# Patient Record
Sex: FEMALE | Race: BLACK OR AFRICAN AMERICAN | NOT HISPANIC OR LATINO | Employment: OTHER | URBAN - METROPOLITAN AREA
[De-identification: names, ages, dates, MRNs, and addresses within clinical notes are randomized per-mention and may not be internally consistent; named-entity substitution may affect disease eponyms.]

---

## 2017-01-03 ENCOUNTER — OFFICE VISIT (OUTPATIENT)
Dept: OPTOMETRY | Facility: CLINIC | Age: 72
End: 2017-01-03
Payer: MEDICARE

## 2017-01-03 DIAGNOSIS — H43.813 PVD (POSTERIOR VITREOUS DETACHMENT), BILATERAL: ICD-10-CM

## 2017-01-03 DIAGNOSIS — H52.7 REFRACTIVE ERROR: ICD-10-CM

## 2017-01-03 DIAGNOSIS — E11.49 TYPE II DIABETES MELLITUS WITH NEUROLOGICAL MANIFESTATIONS: ICD-10-CM

## 2017-01-03 DIAGNOSIS — E11.9 TYPE 2 DIABETES MELLITUS WITHOUT OPHTHALMIC MANIFESTATIONS: ICD-10-CM

## 2017-01-03 DIAGNOSIS — H26.9 CORTICAL CATARACT OF BOTH EYES: ICD-10-CM

## 2017-01-03 DIAGNOSIS — H25.13 NS (NUCLEAR SCLEROSIS), BILATERAL: ICD-10-CM

## 2017-01-03 DIAGNOSIS — I10 HTN (HYPERTENSION), BENIGN: Primary | ICD-10-CM

## 2017-01-03 PROCEDURE — 92014 COMPRE OPH EXAM EST PT 1/>: CPT | Mod: S$GLB,,, | Performed by: OPTOMETRIST

## 2017-01-03 PROCEDURE — 99999 PR PBB SHADOW E&M-EST. PATIENT-LVL III: CPT | Mod: PBBFAC,,, | Performed by: OPTOMETRIST

## 2017-01-03 PROCEDURE — 92015 DETERMINE REFRACTIVE STATE: CPT | Mod: S$GLB,,, | Performed by: OPTOMETRIST

## 2017-01-03 NOTE — PROGRESS NOTES
HPI     Patient's age: 71 y.o.    Approximate date of last eye examination:  11/24/15  Name of last eye doctor seen: Dr. Real    Pt states that she is here for annual exam, not having any trouble with   eyes.    Wears glasses? readers       Wears CLs?:  no            Headaches?  no  Eye pain/discomfort?  no                                                                                     Flashes?  no  Floaters?  no  Diplopia/Double vision?  no    Patient's Ocular History:         Any eye surgeries? no        Family history of eye disease?  no    Significant patient medical history:         1. Diabetes?  yes       If yes, IDDM or NIDDM?  NIDDM   2. HBP?  Yes, medication and diet control                 ! OTC eyedrops currently using:  none   ! Prescription eye meds currently using:  none           Last edited by Kathe Mar MA on 1/3/2017 10:13 AM.         Assessment /Plan     For exam results, see Encounter Report.    Type II diabetes mellitus with neurological manifestations  HTN (hypertension), benign  No retinopathy, monitor yearly     Nuclear sclerosis, bilateral  Cortical cataract of both eyes  Mild, not visually significant     PVD (posterior vitreous detachment), bilateral  Stable  Refractive error  Ok to continue with readers      RTC 1 year, sooner PRN

## 2017-01-13 ENCOUNTER — OFFICE VISIT (OUTPATIENT)
Dept: INTERNAL MEDICINE | Facility: CLINIC | Age: 72
End: 2017-01-13
Payer: MEDICARE

## 2017-01-13 VITALS
DIASTOLIC BLOOD PRESSURE: 75 MMHG | SYSTOLIC BLOOD PRESSURE: 146 MMHG | HEIGHT: 66 IN | WEIGHT: 190 LBS | BODY MASS INDEX: 30.53 KG/M2 | RESPIRATION RATE: 17 BRPM | HEART RATE: 79 BPM

## 2017-01-13 DIAGNOSIS — Z12.31 VISIT FOR SCREENING MAMMOGRAM: ICD-10-CM

## 2017-01-13 DIAGNOSIS — E78.2 HYPERLIPIDEMIA, MIXED: ICD-10-CM

## 2017-01-13 DIAGNOSIS — E04.2 MULTIPLE THYROID NODULES: ICD-10-CM

## 2017-01-13 DIAGNOSIS — I10 HTN (HYPERTENSION), BENIGN: ICD-10-CM

## 2017-01-13 DIAGNOSIS — E11.49 TYPE II DIABETES MELLITUS WITH NEUROLOGICAL MANIFESTATIONS: Primary | ICD-10-CM

## 2017-01-13 DIAGNOSIS — K21.9 GASTROESOPHAGEAL REFLUX DISEASE, ESOPHAGITIS PRESENCE NOT SPECIFIED: ICD-10-CM

## 2017-01-13 DIAGNOSIS — R20.2 PARESTHESIA: ICD-10-CM

## 2017-01-13 LAB
BACTERIA #/AREA URNS AUTO: ABNORMAL /HPF
BILIRUB UR QL STRIP: NEGATIVE
CLARITY UR REFRACT.AUTO: CLEAR
COLOR UR AUTO: YELLOW
CREAT UR-MCNC: 67 MG/DL
GLUCOSE UR QL STRIP: NEGATIVE
HGB UR QL STRIP: NEGATIVE
HYALINE CASTS UR QL AUTO: 1 /LPF
KETONES UR QL STRIP: NEGATIVE
LEUKOCYTE ESTERASE UR QL STRIP: ABNORMAL
MICROALBUMIN UR DL<=1MG/L-MCNC: 107 UG/ML
MICROALBUMIN/CREATININE RATIO: 159.7 UG/MG
MICROSCOPIC COMMENT: ABNORMAL
NITRITE UR QL STRIP: POSITIVE
PH UR STRIP: 6 [PH] (ref 5–8)
PROT UR QL STRIP: NEGATIVE
RBC #/AREA URNS AUTO: 1 /HPF (ref 0–4)
SP GR UR STRIP: 1.01 (ref 1–1.03)
SQUAMOUS #/AREA URNS AUTO: 1 /HPF
URN SPEC COLLECT METH UR: ABNORMAL
UROBILINOGEN UR STRIP-ACNC: NEGATIVE EU/DL
WBC #/AREA URNS AUTO: 26 /HPF (ref 0–5)

## 2017-01-13 PROCEDURE — 99499 UNLISTED E&M SERVICE: CPT | Mod: S$GLB,,, | Performed by: INTERNAL MEDICINE

## 2017-01-13 PROCEDURE — 4010F ACE/ARB THERAPY RXD/TAKEN: CPT | Mod: S$GLB,,, | Performed by: INTERNAL MEDICINE

## 2017-01-13 PROCEDURE — 99214 OFFICE O/P EST MOD 30 MIN: CPT | Mod: S$GLB,,, | Performed by: INTERNAL MEDICINE

## 2017-01-13 PROCEDURE — 1125F AMNT PAIN NOTED PAIN PRSNT: CPT | Mod: S$GLB,,, | Performed by: INTERNAL MEDICINE

## 2017-01-13 PROCEDURE — 3044F HG A1C LEVEL LT 7.0%: CPT | Mod: S$GLB,,, | Performed by: INTERNAL MEDICINE

## 2017-01-13 PROCEDURE — 81001 URINALYSIS AUTO W/SCOPE: CPT

## 2017-01-13 PROCEDURE — 3077F SYST BP >= 140 MM HG: CPT | Mod: S$GLB,,, | Performed by: INTERNAL MEDICINE

## 2017-01-13 PROCEDURE — 99999 PR PBB SHADOW E&M-EST. PATIENT-LVL IV: CPT | Mod: PBBFAC,,, | Performed by: INTERNAL MEDICINE

## 2017-01-13 PROCEDURE — 82570 ASSAY OF URINE CREATININE: CPT

## 2017-01-13 PROCEDURE — 3078F DIAST BP <80 MM HG: CPT | Mod: S$GLB,,, | Performed by: INTERNAL MEDICINE

## 2017-01-13 PROCEDURE — 1160F RVW MEDS BY RX/DR IN RCRD: CPT | Mod: S$GLB,,, | Performed by: INTERNAL MEDICINE

## 2017-01-13 PROCEDURE — 1159F MED LIST DOCD IN RCRD: CPT | Mod: S$GLB,,, | Performed by: INTERNAL MEDICINE

## 2017-01-13 PROCEDURE — 1157F ADVNC CARE PLAN IN RCRD: CPT | Mod: S$GLB,,, | Performed by: INTERNAL MEDICINE

## 2017-01-13 RX ORDER — HYDROCODONE BITARTRATE AND ACETAMINOPHEN 5; 325 MG/1; MG/1
TABLET ORAL
Qty: 30 TABLET | Refills: 0 | Status: SHIPPED | OUTPATIENT
Start: 2017-01-13 | End: 2017-04-24 | Stop reason: SDUPTHER

## 2017-01-13 RX ORDER — LOSARTAN POTASSIUM AND HYDROCHLOROTHIAZIDE 25; 100 MG/1; MG/1
1 TABLET ORAL DAILY
Qty: 90 TABLET | Refills: 3 | Status: SHIPPED | OUTPATIENT
Start: 2017-01-13 | End: 2018-02-06 | Stop reason: SDUPTHER

## 2017-01-13 NOTE — MR AVS SNAPSHOT
[facility] [address]  [address]  [address]  [facility] [address]  Phone: [phone]                  Lina Davis   2017 8:00 AM   Office Visit    Description:  Female : 1945   Provider:  Dora Freedman MD   Department:  [facility] [address]           Reason for Visit     Follow-up           Diagnoses this Visit        Comments    Type II diabetes mellitus with neurological manifestations    -  Primary     HTN (hypertension), benign         Hyperlipidemia, mixed         Gastroesophageal reflux disease, esophagitis presence not specified         Multiple thyroid nodules         Visit for screening mammogram         Paresthesia                To Do List           Future Appointments        Provider Department Dept Phone    2017 10:00 AM [facility] [id number] SCREEN INT MED [facility] [phone]    2017 10:00 AM Kerwin Desir III, MD [facility] - Neurology [phone]    2017 10:00 AM LAB, APPOINTMENT [place] [facility] [phone]    2017 10:00 AM Dora Freedman MD [facility] [address] [phone]      Goals (5 Years of Data)     None       These Medications        Disp Refills Start End    blood sugar diagnostic (TRUE METRIX GLUCOSE TEST STRIP) Strp 200 strip 2 2017     Please give True metrix test strips to use as directed to monitor blood sugars BID    [facility] [address] [facility] Ph #: [phone]       losartan-hydrochlorothiazide 100-25 mg (HYZAAR) 100-25 mg per tablet 90 tablet 3 2017    Take 1 tablet by mouth once daily. - Oral    Ph[facility]acy: [facility] [address] [facility] Ph #: [phone]       hydrocodone-acetaminophen 5-325mg (NORCO) 5-325 mg per tablet 30 tablet 0 2017     One daily as needed for pain    Pharmacy: [facility] -  [place], [place] - [address] [facility] #: [phone]         [facility]     [facility] - 24/7 Assistance  Registered nurses in the [facility] provide clinical advisement, health education, appointment booking, and other advisory services.  Call for this free service at [phone].             Medications           Message regarding Medications     Verify the changes and/or additions to your medication regime listed below are the same as discussed with your clinician today.  If any of these changes or additions are incorrect, please notify your healthcare provider.        START taking these NEW medications        Refills    losartan-hydrochlorothiazide 100-25 mg (HYZAAR) 100-25 mg per tablet 3    Sig: Take 1 tablet by mouth once daily.    Class: Normal    Route: Oral      STOP taking these medications     hydrochlorothiazide (MICROZIDE) 12.5 mg capsule Take 1 capsule (12.5 mg total) by mouth once daily.    losartan-hydrochlorothiazide 100-12.5 mg (HYZAAR) 100-12.5 mg Tab Take 1 tablet by mouth once daily.           Verify that the below list of medications is an accurate representation of the medications you are currently taking.  If none reported, the list may be blank. If incorrect, please contact your healthcare provider. Carry this list with you in case of emergency.           Current Medications     allopurinol (ZYLOPRIM) 300 MG tablet 1 Tablet Oral Every day    aspirin (ASPIRIN LOW DOSE) 81 MG EC tablet Take by mouth. 1 Tablet, Delayed Release (E.C.) Oral Every day    BD ALCOHOL SWABS PadM USE AS DIRECTED TO MONITOR BLOOD SUGARS TWICE DAILY    blood sugar diagnostic (TRUE METRIX GLUCOSE TEST STRIP) Strp Please give True metrix test strips to use as directed to monitor blood sugars BID    blood-glucose meter (TRUE METRIX AIR GLUCOSE METER) Misc Please give a True metrix air glucose meter to use as directed to monitor blood sugars BID    hydrocodone-acetaminophen  "5-325mg (NORCO) 5-325 mg per tablet One daily as needed for pain    lancets (TRUEPLUS LANCETS) 28 gauge Misc 1 lancet by Misc.(Non-Drug; Combo Route) route 2 (two) times daily.    lovastatin (MEVACOR) 40 MG tablet Take 1 tablet (40 mg total) by mouth every evening.    metformin (GLUCOPHAGE) 500 MG tablet Take 2 tablets (1,000 mg total) by mouth 2 (two) times daily with meals.    omeprazole (PRILOSEC) 20 MG capsule One capsule twice daily    PRODIGY CONTROL SOLUTION, LOW Soln     sitagliptin (JANUVIA) 100 MG Tab TAKE 1 TABLET ONE TIME DAILY    losartan-hydrochlorothiazide 100-25 mg (HYZAAR) 100-25 mg per tablet Take 1 tablet by mouth once daily.           Clinical Reference Information           Vital Signs - Last Recorded  Most recent update: 1/13/2017  8:22 AM by Amanda Carias MA    BP Pulse Resp Ht Wt BMI    (!) 146/75 79 17 5' 6" (1.676 m) 86.2 kg (190 lb) 30.67 kg/m2      Blood Pressure          Most Recent Value    BP  (!)  146/75      Allergies as of 1/13/2017     No Known Drug Allergies      Immunizations Administered on Date of Encounter - 1/13/2017     None      Orders Placed During Today's Visit      Normal Orders This Visit    Microalbumin/creatinine urine ratio     Urinalysis     Future Labs/Procedures Expected by Expires    Hemoglobin A1c  1/13/2017 1/13/2018    Mammo Digital Screening Bilat with CAD  1/13/2017 3/16/2018    US Fine Needle Aspiration Thyroid Multi site (xpd)  1/13/2017 1/13/2018    EMG w/ Ultrasound  As directed 1/13/2018      "

## 2017-01-16 ENCOUNTER — HOSPITAL ENCOUNTER (OUTPATIENT)
Dept: RADIOLOGY | Facility: HOSPITAL | Age: 72
Discharge: HOME | End: 2017-01-16
Attending: INTERNAL MEDICINE
Payer: MEDICARE

## 2017-01-16 DIAGNOSIS — Z12.31 VISIT FOR SCREENING MAMMOGRAM: ICD-10-CM

## 2017-01-16 PROCEDURE — 77063 BREAST TOMOSYNTHESIS BI: CPT | Mod: 26,,, | Performed by: RADIOLOGY

## 2017-01-16 PROCEDURE — 77067 SCR MAMMO BI INCL CAD: CPT | Mod: TC

## 2017-01-16 PROCEDURE — 77067 SCR MAMMO BI INCL CAD: CPT | Mod: 26,,, | Performed by: RADIOLOGY

## 2017-01-29 NOTE — PROGRESS NOTES
Subjective:       Patient ID: Lina Davis is a 71 y.o. female.    Chief Complaint: Follow-up    HPIPt stomach is feeling better - no CP or SOB.  Some chronic shoulder pain but some new numbness/tingling in fingers.  No weakness.  Review of Systems   Respiratory: Negative for shortness of breath (PND or orthopnea).    Cardiovascular: Negative for chest pain (arm pain or jaw pain).   Gastrointestinal: Negative for abdominal pain, diarrhea, nausea and vomiting.   Genitourinary: Negative for dysuria.   Neurological: Negative for seizures, syncope and headaches.       Objective:      Physical Exam   Constitutional: She is oriented to person, place, and time. She appears well-developed and well-nourished. No distress.   HENT:   Head: Normocephalic.   Mouth/Throat: Oropharynx is clear and moist.   Neck: Neck supple. No JVD present. No thyromegaly present.   Cardiovascular: Normal rate, regular rhythm, normal heart sounds and intact distal pulses.  Exam reveals no gallop and no friction rub.    No murmur heard.  Pulmonary/Chest: Effort normal and breath sounds normal. She has no wheezes. She has no rales.   Abdominal: Soft. Bowel sounds are normal. She exhibits no distension and no mass. There is no tenderness. There is no rebound and no guarding.   Musculoskeletal: She exhibits no edema.   Lymphadenopathy:     She has no cervical adenopathy.   Neurological: She is alert and oriented to person, place, and time.   Skin: Skin is warm and dry.   Psychiatric: She has a normal mood and affect. Her behavior is normal. Judgment and thought content normal.       Assessment:       1. Type II diabetes mellitus with neurological manifestations    2. HTN (hypertension), benign    3. Hyperlipidemia, mixed    4. Gastroesophageal reflux disease, esophagitis presence not specified    5. Multiple thyroid nodules    6. Visit for screening mammogram    7. Paresthesia        Plan:   Type II diabetes mellitus with neurological  manifestations  -     Hemoglobin A1c; Future; Expected date: 1/13/17  -     Urinalysis  -     Microalbumin/creatinine urine ratio    HTN (hypertension), benign  Uncontrolled increase to [id number]/25  Hyperlipidemia, mixed  Controlled - continue current meds    Gastroesophageal reflux disease, esophagitis presence not specified  Controlled - continue current meds    Multiple thyroid nodules  -     US Fine Needle Aspiration Thyroid Multi site (xpd); Future; Expected date: 1/13/17    Visit for screening mammogram  -     Cancel: Mammo Digital Screening Bilat with CAD; Future; Expected date: 1/13/17    Paresthesia  -     EMG w/ Ultrasound; Future    Other orders  -     blood sugar diagnostic (TRUE METRIX GLUCOSE TEST STRIP) Strp; Please give True metrix test strips to use as directed to monitor blood sugars BID  Dispense: 200 strip; Refill: 2  -     losartan-hydrochlorothiazide 100-25 mg (HYZAAR) 100-25 mg per tablet; Take 1 tablet by mouth once daily.  Dispense: 90 tablet; Refill: 3  -     hydrocodone-acetaminophen 5-325mg (NORCO) 5-325 mg per tablet; One daily as needed for pain  Dispense: 30 tablet; Refill: 0  -     Urinalysis Microscopic

## 2017-02-20 ENCOUNTER — PROCEDURE VISIT (OUTPATIENT)
Dept: NEUROLOGY | Facility: CLINIC | Age: 72
End: 2017-02-20
Payer: MEDICARE

## 2017-02-20 DIAGNOSIS — R20.2 PARESTHESIA: ICD-10-CM

## 2017-02-20 PROCEDURE — 95886 MUSC TEST DONE W/N TEST COMP: CPT | Mod: S$GLB,,,

## 2017-02-20 PROCEDURE — 95909 NRV CNDJ TST 5-6 STUDIES: CPT | Mod: S$GLB,,,

## 2017-02-20 NOTE — PROCEDURES
Procedures     See the copy of this report that has been scanned into patient's Epic Chart.     EMILY REYNOLDS III, MD

## 2017-02-27 RX ORDER — LANCETS 28 GAUGE
1 EACH MISCELLANEOUS 2 TIMES DAILY
Qty: 200 EACH | Refills: 2 | Status: SHIPPED | OUTPATIENT
Start: 2017-02-27

## 2017-03-14 ENCOUNTER — PATIENT MESSAGE (OUTPATIENT)
Dept: ENDOCRINOLOGY | Facility: CLINIC | Age: 72
End: 2017-03-14

## 2017-03-18 DIAGNOSIS — E78.5 HYPERLIPIDEMIA: ICD-10-CM

## 2017-03-18 RX ORDER — BLOOD-GLUCOSE METER
EACH MISCELLANEOUS
Qty: 1 EACH | Refills: 0 | Status: SHIPPED | OUTPATIENT
Start: 2017-03-18 | End: 2017-07-31 | Stop reason: SDUPTHER

## 2017-03-19 RX ORDER — LOVASTATIN 40 MG/1
TABLET ORAL
Qty: 90 TABLET | Refills: 3 | Status: SHIPPED | OUTPATIENT
Start: 2017-03-19 | End: 2018-02-06 | Stop reason: SDUPTHER

## 2017-04-17 ENCOUNTER — LAB VISIT (OUTPATIENT)
Dept: LAB | Facility: HOSPITAL | Age: 72
End: 2017-04-17
Attending: INTERNAL MEDICINE
Payer: MEDICARE

## 2017-04-17 DIAGNOSIS — E11.49 TYPE II DIABETES MELLITUS WITH NEUROLOGICAL MANIFESTATIONS: ICD-10-CM

## 2017-04-17 LAB
ESTIMATED AVG GLUCOSE: 117 MG/DL
HBA1C MFR BLD HPLC: 5.7 %

## 2017-04-17 PROCEDURE — 83036 HEMOGLOBIN GLYCOSYLATED A1C: CPT

## 2017-04-17 PROCEDURE — 36415 COLL VENOUS BLD VENIPUNCTURE: CPT

## 2017-04-24 ENCOUNTER — OFFICE VISIT (OUTPATIENT)
Dept: INTERNAL MEDICINE | Facility: CLINIC | Age: 72
End: 2017-04-24
Payer: MEDICARE

## 2017-04-24 VITALS
DIASTOLIC BLOOD PRESSURE: 68 MMHG | SYSTOLIC BLOOD PRESSURE: 136 MMHG | WEIGHT: 189.19 LBS | HEART RATE: 87 BPM | HEIGHT: 66 IN | BODY MASS INDEX: 30.41 KG/M2

## 2017-04-24 DIAGNOSIS — M54.12 CERVICAL RADICULOPATHY AT C7: Primary | ICD-10-CM

## 2017-04-24 DIAGNOSIS — I10 HTN (HYPERTENSION), BENIGN: ICD-10-CM

## 2017-04-24 DIAGNOSIS — E11.21 TYPE 2 DIABETES MELLITUS WITH DIABETIC NEPHROPATHY, WITHOUT LONG-TERM CURRENT USE OF INSULIN: ICD-10-CM

## 2017-04-24 DIAGNOSIS — E78.2 HYPERLIPIDEMIA, MIXED: ICD-10-CM

## 2017-04-24 PROCEDURE — 1160F RVW MEDS BY RX/DR IN RCRD: CPT | Mod: S$GLB,,, | Performed by: INTERNAL MEDICINE

## 2017-04-24 PROCEDURE — 4010F ACE/ARB THERAPY RXD/TAKEN: CPT | Mod: S$GLB,,, | Performed by: INTERNAL MEDICINE

## 2017-04-24 PROCEDURE — 99999 PR PBB SHADOW E&M-EST. PATIENT-LVL IV: CPT | Mod: PBBFAC,,, | Performed by: INTERNAL MEDICINE

## 2017-04-24 PROCEDURE — 1125F AMNT PAIN NOTED PAIN PRSNT: CPT | Mod: S$GLB,,, | Performed by: INTERNAL MEDICINE

## 2017-04-24 PROCEDURE — 99499 UNLISTED E&M SERVICE: CPT | Mod: S$GLB,,, | Performed by: INTERNAL MEDICINE

## 2017-04-24 PROCEDURE — 3078F DIAST BP <80 MM HG: CPT | Mod: S$GLB,,, | Performed by: INTERNAL MEDICINE

## 2017-04-24 PROCEDURE — 3075F SYST BP GE 130 - 139MM HG: CPT | Mod: S$GLB,,, | Performed by: INTERNAL MEDICINE

## 2017-04-24 PROCEDURE — 3044F HG A1C LEVEL LT 7.0%: CPT | Mod: S$GLB,,, | Performed by: INTERNAL MEDICINE

## 2017-04-24 PROCEDURE — 99214 OFFICE O/P EST MOD 30 MIN: CPT | Mod: S$GLB,,, | Performed by: INTERNAL MEDICINE

## 2017-04-24 PROCEDURE — 1159F MED LIST DOCD IN RCRD: CPT | Mod: S$GLB,,, | Performed by: INTERNAL MEDICINE

## 2017-04-24 RX ORDER — GABAPENTIN 100 MG/1
CAPSULE ORAL
Qty: 90 CAPSULE | Refills: 3 | Status: SHIPPED | OUTPATIENT
Start: 2017-04-24 | End: 2017-08-08

## 2017-04-24 RX ORDER — HYDROCODONE BITARTRATE AND ACETAMINOPHEN 5; 325 MG/1; MG/1
TABLET ORAL
Qty: 30 TABLET | Refills: 0 | Status: SHIPPED | OUTPATIENT
Start: 2017-04-24 | End: 2017-06-08 | Stop reason: SDUPTHER

## 2017-04-24 NOTE — MR AVS SNAPSHOT
[facility] [address]  [address]  [address]  [facility] [address]  Phone: [phone]                  Lina Davis   2017 10:00 AM   Office Visit    Description:  Female : 1945   Provider:  Dora Freedman MD   Department:  [facility] [address]           Reason for Visit     Follow-up           Diagnoses this Visit        Comments    Cervical radiculopathy at C7    -  Primary            To Do List           Goals (5 Years of Data)     None      Follow-Up and Disposition     Return in about 3 months (around 2017).       These Medications        Disp Refills Start End    gabapentin (NEURONTIN) 100 MG capsule 90 capsule 3 2017     One at bedtime for 4 days then two at bedtime for four days then three indefintiely    [facility] [address] Ph #: [phone]       hydrocodone-acetaminophen 5-325mg (NORCO) 5-325 mg per tablet 30 tablet 0 2017     One daily as needed for pain    [facility] [address] Ph #: [phone]         [facility] On [facility]     [facility] Care [facility] -  Assistance  Unless otherwise directed by your provider, please contact [facility], our nurse care line that is available for  assistance.     Registered nurses in the [facility] provide: appointment scheduling, clinical advisement, health education, and other advisory services.  Call: [phone] (toll free)               Medications           Message regarding Medications     Verify the changes and/or additions to your medication regime listed below are the same as discussed with your clinician today.  If any of these changes or additions are incorrect, please notify your healthcare provider.        START taking these NEW medications        Refills    gabapentin (NEURONTIN) 100 MG capsule 3    Sig: One at bedtime  "for 4 days then two at bedtime for four days then three indefintiely    Class: Normal           Verify that the below list of medications is an accurate representation of the medications you are currently taking.  If none reported, the list may be blank. If incorrect, please contact your healthcare provider. Carry this list with you in case of emergency.           Current Medications     allopurinol (ZYLOPRIM) 300 MG tablet 1 Tablet Oral Every day    aspirin (ASPIRIN LOW DOSE) 81 MG EC tablet Take by mouth. 1 Tablet, Delayed Release (E.C.) Oral Every day    BD ALCOHOL SWABS PadM USE AS DIRECTED TO MONITOR BLOOD SUGARS TWICE DAILY    blood sugar diagnostic (TRUE METRIX GLUCOSE TEST STRIP) Strp Please give True metrix test strips to use as directed to monitor blood sugars BID    blood-glucose meter (TRUE METRIX AIR GLUCOSE METER) Misc Please give a True metrix air glucose meter to use as directed to monitor blood sugars BID    hydrocodone-acetaminophen 5-325mg (NORCO) 5-325 mg per tablet One daily as needed for pain    lancets (TRUEPLUS LANCETS) 28 gauge Misc 1 lancet by Misc.(Non-Drug; Combo Route) route 2 (two) times daily.    losartan-hydrochlorothiazide 100-25 mg (HYZAAR) 100-25 mg per tablet Take 1 tablet by mouth once daily.    lovastatin (MEVACOR) 40 MG tablet TAKE 1 TABLET EVERY EVENING    metformin (GLUCOPHAGE) 500 MG tablet Take 2 tablets (1,000 mg total) by mouth 2 (two) times daily with meals.    omeprazole (PRILOSEC) 20 MG capsule One capsule twice daily    PRODIGY CONTROL SOLUTION, LOW Soln     sitagliptin (JANUVIA) 100 MG Tab TAKE 1 TABLET ONE TIME DAILY    TRUE METRIX AIR GLUCOSE METER kit TEST BLOOD SUGAR TWICE DAILY    gabapentin (NEURONTIN) 100 MG capsule One at bedtime for 4 days then two at bedtime for four days then three indefintiely           Clinical Reference Information           Your Vitals Were     BP Pulse Height Weight BMI    136/68 87 5' 6" (1.676 m) 85.8 kg (189 lb 3.2 oz) 30.54 kg/m2 "      Blood Pressure          Most Recent Value    BP  136/68      Allergies as of 4/24/2017     No Known Drug Allergies      Immunizations Administered on Date of Encounter - 4/24/2017     None      Orders Placed During Today's Visit     Future Labs/Procedures Expected by Expires    Creatinine, serum  4/24/2017 6/23/2018    MRI Cervical Spine W WO Cont  4/24/2017 4/24/2018      Language Assistance Services     ATTENTION: Language assistance services are available, free of charge. Please call [phone].      ATENCIÓN: Si habla michael, tiene a castillo disposición servicios gratuitos de asistencia lingüística. Llame al [phone].     CHÚ Ý: N?u b?n nói Ti?ng Vi?t, có các d?ch v? h? tr? ngôn ng? mi?n phí dành cho b?n. G?i s? [phone].         [facility] [address] complies with applicable Federal civil rights laws and does not discriminate on the basis of race, color, national origin, age, disability, or sex.

## 2017-05-02 NOTE — PROGRESS NOTES
Subjective:       Patient ID: Lina Davis is a 72 y.o. female.    Chief Complaint: Follow-up (HTN)    HPIPt doing well from standpoint of BP and sugar however has burning pain and numbness in first and second fingers of the left hand.  She finds she is dropping some things but only knows she has dropped them when she hears them hit the floor.  Difficulty buttoning clothes etc.  Review of Systems   Respiratory: Negative for shortness of breath (PND or orthopnea).    Cardiovascular: Negative for chest pain (arm pain or jaw pain).   Gastrointestinal: Negative for abdominal pain, diarrhea, nausea and vomiting.   Genitourinary: Negative for dysuria.   Neurological: Negative for seizures, syncope and headaches.       Objective:      Physical Exam   Constitutional: She is oriented to person, place, and time. She appears well-developed and well-nourished. No distress.   HENT:   Head: Normocephalic.   Mouth/Throat: Oropharynx is clear and moist.   Neck: Neck supple. No JVD present. No thyromegaly present.   Cardiovascular: Normal rate, regular rhythm, normal heart sounds and intact distal pulses.  Exam reveals no gallop and no friction rub.    No murmur heard.  Pulmonary/Chest: Effort normal and breath sounds normal. She has no wheezes. She has no rales.   Abdominal: Soft. Bowel sounds are normal. She exhibits no distension and no mass. There is no tenderness. There is no rebound and no guarding.   Musculoskeletal: She exhibits no edema.   5/5 strength in upper extremities of testing some diminished reflexes at the biceps and brachioradialis on the left   Lymphadenopathy:     She has no cervical adenopathy.   Neurological: She is alert and oriented to person, place, and time.   Skin: Skin is warm and dry.   Psychiatric: She has a normal mood and affect. Her behavior is normal. Judgment and thought content normal.       Assessment:       1. Cervical radiculopathy at C7    2. HTN (hypertension), benign    3. Type 2 diabetes  mellitus with diabetic nephropathy, without long-term current use of insulin    4. Hyperlipidemia, mixed        Plan:   Cervical radiculopathy at C7  -     MRI Cervical Spine W WO Cont; Future; Expected date: 4/24/17  -     Creatinine, serum; Future; Expected date: 4/24/17    HTN (hypertension), benign  Controlled - continue current meds    Type 2 diabetes mellitus with diabetic nephropathy, without long-term current use of insulin  Controlled - continue current meds    Hyperlipidemia, mixed  Controlled - continue current meds    Other orders  -     gabapentin (NEURONTIN) 100 MG capsule; One at bedtime for 4 days then two at bedtime for four days then three indefintiely  Dispense: 90 capsule; Refill: 3  -     hydrocodone-acetaminophen 5-325mg (NORCO) 5-325 mg per tablet; One daily as needed for pain  Dispense: 30 tablet; Refill: 0

## 2017-05-24 ENCOUNTER — HOSPITAL ENCOUNTER (OUTPATIENT)
Dept: RADIOLOGY | Facility: HOSPITAL | Age: 72
Discharge: HOME | End: 2017-05-24
Attending: INTERNAL MEDICINE
Payer: MEDICARE

## 2017-05-24 DIAGNOSIS — M54.12 CERVICAL RADICULOPATHY AT C7: ICD-10-CM

## 2017-05-24 PROCEDURE — 72141 MRI NECK SPINE W/O DYE: CPT | Mod: TC

## 2017-05-24 PROCEDURE — 63600175 PHARM REV CODE 636 W HCPCS: Performed by: STUDENT IN AN ORGANIZED HEALTH CARE EDUCATION/TRAINING PROGRAM

## 2017-05-24 PROCEDURE — 72141 MRI NECK SPINE W/O DYE: CPT | Mod: 26,,, | Performed by: RADIOLOGY

## 2017-05-24 RX ORDER — ALLOPURINOL 300 MG/1
TABLET ORAL
Qty: 90 TABLET | Refills: 3 | Status: SHIPPED | OUTPATIENT
Start: 2017-05-24 | End: 2018-09-12 | Stop reason: SDUPTHER

## 2017-05-24 RX ORDER — MIDAZOLAM HYDROCHLORIDE 1 MG/ML
2 INJECTION INTRAMUSCULAR; INTRAVENOUS ONCE
Status: COMPLETED | OUTPATIENT
Start: 2017-05-24 | End: 2017-05-24

## 2017-05-24 RX ADMIN — MIDAZOLAM HYDROCHLORIDE 2 MG: 1 INJECTION, SOLUTION INTRAMUSCULAR; INTRAVENOUS at 10:05

## 2017-05-24 NOTE — NURSING
MD ordered versed 2mg IV to be given to pt in MRI as anxiolytic, NKDA, pts  present in waiting area & will drive pt home, pt instructed no driving until godfrey am, pt verbalized she takes pain medicine @ night only, pt denies taking antianxiety medicine, pt verbalized understanding to all above

## 2017-05-25 RX ORDER — DEXTROSE 4 G
TABLET,CHEWABLE ORAL
Qty: 1 EACH | Refills: 0 | Status: SHIPPED | OUTPATIENT
Start: 2017-05-25 | End: 2017-11-17 | Stop reason: SDUPTHER

## 2017-05-25 NOTE — TELEPHONE ENCOUNTER
----- Message from Luisa Page MA sent at 5/25/2017 12:00 PM CDT -----  Contact: [phone]  Type: Rx    Name of medication(s): blood-glucose meter (TRUE METRIX AIR GLUCOSE METER) Misc    Is this a refill? New rx? Refill    Who prescribed medication?    Pharmacy Name, Phone, & Location: [facility] - [place], [place] - [address]    Comments: Please advise. Thanks!

## 2017-06-08 ENCOUNTER — OFFICE VISIT (OUTPATIENT)
Dept: ORTHOPEDICS | Facility: CLINIC | Age: 72
End: 2017-06-08
Payer: MEDICARE

## 2017-06-08 ENCOUNTER — HOSPITAL ENCOUNTER (OUTPATIENT)
Dept: RADIOLOGY | Facility: HOSPITAL | Age: 72
Discharge: HOME | End: 2017-06-08
Attending: NURSE PRACTITIONER
Payer: MEDICARE

## 2017-06-08 VITALS — WEIGHT: 185.63 LBS | HEIGHT: 66 IN | BODY MASS INDEX: 29.83 KG/M2

## 2017-06-08 DIAGNOSIS — M25.561 RIGHT KNEE PAIN, UNSPECIFIED CHRONICITY: Primary | ICD-10-CM

## 2017-06-08 DIAGNOSIS — M17.11 PRIMARY OSTEOARTHRITIS OF RIGHT KNEE: ICD-10-CM

## 2017-06-08 DIAGNOSIS — M25.561 RIGHT KNEE PAIN, UNSPECIFIED CHRONICITY: ICD-10-CM

## 2017-06-08 PROCEDURE — 1159F MED LIST DOCD IN RCRD: CPT | Mod: S$GLB,,, | Performed by: NURSE PRACTITIONER

## 2017-06-08 PROCEDURE — 99999 PR PBB SHADOW E&M-EST. PATIENT-LVL III: CPT | Mod: PBBFAC,,, | Performed by: NURSE PRACTITIONER

## 2017-06-08 PROCEDURE — 73560 X-RAY EXAM OF KNEE 1 OR 2: CPT | Mod: TC,LT

## 2017-06-08 PROCEDURE — 1125F AMNT PAIN NOTED PAIN PRSNT: CPT | Mod: S$GLB,,, | Performed by: NURSE PRACTITIONER

## 2017-06-08 PROCEDURE — 20610 DRAIN/INJ JOINT/BURSA W/O US: CPT | Mod: RT,S$GLB,, | Performed by: NURSE PRACTITIONER

## 2017-06-08 PROCEDURE — 73562 X-RAY EXAM OF KNEE 3: CPT | Mod: 26,RT,, | Performed by: RADIOLOGY

## 2017-06-08 PROCEDURE — 73560 X-RAY EXAM OF KNEE 1 OR 2: CPT | Mod: 26,59,LT, | Performed by: RADIOLOGY

## 2017-06-08 PROCEDURE — 99499 UNLISTED E&M SERVICE: CPT | Mod: S$GLB,,, | Performed by: NURSE PRACTITIONER

## 2017-06-08 PROCEDURE — 99214 OFFICE O/P EST MOD 30 MIN: CPT | Mod: 25,S$GLB,, | Performed by: NURSE PRACTITIONER

## 2017-06-08 RX ORDER — HYDROCODONE BITARTRATE AND ACETAMINOPHEN 5; 325 MG/1; MG/1
TABLET ORAL
Qty: 30 TABLET | Refills: 0 | Status: SHIPPED | OUTPATIENT
Start: 2017-06-08 | End: 2017-08-08 | Stop reason: SDUPTHER

## 2017-06-08 RX ORDER — ACETAMINOPHEN 325 MG/1
325 TABLET ORAL EVERY 6 HOURS PRN
Status: ON HOLD | COMMUNITY
End: 2020-03-20 | Stop reason: HOSPADM

## 2017-06-09 NOTE — PROGRESS NOTES
CC: Pain of the Right Knee      HPI: Pt with right knee pain for the past month and 1/2. The pain is global with swelling to the posterior knee. The pain is aching and worse with activity. She has taken tylenol without improvement. She doesn't take NSAIDS due to her diabetes. She is ambulating without assistive device. There is not a limp.    ROS  General: denies fever and chills  Resp: no c/o sob  CVS: no c/o cp  MSK: c/o right knee pain and swelling    PE  General: AAOx3, pleasant and cooperative  Resp: respirations even and unlabored  MSK: right knee exam  0 degrees extension  120 degrees flexion  No warmth or erythema   - effusion    Xray:  Ordered and reviewed by me: There is mild DJD and a mild varus deformity.  There are large spurs on the patella.  No fracture dislocation bone destruction seen.    Assessment:  Right knee djd    Plan:  Cortisone injection right knee today  RICE  Tylenol prn  F/u as needed    Knee Injection Procedure Note    Pre-operative Diagnosis: right knee degenerative arthritis    Post-operative Diagnosis: same    Indications: right knee pain    Anesthesia: none    Procedure Details     Verbal consent was obtained for the procedure. The injection site was identified and the skin was prepared with alcohol. The right knee was injected from an anterolateral approach with 1 ml of Kenalog and 5 ml Lidocaine under sterile technique using a 22 gauge needle. The needle was removed and the area cleansed and dressed.    Complications:  None; patient tolerated the procedure well.    she was advised to rest the knee today, using ice and elevation as needed for comfort and swelling. she did receive immediate relief of the knee pain. she was told this would be short lived and is secondary to the lidocaine. she may have an increase in discomfort tonight followed by steady improvement over the next several days. It may take 1-3 weeks following the injection to get the full benefit of the  medication.

## 2017-06-12 RX ORDER — TRIAMCINOLONE ACETONIDE 40 MG/ML
40 INJECTION, SUSPENSION INTRA-ARTICULAR; INTRAMUSCULAR
Status: DISCONTINUED | OUTPATIENT
Start: 2017-06-08 | End: 2017-10-13 | Stop reason: HOSPADM

## 2017-06-22 ENCOUNTER — OFFICE VISIT (OUTPATIENT)
Dept: INTERNAL MEDICINE | Facility: CLINIC | Age: 72
End: 2017-06-22
Payer: MEDICARE

## 2017-06-22 ENCOUNTER — HOSPITAL ENCOUNTER (OUTPATIENT)
Dept: RADIOLOGY | Facility: HOSPITAL | Age: 72
Discharge: HOME | End: 2017-06-22
Attending: INTERNAL MEDICINE
Payer: MEDICARE

## 2017-06-22 VITALS
DIASTOLIC BLOOD PRESSURE: 59 MMHG | TEMPERATURE: 99 F | HEART RATE: 90 BPM | WEIGHT: 183.19 LBS | HEIGHT: 67 IN | BODY MASS INDEX: 28.75 KG/M2 | OXYGEN SATURATION: 98 % | SYSTOLIC BLOOD PRESSURE: 134 MMHG

## 2017-06-22 DIAGNOSIS — R05.9 COUGH: ICD-10-CM

## 2017-06-22 DIAGNOSIS — J32.9 SINUSITIS, UNSPECIFIED CHRONICITY, UNSPECIFIED LOCATION: Primary | ICD-10-CM

## 2017-06-22 DIAGNOSIS — J20.9 ACUTE BRONCHITIS, UNSPECIFIED ORGANISM: ICD-10-CM

## 2017-06-22 PROCEDURE — 99499 UNLISTED E&M SERVICE: CPT | Mod: S$GLB,,, | Performed by: PHYSICIAN ASSISTANT

## 2017-06-22 PROCEDURE — 1159F MED LIST DOCD IN RCRD: CPT | Mod: S$GLB,,, | Performed by: PHYSICIAN ASSISTANT

## 2017-06-22 PROCEDURE — 1126F AMNT PAIN NOTED NONE PRSNT: CPT | Mod: S$GLB,,, | Performed by: PHYSICIAN ASSISTANT

## 2017-06-22 PROCEDURE — 99999 PR PBB SHADOW E&M-EST. PATIENT-LVL V: CPT | Mod: PBBFAC,,, | Performed by: PHYSICIAN ASSISTANT

## 2017-06-22 PROCEDURE — 71020 XR CHEST PA AND LATERAL: CPT | Mod: 26,,, | Performed by: RADIOLOGY

## 2017-06-22 PROCEDURE — 99213 OFFICE O/P EST LOW 20 MIN: CPT | Mod: S$GLB,,, | Performed by: PHYSICIAN ASSISTANT

## 2017-06-22 PROCEDURE — 71020 XR CHEST PA AND LATERAL: CPT | Mod: TC

## 2017-06-22 RX ORDER — BENZONATATE 100 MG/1
100 CAPSULE ORAL 3 TIMES DAILY PRN
Qty: 30 CAPSULE | Refills: 0 | Status: SHIPPED | OUTPATIENT
Start: 2017-06-22 | End: 2017-06-22 | Stop reason: SDUPTHER

## 2017-06-22 RX ORDER — BENZONATATE 100 MG/1
100 CAPSULE ORAL 3 TIMES DAILY PRN
Qty: 30 CAPSULE | Refills: 0 | Status: SHIPPED | OUTPATIENT
Start: 2017-06-22 | End: 2017-06-30 | Stop reason: SDUPTHER

## 2017-06-22 RX ORDER — DOXYCYCLINE 100 MG/1
100 CAPSULE ORAL EVERY 12 HOURS
Qty: 20 CAPSULE | Refills: 0 | Status: SHIPPED | OUTPATIENT
Start: 2017-06-22 | End: 2017-08-08

## 2017-06-22 NOTE — PATIENT INSTRUCTIONS
What Is Acute Bronchitis?  Acute or short-term bronchitis last for days or weeks. It occurs when the bronchial tubes (airways in the lungs) are irritated by a virus, bacteria, or allergen. This causes a cough that produces yellow or greenish mucus.  Inside healthy lungs    Air travels in and out of the lungs through the airways. The linings of these airways produce sticky mucus. This mucus traps particles that enter the lungs. Tiny structures called cilia then sweep the particles out of the airways.     Healthy airway: Airways are normally open. Air moves in and out easily.      Healthy cilia: Tiny, hairlike cilia sweep mucus and particles up and out of the airways.   Lungs with bronchitis  Bronchitis often occurs with a cold or the flu virus. The airways become inflamed (red and swollen). There is a deep hacking cough from the extra mucus. Other symptoms may include:  · Wheezing  · Chest discomfort  · Shortness of breath  · Mild fever  A second infection, this time due to bacteria, may then occur. And airways irritated by allergens or smoke are more likely to get infected.        Inflamed airway: Inflammation and extra mucus narrow the airway, causing shortness of breath.      Impaired cilia: Extra mucus impairs cilia, causing congestion and wheezing. Smoking makes the problem worse.   Making a diagnosis  A physical exam, health history, and certain tests help your healthcare provider make the diagnosis.  Health history  Your healthcare provider will ask you about your symptoms.  The exam  Your provider listens to your chest for signs of congestion. He or she may also check your ears, nose, and throat.  Possible tests  · A sputum test for bacteria. This requires a sample of mucus from the lungs.  · A nasal or throat swab for bacterial infection.  · A chest X-ray if your healthcare provider thinks you have pneumonia.  · Tests to check for an underlying condition, such as allergies, asthma, or COPD. You may need  to see a specialist for more lung function testing.  Treating a cough  The main treatment for bronchitis is easing symptoms. Avoiding smoke, allergens, and other things that trigger coughing can often help. If the infection is bacterial, you may be given antibiotics. During the illness, it's important to get plenty of sleep. To ease symptoms:  · Dont smoke, and avoid secondhand smoke.  · Use a humidifier, or breathe in steam from a hot shower. This may help loosen mucus.  · Drink a lot of water and juice. They can soothe the throat and may help thin mucus.  · Sit up or use extra pillows when in bed to help lessen coughing and congestion.  · Ask your provider about using cough medicine, pain and fever medicine, or a decongestant.  Antibiotics  Most cases of bronchitis are caused by cold or flu viruses. Antibiotics dont treat viral illness. Taking antibiotics when they are not needed increases your risk of getting an infection later that is antibiotic-resistant. Your provider will prescribe antibiotics if the infection is caused by bacteria. If they are prescribed:  · Take the medicine until it is used up, even if symptoms have improved. If you dont, the bronchitis may come back.  · Take them as directed. For instance, some medicines should be taken with food.  · Ask your provider or pharmacist what side effects are common, and what to do about them.  Follow-up care  You should see your provider again in 2 to 3 weeks. By this time, symptoms should have improved. An infection that lasts longer may mean you have a more serious problem.  Prevention  · Avoid tobacco smoke. If you smoke, quit. Stay away from smoky places. Ask friends and family not to smoke around you, or in your home or car.  · Get checked for allergies.  · Ask your provider about getting a yearly flu shot, and pneumococcal or pneumonia shots.  · Wash your hands often. This helps reduce the chance of picking up viruses that cause colds and flu.  Call  your healthcare provider if:  · Symptoms worsen, or new symptoms develop.  · Breathing problems worsen or  become severe.  · Symptoms dont get better within a week, or within 3 days of taking antibiotics.   Date Last Reviewed: 6/18/2014  © 6835-3426 [employer]. [address] [place] [address], [place] [address]. All rights reserved. This information is not intended as a substitute for professional medical care. Always follow your healthcare professional's instructions.

## 2017-06-22 NOTE — PROGRESS NOTES
Subjective:       Patient ID: Lina Davis is a 72 y.o. female.        Chief Complaint: Cough; Fever; and Nasal Congestion    Lina Davis is an established patient of Dora Freedman MD here today for urgent care visit.    Sx started 5 days ago.  Nasal congestion, purulent nasal discharge, cough productive of thick white mucus.  No shortness of breath.  No chest pain.  Feels congestion in her chest.  Also with sore throat.  She has had some sweating.  No chills.      No N/V/D/C.  No abdominal pain.  No urinary sx.  No leg swelling.      Past medical history significant for HTN, DM, gout, hyperlipidemia.      Often can use Mucinex to improve sx in a couple days but sx this time have been persistent.      DM-blood sugar 143 this morning.             Review of Systems   Constitutional: Positive for diaphoresis. Negative for chills, fatigue and fever.   HENT: Positive for congestion and sore throat.    Eyes: Negative for visual disturbance.   Respiratory: Positive for cough. Negative for chest tightness and shortness of breath.    Cardiovascular: Negative for chest pain, palpitations and leg swelling.   Gastrointestinal: Negative for abdominal pain, blood in stool, constipation, diarrhea, nausea and vomiting.   Genitourinary: Negative for dysuria, frequency, hematuria and urgency.   Musculoskeletal: Negative for arthralgias and back pain.   Skin: Negative for rash.   Neurological: Negative for dizziness, syncope, weakness and headaches.   Psychiatric/Behavioral: Negative for dysphoric mood and sleep disturbance. The patient is not nervous/anxious.        Objective:      Physical Exam   Constitutional: She appears well-developed and well-nourished. No distress.   HENT:   Head: Normocephalic and atraumatic.   Right Ear: Tympanic membrane and external ear normal.   Left Ear: Tympanic membrane and external ear normal.   Nose: Mucosal edema and rhinorrhea present. Right sinus exhibits maxillary sinus tenderness. Right  sinus exhibits no frontal sinus tenderness. Left sinus exhibits maxillary sinus tenderness. Left sinus exhibits no frontal sinus tenderness.   Mouth/Throat: Oropharynx is clear and moist.   Eyes: Conjunctivae are normal. Pupils are equal, round, and reactive to light.   Cardiovascular: Normal rate, regular rhythm and normal heart sounds.  Exam reveals no gallop.    No murmur heard.  Pulmonary/Chest: Effort normal and breath sounds normal. No respiratory distress.   Abdominal: Soft. Normal appearance. There is no tenderness. There is no rebound, no guarding and no CVA tenderness.   Musculoskeletal: She exhibits no edema.   Neurological: She is alert.   Skin: Skin is warm and dry. She is not diaphoretic.   Psychiatric: She has a normal mood and affect.   Nursing note and vitals reviewed.      Assessment:       1. Sinusitis, unspecified chronicity, unspecified location    2. Acute bronchitis, unspecified organism        Plan:       Lina was seen today for cough, fever and nasal congestion.    Diagnoses and all orders for this visit:    Sinusitis, unspecified chronicity, unspecified location  -     doxycycline (VIBRAMYCIN) 100 MG Cap; Take 1 capsule (100 mg total) by mouth every 12 (twelve) hours.  -     benzonatate (TESSALON) 100 MG capsule; Take 1 capsule (100 mg total) by mouth 3 (three) times daily as needed for Cough.    Acute bronchitis, unspecified organism        -     X-Ray Chest PA And Lateral; Future  -     doxycycline (VIBRAMYCIN) 100 MG Cap; Take 1 capsule (100 mg total) by mouth every 12 (twelve) hours.  -     benzonatate (TESSALON) 100 MG capsule; Take 1 capsule (100 mg total) by mouth 3 (three) times daily as needed for Cough.    Drink plenty of fluids, get lots of rest, and follow-up poor results.  CXR negative for pneumonia.  Sx x 5 days and worsening with immunocompromised state with diabetes, start empiric antibiotic therapy.      Pt has been given instructions populated from [employer] database and has  "verbalized understanding of the after visit summary and information contained wherein.    Follow up with a primary care provider. May go to ER for acute shortness of breath, lightheadedness, fever, or any other emergent complaints or changes in condition.    "This note will be shared with the patient"    Future Appointments  Date Time Provider Department Center   7/31/2017 9:00 AM Dora Freedman MD [facility] Patel               "

## 2017-06-30 DIAGNOSIS — J32.9 SINUSITIS, UNSPECIFIED CHRONICITY, UNSPECIFIED LOCATION: ICD-10-CM

## 2017-06-30 DIAGNOSIS — J20.9 ACUTE BRONCHITIS, UNSPECIFIED ORGANISM: ICD-10-CM

## 2017-07-03 RX ORDER — BENZONATATE 100 MG/1
CAPSULE ORAL
Qty: 30 CAPSULE | Refills: 0 | Status: SHIPPED | OUTPATIENT
Start: 2017-07-03 | End: 2018-03-12

## 2017-07-13 RX ORDER — BLOOD-GLUCOSE METER
EACH MISCELLANEOUS
Refills: 0 | OUTPATIENT
Start: 2017-07-13

## 2017-07-31 RX ORDER — CALCIUM CITRATE/VITAMIN D3 200MG-6.25
TABLET ORAL
Qty: 200 STRIP | Refills: 2 | Status: CANCELLED | OUTPATIENT
Start: 2017-07-31

## 2017-08-03 NOTE — TELEPHONE ENCOUNTER
----- Message from Jackelin Khanna sent at 8/3/2017 12:19 PM CDT -----  Contact: Ran [phone]  Type: Rx    Name of medication(s):   blood sugar diagnostic (TRUE METRIX GLUCOSE TEST STRIP) Strp ,blood-glucose meter (TRUE METRIX AIR GLUCOSE METER) Misc     Is this a refill? New rx? Refill      Who prescribed medication?    Pharmacy Name, Phone, & Location: [facility]     Comments: please advise , Thanks !

## 2017-08-08 ENCOUNTER — HOSPITAL ENCOUNTER (OUTPATIENT)
Dept: RADIOLOGY | Facility: HOSPITAL | Age: 72
Discharge: HOME | End: 2017-08-08
Attending: INTERNAL MEDICINE
Payer: MEDICARE

## 2017-08-08 ENCOUNTER — TELEPHONE (OUTPATIENT)
Dept: INTERNAL MEDICINE | Facility: CLINIC | Age: 72
End: 2017-08-08

## 2017-08-08 DIAGNOSIS — I10 HTN (HYPERTENSION), BENIGN: ICD-10-CM

## 2017-08-08 DIAGNOSIS — E55.9 MILD VITAMIN D DEFICIENCY: ICD-10-CM

## 2017-08-08 DIAGNOSIS — E11.49 TYPE II DIABETES MELLITUS WITH NEUROLOGICAL MANIFESTATIONS: ICD-10-CM

## 2017-08-08 DIAGNOSIS — M25.511 ACUTE PAIN OF RIGHT SHOULDER: ICD-10-CM

## 2017-08-08 DIAGNOSIS — G95.89 MYELOMALACIA OF CERVICAL CORD: ICD-10-CM

## 2017-08-08 DIAGNOSIS — R11.10 VOMITING, INTRACTABILITY OF VOMITING NOT SPECIFIED, PRESENCE OF NAUSEA NOT SPECIFIED, UNSPECIFIED VOMITING TYPE: ICD-10-CM

## 2017-08-08 DIAGNOSIS — E78.00 PURE HYPERCHOLESTEROLEMIA: Primary | ICD-10-CM

## 2017-08-08 PROCEDURE — 73060 X-RAY EXAM OF HUMERUS: CPT | Mod: TC,RT

## 2017-08-08 PROCEDURE — 73030 X-RAY EXAM OF SHOULDER: CPT | Mod: 26,RT,, | Performed by: RADIOLOGY

## 2017-08-08 PROCEDURE — 73030 X-RAY EXAM OF SHOULDER: CPT | Mod: TC,RT

## 2017-08-08 PROCEDURE — 73060 X-RAY EXAM OF HUMERUS: CPT | Mod: 26,RT,, | Performed by: RADIOLOGY

## 2017-08-08 RX ORDER — HYDROCODONE BITARTRATE AND ACETAMINOPHEN 5; 325 MG/1; MG/1
TABLET ORAL
Qty: 30 TABLET | Refills: 0 | Status: SHIPPED | OUTPATIENT
Start: 2017-08-08 | End: 2017-09-11 | Stop reason: SDUPTHER

## 2017-08-08 RX ORDER — INSULIN PUMP SYRINGE, 3 ML
EACH MISCELLANEOUS
Qty: 1 EACH | Refills: 0 | Status: ON HOLD | OUTPATIENT
Start: 2017-08-08 | End: 2018-01-30 | Stop reason: HOSPADM

## 2017-08-09 ENCOUNTER — PATIENT MESSAGE (OUTPATIENT)
Dept: INTERNAL MEDICINE | Facility: CLINIC | Age: 72
End: 2017-08-09

## 2017-09-06 ENCOUNTER — TELEPHONE (OUTPATIENT)
Dept: INTERNAL MEDICINE | Facility: CLINIC | Age: 72
End: 2017-09-06

## 2017-09-06 NOTE — TELEPHONE ENCOUNTER
----- Message from Cookie Guerra sent at 9/5/2017  1:20 PM CDT -----  Contact: Self/ [phone]   Pt want to know if the doctor want to do more testing on her shoulder. She is still having issues with it. Please call and advise     Thank you

## 2017-09-06 NOTE — TELEPHONE ENCOUNTER
Pt is still having pain in her shoulder she states what other test she can have done to see what is wrong

## 2017-09-11 ENCOUNTER — TELEPHONE (OUTPATIENT)
Dept: INTERNAL MEDICINE | Facility: CLINIC | Age: 72
End: 2017-09-11

## 2017-09-11 DIAGNOSIS — M25.519 SHOULDER PAIN, UNSPECIFIED CHRONICITY, UNSPECIFIED LATERALITY: Primary | ICD-10-CM

## 2017-09-11 RX ORDER — HYDROCODONE BITARTRATE AND ACETAMINOPHEN 5; 325 MG/1; MG/1
TABLET ORAL
Qty: 30 TABLET | Refills: 0 | Status: SHIPPED | OUTPATIENT
Start: 2017-09-11 | End: 2017-10-10 | Stop reason: SDUPTHER

## 2017-09-12 DIAGNOSIS — E11.21 TYPE 2 DIABETES MELLITUS WITH DIABETIC NEPHROPATHY: ICD-10-CM

## 2017-09-12 RX ORDER — METFORMIN HYDROCHLORIDE 500 MG/1
TABLET ORAL
Qty: 360 TABLET | Refills: 3 | Status: SHIPPED | OUTPATIENT
Start: 2017-09-12 | End: 2018-02-26

## 2017-09-14 ENCOUNTER — TELEPHONE (OUTPATIENT)
Dept: INTERNAL MEDICINE | Facility: CLINIC | Age: 72
End: 2017-09-14

## 2017-09-14 ENCOUNTER — OFFICE VISIT (OUTPATIENT)
Dept: SPORTS MEDICINE | Facility: CLINIC | Age: 72
End: 2017-09-14
Payer: MEDICARE

## 2017-09-14 VITALS
HEART RATE: 91 BPM | SYSTOLIC BLOOD PRESSURE: 129 MMHG | WEIGHT: 183 LBS | DIASTOLIC BLOOD PRESSURE: 74 MMHG | HEIGHT: 67 IN | BODY MASS INDEX: 28.72 KG/M2

## 2017-09-14 DIAGNOSIS — M19.019 ACROMIOCLAVICULAR JOINT ARTHRITIS: ICD-10-CM

## 2017-09-14 DIAGNOSIS — M75.41 IMPINGEMENT SYNDROME, SHOULDER, RIGHT: ICD-10-CM

## 2017-09-14 DIAGNOSIS — M25.511 ACUTE PAIN OF RIGHT SHOULDER: ICD-10-CM

## 2017-09-14 DIAGNOSIS — M79.603 PAIN OF UPPER EXTREMITY, UNSPECIFIED LATERALITY: Primary | ICD-10-CM

## 2017-09-14 PROCEDURE — 1159F MED LIST DOCD IN RCRD: CPT | Mod: S$GLB,,, | Performed by: ORTHOPAEDIC SURGERY

## 2017-09-14 PROCEDURE — 3078F DIAST BP <80 MM HG: CPT | Mod: S$GLB,,, | Performed by: ORTHOPAEDIC SURGERY

## 2017-09-14 PROCEDURE — 3008F BODY MASS INDEX DOCD: CPT | Mod: S$GLB,,, | Performed by: ORTHOPAEDIC SURGERY

## 2017-09-14 PROCEDURE — 99999 PR PBB SHADOW E&M-EST. PATIENT-LVL III: CPT | Mod: PBBFAC,,, | Performed by: ORTHOPAEDIC SURGERY

## 2017-09-14 PROCEDURE — 3074F SYST BP LT 130 MM HG: CPT | Mod: S$GLB,,, | Performed by: ORTHOPAEDIC SURGERY

## 2017-09-14 PROCEDURE — 20610 DRAIN/INJ JOINT/BURSA W/O US: CPT | Mod: RT,S$GLB,, | Performed by: ORTHOPAEDIC SURGERY

## 2017-09-14 PROCEDURE — 1125F AMNT PAIN NOTED PAIN PRSNT: CPT | Mod: S$GLB,,, | Performed by: ORTHOPAEDIC SURGERY

## 2017-09-14 PROCEDURE — 99204 OFFICE O/P NEW MOD 45 MIN: CPT | Mod: 25,S$GLB,, | Performed by: ORTHOPAEDIC SURGERY

## 2017-09-14 RX ORDER — TRIAMCINOLONE ACETONIDE 40 MG/ML
40 INJECTION, SUSPENSION INTRA-ARTICULAR; INTRAMUSCULAR
Status: DISCONTINUED | OUTPATIENT
Start: 2017-09-14 | End: 2017-09-15 | Stop reason: HOSPADM

## 2017-09-14 RX ADMIN — TRIAMCINOLONE ACETONIDE 40 MG: 40 INJECTION, SUSPENSION INTRA-ARTICULAR; INTRAMUSCULAR at 04:09

## 2017-09-14 NOTE — PROGRESS NOTES
CC: RIGHT shoulder pain     72 y.o. 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 Her right shoulder has been bothering her for 3 weeks after a fall down the stairs with an extended and slightly abducted R arm. The pain has gradually gotten worse since the fall. She cannot sleep with the pain. The pain in achy in nature.   She states that the pain is severe and not responding to any conservative care.      She reports that the pain and weakness is worse with overhead activity. It also bothers her at night.    Is affecting ADLs.     No numbness or tingling down arm in association with complaint.    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    Vitamin D deficiency disease        Past Surgical History:   Procedure Laterality Date    CARPAL TUNNEL RELEASE      bilateral    COLONOSCOPY W/ BIOPSIES AND POLYPECTOMY      HEMORRHOID SURGERY      HYSTERECTOMY      AUB    ROTATOR CUFF REPAIR      left shoulder       Family History   Problem Relation Age of Onset    Heart disease Mother     Diabetes Mother     Heart disease Father     Cancer Father      liver    Diabetes Sister     Cataracts Sister     Kidney disease Brother     Heart disease Brother     Diabetes Sister     COPD Brother     COPD Brother     Gout Brother     Benign prostatic hyperplasia Brother     Heart disease Brother     Kidney disease Brother     Heart attack Brother      heart attack    Kidney failure Brother     Lupus Sister     Heart attack Brother     Drug abuse Brother     No Known Problems Daughter     No Known Problems Son     Amblyopia Neg Hx     Blindness Neg Hx     Breast cancer Neg Hx     Colon  cancer Neg Hx     Ovarian cancer Neg Hx          Current Outpatient Prescriptions:     acetaminophen (TYLENOL) 325 MG tablet, Take 325 mg by mouth every 6 (six) hours as needed for Pain., Disp: , Rfl:     allopurinol (ZYLOPRIM) 300 MG tablet, 1 Tablet Oral Every day, Disp: 90 tablet, Rfl: 3    aspirin (ASPIRIN LOW DOSE) 81 MG EC tablet, Take by mouth. 1 Tablet, Delayed Release (E.C.) Oral Every day, Disp: , Rfl:     BD ALCOHOL SWABS Pad, USE AS DIRECTED TO MONITOR BLOOD SUGARS TWICE DAILY, Disp: 200 each, Rfl: 3    benzonatate (TESSALON) 100 MG capsule, TAKE 1 CAPSULE THREE TIMES DAILY AS NEEDED FOR COUGH, Disp: 30 capsule, Rfl: 0    blood sugar diagnostic (TRUE METRIX GLUCOSE TEST STRIP) Strp, Please give True metrix test strips and lancets to monitor blood sugars twice daily E11.21, Disp: 200 each, Rfl: 2    blood-glucose meter (TRUE METRIX AIR GLUCOSE METER) kit, Test twice daily, E11.21, Disp: 1 each, Rfl: 0    blood-glucose meter (TRUE METRIX AIR GLUCOSE METER) Misc, Please give a True metrix air glucose meter to use as directed to monitor blood sugars BID, Disp: 1 each, Rfl: 0    hydrocodone-acetaminophen 5-325mg (NORCO) 5-325 mg per tablet, One daily as needed for pain, Disp: 30 tablet, Rfl: 0    lancets (TRUEPLUS LANCETS) 28 gauge Misc, 1 lancet by Misc.(Non-Drug; Combo Route) route 2 (two) times daily., Disp: 200 each, Rfl: 2    losartan-hydrochlorothiazide 100-25 mg (HYZAAR) 100-25 mg per tablet, Take 1 tablet by mouth once daily., Disp: 90 tablet, Rfl: 3    lovastatin (MEVACOR) 40 MG tablet, TAKE 1 TABLET EVERY EVENING, Disp: 90 tablet, Rfl: 3    metformin (GLUCOPHAGE) 500 MG tablet, TAKE 2 TABLETS TWICE DAILY WITH MEALS, Disp: 360 tablet, Rfl: 3    omeprazole (PRILOSEC) 20 MG capsule, One capsule twice daily, Disp: 180 capsule, Rfl: 3    PRODIGY CONTROL SOLUTION, LOW Soln, , Disp: , Rfl:     sitagliptin (JANUVIA) 100 MG Tab, TAKE 1 TABLET ONE TIME DAILY, Disp: 90 tablet, Rfl:  "3    Current Facility-Administered Medications:     triamcinolone acetonide injection 40 mg, 40 mg, Intra-articular, 1 time in Clinic/HOD, Ruth Rojas NP    Review of patient's allergies indicates:   Allergen Reactions    No known drug allergies           REVIEW OF SYSTEMS:  Constitution: Negative. Negative for chills, fever and night sweats.    Hematologic/Lymphatic: Negative for bleeding problem. Does not bruise/bleed easily.   Skin: Negative for dry skin, itching and rash.   Musculoskeletal: Negative for falls. Positive for right shoulder pain and  muscle weakness.     PHYSICAL EXAMINATION:  Vitals:  /74   Pulse 91   Ht 5' 7" (1.702 m)   Wt 83 kg (183 lb)   BMI 28.66 kg/m²    General: Well-developed well-nourished 72 y.o. femalein no acute distress   Cardiovascular: Regular rhythm by palpation of distal pulse, normal color and temperature, no concerning varicosities on symptomatic side   Lungs: No labored breathing or wheezing appreciated   Neuro: Alert and oriented ×3   Psychiatric: well oriented to person, place and time, demonstrates normal mood and affect   Skin: No rashes, lesions or ulcers, normal temperature, turgor, and texture on involved extremity            Right Shoulder Exam     Crepitus   The patient has crepitus of the greater tuberosity, supraspinatus, biceps tendon and AC joint.    Range of Motion   Forward Elevation: 80  External Rotation 0 degrees: 30     Tests & Signs   Drop Arm: positive  Hawkin's test: positive  Impingement: positive  Anterosuperior Escape: negative  Belly Press: negative    Left Shoulder Exam     Inspection/Observation   Scars: present      Muscle Strength   Right Upper Extremity   Shoulder Abduction: 3/5   Shoulder Internal Rotation: 4/5   Shoulder External Rotation: 4/5   Supraspinatus: 4/5/5   Subscapularis: 4/5/5   Left Upper Extremity  Shoulder Abduction: 5/5   Shoulder Internal Rotation: 5/5   Shoulder External Rotation: 5/5   Supraspinatus: 5/5/5 "   Subscapularis: 5/5/5         IMAGING:  Xrays including AP, Outlet and Axillary Lateral of RIGHT shoulder are ordered / images reviewed by me:   No fracture dislocation   Acromion type III with large spur   Proximal migration of humeral head: None   GH arthritis: Mild degenerative change    ASSESSMENT:      ICD-10-CM ICD-9-CM   1. Impingement syndrome, shoulder, right M75.41 726.2   2. Acromioclavicular joint arthritis M19.019 716.91   3. Acute pain of right shoulder M25.511 719.41       Likely acute on chronic cuff tearing with new onset pseudoparalysis    PLAN:      Will obtain MRI of the shoulder to assess the cuff. Similar complaint in left shoulder which ultimately required surgery. Patient requests steroid injection to help her sleep. Given her current pain level, I'm happy to provide this. RTC after study.       Large Joint Aspiration/Injection  Date/Time: 9/14/2017 4:34 PM  Performed by: MUNIRA LACKEY  Authorized by: MUNIRA LACKEY     Consent Done?:  Yes (Verbal)  Indications:  Pain  Procedure site marked: Yes    Timeout: Prior to procedure the correct patient, procedure, and site was verified      Location:  Shoulder  Site:  R subacromial bursa  Prep: Patient was prepped and draped in usual sterile fashion    Ultrasonic Guidance for needle placement: No  Needle size:  22 G  Approach:  Posterior  Medications:  40 mg triamcinolone acetonide 40 mg/mL  Patient tolerance:  Patient tolerated the procedure well with no immediate complications

## 2017-09-14 NOTE — LETTER
September 14, 2017      Dora Freedman MD  [address]  [facility] [address]           [facility]  [address]  [facility] [address]  Phone: [phone]          Patient: Lina Davis   MR Number: [id number]   YOB: 1945   Date of Visit: 9/14/2017       Dear Dr. Dora Freedman:    Thank you for referring Lina Davis to me for evaluation. Attached you will find relevant portions of my assessment and plan of care.    If you have questions, please do not hesitate to call me. I look forward to following Lina Davis along with you.    Sincerely,    MUNIRA Grier MD    Enclosure  CC:  No Recipients    If you would like to receive this communication electronically, please contact [email or web address] or [phone] to request more information on [employer] access.    For providers and/or their staff who would like to refer a patient to [facility], please contact us through our one-stop-shop provider referral line, [facility], at [phone].    If you feel you have received this communication in error or would no longer like to receive these types of communications, please e-mail [email or web address]

## 2017-09-19 RX ORDER — ISOPROPYL ALCOHOL 0.75 G/1
SWAB TOPICAL
Refills: 3 | Status: CANCELLED | OUTPATIENT
Start: 2017-09-19

## 2017-09-21 RX ORDER — ALPRAZOLAM 0.5 MG/1
TABLET ORAL
Qty: 2 TABLET | Refills: 0 | Status: SHIPPED | OUTPATIENT
Start: 2017-09-21 | End: 2017-10-12 | Stop reason: CLARIF

## 2017-09-22 ENCOUNTER — HOSPITAL ENCOUNTER (OUTPATIENT)
Dept: RADIOLOGY | Facility: HOSPITAL | Age: 72
Discharge: HOME | End: 2017-09-22
Attending: ORTHOPAEDIC SURGERY
Payer: MEDICARE

## 2017-09-22 DIAGNOSIS — M75.41 IMPINGEMENT SYNDROME, SHOULDER, RIGHT: ICD-10-CM

## 2017-09-22 PROCEDURE — 73221 MRI JOINT UPR EXTREM W/O DYE: CPT | Mod: 26,RT,, | Performed by: RADIOLOGY

## 2017-09-22 PROCEDURE — 73221 MRI JOINT UPR EXTREM W/O DYE: CPT | Mod: TC,RT

## 2017-09-26 ENCOUNTER — OFFICE VISIT (OUTPATIENT)
Dept: SPORTS MEDICINE | Facility: CLINIC | Age: 72
End: 2017-09-26
Payer: MEDICARE

## 2017-09-26 VITALS
SYSTOLIC BLOOD PRESSURE: 157 MMHG | HEART RATE: 93 BPM | BODY MASS INDEX: 28.72 KG/M2 | DIASTOLIC BLOOD PRESSURE: 84 MMHG | WEIGHT: 183 LBS | HEIGHT: 67 IN

## 2017-09-26 DIAGNOSIS — M75.121 COMPLETE TEAR OF RIGHT ROTATOR CUFF: Primary | ICD-10-CM

## 2017-09-26 DIAGNOSIS — M75.41 IMPINGEMENT SYNDROME, SHOULDER, RIGHT: ICD-10-CM

## 2017-09-26 DIAGNOSIS — M75.21 BICIPITAL TENDINITIS, RIGHT: ICD-10-CM

## 2017-09-26 DIAGNOSIS — M19.019 ACROMIOCLAVICULAR JOINT ARTHRITIS: ICD-10-CM

## 2017-09-26 PROCEDURE — 3079F DIAST BP 80-89 MM HG: CPT | Mod: S$GLB,,, | Performed by: ORTHOPAEDIC SURGERY

## 2017-09-26 PROCEDURE — 1125F AMNT PAIN NOTED PAIN PRSNT: CPT | Mod: S$GLB,,, | Performed by: ORTHOPAEDIC SURGERY

## 2017-09-26 PROCEDURE — 99999 PR PBB SHADOW E&M-EST. PATIENT-LVL III: CPT | Mod: PBBFAC,,, | Performed by: ORTHOPAEDIC SURGERY

## 2017-09-26 PROCEDURE — 3077F SYST BP >= 140 MM HG: CPT | Mod: S$GLB,,, | Performed by: ORTHOPAEDIC SURGERY

## 2017-09-26 PROCEDURE — 1159F MED LIST DOCD IN RCRD: CPT | Mod: S$GLB,,, | Performed by: ORTHOPAEDIC SURGERY

## 2017-09-26 PROCEDURE — 99214 OFFICE O/P EST MOD 30 MIN: CPT | Mod: S$GLB,,, | Performed by: ORTHOPAEDIC SURGERY

## 2017-09-26 PROCEDURE — 3008F BODY MASS INDEX DOCD: CPT | Mod: S$GLB,,, | Performed by: ORTHOPAEDIC SURGERY

## 2017-09-26 NOTE — PROGRESS NOTES
"CC: RIGHT shoulder pain     72 y.o. 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 Her right shoulder has been bothering her since a fall down the stairs with an extended and slightly abducted R arm. She obtained a cortisone injection last visit, which she reports gave her 40% relief but pain has returned back. Last night was the first night her pain had completely returned, and it kept her up all night. She is here today to review the MRI and discuss possible treatment options with Dr. Grier.     She continues to report that the pain and weakness is worse with overhead activity. It continues to bother her at night.    Is affecting ADLs.     No numbness or tingling down arm in association with complaint.    History of previous left shoulder RCR from which she's well.     She denies any changes to their past medical history, past surgical history, family history, social history, medications and allergies.     REVIEW OF SYSTEMS:  Constitution: Negative. Negative for chills, fever and night sweats.    Hematologic/Lymphatic: Negative for bleeding problem. Does not bruise/bleed easily.   Skin: Negative for dry skin, itching and rash.   Musculoskeletal: Negative for falls. Positive for right shoulder pain and  muscle weakness.     PHYSICAL EXAMINATION:  Vitals:  BP (!) 157/84   Pulse 93   Ht 5' 7" (1.702 m)   Wt 83 kg (183 lb)   BMI 28.66 kg/m²    General: Well-developed well-nourished 72 y.o. femalein no acute distress   Cardiovascular: Regular rhythm by palpation of distal pulse, normal color and temperature, no concerning varicosities on symptomatic side   Lungs: No labored breathing or wheezing appreciated   Neuro: Alert and oriented ×3   Psychiatric: well oriented to person, place and time, demonstrates normal mood and affect   Skin: No rashes, lesions or ulcers, normal temperature, turgor, and texture on involved extremity          Right Shoulder Exam "     Inspection/Observation   Swelling: absent    Tenderness   The patient is tender to palpation of the greater tuberosity, supraspinatus, acromioclavicular joint and biceps tendon.    Crepitus   The patient has crepitus of the greater tuberosity.    Range of Motion   Forward Elevation: 140  External Rotation 0 degrees: 60   Internal Rotation 0 degrees: T12     Tests & Signs   Hawkin's test: positive  Impingement: positive  Rotator Cuff Painful Arc/Range: moderate  Anterosuperior Escape: negative  Belly Press: negative    Comments:  Improved AROM on today's exam - overhead limited by pain; full PROM in all planes and no signs of adhesive capsulitis; pain and weakness with cuff testing; no rotational lag signs; negative Spurlings    Left Shoulder Exam     Inspection/Observation   Scars: present      Muscle Strength   Right Upper Extremity   Shoulder Internal Rotation: 5/5   Shoulder External Rotation: 4/5   Supraspinatus: 4/5/5   Left Upper Extremity  Shoulder Abduction: 5/5   Shoulder Internal Rotation: 5/5   Shoulder External Rotation: 5/5   Supraspinatus: 5/5/5   Subscapularis: 5/5/5     Vascular Exam     Right Pulses      Radial:                    2+        Hemoglobin A1C 4.0 - 5.6 % 5.1          IMAGING:  R Shoulder MRI from 9/22/17 was reviewed with the patient. Images show:    1.Massive rotator cuff tear involving the supraspinatus and infraspinatus tendons with differential retraction to the level of the glenoid.  No associated volume loss or fatty infiltration.  See description.  2.Biceps tendinosis without partial or full-thickness tear.  3. Degenerative changes of the glenoid labrum with a diminutive caliber of the superior segment.  4. Suspected high-grade partial-thickness cartilage loss overlying the superior aspect of the humeral head.  5. Superior subluxation of the humeral head with resulting narrowing of the acromiohumeral interval but no associated pseudoarticulation.  6. Moderate AC joint  hypertrophy.  7. Joint effusion and subacromial/subdeltoid bursitis.    Negative Pickens. Goutallier grade 2 fatty infiltration of infraspinatus. Supine imaging in scanner without sig proximal humeral head migration on upright prior films. Large subacromial spurs.       ASSESSMENT:      ICD-10-CM ICD-9-CM   1. Complete tear of right rotator cuff M75.121 727.61   2. Bicipital tendinitis, right M75.21 726.12   3. Impingement syndrome, shoulder, right M75.41 726.2   4. Acromioclavicular joint arthritis M19.019 716.91     PLAN:      Findings were reviewed with the patient. Treatment options were discussed to include arthroscopic rotator cuff repair versus debridement, biceps tenotomy, subacromial decompression, labral debridement, distal clavicle excision.  Risks of surgery include tissue re-tear versus inability to completely repair the cuff at time of surgery.  This is a large to massive tear and certainly the risk for re-tear or irreparability is significant although muscle quality well maintained.  Functional and clinical outcomes often improve irrespective of retear with biceps treatment. In the setting of persistent or recurrent symptomatic tear, she understands the  potential need down the road for RTSA, which she'd like to avoid if possible. In the absence of treatment, she understands the inherent risk for CTA. She will discuss with her  and let us know if she would like to proceed.     Procedures

## 2017-10-03 DIAGNOSIS — M75.41 IMPINGEMENT SYNDROME OF RIGHT SHOULDER: ICD-10-CM

## 2017-10-03 DIAGNOSIS — M75.121 COMPLETE TEAR OF RIGHT ROTATOR CUFF: Primary | ICD-10-CM

## 2017-10-03 DIAGNOSIS — M75.21 BICEPS TENDINITIS OF RIGHT UPPER EXTREMITY: ICD-10-CM

## 2017-10-03 DIAGNOSIS — M19.019 AC (ACROMIOCLAVICULAR) ARTHRITIS: ICD-10-CM

## 2017-10-05 NOTE — PLAN OF CARE
10/05/17 1224   ED Admissions Case Approval   ED Admissions Case Approval CM Approved  (OP )

## 2017-10-09 ENCOUNTER — OFFICE VISIT (OUTPATIENT)
Dept: SPORTS MEDICINE | Facility: CLINIC | Age: 72
End: 2017-10-09
Payer: MEDICARE

## 2017-10-09 VITALS
HEIGHT: 67 IN | DIASTOLIC BLOOD PRESSURE: 84 MMHG | WEIGHT: 183 LBS | HEART RATE: 96 BPM | BODY MASS INDEX: 28.72 KG/M2 | SYSTOLIC BLOOD PRESSURE: 148 MMHG

## 2017-10-09 DIAGNOSIS — M75.21 BICEPS TENDONITIS ON RIGHT: ICD-10-CM

## 2017-10-09 DIAGNOSIS — M75.121 COMPLETE TEAR OF RIGHT ROTATOR CUFF: Primary | ICD-10-CM

## 2017-10-09 PROCEDURE — 99999 PR PBB SHADOW E&M-EST. PATIENT-LVL IV: CPT | Mod: PBBFAC,,, | Performed by: PHYSICIAN ASSISTANT

## 2017-10-09 PROCEDURE — 99499 UNLISTED E&M SERVICE: CPT | Mod: S$GLB,,, | Performed by: PHYSICIAN ASSISTANT

## 2017-10-09 RX ORDER — OXYCODONE HYDROCHLORIDE 5 MG/1
5 TABLET ORAL EVERY 4 HOURS PRN
Qty: 60 TABLET | Refills: 0 | Status: SHIPPED | OUTPATIENT
Start: 2017-10-09 | End: 2017-10-25

## 2017-10-09 RX ORDER — ASPIRIN 325 MG
325 TABLET, DELAYED RELEASE (ENTERIC COATED) ORAL 2 TIMES DAILY
Qty: 28 TABLET | Refills: 0 | Status: SHIPPED | OUTPATIENT
Start: 2017-10-09 | End: 2017-11-21

## 2017-10-09 RX ORDER — ONDANSETRON 4 MG/1
4 TABLET, FILM COATED ORAL EVERY 8 HOURS PRN
Qty: 30 TABLET | Refills: 0 | Status: SHIPPED | OUTPATIENT
Start: 2017-10-09 | End: 2017-10-16

## 2017-10-09 NOTE — LETTER
Patient: Lina Davis   YOB: 1945   Clinic Number: [id number]   Today's Date: October 9, 2017        To Whom It May Concern     Lina Davis is scheduled for right shoulder surgery on 10/13/2017.     Please excuse Madiha Davis from work on 10/13/2017 while she takes care of her mother for surgery on Friday.      If you have any questions or concerns, please feel free to contact the office at [phone].    Thank you.    Glenny Steward PA-C        Signature: __________________________________________________

## 2017-10-09 NOTE — H&P
Lina Davis  is here for a completion of her perioperative paperwork. she  Is scheduled to undergo right shoulder arthroscopic rotator cuff repair versus debridement, biceps tenotomy, subacromial decompression, labral debridement, distal clavicle excision on 10/13/2017.  She is a healthy individual and does need clearance for this procedure. Pending clearance by Dr. Freedman.    Risks, indications and benefits of the surgical procedure were discussed with the patient. All questions with regard to surgery, rehab, expected return to functional activities, activities of daily living and recreational endeavors were answered to her satisfaction.    Patient was informed and understands the risks of surgery are greater for patients with a current condition or hx of heart disease, obesity, clotting disorders, recurrent infections, steroid use, current or past smoking, and factors such as sedentary lifestyle and noncompliance with medications, therapy or f/u. The degree of the increased risk is hard to estimate w/ any degree of precision.    Once no other questions were asked, a brief history and physical exam was then performed.      PHYSICAL EXAM:  GEN: A&Ox3, WD WN NAD  HEENT: WNL  CHEST: CTAB, no W/R/R  HEART: RRR, no M/R/G   ABD: Soft, NT ND, BS x4 QUADS  MS: Refer to previous note for detailed MS exam  NEURO: CN II-XII intact       The surgical consent was then reviewed with the patient, who agreed with all the contents of the consent form and it was signed. she was then given the [facility] surgery packet to bring with her to [facility] for the anesthesia portion of her perioperative paperwork.     PHYSICAL THERAPY:  She was also instructed regarding physical therapy and will begin on  POD #3-5 at [facility].     POST OP CARE:instructions were reviewed including care of the wound and dressing after surgery and when she can shower.     PAIN MANAGEMENT: Lina Davis was instructed regarding the Polar ice unit that  will be in place after surgery and her postoperative pain medications.     MEDICATION:  Roxicodone 5 mg 1-2 q 4 hours PRN for pain  Zofran 4 mg q 8 hours PRN for nausea and vomiting.  ASA 325mg BID x 2 weeks    Patient was instructed to purchase and take Colace to counter possible GI side effects of taking opiates.     DVT prophylaxis was discussed with the patient today including risk factors for developing DVTs and history of DVTs. The patient was asked if any specific recommendations were given from the doctor/s that did pre-operative surgical clearance.      The below listed DVT prophylaxis regimen along with bilateral BETH compression stockings will be used post-op.      Patient was instructed to buy and take:  Patient was asked if they were taking or using OCP pills or devices, if had a hx of a DVT/PE, current smoker, or hypercoagulable disorder. If they answered yes to any of the following, then Lovenox 40 mg, q daily x 2 weeks was prescribed to help prevent DVT development.     If patient's BMI>40, Lovenox 40 mg, q daily x 2 weeks was prescribed to help prevent DVT development.     If patient has no increased risk factors, Aspirin 325 mg BID x 2 weeks was prescribed to help prevent DVT development    If the patient was previously taking 81mg baby aspirin, they were told to not take it will using the above stated aspirin and to restart the 81mg aspirin after completion of the aspirin dose.      Aspirin 325mg BID x 2 weeks for DVT prophylaxis starting on the evening after surgery.  Patient will also use bilateral TEDs on lower extremities, SCDs during surgery, and early ambulation post-op.   Patient was also told to buy over the counter Prilosec medication and take it once daily for GI protection as long as they are taking NSAIDs or Aspirin.     The patient was told that narcotic pain medications may make them drowsy and instructions were given to not sign legal documents, drive or operate heavy machinery,  cars, or equipment while under the influence of narcotic medications.     Dr. Grier was present in clinic and directly involved in the additional informed consent process with signing of paperwork and pre-op evaluation. The patient had the opportunity to ask Dr. Grier any additional questions and all questions were answered.    As there were no other questions to be asked, she was given my business card along with Dr. Grier's business card if she has any questions or concerns prior to surgery or in the postop period.

## 2017-10-10 ENCOUNTER — OFFICE VISIT (OUTPATIENT)
Dept: INTERNAL MEDICINE | Facility: CLINIC | Age: 72
End: 2017-10-10
Payer: MEDICARE

## 2017-10-10 ENCOUNTER — IMMUNIZATION (OUTPATIENT)
Dept: INTERNAL MEDICINE | Facility: CLINIC | Age: 72
End: 2017-10-10
Payer: MEDICARE

## 2017-10-10 ENCOUNTER — LAB VISIT (OUTPATIENT)
Dept: LAB | Facility: HOSPITAL | Age: 72
End: 2017-10-10
Attending: INTERNAL MEDICINE
Payer: MEDICARE

## 2017-10-10 VITALS
SYSTOLIC BLOOD PRESSURE: 115 MMHG | WEIGHT: 179.88 LBS | HEART RATE: 71 BPM | BODY MASS INDEX: 28.23 KG/M2 | DIASTOLIC BLOOD PRESSURE: 69 MMHG | HEIGHT: 67 IN

## 2017-10-10 DIAGNOSIS — I10 HTN (HYPERTENSION), BENIGN: ICD-10-CM

## 2017-10-10 DIAGNOSIS — E11.49 TYPE II DIABETES MELLITUS WITH NEUROLOGICAL MANIFESTATIONS: ICD-10-CM

## 2017-10-10 DIAGNOSIS — R80.9 DIABETES MELLITUS DUE TO UNDERLYING CONDITION WITH MICROALBUMINURIA, WITHOUT LONG-TERM CURRENT USE OF INSULIN: ICD-10-CM

## 2017-10-10 DIAGNOSIS — E11.21 DIABETIC NEPHROPATHY ASSOCIATED WITH TYPE 2 DIABETES MELLITUS: ICD-10-CM

## 2017-10-10 DIAGNOSIS — E78.00 PURE HYPERCHOLESTEROLEMIA: Primary | ICD-10-CM

## 2017-10-10 DIAGNOSIS — K21.9 GASTROESOPHAGEAL REFLUX DISEASE, ESOPHAGITIS PRESENCE NOT SPECIFIED: ICD-10-CM

## 2017-10-10 DIAGNOSIS — E08.29 DIABETES MELLITUS DUE TO UNDERLYING CONDITION WITH MICROALBUMINURIA, WITHOUT LONG-TERM CURRENT USE OF INSULIN: ICD-10-CM

## 2017-10-10 DIAGNOSIS — E55.9 VITAMIN D DEFICIENCY: ICD-10-CM

## 2017-10-10 LAB
25(OH)D3+25(OH)D2 SERPL-MCNC: 28 NG/ML
ALBUMIN SERPL BCP-MCNC: 3.6 G/DL
ALP SERPL-CCNC: 72 U/L
ALT SERPL W/O P-5'-P-CCNC: 13 U/L
ANION GAP SERPL CALC-SCNC: 11 MMOL/L
AST SERPL-CCNC: 13 U/L
BASOPHILS # BLD AUTO: 0.02 K/UL
BASOPHILS NFR BLD: 0.2 %
BILIRUB SERPL-MCNC: 0.4 MG/DL
BUN SERPL-MCNC: 18 MG/DL
CALCIUM SERPL-MCNC: 9.9 MG/DL
CHLORIDE SERPL-SCNC: 110 MMOL/L
CO2 SERPL-SCNC: 22 MMOL/L
CREAT SERPL-MCNC: 0.8 MG/DL
DIFFERENTIAL METHOD: ABNORMAL
EOSINOPHIL # BLD AUTO: 0.1 K/UL
EOSINOPHIL NFR BLD: 0.8 %
ERYTHROCYTE [DISTWIDTH] IN BLOOD BY AUTOMATED COUNT: 14.5 %
EST. GFR  (AFRICAN AMERICAN): >60 ML/MIN/1.73 M^2
EST. GFR  (NON AFRICAN AMERICAN): >60 ML/MIN/1.73 M^2
GLUCOSE SERPL-MCNC: 111 MG/DL
HCT VFR BLD AUTO: 32 %
HGB BLD-MCNC: 10.4 G/DL
LYMPHOCYTES # BLD AUTO: 3 K/UL
LYMPHOCYTES NFR BLD: 30.1 %
MCH RBC QN AUTO: 28.7 PG
MCHC RBC AUTO-ENTMCNC: 32.5 G/DL
MCV RBC AUTO: 88 FL
MONOCYTES # BLD AUTO: 0.7 K/UL
MONOCYTES NFR BLD: 7.1 %
NEUTROPHILS # BLD AUTO: 6.2 K/UL
NEUTROPHILS NFR BLD: 61.8 %
PLATELET # BLD AUTO: 317 K/UL
PMV BLD AUTO: 9.8 FL
POTASSIUM SERPL-SCNC: 4.6 MMOL/L
PROT SERPL-MCNC: 7.7 G/DL
RBC # BLD AUTO: 3.62 M/UL
SODIUM SERPL-SCNC: 143 MMOL/L
TSH SERPL DL<=0.005 MIU/L-ACNC: 1.07 UIU/ML
WBC # BLD AUTO: 10.09 K/UL

## 2017-10-10 PROCEDURE — 36415 COLL VENOUS BLD VENIPUNCTURE: CPT | Mod: PO

## 2017-10-10 PROCEDURE — 99999 PR PBB SHADOW E&M-EST. PATIENT-LVL IV: CPT | Mod: PBBFAC,,, | Performed by: INTERNAL MEDICINE

## 2017-10-10 PROCEDURE — 93005 ELECTROCARDIOGRAM TRACING: CPT | Mod: S$GLB,,, | Performed by: INTERNAL MEDICINE

## 2017-10-10 PROCEDURE — G0008 ADMIN INFLUENZA VIRUS VAC: HCPCS | Mod: S$GLB,,, | Performed by: INTERNAL MEDICINE

## 2017-10-10 PROCEDURE — 84443 ASSAY THYROID STIM HORMONE: CPT

## 2017-10-10 PROCEDURE — 90662 IIV NO PRSV INCREASED AG IM: CPT | Mod: S$GLB,,, | Performed by: INTERNAL MEDICINE

## 2017-10-10 PROCEDURE — 99214 OFFICE O/P EST MOD 30 MIN: CPT | Mod: S$GLB,,, | Performed by: INTERNAL MEDICINE

## 2017-10-10 PROCEDURE — 93010 ELECTROCARDIOGRAM REPORT: CPT | Mod: S$GLB,,, | Performed by: INTERNAL MEDICINE

## 2017-10-10 PROCEDURE — 80053 COMPREHEN METABOLIC PANEL: CPT

## 2017-10-10 PROCEDURE — 85025 COMPLETE CBC W/AUTO DIFF WBC: CPT | Mod: PO

## 2017-10-10 PROCEDURE — 99499 UNLISTED E&M SERVICE: CPT | Mod: S$GLB,,, | Performed by: INTERNAL MEDICINE

## 2017-10-10 PROCEDURE — 82306 VITAMIN D 25 HYDROXY: CPT

## 2017-10-10 RX ORDER — HYDROCODONE BITARTRATE AND ACETAMINOPHEN 5; 325 MG/1; MG/1
TABLET ORAL
Qty: 10 TABLET | Refills: 0 | Status: SHIPPED | OUTPATIENT
Start: 2017-10-10 | End: 2017-10-12 | Stop reason: CLARIF

## 2017-10-10 RX ORDER — ISOPROPYL ALCOHOL 70 ML/100ML
SWAB TOPICAL
Qty: 200 EACH | Refills: 3 | Status: SHIPPED | OUTPATIENT
Start: 2017-10-10 | End: 2019-03-04 | Stop reason: SDUPTHER

## 2017-10-12 ENCOUNTER — ANESTHESIA EVENT (OUTPATIENT)
Dept: SURGERY | Facility: OTHER | Age: 72
End: 2017-10-12
Payer: MEDICARE

## 2017-10-12 ENCOUNTER — TELEPHONE (OUTPATIENT)
Dept: INTERNAL MEDICINE | Facility: CLINIC | Age: 72
End: 2017-10-12

## 2017-10-12 ENCOUNTER — HOSPITAL ENCOUNTER (OUTPATIENT)
Dept: PREADMISSION TESTING | Facility: OTHER | Age: 72
Discharge: HOME | End: 2017-10-12
Attending: ORTHOPAEDIC SURGERY
Payer: MEDICARE

## 2017-10-12 VITALS
OXYGEN SATURATION: 99 % | DIASTOLIC BLOOD PRESSURE: 81 MMHG | WEIGHT: 173 LBS | SYSTOLIC BLOOD PRESSURE: 162 MMHG | TEMPERATURE: 98 F | BODY MASS INDEX: 27.15 KG/M2 | HEIGHT: 67 IN | HEART RATE: 94 BPM

## 2017-10-12 RX ORDER — FENTANYL CITRATE 50 UG/ML
25 INJECTION, SOLUTION INTRAMUSCULAR; INTRAVENOUS EVERY 5 MIN PRN
Status: CANCELLED | OUTPATIENT
Start: 2017-10-12

## 2017-10-12 RX ORDER — OXYCODONE HYDROCHLORIDE 5 MG/1
5 TABLET ORAL
Status: CANCELLED | OUTPATIENT
Start: 2017-10-12

## 2017-10-12 RX ORDER — SODIUM CHLORIDE 0.9 % (FLUSH) 0.9 %
3 SYRINGE (ML) INJECTION
Status: DISCONTINUED | OUTPATIENT
Start: 2017-10-12 | End: 2017-10-13 | Stop reason: HOSPADM

## 2017-10-12 RX ORDER — MEPERIDINE HYDROCHLORIDE 50 MG/ML
12.5 INJECTION INTRAMUSCULAR; INTRAVENOUS; SUBCUTANEOUS ONCE AS NEEDED
Status: CANCELLED | OUTPATIENT
Start: 2017-10-12 | End: 2017-10-12

## 2017-10-12 RX ORDER — HYDROMORPHONE HYDROCHLORIDE 2 MG/ML
0.4 INJECTION, SOLUTION INTRAMUSCULAR; INTRAVENOUS; SUBCUTANEOUS EVERY 5 MIN PRN
Status: CANCELLED | OUTPATIENT
Start: 2017-10-12

## 2017-10-12 RX ORDER — ONDANSETRON 2 MG/ML
4 INJECTION INTRAMUSCULAR; INTRAVENOUS DAILY PRN
Status: CANCELLED | OUTPATIENT
Start: 2017-10-12

## 2017-10-12 NOTE — TELEPHONE ENCOUNTER
----- Message from Anna Das sent at 10/12/2017  3:06 PM CDT -----  Contact: Susi with Dr. Grier ext [phone]  Pt is scheduled for surgery tomorrow morning and ask is pt clear.

## 2017-10-12 NOTE — ANESTHESIA PREPROCEDURE EVALUATION
10/12/2017  Lina Davis is a 72 y.o., female.    Anesthesia Evaluation    I have reviewed the Patient Summary Reports.    I have reviewed the Nursing Notes.   I have reviewed the Medications.     Review of Systems  Anesthesia Hx:  No problems with previous Anesthesia  Denies Family Hx of Anesthesia complications.   Denies Personal Hx of Anesthesia complications.   Social:  Non-Smoker    Hematology/Oncology:     Oncology Normal    -- Anemia (hct 32):   EENT/Dental:EENT/Dental Normal   Cardiovascular:   Exercise tolerance: good Hypertension, well controlled Very active. Cuts grass at home.   Pulmonary:  Pulmonary Normal    Renal/:  Renal/ Normal     Hepatic/GI:   GERD    Musculoskeletal:   Arthritis     Neurological:   Neuromuscular Disease, polyneuropathy  Peripheral Neuropathy    Endocrine:   Diabetes, well controlled, type 2    Dermatological:  Skin Normal    Psych:  Psychiatric Normal           Physical Exam  General:  Well nourished    Airway/Jaw/Neck:  Airway Findings: Mouth Opening: Normal Tongue: Normal  General Airway Assessment: Adult  Mallampati: I  TM Distance: Normal, at least 6 cm  Jaw/Neck Findings:  Neck ROM: Normal ROM      Dental:  Dental Findings: In tact             Anesthesia Plan  Type of Anesthesia, risks & benefits discussed:  Anesthesia Type:  general  Patient's Preference:   Intra-op Monitoring Plan:   Intra-op Monitoring Plan Comments:   Post Op Pain Control Plan: peripheral nerve block and multimodal analgesia  Post Op Pain Control Plan Comments:   Induction:   IV  Beta Blocker:         Informed Consent: Patient understands risks and agrees with Anesthesia plan.  Questions answered. Anesthesia consent signed with patient.  ASA Score: 3     Day of Surgery Review of History & Physical:    H&P update referred to the surgeon.         Ready For Surgery From Anesthesia Perspective.

## 2017-10-12 NOTE — DISCHARGE INSTRUCTIONS
PRE-ADMIT TESTING -  [phone]    [address]  [place]          Your surgery has been scheduled at [facility]. We are pleased to have the opportunity to serve you. For Further Information please call [phone].    On the day of surgery please report to the Information Desk on the 1st floor.    · CONTACT YOUR PHYSICIAN'S OFFICE THE DAY PRIOR TO YOUR SURGERY TO OBTAIN YOUR ARRIVAL TIME.     · The evening before surgery do not eat anything after 9 p.m. ( this includes hard candy, chewing gum and mints).  You may only have GATORADE, POWERADE AND WATER  from 9 p.m. until you leave your home.   DO NOT DRINK ANY LIQUIDS ON THE WAY TO THE HOSPITAL.      SPECIAL MEDICATION INSTRUCTIONS: TAKE medications checked off by the Anesthesiologist on your Medication List.    Angiogram Patients: Take medications as instructed by your physician, including aspirin.     Surgery Patients:    If you take ASPIRIN - Your PHYSICIAN/SURGEON will need to inform you IF/OR when you need to stop taking aspirin prior to your surgery.     Do Not take any medications containing IBUPROFEN.  Do Not Wear any make-up or dark nail polish   (especially eye make-up) to surgery. If you come to surgery with makeup on you will be required to remove the makeup or nail polish.    Do not shave your surgical area at least 5 days prior to your surgery. The surgical prep will be performed at the hospital according to Infection Control regulations.    Leave all valuables at home.   Do Not wear any jewelry or watches, including any metal in body piercings.  Contact Lens must be removed before surgery. Either do not wear the contact lens or bring a case and solution for storage.  Please bring a container for eyeglasses or dentures as required.  Bring any paperwork your physician has provided, such as consent forms,  history and physicals, doctor's orders, etc.   Bring comfortable clothes that are loose fitting to wear upon  discharge. Take into consideration the type of surgery being performed.  Maintain your diet as advised per your physician the day prior to surgery.      Adequate rest the night before surgery is advised.   Park in the Parking lot behind the hospital or in the [facility] across the street from the parking lot. Parking is complimentary.  If you will be discharged the same day as your procedure, please arrange for a responsible adult to drive you home or to accompany you if traveling by taxi.   YOU WILL NOT BE PERMITTED TO DRIVE OR TO LEAVE THE HOSPITAL ALONE AFTER SURGERY.   It is strongly recommended that you arrange for someone to remain with you for the first 24 hrs following your surgery.       Thank you for your cooperation.  The Staff of [facility].        Bathing Instructions                                                                 Please shower the evening before and morning of your procedure with    ANTIBACTERIAL SOAP. ( DIAL, etc )  Concentrate on the surgical area   for at least 3 minutes and rinse completely. Dry off as usual.   Do not use any deodorant, powder, body lotions, perfume, after shave or    cologne.

## 2017-10-13 ENCOUNTER — SURGERY (OUTPATIENT)
Age: 72
End: 2017-10-13

## 2017-10-13 ENCOUNTER — HOSPITAL ENCOUNTER (OUTPATIENT)
Facility: OTHER | Age: 72
Discharge: HOME | End: 2017-10-13
Attending: ORTHOPAEDIC SURGERY | Admitting: ORTHOPAEDIC SURGERY
Payer: MEDICARE

## 2017-10-13 ENCOUNTER — ANESTHESIA (OUTPATIENT)
Dept: SURGERY | Facility: OTHER | Age: 72
End: 2017-10-13
Payer: MEDICARE

## 2017-10-13 VITALS
OXYGEN SATURATION: 96 % | RESPIRATION RATE: 18 BRPM | TEMPERATURE: 98 F | HEIGHT: 67 IN | SYSTOLIC BLOOD PRESSURE: 140 MMHG | WEIGHT: 173 LBS | BODY MASS INDEX: 27.15 KG/M2 | DIASTOLIC BLOOD PRESSURE: 80 MMHG | HEART RATE: 90 BPM

## 2017-10-13 DIAGNOSIS — M75.121 COMPLETE TEAR OF RIGHT ROTATOR CUFF: ICD-10-CM

## 2017-10-13 DIAGNOSIS — M75.21 BICEPS TENDONITIS ON RIGHT: ICD-10-CM

## 2017-10-13 LAB
POCT GLUCOSE: 146 MG/DL (ref 70–110)
POCT GLUCOSE: 181 MG/DL (ref 70–110)

## 2017-10-13 PROCEDURE — 25000003 PHARM REV CODE 250: Performed by: NURSE ANESTHETIST, CERTIFIED REGISTERED

## 2017-10-13 PROCEDURE — 71000033 HC RECOVERY, INTIAL HOUR: Performed by: ORTHOPAEDIC SURGERY

## 2017-10-13 PROCEDURE — 63600175 PHARM REV CODE 636 W HCPCS: Performed by: ORTHOPAEDIC SURGERY

## 2017-10-13 PROCEDURE — 63600175 PHARM REV CODE 636 W HCPCS: Performed by: ANESTHESIOLOGY

## 2017-10-13 PROCEDURE — 29823 SHO ARTHRS SRG XTNSV DBRDMT: CPT | Mod: 51,RT,, | Performed by: ORTHOPAEDIC SURGERY

## 2017-10-13 PROCEDURE — 37000009 HC ANESTHESIA EA ADD 15 MINS: Performed by: ORTHOPAEDIC SURGERY

## 2017-10-13 PROCEDURE — 71000016 HC POSTOP RECOV ADDL HR: Performed by: ORTHOPAEDIC SURGERY

## 2017-10-13 PROCEDURE — 29828 SHO ARTHRS SRG BICP TENODSIS: CPT | Mod: 51,RT,, | Performed by: ORTHOPAEDIC SURGERY

## 2017-10-13 PROCEDURE — 63600175 PHARM REV CODE 636 W HCPCS: Performed by: PHYSICIAN ASSISTANT

## 2017-10-13 PROCEDURE — 29827 SHO ARTHRS SRG RT8TR CUF RPR: CPT | Mod: RT,,, | Performed by: ORTHOPAEDIC SURGERY

## 2017-10-13 PROCEDURE — 27201423 OPTIME MED/SURG SUP & DEVICES STERILE SUPPLY: Performed by: ORTHOPAEDIC SURGERY

## 2017-10-13 PROCEDURE — 27100025 HC TUBING, SET FLUID WARMER: Performed by: NURSE ANESTHETIST, CERTIFIED REGISTERED

## 2017-10-13 PROCEDURE — 71000039 HC RECOVERY, EACH ADD'L HOUR: Performed by: ORTHOPAEDIC SURGERY

## 2017-10-13 PROCEDURE — 25000003 PHARM REV CODE 250: Performed by: ANESTHESIOLOGY

## 2017-10-13 PROCEDURE — 36000711: Performed by: ORTHOPAEDIC SURGERY

## 2017-10-13 PROCEDURE — C1713 ANCHOR/SCREW BN/BN,TIS/BN: HCPCS | Performed by: ORTHOPAEDIC SURGERY

## 2017-10-13 PROCEDURE — 82962 GLUCOSE BLOOD TEST: CPT | Performed by: ORTHOPAEDIC SURGERY

## 2017-10-13 PROCEDURE — 37000008 HC ANESTHESIA 1ST 15 MINUTES: Performed by: ORTHOPAEDIC SURGERY

## 2017-10-13 PROCEDURE — 29824 SHO ARTHRS SRG DSTL CLAVICLC: CPT | Mod: 51,RT,, | Performed by: ORTHOPAEDIC SURGERY

## 2017-10-13 PROCEDURE — 63600175 PHARM REV CODE 636 W HCPCS: Performed by: NURSE ANESTHETIST, CERTIFIED REGISTERED

## 2017-10-13 PROCEDURE — 71000015 HC POSTOP RECOV 1ST HR: Performed by: ORTHOPAEDIC SURGERY

## 2017-10-13 PROCEDURE — 25000003 PHARM REV CODE 250: Performed by: ORTHOPAEDIC SURGERY

## 2017-10-13 PROCEDURE — 36000710: Performed by: ORTHOPAEDIC SURGERY

## 2017-10-13 PROCEDURE — 29826 SHO ARTHRS SRG DECOMPRESSION: CPT | Mod: RT,,, | Performed by: ORTHOPAEDIC SURGERY

## 2017-10-13 DEVICE — ANCHOR SUT BIOCOM 5.5X19.1MM: Type: IMPLANTABLE DEVICE | Site: SHOULDER | Status: FUNCTIONAL

## 2017-10-13 DEVICE — CORKSCREW BIOCOMPOSITE 5.5MM: Type: IMPLANTABLE DEVICE | Site: SHOULDER | Status: FUNCTIONAL

## 2017-10-13 RX ORDER — ONDANSETRON 2 MG/ML
INJECTION INTRAMUSCULAR; INTRAVENOUS
Status: DISCONTINUED | OUTPATIENT
Start: 2017-10-13 | End: 2017-10-13

## 2017-10-13 RX ORDER — EPINEPHRINE 1 MG/ML
INJECTION, SOLUTION INTRACARDIAC; INTRAMUSCULAR; INTRAVENOUS; SUBCUTANEOUS
Status: DISCONTINUED | OUTPATIENT
Start: 2017-10-13 | End: 2017-10-13 | Stop reason: HOSPADM

## 2017-10-13 RX ORDER — FENTANYL CITRATE 50 UG/ML
25 INJECTION, SOLUTION INTRAMUSCULAR; INTRAVENOUS EVERY 5 MIN PRN
Status: DISCONTINUED | OUTPATIENT
Start: 2017-10-13 | End: 2017-10-13 | Stop reason: HOSPADM

## 2017-10-13 RX ORDER — OXYCODONE HYDROCHLORIDE 5 MG/1
5 TABLET ORAL
Status: DISCONTINUED | OUTPATIENT
Start: 2017-10-13 | End: 2017-10-13 | Stop reason: SDUPTHER

## 2017-10-13 RX ORDER — ROPIVACAINE HYDROCHLORIDE 5 MG/ML
INJECTION, SOLUTION EPIDURAL; INFILTRATION; PERINEURAL
Status: DISCONTINUED | OUTPATIENT
Start: 2017-10-13 | End: 2017-10-13

## 2017-10-13 RX ORDER — ACETAMINOPHEN 10 MG/ML
INJECTION, SOLUTION INTRAVENOUS
Status: DISCONTINUED | OUTPATIENT
Start: 2017-10-13 | End: 2017-10-13

## 2017-10-13 RX ORDER — MEPERIDINE HYDROCHLORIDE 50 MG/ML
12.5 INJECTION INTRAMUSCULAR; INTRAVENOUS; SUBCUTANEOUS ONCE AS NEEDED
Status: DISCONTINUED | OUTPATIENT
Start: 2017-10-13 | End: 2017-10-13 | Stop reason: HOSPADM

## 2017-10-13 RX ORDER — FENTANYL CITRATE 50 UG/ML
100 INJECTION, SOLUTION INTRAMUSCULAR; INTRAVENOUS EVERY 5 MIN PRN
Status: COMPLETED | OUTPATIENT
Start: 2017-10-13 | End: 2017-10-13

## 2017-10-13 RX ORDER — ONDANSETRON 2 MG/ML
4 INJECTION INTRAMUSCULAR; INTRAVENOUS EVERY 12 HOURS PRN
Status: DISCONTINUED | OUTPATIENT
Start: 2017-10-13 | End: 2017-10-13 | Stop reason: HOSPADM

## 2017-10-13 RX ORDER — FENTANYL CITRATE 50 UG/ML
25 INJECTION, SOLUTION INTRAMUSCULAR; INTRAVENOUS EVERY 5 MIN PRN
Status: DISCONTINUED | OUTPATIENT
Start: 2017-10-13 | End: 2017-10-13 | Stop reason: SDUPTHER

## 2017-10-13 RX ORDER — ONDANSETRON 2 MG/ML
4 INJECTION INTRAMUSCULAR; INTRAVENOUS DAILY PRN
Status: DISCONTINUED | OUTPATIENT
Start: 2017-10-13 | End: 2017-10-13 | Stop reason: SDUPTHER

## 2017-10-13 RX ORDER — MIDAZOLAM HYDROCHLORIDE 1 MG/ML
2 INJECTION INTRAMUSCULAR; INTRAVENOUS ONCE
Status: COMPLETED | OUTPATIENT
Start: 2017-10-13 | End: 2017-10-13

## 2017-10-13 RX ORDER — PROPOFOL 10 MG/ML
VIAL (ML) INTRAVENOUS
Status: DISCONTINUED | OUTPATIENT
Start: 2017-10-13 | End: 2017-10-13

## 2017-10-13 RX ORDER — DIPHENHYDRAMINE HYDROCHLORIDE 50 MG/ML
12.5 INJECTION INTRAMUSCULAR; INTRAVENOUS EVERY 30 MIN PRN
Status: DISCONTINUED | OUTPATIENT
Start: 2017-10-13 | End: 2017-10-13 | Stop reason: HOSPADM

## 2017-10-13 RX ORDER — OXYCODONE HYDROCHLORIDE 5 MG/1
5 TABLET ORAL ONCE
Status: COMPLETED | OUTPATIENT
Start: 2017-10-13 | End: 2017-10-13

## 2017-10-13 RX ORDER — HYDROMORPHONE HYDROCHLORIDE 2 MG/ML
0.4 INJECTION, SOLUTION INTRAMUSCULAR; INTRAVENOUS; SUBCUTANEOUS EVERY 5 MIN PRN
Status: DISCONTINUED | OUTPATIENT
Start: 2017-10-13 | End: 2017-10-13 | Stop reason: HOSPADM

## 2017-10-13 RX ORDER — NEOSTIGMINE METHYLSULFATE 1 MG/ML
INJECTION, SOLUTION INTRAVENOUS
Status: DISCONTINUED | OUTPATIENT
Start: 2017-10-13 | End: 2017-10-13

## 2017-10-13 RX ORDER — OXYCODONE HYDROCHLORIDE 5 MG/1
5 TABLET ORAL
Status: DISCONTINUED | OUTPATIENT
Start: 2017-10-13 | End: 2017-10-13 | Stop reason: HOSPADM

## 2017-10-13 RX ORDER — SODIUM CHLORIDE 0.9 % (FLUSH) 0.9 %
3 SYRINGE (ML) INJECTION
Status: DISCONTINUED | OUTPATIENT
Start: 2017-10-13 | End: 2017-10-13 | Stop reason: HOSPADM

## 2017-10-13 RX ORDER — HYDROMORPHONE HYDROCHLORIDE 2 MG/ML
0.4 INJECTION, SOLUTION INTRAMUSCULAR; INTRAVENOUS; SUBCUTANEOUS EVERY 5 MIN PRN
Status: DISCONTINUED | OUTPATIENT
Start: 2017-10-13 | End: 2017-10-13 | Stop reason: SDUPTHER

## 2017-10-13 RX ORDER — CEFAZOLIN SODIUM 2 G/50ML
2 SOLUTION INTRAVENOUS
Status: DISCONTINUED | OUTPATIENT
Start: 2017-10-13 | End: 2017-10-13 | Stop reason: HOSPADM

## 2017-10-13 RX ORDER — ROCURONIUM BROMIDE 10 MG/ML
INJECTION, SOLUTION INTRAVENOUS
Status: DISCONTINUED | OUTPATIENT
Start: 2017-10-13 | End: 2017-10-13

## 2017-10-13 RX ORDER — OXYCODONE AND ACETAMINOPHEN 5; 325 MG/1; MG/1
1 TABLET ORAL EVERY 4 HOURS PRN
Status: DISCONTINUED | OUTPATIENT
Start: 2017-10-13 | End: 2017-10-13 | Stop reason: HOSPADM

## 2017-10-13 RX ORDER — PHENYLEPHRINE HYDROCHLORIDE 10 MG/ML
INJECTION INTRAVENOUS
Status: DISCONTINUED | OUTPATIENT
Start: 2017-10-13 | End: 2017-10-13

## 2017-10-13 RX ORDER — FENTANYL CITRATE 50 UG/ML
INJECTION, SOLUTION INTRAMUSCULAR; INTRAVENOUS
Status: DISCONTINUED | OUTPATIENT
Start: 2017-10-13 | End: 2017-10-13

## 2017-10-13 RX ORDER — MEPERIDINE HYDROCHLORIDE 50 MG/ML
12.5 INJECTION INTRAMUSCULAR; INTRAVENOUS; SUBCUTANEOUS ONCE AS NEEDED
Status: DISCONTINUED | OUTPATIENT
Start: 2017-10-13 | End: 2017-10-13 | Stop reason: SDUPTHER

## 2017-10-13 RX ORDER — HYDRALAZINE HYDROCHLORIDE 20 MG/ML
10 INJECTION INTRAMUSCULAR; INTRAVENOUS ONCE
Status: COMPLETED | OUTPATIENT
Start: 2017-10-13 | End: 2017-10-13

## 2017-10-13 RX ORDER — ONDANSETRON 2 MG/ML
4 INJECTION INTRAMUSCULAR; INTRAVENOUS DAILY PRN
Status: DISCONTINUED | OUTPATIENT
Start: 2017-10-13 | End: 2017-10-13 | Stop reason: HOSPADM

## 2017-10-13 RX ORDER — LIDOCAINE HYDROCHLORIDE 10 MG/ML
INJECTION, SOLUTION INTRAVENOUS
Status: DISCONTINUED | OUTPATIENT
Start: 2017-10-13 | End: 2017-10-13

## 2017-10-13 RX ORDER — SODIUM CHLORIDE, SODIUM LACTATE, POTASSIUM CHLORIDE, CALCIUM CHLORIDE 600; 310; 30; 20 MG/100ML; MG/100ML; MG/100ML; MG/100ML
INJECTION, SOLUTION INTRAVENOUS CONTINUOUS PRN
Status: DISCONTINUED | OUTPATIENT
Start: 2017-10-13 | End: 2017-10-13

## 2017-10-13 RX ORDER — GLYCOPYRROLATE 0.2 MG/ML
INJECTION INTRAMUSCULAR; INTRAVENOUS
Status: DISCONTINUED | OUTPATIENT
Start: 2017-10-13 | End: 2017-10-13

## 2017-10-13 RX ADMIN — PROPOFOL 40 MG: 10 INJECTION, EMULSION INTRAVENOUS at 09:10

## 2017-10-13 RX ADMIN — PHENYLEPHRINE HYDROCHLORIDE 100 MCG: 10 INJECTION INTRAVENOUS at 08:10

## 2017-10-13 RX ADMIN — MIDAZOLAM HYDROCHLORIDE 2 MG: 1 INJECTION INTRAMUSCULAR; INTRAVENOUS at 06:10

## 2017-10-13 RX ADMIN — OXYCODONE HYDROCHLORIDE 5 MG: 5 TABLET ORAL at 12:10

## 2017-10-13 RX ADMIN — SODIUM CHLORIDE, SODIUM LACTATE, POTASSIUM CHLORIDE, AND CALCIUM CHLORIDE: 600; 310; 30; 20 INJECTION, SOLUTION INTRAVENOUS at 09:10

## 2017-10-13 RX ADMIN — HYDRALAZINE HYDROCHLORIDE 10 MG: 20 INJECTION INTRAMUSCULAR; INTRAVENOUS at 11:10

## 2017-10-13 RX ADMIN — PHENYLEPHRINE HYDROCHLORIDE 50 MCG: 10 INJECTION INTRAVENOUS at 08:10

## 2017-10-13 RX ADMIN — EPINEPHRINE 3 ML: 1 INJECTION, SOLUTION INTRAMUSCULAR; SUBCUTANEOUS at 07:10

## 2017-10-13 RX ADMIN — FENTANYL CITRATE 100 MCG: 50 INJECTION, SOLUTION INTRAMUSCULAR; INTRAVENOUS at 06:10

## 2017-10-13 RX ADMIN — SODIUM CHLORIDE, SODIUM LACTATE, POTASSIUM CHLORIDE, AND CALCIUM CHLORIDE: 600; 310; 30; 20 INJECTION, SOLUTION INTRAVENOUS at 06:10

## 2017-10-13 RX ADMIN — ONDANSETRON 4 MG: 2 INJECTION INTRAMUSCULAR; INTRAVENOUS at 11:10

## 2017-10-13 RX ADMIN — PHENYLEPHRINE HYDROCHLORIDE 100 MCG: 10 INJECTION INTRAVENOUS at 07:10

## 2017-10-13 RX ADMIN — NEOSTIGMINE METHYLSULFATE 2 MG: 1 INJECTION INTRAVENOUS at 09:10

## 2017-10-13 RX ADMIN — FENTANYL CITRATE 25 MCG: 50 INJECTION INTRAMUSCULAR; INTRAVENOUS at 11:10

## 2017-10-13 RX ADMIN — ROPIVACAINE HYDROCHLORIDE 20 ML: 5 INJECTION, SOLUTION EPIDURAL; INFILTRATION; PERINEURAL at 06:10

## 2017-10-13 RX ADMIN — FENTANYL CITRATE 50 MCG: 50 INJECTION, SOLUTION INTRAMUSCULAR; INTRAVENOUS at 07:10

## 2017-10-13 RX ADMIN — OXYCODONE HYDROCHLORIDE 5 MG: 5 TABLET ORAL at 10:10

## 2017-10-13 RX ADMIN — ONDANSETRON 4 MG: 2 INJECTION INTRAMUSCULAR; INTRAVENOUS at 07:10

## 2017-10-13 RX ADMIN — ACETAMINOPHEN 1000 MG: 10 INJECTION, SOLUTION INTRAVENOUS at 07:10

## 2017-10-13 RX ADMIN — GLYCOPYRROLATE 0.4 MG: 0.2 INJECTION, SOLUTION INTRAMUSCULAR; INTRAVENOUS at 09:10

## 2017-10-13 RX ADMIN — ROCURONIUM BROMIDE 40 MG: 10 INJECTION, SOLUTION INTRAVENOUS at 07:10

## 2017-10-13 RX ADMIN — LIDOCAINE HYDROCHLORIDE 50 MG: 10 INJECTION, SOLUTION INTRAVENOUS at 07:10

## 2017-10-13 RX ADMIN — FENTANYL CITRATE 25 MCG: 50 INJECTION INTRAMUSCULAR; INTRAVENOUS at 10:10

## 2017-10-13 RX ADMIN — PROPOFOL 60 MG: 10 INJECTION, EMULSION INTRAVENOUS at 09:10

## 2017-10-13 RX ADMIN — PROPOFOL 150 MG: 10 INJECTION, EMULSION INTRAVENOUS at 07:10

## 2017-10-13 RX ADMIN — Medication 20 MG: at 07:10

## 2017-10-13 RX ADMIN — CEFAZOLIN SODIUM 2 G: 2 SOLUTION INTRAVENOUS at 07:10

## 2017-10-13 RX ADMIN — CARBOXYMETHYLCELLULOSE SODIUM 2 DROP: 2.5 SOLUTION/ DROPS OPHTHALMIC at 07:10

## 2017-10-13 NOTE — OR NURSING
Dr. Hadley called to bedside and notified of elevated BP: 201/83 & HR: 68-90's.      updated on pt status and sent to ACU

## 2017-10-13 NOTE — ANESTHESIA PROCEDURE NOTES
ISB    Patient location during procedure: holding area   Block not for primary anesthetic.  Reason for block: at surgeon's request and post-op pain management   Post-op Pain Location: R SHOULDER PAIN  Start time: 10/13/2017 6:39 AM  Timeout: 10/13/2017 6:37 AM   End time: 10/13/2017 6:47 AM  Staffing  Anesthesiologist: OLMAN ORTIZ  Performed: anesthesiologist   Preanesthetic Checklist  Completed: patient identified, site marked, surgical consent, pre-op evaluation, timeout performed, IV checked, risks and benefits discussed and monitors and equipment checked     Additional Notes  INTERSCALENE BLOCK, R, single shot  Supine position  O2 nasal cannula, sedation titrated  Chloraprep, sterile technique  Monitors: Continuous EKG, pulse Oximeter, Intermittent BP  22G, short-bevel, 3.5 in needle  Ultrasound Guided needle positioning  Local visualized surrounding nerve  Images saved to disc  Muscle twitch elicited at 0.5 mA  Ropivicaine 0.5%, 20cc  Attempt x 1 pass  Negative aspiration every 5 cc  Low injection pressure  Negative paresthesia  Pt. Tolerated procedure well

## 2017-10-13 NOTE — INTERVAL H&P NOTE
The patient has been examined and the H&P has been reviewed:    I concur with the findings and no changes have occurred since H&P was written.    Anesthesia/Surgery risks, benefits and alternative options discussed and understood by patient/family.          Active Hospital Problems    Diagnosis  POA    Complete tear of right rotator cuff [M75.121]  Yes      Resolved Hospital Problems    Diagnosis Date Resolved POA   No resolved problems to display.

## 2017-10-13 NOTE — OR NURSING
Assisted up to bathroom again to void, voided without difficulty and assisted to dress, c/o increased pain to right posterior shoulder area with movement.  Dr Hadley notified, orders noted

## 2017-10-13 NOTE — OP NOTE
[facility]   OPERATIVE REPORT   ORTHOPAEDIC SURGERY   PROVIDER: DR. ANTHONY LACKEY    PATIENT INFORMATION   Lina Davis 72 y.o. female 1945   MRN: [id number]   LOCATION: [facility]     DATE OF PROCEDURE: 10/13/2017    SURGEON: ANTHONY Lackey M.D.    ASSISTANT:   RACHNA Franklin MD PGY6  [facility]    PREOPERATIVE DIAGNOSES:   right  1. Shoulder rotator cuff tear   2. Shoulder biceps tendonitis  3. Shoulder synovitis  4. Shoulder capsulitis  5. Shoulder impingement, bursitis  6. Shoulder AC arthritis    POSTOPERATIVE DIAGNOSES:   right  1. Shoulder rotator cuff tear, massive  2. Shoulder biceps tendonitis  3. Shoulder synovitis  4. Shoulder capsulitis  5. Shoulder impingement, bursitis  6. Shoulder AC arthritis      OPERATION:   right  1. Shoulder arthroscopic rotator cuff repair (transosseous equivalent, double row), massive (CPT 79173)  2. Shoulder arthroscopic biceps auto tenodesis (60257)  3. Shoulder arthroscopic subacromial decompression, bursectomy (CPT 71210)  4. Shoulder arthroscopic extensive debridement (anterior, posterior glenohumeral joint, subacromial space) (CPT 96256)   5. Shoulder arthroscopic lysis of adhesions/capsular release (CPT 88787)  6. Shoulder arthroscopic distal clavicle excision (CPT 16211)    ANESTHESIA:  General with interscalene block    ESTIMATED BLOOD LOSS:  Minimal.    TOURNIQUET TIME:  None.    DRAINS:  None.    IMPLANTS:   Implant Name Type Inv. Item Serial No.  Lot No. LRB No. Used   CORKSCREW BIOCOMPOSITE 5.5MM - [id number]  CORKSCREW BIOCOMPOSITE 5.5MM  [employer] [id number] Right 1   CORKSCREW BIOCOMPOSITE 5.5MM - [id number]  CORKSCREW BIOCOMPOSITE 5.5MM  [employer] [id number] Right 1   SUTURE ANCH BIOCOM 5.5X19.1MM - [id number]  SUTURE ANCH BIOCOM 5.5X19.1MM  [employer] [id number] Right 1   SUTURE ANCH BIOCOM 5.5X19.1MM - [id number]   SUTURE ANCH BIOCOM 5.5X19.1MM   [employer] [id number] Right 1      COMPLICATIONS:  None.  The patient was moved to the  recovery room in stable condition with compartments soft and cap refill less than a second in all digits.    INDICATIONS/MEDICAL NECESSITY: The patient is a 72 y.o. year-old female who has history and physical examination findings consistent with the above.  Nonoperative versus operative options were discussed.  The risks and benefits were discussed with the patient.  The patient acknowledged understanding and wished to proceed with operative intervention.  Informed consent was obtained prior to the procedure.  Reasonable expectations and potential complications were discussed and acknowledged, including but not limited to infection, bleeding, blood clots, (DVT and/or PE), nerve injury, tear, instability, continued pain and stiffness.  They agreed and understood and wished to proceed.    EXAMINATION UNDER ANESTHESIA of the right SHOULDER: Forward elevation 175 degrees, External rotation at 0 60 degrees. Translation testing: anterior grade 1, posterior grade 1, sulcus sign grade 1 corrects to 0 on external rotation.    PROCEDURE IN DETAIL:  After the correct operative site was marked by the operating surgeon, an interscalene block was administered by the anesthesia team.  The patient was then taken to the operating room and placed supine on the operating room table, where the patient  underwent general anesthesia by the anesthesia team.  The patient was then rolled into the lateral decubitus position with the operative side up.  A well-padded axillary roll, beanbag and pillows were placed.  All pressure points were carefully padded and checked.  The upper extremities and both lower extremities were placed in comfortable positions and were also well-padded.  The operative upper extremity was then prepped and draped in the usual sterile fashion.     The Spider arm positioner was implemented with balanced suspension and appropriate landmarks were noted on the skin.  A posterior followed by cj-superior portals were  created and systematic examination of the joint revealed the following:      There was no evidence of any significant chondral lesions to the glenoid or humeral head.     There was some synovitis in the labrum that was debrided as needed from anterior to posterior.  Biceps release with auto-tenodesis was performed using thermal device.  Part of superior labrum was included to allow the biceps tendon to auto-tenodese.  Attention was then turned to the rotator cuff.  The rotator cuff undersurface was then visualized and debrided as needed using a shaver. A large to massive cuff tear was easily visualized.  Tissue retraction to the level of the glenoid.  Capsular sided releases were performed carefully.  The subscapularis was probed and found to be intact.  Rotator interval was debrided down to the coracoid.  Patient was found to have inflamed and thickened capsular tissue over the MGHL and the anterior band of the IGHL.  Capsular release was performed from the 2:00 to the 6:00 position.    Attention was then turned to the subacromial space where a significant hypertrophic bursa was encountered.  The bursa itself was thickened and hypertrophic.  A lateral portal was created to assist with the bursectomy.  Subacromial decompression was completed using posterior cutting block technique in the standard fashion with a 4.5 mm rachel without difficulty.  The anterior osteophyte was flattened.  Confirmation of adequate resection was confirmed while viewing from the lateral portal.      The rotator cuff footprint was carefully prepared for the repair and the rotator cuff tear was reassessed.  Again this was a massive tear, 5 cm or more in size with reverse L-shaped orientation.  A posterior lateral assessory portal was utilized in addition to the anterolateral portal for access.  Adhesions over the scapular spine were removed and the bursal sided scar also was resected.  Completion of mobilization of the cuff was performed with  release down to the coracoid base.  A tissue grasper was then brought in to confirm adequate mobility of the rotator cuff down to the footprint.  A double loaded and triple loaded medial footprint Arthrex corkscrew anchors were placed with good fixation.  Using a combination of a scorpion suture passer and a retrograde passer, medial row sutures were passed in horizontal mattress fashion just lateral to the musculotendinous junction.  Residual tendon was shortened due to the chronicity of the tear.  After suture passage from anterior to posterior, knots were tied for medial row fixation from posterior to anterior.  Good reduction and fixation was achieved.  Lateral row anchors were placed incorporating the sutures from the medial row using 2 Arthrex Swivelock anchors.  At the conclusion of the case the cuff construct was found to be quite stable with good footprint coverage and adequate fixation of tissue.     Next, attention was then turned to the distal clavicle excision.  A thermal device was used to clear the soft tissue off the length of the AC joint approximately 1 cm medial to the end of the distal end of the clavicle.  The anterior portal which was established earlier was used to perform the AC resection at the level of the AC joint.  The adequacy of the resection was confirmed while viewing from the anterior portal.  Care was taken to resect enough postero-superiorly.  Approximately 7 mm was resected.  A Nevaiser's portal was used for assistance to resect the posterior superior bone.    The shoulder and subacromial space were then irrigated and fluid was extravasated using suction. All portals were reapproximated using inverted 4-0 Monocryl suture in the subcutaneous tissue of the portals.  Mastisol and Steri-strips were placed with xeroform, 4x4s, abd pad, and Medi-pore tape.  An iceman was secured in the shoulder navarro.  A sling with an abduction pillow was secured.  The patient was then moved to supine,  extubated and taken to the recovery room where the patient arrived in stable condition with the compartments of the arm and forearm soft and cap refill less than a second in all digits.     POSTOPERATIVE PLAN: The patient will be discharged home today.  Plan to follow a massive rotator cuff repair protocol.  I've advised that she stay in her sling and pillow for now.  May remove for simple elbow range of motion exercises.  Can start pendulums after 2 weeks.  Gentle passive range of motion can begin at 4 weeks.  No active range of motion before 8-9 weeks.  No resistive activity for 12 weeks.  Goal of full passive and active range of motion at 12 weeks.  Patient understands and time to full recovery can be 6-12 months.  Will wear sling for 6-8 weeks.  DVT prophylaxis as previously discussed with aspirin.

## 2017-10-13 NOTE — PLAN OF CARE
Lina Davis has met all discharge criteria from Phase II. Vital Signs are stable, ambulating  without difficulty.Pain is now under control and tolerable for the pt. Pain score 5 at this time.  Discharge instructions given, patient verbalized understanding. Discharged from facility via wheelchair in stable condition.

## 2017-10-13 NOTE — ANESTHESIA POSTPROCEDURE EVALUATION
"Anesthesia Post Evaluation    Patient: Lina Davis    Procedure(s) Performed: Procedure(s) (LRB):  REPAIR ROTATOR CUFF ARTHROSCOPIC (Right)  RELEASE-TENDON (Right)  [id number] END (Right)  DEBRIDEMENT-SHOULDER-ARTHROSCOPIC (Right)    Final Anesthesia Type: general  Patient location during evaluation: PACU  Patient participation: Yes- Able to Participate  Level of consciousness: awake and alert  Post-procedure vital signs: reviewed and stable  Pain management: adequate  Airway patency: patent  PONV status at discharge: No PONV  Anesthetic complications: no      Cardiovascular status: blood pressure returned to baseline and stable (required hydralazine for BP in PACU)  Respiratory status: unassisted, spontaneous ventilation and room air  Hydration status: euvolemic  Follow-up not needed.        Visit Vitals  BP (!) 150/68   Pulse 88   Temp 36.7 °C (98 °F)   Resp 16   Ht 5' 7" (1.702 m)   Wt 78.5 kg (173 lb)   SpO2 95%   BMI 27.10 kg/m²       Pain/Adriana Score: Pain Assessment Performed: Yes (10/13/2017 11:40 AM)  Presence of Pain: complains of pain/discomfort (10/13/2017 11:40 AM)  Presence of Pain: complains of pain/discomfort (10/13/2017 10:45 AM)  Pain Rating Prior to Med Admin: 6 (10/13/2017 11:05 AM)  Pain Rating Post Med Admin: 4 (10/13/2017 11:40 AM)  Adriana Score: 10 (10/13/2017 11:40 AM)      "

## 2017-10-13 NOTE — BRIEF OP NOTE
[facility]  Brief Operative Note     SUMMARY     Surgery Date: 10/13/2017     Surgeon(s) and Role:     * MUNIRA Grier MD - Primary    Assisting Surgeon: None    Pre-op Diagnosis:  AC (acromioclavicular) arthritis [M19.019]  Impingement syndrome of right shoulder [M75.41]  Complete tear of right rotator cuff [M75.121]  Biceps tendinitis of right upper extremity [M75.21]    Post-op Diagnosis:  Post-Op Diagnosis Codes:     * AC (acromioclavicular) arthritis [M19.019]     * Impingement syndrome of right shoulder [M75.41]     * Complete tear of right rotator cuff [M75.121]     * Biceps tendinitis of right upper extremity [M75.21]    Procedure(s) (LRB):  REPAIR ROTATOR CUFF ARTHROSCOPIC (Right)  RELEASE-TENDON (Right)  [id number] END (Right)  DEBRIDEMENT-SHOULDER-ARTHROSCOPIC (Right)    Anesthesia: General    Description of the findings of the procedure: Right shoulder cuff repair     Findings/Key Components: Massive multitendon cuff tear    Estimated Blood Loss: * No values recorded between 10/13/2017  7:39 AM and 10/13/2017  9:47 AM *         Specimens:   Specimen (12h ago through future)    None          Discharge Note    SUMMARY     Admit Date: 10/13/2017    Discharge Date and Time:  10/13/2017 9:48 AM    Hospital Course (synopsis of major diagnoses, care, treatment, and services provided during the course of the hospital stay): Underwent surgery. Transfer to PACU. Then Home.      Final Diagnosis: Post-Op Diagnosis Codes:     * AC (acromioclavicular) arthritis [M19.019]     * Impingement syndrome of right shoulder [M75.41]     * Complete tear of right rotator cuff [M75.121]     * Biceps tendinitis of right upper extremity [M75.21]    Disposition: Home or Self Care    Follow Up/Patient Instructions:     Medications:  Reconciled Home Medications:   Current Discharge Medication List      CONTINUE these medications which have NOT CHANGED    Details   allopurinol (ZYLOPRIM) 300 MG  tablet 1 Tablet Oral Every day  Qty: 90 tablet, Refills: 3      !! aspirin (ASPIRIN LOW DOSE) 81 MG EC tablet Take by mouth. 1 Tablet, Delayed Release (E.C.) Oral Every day      benzonatate (TESSALON) 100 MG capsule TAKE 1 CAPSULE THREE TIMES DAILY AS NEEDED FOR COUGH  Qty: 30 capsule, Refills: 0    Associated Diagnoses: Sinusitis, unspecified chronicity, unspecified location; Acute bronchitis, unspecified organism      losartan-hydrochlorothiazide 100-25 mg (HYZAAR) 100-25 mg per tablet Take 1 tablet by mouth once daily.  Qty: 90 tablet, Refills: 3      lovastatin (MEVACOR) 40 MG tablet TAKE 1 TABLET EVERY EVENING  Qty: 90 tablet, Refills: 3    Associated Diagnoses: Hyperlipidemia      metformin (GLUCOPHAGE) 500 MG tablet TAKE 2 TABLETS TWICE DAILY WITH MEALS  Qty: 360 tablet, Refills: 3    Associated Diagnoses: Type 2 diabetes mellitus with diabetic nephropathy      omeprazole (PRILOSEC) 20 MG capsule One capsule twice daily  Qty: 180 capsule, Refills: 3      ondansetron (ZOFRAN) 4 MG tablet Take 1 tablet (4 mg total) by mouth every 8 (eight) hours as needed for Nausea.  Qty: 30 tablet, Refills: 0    Associated Diagnoses: Complete tear of right rotator cuff; Biceps tendonitis on right      oxycodone (ROXICODONE) 5 MG immediate release tablet Take 1 tablet (5 mg total) by mouth every 4 (four) hours as needed for Pain.  Qty: 60 tablet, Refills: 0    Associated Diagnoses: Complete tear of right rotator cuff; Biceps tendonitis on right      sitagliptin (JANUVIA) 100 MG Tab TAKE 1 TABLET ONE TIME DAILY  Qty: 90 tablet, Refills: 3    Associated Diagnoses: Type 2 diabetes mellitus with diabetic nephropathy      acetaminophen (TYLENOL) 325 MG tablet Take 325 mg by mouth every 6 (six) hours as needed for Pain.      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  aspirin (ECOTRIN) 325 MG EC tablet Take 1 tablet (325 mg total) by mouth 2 (two) times daily.  Qty: 28 tablet, Refills: 0    Associated Diagnoses: Complete tear of right rotator cuff; Biceps tendonitis on right      blood sugar diagnostic (TRUE METRIX GLUCOSE TEST STRIP) Strp Please give True metrix test strips and lancets to monitor blood sugars twice daily E11.21  Qty: 200 each, Refills: 2      blood-glucose meter (TRUE METRIX AIR GLUCOSE METER) kit Test twice daily, E11.21  Qty: 1 each, Refills: 0      blood-glucose meter (TRUE METRIX AIR GLUCOSE METER) Misc Please give a True metrix air glucose meter to use as directed to monitor blood sugars BID  Qty: 1 each, Refills: 0      lancets (TRUEPLUS LANCETS) 28 gauge Misc 1 lancet by Misc.(Non-Drug; Combo Route) route 2 (two) times daily.  Qty: 200 each, Refills: 2      PRODIGY CONTROL SOLUTION, LOW Soln        !! - Potential duplicate medications found. Please discuss with provider.        No discharge procedures on file.   F/u with Dr. Grier in 2 weeks.

## 2017-10-13 NOTE — DISCHARGE INSTRUCTIONS
REMOVE DRESSING IN 72 HOURS    NO WEIGHT BEARING    ICE TO AFFECTED AREA        Discharge Instructions: After Your Surgery  Youve just had surgery. During surgery, you were given medicine called anesthesia to keep you relaxed and free of pain. After surgery, you may have some pain or nausea. This is common. Here are some tips for feeling better and getting well after surgery.     Stay on schedule with your medicine.     Going home  Your healthcare provider will show you how to take care of yourself when you go home. He or she will also answer your questions. Have an adult family member or friend drive you home. For the first 24 hours after your surgery:    · Do not drive or use heavy equipment.  · Do not make important decisions or sign legal papers.  · Do not drink alcohol.  · Have someone stay with you, if needed. He or she can watch for problems and help keep you safe.    Be sure to go to all follow-up visits with your healthcare provider. And rest after your surgery for as long as your healthcare provider tells you to.    Coping with pain  If you have pain after surgery, pain medicine will help you feel better. Take it as told, before pain becomes severe. Also, ask your healthcare provider or pharmacist about other ways to control pain. This might be with heat, ice, or relaxation. And follow any other instructions your surgeon or nurse gives you.    Tips for taking pain medicine  To get the best relief possible, remember these points:    · Pain medicines can upset your stomach. Taking them with a little food may help.  · Most pain relievers taken by mouth need at least 20 to 30 minutes to start to work.  · Taking medicine on a schedule can help you remember to take it. Try to time your medicine so that you can take it before starting an activity. This might be before you get dressed, go for a walk, or sit down for dinner.  · Constipation is a common side effect of pain medicines. Call your healthcare provider  before taking any medicines such as laxatives or stool softeners to help ease constipation. Also ask if you should skip any foods. Drinking lots of fluids and eating foods such as fruits and vegetables that are high in fiber can also help. Remember, do not take laxatives unless your surgeon has prescribed them.  · Drinking alcohol and taking pain medicine can cause dizziness and slow your breathing. It can even be deadly. Do not drink alcohol while taking pain medicine.  · Pain medicine can make you react more slowly to things. Do not drive or run machinery while taking pain medicine.    Your healthcare provider may tell you to take acetaminophen to help ease your pain. Ask him or her how much you are supposed to take each day. Acetaminophen or other pain relievers may interact with your prescription medicines or other over-the-counter (OTC) medicines. Some prescription medicines have acetaminophen and other ingredients. Using both prescription and OTC acetaminophen for pain can cause you to overdose. Read the labels on your OTC medicines with care. This will help you to clearly know the list of ingredients, how much to take, and any warnings. It may also help you not take too much acetaminophen. If you have questions or do not understand the information, ask your pharmacist or healthcare provider to explain it to you before you take the OTC medicine.    Managing nausea  Some people have an upset stomach after surgery. This is often because of anesthesia, pain, or pain medicine, or the stress of surgery. These tips will help you handle nausea and eat healthy foods as you get better. If you were on a special food plan before surgery, ask your healthcare provider if you should follow it while you get better. These tips may help:    · Do not push yourself to eat. Your body will tell you when to eat and how much.  · Start off with clear liquids and soup. They are easier to digest.  · Next try semi-solid foods, such as  mashed potatoes, applesauce, and gelatin, as you feel ready.  · Slowly move to solid foods. Dont eat fatty, rich, or spicy foods at first.  · Do not force yourself to have 3 large meals a day. Instead eat smaller amounts more often.  · Take pain medicines with a small amount of solid food, such as crackers or toast, to avoid nausea.     Call your surgeon if  · You still have pain an hour after taking medicine. The medicine may not be strong enough.  · You feel too sleepy, dizzy, or groggy. The medicine may be too strong.  · You have side effects like nausea, vomiting, or skin changes, such as rash, itching, or hives.       If you have obstructive sleep apnea  You were given anesthesia medicine during surgery to keep you comfortable and free of pain. After surgery, you may have more apnea spells because of this medicine and other medicines you were given. The spells may last longer than usual.   At home:    · Keep using the continuous positive airway pressure (CPAP) device when you sleep. Unless your healthcare provider tells you not to, use it when you sleep, day or night. CPAP is a common device used to treat obstructive sleep apnea.  · Talk with your provider before taking any pain medicine, muscle relaxants, or sedatives. Your provider will tell you about the possible dangers of taking these medicines.    © 7495-0435 [employer]. [address] [place] [address], [place] [address]. All rights reserved. This information is not intended as a substitute for professional medical care. Always follow your healthcare professional's instructions.    PLEASE FOLLOW ANY OTHER INSTRUCTIONS PROVIDED TO YOU BY DR. LACKEY!

## 2017-10-13 NOTE — H&P (VIEW-ONLY)
Lina Davis  is here for a completion of her perioperative paperwork. she  Is scheduled to undergo right shoulder arthroscopic rotator cuff repair versus debridement, biceps tenotomy, subacromial decompression, labral debridement, distal clavicle excision on 10/13/2017.  She is a healthy individual and does need clearance for this procedure. Pending clearance by Dr. Freedman.    Risks, indications and benefits of the surgical procedure were discussed with the patient. All questions with regard to surgery, rehab, expected return to functional activities, activities of daily living and recreational endeavors were answered to her satisfaction.    Patient was informed and understands the risks of surgery are greater for patients with a current condition or hx of heart disease, obesity, clotting disorders, recurrent infections, steroid use, current or past smoking, and factors such as sedentary lifestyle and noncompliance with medications, therapy or f/u. The degree of the increased risk is hard to estimate w/ any degree of precision.    Once no other questions were asked, a brief history and physical exam was then performed.      PHYSICAL EXAM:  GEN: A&Ox3, WD WN NAD  HEENT: WNL  CHEST: CTAB, no W/R/R  HEART: RRR, no M/R/G   ABD: Soft, NT ND, BS x4 QUADS  MS: Refer to previous note for detailed MS exam  NEURO: CN II-XII intact       The surgical consent was then reviewed with the patient, who agreed with all the contents of the consent form and it was signed. she was then given the [facility] [place] surgery packet to bring with her to [facility] [place] for the anesthesia portion of her perioperative paperwork.     PHYSICAL THERAPY:  She was also instructed regarding physical therapy and will begin on  POD #3-5 at [facility].     POST OP CARE:instructions were reviewed including care of the wound and dressing after surgery and when she can shower.     PAIN MANAGEMENT: Lina Davis was instructed regarding the Polar ice unit that  will be in place after surgery and her postoperative pain medications.     MEDICATION:  Roxicodone 5 mg 1-2 q 4 hours PRN for pain  Zofran 4 mg q 8 hours PRN for nausea and vomiting.  ASA 325mg BID x 2 weeks    Patient was instructed to purchase and take Colace to counter possible GI side effects of taking opiates.     DVT prophylaxis was discussed with the patient today including risk factors for developing DVTs and history of DVTs. The patient was asked if any specific recommendations were given from the doctor/s that did pre-operative surgical clearance.      The below listed DVT prophylaxis regimen along with bilateral BETH compression stockings will be used post-op.      Patient was instructed to buy and take:  Patient was asked if they were taking or using OCP pills or devices, if had a hx of a DVT/PE, current smoker, or hypercoagulable disorder. If they answered yes to any of the following, then Lovenox 40 mg, q daily x 2 weeks was prescribed to help prevent DVT development.     If patient's BMI>40, Lovenox 40 mg, q daily x 2 weeks was prescribed to help prevent DVT development.     If patient has no increased risk factors, Aspirin 325 mg BID x 2 weeks was prescribed to help prevent DVT development    If the patient was previously taking 81mg baby aspirin, they were told to not take it will using the above stated aspirin and to restart the 81mg aspirin after completion of the aspirin dose.      Aspirin 325mg BID x 2 weeks for DVT prophylaxis starting on the evening after surgery.  Patient will also use bilateral TEDs on lower extremities, SCDs during surgery, and early ambulation post-op.   Patient was also told to buy over the counter Prilosec medication and take it once daily for GI protection as long as they are taking NSAIDs or Aspirin.     The patient was told that narcotic pain medications may make them drowsy and instructions were given to not sign legal documents, drive or operate heavy machinery,  cars, or equipment while under the influence of narcotic medications.     Dr. Grier was present in clinic and directly involved in the additional informed consent process with signing of paperwork and pre-op evaluation. The patient had the opportunity to ask Dr. Grier any additional questions and all questions were answered.    As there were no other questions to be asked, she was given my business card along with Dr. Grier's business card if she has any questions or concerns prior to surgery or in the postop period.

## 2017-10-13 NOTE — OR NURSING
Assisted up to bathroom, voided without difficulty, assisted back to bed for now.  Family at bedside

## 2017-10-13 NOTE — PROGRESS NOTES
Subjective:       Patient ID: Lina Davis is a 72 y.o. female.    Chief Complaint: Pre-op Exam    Pt is feeling well except for her R shoulder pain.  No Cp or SOB.  Pt denies any problems with anesthesia in the past. Pt reports no history of bleeding diathesis, no significant neck DJD, and no steroids in the last year.        Review of Systems   Respiratory: Negative for shortness of breath (PND or orthopnea).    Cardiovascular: Negative for chest pain (arm pain or jaw pain).   Gastrointestinal: Negative for abdominal pain, diarrhea, nausea and vomiting.   Genitourinary: Negative for dysuria.   Neurological: Negative for seizures, syncope and headaches.       Objective:      Physical Exam   Constitutional: She is oriented to person, place, and time. She appears well-developed and well-nourished. No distress.   HENT:   Mouth/Throat: Oropharynx is clear and moist.   Neck: Neck supple. No JVD present. No thyromegaly present.   Cardiovascular: Normal rate, regular rhythm, normal heart sounds and intact distal pulses.  Exam reveals no gallop and no friction rub.    No murmur heard.  Pulmonary/Chest: Effort normal and breath sounds normal. She has no wheezes. She has no rales.   Abdominal: Soft. Bowel sounds are normal. She exhibits no distension and no mass. There is no tenderness. There is no rebound and no guarding.   Musculoskeletal: She exhibits no edema.   Lymphadenopathy:     She has no cervical adenopathy.   Neurological: She is alert and oriented to person, place, and time. She has normal reflexes.   Skin: Skin is warm and dry.   Psychiatric: She has a normal mood and affect. Her behavior is normal. Judgment and thought content normal.       Assessment:       1. Pure hypercholesterolemia    2. HTN (hypertension), benign    3. Type II diabetes mellitus with neurological manifestations    4. Gastroesophageal reflux disease, esophagitis presence not specified    5. Vitamin D deficiency    6. Diabetes mellitus due  to underlying condition with microalbuminuria, without long-term current use of insulin    7. 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        Plan:   Pure hypercholesterolemia  Controlled - continue current meds  Take lovastatin periop  HTN (hypertension), benign  -     CBC auto differential; Future; Expected date: 10/10/2017  -     Comprehensive metabolic panel; Future; Expected date: 10/10/2017  -     EKG 12-lead  Elevated due to pain - continue meds periop  Type II diabetes mellitus with neurological manifestations  -     TSH; Future; Expected date: 10/10/2017  Controlled - continue current meds  Continue metformin periop  Gastroesophageal reflux disease, esophagitis presence not specified  Controlled - continue current meds    Vitamin D deficiency  -     Vitamin D; Future; Expected date: 10/10/2017    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  -     alcohol swabs (BD ALCOHOL SWABS) PadM; USE AS DIRECTED TO MONITOR BLOOD SUGARS TWICE DAILY  Dispense: 200 each; Refill: 3    Other orders  -     blood sugar diagnostic (TRUE METRIX GLUCOSE TEST STRIP) Strp; Please give True metrix test strips and lancets to monitor blood sugars twice daily E11.21  Dispense: 200 each; Refill: 2  -     Discontinue: hydrocodone-acetaminophen 5-325mg (NORCO) 5-325 mg per tablet; One daily as needed for pain  Dispense: 10 tablet; Refill: 0    CBC - H&H 10.4/32 - stable  CMP - WNL  CXR WNL  EKG - NSR, nl EKG    Pt is at acceptable risk for anesthesia and surgery and at low risk for cardio-pulmonary complications.

## 2017-10-13 NOTE — TRANSFER OF CARE
"Anesthesia Transfer of Care Note    Patient: Lina Davis    Procedure(s) Performed: Procedure(s) (LRB):  REPAIR ROTATOR CUFF ARTHROSCOPIC (Right)  RELEASE-TENDON (Right)  [id number] END (Right)  DEBRIDEMENT-SHOULDER-ARTHROSCOPIC (Right)    Patient location: PACU    Anesthesia Type: general    Transport from OR: Transported from OR on 2-3 L/min O2 by NC with adequate spontaneous ventilation    Post pain: adequate analgesia    Post assessment: no apparent anesthetic complications    Post vital signs: stable    Level of consciousness: awake    Nausea/Vomiting: no nausea/vomiting    Complications: none    Transfer of care protocol was followed      Last vitals:   Visit Vitals  BP (!) 161/74 (BP Location: Right arm, Patient Position: Lying)   Pulse 76   Temp 36.6 °C (97.8 °F) (Oral)   Resp 18   Ht 5' 7" (1.702 m)   Wt 78.5 kg (173 lb)   SpO2 99%   BMI 27.10 kg/m²     "

## 2017-10-18 ENCOUNTER — CLINICAL SUPPORT (OUTPATIENT)
Dept: REHABILITATION | Facility: HOSPITAL | Age: 72
End: 2017-10-18
Attending: PHYSICIAN ASSISTANT
Payer: MEDICARE

## 2017-10-18 DIAGNOSIS — M25.611 DECREASED ROM OF RIGHT SHOULDER: ICD-10-CM

## 2017-10-18 DIAGNOSIS — M25.511 ACUTE PAIN OF RIGHT SHOULDER: ICD-10-CM

## 2017-10-18 PROCEDURE — 97110 THERAPEUTIC EXERCISES: CPT

## 2017-10-18 PROCEDURE — G8979 MOBILITY GOAL STATUS: HCPCS | Mod: CJ

## 2017-10-18 PROCEDURE — G8978 MOBILITY CURRENT STATUS: HCPCS | Mod: CL

## 2017-10-18 PROCEDURE — 97162 PT EVAL MOD COMPLEX 30 MIN: CPT

## 2017-10-18 NOTE — PLAN OF CARE
[facility]   PATIENT EVALUATION    Date: 10/18/2017  Start Time: 240  Stop Time: 340    Patient Name: Lina Davis  Clinic Number: [id number]  Age: 72 y.o.  Gender: female    Diagnosis:   Encounter Diagnoses   Name Primary?   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    alcohol swabs (BD ALCOHOL SWABS) PadM USE AS DIRECTED TO MONITOR BLOOD SUGARS TWICE DAILY    allopurinol (ZYLOPRIM) 300 MG tablet 1 Tablet Oral Every day    aspirin (ECOTRIN) 325 MG EC tablet Take 1 tablet (325 mg total) by mouth 2 (two) times daily.    benzonatate (TESSALON) 100 MG capsule TAKE 1 CAPSULE THREE TIMES DAILY AS NEEDED FOR COUGH    blood sugar diagnostic (TRUE METRIX GLUCOSE TEST STRIP) Strp Please give True metrix test strips and lancets to monitor blood sugars twice daily E11.21    blood-glucose meter (TRUE METRIX AIR GLUCOSE METER) kit Test twice daily, E11.21    blood-glucose meter (TRUE METRIX AIR GLUCOSE METER) Misc Please give a True metrix air glucose meter to use as directed to monitor blood sugars BID    lancets (TRUEPLUS LANCETS) 28 gauge Misc 1 lancet by Misc.(Non-Drug; Combo Route) route 2 (two) times daily.    losartan-hydrochlorothiazide 100-25 mg (HYZAAR) 100-25 mg per tablet Take 1 tablet by mouth once daily.     lovastatin (MEVACOR) 40 MG tablet TAKE 1 TABLET EVERY EVENING    metformin (GLUCOPHAGE) 500 MG tablet TAKE 2 TABLETS TWICE DAILY WITH MEALS    omeprazole (PRILOSEC) 20 MG capsule One capsule twice daily    oxycodone (ROXICODONE) 5 MG immediate release tablet Take 1 tablet (5 mg total) by mouth every 4 (four) hours as needed for Pain.    PRODIGY CONTROL SOLUTION, LOW Soln     sitagliptin (JANUVIA) 100 MG Tab TAKE 1 TABLET ONE TIME DAILY     No current facility-administered medications for this visit.        Review of patient's allergies indicates:   Allergen Reactions    No known drug allergies          Subjective     History of Present Condition: Patient reports falling on R shoulder 1 mos prior. She followed up with her PCP and ordered imaging. She was then referred to ortho once imagine results came in. She is now 1 weeks s/p massive R RTR.     Onset Date: 1 mos prior  Date of Surgery: 10/13/17  Precautions: standard, s/p massive RTC repair protocol     Mechanism of Injury: sudden    Pain Location: shoulder   Pain Description: Throbbing and Shooting  Current Pain: 8/10  Least Pain: 3/10  Worst Pain: 10/10  Aggravating Factors: going from sit to stand  Relieving Factors: ice, pain medication    Diagnostic Tests: X-ray  Prior Therapy: yes    Occupation: retired  Sports/Recreational Activities: yard work  Extremity Dominance: right    Prior Level of Function: Independent  Functional Deficits Leading to Referral/Nature of Injury: chronic nature of condition  Patient Therapy Goals: return to PLOF  Cultural/Environmental/Spiritual Barriers to Treatment or Learning: none      Objective     Observation: pleasant  Posture: R elevated shoulder    Dermatomes: WNL  Myotomes: WNL  DTRs: NT    Palpation: TTP around incision site    Shoulder Range of Motion: (PROM)  Flexion- R:~30 deg, L:180 deg  Abduction- R: ~20 deg, L:180 deg  IR- R:~10 deg, L:60 deg  ER- R:0 deg, L:70 deg        Joint Mobility: NT    Strength:  5/5  KANCHAN  NT RUE          Treatment:   theraputty yellow x2 min  Wrist flex, ext, RD and pro/sup x30  Scapular retractions 10 sec x10    Treatment ended with ice x10 min R shoulder    History  Co-morbidities and personal factors that may impact the plan of care Examination  Body Structures and Functions, activity limitations and participation restrictions that may impact the plan of care    Clinical Presentation   Co-morbidities:   advanced age and depression        Personal Factors:   no deficits Body Regions:   upper extremities    Body Systems:    ROM  strength            Participation Restrictions:   none     Activity limitations:   Learning and applying knowledge  no deficits    General Tasks and Commands  no deficits    Communication  no deficits    Mobility  no deficits    Self care  no deficits    Domestic Life  cooking  doing house work (cleaning house, washing dishes, laundry)    Interactions/Relationships  no deficits    Life Areas  no deficits    Community and Social Life  no deficits         evolving clinical presentation with changing clinical characteristics                      moderate   moderate  moderate Decision Making/ Complexity Score:  moderate         PT reviewed FOTO scores for Lina Davis on 10/18/2017    FOTO score: 29    Functional Limitations Reports - G Codes  Category: mobility  Tool: FOTO      Current ():  CL  Goal (): CJ  Discharge ():  NA             Assessment     This is a 72 y.o. female referred to outpatient physical therapy and presents with a medical diagnosis of R shoulder pain with decreased ROM  and demonstrates limitations as described in the problem list. Pt demonstrates good rehab potential. Pt will benefit from physcial therapy services in order to maximize pain free and/or functional use of right shoulder. The following goals were discussed with the patient and patient is in agreement with them as to be addressed in the treatment plan. Pt was given a HEP  consisting of treatment this visit. Pt verbally understood the instructions as they were given and demonstrated proper form and technique during therapy. Pt was advised to perform these exercises free of pain, and to stop performing them if pain occurs.     Medical necessity is demonstrated by the following problem list:   - Pain limits function of effected part for all activities  - Unable to participate in daily activities     Short Term Goals (6 Weeks):  - Pt will increase PROM to 90 deg R shoulder abd and flex  - Decrease Pain to 4/10  - Pt to self correct posture with minimal cues  - Pt independent with HEP with progressions.     Long Term Goals (24 Weeks):  - Pt will increase ROM to WNL R shoulder  - Pt will increase strength to 5/5 R shoulder  - Decrease Pain to 1/10  - Pt to return to 80% PLOF      Plan     Pt will be treated by physical therapy 1-2 times a week for 24 weeks for manual therapy, therapeutic exercise, home exercise program, patient education, and modalities PRN to achieve established goals. [place] may at times be seen by a PTA as part of the Rehab Team.     Therapist: Linda Batista, PT, DPT    I CERTIFY THE NEED FOR THESE SERVICES FURNISHED UNDER THIS PLAN OF TREATMENT AND WHILE UNDER MY CARE    Physician's comments: ________________________________________________________________________________________________________________________________________________    Physician's Name: ___________________________________    Please sign and return plan of care. Thank you for this referral.

## 2017-10-25 ENCOUNTER — OFFICE VISIT (OUTPATIENT)
Dept: SPORTS MEDICINE | Facility: CLINIC | Age: 72
End: 2017-10-25
Payer: MEDICARE

## 2017-10-25 VITALS
HEART RATE: 104 BPM | SYSTOLIC BLOOD PRESSURE: 159 MMHG | HEIGHT: 67 IN | DIASTOLIC BLOOD PRESSURE: 79 MMHG | WEIGHT: 173 LBS | BODY MASS INDEX: 27.15 KG/M2

## 2017-10-25 DIAGNOSIS — Z51.89 AFTERCARE: Primary | ICD-10-CM

## 2017-10-25 DIAGNOSIS — Z47.89 SURGICAL AFTERCARE, MUSCULOSKELETAL SYSTEM: Primary | ICD-10-CM

## 2017-10-25 PROCEDURE — 99999 PR PBB SHADOW E&M-EST. PATIENT-LVL IV: CPT | Mod: PBBFAC,,, | Performed by: ORTHOPAEDIC SURGERY

## 2017-10-25 PROCEDURE — 99024 POSTOP FOLLOW-UP VISIT: CPT | Mod: S$GLB,,, | Performed by: ORTHOPAEDIC SURGERY

## 2017-10-25 RX ORDER — TRAMADOL HYDROCHLORIDE 50 MG/1
TABLET ORAL
Qty: 60 TABLET | Refills: 0 | Status: SHIPPED | OUTPATIENT
Start: 2017-10-25 | End: 2017-10-25

## 2017-10-25 RX ORDER — HYDROCODONE BITARTRATE AND ACETAMINOPHEN 5; 325 MG/1; MG/1
1 TABLET ORAL EVERY 6 HOURS PRN
Qty: 30 TABLET | Refills: 0 | Status: SHIPPED | OUTPATIENT
Start: 2017-10-25 | End: 2017-11-04

## 2017-10-25 RX ORDER — HYDROCODONE BITARTRATE AND ACETAMINOPHEN 5; 325 MG/1; MG/1
1 TABLET ORAL EVERY 6 HOURS PRN
Qty: 30 TABLET | Refills: 0 | Status: SHIPPED | OUTPATIENT
Start: 2017-10-25 | End: 2017-10-25

## 2017-10-25 RX ORDER — TRAMADOL HYDROCHLORIDE 50 MG/1
TABLET ORAL
Qty: 60 TABLET | Refills: 0 | Status: SHIPPED | OUTPATIENT
Start: 2017-10-25 | End: 2018-01-11

## 2017-10-25 NOTE — PROGRESS NOTES
S:Lina Davis presents for initial post-operative evaluation. DOS 10/13/17. 2 weeks out.    right  1. Shoulder arthroscopic rotator cuff repair (transosseous equivalent, double row), massive (CPT 24650)  2. Shoulder arthroscopic biceps auto tenodesis (69500)  3. Shoulder arthroscopic subacromial decompression, bursectomy (CPT 38729)  4. Shoulder arthroscopic extensive debridement (anterior, posterior glenohumeral joint, subacromial space) (CPT 74512)        5. Shoulder arthroscopic lysis of adhesions/capsular release (CPT 79904)  6. Shoulder arthroscopic distal clavicle excision (CPT 72856)    O: Incisions are healing well. No signs of infection. No unusual swelling. Negative Efr's. Full elbow AROM. NVI distally.    A/P: I met with Lina and her  today. Reviewed scope pictures. Discussed rehab plan and activity limitations extensively.    Plan to follow a massive rotator cuff repair delayed motion protocol.  I've advised that she stay in her sling and pillow for another 4 weeks full time.  May remove for simple elbow range of motion exercises, shoulder shrugs and self care.  Can start gentle pendulums now. Formal supine passive ROM can start in 3-4 weeks. No active range of motion before 9 weeks.  No resistive activity for 12 weeks.  Goal of full passive and active range of motion at 12 weeks.  Patient understands and time to full recovery can be 6-12 months.  Will wear sling for another 5-6 weeks total. Can restart formal therapy sessions in 3 weeks. RTC in 4 to see me.

## 2017-10-31 DIAGNOSIS — E11.21 DIABETIC NEPHROPATHY ASSOCIATED WITH TYPE 2 DIABETES MELLITUS: ICD-10-CM

## 2017-10-31 RX ORDER — ISOPROPYL ALCOHOL 0.75 G/1
SWAB TOPICAL
Refills: 3 | OUTPATIENT
Start: 2017-10-31

## 2017-11-10 ENCOUNTER — TELEPHONE (OUTPATIENT)
Dept: INTERNAL MEDICINE | Facility: CLINIC | Age: 72
End: 2017-11-10

## 2017-11-10 DIAGNOSIS — E11.9 TYPE 2 DIABETES MELLITUS WITHOUT COMPLICATION: ICD-10-CM

## 2017-11-10 RX ORDER — HYDROCODONE BITARTRATE AND ACETAMINOPHEN 5; 325 MG/1; MG/1
1 TABLET ORAL EVERY 6 HOURS PRN
Qty: 30 TABLET | Refills: 0 | Status: SHIPPED | OUTPATIENT
Start: 2017-11-25 | End: 2018-01-11

## 2017-11-17 NOTE — TELEPHONE ENCOUNTER
----- Message from Danielle Nickerson sent at 11/17/2017 11:03 AM CST -----  Contact: Tati/[employer] [phone]  Prescription Request:     Name of medication: True Metrix CONTROL SOLUTION,    Reason for request: Refill    Pharmacy: [facility] - [facility] [id number]    Please advise.    Thank You

## 2017-11-20 NOTE — PROGRESS NOTES
S:Lina Davis presents for her 2nd post-operative evaluation. DOS 10/13/17. 6 weeks out.    right  1. Shoulder arthroscopic rotator cuff repair (transosseous equivalent, double row), massive  2. Shoulder arthroscopic biceps auto tenodesis   3. Shoulder arthroscopic subacromial decompression, bursectomy  4. Shoulder arthroscopic extensive debridement (anterior, posterior glenohumeral joint, subacromial space)    5. Shoulder arthroscopic lysis of adhesions/capsular release   6. Shoulder arthroscopic distal clavicle excision     Lina Davis reports to be doing very well 6wk s/p the above mentioned procedure. Has been in sling full-time. Have not been in for PT, but has been doing a HEP at home 2xDay of the exercises we have her LOV. Scheduled for PT after the clinic appointment, will plan to go to PT 3xWeek at the [place] location. Pre-surgery pain has resolved. General morning and evening soreness. D/c pain medication. No true concerns or questions, and very pleased thus far.     O: Incisions are well healed. No signs of infection. No unusual swelling, pain or tenderness. Negative Fer's. Full elbow AROM. Passive external rotation with arm at side  25°, passive forward elevation to 60 degrees. NVI distally.    A/P:  Will begin PT 3xWeek. Sling for 2 more weeks when transferring and sleeping. No active range of motion before 9 weeks.  No resistive activity for 12 weeks.  Goal of full passive and active range of motion at 12 weeks.  Patient understands and time to full recovery can be 6-12 months.  Will wear sling for another 5-6 weeks total.

## 2017-11-21 ENCOUNTER — OFFICE VISIT (OUTPATIENT)
Dept: SPORTS MEDICINE | Facility: CLINIC | Age: 72
End: 2017-11-21
Payer: MEDICARE

## 2017-11-21 ENCOUNTER — CLINICAL SUPPORT (OUTPATIENT)
Dept: REHABILITATION | Facility: HOSPITAL | Age: 72
End: 2017-11-21
Attending: PHYSICIAN ASSISTANT
Payer: MEDICARE

## 2017-11-21 VITALS
BODY MASS INDEX: 27.15 KG/M2 | DIASTOLIC BLOOD PRESSURE: 82 MMHG | WEIGHT: 173 LBS | HEIGHT: 67 IN | HEART RATE: 107 BPM | SYSTOLIC BLOOD PRESSURE: 139 MMHG

## 2017-11-21 DIAGNOSIS — M25.611 DECREASED ROM OF RIGHT SHOULDER: ICD-10-CM

## 2017-11-21 DIAGNOSIS — M25.511 ACUTE PAIN OF RIGHT SHOULDER: ICD-10-CM

## 2017-11-21 DIAGNOSIS — Z47.89 SURGICAL AFTERCARE, MUSCULOSKELETAL SYSTEM: Primary | ICD-10-CM

## 2017-11-21 PROCEDURE — 99999 PR PBB SHADOW E&M-EST. PATIENT-LVL III: CPT | Mod: PBBFAC,,, | Performed by: ORTHOPAEDIC SURGERY

## 2017-11-21 PROCEDURE — 97110 THERAPEUTIC EXERCISES: CPT

## 2017-11-21 PROCEDURE — 99024 POSTOP FOLLOW-UP VISIT: CPT | Mod: S$GLB,,, | Performed by: ORTHOPAEDIC SURGERY

## 2017-11-21 RX ORDER — IBUPROFEN 600 MG/1
600 TABLET ORAL EVERY 8 HOURS PRN
Qty: 30 TABLET | Refills: 0 | Status: SHIPPED | OUTPATIENT
Start: 2017-11-21 | End: 2017-11-21 | Stop reason: SDUPTHER

## 2017-11-21 RX ORDER — ACETAMINOPHEN AND CODEINE PHOSPHATE 300; 30 MG/1; MG/1
1 TABLET ORAL EVERY 8 HOURS PRN
Qty: 60 TABLET | Refills: 0 | Status: SHIPPED | OUTPATIENT
Start: 2017-11-21 | End: 2018-01-11 | Stop reason: SDUPTHER

## 2017-11-21 RX ORDER — IBUPROFEN 600 MG/1
600 TABLET ORAL EVERY 8 HOURS PRN
Qty: 30 TABLET | Refills: 0 | Status: SHIPPED | OUTPATIENT
Start: 2017-11-21 | End: 2017-12-01

## 2017-11-21 RX ORDER — ACETAMINOPHEN AND CODEINE PHOSPHATE 300; 30 MG/1; MG/1
1 TABLET ORAL EVERY 8 HOURS PRN
Qty: 60 TABLET | Refills: 0 | Status: SHIPPED | OUTPATIENT
Start: 2017-11-21 | End: 2017-11-21 | Stop reason: SDUPTHER

## 2017-11-21 NOTE — PROGRESS NOTES
Physical Therapy Daily Note     Date: 11/21/2017  Name: Lina Davis  Clinic Number: [id number]  Diagnosis:   Encounter Diagnoses   Name Primary?    Acute pain of right shoulder     Decreased ROM of right shoulder      Physician: Glenny Steward PA-C    Evaluation Date: 10/18/17  Date of Surgery: 10/13/17  Visit #: 2   Start Time:  940  Stop Time:  1020  Total Treatment Time: 40    Precautions: standard, s/p massive RTR (NO AAROM UNTIL 9 WEEKS)      Subjective     Pt reports she has been compliant with HEP.    Pain: not quantified this visit    Objective     Measurements taken: none this visit    Patient received group therapy to increase endurance, ROM, flexibility and posture with activities as follows:     Lina received therapeutic exercises to develop strength, endurance, ROM, flexibility and posture for 30 minutes including:   Treatment initiated with MHP x10 min  Scapular retraction 10 sec x10  Pendulums x1 min (side to side and fwd/bck)  4 way wrist 1# R  PROM R shoulder flex, abd, scap below 90 deg    Lina received the following manual therapy techniques: Joint mobilizations were applied to the: R shoulder for 0 minutes.       Written Home Exercises Provided: provided at initial eval  Pt demo good understanding of the education provided. [place] demonstrated good return demonstration of activities.     Education provided:  [place] verbalized good understanding of education provided.   No spiritual or educational barriers to learning identified.    Assessment     This is a 72 y.o. female referred to outpatient physical therapy and presents with a medical diagnosis of s/p R RTR massive and demonstrates limitations as described in the problem list Pt prognosis is Good. Pt will continue to benefit from skilled outpatient physical therapy to address the deficits listed below in the problem list, provide pt/family education and to maximize pt's level of independence  in the home and community environment.    Will the patient continue to benefit from skilled PT intervention? yes        Medical necessity is demonstrated by:   - Pain limits function of effected part for all activities  - Unable to participate in daily activities     Progress towards goals: good    New/Revised Goals:none this visit       Plan   Continue with established Plan of Care towards PT goals.      Therapist: Linda Batista, PT, DPT

## 2017-11-24 RX ORDER — INSULIN PUMP SYRINGE, 3 ML
EACH MISCELLANEOUS
Qty: 1 EACH | Refills: 1 | Status: SHIPPED | OUTPATIENT
Start: 2017-11-24

## 2017-11-24 RX ORDER — DEXTROSE 4 G
TABLET,CHEWABLE ORAL
Qty: 1 EACH | Refills: 0 | Status: ON HOLD | OUTPATIENT
Start: 2017-11-24 | End: 2018-01-30 | Stop reason: HOSPADM

## 2017-11-28 ENCOUNTER — CLINICAL SUPPORT (OUTPATIENT)
Dept: REHABILITATION | Facility: HOSPITAL | Age: 72
End: 2017-11-28
Attending: PHYSICIAN ASSISTANT
Payer: MEDICARE

## 2017-11-28 DIAGNOSIS — M25.611 DECREASED ROM OF RIGHT SHOULDER: ICD-10-CM

## 2017-11-28 DIAGNOSIS — M25.511 ACUTE PAIN OF RIGHT SHOULDER: ICD-10-CM

## 2017-11-28 PROCEDURE — 97110 THERAPEUTIC EXERCISES: CPT

## 2017-11-28 PROCEDURE — 97140 MANUAL THERAPY 1/> REGIONS: CPT

## 2017-11-28 NOTE — PROGRESS NOTES
Physical Therapy Daily Note     Date: 11/28/2017  Name: Lina Davis  Clinic Number: [id number]  Diagnosis:   Encounter Diagnoses   Name Primary?    Acute pain of right shoulder     Decreased ROM of right shoulder      Physician: Glenny Steward PA-C    Evaluation Date: 10/18/17  Date of Surgery: 10/13/17  Visit #: 3  Start Time:  410  Stop Time:  510  Total Treatment Time: 60    Precautions: standard, s/p massive RTR (NO AAROM UNTIL 9 WEEKS)      Subjective     Pt reports she has been compliant with HEP.    Pain: not quantified this visit    Objective     Measurements taken: none this visit    Patient received group therapy to increase endurance, ROM, flexibility and posture with activities as follows:     Lina received therapeutic exercises to develop strength, endurance, ROM, flexibility and posture for 30 minutes including:   Treatment initiated with MHP x10 min  Scapular retraction 10 sec x10 with towel roll  Pendulums x2 min (side to side and fwd/bck)  Arm hang small [place] x30 sec 2#  4 way wrist 1# R x30  PROM R shoulder flex, abd, scap below 90 deg    [place] received the following manual therapy techniques: Joint mobilizations were applied to the: R shoulder for 0 minutes.       Written Home Exercises Provided: provided at initial eval  Pt demo good understanding of the education provided. [place] demonstrated good return demonstration of activities.     Education provided:  [place] verbalized good understanding of education provided.   No spiritual or educational barriers to learning identified.    Assessment   Patient responded better to PROM and was able to relax more this visit. Patient progressing well with treatment and would continue to benefit from PT intervention to progress toward functional goals.  This is a 72 y.o. female referred to outpatient physical therapy and presents with a medical diagnosis of s/p R RTR massive and demonstrates limitations as  described in the problem list Pt prognosis is Good. Pt will continue to benefit from skilled outpatient physical therapy to address the deficits listed below in the problem list, provide pt/family education and to maximize pt's level of independence in the home and community environment.    Will the patient continue to benefit from skilled PT intervention? yes        Medical necessity is demonstrated by:   - Pain limits function of effected part for all activities  - Unable to participate in daily activities     Progress towards goals: good    New/Revised Goals:none this visit       Plan   Continue with established Plan of Care towards PT goals.      Therapist: Linda Batista, PT, DPT

## 2017-11-30 ENCOUNTER — CLINICAL SUPPORT (OUTPATIENT)
Dept: REHABILITATION | Facility: HOSPITAL | Age: 72
End: 2017-11-30
Attending: PHYSICIAN ASSISTANT
Payer: MEDICARE

## 2017-11-30 DIAGNOSIS — M25.611 DECREASED ROM OF RIGHT SHOULDER: ICD-10-CM

## 2017-11-30 DIAGNOSIS — M25.511 ACUTE PAIN OF RIGHT SHOULDER: ICD-10-CM

## 2017-11-30 PROCEDURE — 97110 THERAPEUTIC EXERCISES: CPT

## 2017-11-30 NOTE — PROGRESS NOTES
Physical Therapy Daily Note     Date: 11/30/2017  Name: Lina Davis  Clinic Number: [id number]  Diagnosis:   Encounter Diagnoses   Name Primary?    Acute pain of right shoulder     Decreased ROM of right shoulder      Physician: Glenny Steward PA-C    Evaluation Date: 10/18/17  Date of Surgery: 10/13/17  Visit #: 3  Start Time:  400  Stop Time:  455  Total Treatment Time: 55    Precautions: standard, s/p massive RTR (NO AAROM UNTIL 9 WEEKS)      Subjective     Pt reports she has not been sleeping well.    Pain: 2/10    Objective     Measurements taken: none this visit    Patient received group therapy to increase endurance, ROM, flexibility and posture with activities as follows:     Lina received therapeutic exercises to develop strength, endurance, ROM, flexibility and posture for 30 minutes including:   Treatment initiated with MHP x10 min  Scapular retraction 10 sec x10 with towel roll  Pendulums x2 min (side to side and fwd/bck)  Arm hang  x2 min 2#  4 way wrist 1# R x30  Biceps curls 1# x30  PROM R shoulder flex, abd, scap below 90 deg    [place] received the following manual therapy techniques: Joint mobilizations were applied to the: R shoulder for 0 minutes.       Written Home Exercises Provided: provided at initial eval  Pt demo good understanding of the education provided. Lina demonstrated good return demonstration of activities.     Education provided:  [place] verbalized good understanding of education provided.   No spiritual or educational barriers to learning identified.    Assessment   Patient is showing better awareness of shoulder limitations. Patient continues to slightly improve PROM each visit with less guarding . Patient progressing well with treatment and would continue to benefit from PT intervention to progress toward functional goals.  This is a 72 y.o. female referred to outpatient physical therapy and presents with a medical diagnosis of  s/p R RTR massive and demonstrates limitations as described in the problem list Pt prognosis is Good. Pt will continue to benefit from skilled outpatient physical therapy to address the deficits listed below in the problem list, provide pt/family education and to maximize pt's level of independence in the home and community environment.    Will the patient continue to benefit from skilled PT intervention? yes        Medical necessity is demonstrated by:   - Pain limits function of effected part for all activities  - Unable to participate in daily activities     Progress towards goals: good    New/Revised Goals:none this visit       Plan   Continue with established Plan of Care towards PT goals.      Therapist: Linda Batista, PT, DPT

## 2017-12-01 ENCOUNTER — CLINICAL SUPPORT (OUTPATIENT)
Dept: REHABILITATION | Facility: HOSPITAL | Age: 72
End: 2017-12-01
Attending: PHYSICIAN ASSISTANT
Payer: MEDICARE

## 2017-12-01 DIAGNOSIS — M25.611 DECREASED ROM OF RIGHT SHOULDER: ICD-10-CM

## 2017-12-01 DIAGNOSIS — M25.511 ACUTE PAIN OF RIGHT SHOULDER: ICD-10-CM

## 2017-12-01 PROCEDURE — 97110 THERAPEUTIC EXERCISES: CPT

## 2017-12-01 PROCEDURE — 97140 MANUAL THERAPY 1/> REGIONS: CPT

## 2017-12-01 NOTE — PROGRESS NOTES
Physical Therapy Daily Note     Date: 12/01/2017  Name: Lina Davis  Clinic Number: [id number]  Diagnosis:   Encounter Diagnoses   Name Primary?    Acute pain of right shoulder     Decreased ROM of right shoulder      Physician: Glenny Steward PA-C    Evaluation Date: 10/18/17  Date of Surgery: 10/13/17  Visit #: 5  Start Time:  400  Stop Time:  455  Total Treatment Time: 55    Precautions: standard, s/p massive RTR (NO AAROM UNTIL 9 WEEKS)      Subjective     Pt reports she is doing okay, but still has a hard time with her brace.    Pain: 3/10    Objective     Measurements taken: none this visit    Patient received group therapy to increase endurance, ROM, flexibility and posture with activities as follows:     Lina received therapeutic exercises to develop strength, endurance, ROM, flexibility and posture for 45 minutes including:   Treatment initiated with MHP x10 min  Scapular retraction 10 sec x10 with towel roll  Table slides (< 80 deg shoulder flex) x20  Pendulums x2 min (side to side and fwd/bck)-np  Arm hang  x2 min 2#  4 way wrist 2# R x30  Biceps curls 2# x30  PROM R shoulder flex, abd, scap below 90 deg x10 min    Lina received the following manual therapy techniques: Joint mobilizations were applied to the: R shoulder for 0 minutes.       Written Home Exercises Provided: provided at initial eval  Pt demo good understanding of the education provided. [place] demonstrated good return demonstration of activities.     Education provided:  [place] verbalized good understanding of education provided.   No spiritual or educational barriers to learning identified.    Assessment   Patient tolerated table slides fairly well, but required cues for proper movement to avoid compensations. Patient continues to slightly improve PROM each visit with less guarding . Patient progressing well with treatment and would continue to benefit from PT intervention to progress  toward functional goals.  This is a 72 y.o. female referred to outpatient physical therapy and presents with a medical diagnosis of s/p R RTR massive and demonstrates limitations as described in the problem list Pt prognosis is Good. Pt will continue to benefit from skilled outpatient physical therapy to address the deficits listed below in the problem list, provide pt/family education and to maximize pt's level of independence in the home and community environment.    Will the patient continue to benefit from skilled PT intervention? yes        Medical necessity is demonstrated by:   - Pain limits function of effected part for all activities  - Unable to participate in daily activities     Progress towards goals: good    New/Revised Goals:none this visit       Plan   Continue with established Plan of Care towards PT goals.      Therapist: Linda Batista, PT, DPT

## 2017-12-05 ENCOUNTER — OFFICE VISIT (OUTPATIENT)
Dept: SPORTS MEDICINE | Facility: CLINIC | Age: 72
End: 2017-12-05
Payer: MEDICARE

## 2017-12-05 ENCOUNTER — CLINICAL SUPPORT (OUTPATIENT)
Dept: REHABILITATION | Facility: HOSPITAL | Age: 72
End: 2017-12-05
Attending: PHYSICIAN ASSISTANT
Payer: MEDICARE

## 2017-12-05 VITALS
DIASTOLIC BLOOD PRESSURE: 72 MMHG | SYSTOLIC BLOOD PRESSURE: 153 MMHG | WEIGHT: 173 LBS | HEART RATE: 97 BPM | HEIGHT: 67 IN | BODY MASS INDEX: 27.15 KG/M2

## 2017-12-05 DIAGNOSIS — M25.511 ACUTE PAIN OF RIGHT SHOULDER: ICD-10-CM

## 2017-12-05 DIAGNOSIS — Z47.89 SURGICAL AFTERCARE, MUSCULOSKELETAL SYSTEM: Primary | ICD-10-CM

## 2017-12-05 DIAGNOSIS — M25.611 DECREASED ROM OF RIGHT SHOULDER: ICD-10-CM

## 2017-12-05 PROCEDURE — 97110 THERAPEUTIC EXERCISES: CPT

## 2017-12-05 PROCEDURE — 97140 MANUAL THERAPY 1/> REGIONS: CPT

## 2017-12-05 PROCEDURE — 99024 POSTOP FOLLOW-UP VISIT: CPT | Mod: S$GLB,,, | Performed by: ORTHOPAEDIC SURGERY

## 2017-12-05 PROCEDURE — 99999 PR PBB SHADOW E&M-EST. PATIENT-LVL III: CPT | Mod: PBBFAC,,, | Performed by: ORTHOPAEDIC SURGERY

## 2017-12-05 NOTE — PROGRESS NOTES
S:Lina Davis presents for her 3rd post-operative evaluation. DOS 10/13/17. 7.5 weeks out.  Told to follow-up in clinic today by her physical therapist is there was some confusion regarding follow-up plan.    right  1. Shoulder arthroscopic rotator cuff repair (transosseous equivalent, double row), massive  2. Shoulder arthroscopic biceps auto tenodesis   3. Shoulder arthroscopic subacromial decompression, bursectomy  4. Shoulder arthroscopic extensive debridement (anterior, posterior glenohumeral joint, subacromial space)    5. Shoulder arthroscopic lysis of adhesions/capsular release   6. Shoulder arthroscopic distal clavicle excision     Lina Davis reports to be doing very well 7.5wk s/p the above mentioned procedure. Has been in sling full-time. Going to PT 2xWeek at the [place] location. General morning and evening soreness but otherwise states her usual pain preoperatively is now gone and doing well from that standpoint. Here today to schedule her 3mo s/p follow up appointment and receive clarification on when she can d/c her sling. No true concerns or questions, and very pleased thus far.     O: Incisions are well healed. No signs of infection. No unusual swelling, pain or tenderness. Negative Fer's. Full elbow AROM. Passive external rotation with arm at side  30°, passive forward elevation to 100 degrees. NVI distally.    A/P:  Continue PT 2-3xWeek. Sling for 2 more weeks sleeping and out in public. No active range of motion before 9 weeks.  No resistive activity for 12 weeks.  Goal of full passive and active range of motion at 12 weeks. Patient understands and time to full recovery can be 6-12 months.

## 2017-12-05 NOTE — PROGRESS NOTES
Physical Therapy Daily Note     Date: 12/05/2017  Name: Lina Davis  Clinic Number: [id number]  Diagnosis:   Encounter Diagnoses   Name Primary?    Acute pain of right shoulder     Decreased ROM of right shoulder      Physician: Glenny Steward PA-C    Evaluation Date: 10/18/17  Date of Surgery: 10/13/17  Visit #: 6  Start Time:  400  Stop Time:  450  Total Treatment Time: 50    Precautions: standard, s/p massive RTR (NO AAROM UNTIL 9 WEEKS)      Subjective     Pt reports she  Was told by the MD she can drive a little.    Pain: 4/10    Objective     Measurements taken: none this visit    Patient received group therapy to increase endurance, ROM, flexibility and posture with activities as follows:     Lina received therapeutic exercises to develop strength, endurance, ROM, flexibility and posture for 30 minutes including:   Treatment initiated with MHP x10 min  Scapular retraction 10 sec x10 with towel roll-np  Table slides (< 80 deg shoulder flex and scaption) x20  Pendulums x1 min (side to side and fwd/bck)  Arm hang  x2 min 2#  4 way wrist 3# R x30  Biceps curls 2# x30  PROM R shoulder flex, abd, scap below 90 deg x10 min    Lina received the following manual therapy techniques: Joint mobilizations were applied to the: R shoulder for 0 minutes.       Written Home Exercises Provided: provided at initial eval  Pt demo good understanding of the education provided. Lina demonstrated good return demonstration of activities.     Education provided:  [place] verbalized good understanding of education provided.   No spiritual or educational barriers to learning identified.    Assessment   Patient has reached PROM goals in shoulder flex, scaption and abd to 90 deg with no muscle guarding. She continues to show good wrist and elbow strengthening. Patient progressing well with treatment and would continue to benefit from PT intervention to progress toward functional  goals.  This is a 72 y.o. female referred to outpatient physical therapy and presents with a medical diagnosis of s/p R RTR massive and demonstrates limitations as described in the problem list Pt prognosis is Good. Pt will continue to benefit from skilled outpatient physical therapy to address the deficits listed below in the problem list, provide pt/family education and to maximize pt's level of independence in the home and community environment.    Will the patient continue to benefit from skilled PT intervention? yes        Medical necessity is demonstrated by:   - Pain limits function of effected part for all activities  - Unable to participate in daily activities     Progress towards goals: good    New/Revised Goals:none this visit       Plan   Continue with established Plan of Care towards PT goals.      Therapist: Linda Batista, PT, DPT

## 2017-12-11 ENCOUNTER — CLINICAL SUPPORT (OUTPATIENT)
Dept: REHABILITATION | Facility: HOSPITAL | Age: 72
End: 2017-12-11
Attending: PHYSICIAN ASSISTANT
Payer: MEDICARE

## 2017-12-11 DIAGNOSIS — M25.511 ACUTE PAIN OF RIGHT SHOULDER: Primary | ICD-10-CM

## 2017-12-11 PROCEDURE — 97140 MANUAL THERAPY 1/> REGIONS: CPT

## 2017-12-11 PROCEDURE — 97110 THERAPEUTIC EXERCISES: CPT

## 2017-12-11 NOTE — PROGRESS NOTES
Physical Therapy Daily Note     Date: 12/11/2017  Name: Lina KING Millry  [facility] Number: [id number]  Diagnosis:   Encounter Diagnosis   Name Primary?    Acute pain of right shoulder Yes     Physician: Glenny Steward PA-C    Evaluation Date: 10/18/17  Date of Surgery: 10/13/17  Visit #: 7  Start Time:  4:08  Stop Time: 5:10  Total Treatment Time: 62 min    Precautions: standard, s/p massive RTR (NO AAROM UNTIL 9 WEEKS)      Subjective     Pt states feeling well w/ no c/o pn in R shld but does have a little stiffness.      Pain: 4/10    Objective     Measurements taken: none this visit    Patient received group therapy to increase endurance, ROM, flexibility and posture with activities as follows:     Lina received therapeutic exercises to develop strength, endurance, ROM, flexibility and posture for 20 minutes including:     Treatment initiated with MHP x10 min  Scapular retraction ytb 3 x 10 (VCs needed)  Table slides (< 80 deg shoulder flex and scaption) x20  Pendulums x1 min (side to side and fwd/bck)  Arm hang  x2 min 2# np  4 way wrist 3# R x30  Biceps curls 2# x30 np  PROM R shoulder flex, abd, scap below 90 deg x10 min  Therex time: 20    [place] received the following manual therapy techniques: Joint mobilizations were applied to the: R shoulder for 0 minutes.       Written Home Exercises Provided: provided at initial eval  Pt demo good understanding of the education provided. Lina demonstrated good return demonstration of activities.     Education provided:  Lina verbalized good understanding of education provided.   No spiritual or educational barriers to learning identified.    Assessment   Pt blaise tx well.  Pn level remained same prior  Pt displayed increased endurance during therex w/ Vcs for technique.  Pt edu on and instrcuted to cont HEP.  Cont to progress as blaise.     This is a 72 y.o. female referred to outpatient physical therapy and presents with a  medical diagnosis of s/p R RTR massive and demonstrates limitations as described in the problem list Pt prognosis is Good. Pt will continue to benefit from skilled outpatient physical therapy to address the deficits listed below in the problem list, provide pt/family education and to maximize pt's level of independence in the home and community environment.    Will the patient continue to benefit from skilled PT intervention? yes        Medical necessity is demonstrated by:   - Pain limits function of effected part for all activities  - Unable to participate in daily activities     Progress towards goals: good    New/Revised Goals:none this visit       Plan   Continue with established Plan of Care towards PT goals.      Therapist: Mayco Brooks, PTA

## 2017-12-13 ENCOUNTER — CLINICAL SUPPORT (OUTPATIENT)
Dept: REHABILITATION | Facility: HOSPITAL | Age: 72
End: 2017-12-13
Attending: PHYSICIAN ASSISTANT
Payer: MEDICARE

## 2017-12-13 DIAGNOSIS — M25.611 DECREASED ROM OF RIGHT SHOULDER: ICD-10-CM

## 2017-12-13 DIAGNOSIS — M25.511 ACUTE PAIN OF RIGHT SHOULDER: ICD-10-CM

## 2017-12-13 PROCEDURE — 97140 MANUAL THERAPY 1/> REGIONS: CPT

## 2017-12-13 PROCEDURE — 97110 THERAPEUTIC EXERCISES: CPT

## 2017-12-13 NOTE — PROGRESS NOTES
Physical Therapy Daily Note     Date: 12/13/2017  Name: Lina Davis  [facility] Number: [id number]  Diagnosis:   Encounter Diagnoses   Name Primary?    Acute pain of right shoulder     Decreased ROM of right shoulder      Physician: Glenny Steward PA-C    Evaluation Date: 10/18/17  Date of Surgery: 10/13/17  Visit #: 8  Start Time:  4:10  Stop Time: 5:05  Total Treatment Time: 55 min    Precautions: standard, s/p massive RTR (NO AAROM UNTIL 9 WEEKS)      Subjective     Pt states feeling well w/ no c/o pn in R shld but does have a little stiffness.      Pain: 4/10    Objective     Measurements taken:   R shoulder PROM:  Flex: 145 deg  Abd: 115 deg  ER: 45 deg  IR: 30 deg    Patient received group therapy to increase endurance, ROM, flexibility and posture with activities as follows:     Lina received therapeutic exercises to develop strength, endurance, ROM, flexibility and posture for 35 minutes including:     Treatment initiated with MHP x10 min  Scapular retraction ytb 3 x 10 (VCs needed)  Table slides (< 80 deg shoulder flex and scaption) x2 min  Pendulums x1 min (side to side and fwd/bck)-np  Arm hang  x2 min 2#  4 way wrist 2# R x30  Biceps curls 2# 3x10  PROM R shoulder flex, abd, IR and ER x10 min      Lina received the following manual therapy techniques: Joint mobilizations were applied to the: R shoulder for 0 minutes.       Written Home Exercises Provided: provided at initial eval  Pt demo good understanding of the education provided. [place] demonstrated good return demonstration of activities.     Education provided:  Lina verbalized good understanding of education provided.   No spiritual or educational barriers to learning identified.    Assessment   Patient demonstrated improved PROM this visit with minimal guarding. Patient responding well to treatment and is demonstrating good postural awareness. Patient would continue to benefit from PT intervention  to progress toward functional goals.    This is a 72 y.o. female referred to outpatient physical therapy and presents with a medical diagnosis of s/p R RTR massive and demonstrates limitations as described in the problem list Pt prognosis is Good. Pt will continue to benefit from skilled outpatient physical therapy to address the deficits listed below in the problem list, provide pt/family education and to maximize pt's level of independence in the home and community environment.    Will the patient continue to benefit from skilled PT intervention? yes        Medical necessity is demonstrated by:   - Pain limits function of effected part for all activities  - Unable to participate in daily activities     Progress towards goals: good    New/Revised Goals:none this visit       Plan   Continue with established Plan of Care towards PT goals.      Therapist: Linda Batista, PT, DPT

## 2017-12-15 ENCOUNTER — CLINICAL SUPPORT (OUTPATIENT)
Dept: REHABILITATION | Facility: HOSPITAL | Age: 72
End: 2017-12-15
Attending: PHYSICIAN ASSISTANT
Payer: MEDICARE

## 2017-12-15 DIAGNOSIS — M25.511 ACUTE PAIN OF RIGHT SHOULDER: ICD-10-CM

## 2017-12-15 DIAGNOSIS — M25.611 DECREASED ROM OF RIGHT SHOULDER: ICD-10-CM

## 2017-12-15 PROCEDURE — 97140 MANUAL THERAPY 1/> REGIONS: CPT

## 2017-12-15 PROCEDURE — 97110 THERAPEUTIC EXERCISES: CPT

## 2017-12-15 NOTE — PROGRESS NOTES
Physical Therapy Daily Note     Date: 12/15/2017  Name: Lina Davis  Clinic Number: [id number]  Diagnosis:   Encounter Diagnoses   Name Primary?    Acute pain of right shoulder     Decreased ROM of right shoulder      Physician: Glenny Steward PA-C    Evaluation Date: 10/18/17  Date of Surgery: 10/13/17  Visit #: 9  Start Time:  4:00  Stop Time: 5:00  Total Treatment Time: 60 min    Precautions: standard, s/p massive RTR (NO AAROM UNTIL 9 WEEKS)      Subjective     Pt states she is feeling okay today.    Pain: 4/10    Objective     Measurements taken:   R shoulder PROM:  Flex: 145 deg  Abd: 115 deg  ER: 45 deg  IR: 30 deg    Patient received group therapy to increase endurance, ROM, flexibility and posture with activities as follows:     Lina received therapeutic exercises to develop strength, endurance, ROM, flexibility and posture for 35 minutes including:     Treatment initiated with MHP x10 min  Table slides (shoulder flex and scaption) x3 min  Arm hang  x2 min 2#  Pulleys 3'/3'  3 towel roll x3 min  PROM R shoulder flex, abd, IR and ER x10 min      Lina received the following manual therapy techniques: Joint mobilizations were applied to the: R shoulder for 0 minutes.       Written Home Exercises Provided: provided at initial eval  Pt demo good understanding of the education provided. Lina demonstrated good return demonstration of activities.     Education provided:  Lina verbalized good understanding of education provided.   No spiritual or educational barriers to learning identified.    Assessment   Patient showed poor mechanics on pulleys and required a lot of cuing to AAROM of R shoulder. She demonstrated more upper trap compensations this visit.  Patient would continue to benefit from PT intervention to progress toward functional goals.    This is a 72 y.o. female referred to outpatient physical therapy and presents with a medical diagnosis of s/p R  RTR massive and demonstrates limitations as described in the problem list Pt prognosis is Good. Pt will continue to benefit from skilled outpatient physical therapy to address the deficits listed below in the problem list, provide pt/family education and to maximize pt's level of independence in the home and community environment.    Will the patient continue to benefit from skilled PT intervention? yes        Medical necessity is demonstrated by:   - Pain limits function of effected part for all activities  - Unable to participate in daily activities     Progress towards goals: good    New/Revised Goals:none this visit       Plan   Continue with established Plan of Care towards PT goals.      Therapist: Linda Batista, PT, DPT

## 2017-12-18 ENCOUNTER — CLINICAL SUPPORT (OUTPATIENT)
Dept: REHABILITATION | Facility: HOSPITAL | Age: 72
End: 2017-12-18
Attending: PHYSICIAN ASSISTANT
Payer: MEDICARE

## 2017-12-18 DIAGNOSIS — M25.511 ACUTE PAIN OF RIGHT SHOULDER: ICD-10-CM

## 2017-12-18 DIAGNOSIS — M25.611 DECREASED ROM OF RIGHT SHOULDER: ICD-10-CM

## 2017-12-18 PROCEDURE — 97110 THERAPEUTIC EXERCISES: CPT

## 2017-12-18 PROCEDURE — 97140 MANUAL THERAPY 1/> REGIONS: CPT

## 2017-12-18 PROCEDURE — G8978 MOBILITY CURRENT STATUS: HCPCS | Mod: CK

## 2017-12-18 PROCEDURE — G8979 MOBILITY GOAL STATUS: HCPCS | Mod: CJ

## 2017-12-18 NOTE — PROGRESS NOTES
Physical Therapy Daily Note     Date: 12/18/2017  Name: Lina Davis  Clinic Number: [id number]  Diagnosis:   Encounter Diagnoses   Name Primary?    Acute pain of right shoulder     Decreased ROM of right shoulder      Physician: Glenny Steward PA-C    Evaluation Date: 10/18/17  Date of Surgery: 10/13/17  Visit #: 10  Start Time:  4:00  Stop Time: 5:10  Total Treatment Time: 70 min    Precautions: standard, s/p massive RTR (NO AAROM UNTIL 9 WEEKS)      Subjective     Pt states she is feeling okay today.    Pain: 4/10    Objective     Measurements taken:   R shoulder PROM:  Flex: 145 deg  Abd: 115 deg  ER: 45 deg  IR: 30 deg    Patient received group therapy to increase endurance, ROM, flexibility and posture with activities as follows:     Lina received therapeutic exercises to develop strength, endurance, ROM, flexibility and posture for 35 minutes including:     Treatment initiated with MHP x10 min with arm prop  Pulleys 3'/3'  Towel isometrics 10 sec x10 4 ways  3 towel roll x3 min  PROM R shoulder flex, abd, IR and ER x10 min      Lina received the following manual therapy techniques: Joint mobilizations were applied to the: R shoulder for 0 minutes.       Written Home Exercises Provided: provided at initial eval  Pt demo good understanding of the education provided. Lina demonstrated good return demonstration of activities.     Education provided:  Lina verbalized good understanding of education provided.   No spiritual or educational barriers to learning identified.    PT reviewed FOTO scores for Lina Davis on 12/18/17  FOTO score: 47    Functional Limitations Reports - G Codes  Category: mobility  Tool: FOTO      Current ():  CK  Goal (): CJ  Discharge ():  NA          Assessment   Patient was released from sling today and tolerated AAROM exercises fairly well. She started initiating light towel isometrics at 40% submax.  Patient would  continue to benefit from PT intervention to progress toward functional goals.    This is a 72 y.o. female referred to outpatient physical therapy and presents with a medical diagnosis of s/p R RTR massive and demonstrates limitations as described in the problem list Pt prognosis is Good. Pt will continue to benefit from skilled outpatient physical therapy to address the deficits listed below in the problem list, provide pt/family education and to maximize pt's level of independence in the home and community environment.    Will the patient continue to benefit from skilled PT intervention? yes        Medical necessity is demonstrated by:   - Pain limits function of effected part for all activities  - Unable to participate in daily activities     Progress towards goals: good    New/Revised Goals:none this visit       Plan   Continue with established Plan of Care towards PT goals.      Therapist: Linda Batista, PT, DPT

## 2017-12-20 ENCOUNTER — CLINICAL SUPPORT (OUTPATIENT)
Dept: REHABILITATION | Facility: HOSPITAL | Age: 72
End: 2017-12-20
Attending: PHYSICIAN ASSISTANT
Payer: MEDICARE

## 2017-12-20 DIAGNOSIS — M25.611 DECREASED ROM OF RIGHT SHOULDER: ICD-10-CM

## 2017-12-20 DIAGNOSIS — M25.511 ACUTE PAIN OF RIGHT SHOULDER: ICD-10-CM

## 2017-12-20 PROCEDURE — 97140 MANUAL THERAPY 1/> REGIONS: CPT

## 2017-12-20 PROCEDURE — 97110 THERAPEUTIC EXERCISES: CPT

## 2017-12-20 NOTE — PROGRESS NOTES
Physical Therapy Daily Note     Date: 12/20/2017  Name: Lina Davis  Clinic Number: [id number]  Diagnosis:   Encounter Diagnoses   Name Primary?    Acute pain of right shoulder     Decreased ROM of right shoulder      Physician: Glenny Steward PA-C    Evaluation Date: 10/18/17  Date of Surgery: 10/13/17  Visit #: 11  Start Time:  4:00  Stop Time: 5:10  Total Treatment Time: 70 min    Precautions: standard, s/p massive RTR (NO AAROM UNTIL 9 WEEKS)      Subjective     Pt states she is feeling okay today.    Pain: 4/10    Objective     Measurements taken:   R shoulder PROM:  Flex: 145 deg  Abd: 115 deg  ER: 45 deg  IR: 30 deg    Patient received group therapy to increase endurance, ROM, flexibility and posture with activities as follows:     Lina received therapeutic exercises to develop strength, endurance, ROM, flexibility and posture for 35 minutes including:     Treatment initiated with MHP x10 min with arm prop  UBE 3'/3'  Pulleys 3'/3'  Towel isometrics 10 sec x10 4 ways  3 towel roll x3 min  PROM R shoulder flex, abd, IR and ER x10 min      Lina received the following manual therapy techniques: Joint mobilizations were applied to the: R shoulder for 0 minutes.       Written Home Exercises Provided: provided at initial eval  Pt demo good understanding of the education provided. Lina demonstrated good return demonstration of activities.     Education provided:  [place] verbalized good understanding of education provided.   No spiritual or educational barriers to learning identified.    PT reviewed FOTO scores for Lina Davis on 12/18/17  FOTO score: 47    Functional Limitations Reports - G Codes  Category: mobility  Tool: FOTO      Current ():  CK  Goal (): CJ  Discharge ():  NA          Assessment   Patient showed better AAROM on pulleys this visit and is showing better endurance.  Patient would continue to benefit from PT intervention to  progress toward functional goals.    This is a 72 y.o. female referred to outpatient physical therapy and presents with a medical diagnosis of s/p R RTR massive and demonstrates limitations as described in the problem list Pt prognosis is Good. Pt will continue to benefit from skilled outpatient physical therapy to address the deficits listed below in the problem list, provide pt/family education and to maximize pt's level of independence in the home and community environment.    Will the patient continue to benefit from skilled PT intervention? yes        Medical necessity is demonstrated by:   - Pain limits function of effected part for all activities  - Unable to participate in daily activities     Progress towards goals: good    New/Revised Goals:none this visit       Plan   Continue with established Plan of Care towards PT goals.      Therapist: Linda Batista, PT, DPT

## 2017-12-27 ENCOUNTER — CLINICAL SUPPORT (OUTPATIENT)
Dept: REHABILITATION | Facility: HOSPITAL | Age: 72
End: 2017-12-27
Attending: PHYSICIAN ASSISTANT
Payer: MEDICARE

## 2017-12-27 DIAGNOSIS — M25.511 ACUTE PAIN OF RIGHT SHOULDER: ICD-10-CM

## 2017-12-27 DIAGNOSIS — M25.611 DECREASED ROM OF RIGHT SHOULDER: ICD-10-CM

## 2017-12-27 PROCEDURE — 97110 THERAPEUTIC EXERCISES: CPT

## 2017-12-27 PROCEDURE — 97140 MANUAL THERAPY 1/> REGIONS: CPT

## 2017-12-27 NOTE — PROGRESS NOTES
Physical Therapy Daily Note     Date: 12/27/2017  Name: Lina Davis  Clinic Number: [id number]  Diagnosis:   Encounter Diagnoses   Name Primary?    Acute pain of right shoulder     Decreased ROM of right shoulder      Physician: Glenny Steward PA-C    Evaluation Date: 10/18/17  Date of Surgery: 10/13/17  Visit #: 12  Start Time:  4:00  Stop Time: 5:10  Total Treatment Time: 70 min    Precautions: standard, s/p massive RTR (NO AAROM UNTIL 9 WEEKS)      Subjective     Pt states she is feeling okay today.    Pain: 4/10    Objective     Measurements taken:   R shoulder PROM:  Flex: 145 deg  Abd: 115 deg  ER: 45 deg  IR: 30 deg    Patient received group therapy to increase endurance, ROM, flexibility and posture with activities as follows:     Lina received therapeutic exercises to develop strength, endurance, ROM, flexibility and posture for 35 minutes including:     Treatment initiated with MHP x10 min with arm prop  UBE 3'/3'  Pulleys 3'/3'  Towel isometrics 10 sec x10 4 ways  3 towel roll x3 min  Step outs otb IR and ER x20 R  PROM R shoulder flex, abd, IR and ER x10 min      Lina received the following manual therapy techniques: Joint mobilizations were applied to the: R shoulder for 0 minutes.       Written Home Exercises Provided: provided at initial eval  Pt demo good understanding of the education provided. Lina demonstrated good return demonstration of activities.     Education provided:  [place] verbalized good understanding of education provided.   No spiritual or educational barriers to learning identified.    PT reviewed FOTO scores for Lina Davis on 12/18/17  FOTO score: 47    Functional Limitations Reports - G Codes  Category: mobility  Tool: FOTO      Current ():  CK  Goal (): CJ  Discharge ():  NA          Assessment   Patient showing better scapular retraction and is tolerating isometric exercise well. Patient demonstrates a good  understanding of HEP.  Patient would continue to benefit from PT intervention to progress toward functional goals.    This is a 72 y.o. female referred to outpatient physical therapy and presents with a medical diagnosis of s/p R RTR massive and demonstrates limitations as described in the problem list Pt prognosis is Good. Pt will continue to benefit from skilled outpatient physical therapy to address the deficits listed below in the problem list, provide pt/family education and to maximize pt's level of independence in the home and community environment.    Will the patient continue to benefit from skilled PT intervention? yes        Medical necessity is demonstrated by:   - Pain limits function of effected part for all activities  - Unable to participate in daily activities     Progress towards goals: good    New/Revised Goals:none this visit       Plan   Continue with established Plan of Care towards PT goals.      Therapist: Linda Batista, PT, DPT

## 2017-12-29 ENCOUNTER — CLINICAL SUPPORT (OUTPATIENT)
Dept: REHABILITATION | Facility: HOSPITAL | Age: 72
End: 2017-12-29
Attending: PHYSICIAN ASSISTANT
Payer: MEDICARE

## 2017-12-29 DIAGNOSIS — M25.611 DECREASED ROM OF RIGHT SHOULDER: ICD-10-CM

## 2017-12-29 DIAGNOSIS — M25.511 ACUTE PAIN OF RIGHT SHOULDER: Primary | ICD-10-CM

## 2017-12-29 PROCEDURE — 97110 THERAPEUTIC EXERCISES: CPT

## 2017-12-29 NOTE — PROGRESS NOTES
Physical Therapy Daily Note     Date: 12/29/2017  Name: Lina Davis  Clinic Number: [id number]  Diagnosis:   No diagnosis found.  Physician: Glenny Steward PA-C    Evaluation Date: 10/18/17  Date of Surgery: 10/13/17  Visit #: 13  Start Time:  4:05  Stop Time: 5:05  Total Treatment Time: 60 min    Precautions: standard, s/p massive RTR (NO AAROM UNTIL 9 WEEKS)      Subjective     Pt reports w/ mild c/o pn in R shld.      Pain: 4/10    Objective     Measurements taken:   R shoulder PROM:  Flex: 145 deg  Abd: 115 deg  ER: 45 deg  IR: 30 deg    Patient received group therapy to increase endurance, ROM, flexibility and posture with activities as follows:     Lina received therapeutic exercises to develop strength, endurance, ROM, flexibility and posture for 55 minutes including:     Treatment ended with MHP x10 min with arm prop  UBE 3'/3'  Pulleys 3'/3'  Towel isometrics 10 sec x10 4 ways np  3 towel roll x5 min  Supine AAROM w/ dowel flexion/ abd  x 10 (VCs needed)  Step outs otb IR and ER x30 R (many VCs needed)  PROM R shoulder flex, abd, IR and ER x10 min      [place] received the following manual therapy techniques: Joint mobilizations were applied to the: R shoulder for 0 minutes.       Written Home Exercises Provided: provided at initial eval  Pt demo good understanding of the education provided. Lina demonstrated good return demonstration of activities.     Education provided:  [place] verbalized good understanding of education provided.   No spiritual or educational barriers to learning identified.    PT reviewed FOTO scores for Lina Davis on 12/18/17  FOTO score: 47    Functional Limitations Reports - G Codes  Category: mobility  Tool: FOTO      Current ():  CK  Goal (): CJ  Discharge ():  NA          Assessment   Pt blaise tx well w/ no c/o pn.  Pt displayed increased endurance during therex w/ VCs for technique.  Pt edu on and instructed to cont  HEP.  Cont to progress as blaise.      This is a 72 y.o. female referred to outpatient physical therapy and presents with a medical diagnosis of s/p R RTR massive and demonstrates limitations as described in the problem list Pt prognosis is Good. Pt will continue to benefit from skilled outpatient physical therapy to address the deficits listed below in the problem list, provide pt/family education and to maximize pt's level of independence in the home and community environment.    Will the patient continue to benefit from skilled PT intervention? yes        Medical necessity is demonstrated by:   - Pain limits function of effected part for all activities  - Unable to participate in daily activities     Progress towards goals: good    New/Revised Goals:none this visit       Plan   Continue with established Plan of Care towards PT goals.      Therapist: Mayco Brooks PTA

## 2018-01-05 ENCOUNTER — CLINICAL SUPPORT (OUTPATIENT)
Dept: REHABILITATION | Facility: HOSPITAL | Age: 73
End: 2018-01-05
Attending: PHYSICIAN ASSISTANT
Payer: MEDICARE

## 2018-01-05 DIAGNOSIS — M25.511 ACUTE PAIN OF RIGHT SHOULDER: Primary | ICD-10-CM

## 2018-01-05 DIAGNOSIS — M25.611 DECREASED ROM OF RIGHT SHOULDER: ICD-10-CM

## 2018-01-05 PROCEDURE — 97110 THERAPEUTIC EXERCISES: CPT

## 2018-01-05 NOTE — PROGRESS NOTES
Physical Therapy Daily Note     Date: 01/05/2018  Name: Lina Davis  Clinic Number: [id number]  Diagnosis:   Encounter Diagnoses   Name Primary?    Acute pain of right shoulder Yes    Decreased ROM of right shoulder      Physician: Glenny Steward PA-C    Evaluation Date: 10/18/17  Date of Surgery: 10/13/17  Visit #: 14  Start Time:  1:04  Stop Time: 1:45  Total Treatment Time:  41min    Precautions: standard, s/p massive RTR (NO AAROM UNTIL 9 WEEKS)      Subjective     Pt states R shld is stiff but no c/o pn.  Pt mentioned feeling nauseated but wanted to try tx today.       Pain: 0/10    Objective     Measurements taken:   R shoulder PROM:  Flex: 145 deg  Abd: 115 deg  ER: 45 deg  IR: 30 deg    Patient received group therapy to increase endurance, ROM, flexibility and posture with activities as follows:     Lina received therapeutic exercises to develop strength, endurance, ROM, flexibility and posture for 10 minutes including:     Treatment ended with MHP x10 min with arm prop  UBE 3'/3'  Pulleys 3'/3'  Cp x 10 min   NP:  Towel isometrics 10 sec x10 4 ways np  3 towel roll x5 min  Supine AAROM w/ dowel flexion/ abd  x 10 (VCs needed)  Step outs otb IR and ER x30 R (many VCs needed)  PROM R shoulder flex, abd, IR and ER x10 min      Lina received the following manual therapy techniques: Joint mobilizations were applied to the: R shoulder for 0 minutes.       Written Home Exercises Provided: provided at initial eval  Pt demo good understanding of the education provided. [place] demonstrated good return demonstration of activities.     Education provided:  [place] verbalized good understanding of education provided.   No spiritual or educational barriers to learning identified.    PT reviewed FOTO scores for Lina Davis on 12/18/17  FOTO score: 47    Functional Limitations Reports - G Codes  Category: mobility  Tool: FOTO      Current ():  CK  Goal ():  CJ  Discharge ():  NA          Assessment   Pt demonstrated good effort during therex w/ VCs for technique.  Therex reduced 2* pt feeling ill.  Pt blaise tx well w/ no c/o pn.  Pt edu on and instructed to cont HEP.  Cont to progress as blaise.      This is a 72 y.o. female referred to outpatient physical therapy and presents with a medical diagnosis of s/p R RTR massive and demonstrates limitations as described in the problem list Pt prognosis is Good. Pt will continue to benefit from skilled outpatient physical therapy to address the deficits listed below in the problem list, provide pt/family education and to maximize pt's level of independence in the home and community environment.    Will the patient continue to benefit from skilled PT intervention? yes        Medical necessity is demonstrated by:   - Pain limits function of effected part for all activities  - Unable to participate in daily activities     Progress towards goals: good    New/Revised Goals:none this visit       Plan   Continue with established Plan of Care towards PT goals.      Therapist: Mayco Brooks PTA

## 2018-01-10 ENCOUNTER — CLINICAL SUPPORT (OUTPATIENT)
Dept: REHABILITATION | Facility: HOSPITAL | Age: 73
End: 2018-01-10
Attending: PHYSICIAN ASSISTANT
Payer: MEDICARE

## 2018-01-10 DIAGNOSIS — M25.511 ACUTE PAIN OF RIGHT SHOULDER: ICD-10-CM

## 2018-01-10 DIAGNOSIS — M25.611 DECREASED ROM OF RIGHT SHOULDER: ICD-10-CM

## 2018-01-10 PROCEDURE — 97110 THERAPEUTIC EXERCISES: CPT

## 2018-01-10 PROCEDURE — 97140 MANUAL THERAPY 1/> REGIONS: CPT

## 2018-01-10 NOTE — PROGRESS NOTES
S:Lina Davis presents for a post-operative evaluation.     DOS 10/13/17.   12 wk s/p    Right  1. Shoulder arthroscopic rotator cuff repair (transosseous equivalent, double row), massive  2. Shoulder arthroscopic biceps auto tenodesis   3. Shoulder arthroscopic subacromial decompression, bursectomy  4. Shoulder arthroscopic extensive debridement (anterior, posterior glenohumeral joint, subacromial space)    5. Shoulder arthroscopic lysis of adhesions/capsular release   6. Shoulder arthroscopic distal clavicle excision     Lina Davis reports to be doing very well 12wk s/p the above mentioned procedure. Has d/c'd the sling. Going to PT 2xWeek at the [place] location and doing a HEP Daily. Started active motion at week 9. General morning and evening soreness but otherwise states her usual pain preoperatively is now gone and doing well from that standpoint. Taking occasional Tylenol. Very pleased thus far.     O: Right shoulder examination demonstrates active forward elevation to 100° with scapular capture.  Active external rotation with arm at side to 30°.  Passive forward elevation to 140°.  Passive external rotation with arm at side to 45°.  No specific and pain on exam.  No sig scapular winging.    A/P: The patient denies significant pain and from that standpoint healing of our cuff repair is suggested.  She does report gradual improvements with range of motion.  Stiffness present on today's exam.  Underlying medical history of diabetes.  She needs continued passive stretching overhead on a daily basis.  Needs continued physical therapy for assistance in this.  I'll see her back in 6 weeks.

## 2018-01-10 NOTE — PROGRESS NOTES
Physical Therapy Daily Note     Date: 01/10/2018  Name: Lina Davis  Clinic Number: [id number]  Diagnosis:   Encounter Diagnoses   Name Primary?    Acute pain of right shoulder     Decreased ROM of right shoulder      Physician: Glenny Steward PA-C    Evaluation Date: 10/18/17  Date of Surgery: 10/13/17  Visit #: 15  Start Time:  400  Stop Time: 515  Total Treatment Time:  75 min    Precautions: standard, s/p massive RTR (NO AAROM UNTIL 9 WEEKS)      Subjective     Pt reports mild stiffness in R shoulder upon arrival, with no notable pain. Pt was happy to receive new HEP at previous visit.      Pain: 0/10    Objective     Measurements taken:   R shoulder PROM:  Flex: 150deg  Abd: 130deg  ER: 52 deg at 90 deg abd  IR: 24 deg at 90 deg    Patient received group therapy to increase endurance, ROM, flexibility and posture with activities as follows:     Lina received therapeutic exercises to develop strength, endurance, ROM, flexibility and posture for 45 minutes including:     UBE 3'/3'  Pulleys 3'/3'-np  Towel isometrics 10 sec x10 4 ways np  3 towel roll x5 min-np  Supine AAROM w/ dowel; flexion/abd/scaption  x 15 (VCs and demonstration needed)  Step outs OTB IR and ER 2x15 R (multiple VCs needed)      [place] received the following manual therapy techniques applied to the: R shoulder for 15 minutes.   Scapulothoracic motion into abd/add, upward rotation, elevation/depression  PROM R shoulder flex, abd, IR, ER     Cold pack to R shoulder x 10 min. Skin check clear pre- and post-application.    Written Home Exercises Provided: provided at initial eval  Pt demo good understanding of the education provided. [place] demonstrated good return demonstration of activities.     Education provided:  Lina verbalized good understanding of education provided.   No spiritual or educational barriers to learning identified.    PT reviewed FOTO scores for Lina Davis on  12/18/17  FOTO score: 47    Functional Limitations Reports - G Codes  Category: mobility  Tool: FOTO      Current ():  CK  Goal (): CJ  Discharge ():  NA          Assessment     Upon reassessment patient continues to demonstrate improved PROM. Pt required frequent cueing for proper technique during TherEx to avoid upper trap compensation and proper technique. Tissue extensibility was increased to allow greater PROM after manual therapy which patient responded favorably.  Pt was instructed to continue HEP for symptom relief and reducing stiffness. Pt will benefit from continued skilled PT services to progress toward functional goals.    This is a 72 y.o. female referred to outpatient physical therapy and presents with a medical diagnosis of s/p R RTR massive and demonstrates limitations as described in the problem list Pt prognosis is Good. Pt will continue to benefit from skilled outpatient physical therapy to address the deficits listed below in the problem list, provide pt/family education and to maximize pt's level of independence in the home and community environment.    Will the patient continue to benefit from skilled PT intervention? yes        Medical necessity is demonstrated by:   - Pain limits function of effected part for all activities  - Unable to participate in daily activities     Progress towards goals: good    New/Revised Goals:none this visit       Plan   Continue with established Plan of Care towards PT goals.      Therapist: Linda Batista, PT, DPT

## 2018-01-11 ENCOUNTER — OFFICE VISIT (OUTPATIENT)
Dept: SPORTS MEDICINE | Facility: CLINIC | Age: 73
End: 2018-01-11
Payer: MEDICARE

## 2018-01-11 VITALS
HEIGHT: 67 IN | DIASTOLIC BLOOD PRESSURE: 70 MMHG | BODY MASS INDEX: 27.15 KG/M2 | SYSTOLIC BLOOD PRESSURE: 126 MMHG | HEART RATE: 105 BPM | WEIGHT: 173 LBS

## 2018-01-11 DIAGNOSIS — Z47.89 SURGICAL AFTERCARE, MUSCULOSKELETAL SYSTEM: Primary | ICD-10-CM

## 2018-01-11 PROCEDURE — 99999 PR PBB SHADOW E&M-EST. PATIENT-LVL III: CPT | Mod: PBBFAC,,, | Performed by: ORTHOPAEDIC SURGERY

## 2018-01-11 PROCEDURE — 99024 POSTOP FOLLOW-UP VISIT: CPT | Mod: S$GLB,,, | Performed by: ORTHOPAEDIC SURGERY

## 2018-01-11 RX ORDER — IBUPROFEN 600 MG/1
600 TABLET ORAL EVERY 8 HOURS PRN
Qty: 30 TABLET | Refills: 0 | Status: SHIPPED | OUTPATIENT
Start: 2018-01-11 | End: 2018-01-21

## 2018-01-11 RX ORDER — DIAZEPAM 5 MG/1
5 TABLET ORAL EVERY 8 HOURS PRN
Qty: 40 TABLET | Refills: 0 | Status: SHIPPED | OUTPATIENT
Start: 2018-01-11 | End: 2018-03-12

## 2018-01-11 RX ORDER — ACETAMINOPHEN AND CODEINE PHOSPHATE 300; 30 MG/1; MG/1
1 TABLET ORAL EVERY 8 HOURS PRN
Qty: 60 TABLET | Refills: 0 | Status: SHIPPED | OUTPATIENT
Start: 2018-01-11 | End: 2018-03-12

## 2018-01-12 ENCOUNTER — CLINICAL SUPPORT (OUTPATIENT)
Dept: REHABILITATION | Facility: HOSPITAL | Age: 73
End: 2018-01-12
Attending: PHYSICIAN ASSISTANT
Payer: MEDICARE

## 2018-01-12 DIAGNOSIS — M25.611 DECREASED ROM OF RIGHT SHOULDER: ICD-10-CM

## 2018-01-12 DIAGNOSIS — M25.511 ACUTE PAIN OF RIGHT SHOULDER: ICD-10-CM

## 2018-01-12 PROCEDURE — 97140 MANUAL THERAPY 1/> REGIONS: CPT

## 2018-01-12 NOTE — PROGRESS NOTES
Physical Therapy Daily Note     Date: 01/12/2018  Name: Lina KING Grampian  [facility] Number: [id number]  Diagnosis:   Encounter Diagnoses   Name Primary?    Acute pain of right shoulder     Decreased ROM of right shoulder      Physician: Glenny Steward PA-C    Evaluation Date: 10/18/17  Date of Surgery: 10/13/17  Visit #: 16  Start Time:  405  Stop Time: 500  Total Treatment Time:  55 min    Precautions: standard, s/p massive RTR (NO AAROM UNTIL 9 WEEKS)      Subjective     Pt reports pretty good report from MD, but needs to improved PROM.    Pain: 0/10    Objective     Measurements taken:   R shoulder PROM:  Flex: 150deg  Abd: 130deg  ER: 52 deg at 90 deg abd  IR: 24 deg at 90 deg    Patient received group therapy to increase endurance, ROM, flexibility and posture with activities as follows:     Lina received therapeutic exercises to develop strength, endurance, ROM, flexibility and posture for 25 minutes including:   Treatment initiated with MHP prop x10 min  UBE 3'/3'  Pulleys 3'/3'-np  Table slides x2 min flex  Towel isometrics 10 sec x10 4 ways np  3 towel roll x5 min-np  Supine AAROM w/ dowel; flexion/abd/scaption  x 15 (VCs and demonstration needed)-np  Step outs OTB IR and ER 2x15 R (multiple VCs needed)-np  Wall slides with towel x10  AAROM shoulder x5      [place] received the following manual therapy techniques applied to the: R shoulder for 25 minutes.   Scapulothoracic motion into abd/add, upward rotation, elevation/depression  PROM R shoulder flex, abd, IR, ER     Cold pack to R shoulder x 10 min. Skin check clear pre- and post-application.-np    Written Home Exercises Provided: provided at initial eval  Pt demo good understanding of the education provided. [place] demonstrated good return demonstration of activities.     Education provided:  Lina verbalized good understanding of education provided.   No spiritual or educational barriers to learning  identified.    PT reviewed FOTO scores for Lina Davis on 12/18/17  FOTO score: 47    Functional Limitations Reports - G Codes  Category: mobility  Tool: FOTO      Current ():  CK  Goal (): CJ  Discharge ():  NA          Assessment     Patient required multiple corrections to avoid upper trap compensation, but showed better PROM with increased duration of manual therapy applied.  Pt was instructed to continue HEP for symptom relief and reducing stiffness. Pt will benefit from continued skilled PT services to progress toward functional goals.    This is a 72 y.o. female referred to outpatient physical therapy and presents with a medical diagnosis of s/p R RTR massive and demonstrates limitations as described in the problem list Pt prognosis is Good. Pt will continue to benefit from skilled outpatient physical therapy to address the deficits listed below in the problem list, provide pt/family education and to maximize pt's level of independence in the home and community environment.    Will the patient continue to benefit from skilled PT intervention? yes        Medical necessity is demonstrated by:   - Pain limits function of effected part for all activities  - Unable to participate in daily activities     Progress towards goals: good    New/Revised Goals:none this visit       Plan   Continue with established Plan of Care towards PT goals.      Therapist: Linda Batista, PT, DPT

## 2018-01-24 ENCOUNTER — CLINICAL SUPPORT (OUTPATIENT)
Dept: REHABILITATION | Facility: HOSPITAL | Age: 73
End: 2018-01-24
Attending: PHYSICIAN ASSISTANT
Payer: MEDICARE

## 2018-01-24 DIAGNOSIS — M25.511 ACUTE PAIN OF RIGHT SHOULDER: ICD-10-CM

## 2018-01-24 DIAGNOSIS — M25.611 DECREASED ROM OF RIGHT SHOULDER: ICD-10-CM

## 2018-01-24 PROCEDURE — 97110 THERAPEUTIC EXERCISES: CPT

## 2018-01-24 PROCEDURE — 97140 MANUAL THERAPY 1/> REGIONS: CPT

## 2018-01-24 NOTE — PROGRESS NOTES
Physical Therapy Daily Note     Date: 01/24/2018  Name: Lina Davis  [facility] Number: [id number]  Diagnosis:   Encounter Diagnoses   Name Primary?    Acute pain of right shoulder     Decreased ROM of right shoulder      Physician: MUNIRA Grier MD    Evaluation Date: 10/18/17  Date of Surgery: 10/13/17  Visit #: 17  Start Time:  1100  Stop Time: 1145  Total Treatment Time:  45 min    Precautions: standard, s/p massive RTR (NO AAROM UNTIL 9 WEEKS)      Subjective     Pt reports she did not want to drive in the bad weather last week so had to miss PT.  Pain: 0/10    Objective     Measurements taken:   R shoulder PROM:  Flex: 150deg  Abd: 143deg  ER: 75 deg at 90 deg abd  IR: 45 deg at 90 deg    Patient received group therapy to increase endurance, ROM, flexibility and posture with activities as follows:     Lina received therapeutic exercises to develop strength, endurance, ROM, flexibility and posture for 10 minutes including:   Treatment initiated with MHP prop x10 min  UBE 4'/4'  Pulleys 3'/3'-np  Table slides x2 min flex-np  Towel isometrics 10 sec x10 4 ways np  3 towel roll x5 min-np  Supine AAROM w/ dowel; flexion/abd/scaption  x 15 (VCs and demonstration needed)-np  Step outs OTB IR and ER 2x15 R (multiple VCs needed)-np  Wall slides with towel x10-np  AAROM shoulder x5      [place] received the following manual therapy techniques applied to the: R shoulder for 35 minutes.   Scapulothoracic motion into abd/add, upward rotation, elevation/depression  PROM R shoulder flex, abd, IR, ER     Cold pack to R shoulder x 10 min. Skin check clear pre- and post-application.-np    Written Home Exercises Provided: provided at initial eval  Pt demo good understanding of the education provided. [place] demonstrated good return demonstration of activities.     Education provided:  Lina verbalized good understanding of education provided.   No spiritual or educational barriers to  learning identified.    PT reviewed FOTO scores for Lina Davis on 12/18/17  FOTO score: 47    Functional Limitations Reports - G Codes  Category: mobility  Tool: FOTO      Current ():  CK  Goal (): CJ  Discharge ():  NA          Assessment     Patient demonstrating improved PROM of R shoulder this visit. She is demonstrating a good understanding of her HEP to regain functional mobility. Pt will benefit from continued skilled PT services to progress toward functional goals.    This is a 72 y.o. female referred to outpatient physical therapy and presents with a medical diagnosis of s/p R RTR massive and demonstrates limitations as described in the problem list Pt prognosis is Good. Pt will continue to benefit from skilled outpatient physical therapy to address the deficits listed below in the problem list, provide pt/family education and to maximize pt's level of independence in the home and community environment.    Will the patient continue to benefit from skilled PT intervention? yes        Medical necessity is demonstrated by:   - Pain limits function of effected part for all activities  - Unable to participate in daily activities     Progress towards goals: good    New/Revised Goals:none this visit       Plan   Continue with established Plan of Care towards PT goals.      Therapist: Linda Batista, PT, DPT

## 2018-01-26 ENCOUNTER — HOSPITAL ENCOUNTER (INPATIENT)
Facility: HOSPITAL | Age: 73
LOS: 4 days | Discharge: HOME HEALTH | DRG: 872 | End: 2018-01-31
Attending: EMERGENCY MEDICINE | Admitting: HOSPITALIST
Payer: MEDICARE

## 2018-01-26 ENCOUNTER — CLINICAL SUPPORT (OUTPATIENT)
Dept: REHABILITATION | Facility: HOSPITAL | Age: 73
End: 2018-01-26
Attending: PHYSICIAN ASSISTANT
Payer: MEDICARE

## 2018-01-26 DIAGNOSIS — R65.20 SEVERE SEPSIS WITH ACUTE ORGAN DYSFUNCTION DUE TO GRAM NEGATIVE BACTERIA: Primary | ICD-10-CM

## 2018-01-26 DIAGNOSIS — M25.511 ACUTE PAIN OF RIGHT SHOULDER: ICD-10-CM

## 2018-01-26 DIAGNOSIS — R00.0 TACHYCARDIA: ICD-10-CM

## 2018-01-26 DIAGNOSIS — M25.611 DECREASED ROM OF RIGHT SHOULDER: ICD-10-CM

## 2018-01-26 DIAGNOSIS — N17.9 ACUTE KIDNEY INJURY: ICD-10-CM

## 2018-01-26 DIAGNOSIS — B96.20 PYELONEPHRITIS DUE TO ESCHERICHIA COLI: ICD-10-CM

## 2018-01-26 DIAGNOSIS — E11.21 TYPE 2 DIABETES MELLITUS WITH DIABETIC NEPHROPATHY: ICD-10-CM

## 2018-01-26 DIAGNOSIS — N12 PYELONEPHRITIS DUE TO ESCHERICHIA COLI: ICD-10-CM

## 2018-01-26 DIAGNOSIS — A41.50 SEVERE SEPSIS WITH ACUTE ORGAN DYSFUNCTION DUE TO GRAM NEGATIVE BACTERIA: Primary | ICD-10-CM

## 2018-01-26 PROCEDURE — 97110 THERAPEUTIC EXERCISES: CPT

## 2018-01-26 PROCEDURE — 99285 EMERGENCY DEPT VISIT HI MDM: CPT | Mod: 25

## 2018-01-26 PROCEDURE — 99284 EMERGENCY DEPT VISIT MOD MDM: CPT | Mod: ,,, | Performed by: EMERGENCY MEDICINE

## 2018-01-26 PROCEDURE — 93005 ELECTROCARDIOGRAM TRACING: CPT

## 2018-01-26 PROCEDURE — 97140 MANUAL THERAPY 1/> REGIONS: CPT

## 2018-01-26 PROCEDURE — 11000001 HC ACUTE MED/SURG PRIVATE ROOM

## 2018-01-26 NOTE — PROGRESS NOTES
Physical Therapy Daily Note     Date: 01/26/2018  Name: Lina Davis  [facility] Number: [id number]  Diagnosis:   Encounter Diagnoses   Name Primary?    Acute pain of right shoulder     Decreased ROM of right shoulder      Physician: MUNIRA Grier MD    Evaluation Date: 10/18/17  Date of Surgery: 10/13/17  Visit #: 18  Start Time:  1000  Stop Time: 1050  Total Treatment Time:  50 min    Precautions: standard, s/p massive RTR (NO AAROM UNTIL 9 WEEKS)      Subjective     Pt reports has been doing her exercises at home.  Pain: 0/10    Objective     Measurements taken:   R shoulder PROM:  Flex: 150deg  Abd: 143deg  ER: 75 deg at 90 deg abd  IR: 45 deg at 90 deg    Patient received group therapy to increase endurance, ROM, flexibility and posture with activities as follows:     Lina received therapeutic exercises to develop strength, endurance, ROM, flexibility and posture for 25 minutes including:   Treatment initiated with MHP prop x10 min  UBE 4'/4'  Pulleys 3'/3'-np  Table slides x2 min flex-np  Towel isometrics 10 sec x10 4 ways np  3 towel roll x5 min-np  Supine AAROM w/ dowel; flexion/abd/scaption  x 15 (VCs and demonstration needed)-np  Step outs OTB IR and ER 2x15 R (multiple VCs needed)-np  Wall slides with towel x15 (flex and abd)  AAROM shoulder x5  Dowel shoulder flex 10 sec x10 with 2# dowel      Lina received the following manual therapy techniques applied to the: R shoulder for 15 minutes.   Scapulothoracic motion into abd/add, upward rotation, elevation/depression  PROM R shoulder flex, abd, IR, ER     Cold pack to R shoulder x 10 min. Skin check clear pre- and post-application.-np    Written Home Exercises Provided: provided at initial eval  Pt demo good understanding of the education provided. Lina demonstrated good return demonstration of activities.     Education provided:  [place] verbalized good understanding of education provided.   No spiritual or  educational barriers to learning identified.    PT reviewed FOTO scores for Lina Davis on 12/18/17  FOTO score: 47    Functional Limitations Reports - G Codes  Category: mobility  Tool: FOTO      Current ():  CK  Goal (): CJ  Discharge ():  NA          Assessment     Patient demonstrated greater shoulder flexion with weighted dowel. She is showing better flexion and abduction of her R shoulder. She is demonstrating a good understanding of her HEP to regain functional mobility. Pt will benefit from continued skilled PT services to progress toward functional goals.    This is a 72 y.o. female referred to outpatient physical therapy and presents with a medical diagnosis of s/p R RTR massive and demonstrates limitations as described in the problem list Pt prognosis is Good. Pt will continue to benefit from skilled outpatient physical therapy to address the deficits listed below in the problem list, provide pt/family education and to maximize pt's level of independence in the home and community environment.    Will the patient continue to benefit from skilled PT intervention? yes        Medical necessity is demonstrated by:   - Pain limits function of effected part for all activities  - Unable to participate in daily activities     Progress towards goals: good    New/Revised Goals:none this visit       Plan   Continue with established Plan of Care towards PT goals.      Therapist: Linda Batista, PT, DPT

## 2018-01-27 PROBLEM — N39.0 UTI (URINARY TRACT INFECTION): Status: ACTIVE | Noted: 2018-01-27

## 2018-01-27 PROBLEM — R65.20 SEVERE SEPSIS: Status: ACTIVE | Noted: 2018-01-27

## 2018-01-27 PROBLEM — R19.7 DIARRHEA OF PRESUMED INFECTIOUS ORIGIN: Status: ACTIVE | Noted: 2018-01-27

## 2018-01-27 PROBLEM — A41.9 SEVERE SEPSIS: Status: ACTIVE | Noted: 2018-01-27

## 2018-01-27 PROBLEM — N17.9 ACUTE KIDNEY INJURY: Status: ACTIVE | Noted: 2018-01-27

## 2018-01-27 LAB
ALBUMIN SERPL BCP-MCNC: 2.7 G/DL
ALBUMIN SERPL BCP-MCNC: 3.1 G/DL
ALP SERPL-CCNC: 73 U/L
ALP SERPL-CCNC: 79 U/L
ALT SERPL W/O P-5'-P-CCNC: 12 U/L
ALT SERPL W/O P-5'-P-CCNC: 13 U/L
ANION GAP SERPL CALC-SCNC: 10 MMOL/L
ANION GAP SERPL CALC-SCNC: 12 MMOL/L
ANISOCYTOSIS BLD QL SMEAR: SLIGHT
AST SERPL-CCNC: 19 U/L
AST SERPL-CCNC: 21 U/L
BACTERIA #/AREA URNS AUTO: ABNORMAL /HPF
BASOPHILS # BLD AUTO: 0.03 K/UL
BASOPHILS # BLD AUTO: ABNORMAL K/UL
BASOPHILS NFR BLD: 0 %
BASOPHILS NFR BLD: 0.2 %
BILIRUB SERPL-MCNC: 0.6 MG/DL
BILIRUB SERPL-MCNC: 0.7 MG/DL
BILIRUB UR QL STRIP: NEGATIVE
BUN SERPL-MCNC: 35 MG/DL
BUN SERPL-MCNC: 51 MG/DL
CALCIUM SERPL-MCNC: 9.3 MG/DL
CALCIUM SERPL-MCNC: 9.7 MG/DL
CHLORIDE SERPL-SCNC: 112 MMOL/L
CHLORIDE SERPL-SCNC: 117 MMOL/L
CLARITY UR REFRACT.AUTO: ABNORMAL
CO2 SERPL-SCNC: 11 MMOL/L
CO2 SERPL-SCNC: 14 MMOL/L
COLOR UR AUTO: YELLOW
CREAT SERPL-MCNC: 1.6 MG/DL
CREAT SERPL-MCNC: 3.1 MG/DL
DIFFERENTIAL METHOD: ABNORMAL
DIFFERENTIAL METHOD: ABNORMAL
DOHLE BOD BLD QL SMEAR: PRESENT
EOSINOPHIL # BLD AUTO: 0 K/UL
EOSINOPHIL # BLD AUTO: ABNORMAL K/UL
EOSINOPHIL NFR BLD: 0 %
EOSINOPHIL NFR BLD: 0 %
ERYTHROCYTE [DISTWIDTH] IN BLOOD BY AUTOMATED COUNT: 14.4 %
ERYTHROCYTE [DISTWIDTH] IN BLOOD BY AUTOMATED COUNT: 14.4 %
EST. GFR  (AFRICAN AMERICAN): 16.8 ML/MIN/1.73 M^2
EST. GFR  (AFRICAN AMERICAN): 37.4 ML/MIN/1.73 M^2
EST. GFR  (NON AFRICAN AMERICAN): 14.6 ML/MIN/1.73 M^2
EST. GFR  (NON AFRICAN AMERICAN): 32.4 ML/MIN/1.73 M^2
ESTIMATED AVG GLUCOSE: 100 MG/DL
GLUCOSE SERPL-MCNC: 156 MG/DL
GLUCOSE SERPL-MCNC: 217 MG/DL
GLUCOSE UR QL STRIP: ABNORMAL
HBA1C MFR BLD HPLC: 5.1 %
HCT VFR BLD AUTO: 23.2 %
HCT VFR BLD AUTO: 24.2 %
HGB BLD-MCNC: 7.8 G/DL
HGB BLD-MCNC: 8.4 G/DL
HGB UR QL STRIP: ABNORMAL
HYALINE CASTS UR QL AUTO: 13 /LPF
HYPOCHROMIA BLD QL SMEAR: ABNORMAL
IMM GRANULOCYTES # BLD AUTO: 0.16 K/UL
IMM GRANULOCYTES # BLD AUTO: ABNORMAL K/UL
IMM GRANULOCYTES NFR BLD AUTO: 1.1 %
IMM GRANULOCYTES NFR BLD AUTO: ABNORMAL %
KETONES UR QL STRIP: NEGATIVE
LACTATE SERPL-SCNC: 0.8 MMOL/L
LACTATE SERPL-SCNC: 1 MMOL/L
LEUKOCYTE ESTERASE UR QL STRIP: ABNORMAL
LYMPHOCYTES # BLD AUTO: 0.6 K/UL
LYMPHOCYTES # BLD AUTO: ABNORMAL K/UL
LYMPHOCYTES NFR BLD: 3 %
LYMPHOCYTES NFR BLD: 4.5 %
MAGNESIUM SERPL-MCNC: 1.1 MG/DL
MAGNESIUM SERPL-MCNC: 1.6 MG/DL
MCH RBC QN AUTO: 29.1 PG
MCH RBC QN AUTO: 29.5 PG
MCHC RBC AUTO-ENTMCNC: 33.6 G/DL
MCHC RBC AUTO-ENTMCNC: 34.7 G/DL
MCV RBC AUTO: 85 FL
MCV RBC AUTO: 87 FL
MICROSCOPIC COMMENT: ABNORMAL
MONOCYTES # BLD AUTO: 0.9 K/UL
MONOCYTES # BLD AUTO: ABNORMAL K/UL
MONOCYTES NFR BLD: 6.5 %
MONOCYTES NFR BLD: 8 %
NEUTROPHILS # BLD AUTO: 12.3 K/UL
NEUTROPHILS NFR BLD: 87.7 %
NEUTROPHILS NFR BLD: 89 %
NITRITE UR QL STRIP: NEGATIVE
NON-SQ EPI CELLS #/AREA URNS AUTO: 2 /HPF
NRBC BLD-RTO: 0 /100 WBC
NRBC BLD-RTO: 0 /100 WBC
OVALOCYTES BLD QL SMEAR: ABNORMAL
PH UR STRIP: 5 [PH] (ref 5–8)
PHOSPHATE SERPL-MCNC: 1.5 MG/DL
PLATELET # BLD AUTO: 201 K/UL
PLATELET # BLD AUTO: 211 K/UL
PLATELET BLD QL SMEAR: ABNORMAL
PMV BLD AUTO: 9.7 FL
PMV BLD AUTO: 9.8 FL
POCT GLUCOSE: 118 MG/DL (ref 70–110)
POCT GLUCOSE: 120 MG/DL (ref 70–110)
POCT GLUCOSE: 148 MG/DL (ref 70–110)
POIKILOCYTOSIS BLD QL SMEAR: SLIGHT
POLYCHROMASIA BLD QL SMEAR: ABNORMAL
POTASSIUM SERPL-SCNC: 5.3 MMOL/L
POTASSIUM SERPL-SCNC: 5.4 MMOL/L
PROT SERPL-MCNC: 6.7 G/DL
PROT SERPL-MCNC: 7.4 G/DL
PROT UR QL STRIP: ABNORMAL
RBC # BLD AUTO: 2.68 M/UL
RBC # BLD AUTO: 2.85 M/UL
RBC #/AREA URNS AUTO: 5 /HPF (ref 0–4)
SODIUM SERPL-SCNC: 136 MMOL/L
SODIUM SERPL-SCNC: 140 MMOL/L
SP GR UR STRIP: 1.01 (ref 1–1.03)
SQUAMOUS #/AREA URNS AUTO: 1 /HPF
TARGETS BLD QL SMEAR: ABNORMAL
URN SPEC COLLECT METH UR: ABNORMAL
UROBILINOGEN UR STRIP-ACNC: NEGATIVE EU/DL
WBC # BLD AUTO: 14 K/UL
WBC # BLD AUTO: 23.27 K/UL
WBC #/AREA URNS AUTO: 63 /HPF (ref 0–5)
WBC CLUMPS UR QL AUTO: ABNORMAL

## 2018-01-27 PROCEDURE — 99223 1ST HOSP IP/OBS HIGH 75: CPT | Mod: ,,, | Performed by: INTERNAL MEDICINE

## 2018-01-27 PROCEDURE — 63600175 PHARM REV CODE 636 W HCPCS: Performed by: PHYSICIAN ASSISTANT

## 2018-01-27 PROCEDURE — 80053 COMPREHEN METABOLIC PANEL: CPT | Mod: 91

## 2018-01-27 PROCEDURE — 63600175 PHARM REV CODE 636 W HCPCS: Performed by: INTERNAL MEDICINE

## 2018-01-27 PROCEDURE — 11000001 HC ACUTE MED/SURG PRIVATE ROOM

## 2018-01-27 PROCEDURE — 87040 BLOOD CULTURE FOR BACTERIA: CPT

## 2018-01-27 PROCEDURE — 83605 ASSAY OF LACTIC ACID: CPT | Mod: 91

## 2018-01-27 PROCEDURE — 87040 BLOOD CULTURE FOR BACTERIA: CPT | Mod: 59

## 2018-01-27 PROCEDURE — 36415 COLL VENOUS BLD VENIPUNCTURE: CPT

## 2018-01-27 PROCEDURE — 87088 URINE BACTERIA CULTURE: CPT

## 2018-01-27 PROCEDURE — 82962 GLUCOSE BLOOD TEST: CPT

## 2018-01-27 PROCEDURE — 25000003 PHARM REV CODE 250: Performed by: PHYSICIAN ASSISTANT

## 2018-01-27 PROCEDURE — 25000003 PHARM REV CODE 250: Performed by: INTERNAL MEDICINE

## 2018-01-27 PROCEDURE — 83735 ASSAY OF MAGNESIUM: CPT | Mod: 91

## 2018-01-27 PROCEDURE — 85027 COMPLETE CBC AUTOMATED: CPT

## 2018-01-27 PROCEDURE — 25000003 PHARM REV CODE 250: Performed by: ANESTHESIOLOGY

## 2018-01-27 PROCEDURE — 85007 BL SMEAR W/DIFF WBC COUNT: CPT

## 2018-01-27 PROCEDURE — 96372 THER/PROPH/DIAG INJ SC/IM: CPT

## 2018-01-27 PROCEDURE — 83036 HEMOGLOBIN GLYCOSYLATED A1C: CPT

## 2018-01-27 PROCEDURE — 96375 TX/PRO/DX INJ NEW DRUG ADDON: CPT

## 2018-01-27 PROCEDURE — 93010 ELECTROCARDIOGRAM REPORT: CPT | Mod: ,,, | Performed by: INTERNAL MEDICINE

## 2018-01-27 PROCEDURE — 96361 HYDRATE IV INFUSION ADD-ON: CPT

## 2018-01-27 PROCEDURE — 80053 COMPREHEN METABOLIC PANEL: CPT

## 2018-01-27 PROCEDURE — 83735 ASSAY OF MAGNESIUM: CPT

## 2018-01-27 PROCEDURE — 87186 SC STD MICRODIL/AGAR DIL: CPT

## 2018-01-27 PROCEDURE — 83605 ASSAY OF LACTIC ACID: CPT

## 2018-01-27 PROCEDURE — 81001 URINALYSIS AUTO W/SCOPE: CPT

## 2018-01-27 PROCEDURE — 63600175 PHARM REV CODE 636 W HCPCS: Performed by: ANESTHESIOLOGY

## 2018-01-27 PROCEDURE — 96367 TX/PROPH/DG ADDL SEQ IV INF: CPT

## 2018-01-27 PROCEDURE — 96365 THER/PROPH/DIAG IV INF INIT: CPT

## 2018-01-27 PROCEDURE — S0030 INJECTION, METRONIDAZOLE: HCPCS | Performed by: ANESTHESIOLOGY

## 2018-01-27 PROCEDURE — 96366 THER/PROPH/DIAG IV INF ADDON: CPT

## 2018-01-27 PROCEDURE — 85025 COMPLETE CBC W/AUTO DIFF WBC: CPT

## 2018-01-27 PROCEDURE — 87077 CULTURE AEROBIC IDENTIFY: CPT

## 2018-01-27 PROCEDURE — 87086 URINE CULTURE/COLONY COUNT: CPT

## 2018-01-27 PROCEDURE — 84100 ASSAY OF PHOSPHORUS: CPT

## 2018-01-27 RX ORDER — HEPARIN SODIUM 5000 [USP'U]/ML
5000 INJECTION, SOLUTION INTRAVENOUS; SUBCUTANEOUS EVERY 8 HOURS
Status: DISCONTINUED | OUTPATIENT
Start: 2018-01-27 | End: 2018-01-31 | Stop reason: HOSPADM

## 2018-01-27 RX ORDER — ACETAMINOPHEN 500 MG
1000 TABLET ORAL ONCE
Status: COMPLETED | OUTPATIENT
Start: 2018-01-27 | End: 2018-01-27

## 2018-01-27 RX ORDER — ACETAMINOPHEN 10 MG/ML
1000 INJECTION, SOLUTION INTRAVENOUS ONCE
Status: DISCONTINUED | OUTPATIENT
Start: 2018-01-27 | End: 2018-01-28

## 2018-01-27 RX ORDER — CEFTRIAXONE 1 G/1
1 INJECTION, POWDER, FOR SOLUTION INTRAMUSCULAR; INTRAVENOUS
Status: DISCONTINUED | OUTPATIENT
Start: 2018-01-27 | End: 2018-01-27

## 2018-01-27 RX ORDER — INSULIN ASPART 100 [IU]/ML
0-5 INJECTION, SOLUTION INTRAVENOUS; SUBCUTANEOUS
Status: DISCONTINUED | OUTPATIENT
Start: 2018-01-27 | End: 2018-01-31 | Stop reason: HOSPADM

## 2018-01-27 RX ORDER — METRONIDAZOLE 500 MG/100ML
500 INJECTION, SOLUTION INTRAVENOUS
Status: COMPLETED | OUTPATIENT
Start: 2018-01-27 | End: 2018-01-27

## 2018-01-27 RX ORDER — MAGNESIUM SULFATE HEPTAHYDRATE 40 MG/ML
2 INJECTION, SOLUTION INTRAVENOUS ONCE
Status: COMPLETED | OUTPATIENT
Start: 2018-01-27 | End: 2018-01-27

## 2018-01-27 RX ORDER — GABAPENTIN 100 MG/1
100 CAPSULE ORAL NIGHTLY
Status: DISCONTINUED | OUTPATIENT
Start: 2018-01-27 | End: 2018-01-31 | Stop reason: HOSPADM

## 2018-01-27 RX ORDER — ONDANSETRON 8 MG/1
8 TABLET, ORALLY DISINTEGRATING ORAL EVERY 8 HOURS PRN
Status: DISCONTINUED | OUTPATIENT
Start: 2018-01-27 | End: 2018-01-31 | Stop reason: HOSPADM

## 2018-01-27 RX ORDER — GLUCAGON 1 MG
1 KIT INJECTION
Status: DISCONTINUED | OUTPATIENT
Start: 2018-01-27 | End: 2018-01-31 | Stop reason: HOSPADM

## 2018-01-27 RX ORDER — IBUPROFEN 200 MG
24 TABLET ORAL
Status: DISCONTINUED | OUTPATIENT
Start: 2018-01-27 | End: 2018-01-31 | Stop reason: HOSPADM

## 2018-01-27 RX ORDER — METRONIDAZOLE 500 MG/1
500 TABLET ORAL EVERY 8 HOURS
Status: DISCONTINUED | OUTPATIENT
Start: 2018-01-27 | End: 2018-01-29

## 2018-01-27 RX ORDER — BISACODYL 10 MG
10 SUPPOSITORY, RECTAL RECTAL DAILY PRN
Status: DISCONTINUED | OUTPATIENT
Start: 2018-01-27 | End: 2018-01-31 | Stop reason: HOSPADM

## 2018-01-27 RX ORDER — CEFTRIAXONE 1 G/1
1 INJECTION, POWDER, FOR SOLUTION INTRAMUSCULAR; INTRAVENOUS ONCE
Status: COMPLETED | OUTPATIENT
Start: 2018-01-27 | End: 2018-01-27

## 2018-01-27 RX ORDER — INSULIN ASPART 100 [IU]/ML
2 INJECTION, SOLUTION INTRAVENOUS; SUBCUTANEOUS
Status: DISCONTINUED | OUTPATIENT
Start: 2018-01-28 | End: 2018-01-29

## 2018-01-27 RX ORDER — METOCLOPRAMIDE HYDROCHLORIDE 5 MG/ML
5 INJECTION INTRAMUSCULAR; INTRAVENOUS EVERY 8 HOURS PRN
Status: DISCONTINUED | OUTPATIENT
Start: 2018-01-27 | End: 2018-01-31 | Stop reason: HOSPADM

## 2018-01-27 RX ORDER — ACETAMINOPHEN AND CODEINE PHOSPHATE 300; 30 MG/1; MG/1
1 TABLET ORAL EVERY 8 HOURS PRN
Status: DISCONTINUED | OUTPATIENT
Start: 2018-01-27 | End: 2018-01-28

## 2018-01-27 RX ORDER — SODIUM CHLORIDE 9 MG/ML
INJECTION, SOLUTION INTRAVENOUS CONTINUOUS
Status: DISCONTINUED | OUTPATIENT
Start: 2018-01-27 | End: 2018-01-29

## 2018-01-27 RX ORDER — SODIUM CHLORIDE 0.9 % (FLUSH) 0.9 %
5 SYRINGE (ML) INJECTION
Status: DISCONTINUED | OUTPATIENT
Start: 2018-01-27 | End: 2018-01-31 | Stop reason: HOSPADM

## 2018-01-27 RX ORDER — IBUPROFEN 200 MG
16 TABLET ORAL
Status: DISCONTINUED | OUTPATIENT
Start: 2018-01-27 | End: 2018-01-31 | Stop reason: HOSPADM

## 2018-01-27 RX ORDER — PANTOPRAZOLE SODIUM 40 MG/1
40 TABLET, DELAYED RELEASE ORAL DAILY
Status: DISCONTINUED | OUTPATIENT
Start: 2018-01-27 | End: 2018-01-31 | Stop reason: HOSPADM

## 2018-01-27 RX ORDER — ACETAMINOPHEN 500 MG
500 TABLET ORAL EVERY 6 HOURS PRN
Status: DISCONTINUED | OUTPATIENT
Start: 2018-01-27 | End: 2018-01-31 | Stop reason: HOSPADM

## 2018-01-27 RX ORDER — ONDANSETRON 2 MG/ML
4 INJECTION INTRAMUSCULAR; INTRAVENOUS EVERY 12 HOURS PRN
Status: DISCONTINUED | OUTPATIENT
Start: 2018-01-27 | End: 2018-01-31 | Stop reason: HOSPADM

## 2018-01-27 RX ORDER — MAGNESIUM SULFATE HEPTAHYDRATE 40 MG/ML
2 INJECTION, SOLUTION INTRAVENOUS ONCE
Status: COMPLETED | OUTPATIENT
Start: 2018-01-27 | End: 2018-01-28

## 2018-01-27 RX ADMIN — INSULIN DETEMIR 3 UNITS: 100 INJECTION, SOLUTION SUBCUTANEOUS at 09:01

## 2018-01-27 RX ADMIN — METRONIDAZOLE 500 MG: 500 TABLET ORAL at 05:01

## 2018-01-27 RX ADMIN — SODIUM CHLORIDE 1000 ML: 0.9 INJECTION, SOLUTION INTRAVENOUS at 08:01

## 2018-01-27 RX ADMIN — SODIUM CHLORIDE: 0.9 INJECTION, SOLUTION INTRAVENOUS at 09:01

## 2018-01-27 RX ADMIN — MAGNESIUM SULFATE IN WATER 2 G: 40 INJECTION, SOLUTION INTRAVENOUS at 10:01

## 2018-01-27 RX ADMIN — METRONIDAZOLE 500 MG: 500 INJECTION, SOLUTION INTRAVENOUS at 03:01

## 2018-01-27 RX ADMIN — SODIUM CHLORIDE 1000 ML: 0.9 INJECTION, SOLUTION INTRAVENOUS at 03:01

## 2018-01-27 RX ADMIN — MAGNESIUM SULFATE IN WATER 2 G: 40 INJECTION, SOLUTION INTRAVENOUS at 09:01

## 2018-01-27 RX ADMIN — ACETAMINOPHEN 500 MG: 500 TABLET ORAL at 09:01

## 2018-01-27 RX ADMIN — METOCLOPRAMIDE 5 MG: 5 INJECTION, SOLUTION INTRAMUSCULAR; INTRAVENOUS at 09:01

## 2018-01-27 RX ADMIN — ONDANSETRON 4 MG: 2 INJECTION INTRAMUSCULAR; INTRAVENOUS at 08:01

## 2018-01-27 RX ADMIN — ACETAMINOPHEN AND CODEINE PHOSPHATE 1 TABLET: 300; 30 TABLET ORAL at 02:01

## 2018-01-27 RX ADMIN — CEFTRIAXONE SODIUM 1 G: 1 INJECTION, POWDER, FOR SOLUTION INTRAMUSCULAR; INTRAVENOUS at 10:01

## 2018-01-27 RX ADMIN — METRONIDAZOLE 500 MG: 500 TABLET ORAL at 10:01

## 2018-01-27 RX ADMIN — GABAPENTIN 100 MG: 100 CAPSULE ORAL at 10:01

## 2018-01-27 RX ADMIN — INSULIN DETEMIR 3 UNITS: 100 INJECTION, SOLUTION SUBCUTANEOUS at 10:01

## 2018-01-27 RX ADMIN — ACETAMINOPHEN 1000 MG: 500 TABLET ORAL at 08:01

## 2018-01-27 RX ADMIN — CEFTRIAXONE SODIUM 1 G: 1 INJECTION, POWDER, FOR SOLUTION INTRAMUSCULAR; INTRAVENOUS at 09:01

## 2018-01-27 RX ADMIN — SODIUM CHLORIDE 1000 ML: 0.9 INJECTION, SOLUTION INTRAVENOUS at 12:01

## 2018-01-27 RX ADMIN — PANTOPRAZOLE SODIUM 40 MG: 40 TABLET, DELAYED RELEASE ORAL at 09:01

## 2018-01-27 RX ADMIN — MAGNESIUM SULFATE IN WATER 2 G: 40 INJECTION, SOLUTION INTRAVENOUS at 04:01

## 2018-01-27 RX ADMIN — HEPARIN SODIUM 5000 UNITS: 5000 INJECTION, SOLUTION INTRAVENOUS; SUBCUTANEOUS at 05:01

## 2018-01-27 NOTE — ED NOTES
Hourly rounding complete. Patient resting in stretcher and is in NAD at this time. Pt is awake alert and oriented x4, VSS, respirations even and unlabored. Pt denies pain at this time. Pt updated on POC. Bed low and locked with side rails up x2, call bell in pt reach. Pt requesting food, meal tray requested, pt instructed not to eat meal until after glucose check and insulin administered if needed, verbalized understanding. Pt requesting pain medication for arthritis pain, will administer PRN acetaminophen as ordered. Pt voices no other needs at this time.

## 2018-01-27 NOTE — ED PROVIDER NOTES
Encounter Date: 1/26/2018    SCRIBE #1 NOTE: I, Oscar Tripathi, am scribing for, and in the presence of,  Dr. Corral. I have scribed the following portions of the note - the Resident attestation.       History     Chief Complaint   Patient presents with    Diarrhea     Diarrhea x3 days. Weakness. and panic attack today.     72 year old female with PMH significant for HTN, DM, and GERD presents to the ED with a three day history of diarrhea and decreased appetite. Patient reports that she was prompted to go to the ED after having an episode of diarrhea with subsequent shakes, chills, and lethargy. Patient reports that she has approximately 1-2 episodes of nonbloody, watery stools after she attempts to eat or drink anything. Patient reports that she had a fever when her temp was measured by EMS. Patient denies of any LOC, visual changes, chest pain, shortness of breath, abdominal pain, hematochezia, melena, dysuria, or lower extremity swelling.           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    Vitamin D deficiency disease      Past Surgical History:   Procedure Laterality Date    CARPAL TUNNEL RELEASE      bilateral    COLONOSCOPY W/ BIOPSIES AND POLYPECTOMY      HEMORRHOID SURGERY      HYSTERECTOMY      AUB    ROTATOR CUFF REPAIR      left shoulder    SHOULDER SURGERY       Family History   Problem Relation Age of Onset    Heart disease Mother     Diabetes Mother     Heart disease Father     Cancer Father      liver    Diabetes Sister     Cataracts Sister     Kidney disease Brother     Heart disease Brother     Diabetes Sister      COPD Brother     COPD Brother     Gout Brother     Benign prostatic hyperplasia Brother     Heart disease Brother     Kidney disease Brother     Heart attack Brother      heart attack    Kidney failure Brother     Lupus Sister     Heart attack Brother     Drug abuse Brother     No Known Problems Daughter     No Known Problems Son     Amblyopia Neg Hx     Blindness Neg Hx     Breast cancer Neg Hx     Colon cancer Neg Hx     Ovarian cancer Neg Hx      Social History   Substance Use Topics    Smoking status: Never Smoker    Smokeless tobacco: Never Used    Alcohol use No     Review of Systems   Constitutional: Positive for activity change, appetite change, chills, diaphoresis and fatigue.   HENT: Negative for trouble swallowing.    Eyes: Negative for visual disturbance.   Respiratory: Negative for shortness of breath.    Cardiovascular: Negative for chest pain and leg swelling.   Gastrointestinal: Positive for diarrhea, nausea and vomiting. Negative for abdominal pain and blood in stool.   Endocrine: Negative for polyuria.   Genitourinary: Negative for dysuria.   Musculoskeletal: Negative for gait problem.   Skin: Negative for wound.   Neurological: Positive for dizziness. Negative for syncope.   Psychiatric/Behavioral: Negative for confusion.       Physical Exam     Initial Vitals   BP Pulse Resp Temp SpO2   01/26/18 2051 01/27/18 0003 01/26/18 2051 01/27/18 0000 01/26/18 2051   (!) 160/80 (!) 122 18 98.6 °F (37 °C) 98 %      MAP       01/26/18 2051       106.67         Physical Exam    Vitals reviewed.  Constitutional: She appears well-developed and well-nourished. She is not diaphoretic. No distress.   HENT:   Head: Normocephalic and atraumatic.   Right Ear: External ear normal.   Left Ear: External ear normal.   Nose: Nose normal.   Mouth/Throat: Oropharynx is clear and moist. No oropharyngeal exudate.   Eyes: Conjunctivae and EOM are normal. Pupils are equal, round, and reactive to light. Right  eye exhibits no discharge. Left eye exhibits no discharge. No scleral icterus.   Neck: Normal range of motion. Neck supple. No thyromegaly present. No tracheal deviation present.   Cardiovascular: Regular rhythm and normal heart sounds. Tachycardia present.    No murmur heard.  Pulmonary/Chest: Breath sounds normal. No respiratory distress. She has no wheezes. She has no rhonchi. She has no rales.   Abdominal: Soft. Bowel sounds are normal. She exhibits no distension. There is no tenderness. There is no rebound and no guarding.   Musculoskeletal: Normal range of motion. She exhibits no edema or tenderness.   Neurological: She is alert and oriented to person, place, and time. She has normal reflexes.   Skin: Skin is warm and dry. No rash noted. No erythema.   Psychiatric: She has a normal mood and affect. Her behavior is normal. Thought content normal.         ED Course   Procedures  Labs Reviewed   COMPREHENSIVE METABOLIC PANEL - Abnormal; Notable for the following:        Result Value    Potassium 5.4 (*)     Chloride 112 (*)     CO2 14 (*)     Glucose 217 (*)     BUN, Bld 51 (*)     Creatinine 3.1 (*)     Albumin 3.1 (*)     eGFR if  16.8 (*)     eGFR if non  14.6 (*)     All other components within normal limits   CBC W/ AUTO DIFFERENTIAL - Abnormal; Notable for the following:     WBC 23.27 (*)     RBC 2.85 (*)     Hemoglobin 8.4 (*)     Hematocrit 24.2 (*)     Gran% 89.0 (*)     Lymph% 3.0 (*)     All other components within normal limits   MAGNESIUM - Abnormal; Notable for the following:     Magnesium 1.1 (*)     All other components within normal limits   PHOSPHORUS - Abnormal; Notable for the following:     Phosphorus 1.5 (*)     All other components within normal limits   URINALYSIS, REFLEX TO URINE CULTURE - Abnormal; Notable for the following:     Appearance, UA Cloudy (*)     Protein, UA 2+ (*)     Glucose, UA 1+ (*)     Occult Blood UA 1+ (*)     Leukocytes, UA 3+ (*)      "All other components within normal limits    Narrative:     Preferred Collection Type->Urine, Clean Catch   URINALYSIS MICROSCOPIC - Abnormal; Notable for the following:     RBC, UA 5 (*)     WBC, UA 63 (*)     Bacteria, UA Many (*)     Non-Squam Epith 2 (*)     Hyaline Casts, UA 13 (*)     All other components within normal limits    Narrative:     Preferred Collection Type->Urine, Clean Catch   POCT GLUCOSE - Abnormal; Notable for the following:     POCT Glucose 118 (*)     All other components within normal limits   CLOSTRIDIUM DIFFICILE   CULTURE, URINE   CULTURE, STOOL   LACTIC ACID, PLASMA   HEMOGLOBIN A1C   HEMOGLOBIN A1C    Narrative:     Add on GHGB per Dr. Linda Barber, order #[id number]   08:35  01/27/2018    WBC, STOOL   OCCULT BLOOD X 1, STOOL   POCT GLUCOSE MONITORING CONTINUOUS             Medical Decision Making:   History:   Old Medical Records: I decided to obtain old medical records.  Clinical Tests:   Lab Tests: Ordered and Reviewed  Medical Tests: Reviewed and Ordered       APC / Resident Notes:   12:43 AM 72 year old female with PMH significant for HTN, DM, and GERD presents to the ED with a three day history of diarrhea and decreased appetite. Patient reports that she was prompted to go to the ED after having an episode of diarrhea with subsequent shakes, chills, and lethargy. Patient did endorse chest "shaking" prior to presentation. Will order basic labs to evaluate electrolytes, EKG, and administer 1 L NS.     Brock Mcclelland MD  PGY-4       Scribe Attestation:   Scribe #1: I performed the above scribed service and the documentation accurately describes the services I performed. I attest to the accuracy of the note.    Attending Attestation:   Physician Attestation Statement for Resident:  As the supervising MD   Physician Attestation Statement: I have personally seen and examined this patient.   I agree with the above history. -: Diarrhea, dehydration, acute kidney injury. Benign exam. " Will admit to medicine.   As the supervising MD I agree with the above PE.    As the supervising MD I agree with the above treatment, course, plan, and disposition.                    ED Course      Clinical Impression:   The encounter diagnosis was Acute kidney injury.    Disposition:   Disposition: Admitted                        Christiano Corral MD  01/27/18 5977

## 2018-01-27 NOTE — ED NOTES
Hourly rounding complete.  Patient updated on plan of care and current status.  Items within reach. Ambulating to bathroom with standby assist.

## 2018-01-27 NOTE — ED NOTES
Care assumed of patient. Hourly rounding complete.  Patient updated on plan of care and current status. Items within reach. Toileting offered.

## 2018-01-27 NOTE — H&P
[facility] Hospital Medicine  History & Physical    Patient Name: Lina Davis  MRN: [id number]  Admission Date: 1/26/2018  Attending Physician: Linda Barber MD   Primary Care Provider: Dora Freedman MD    [facility] Team: [facility] D Linda Barber MD     Patient information was obtained from patient, relative(s), past medical records and ER records.     Subjective:     Principal Problem:Acute kidney injury    Chief Complaint:   Chief Complaint   Patient presents with    Diarrhea     Diarrhea x3 days. Weakness. and panic attack today.        HPI: 72 y.o. female presented to the ER with past medical history of DM-2 with nephropathy and neuropathy, arthritis, gout, hypertension.  She was in her usual state of good health until the acute onset of nausea and vomiting beginning 10 days prior to admission with rapidly improving course followed by diarrhea 1 week ago. She had decreased oral intake x 3 days ago and fever/chills x 2 days. Pertinent associated symptoms include lethargy.    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      Current Outpatient Prescriptions on File Prior to Encounter   Medication Sig    acetaminophen-codeine 300-30mg (TYLENOL  #3) 300-30 mg Tab Take 1 tablet by mouth every 8 (eight) hours as needed.    diazePAM (VALIUM) 5 MG tablet Take 1 tablet (5 mg total) by mouth every 8 (eight) hours as needed (muscle spasms). May repeat if necessary    metformin (GLUCOPHAGE) 500 MG tablet TAKE 2 TABLETS TWICE DAILY WITH MEALS    omeprazole (PRILOSEC) 20 MG capsule One capsule twice daily    acetaminophen (TYLENOL) 325 MG tablet Take 325 mg by mouth every 6 (six) hours as needed for Pain.    alcohol swabs (BD ALCOHOL SWABS) PadM USE AS DIRECTED TO MONITOR BLOOD SUGARS TWICE DAILY    allopurinol (ZYLOPRIM) 300 MG tablet 1 Tablet Oral Every day    benzonatate (TESSALON) 100 MG capsule TAKE 1 CAPSULE THREE TIMES DAILY AS NEEDED FOR COUGH    blood glucose control, normal Soln True Metrix control solution E11.9 - pt tests twice daily    blood sugar diagnostic (TRUE METRIX GLUCOSE TEST STRIP) Strp Please give True metrix test strips and lancets to monitor blood sugars twice daily E11.21    blood-glucose meter (TRUE METRIX AIR GLUCOSE METER) kit Test twice daily, E11.21    blood-glucose meter (TRUE METRIX AIR GLUCOSE METER) Misc Please give a True metrix air glucose meter to use as directed to monitor blood sugars BID    lancets (TRUEPLUS LANCETS) 28 gauge Misc 1 lancet by Misc.(Non-Drug; Combo Route) route 2 (two) times daily.    losartan-hydrochlorothiazide 100-25 mg (HYZAAR) 100-25 mg per tablet Take 1 tablet by mouth once daily.    lovastatin (MEVACOR) 40 MG tablet TAKE 1 TABLET EVERY EVENING    PRODIGY CONTROL SOLUTION, LOW Soln     sitagliptin (JANUVIA) 100 MG Tab TAKE 1 TABLET ONE TIME DAILY     Family History     Problem Relation (Age of Onset)    Benign prostatic hyperplasia Brother    COPD Brother, Brother    Cancer Father    Cataracts Sister    Diabetes Mother, Sister, Sister    Drug abuse Brother    Gout Brother    Heart attack Brother, Brother    Heart disease Mother, Father, Brother, Brother    Kidney disease Brother, Brother     Kidney failure Brother    Lupus Sister    No Known Problems Daughter, Son        Social History Main Topics    Smoking status: Never Smoker    Smokeless tobacco: Never Used    Alcohol use No    Drug use: No    Sexual activity: Yes     Partners: Male     Birth control/ protection: Surgical     Review of Systems   Constitutional: Positive for activity change, appetite change, chills, fatigue and fever.   HENT: Negative.  Negative for congestion.    Eyes: Negative.    Respiratory: Negative.  Negative for shortness of breath.    Cardiovascular: Negative.    Gastrointestinal: Positive for diarrhea, nausea and vomiting.   Genitourinary: Positive for decreased urine volume.   Musculoskeletal: Positive for myalgias.   Skin: Negative.    Allergic/Immunologic: Negative for immunocompromised state.   Neurological: Negative.      Objective:     Vital Signs (Most Recent):  Temp: 98.6 °F (37 °C) (01/27/18 0000)  Pulse: 105 (01/27/18 0730)  Resp: 12 (01/27/18 0730)  BP: 118/67 (01/27/18 0730)  SpO2: 98 % (01/27/18 0730) Vital Signs (24h Range):  Temp:  [98.6 °F (37 °C)] 98.6 °F (37 °C)  Pulse:  [105-122] 105  Resp:  [12-20] 12  SpO2:  [97 %-100 %] 98 %  BP: (107-160)/(54-80) 118/67     Weight: 78.5 kg (173 lb)  Body mass index is 27.1 kg/m².    Physical Exam   Constitutional: She appears well-developed.  Non-toxic appearance.   HENT:   Head: Normocephalic.   Mouth/Throat: Mucous membranes are not pale and not cyanotic.   Eyes: Conjunctivae and lids are normal. Pupils are equal.   Neck: Neck supple.   Cardiovascular: Normal rate, regular rhythm, S1 normal and S2 normal.    Pulmonary/Chest: Effort normal and breath sounds normal.   Abdominal: Soft. Bowel sounds are normal. There is generalized tenderness.   Musculoskeletal: She exhibits no edema.   Neurological: She is not disoriented.   Skin: Skin is warm and dry. No cyanosis. Nails show no clubbing.   Psychiatric: She has a normal mood and affect.         CRANIAL NERVES      CN III, IV, VI   Pupils: equal      Significant Labs:   A1C:   Recent Labs  Lab 08/08/17  1820 01/27/18  0047   HGBA1C 5.1 5.1     CBC:   Recent Labs  Lab 01/27/18  0047   WBC 23.27*   HGB 8.4*   HCT 24.2*        CMP:   Recent Labs  Lab 01/27/18  0047      K 5.4*   *   CO2 14*   *   BUN 51*   CREATININE 3.1*   CALCIUM 9.7   PROT 7.4   ALBUMIN 3.1*   BILITOT 0.6   ALKPHOS 79   AST 19   ALT 13   ANIONGAP 10   EGFRNONAA 14.6*     Lactic Acid:   Recent Labs  Lab 01/27/18  0742   LACTATE 0.8     Magnesium:   Recent Labs  Lab 01/27/18  0047   MG 1.1*     POCT Glucose:   Recent Labs  Lab 01/27/18  0943 01/27/18  1726   POCTGLUCOSE 118* 120*     TSH:   Recent Labs  Lab 10/10/17  1500   TSH 1.075     Urine Studies:   Recent Labs  Lab 01/27/18  0328   COLORU Yellow   APPEARANCEUA Cloudy*   PHUR 5.0   SPECGRAV 1.010   PROTEINUA 2+*   GLUCUA 1+*   KETONESU Negative   BILIRUBINUA Negative   OCCULTUA 1+*   NITRITE Negative   UROBILINOGEN Negative   LEUKOCYTESUR 3+*   RBCUA 5*   WBCUA 63*   BACTERIA Many*   SQUAMEPITHEL 1   HYALINECASTS 13*       Significant Imaging: EKG: I have reviewed all pertinent results/findings within the past 24 hours and my personal findings are: sinus tachycardia    Baselines:  Hemoglobin   Date Value Ref Range Status   01/27/2018 8.4 (L) 12.0 - 16.0 g/dL Final   10/10/2017 10.4 (L) 12.0 - 16.0 g/dL Final   08/08/2017 10.7 (L) 12.0 - 16.0 g/dL Final   11/15/2016 11.3 (L) 12.0 - 16.0 g/dL Final     Creatinine   Date Value Ref Range Status   01/27/2018 3.1 (H) 0.5 - 1.4 mg/dL Final   10/10/2017 0.8 0.5 - 1.4 mg/dL Final   08/08/2017 1.1 0.5 - 1.4 mg/dL Final   05/24/2017 0.9 0.5 - 1.4 mg/dL Final     Diagnostics:  No results found for: EF    Assessment/Plan:     Current Hospital Problem List:    Active Hospital Problems    Diagnosis  POA    *Acute kidney injury [N17.9]  Yes     Priority: 1 - High    Diarrhea of presumed infectious origin [A09]  Yes     Priority: 2     UTI  (urinary tract infection) [N39.0]  Yes    Severe sepsis [A41.9, R65.20]  Yes    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       Medications for management of current problems:      cefTRIAXone (ROCEPHIN) IVPB  1 g Intravenous Q24H    gabapentin  100 mg Oral QHS    heparin (porcine)  5,000 Units Subcutaneous Q8H    [START ON 1/28/2018] insulin aspart  2 Units Subcutaneous TIDWM    insulin detemir  3 Units Subcutaneous BID    magnesium sulfate IVPB  2 g Intravenous Once    metroNIDAZOLE  500 mg Oral Q8H    pantoprazole  40 mg Oral Daily      sodium chloride 0.9%       PRN:   acetaminophen    bisacodyl    dextrose 50%    dextrose 50%    glucagon (human recombinant)    glucose    glucose    insulin aspart    metoclopramide HCl    ondansetron    ondansetron    sodium chloride 0.9%     IV Fluids: 0.9NS x 2 L in ED    Problems addressed today:     *Acute kidney injury  Trial of IV NS.      Diarrhea of presumed infectious origin, Sepsis (severe)  WBC elevated, fever, tachycardia.  Treating with ceftriaxone and Flagyl. Stool studies pending.      UTI (urinary tract infection)  Treating with ceftriaxone. Cultures pending      Type 2 diabetes mellitus with diabetic nephropathy, Polyneuropathy  Non-insulin diabetes medications are held and the patient's hyperglycemia is managed with a SQ basal, prandial, correction dose insulin regimen.      HTN (hypertension), benign  Holding ARB and HCTZ       Risk:  Patient has a condition that poses threat to life and bodily function: Acute Renal Failure    Anticipated Disposition: Home or Self Care  JERRY 1/29/2018    Goals of Care:  General Prognosis: good  Goals: Curative  Comfort Only: No  Hospice: No  Code Status: Full Code      VTE Risk Mitigation         Ordered      heparin (porcine) injection 5,000 Units  Every 8 hours     Route:  Subcutaneous        01/27/18 0747     Medium Risk of VTE  Once      01/27/18 0747            Linda Barber MD  Department of Hospital Medicine   [facility]

## 2018-01-27 NOTE — ED NOTES
Patient identifiers verified and correct for Lina P Oldham.    LOC: The patient is awake, alert and aware of environment with an appropriate affect, the patient is oriented x 3 and speaking appropriately.  APPEARANCE: Patient resting comfortably and in no acute distress, patient is clean and well groomed, patient's clothing is properly fastened.  SKIN: The skin is warm and dry, color consistent with ethnicity, patient has normal skin turgor and moist mucus membranes, skin intact, no breakdown or bruising noted.  MUSCULOSKELETAL: Patient moving all extremities spontaneously, no obvious swelling or deformities noted.  RESPIRATORY: Airway is open and patent, respirations are spontaneous, patient has a normal effort and rate, no accessory muscle use noted  CARDIAC: Patient is tachycardic with regular rhythm, no periphreal edema noted, capillary refill < 3 seconds.  ABDOMEN: Soft and non tender to palpation, no distention noted, normoactive bowel sounds present in all four quadrants.  NEUROLOGIC: PERRL, 3mm bilaterally, eyes open spontaneously, behavior appropriate to situation, follows commands, facial expression symmetrical, bilateral hand grasp equal and even, purposeful motor response noted, normal sensation in all extremities when touched with a finger.

## 2018-01-27 NOTE — ED NOTES
Patient request medication for arthritis pain. KATERIN LEROY paged. Dr. Barber to add on pain medication.

## 2018-01-27 NOTE — ED NOTES
"Pt states "I haven't been eating the last few days". Pt reports diarrhea X3 days, fatigue, vomiting X1, loss of appetite,chills. Pt also reports 103 fever last night at home. Pt denies blood in stool, cough, abdominal pain, nausea at this time, SOB, chest pain. Pt reports earlier tonight chest pain and states "I just couldn't keep still, I was shaking".   "

## 2018-01-27 NOTE — ED NOTES
Hourly rounding complete. Patient resting in stretcher and is in NAD at this time. Pt is awake alert and oriented x4, VSS, respirations even and unlabored. Pt aware of POC. Bed low and locked with side rails up x2, call bell in pt reach. Pt voices no needs at this time.

## 2018-01-27 NOTE — ED NOTES
Care assumed. Patient resting in stretcher and is in NAD at this time. Pt is awake alert and oriented x4, VSS, respirations even and unlabored. Pt denies pain at this time. Family at bedside, pt and family updated on POC. Bed low and locked with side rails up x2, call bell in pt reach. Pt requests chap stick, chap stick provided. Pt voices no other needs at this time.

## 2018-01-28 PROBLEM — R78.81 BACTEREMIA DUE TO GRAM-NEGATIVE BACTERIA: Status: ACTIVE | Noted: 2018-01-28

## 2018-01-28 PROBLEM — B96.20 E. COLI UTI (URINARY TRACT INFECTION): Status: ACTIVE | Noted: 2018-01-27

## 2018-01-28 PROBLEM — A41.50 SEVERE SEPSIS WITH ACUTE ORGAN DYSFUNCTION DUE TO GRAM NEGATIVE BACTERIA: Status: ACTIVE | Noted: 2018-01-27

## 2018-01-28 PROBLEM — B96.20 BACTEREMIA DUE TO ESCHERICHIA COLI: Status: ACTIVE | Noted: 2018-01-28

## 2018-01-28 LAB
ALBUMIN SERPL BCP-MCNC: 2.6 G/DL
ANION GAP SERPL CALC-SCNC: 9 MMOL/L
BASOPHILS # BLD AUTO: 0.02 K/UL
BASOPHILS NFR BLD: 0.1 %
BUN SERPL-MCNC: 33 MG/DL
CALCIUM SERPL-MCNC: 9.2 MG/DL
CHLORIDE SERPL-SCNC: 117 MMOL/L
CO2 SERPL-SCNC: 14 MMOL/L
CREAT SERPL-MCNC: 1.5 MG/DL
DIFFERENTIAL METHOD: ABNORMAL
EOSINOPHIL # BLD AUTO: 0 K/UL
EOSINOPHIL NFR BLD: 0 %
ERYTHROCYTE [DISTWIDTH] IN BLOOD BY AUTOMATED COUNT: 14.8 %
EST. GFR  (AFRICAN AMERICAN): 40.4 ML/MIN/1.73 M^2
EST. GFR  (NON AFRICAN AMERICAN): 35 ML/MIN/1.73 M^2
FLUAV AG SPEC QL IA: NEGATIVE
FLUBV AG SPEC QL IA: NEGATIVE
GLUCOSE SERPL-MCNC: 152 MG/DL
HCT VFR BLD AUTO: 23 %
HGB BLD-MCNC: 7.6 G/DL
IMM GRANULOCYTES # BLD AUTO: 0.09 K/UL
IMM GRANULOCYTES NFR BLD AUTO: 0.6 %
LYMPHOCYTES # BLD AUTO: 1.1 K/UL
LYMPHOCYTES NFR BLD: 7.9 %
MAGNESIUM SERPL-MCNC: 2.6 MG/DL
MCH RBC QN AUTO: 28.7 PG
MCHC RBC AUTO-ENTMCNC: 33 G/DL
MCV RBC AUTO: 87 FL
MONOCYTES # BLD AUTO: 1.2 K/UL
MONOCYTES NFR BLD: 8.7 %
NEUTROPHILS # BLD AUTO: 11.7 K/UL
NEUTROPHILS NFR BLD: 82.7 %
NRBC BLD-RTO: 0 /100 WBC
PHOSPHATE SERPL-MCNC: 3.6 MG/DL
PLATELET # BLD AUTO: 203 K/UL
PMV BLD AUTO: 10.3 FL
POCT GLUCOSE: 138 MG/DL (ref 70–110)
POCT GLUCOSE: 145 MG/DL (ref 70–110)
POCT GLUCOSE: 154 MG/DL (ref 70–110)
POCT GLUCOSE: 99 MG/DL (ref 70–110)
POTASSIUM SERPL-SCNC: 5.5 MMOL/L
RBC # BLD AUTO: 2.65 M/UL
SODIUM SERPL-SCNC: 140 MMOL/L
SPECIMEN SOURCE: NORMAL
WBC # BLD AUTO: 14.19 K/UL

## 2018-01-28 PROCEDURE — 83735 ASSAY OF MAGNESIUM: CPT

## 2018-01-28 PROCEDURE — 63600175 PHARM REV CODE 636 W HCPCS: Performed by: PHYSICIAN ASSISTANT

## 2018-01-28 PROCEDURE — 85025 COMPLETE CBC W/AUTO DIFF WBC: CPT

## 2018-01-28 PROCEDURE — 99232 SBSQ HOSP IP/OBS MODERATE 35: CPT | Mod: ,,, | Performed by: INTERNAL MEDICINE

## 2018-01-28 PROCEDURE — 80069 RENAL FUNCTION PANEL: CPT

## 2018-01-28 PROCEDURE — 87400 INFLUENZA A/B EACH AG IA: CPT

## 2018-01-28 PROCEDURE — 63600175 PHARM REV CODE 636 W HCPCS: Performed by: INTERNAL MEDICINE

## 2018-01-28 PROCEDURE — 11000001 HC ACUTE MED/SURG PRIVATE ROOM

## 2018-01-28 PROCEDURE — 36415 COLL VENOUS BLD VENIPUNCTURE: CPT

## 2018-01-28 PROCEDURE — 25000003 PHARM REV CODE 250: Performed by: INTERNAL MEDICINE

## 2018-01-28 RX ORDER — ACETAMINOPHEN AND CODEINE PHOSPHATE 300; 30 MG/1; MG/1
1 TABLET ORAL EVERY 8 HOURS PRN
Status: DISCONTINUED | OUTPATIENT
Start: 2018-01-28 | End: 2018-01-31 | Stop reason: HOSPADM

## 2018-01-28 RX ORDER — OXYCODONE HYDROCHLORIDE 5 MG/1
5 TABLET ORAL EVERY 8 HOURS PRN
Status: DISCONTINUED | OUTPATIENT
Start: 2018-01-28 | End: 2018-01-31 | Stop reason: HOSPADM

## 2018-01-28 RX ADMIN — ACETAMINOPHEN 500 MG: 500 TABLET ORAL at 05:01

## 2018-01-28 RX ADMIN — INSULIN DETEMIR 3 UNITS: 100 INJECTION, SOLUTION SUBCUTANEOUS at 09:01

## 2018-01-28 RX ADMIN — PANTOPRAZOLE SODIUM 40 MG: 40 TABLET, DELAYED RELEASE ORAL at 09:01

## 2018-01-28 RX ADMIN — INSULIN ASPART 2 UNITS: 100 INJECTION, SOLUTION INTRAVENOUS; SUBCUTANEOUS at 09:01

## 2018-01-28 RX ADMIN — SODIUM CHLORIDE: 0.9 INJECTION, SOLUTION INTRAVENOUS at 01:01

## 2018-01-28 RX ADMIN — ACETAMINOPHEN AND CODEINE PHOSPHATE 1 TABLET: 300; 30 TABLET ORAL at 09:01

## 2018-01-28 RX ADMIN — INSULIN ASPART 2 UNITS: 100 INJECTION, SOLUTION INTRAVENOUS; SUBCUTANEOUS at 01:01

## 2018-01-28 RX ADMIN — HEPARIN SODIUM 5000 UNITS: 5000 INJECTION, SOLUTION INTRAVENOUS; SUBCUTANEOUS at 06:01

## 2018-01-28 RX ADMIN — INSULIN ASPART 2 UNITS: 100 INJECTION, SOLUTION INTRAVENOUS; SUBCUTANEOUS at 05:01

## 2018-01-28 RX ADMIN — METRONIDAZOLE 500 MG: 500 TABLET ORAL at 06:01

## 2018-01-28 RX ADMIN — METRONIDAZOLE 500 MG: 500 TABLET ORAL at 09:01

## 2018-01-28 RX ADMIN — OXYCODONE HYDROCHLORIDE 5 MG: 5 TABLET ORAL at 01:01

## 2018-01-28 RX ADMIN — HEPARIN SODIUM 5000 UNITS: 5000 INJECTION, SOLUTION INTRAVENOUS; SUBCUTANEOUS at 09:01

## 2018-01-28 RX ADMIN — METOCLOPRAMIDE 5 MG: 5 INJECTION, SOLUTION INTRAMUSCULAR; INTRAVENOUS at 06:01

## 2018-01-28 RX ADMIN — ONDANSETRON 8 MG: 8 TABLET, ORALLY DISINTEGRATING ORAL at 04:01

## 2018-01-28 RX ADMIN — CEFTRIAXONE 2 G: 2 INJECTION, SOLUTION INTRAVENOUS at 01:01

## 2018-01-28 RX ADMIN — HEPARIN SODIUM 5000 UNITS: 5000 INJECTION, SOLUTION INTRAVENOUS; SUBCUTANEOUS at 01:01

## 2018-01-28 RX ADMIN — METRONIDAZOLE 500 MG: 500 TABLET ORAL at 01:01

## 2018-01-28 RX ADMIN — ACETAMINOPHEN 500 MG: 500 TABLET ORAL at 06:01

## 2018-01-28 RX ADMIN — GABAPENTIN 100 MG: 100 CAPSULE ORAL at 09:01

## 2018-01-28 NOTE — PROGRESS NOTES
[facility] Hospital Medicine  Progress Note    Patient Name: Lina Davis  MRN: [id number]  Patient Class: IP- Inpatient   Admission Date: 1/26/2018  Length of Stay: 1 days  Attending Physician: Linda Barber MD  Primary Care Provider: Dora Freedman MD    [facility] Medicine Team: [facility]P MED D Linda Barber MD    Subjective:     Principal Problem:Severe sepsis with acute organ dysfunction due to Gram negative bacteria    Interval History: Spiked fever overnight. No diarrhea since admission.    Concerns:    Review of Systems   Constitutional: Positive for fever.   Respiratory: Negative for shortness of breath.    Gastrointestinal: Negative for diarrhea and vomiting.     Objective:     Vital Signs (Most Recent):  Temp: 98.9 °F (37.2 °C) (01/28/18 1143)  Pulse: 76 (01/28/18 1505)  Resp: 18 (01/28/18 1143)  BP: 124/67 (01/28/18 1143)  SpO2: (!) 94 % (01/28/18 1143) Vital Signs (24h Range):  Temp:  [97.6 °F (36.4 °C)-102.3 °F (39.1 °C)] 98.9 °F (37.2 °C)  Pulse:  [] 76  Resp:  [16-24] 18  SpO2:  [93 %-100 %] 94 %  BP: (118-182)/(58-88) 124/67     Weight: 78.5 kg (173 lb)  Body mass index is 27.92 kg/m².    Intake/Output Summary (Last 24 hours) at 01/28/18 1535  Last data filed at 01/27/18 1900   Gross per 24 hour   Intake              650 ml   Output              600 ml   Net               50 ml      Physical Exam   Constitutional: She appears well-developed.   HENT:   Head: Normocephalic.   Mouth/Throat: Mucous membranes are not cyanotic.   Eyes: Conjunctivae and lids are normal.   Neck: Neck supple.   Cardiovascular: Normal rate, regular rhythm, S1 normal and S2 normal.    Pulmonary/Chest: Effort normal and breath sounds normal.   Abdominal: Soft. Bowel sounds are normal. There is no tenderness.   Musculoskeletal: She exhibits no edema.   Neurological: She is alert. She is not disoriented.   Skin: She is not diaphoretic. No cyanosis. No pallor. Nails show no clubbing.    Psychiatric: She has a normal mood and affect.       Significant Labs:   A1C:   Recent Labs  Lab 08/08/17  1820 01/27/18 0047   HGBA1C 5.1 5.1     Blood Culture:   Recent Labs  Lab 01/27/18 0742 01/27/18 2128   LABBLOO Gram stain aditi bottle: Gram negative rods   Results called to and read back by: Marilee Singh RN  01/27/2018    20:47  Gram stain aer bottle: Gram negative rods   Positive results previously called 01/27/2018  21:21  ESCHERICHIA COLISusceptibility pending No Growth to date  No Growth to date     CBC:   Recent Labs  Lab 01/27/18 0047 01/27/18 2127 01/28/18  0332   WBC 23.27* 14.00* 14.19*   HGB 8.4* 7.8* 7.6*   HCT 24.2* 23.2* 23.0*    201 203     CMP:   Recent Labs  Lab 01/27/18 0047 01/27/18 2128 01/28/18  0332    140 140   K 5.4* 5.3* 5.5*   * 117* 117*   CO2 14* 11* 14*   * 156* 152*   BUN 51* 35* 33*   CREATININE 3.1* 1.6* 1.5*   CALCIUM 9.7 9.3 9.2   PROT 7.4 6.7  --    ALBUMIN 3.1* 2.7* 2.6*   BILITOT 0.6 0.7  --    ALKPHOS 79 73  --    AST 19 21  --    ALT 13 12  --    ANIONGAP 10 12 9   EGFRNONAA 14.6* 32.4* 35.0*     Lactic Acid:   Recent Labs  Lab 01/27/18 0742 01/27/18 2128   LACTATE 0.8 1.0     Magnesium:   Recent Labs  Lab 01/27/18  0047 01/27/18 2128 01/28/18  0332   MG 1.1* 1.6 2.6     POCT Glucose:   Recent Labs  Lab 01/28/18  0801 01/28/18  1253 01/28/18  1733   POCTGLUCOSE 145* 99 138*     TSH:   Recent Labs  Lab 10/10/17  1500   TSH 1.075     Urine Culture:   Recent Labs  Lab 01/27/18  0328   LABURIN PRESUMPTIVE E COLI>100,000 cfu/mlIdentification and susceptibility pending     Urine Studies:   Recent Labs  Lab 01/27/18  0328   COLORU Yellow   APPEARANCEUA Cloudy*   PHUR 5.0   SPECGRAV 1.010   PROTEINUA 2+*   GLUCUA 1+*   KETONESU Negative   BILIRUBINUA Negative   OCCULTUA 1+*   NITRITE Negative   UROBILINOGEN Negative   LEUKOCYTESUR 3+*   RBCUA 5*   WBCUA 63*   BACTERIA Many*   SQUAMEPITHEL 1   HYALINECASTS 13*       Assessment/Plan:       Brief HPI: 72 y.o. female with past medical history of DM-2 with nephropathy and neuropathy, arthritis, gout, hypertension presented with the acute onset of nausea and vomiting beginning 10 days prior to admission followed by diarrhea 1 week ago. She had decreased oral intake x 3 days ago and fever/chills x 2 days. Pertinent associated symptoms include lethargy.     Hospital Course Overview:     Current Hospital Problem List:    Active Hospital Problems    Diagnosis  POA    *Severe sepsis with acute organ dysfunction due to Gram negative bacteria [A41.50, R65.20]  Yes     Priority: 1 - High    Bacteremia due to Escherichia coli [R78.81]  Yes     Priority: 1 - High    Acute kidney injury [N17.9]  Yes     Priority: 1 - High    Diarrhea of presumed infectious origin [A09]  Yes     Priority: 2     E. 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       Medications for management of current problems:      cefTRIAXone (ROCEPHIN) IVPB  2 g Intravenous Q24H    gabapentin  100 mg Oral QHS    heparin (porcine)  5,000 Units Subcutaneous Q8H    insulin aspart  2 Units Subcutaneous TIDWM    insulin detemir  3 Units Subcutaneous BID    metroNIDAZOLE  500 mg Oral Q8H    pantoprazole  40 mg Oral Daily      sodium chloride 0.9% 75 mL/hr at 01/28/18 1306     PRN:   acetaminophen    acetaminophen-codeine 300-30mg    bisacodyl    dextrose 50%    dextrose 50%    glucagon (human recombinant)    glucose    glucose    insulin aspart    metoclopramide HCl    ondansetron    ondansetron    oxyCODONE    sodium chloride 0.9%     IV Fluids: saline    Problems addressed today:        *Acute kidney injury  Trial of IV NS. Creatinine improving, continuing  hydration.        Diarrhea of presumed infectious origin, Sepsis (severe)  WBC elevated, fever, tachycardia.  Treating with ceftriaxone and Flagyl. Stool studies pending.  Diarrhea resolved.        UTI (urinary tract infection)  Treating with ceftriaxone. Cultures revealed E. Coli.        Type 2 diabetes mellitus with diabetic nephropathy, Polyneuropathy  Non-insulin diabetes medications are held and the patient's hyperglycemia is managed with a SQ basal, prandial, correction dose insulin regimen.        HTN (hypertension), benign  Holding ARB and HCTZ        E. Coli bacteremia  Continuing current management with ceftriaxone; cultures pending.                Risk:  Patient has a condition that poses threat to life and bodily function: Acute Renal Failure    Anticipated Disposition: Home-Health Care [facility]  JERRY     Goals of Care:  General Prognosis: good  Goals: Curative  Comfort Only: No  Hospice: No  Code Status: Full Code      VTE Risk Mitigation         Ordered     heparin (porcine) injection 5,000 Units  Every 8 hours     Route:  Subcutaneous        01/27/18 0747     Medium Risk of VTE  Once      01/27/18 0747             Linda Barber MD  Department of Hospital Medicine   [facility]

## 2018-01-28 NOTE — CARE UPDATE
Rapid Response Follow-up Note  Followed up with patient for proactive rounding.     Pt received PO tylenol and 1L bolus. Temp down to 100.0 from 102.3, 's. Pt overall feels better, new labs reviewed, Cr. Trending down, will replace Mg 1.6. Additional IV access obtained as 1 IV was inadvertently lost.     Reviewed plan of care with primary RN.     Please call Rapid Response RN with any questions or concerns at  [phone]

## 2018-01-28 NOTE — CARE UPDATE
RN Proactive Rounding Note  Time of Visit:     Admit Date: 2018  LOS: 0  Code Status: Full Code   Date of Visit: 2018  : 1945  Age: 72 y.o.  Sex: female  Bed: [facility] A:   MRN: [id number]  Was the patient discharged from an ICU this admission? no   Was the patient discharged from a PACU within last 24 hours? no  Did the patient receive conscious sedation/general anesthesia in last 24 hours? no  Was the patient in the ED within the past 24 hours? yes  Was the patient started on NIPPV within the past 24 hours? no  Attending Physician: Linda Barber MD  Primary Service: [facility] HOSP MED D      ASSESSMENT:     Modified Early Warning Score (MEWS): 6  Abnormal Vital Signs: Tachycardia, hyperthermia     Proactive rounding initiated due to MEWS of 6. Pt admitted to 11th floor from ED. Pt with temp 102.3 and . No respiratory distress. Pt vomiting clear liquid and overall feels bad. With chart review patient had labs done 47 with Cr 3.1, K 5.7, Mg 1.1. (Mg treated no recheck) Pt displaying acute kidney injury and sepsis. Positive blood cultures resulted when I was at bedside for gram - rods. WBC 23.27 lactic .8. No H&P currently in for review.     INTERVENTIONS/ RECOMMENDATIONS:     Primary team called.   Recommendations for STAT labs (CBC, CMP, MG, Lactic acid)  Orders already in for repeat blood cultures.   Additional IV access achieved while at bedside.     Discussed plan of care with MAGDA Singh     PHYSICIAN ESCALATION:     Yes/No yes    Orders received and case discussed with Dr. Jeffrey      Disposition: [id number]    FOLLOW-UP/CONTINGENCY:     Will follow up.     Call back the Rapid Response Nurse at x [phone]  for additional questions or concerns

## 2018-01-29 PROBLEM — N12 PYELONEPHRITIS DUE TO ESCHERICHIA COLI: Status: ACTIVE | Noted: 2018-01-27

## 2018-01-29 LAB
ALBUMIN SERPL BCP-MCNC: 2.6 G/DL
ANION GAP SERPL CALC-SCNC: 11 MMOL/L
BACTERIA BLD CULT: NORMAL
BACTERIA UR CULT: NORMAL
BASOPHILS # BLD AUTO: 0.03 K/UL
BASOPHILS NFR BLD: 0.2 %
BUN SERPL-MCNC: 22 MG/DL
CALCIUM SERPL-MCNC: 9.4 MG/DL
CHLORIDE SERPL-SCNC: 114 MMOL/L
CO2 SERPL-SCNC: 13 MMOL/L
CREAT SERPL-MCNC: 1.1 MG/DL
DIFFERENTIAL METHOD: ABNORMAL
EOSINOPHIL # BLD AUTO: 0 K/UL
EOSINOPHIL NFR BLD: 0.2 %
ERYTHROCYTE [DISTWIDTH] IN BLOOD BY AUTOMATED COUNT: 14.9 %
EST. GFR  (AFRICAN AMERICAN): 58.8 ML/MIN/1.73 M^2
EST. GFR  (NON AFRICAN AMERICAN): 51 ML/MIN/1.73 M^2
GLUCOSE SERPL-MCNC: 140 MG/DL
HCT VFR BLD AUTO: 23.1 %
HGB BLD-MCNC: 7.6 G/DL
IMM GRANULOCYTES # BLD AUTO: 0.1 K/UL
IMM GRANULOCYTES NFR BLD AUTO: 0.8 %
LYMPHOCYTES # BLD AUTO: 1.1 K/UL
LYMPHOCYTES NFR BLD: 9.1 %
MAGNESIUM SERPL-MCNC: 1.6 MG/DL
MCH RBC QN AUTO: 28.6 PG
MCHC RBC AUTO-ENTMCNC: 32.9 G/DL
MCV RBC AUTO: 87 FL
MONOCYTES # BLD AUTO: 1.4 K/UL
MONOCYTES NFR BLD: 11.6 %
NEUTROPHILS # BLD AUTO: 9.7 K/UL
NEUTROPHILS NFR BLD: 78.1 %
NRBC BLD-RTO: 0 /100 WBC
OB PNL STL: NEGATIVE
PHOSPHATE SERPL-MCNC: 2.6 MG/DL
PLATELET # BLD AUTO: 227 K/UL
PMV BLD AUTO: 10.7 FL
POCT GLUCOSE: 104 MG/DL (ref 70–110)
POCT GLUCOSE: 137 MG/DL (ref 70–110)
POCT GLUCOSE: 145 MG/DL (ref 70–110)
POCT GLUCOSE: 240 MG/DL (ref 70–110)
POTASSIUM SERPL-SCNC: 5.1 MMOL/L
RBC # BLD AUTO: 2.66 M/UL
SODIUM SERPL-SCNC: 138 MMOL/L
WBC # BLD AUTO: 12.38 K/UL
WBC #/AREA STL HPF: NORMAL /[HPF]

## 2018-01-29 PROCEDURE — 99232 SBSQ HOSP IP/OBS MODERATE 35: CPT | Mod: ,,, | Performed by: INTERNAL MEDICINE

## 2018-01-29 PROCEDURE — 87427 SHIGA-LIKE TOXIN AG IA: CPT | Mod: 59

## 2018-01-29 PROCEDURE — 99222 1ST HOSP IP/OBS MODERATE 55: CPT | Mod: GC,,, | Performed by: INTERNAL MEDICINE

## 2018-01-29 PROCEDURE — 87045 FECES CULTURE AEROBIC BACT: CPT

## 2018-01-29 PROCEDURE — 93010 ELECTROCARDIOGRAM REPORT: CPT | Mod: 76,,, | Performed by: INTERNAL MEDICINE

## 2018-01-29 PROCEDURE — 85025 COMPLETE CBC W/AUTO DIFF WBC: CPT

## 2018-01-29 PROCEDURE — 89055 LEUKOCYTE ASSESSMENT FECAL: CPT

## 2018-01-29 PROCEDURE — 93005 ELECTROCARDIOGRAM TRACING: CPT

## 2018-01-29 PROCEDURE — 80069 RENAL FUNCTION PANEL: CPT

## 2018-01-29 PROCEDURE — 11000001 HC ACUTE MED/SURG PRIVATE ROOM

## 2018-01-29 PROCEDURE — 25000003 PHARM REV CODE 250: Performed by: INTERNAL MEDICINE

## 2018-01-29 PROCEDURE — 25000003 PHARM REV CODE 250: Performed by: NURSE PRACTITIONER

## 2018-01-29 PROCEDURE — 82272 OCCULT BLD FECES 1-3 TESTS: CPT

## 2018-01-29 PROCEDURE — 36415 COLL VENOUS BLD VENIPUNCTURE: CPT

## 2018-01-29 PROCEDURE — 83735 ASSAY OF MAGNESIUM: CPT

## 2018-01-29 PROCEDURE — 63600175 PHARM REV CODE 636 W HCPCS: Performed by: INTERNAL MEDICINE

## 2018-01-29 PROCEDURE — 87046 STOOL CULTR AEROBIC BACT EA: CPT

## 2018-01-29 PROCEDURE — 93010 ELECTROCARDIOGRAM REPORT: CPT | Mod: ,,, | Performed by: INTERNAL MEDICINE

## 2018-01-29 RX ORDER — INSULIN ASPART 100 [IU]/ML
3 INJECTION, SOLUTION INTRAVENOUS; SUBCUTANEOUS
Status: DISCONTINUED | OUTPATIENT
Start: 2018-01-29 | End: 2018-01-29

## 2018-01-29 RX ORDER — INSULIN ASPART 100 [IU]/ML
4 INJECTION, SOLUTION INTRAVENOUS; SUBCUTANEOUS
Status: DISCONTINUED | OUTPATIENT
Start: 2018-01-30 | End: 2018-01-31 | Stop reason: HOSPADM

## 2018-01-29 RX ADMIN — HEPARIN SODIUM 5000 UNITS: 5000 INJECTION, SOLUTION INTRAVENOUS; SUBCUTANEOUS at 02:01

## 2018-01-29 RX ADMIN — OXYCODONE HYDROCHLORIDE 5 MG: 5 TABLET ORAL at 12:01

## 2018-01-29 RX ADMIN — SODIUM CHLORIDE 250 ML: 0.9 INJECTION, SOLUTION INTRAVENOUS at 04:01

## 2018-01-29 RX ADMIN — INSULIN ASPART 3 UNITS: 100 INJECTION, SOLUTION INTRAVENOUS; SUBCUTANEOUS at 05:01

## 2018-01-29 RX ADMIN — METRONIDAZOLE 500 MG: 500 TABLET ORAL at 10:01

## 2018-01-29 RX ADMIN — HEPARIN SODIUM 5000 UNITS: 5000 INJECTION, SOLUTION INTRAVENOUS; SUBCUTANEOUS at 06:01

## 2018-01-29 RX ADMIN — ACETAMINOPHEN AND CODEINE PHOSPHATE 1 TABLET: 300; 30 TABLET ORAL at 10:01

## 2018-01-29 RX ADMIN — INSULIN DETEMIR 3 UNITS: 100 INJECTION, SOLUTION SUBCUTANEOUS at 09:01

## 2018-01-29 RX ADMIN — INSULIN DETEMIR 3 UNITS: 100 INJECTION, SOLUTION SUBCUTANEOUS at 10:01

## 2018-01-29 RX ADMIN — GABAPENTIN 100 MG: 100 CAPSULE ORAL at 10:01

## 2018-01-29 RX ADMIN — CEFTRIAXONE 2 G: 2 INJECTION, SOLUTION INTRAVENOUS at 10:01

## 2018-01-29 RX ADMIN — METRONIDAZOLE 500 MG: 500 TABLET ORAL at 02:01

## 2018-01-29 RX ADMIN — METRONIDAZOLE 500 MG: 500 TABLET ORAL at 06:01

## 2018-01-29 RX ADMIN — HEPARIN SODIUM 5000 UNITS: 5000 INJECTION, SOLUTION INTRAVENOUS; SUBCUTANEOUS at 10:01

## 2018-01-29 RX ADMIN — INSULIN ASPART 2 UNITS: 100 INJECTION, SOLUTION INTRAVENOUS; SUBCUTANEOUS at 09:01

## 2018-01-29 RX ADMIN — PANTOPRAZOLE SODIUM 40 MG: 40 TABLET, DELAYED RELEASE ORAL at 09:01

## 2018-01-29 RX ADMIN — INSULIN ASPART 1 UNITS: 100 INJECTION, SOLUTION INTRAVENOUS; SUBCUTANEOUS at 10:01

## 2018-01-29 RX ADMIN — INSULIN ASPART 3 UNITS: 100 INJECTION, SOLUTION INTRAVENOUS; SUBCUTANEOUS at 12:01

## 2018-01-29 NOTE — PROGRESS NOTES
[facility] Hospital Medicine  Progress Note    Patient Name: Lina Davis  MRN: [id number]  Patient Class: IP- Inpatient   Admission Date: 1/26/2018  Length of Stay: 2 days  Attending Physician: Linda Barber MD  Primary Care Provider: Dora Freedman MD    [facility] Medicine Team: [facility] HOSP MED D Linda Barber MD    Subjective:     Principal Problem:Severe sepsis with acute organ dysfunction due to Gram negative bacteria    Interval History: Patient with no events overnight, no new complaints. Fever curve trending down. No diarrhea since admission.    Concerns:    Review of Systems   Respiratory: Negative for shortness of breath.    Gastrointestinal: Negative for diarrhea and vomiting.     Objective:     Vital Signs (Most Recent):  Temp: 98.8 °F (37.1 °C) (01/29/18 1607)  Pulse: (!) 125 (01/29/18 1607)  Resp: 16 (01/29/18 1607)  BP: 112/66 (01/29/18 1607)  SpO2: 95 % (01/29/18 1607) Vital Signs (24h Range):  Temp:  [98.1 °F (36.7 °C)-100.3 °F (37.9 °C)] 98.8 °F (37.1 °C)  Pulse:  [] 125  Resp:  [16-20] 16  SpO2:  [90 %-96 %] 95 %  BP: (112-141)/(58-81) 112/66     Weight: 78.5 kg (173 lb)  Body mass index is 27.92 kg/m².  No intake or output data in the 24 hours ending 01/29/18 1733   Physical Exam   Constitutional: She appears well-developed.   HENT:   Head: Normocephalic.   Mouth/Throat: Mucous membranes are not cyanotic.   Eyes: Conjunctivae and lids are normal.   Neck: Neck supple.   Cardiovascular: Normal rate, regular rhythm, S1 normal and S2 normal.    Pulmonary/Chest: Effort normal and breath sounds normal.   Abdominal: Soft. Bowel sounds are normal. There is no tenderness.   Musculoskeletal: She exhibits no edema.   Neurological: She is alert. She is not disoriented.   Skin: She is not diaphoretic. No cyanosis. No pallor. Nails show no clubbing.   Psychiatric: She has a normal mood and affect.       Significant Labs:   A1C:     Recent Labs  Lab 08/08/17  1820 01/27/18  0047    HGBA1C 5.1 5.1     CBC:     Recent Labs  Lab 01/27/18 2127 01/28/18  0332 01/29/18  0324   WBC 14.00* 14.19* 12.38   HGB 7.8* 7.6* 7.6*   HCT 23.2* 23.0* 23.1*    203 227     CMP:     Recent Labs  Lab 01/27/18 2128 01/28/18  0332 01/29/18  0324    140 138   K 5.3* 5.5* 5.1   * 117* 114*   CO2 11* 14* 13*   * 152* 140*   BUN 35* 33* 22   CREATININE 1.6* 1.5* 1.1   CALCIUM 9.3 9.2 9.4   PROT 6.7  --   --    ALBUMIN 2.7* 2.6* 2.6*   BILITOT 0.7  --   --    ALKPHOS 73  --   --    AST 21  --   --    ALT 12  --   --    ANIONGAP 12 9 11   EGFRNONAA 32.4* 35.0* 51.0*     Lactic Acid:     Recent Labs  Lab 01/27/18 2128   LACTATE 1.0     Magnesium:     Recent Labs  Lab 01/27/18 2128 01/28/18 0332 01/29/18  0324   MG 1.6 2.6 1.6     POCT Glucose:     Recent Labs  Lab 01/28/18 2136 01/29/18  0858 01/29/18  1211   POCTGLUCOSE 154* 137* 104     TSH:     Recent Labs  Lab 10/10/17  1500   TSH 1.075     Microbiology Results (last 7 days)     Procedure Component Value Units Date/Time    Blood culture [[id number]] Collected:  01/27/18 2128    Order Status:  Completed Specimen:  Blood Updated:  01/29/18 2322     Blood Culture, Routine No Growth to date     Blood Culture, Routine No Growth to date     Blood Culture, Routine No Growth to date    Blood culture [[id number]] Collected:  01/27/18 2128    Order Status:  Completed Specimen:  Blood Updated:  01/29/18 2322     Blood Culture, Routine No Growth to date     Blood Culture, Routine No Growth to date     Blood Culture, Routine No Growth to date    Urine culture [[id number]]  (Susceptibility) Collected:  01/27/18 0328    Order Status:  Completed Specimen:  Urine Updated:  01/29/18 1059     Urine Culture, Routine --     ESCHERICHIA COLI  >100,000 cfu/ml      Narrative:       Preferred Collection Type->Urine, Clean Catch    Blood culture [[id number]]  (Susceptibility) Collected:  01/27/18 0742    Order Status:  Completed Specimen:  Blood from Peripheral,  Hand, Left Updated:  01/29/18 0923     Blood Culture, Routine Gram stain aditi bottle: Gram negative rods      Blood Culture, Routine Results called to and read back by: Marilee Singh RN  01/27/2018       Blood Culture, Routine 20:47     Blood Culture, Routine Gram stain aer bottle: Gram negative rods      Blood Culture, Routine Positive results previously called 01/27/2018  21:21     Blood Culture, Routine ESCHERICHIA COLI    Stool culture [[id number]] Collected:  01/29/18 0058    Order Status:  Sent Specimen:  Stool from Stool Updated:  01/29/18 0127    E. coli 0157 antigen [[id number]] Collected:  01/29/18 0058    Order Status:  No result Specimen:  Stool from Stool Updated:  01/29/18 0127    Blood culture [[id number]]     Order Status:  Canceled Specimen:  Blood     Clostridium difficile EIA [[id number]]     Order Status:  Canceled Specimen:  Stool from Stool         Assessment/Plan:      Brief HPI: 72 y.o. female with past medical history of DM-2 with nephropathy and neuropathy, arthritis, gout, hypertension presented with the acute onset of nausea and vomiting beginning 10 days prior to admission followed by diarrhea 1 week ago. She had decreased oral intake x 3 days ago and fever/chills x 2 days. Pertinent associated symptoms include lethargy.     Hospital Course Overview: JACK resolved rapidly with hydration. Patient found to have pan-sensitive E.coli bacteremia and pyelonephritis. Diarrhea rapidly resolved.    Current Hospital Problem List:    Active Hospital Problems    Diagnosis  POA    *Severe sepsis with acute organ dysfunction due to Gram negative bacteria [A41.50, R65.20]  Yes     Priority: 1 - High    Bacteremia due to Escherichia coli [R78.81]  Yes     Priority: 1 - High    Acute kidney injury [N17.9]  Yes     Priority: 1 - High    Diarrhea of presumed infectious origin [A09]  Yes     Priority: 2     Pyelonephritis due to Escherichia coli [N12, B96.20]  Yes     Priority: 3     Type 2 diabetes  mellitus with diabetic nephropathy [E11.21]  Yes    Polyneuropathy [G62.9]  Yes    HTN (hypertension), benign [I10]  Yes      Resolved Hospital Problems    Diagnosis Date Resolved POA   No resolved problems to display.       Medications for management of current problems:      cefTRIAXone (ROCEPHIN) IVPB  2 g Intravenous Q24H    gabapentin  100 mg Oral QHS    heparin (porcine)  5,000 Units Subcutaneous Q8H    [START ON 1/30/2018] insulin aspart  4 Units Subcutaneous TIDWM    [START ON 1/30/2018] insulin detemir  6 Units Subcutaneous BID    pantoprazole  40 mg Oral Daily       PRN:   acetaminophen    acetaminophen-codeine 300-30mg    bisacodyl    dextrose 50%    dextrose 50%    glucagon (human recombinant)    glucose    glucose    insulin aspart    metoclopramide HCl    ondansetron    ondansetron    oxyCODONE    sodium chloride 0.9%     IV Fluids: saline    Problems addressed today:        *Acute kidney injury  Trial of IV NS. Creatinine improving, continuing hydration.  Held ARB.  Creatinine at baseline; IV fluids discontinued.        Diarrhea of presumed infectious origin, Sepsis (severe)  WBC elevated, fever, tachycardia.  Treatied with ceftriaxone and Flagyl. Stool studies pending.  Diarrhea resolved. Discontinued Flagyl 1/29/2018        UTI (urinary tract infection), Pyelonephritis, Severe sepsis  Treating with ceftriaxone initially. Cultures revealed E. Coli.  Plan to change antibiotic to Cipro per ID recommendations.        Type 2 diabetes mellitus with diabetic nephropathy, Polyneuropathy  Non-insulin diabetes medications are held and the patient's hyperglycemia is managed with a SQ basal, prandial, correction dose insulin regimen.  Increasing doses as diet advanced. Resume home regimen at discharge.        HTN (hypertension), benign  Holding ARB and HCTZ due to JACK.        E. Coli bacteremia, Severe sepsis  Continuing current management with ceftriaxone; cultures reveal  pan-sensitivity. Plan for Cipro at discharge.                Risk:  Patient has a condition that poses threat to life and bodily function: Acute Renal Failure    Anticipated Disposition: Home-Health Care [facility]  JERRY 1/31/2018    Goals of Care:  General Prognosis: good  Goals: Curative  Comfort Only: No  Hospice: No  Code Status: Full Code      VTE Risk Mitigation         Ordered     heparin (porcine) injection 5,000 Units  Every 8 hours     Route:  Subcutaneous        01/27/18 0747     Medium Risk of VTE  Once      01/27/18 0747             Linda Barber MD  Department of Hospital Medicine   [facility]

## 2018-01-29 NOTE — NURSING
Patient maintains -140, SpO2 94-96% on 2L NC. BP and temperature WNL. MD notified, bolus of 250 NS administered per MD order. Will continue to monitor.

## 2018-01-29 NOTE — SUBJECTIVE & OBJECTIVE
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    diazePAM (VALIUM) 5 MG tablet Take 1 tablet (5 mg total) by mouth every 8 (eight) hours as needed (muscle spasms). May repeat if necessary    metformin (GLUCOPHAGE) 500 MG tablet TAKE 2 TABLETS TWICE DAILY WITH MEALS    omeprazole (PRILOSEC) 20 MG capsule One capsule twice daily    acetaminophen (TYLENOL) 325 MG tablet Take 325 mg by mouth every 6 (six) hours as needed for Pain.    alcohol swabs (BD ALCOHOL SWABS) PadM USE AS DIRECTED TO MONITOR BLOOD SUGARS TWICE DAILY    allopurinol (ZYLOPRIM) 300 MG tablet 1 Tablet Oral Every day    benzonatate (TESSALON) 100 MG capsule TAKE 1 CAPSULE THREE TIMES DAILY AS NEEDED FOR COUGH    blood glucose control, normal Soln True Metrix control solution E11.9 - pt tests twice daily    blood sugar diagnostic (TRUE METRIX GLUCOSE TEST STRIP) Strp Please give True metrix test strips and lancets to monitor blood sugars twice daily E11.21    blood-glucose meter (TRUE METRIX AIR GLUCOSE METER) kit Test twice daily, E11.21    blood-glucose meter (TRUE METRIX AIR GLUCOSE METER) Misc Please give a  True metrix air glucose meter to use as directed to monitor blood sugars BID    lancets (TRUEPLUS LANCETS) 28 gauge Misc 1 lancet by Misc.(Non-Drug; Combo Route) route 2 (two) times daily.    losartan-hydrochlorothiazide 100-25 mg (HYZAAR) 100-25 mg per tablet Take 1 tablet by mouth once daily.    lovastatin (MEVACOR) 40 MG tablet TAKE 1 TABLET EVERY EVENING    PRODIGY CONTROL SOLUTION, LOW Soln     sitagliptin (JANUVIA) 100 MG Tab TAKE 1 TABLET ONE TIME DAILY     Antibiotics     Start     Stop Route Frequency Ordered    01/28/18 1000  cefTRIAXone (ROCEPHIN) 2 g in dextrose 5 % 50 mL IVPB      -- IV Every 24 hours (non-standard times) 01/27/18 2052 01/27/18 1400  metroNIDAZOLE tablet 500 mg      -- Oral Every 8 hours 01/27/18 0747        Antifungals     None        Antivirals     None           Immunization History   Administered Date(s) Administered    Influenza 10/10/2006, 10/14/2008, 09/21/2009, 12/08/2010, 10/25/2011    Influenza - High Dose 09/17/2012, 09/11/2013, 10/27/2014, 11/13/2015, 11/15/2016, 10/10/2017    Influenza A (H1N1) 2009 Monovalent - IM 11/20/2009    Pneumococcal Conjugate - 13 Valent 11/13/2015    Pneumococcal Polysaccharide - 23 Valent 05/20/2014    Tdap 01/15/2016    Zoster 02/04/2016       Family History     Problem Relation (Age of Onset)    Benign prostatic hyperplasia Brother    COPD Brother, Brother    Cancer Father    Cataracts Sister    Diabetes Mother, Sister, Sister    Drug abuse Brother    Gout Brother    Heart attack Brother, Brother    Heart disease Mother, Father, Brother, Brother    Kidney disease Brother, Brother    Kidney failure Brother    Lupus Sister    No Known Problems Daughter, Son        Social History     Social History    Marital status:      Spouse name: N/A    Number of children: N/A    Years of education: N/A     Social History Main Topics    Smoking status: Never Smoker    Smokeless tobacco: Never Used    Alcohol use No    Drug use: No     Sexual activity: Yes     Partners: Male     Birth control/ protection: Surgical     Other Topics Concern    None     Social History Narrative    None     Review of Systems   Constitutional: Positive for chills and fever.   HENT: Negative for congestion, postnasal drip, rhinorrhea and sore throat.    Eyes: Negative for pain and visual disturbance.   Respiratory: Positive for shortness of breath. Negative for cough.    Cardiovascular: Negative for chest pain and palpitations.   Gastrointestinal: Positive for diarrhea (Improving ). Negative for abdominal pain, nausea and vomiting.   Genitourinary: Positive for flank pain. Negative for difficulty urinating, dysuria, hematuria and urgency.   Musculoskeletal: Positive for arthralgias. Negative for gait problem and neck stiffness.   Skin: Negative for color change and rash.   Neurological: Positive for weakness. Negative for dizziness, tremors, speech difficulty and headaches.   Hematological: Negative for adenopathy. Does not bruise/bleed easily.   Psychiatric/Behavioral: Negative for agitation, confusion, self-injury and sleep disturbance.     Objective:     Vital Signs (Most Recent):  Temp: 99.4 °F (37.4 °C) (01/29/18 0909)  Pulse: (!) 126 (01/29/18 0909)  Resp: 18 (01/29/18 0909)  BP: 131/63 (01/29/18 0909)  SpO2: (!) 94 % (01/29/18 0909) Vital Signs (24h Range):  Temp:  [98.2 °F (36.8 °C)-101.5 °F (38.6 °C)] 99.4 °F (37.4 °C)  Pulse:  [] 126  Resp:  [16-20] 18  SpO2:  [90 %-96 %] 94 %  BP: (116-141)/(58-78) 131/63     Weight: 78.5 kg (173 lb)  Body mass index is 27.92 kg/m².    Estimated Creatinine Clearance: 50.3 mL/min (based on SCr of 1.1 mg/dL).    Physical Exam   Constitutional: She is oriented to person, place, and time. She appears well-developed and well-nourished.   HENT:   Head: Normocephalic and atraumatic.   Right Ear: External ear normal.   Left Ear: External ear normal.   Mouth/Throat: Oropharynx is clear and moist.   Eyes: Conjunctivae and  EOM are normal. Pupils are equal, round, and reactive to light.   Neck: Normal range of motion. Neck supple.   Cardiovascular: Regular rhythm, normal heart sounds and intact distal pulses.  Tachycardia present.    No murmur heard.  Pulmonary/Chest: Effort normal. She has rales (Bilateral rales in lower lobes ).   2 L NC    Abdominal: Soft. Bowel sounds are normal. She exhibits no distension. There is no tenderness.   Musculoskeletal: Normal range of motion. She exhibits tenderness (Mild left flank ).   Neurological: She is alert and oriented to person, place, and time.   Skin: Skin is warm and dry. No erythema.   Psychiatric: She has a normal mood and affect. Her behavior is normal. Judgment and thought content normal.   Nursing note and vitals reviewed.      Significant Labs: All pertinent labs within the past 24 hours have been reviewed.  WBC down trending, 12.38 today  Creatine improved from 1.6 to 1.1    Significant Imaging: I have reviewed all pertinent imaging results/findings within the past 24 hours.   CXR clear

## 2018-01-29 NOTE — MEDICAL/APP STUDENT
[facility]  Infectious Disease  Progress Note    Patient Name: Lina Davis  MRN: [id number]  Admission Date: 1/26/2018  Length of Stay: 2 days  Attending Physician: Linda Barber MD  Primary Care Provider: Dora Freedman MD    Isolation Status: No active isolations     Assessment/Plan: 72 y.o. female w/ PMH DM-2 (nephropathy and neuropathy) presenting w/ fevers, diarrhea, vomiting, and malaise; being treated for E. Coli bacteremia, UTI, and possible pyelonephritis.    E. Coli Bacteremia / UTI & possible pyelonephritis:  - Urine culture (01/27/18) positive for E. Coli  - Blood culture (01/27/18) positive for E. Coli  - WBC trending down (12.38 today)    Recommendations:  - Repeat blood cultures   - Continue Ceftriaxone 2g IV q24Hr until tomorrow  - Transition to Ciprofloxacin (PO) tomorrow (susceptibility confirmed)  - Due to concern for pyelonephritis recommend Cipro (PO) 400 mg q12Hr for 2 wks      Active Diagnoses:    Diagnosis Date Noted POA    PRINCIPAL PROBLEM:  Severe sepsis with acute organ dysfunction due to Gram negative bacteria [A41.50, R65.20] 01/27/2018 Yes    Bacteremia due to Escherichia coli [R78.81] 01/28/2018 Yes    Acute kidney injury [N17.9] 01/27/2018 Yes    Diarrhea of presumed infectious origin [A09] 01/27/2018 Yes    E. 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     Vitamin D deficiency disease      Scheduled Medications:   cefTRIAXone (ROCEPHIN) IVPB  2 g Intravenous Q24H    gabapentin  100 mg Oral QHS    heparin (porcine)  5,000 Units Subcutaneous Q8H    insulin aspart  3 Units Subcutaneous TIDWM    insulin detemir  3 Units Subcutaneous BID    metroNIDAZOLE  500 mg Oral Q8H    pantoprazole  40 mg Oral Daily       Barley Ollie Shields  [employer]   [facility]    Subjective:     Principal Problem:Severe sepsis with acute organ dysfunction due to Gram negative bacteria    Interval History: Patient continues to spike fevers intermittently, but temperature trending downward. Leukocytosis cleared.     HPI:  72 y.o. female presented to the ER with past medical history of DM-2 with nephropathy and neuropathy, arthritis, gout, hypertension.  She was in her usual state of good health until the acute onset of nausea and vomiting beginning 10 days prior to admission with rapidly improving course followed by diarrhea 1 week ago. She had decreased oral intake x 3 days ago and fever/chills x 2 days. Pertinent associated symptoms include lethargy.    Review of Systems   Constitutional: Positive for appetite change (improving), chills (at night), diaphoresis, fatigue and fever.   Respiratory: Positive for cough and shortness of breath (improved with Nasal Canula). Negative for choking, chest tightness and wheezing.    Cardiovascular: Negative for chest pain, palpitations and leg swelling.   Gastrointestinal: Negative for abdominal distention, abdominal pain, blood in stool, constipation, diarrhea, nausea and vomiting.   Endocrine: Negative for polyuria.   Genitourinary: Positive for flank pain. Negative for difficulty urinating, dysuria, frequency and hematuria.   Musculoskeletal: Positive for arthralgias. Negative for gait problem.   Neurological: Positive for weakness (when standing).     Objective:     Vital Signs (Most Recent):  Temp: 98.1 °F  (36.7 °C) (01/29/18 1213)  Pulse: (!) 126 (01/29/18 1213)  Resp: 16 (01/29/18 1213)  BP: 139/81 (01/29/18 1213)  SpO2: 95 % (01/29/18 1213) Vital Signs (24h Range):  Temp:  [98.1 °F (36.7 °C)-101.5 °F (38.6 °C)] 98.1 °F (36.7 °C)  Pulse:  [] 126  Resp:  [16-20] 16  SpO2:  [90 %-96 %] 95 %  BP: (116-141)/(58-81) 139/81     Weight: 78.5 kg (173 lb)  Body mass index is 27.92 kg/m².    Estimated Creatinine Clearance: 50.3 mL/min (based on SCr of 1.1 mg/dL).    Physical Exam   Constitutional: No distress. Nasal cannula in place.   Cardiovascular: Normal rate, regular rhythm, normal heart sounds and intact distal pulses.    No murmur heard.  Pulmonary/Chest: Accessory muscle usage (on deep inspiration) present. No respiratory distress. She has decreased breath sounds in the right lower field and the left lower field. She has no wheezes. She has no rales. She exhibits no tenderness.   Abdominal: Soft. Bowel sounds are normal. She exhibits no distension. There is no tenderness. There is CVA tenderness (Left flank).   Skin: Skin is warm and dry.   Psychiatric: She has a normal mood and affect. Her speech is normal.     Significant Labs:  Recent Labs      01/27/18   2127  01/28/18   0332  01/29/18   0324   WBC  14.00*  14.19*  12.38   HGB  7.8*  7.6*  7.6*   HCT  23.2*  23.0*  23.1*   PLT  201  203  227       Significant Imaging: CXR (01/28/18) reviewed and clear.

## 2018-01-29 NOTE — CONSULTS
Patient seen and chart reviewed. Please see consult note dated 1/29.    Bryan Ludwig M.D.   PGY-2  Pager [phone]

## 2018-01-29 NOTE — CONSULTS
[facility]  Infectious Disease  Consult Note    Patient Name: Lina Davis  MRN: [id number]  Admission Date: 1/26/2018  Hospital Length of Stay: 2 days  Attending Physician: Linda Barber MD  Primary Care Provider: Dora Freedman MD     Isolation Status: No active isolations    Patient information was obtained from patient, past medical records and ER records.      Consults  Assessment/Plan:     Bacteremia due to Escherichia coli    72 year old with DM who presented with UTI and associated malaise, weakness, and diahrrea.  Found to have pan sensitive e coli in Blood culture x 1 on 1/27.  I feel patient likely has pyelonephritis given continued fevers.      Recommendations  - Repeat blood cultures today to assess for clearance.   - Consider US kidney to rule out abscess given persistent fevers if continues to be febrile tonight.  - Can transition to PO abx (recommend Cipro) tomorrow  - Would treat x 1 weeks from negative culture if using Cipro, otherwise would treat for 2 weeks.              Thank you for your consult. I will follow-up with patient. Please contact us if you have any additional questions.    Bryan Ludwig MD  Infectious Disease  [facility]    Subjective:     Principal Problem: Severe sepsis with acute organ dysfunction due to Gram negative bacteria    HPI:  72 y.o. female  with DM-2 (A1c 5.1) with nephropathy and neuropathy, arthritis, gout, hypertension who initially presented for diarrhea associated with fatigue. Patient reports she was her usual state of health until an acute onset of nausea/vomiting beginning 10 days prior to admission followed by diarrhea x 1 week associated with generalized weakness, fatigue, poor appetite/PO intake which progressed to chills x 2 days prior to presenting to the ED.  She reports subjective fevers on and off for a week with a measured temp of 103.  On presentation to the ED she was found to have and JACK and UTI and  was started on IV ceftriaxone and Fluids.  Patients blood cultures came back positive for Ecoli.  Since admission patient reports significant improvement in her weakness and fatigue.  On further questioning she does report some left sided flank pain and cloudy urine x2 days , but denied change inurine frequency, odor, or dysuria.  She currently denies chills, abdominal pain, nausea/vomiting, HA, new rashes, or any new complaints.     Fever of 101.5 everyday evening.      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    diazePAM (VALIUM) 5 MG tablet Take 1 tablet (5 mg total) by mouth every 8 (eight) hours as needed (muscle spasms). May repeat if necessary    metformin (GLUCOPHAGE) 500 MG tablet TAKE 2 TABLETS TWICE DAILY WITH MEALS    omeprazole (PRILOSEC) 20 MG capsule One capsule twice daily    acetaminophen (TYLENOL) 325 MG tablet Take 325 mg by mouth every 6 (six) hours as needed for Pain.    alcohol swabs (BD ALCOHOL SWABS) PadM USE AS DIRECTED TO MONITOR BLOOD SUGARS TWICE DAILY    allopurinol (ZYLOPRIM) 300 MG tablet 1 Tablet Oral Every day    benzonatate (TESSALON) 100  MG capsule TAKE 1 CAPSULE THREE TIMES DAILY AS NEEDED FOR COUGH    blood glucose control, normal Soln True Metrix control solution E11.9 - pt tests twice daily    blood sugar diagnostic (TRUE METRIX GLUCOSE TEST STRIP) Strp Please give True metrix test strips and lancets to monitor blood sugars twice daily E11.21    blood-glucose meter (TRUE METRIX AIR GLUCOSE METER) kit Test twice daily, E11.21    blood-glucose meter (TRUE METRIX AIR GLUCOSE METER) Misc Please give a True metrix air glucose meter to use as directed to monitor blood sugars BID    lancets (TRUEPLUS LANCETS) 28 gauge Misc 1 lancet by Misc.(Non-Drug; Combo Route) route 2 (two) times daily.    losartan-hydrochlorothiazide 100-25 mg (HYZAAR) 100-25 mg per tablet Take 1 tablet by mouth once daily.    lovastatin (MEVACOR) 40 MG tablet TAKE 1 TABLET EVERY EVENING    PRODIGY CONTROL SOLUTION, LOW Soln     sitagliptin (JANUVIA) 100 MG Tab TAKE 1 TABLET ONE TIME DAILY     Antibiotics     Start     Stop Route Frequency Ordered    01/28/18 1000  cefTRIAXone (ROCEPHIN) 2 g in dextrose 5 % 50 mL IVPB      -- IV Every 24 hours (non-standard times) 01/27/18 2052    01/27/18 1400  metroNIDAZOLE tablet 500 mg      -- Oral Every 8 hours 01/27/18 0747        Antifungals     None        Antivirals     None           Immunization History   Administered Date(s) Administered    Influenza 10/10/2006, 10/14/2008, 09/21/2009, 12/08/2010, 10/25/2011    Influenza - High Dose 09/17/2012, 09/11/2013, 10/27/2014, 11/13/2015, 11/15/2016, 10/10/2017    Influenza A (H1N1) 2009 Monovalent - IM 11/20/2009    Pneumococcal Conjugate - 13 Valent 11/13/2015    Pneumococcal Polysaccharide - 23 Valent 05/20/2014    Tdap 01/15/2016    Zoster 02/04/2016       Family History     Problem Relation (Age of Onset)    Benign prostatic hyperplasia Brother    COPD Brother, Brother    Cancer Father    Cataracts Sister    Diabetes Mother, Sister, Sister    Drug abuse Brother    Gout  Brother    Heart attack Brother, Brother    Heart disease Mother, Father, Brother, Brother    Kidney disease Brother, Brother    Kidney failure Brother    Lupus Sister    No Known Problems Daughter, Son        Social History     Social History    Marital status:      Spouse name: N/A    Number of children: N/A    Years of education: N/A     Social History Main Topics    Smoking status: Never Smoker    Smokeless tobacco: Never Used    Alcohol use No    Drug use: No    Sexual activity: Yes     Partners: Male     Birth control/ protection: Surgical     Other Topics Concern    None     Social History Narrative    None     Review of Systems   Constitutional: Positive for chills and fever.   HENT: Negative for congestion, postnasal drip, rhinorrhea and sore throat.    Eyes: Negative for pain and visual disturbance.   Respiratory: Positive for shortness of breath. Negative for cough.    Cardiovascular: Negative for chest pain and palpitations.   Gastrointestinal: Positive for diarrhea (Improving ). Negative for abdominal pain, nausea and vomiting.   Genitourinary: Positive for flank pain. Negative for difficulty urinating, dysuria, hematuria and urgency.   Musculoskeletal: Positive for arthralgias. Negative for gait problem and neck stiffness.   Skin: Negative for color change and rash.   Neurological: Positive for weakness. Negative for dizziness, tremors, speech difficulty and headaches.   Hematological: Negative for adenopathy. Does not bruise/bleed easily.   Psychiatric/Behavioral: Negative for agitation, confusion, self-injury and sleep disturbance.     Objective:     Vital Signs (Most Recent):  Temp: 99.4 °F (37.4 °C) (01/29/18 0909)  Pulse: (!) 126 (01/29/18 0909)  Resp: 18 (01/29/18 0909)  BP: 131/63 (01/29/18 0909)  SpO2: (!) 94 % (01/29/18 0909) Vital Signs (24h Range):  Temp:  [98.2 °F (36.8 °C)-101.5 °F (38.6 °C)] 99.4 °F (37.4 °C)  Pulse:  [] 126  Resp:  [16-20] 18  SpO2:  [90 %-96 %] 94  %  BP: (116-141)/(58-78) 131/63     Weight: 78.5 kg (173 lb)  Body mass index is 27.92 kg/m².    Estimated Creatinine Clearance: 50.3 mL/min (based on SCr of 1.1 mg/dL).    Physical Exam   Constitutional: She is oriented to person, place, and time. She appears well-developed and well-nourished.   HENT:   Head: Normocephalic and atraumatic.   Right Ear: External ear normal.   Left Ear: External ear normal.   Mouth/Throat: Oropharynx is clear and moist.   Eyes: Conjunctivae and EOM are normal. Pupils are equal, round, and reactive to light.   Neck: Normal range of motion. Neck supple.   Cardiovascular: Regular rhythm, normal heart sounds and intact distal pulses.  Tachycardia present.    No murmur heard.  Pulmonary/Chest: Effort normal. She has rales (Bilateral rales in lower lobes ).   2 L NC    Abdominal: Soft. Bowel sounds are normal. She exhibits no distension. There is no tenderness.   Musculoskeletal: Normal range of motion. She exhibits tenderness (Mild left flank ).   Neurological: She is alert and oriented to person, place, and time.   Skin: Skin is warm and dry. No erythema.   Psychiatric: She has a normal mood and affect. Her behavior is normal. Judgment and thought content normal.   Nursing note and vitals reviewed.      Significant Labs: All pertinent labs within the past 24 hours have been reviewed.  WBC down trending, 12.38 today  Creatine improved from 1.6 to 1.1    Significant Imaging: I have reviewed all pertinent imaging results/findings within the past 24 hours.   CXR clear

## 2018-01-30 LAB
ALBUMIN SERPL BCP-MCNC: 2.3 G/DL
ANION GAP SERPL CALC-SCNC: 10 MMOL/L
BASOPHILS # BLD AUTO: 0.03 K/UL
BASOPHILS NFR BLD: 0.3 %
BUN SERPL-MCNC: 28 MG/DL
CALCIUM SERPL-MCNC: 9.2 MG/DL
CHLORIDE SERPL-SCNC: 113 MMOL/L
CO2 SERPL-SCNC: 14 MMOL/L
CREAT SERPL-MCNC: 1.1 MG/DL
DIFFERENTIAL METHOD: ABNORMAL
E COLI SXT1 STL QL IA: NEGATIVE
E COLI SXT2 STL QL IA: NEGATIVE
EOSINOPHIL # BLD AUTO: 0.1 K/UL
EOSINOPHIL NFR BLD: 0.7 %
ERYTHROCYTE [DISTWIDTH] IN BLOOD BY AUTOMATED COUNT: 15 %
EST. GFR  (AFRICAN AMERICAN): 58.8 ML/MIN/1.73 M^2
EST. GFR  (NON AFRICAN AMERICAN): 51 ML/MIN/1.73 M^2
GLUCOSE SERPL-MCNC: 158 MG/DL
HCT VFR BLD AUTO: 20.8 %
HGB BLD-MCNC: 6.9 G/DL
IMM GRANULOCYTES # BLD AUTO: 0.14 K/UL
IMM GRANULOCYTES NFR BLD AUTO: 1.4 %
LYMPHOCYTES # BLD AUTO: 2.1 K/UL
LYMPHOCYTES NFR BLD: 20.9 %
MAGNESIUM SERPL-MCNC: 1.4 MG/DL
MCH RBC QN AUTO: 28.9 PG
MCHC RBC AUTO-ENTMCNC: 33.2 G/DL
MCV RBC AUTO: 87 FL
MONOCYTES # BLD AUTO: 1.4 K/UL
MONOCYTES NFR BLD: 14 %
NEUTROPHILS # BLD AUTO: 6.4 K/UL
NEUTROPHILS NFR BLD: 62.7 %
NRBC BLD-RTO: 0 /100 WBC
PHOSPHATE SERPL-MCNC: 2.8 MG/DL
PLATELET # BLD AUTO: 241 K/UL
PMV BLD AUTO: 10.7 FL
POCT GLUCOSE: 158 MG/DL (ref 70–110)
POCT GLUCOSE: 177 MG/DL (ref 70–110)
POCT GLUCOSE: 186 MG/DL (ref 70–110)
POCT GLUCOSE: 233 MG/DL (ref 70–110)
POTASSIUM SERPL-SCNC: 4.7 MMOL/L
RBC # BLD AUTO: 2.39 M/UL
SODIUM SERPL-SCNC: 137 MMOL/L
WBC # BLD AUTO: 10.26 K/UL

## 2018-01-30 PROCEDURE — 87040 BLOOD CULTURE FOR BACTERIA: CPT

## 2018-01-30 PROCEDURE — 99232 SBSQ HOSP IP/OBS MODERATE 35: CPT | Mod: ,,, | Performed by: INTERNAL MEDICINE

## 2018-01-30 PROCEDURE — 80069 RENAL FUNCTION PANEL: CPT

## 2018-01-30 PROCEDURE — 63600175 PHARM REV CODE 636 W HCPCS: Performed by: PHYSICIAN ASSISTANT

## 2018-01-30 PROCEDURE — 63600175 PHARM REV CODE 636 W HCPCS: Performed by: INTERNAL MEDICINE

## 2018-01-30 PROCEDURE — 25000003 PHARM REV CODE 250: Performed by: INTERNAL MEDICINE

## 2018-01-30 PROCEDURE — 85025 COMPLETE CBC W/AUTO DIFF WBC: CPT

## 2018-01-30 PROCEDURE — 11000001 HC ACUTE MED/SURG PRIVATE ROOM

## 2018-01-30 PROCEDURE — 83735 ASSAY OF MAGNESIUM: CPT

## 2018-01-30 RX ORDER — CIPROFLOXACIN 500 MG/1
500 TABLET ORAL 2 TIMES DAILY
Qty: 24 TABLET | Refills: 0 | Status: SHIPPED | OUTPATIENT
Start: 2018-02-01 | End: 2018-02-13

## 2018-01-30 RX ORDER — GABAPENTIN 100 MG/1
100 CAPSULE ORAL NIGHTLY
Qty: 30 CAPSULE | Refills: 11 | Status: SHIPPED | OUTPATIENT
Start: 2018-01-30 | End: 2018-03-12

## 2018-01-30 RX ORDER — CIPROFLOXACIN 500 MG/1
500 TABLET ORAL 2 TIMES DAILY
Qty: 28 TABLET | Refills: 0 | Status: SHIPPED | OUTPATIENT
Start: 2018-01-30 | End: 2018-01-30

## 2018-01-30 RX ADMIN — INSULIN ASPART 4 UNITS: 100 INJECTION, SOLUTION INTRAVENOUS; SUBCUTANEOUS at 05:01

## 2018-01-30 RX ADMIN — GABAPENTIN 100 MG: 100 CAPSULE ORAL at 09:01

## 2018-01-30 RX ADMIN — HEPARIN SODIUM 5000 UNITS: 5000 INJECTION, SOLUTION INTRAVENOUS; SUBCUTANEOUS at 09:01

## 2018-01-30 RX ADMIN — INSULIN ASPART 4 UNITS: 100 INJECTION, SOLUTION INTRAVENOUS; SUBCUTANEOUS at 12:01

## 2018-01-30 RX ADMIN — INSULIN ASPART 4 UNITS: 100 INJECTION, SOLUTION INTRAVENOUS; SUBCUTANEOUS at 09:01

## 2018-01-30 RX ADMIN — HEPARIN SODIUM 5000 UNITS: 5000 INJECTION, SOLUTION INTRAVENOUS; SUBCUTANEOUS at 05:01

## 2018-01-30 RX ADMIN — OXYCODONE HYDROCHLORIDE 5 MG: 5 TABLET ORAL at 01:01

## 2018-01-30 RX ADMIN — ONDANSETRON 8 MG: 8 TABLET, ORALLY DISINTEGRATING ORAL at 01:01

## 2018-01-30 RX ADMIN — CEFTRIAXONE 2 G: 2 INJECTION, SOLUTION INTRAVENOUS at 09:01

## 2018-01-30 RX ADMIN — HEPARIN SODIUM 5000 UNITS: 5000 INJECTION, SOLUTION INTRAVENOUS; SUBCUTANEOUS at 01:01

## 2018-01-30 RX ADMIN — ACETAMINOPHEN AND CODEINE PHOSPHATE 1 TABLET: 300; 30 TABLET ORAL at 09:01

## 2018-01-30 RX ADMIN — PANTOPRAZOLE SODIUM 40 MG: 40 TABLET, DELAYED RELEASE ORAL at 09:01

## 2018-01-30 NOTE — ASSESSMENT & PLAN NOTE
72 year old with DM who presented with UTI and associated malaise, weakness, and diahrrea.  Found to have pan sensitive e coli in Blood culture x 1 on 1/27.  I feel patient likely has pyelonephritis given continued fevers.  Now improved.      Recommendations  - Transition to PO cipro 500 BID x 7 days   - Consider US kidney to rule out abscess given persistent fevers if continues to be febrile tonight.

## 2018-01-30 NOTE — SUBJECTIVE & OBJECTIVE
Interval History: NAEON. Afebrile overnight.  Feeling better this AM.  Tolerating diet    Review of Systems   Constitutional: Negative for chills and fever.   Cardiovascular: Negative for chest pain and palpitations.   Gastrointestinal: Positive for abdominal pain. Negative for constipation, diarrhea, nausea and vomiting.   Genitourinary: Positive for flank pain. Negative for difficulty urinating, dysuria and hematuria.     Objective:     Vital Signs (Most Recent):  Temp: 98 °F (36.7 °C) (01/30/18 0728)  Pulse: (!) 135 (01/30/18 0809)  Resp: 16 (01/30/18 0728)  BP: 129/63 (01/30/18 0728)  SpO2: 95 % (01/30/18 0728) Vital Signs (24h Range):  Temp:  [98 °F (36.7 °C)-99 °F (37.2 °C)] 98 °F (36.7 °C)  Pulse:  [104-135] 135  Resp:  [16-18] 16  SpO2:  [93 %-95 %] 95 %  BP: (110-139)/(57-81) 129/63     Weight: 82.1 kg (181 lb)  Body mass index is 29.21 kg/m².    Estimated Creatinine Clearance: 51.4 mL/min (based on SCr of 1.1 mg/dL).    Physical Exam   Constitutional: She is oriented to person, place, and time. She appears well-developed and well-nourished.   HENT:   Head: Normocephalic and atraumatic.   Right Ear: External ear normal.   Left Ear: External ear normal.   Mouth/Throat: Oropharynx is clear and moist.   Eyes: Conjunctivae and EOM are normal. Pupils are equal, round, and reactive to light.   Neck: Normal range of motion. Neck supple.   Cardiovascular: Regular rhythm, normal heart sounds and intact distal pulses.  Tachycardia present.    No murmur heard.  Pulmonary/Chest: Effort normal. She has rales (Bilateral rales in lower lobes, improved ).   2 L NC    Abdominal: Soft. Bowel sounds are normal. She exhibits no distension. There is no tenderness.   Musculoskeletal: Normal range of motion. She exhibits tenderness (Mild left flank ).   Neurological: She is alert and oriented to person, place, and time.   Skin: Skin is warm and dry. No erythema.   Psychiatric: She has a normal mood and affect. Her behavior is  normal. Judgment and thought content normal.   Nursing note and vitals reviewed.      Significant Labs:   POCT Glucose:   Recent Labs  Lab 01/29/18  1748 01/29/18  2204 01/30/18  0730   POCTGLUCOSE 145* 240* 158*     WBC improving     All pertinent labs within the past 24 hours have been reviewed.    Significant Imaging: I have reviewed all pertinent imaging results/findings within the past 24 hours.

## 2018-01-30 NOTE — PROGRESS NOTES
Patient has discharge orders.  Ambulated down hallway on room air- oxygen level between 96-97% during entirety of walk.  Patient stated that her daughter and  will not be home today and they will not want her to be home by herself at least on the first night and is requesting to go home tomorrow.   Spoke with Dr. Wynn in the [facility] and explained to her what pt  Is requesting.  She will talk to robert and let me know.      WCTM

## 2018-01-30 NOTE — PLAN OF CARE
CM did speak with Dr Wynn regarding pt being medically ready for d/c but d/c home may not be safe to return as she will be alone.   CM did provide staff with opinion regarding safe discharge but physician to use discretion to d/c.  Please be aware CM does not have authority to override a d/c as indicated by nursing note.    Peggy Wilson, RN  Case Management  [id number]

## 2018-01-30 NOTE — PROGRESS NOTES
[facility]  Infectious Disease  Progress Note    Patient Name: Lina Davis  MRN: [id number]  Admission Date: 1/26/2018  Length of Stay: 3 days  Attending Physician: Latricia Wynn MD  Primary Care Provider: Dora Freedman MD    Isolation Status: No active isolations  Assessment/Plan:      Bacteremia due to Escherichia coli    72 year old with DM who presented with UTI and associated malaise, weakness, and diahrrea.  Found to have pan sensitive e coli in Blood culture x 1 on 1/27.  I feel patient likely has pyelonephritis given continued fevers.  Now improved.      Recommendations  - Transition to PO cipro 500 BID x 7 days   - Consider US kidney to rule out abscess given persistent fevers if continues to be febrile tonight.               Thank you for your consult. I will sign off. Please contact us if you have any additional questions.    Bryan Ludwig MD  Infectious Disease  [facility]    Subjective:     Principal Problem:Severe sepsis with acute organ dysfunction due to Gram negative bacteria    HPI:  72 y.o. female  with DM-2 (A1c 5.1) with nephropathy and neuropathy, arthritis, gout, hypertension who initially presented for diarrhea associated with fatigue. Patient reports she was her usual state of health until an acute onset of nausea/vomiting beginning 10 days prior to admission followed by diarrhea x 1 week associated with generalized weakness, fatigue, poor appetite/PO intake which progressed to chills x 2 days prior to presenting to the ED.  She reports subjective fevers on and off for a week with a measured temp of 103.  On presentation to the ED she was found to have and JACK and UTI and was started on IV ceftriaxone and Fluids.  Patients blood cultures came back positive for Ecoli.  Since admission patient reports significant improvement in her weakness and fatigue.  On further questioning she does report some left sided flank pain and cloudy urine x2 days ,  but denied change inurine frequency, odor, or dysuria.  She currently denies chills, abdominal pain, nausea/vomiting, HA, new rashes, or any new complaints.     Fever of 101.5 everyday evening.    Interval History: NAEON. Afebrile overnight.  Feeling better this AM.  Tolerating diet    Review of Systems   Constitutional: Negative for chills and fever.   Cardiovascular: Negative for chest pain and palpitations.   Gastrointestinal: Positive for abdominal pain. Negative for constipation, diarrhea, nausea and vomiting.   Genitourinary: Positive for flank pain. Negative for difficulty urinating, dysuria and hematuria.     Objective:     Vital Signs (Most Recent):  Temp: 98 °F (36.7 °C) (01/30/18 0728)  Pulse: (!) 135 (01/30/18 0809)  Resp: 16 (01/30/18 0728)  BP: 129/63 (01/30/18 0728)  SpO2: 95 % (01/30/18 0728) Vital Signs (24h Range):  Temp:  [98 °F (36.7 °C)-99 °F (37.2 °C)] 98 °F (36.7 °C)  Pulse:  [104-135] 135  Resp:  [16-18] 16  SpO2:  [93 %-95 %] 95 %  BP: (110-139)/(57-81) 129/63     Weight: 82.1 kg (181 lb)  Body mass index is 29.21 kg/m².    Estimated Creatinine Clearance: 51.4 mL/min (based on SCr of 1.1 mg/dL).    Physical Exam   Constitutional: She is oriented to person, place, and time. She appears well-developed and well-nourished.   HENT:   Head: Normocephalic and atraumatic.   Right Ear: External ear normal.   Left Ear: External ear normal.   Mouth/Throat: Oropharynx is clear and moist.   Eyes: Conjunctivae and EOM are normal. Pupils are equal, round, and reactive to light.   Neck: Normal range of motion. Neck supple.   Cardiovascular: Regular rhythm, normal heart sounds and intact distal pulses.  Tachycardia present.    No murmur heard.  Pulmonary/Chest: Effort normal. She has rales (Bilateral rales in lower lobes, improved ).   2 L NC    Abdominal: Soft. Bowel sounds are normal. She exhibits no distension. There is no tenderness.   Musculoskeletal: Normal range of motion. She exhibits tenderness  (Mild left flank ).   Neurological: She is alert and oriented to person, place, and time.   Skin: Skin is warm and dry. No erythema.   Psychiatric: She has a normal mood and affect. Her behavior is normal. Judgment and thought content normal.   Nursing note and vitals reviewed.      Significant Labs:   POCT Glucose:   Recent Labs  Lab 01/29/18  1748 01/29/18  2204 01/30/18  0730   POCTGLUCOSE 145* 240* 158*     WBC improving     All pertinent labs within the past 24 hours have been reviewed.    Significant Imaging: I have reviewed all pertinent imaging results/findings within the past 24 hours.

## 2018-01-30 NOTE — PROGRESS NOTES
Dr. Wynn called nurse and stated that she spoke with Wanda and patient is able to stay until tomorrow- because she is 72 and her family will not be able to be home with her until tomorrow

## 2018-01-31 VITALS
DIASTOLIC BLOOD PRESSURE: 82 MMHG | SYSTOLIC BLOOD PRESSURE: 129 MMHG | RESPIRATION RATE: 16 BRPM | OXYGEN SATURATION: 99 % | HEART RATE: 97 BPM | WEIGHT: 181 LBS | HEIGHT: 66 IN | TEMPERATURE: 98 F | BODY MASS INDEX: 29.09 KG/M2

## 2018-01-31 LAB
ALBUMIN SERPL BCP-MCNC: 2.5 G/DL
ANION GAP SERPL CALC-SCNC: 11 MMOL/L
BASOPHILS # BLD AUTO: 0.05 K/UL
BASOPHILS NFR BLD: 0.4 %
BUN SERPL-MCNC: 29 MG/DL
CALCIUM SERPL-MCNC: 9.4 MG/DL
CHLORIDE SERPL-SCNC: 115 MMOL/L
CO2 SERPL-SCNC: 16 MMOL/L
CREAT SERPL-MCNC: 1.2 MG/DL
DIFFERENTIAL METHOD: ABNORMAL
EOSINOPHIL # BLD AUTO: 0.1 K/UL
EOSINOPHIL NFR BLD: 1 %
ERYTHROCYTE [DISTWIDTH] IN BLOOD BY AUTOMATED COUNT: 15.2 %
EST. GFR  (AFRICAN AMERICAN): 52.9 ML/MIN/1.73 M^2
EST. GFR  (NON AFRICAN AMERICAN): 45.9 ML/MIN/1.73 M^2
GLUCOSE SERPL-MCNC: 155 MG/DL
HCT VFR BLD AUTO: 21.2 %
HGB BLD-MCNC: 7 G/DL
IMM GRANULOCYTES # BLD AUTO: 0.47 K/UL
IMM GRANULOCYTES NFR BLD AUTO: 3.7 %
LYMPHOCYTES # BLD AUTO: 3.1 K/UL
LYMPHOCYTES NFR BLD: 24.8 %
MAGNESIUM SERPL-MCNC: 1.4 MG/DL
MCH RBC QN AUTO: 28.9 PG
MCHC RBC AUTO-ENTMCNC: 33 G/DL
MCV RBC AUTO: 88 FL
MONOCYTES # BLD AUTO: 1.4 K/UL
MONOCYTES NFR BLD: 10.8 %
NEUTROPHILS # BLD AUTO: 7.4 K/UL
NEUTROPHILS NFR BLD: 59.3 %
NRBC BLD-RTO: 0 /100 WBC
PHOSPHATE SERPL-MCNC: 3.2 MG/DL
PLATELET # BLD AUTO: 279 K/UL
PMV BLD AUTO: 11.3 FL
POCT GLUCOSE: 153 MG/DL (ref 70–110)
POTASSIUM SERPL-SCNC: 4.8 MMOL/L
RBC # BLD AUTO: 2.42 M/UL
SODIUM SERPL-SCNC: 142 MMOL/L
WBC # BLD AUTO: 12.54 K/UL

## 2018-01-31 PROCEDURE — 85025 COMPLETE CBC W/AUTO DIFF WBC: CPT

## 2018-01-31 PROCEDURE — 80069 RENAL FUNCTION PANEL: CPT

## 2018-01-31 PROCEDURE — 99239 HOSP IP/OBS DSCHRG MGMT >30: CPT | Mod: ,,, | Performed by: INTERNAL MEDICINE

## 2018-01-31 PROCEDURE — 83735 ASSAY OF MAGNESIUM: CPT

## 2018-01-31 PROCEDURE — 36415 COLL VENOUS BLD VENIPUNCTURE: CPT

## 2018-01-31 PROCEDURE — 63600175 PHARM REV CODE 636 W HCPCS: Performed by: INTERNAL MEDICINE

## 2018-01-31 PROCEDURE — 25000003 PHARM REV CODE 250: Performed by: INTERNAL MEDICINE

## 2018-01-31 RX ADMIN — HEPARIN SODIUM 5000 UNITS: 5000 INJECTION, SOLUTION INTRAVENOUS; SUBCUTANEOUS at 05:01

## 2018-01-31 RX ADMIN — ACETAMINOPHEN AND CODEINE PHOSPHATE 1 TABLET: 300; 30 TABLET ORAL at 09:01

## 2018-01-31 NOTE — NURSING
Patient assessment completed this morning. Patient being discharged home. Vitals signs stable, blood sugar was 153. Student nurse Cori, assisting with patient care. Patient is eager to leave and wants to take her home medications, at home. She has already contacted her daughter and she is here to get her. Daughter has brought up a wheelchair and is dressing patient, to leave. Monitor retrieved and patient does not have an Iv. States it was removed yesterday. She is alert and oriented.No complaints. She did get a pain pill with her breakfast and rated her pain as a 9/10 at that time.

## 2018-01-31 NOTE — MEDICAL/APP STUDENT
[facility] Hospital Medicine  Progress Note    Patient Name: Lina Davis  MRN: [id number]  Patient Class: IP- Inpatient   Admission Date: 1/26/2018  Length of Stay: 4 days  Attending Physician: Latricia Wynn MD  Primary Care Provider: Dora Freedman MD    [facility] Medicine Team: [facility] RAD Patricio    Subjective:     Follow-up For:  Severe sepsis with acute organ dysfunction due to Gram negative bacteria     HPI/Interval history: no new complaints. No Overnight problems, slept well and improving     Review of Systems: Gen: no fever, no chills, Heart: no chest pain, palpitations; Resp: no SOB, no cough    Stroke Scales      Review of Systems     Review of Systems: Gen: no fever, no chills, Heart: no chest pain, palpitations; Resp: no SOB, no cough.    Objective:     Vital Signs (Most Recent):  Temp: 98.4 °F (36.9 °C) (01/31/18 0723)  Pulse: 97 (01/31/18 0723)  Resp: 16 (01/31/18 0723)  BP: 129/82 (01/31/18 0723)  SpO2: 99 % (01/31/18 0723) Vital Signs (24h Range):  Temp:  [97.9 °F (36.6 °C)-98.8 °F (37.1 °C)] 98.4 °F (36.9 °C)  Pulse:  [] 97  Resp:  [16-18] 16  SpO2:  [92 %-99 %] 99 %  BP: (117-163)/(60-82) 129/82     Weight: 82.1 kg (181 lb)  Body mass index is 29.21 kg/m².  No intake or output data in the 24 hours ending 01/31/18 0926   Physical Exam    Physical Exam:  General appearance: NAD, conversant  Neck: FROM, supple   Lungs: Clear to auscultation, no accessory muscle use  CV: RRR, no heave  Abdomen: Soft, non-tender; no masses or HSM  Extremities: No peripheral edema or digital cyanosis  Skin: no rash, lesions or ulcers  Psych: Alert and oriented to person, place and time    Results for LINA DAVIS (MRN [id number]) as of 1/31/2018 10:01   Ref. Range 1/27/2018 21:27 1/28/2018 03:32 1/29/2018 03:24 1/30/2018 03:54 1/31/2018 03:45   WBC Latest Ref Range: 3.90 - 12.70 K/uL 14.00 (H) 14.19 (H) 12.38 10.26 12.54   RBC Latest Ref Range: 4.00 - 5.40 M/uL 2.68 (L) 2.65 (L) 2.66  (L) 2.39 (L) 2.42 (L)   Hemoglobin Latest Ref Range: 12.0 - 16.0 g/dL 7.8 (L) 7.6 (L) 7.6 (L) 6.9 (L) 7.0 (L)   Hematocrit Latest Ref Range: 37.0 - 48.5 % 23.2 (L) 23.0 (L) 23.1 (L) 20.8 (L) 21.2 (L)   MCV Latest Ref Range: 82 - 98 fL 87 87 87 87 88   MPV Latest Ref Range: 9.2 - 12.9 fL 9.7 10.3 10.7 10.7 11.3   Gran% Latest Ref Range: 38.0 - 73.0 % 87.7 (H) 82.7 (H) 78.1 (H) 62.7 59.3   Gran # (ANC) Latest Ref Range: 1.8 - 7.7 K/uL 12.3 (H) 11.7 (H) 9.7 (H) 6.4 7.4   Immature Granulocytes Latest Ref Range: 0.0 - 0.5 % 1.1 (H) 0.6 (H) 0.8 (H) 1.4 (H) 3.7 (H)   Immature Grans (Abs) Latest Ref Range: 0.00 - 0.04 K/uL 0.16 (H) 0.09 (H) 0.10 (H) 0.14 (H) 0.47 (H)   Lymph% Latest Ref Range: 18.0 - 48.0 % 4.5 (L) 7.9 (L) 9.1 (L) 20.9 24.8   Lymph # Latest Ref Range: 1.0 - 4.8 K/uL 0.6 (L) 1.1 1.1 2.1 3.1       Results for NATHANAEL MARINO (MRN [id number]) as of 1/31/2018 10:01   Ref. Range 1/27/2018 21:28 1/28/2018 03:32 1/29/2018 03:24 1/30/2018 03:54 1/31/2018 03:45   Sodium Latest Ref Range: 136 - 145 mmol/L 140 140 138 137 142   Potassium Latest Ref Range: 3.5 - 5.1 mmol/L 5.3 (H) 5.5 (H) 5.1 4.7 4.8   Chloride Latest Ref Range: 95 - 110 mmol/L 117 (H) 117 (H) 114 (H) 113 (H) 115 (H)   CO2 Latest Ref Range: 23 - 29 mmol/L 11 (L) 14 (L) 13 (L) 14 (L) 16 (L)   Anion Gap Latest Ref Range: 8 - 16 mmol/L 12 9 11 10 11   BUN, Bld Latest Ref Range: 8 - 23 mg/dL 35 (H) 33 (H) 22 28 (H) 29 (H)   Creatinine Latest Ref Range: 0.5 - 1.4 mg/dL 1.6 (H) 1.5 (H) 1.1 1.1 1.2   eGFR if non African American Latest Ref Range: >60 mL/min/1.73 m^2 32.4 (A) 35.0 (A) 51.0 (A) 51.0 (A) 45.9 (A)   eGFR if African American Latest Ref Range: >60 mL/min/1.73 m^2 37.4 (A) 40.4 (A) 58.8 (A) 58.8 (A) 52.9 (A)   Glucose Latest Ref Range: 70 - 110 mg/dL 156 (H) 152 (H) 140 (H) 158 (H) 155 (H)   Calcium Latest Ref Range: 8.7 - 10.5 mg/dL 9.3 9.2 9.4 9.2 9.4   Phosphorus Latest Ref Range: 2.7 - 4.5 mg/dL  3.6 2.6 (L) 2.8 3.2   Magnesium Latest Ref Range: 1.6  - 2.6 mg/dL 1.6 2.6 1.6 1.4 (L) 1.4 (L)   Albumin Latest Ref Range: 3.5 - 5.2 g/dL 2.7 (L) 2.6 (L) 2.6 (L) 2.3 (L) 2.5 (L)       Inpatient Medication:  Ceftriaxone: discontinue upon discharge    Assessment/Plan:      Active Diagnoses:    Diagnosis Date Noted POA    PRINCIPAL PROBLEM:  Severe sepsis with acute organ dysfunction due to Gram negative bacteria [A41.50, R65.20] 01/27/2018 Yes    Bacteremia due to Escherichia coli [R78.81] 01/28/2018 Yes    Acute kidney injury [N17.9] 01/27/2018 Yes    Diarrhea of presumed infectious origin [A09] 01/27/2018 Yes    Pyelonephritis due to Escherichia coli [N12, B96.20] 01/27/2018 Yes    Type 2 diabetes mellitus with diabetic nephropathy [E11.21] 04/12/2016 Yes    Polyneuropathy [G62.9] 04/29/2014 Yes    HTN (hypertension), benign [I10] 12/07/2012 Yes      Problems Resolved During this Admission:    Diagnosis Date Noted Date Resolved POA     VTE Risk Mitigation         Ordered     heparin (porcine) injection 5,000 Units  Every 8 hours     Route:  Subcutaneous        01/27/18 0747     Medium Risk of VTE  Once      01/27/18 0747           ASSESSMENT/PLAN:     Ms Davis is a 72 year old female with improving sepsis due to E. Coli.     `E. Coli bacteremia, Severe sepsis   `Treated with ceftriaxone, will switch to Cipro upon discharge. Tachycardia  `resolved. Hypoxia resolved.     *Acute kidney injury  Creatinine improving. Discontinue IV Ns. Encourage fluids po.  Held ARB.       Diarrhea of presumed infectious origin, Sepsis (severe)  Diarrhea resolved.       UTI (urinary tract infection), Pyelonephritis, Severe sepsis  Treating with ceftriaxone initially. Cultures revealed E. Coli.  Switch abx to Cipro upon discharge       Type 2 diabetes mellitus with diabetic nephropathy, Polyneuropathy  Non-insulin diabetes medications are held and the patient's hyperglycemia is managed with a SQ basal, prandial, correction dose insulin regimen.  Increasing doses as diet advanced.  Resume home regimen at discharge.       HTN (hypertension), benign  Holding ARB and HCTZ due to JACK.         Weakness / Fatigue  Pt. Believes she is well enough to return home provided there is a home health visitation by a nurse.        Loss of appetite, improving     Sinus tachycardia resolved    Goals for discharge: Continue Cipro po. Monitor with home health visits.    Luís Patricio  Department of Hospital Medicine   [facility]

## 2018-01-31 NOTE — PLAN OF CARE
[facility]    HOME HEALTH ORDERS  FACE TO FACE ENCOUNTER    Patient Name: Lina Davis  YOB: 1945    PCP: Dora Freedman MD   PCP Address: [address] [place] [address] / [place] [address]  PCP Phone Number: [phone]  PCP Fax: [phone]    Encounter Date: 01/31/2018    Admit to Home Health    Diagnoses:  Active Hospital Problems    Diagnosis  POA    *Severe sepsis with acute organ dysfunction due to Gram negative bacteria [A41.50, R65.20]  Yes    Bacteremia due to Escherichia coli [R78.81]  Yes    Acute kidney injury [N17.9]  Yes    Diarrhea of presumed infectious origin [A09]  Yes    Pyelonephritis due to Escherichia coli [N12, B96.20]  Yes    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       Future Appointments  Date Time Provider Department Center   2/2/2018 2:00 PM Linda Batista PT [facility] OP [facility]   2/7/2018 11:00 AM Linda Batista PT [facility]   2/9/2018 9:00 AM Linda Batista PT [facility]   2/16/2018 10:30 AM Dora Freedman MD [facility] [place]   2/22/2018 11:15 AM MUNIRA Grier MD [facility] [place]           I have seen and examined this patient face to face today. My clinical findings that support the need for the home health skilled services and home bound status are the following:  Weakness/numbness causing balance and gait disturbance due to Infection and Weakness/Debility making it taxing to leave home.  Requiring assistive device to leave home due to unsteady gait caused by  Infection and Weakness/Debility.    Allergies:  Review of patient's allergies indicates:   Allergen Reactions    No known drug allergies        Diet: diabetic diet: 2000 calorie    Activities: activity as tolerated    Nursing:   SN to complete comprehensive  assessment including routine vital signs. Instruct on disease process and s/s of complications to report to MD. Review/verify medication list sent home with the patient at time of discharge  and instruct patient/caregiver as needed. Frequency may be adjusted depending on start of care date.    Safety evaluation with patient's current functional status at home. Order PT/OT if necessary    Notify MD if SBP > 160 or < 90; DBP > 90 or < 50; HR > 120 or < 50; Temp > 101      CONSULTS:     to evaluate for community resources/long-range planning.    MISCELLANEOUS CARE:  Diabetic Care:   SN to perform and educate Diabetic management with blood glucose monitoring:, Fingerstick blood sugar a.m. and p.m. and Report CBG < 60 or > 350 to physician.    WOUND CARE ORDERS  n/a      Medications: Review discharge medications with patient and family and provide education.      Current Discharge Medication List      START taking these medications    Details   ciprofloxacin HCl (CIPRO) 500 MG tablet Take 1 tablet (500 mg total) by mouth 2 (two) times daily. FINISH ENTIRE BOTTLE  Qty: 24 tablet, Refills: 0      gabapentin (NEURONTIN) 100 MG capsule Take 1 capsule (100 mg total) by mouth every evening.  Qty: 30 capsule, Refills: 11         CONTINUE these medications which have NOT CHANGED    Details   acetaminophen-codeine 300-30mg (TYLENOL #3) 300-30 mg Tab Take 1 tablet by mouth every 8 (eight) hours as needed.  Qty: 60 tablet, Refills: 0    Comments: No more than 3 Grams acetaminophen in 24 hour perios      diazePAM (VALIUM) 5 MG tablet Take 1 tablet (5 mg total) by mouth every 8 (eight) hours as needed (muscle spasms). May repeat if necessary  Qty: 40 tablet, Refills: 0      metformin (GLUCOPHAGE) 500 MG tablet TAKE 2 TABLETS TWICE DAILY WITH MEALS  Qty: 360 tablet, Refills: 3    Associated Diagnoses: Type 2 diabetes mellitus with diabetic nephropathy      omeprazole (PRILOSEC) 20 MG capsule One capsule twice daily  Qty:  180 capsule, Refills: 3      acetaminophen (TYLENOL) 325 MG tablet Take 325 mg by mouth every 6 (six) hours as needed for Pain.      alcohol swabs (BD ALCOHOL SWABS) PadM USE AS DIRECTED TO MONITOR BLOOD SUGARS TWICE DAILY  Qty: 200 each, Refills: 3    Associated Diagnoses: 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      allopurinol (ZYLOPRIM) 300 MG tablet 1 Tablet Oral Every day  Qty: 90 tablet, Refills: 3      benzonatate (TESSALON) 100 MG capsule TAKE 1 CAPSULE THREE TIMES DAILY AS NEEDED FOR COUGH  Qty: 30 capsule, Refills: 0    Associated Diagnoses: Sinusitis, unspecified chronicity, unspecified location; Acute bronchitis, unspecified organism      blood glucose control, normal Soln True Metrix control solution E11.9 - pt tests twice daily  Qty: 1 each, Refills: 1      blood sugar diagnostic (TRUE METRIX GLUCOSE TEST STRIP) Strp Please give True metrix test strips and lancets to monitor blood sugars twice daily E11.21  Qty: 200 each, Refills: 2      lancets (TRUEPLUS LANCETS) 28 gauge Misc 1 lancet by Misc.(Non-Drug; Combo Route) route 2 (two) times daily.  Qty: 200 each, Refills: 2      losartan-hydrochlorothiazide 100-25 mg (HYZAAR) 100-25 mg per tablet Take 1 tablet by mouth once daily.  Qty: 90 tablet, Refills: 3      lovastatin (MEVACOR) 40 MG tablet TAKE 1 TABLET EVERY EVENING  Qty: 90 tablet, Refills: 3    Associated Diagnoses: Hyperlipidemia      sitagliptin (JANUVIA) 100 MG Tab TAKE 1 TABLET ONE TIME DAILY  Qty: 90 tablet, Refills: 3    Associated Diagnoses: Type 2 diabetes mellitus with diabetic nephropathy         STOP taking these medications       blood-glucose meter (TRUE METRIX AIR GLUCOSE METER) kit Comments:   Reason for Stopping:         blood-glucose meter (TRUE METRIX AIR GLUCOSE METER) Misc Comments:   Reason for Stopping:         PRODIGY CONTROL SOLUTION, LOW Soln Comments:   Reason for Stopping:               I certify that this patient is confined to her home and  needs intermittent skilled nursing care.

## 2018-01-31 NOTE — PROGRESS NOTES
Progress Note  Hospital Medicine      Admit Date: 1/26/2018    SUBJECTIVE:     Follow-up For:  Severe sepsis with acute organ dysfunction due to Gram negative bacteria    HPI/Interval history: no new complaints.     Review of Systems: Gen: no fever, no chills, Heart: no chest pain, palpitations; Resp: no SOB, no cough    OBJECTIVE:     Vital Signs Range (Last 24H):  Temp:  [97.9 °F (36.6 °C)-98.8 °F (37.1 °C)]   Pulse:  []   Resp:  [16-18]   BP: (110-163)/(57-75)   SpO2:  [92 %-98 %]     Physical Exam:  General appearance: NAD, conversant  Neck: FROM, supple   Lungs: Clear to auscultation, no accessory muscle use  CV: RRR, no heave  Abdomen: Soft, non-tender; no masses or HSM  Extremities: No peripheral edema or digital cyanosis  Skin: no rash, lesions or ulcers  Psych: Alert and oriented to person, place and time      Recent Labs  Lab 01/28/18 0332 01/29/18 0324 01/30/18  0354    138 137   K 5.5* 5.1 4.7   * 114* 113*   CO2 14* 13* 14*   BUN 33* 22 28*   CREATININE 1.5* 1.1 1.1   * 140* 158*   CALCIUM 9.2 9.4 9.2   MG 2.6 1.6 1.4*   PHOS 3.6 2.6* 2.8       Recent Labs  Lab 01/27/18  0047 01/27/18 2128 01/28/18 0332 01/29/18 0324 01/30/18  0354   ALKPHOS 79 73  --   --   --    ALT 13 12  --   --   --    AST 19 21  --   --   --    ALBUMIN 3.1* 2.7* 2.6* 2.6* 2.3*   PROT 7.4 6.7  --   --   --    BILITOT 0.6 0.7  --   --   --          Recent Labs  Lab 01/28/18 0332 01/29/18 0324 01/30/18  0354   WBC 14.19* 12.38 10.26   HGB 7.6* 7.6* 6.9*   HCT 23.0* 23.1* 20.8*    227 241   GRAN 82.7*  11.7* 78.1*  9.7* 62.7  6.4   LYMPH 7.9*  1.1 9.1*  1.1 20.9  2.1   MONO 8.7  1.2* 11.6  1.4* 14.0  1.4*         Recent Labs  Lab 01/29/18  1748 01/29/18  2204 01/30/18  0730 01/30/18  1208 01/30/18  1706 01/30/18  2134   POCTGLUCOSE 145* 240* 158* 186* 177* 233*       ASSESSMENT/PLAN:      *Acute kidney injury  Trial of IV NS. Creatinine improving, continuing hydration.  Held  ARB.  Creatinine at baseline; IV fluids discontinued.       Diarrhea of presumed infectious origin, Sepsis (severe)  WBC elevated, fever, tachycardia.  Treatied with ceftriaxone and Flagyl. Stool studies pending.  Diarrhea resolved. Discontinued Flagyl 1/29/2018       UTI (urinary tract infection), Pyelonephritis, Severe sepsis  Treating with ceftriaxone initially. Cultures revealed E. Coli.  Plan to change antibiotic to Cipro per ID recommendations at discharge.       Type 2 diabetes mellitus with diabetic nephropathy, Polyneuropathy  Non-insulin diabetes medications are held and the patient's hyperglycemia is managed with a SQ basal, prandial, correction dose insulin regimen.  Increasing doses as diet advanced. Resume home regimen at discharge.       HTN (hypertension), benign  Holding ARB and HCTZ due to JACK.        E. Coli bacteremia, Severe sepsis  Continuing current management with ceftriaxone; cultures reveal pan-sensitivity. Plan for Cipro at discharge. Hypoxia resolved. Still with sinus tachycardia     Sinus tachycardia likely related to sepsis. Follow up with PCP for to ensure resolution.

## 2018-01-31 NOTE — PLAN OF CARE
Pt will d/c home on today.  States her bintaon will  her rx today for her, daughter will take her home when ready for d/c.  Pt has declined home health at this time, states she does not need it and will be functioning independently,  will help as needed.      Future Appointments  Date Time Provider Department Center   2/2/2018 2:00 PM Linda Batista, PT [facility]   2/7/2018 11:00 AM Linda Batista PT [facility]   2/9/2018 9:00 AM Linda Batista PT [facility]   2/16/2018 10:30 AM Dora Freedman MD [facility] JEFF [facility] [place]   2/22/2018 11:15 AM MUNIRA Grier MD [facility] SPORTS [place]       Peggy Wilson, RN  Case Management  [id number]

## 2018-01-31 NOTE — MEDICAL/APP STUDENT
[facility] Hospital Medicine  Progress Note    Patient Name: Lina Davis  MRN: [id number]  Patient Class: IP- Inpatient   Admission Date: 1/26/2018  Length of Stay: 4 days  Attending Physician: Latricia Wynn MD  Primary Care Provider: Dora Freedman MD    [facility] Medicine Team: [facility] HOSP MED RAD Patricio    Subjective:     Principal Problem:Severe sepsis with acute organ dysfunction due to Gram negative bacteria    HPI: ***    Hospital Course: ***    Interval History: ***    Review of Systems  Objective:     Vital Signs (Most Recent):  Temp: 98.4 °F (36.9 °C) (01/31/18 0723)  Pulse: 97 (01/31/18 0723)  Resp: 16 (01/31/18 0723)  BP: 129/82 (01/31/18 0723)  SpO2: 99 % (01/31/18 0723) Vital Signs (24h Range):  Temp:  [97.9 °F (36.6 °C)-98.8 °F (37.1 °C)] 98.4 °F (36.9 °C)  Pulse:  [] 97  Resp:  [16-18] 16  SpO2:  [92 %-99 %] 99 %  BP: (117-163)/(60-82) 129/82     Weight: 82.1 kg (181 lb)  Body mass index is 29.21 kg/m².  No intake or output data in the 24 hours ending 01/31/18 0813   Physical Exam    Significant Labs: {Results:30303}    Significant Imaging: {Imaging Review:41467}    Assessment/Plan:      Active Diagnoses:    Diagnosis Date Noted POA    PRINCIPAL PROBLEM:  Severe sepsis with acute organ dysfunction due to Gram negative bacteria [A41.50, R65.20] 01/27/2018 Yes    Bacteremia due to Escherichia coli [R78.81] 01/28/2018 Yes    Acute kidney injury [N17.9] 01/27/2018 Yes    Diarrhea of presumed infectious origin [A09] 01/27/2018 Yes    Pyelonephritis due to Escherichia coli [N12, B96.20] 01/27/2018 Yes    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

## 2018-01-31 NOTE — PLAN OF CARE
Problem: Patient Care Overview  Goal: Plan of Care Review  Outcome: Ongoing (interventions implemented as appropriate)  Pt free of falls/injuries throughout the shift. Bed locked, in lowest position, call bell within reach. Pt afebrile, pain assessed & denied. VSS, will continue to monitor.

## 2018-02-01 LAB
BACTERIA BLD CULT: NORMAL
BACTERIA BLD CULT: NORMAL
BACTERIA STL CULT: NORMAL

## 2018-02-02 ENCOUNTER — TELEPHONE (OUTPATIENT)
Dept: SPORTS MEDICINE | Facility: CLINIC | Age: 73
End: 2018-02-02

## 2018-02-02 NOTE — TELEPHONE ENCOUNTER
Spoke with Ms. Ryan about her current situation. Was just discharged from the ER due to an infection which has left her weak and bed-ridden for the time being.  Cannot attend outpatient PT due to this. Can do exercises but is afraid she is going to get stiff without her proper stretches with Linda. Will work on arranging something for her & get back to her ASAP. EN.

## 2018-02-04 LAB — BACTERIA BLD CULT: NORMAL

## 2018-02-04 PROCEDURE — 96375 TX/PRO/DX INJ NEW DRUG ADDON: CPT

## 2018-02-04 PROCEDURE — 99284 EMERGENCY DEPT VISIT MOD MDM: CPT | Mod: ,,, | Performed by: EMERGENCY MEDICINE

## 2018-02-04 PROCEDURE — 96365 THER/PROPH/DIAG IV INF INIT: CPT

## 2018-02-04 PROCEDURE — 99284 EMERGENCY DEPT VISIT MOD MDM: CPT | Mod: 25

## 2018-02-04 PROCEDURE — 82962 GLUCOSE BLOOD TEST: CPT | Mod: 59

## 2018-02-04 PROCEDURE — 96366 THER/PROPH/DIAG IV INF ADDON: CPT

## 2018-02-05 ENCOUNTER — HOSPITAL ENCOUNTER (EMERGENCY)
Facility: HOSPITAL | Age: 73
Discharge: HOME | End: 2018-02-05
Attending: EMERGENCY MEDICINE
Payer: MEDICARE

## 2018-02-05 VITALS
WEIGHT: 175 LBS | RESPIRATION RATE: 17 BRPM | SYSTOLIC BLOOD PRESSURE: 161 MMHG | TEMPERATURE: 99 F | OXYGEN SATURATION: 99 % | DIASTOLIC BLOOD PRESSURE: 82 MMHG | HEART RATE: 109 BPM | HEIGHT: 66 IN | BODY MASS INDEX: 28.12 KG/M2

## 2018-02-05 DIAGNOSIS — R53.83 FATIGUE, UNSPECIFIED TYPE: ICD-10-CM

## 2018-02-05 DIAGNOSIS — R11.2 NAUSEA AND VOMITING, INTRACTABILITY OF VOMITING NOT SPECIFIED, UNSPECIFIED VOMITING TYPE: Primary | ICD-10-CM

## 2018-02-05 DIAGNOSIS — R05.9 COUGH: ICD-10-CM

## 2018-02-05 LAB
ABO + RH BLD: NORMAL
ALBUMIN SERPL BCP-MCNC: 3 G/DL
ALP SERPL-CCNC: 85 U/L
ALT SERPL W/O P-5'-P-CCNC: 14 U/L
ANION GAP SERPL CALC-SCNC: 10 MMOL/L
APTT BLDCRRT: 24.3 SEC
AST SERPL-CCNC: 17 U/L
BACTERIA #/AREA URNS AUTO: ABNORMAL /HPF
BASOPHILS # BLD AUTO: 0.02 K/UL
BASOPHILS NFR BLD: 0.2 %
BILIRUB SERPL-MCNC: 0.6 MG/DL
BILIRUB UR QL STRIP: NEGATIVE
BLD GP AB SCN CELLS X3 SERPL QL: NORMAL
BUN SERPL-MCNC: 13 MG/DL
CALCIUM SERPL-MCNC: 9.5 MG/DL
CHLORIDE SERPL-SCNC: 113 MMOL/L
CLARITY UR REFRACT.AUTO: ABNORMAL
CO2 SERPL-SCNC: 19 MMOL/L
COLOR UR AUTO: YELLOW
CREAT SERPL-MCNC: 0.9 MG/DL
DIFFERENTIAL METHOD: ABNORMAL
EOSINOPHIL # BLD AUTO: 0 K/UL
EOSINOPHIL NFR BLD: 0.3 %
ERYTHROCYTE [DISTWIDTH] IN BLOOD BY AUTOMATED COUNT: 15.8 %
EST. GFR  (AFRICAN AMERICAN): >60 ML/MIN/1.73 M^2
EST. GFR  (NON AFRICAN AMERICAN): >60 ML/MIN/1.73 M^2
GLUCOSE SERPL-MCNC: 135 MG/DL
GLUCOSE UR QL STRIP: NEGATIVE
HCT VFR BLD AUTO: 24.1 %
HGB BLD-MCNC: 8.1 G/DL
HGB UR QL STRIP: ABNORMAL
HYALINE CASTS UR QL AUTO: 12 /LPF
IMM GRANULOCYTES # BLD AUTO: 0.24 K/UL
IMM GRANULOCYTES NFR BLD AUTO: 1.9 %
INR PPP: 1
KETONES UR QL STRIP: NEGATIVE
LACTATE SERPL-SCNC: 1.2 MMOL/L
LEUKOCYTE ESTERASE UR QL STRIP: NEGATIVE
LYMPHOCYTES # BLD AUTO: 2.2 K/UL
LYMPHOCYTES NFR BLD: 17.1 %
MCH RBC QN AUTO: 29 PG
MCHC RBC AUTO-ENTMCNC: 33.6 G/DL
MCV RBC AUTO: 86 FL
MICROSCOPIC COMMENT: ABNORMAL
MONOCYTES # BLD AUTO: 0.7 K/UL
MONOCYTES NFR BLD: 5.2 %
NEUTROPHILS # BLD AUTO: 9.6 K/UL
NEUTROPHILS NFR BLD: 75.3 %
NITRITE UR QL STRIP: NEGATIVE
NRBC BLD-RTO: 0 /100 WBC
PH UR STRIP: 5 [PH] (ref 5–8)
PLATELET # BLD AUTO: 321 K/UL
PMV BLD AUTO: 9.5 FL
POCT GLUCOSE: 133 MG/DL (ref 70–110)
POTASSIUM SERPL-SCNC: 4.3 MMOL/L
PROT SERPL-MCNC: 7.7 G/DL
PROT UR QL STRIP: ABNORMAL
PROTHROMBIN TIME: 10.5 SEC
RBC # BLD AUTO: 2.79 M/UL
RBC #/AREA URNS AUTO: 4 /HPF (ref 0–4)
SODIUM SERPL-SCNC: 142 MMOL/L
SP GR UR STRIP: 1.02 (ref 1–1.03)
SQUAMOUS #/AREA URNS AUTO: 5 /HPF
URN SPEC COLLECT METH UR: ABNORMAL
UROBILINOGEN UR STRIP-ACNC: NEGATIVE EU/DL
WBC # BLD AUTO: 12.77 K/UL
WBC #/AREA URNS AUTO: 5 /HPF (ref 0–5)

## 2018-02-05 PROCEDURE — 81001 URINALYSIS AUTO W/SCOPE: CPT

## 2018-02-05 PROCEDURE — 86901 BLOOD TYPING SEROLOGIC RH(D): CPT

## 2018-02-05 PROCEDURE — 87040 BLOOD CULTURE FOR BACTERIA: CPT

## 2018-02-05 PROCEDURE — 80053 COMPREHEN METABOLIC PANEL: CPT

## 2018-02-05 PROCEDURE — 83605 ASSAY OF LACTIC ACID: CPT

## 2018-02-05 PROCEDURE — 25000003 PHARM REV CODE 250: Performed by: EMERGENCY MEDICINE

## 2018-02-05 PROCEDURE — 85610 PROTHROMBIN TIME: CPT

## 2018-02-05 PROCEDURE — 85025 COMPLETE CBC W/AUTO DIFF WBC: CPT

## 2018-02-05 PROCEDURE — 85730 THROMBOPLASTIN TIME PARTIAL: CPT

## 2018-02-05 PROCEDURE — 63600175 PHARM REV CODE 636 W HCPCS: Performed by: EMERGENCY MEDICINE

## 2018-02-05 RX ORDER — ONDANSETRON 2 MG/ML
4 INJECTION INTRAMUSCULAR; INTRAVENOUS
Status: COMPLETED | OUTPATIENT
Start: 2018-02-05 | End: 2018-02-05

## 2018-02-05 RX ORDER — ONDANSETRON 4 MG/1
4 TABLET, FILM COATED ORAL EVERY 6 HOURS
Qty: 6 TABLET | Refills: 0 | Status: SHIPPED | OUTPATIENT
Start: 2018-02-05 | End: 2018-02-26 | Stop reason: SDUPTHER

## 2018-02-05 RX ADMIN — SODIUM CHLORIDE, SODIUM LACTATE, POTASSIUM CHLORIDE, AND CALCIUM CHLORIDE 500 ML: .6; .31; .03; .02 INJECTION, SOLUTION INTRAVENOUS at 01:02

## 2018-02-05 RX ADMIN — ONDANSETRON 4 MG: 2 INJECTION INTRAMUSCULAR; INTRAVENOUS at 01:02

## 2018-02-05 RX ADMIN — SODIUM CHLORIDE, SODIUM LACTATE, POTASSIUM CHLORIDE, AND CALCIUM CHLORIDE 500 ML: 600; 310; 30; 20 INJECTION, SOLUTION INTRAVENOUS at 01:02

## 2018-02-05 NOTE — ED NOTES
Pt identifiers [place] P [place] and correct    LOC: The patient is awake, alert, aware of environment with an appropriate affect. Oriented x3, speaking appropriately  APPEARANCE: Pt resting comfortably, in no acute distress, pt is clean and well groomed, clothing properly fastened  SKIN: Skin warm, dry and intact, normal skin turgor, moist mucus membranes  RESPIRATORY: Airway is open and patent, respirations are spontaneous, even and unlabored, normal effort and rate  CARDIAC: Normal rate and rhythm, no peripheral edema noted, capillary refill < 3 seconds, bilateral radial pulses 2+  ABDOMEN: Soft, nontender, nondistended. Bowel sounds present. N/V/D  NEUROLOGIC: PERRL, facial expression is symmetrical, patient moving all extremities spontaneously, normal sensation in all extremities when touched with a finger.  Follows all commands appropriately  MUSCULOSKELETAL: No obvious deformities. +generalized weakness

## 2018-02-05 NOTE — ED NOTES
[place] FERNANDO Davis, a 72 y.o. female presents to the ED intake 2      Chief Complaint   Patient presents with    Emesis     Emesis, diarrhea, shaking and chills for the past couple of weeks, had 5 day admission here and was discharged on Wednesday for the same symptoms. Pt believes she has an infection.      Pt is currently taking cipro at home      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    Vitamin D deficiency disease

## 2018-02-05 NOTE — ED PROVIDER NOTES
Encounter Date: 2/4/2018       History     Chief Complaint   Patient presents with    Emesis     Emesis, diarrhea, shaking and chills for the past couple of weeks, had 5 day admission here and was discharged on Wednesday for the same symptoms. Pt believes she has an infection.      73y/o F w/ recent admission for e.coli bacteremia 2nd UTI presents w/ n/v and diarrhea since discharge. N/v nonbloody and diarrhea loose w/o bleeding, no fever, no abd pain or other pains. Has been feeling tired and also noted nonproductive cough. E.coli bacteremia was pan-sensitive and discharged on cipro. On metformin for DM.      The history is provided by the patient and medical records.     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    Vitamin D deficiency disease      Past Surgical History:   Procedure Laterality Date    CARPAL TUNNEL RELEASE      bilateral    COLONOSCOPY W/ BIOPSIES AND POLYPECTOMY      HEMORRHOID SURGERY      HYSTERECTOMY      AUB    ROTATOR CUFF REPAIR      left shoulder    SHOULDER SURGERY       Family History   Problem Relation Age of Onset    Heart disease Mother     Diabetes Mother     Heart disease Father     Cancer Father      liver    Diabetes Sister     Cataracts Sister     Kidney disease Brother     Heart disease Brother     Diabetes Sister     COPD Brother     COPD Brother     Gout Brother     Benign prostatic hyperplasia Brother     Heart disease Brother     Kidney disease Brother     Heart attack Brother      heart attack    Kidney failure Brother     Lupus Sister     Heart attack Brother     Drug abuse Brother      No Known Problems Daughter     No Known Problems Son     Amblyopia Neg Hx     Blindness Neg Hx     Breast cancer Neg Hx     Colon cancer Neg Hx     Ovarian cancer Neg Hx      Social History   Substance Use Topics    Smoking status: Never Smoker    Smokeless tobacco: Never Used    Alcohol use No     Review of Systems   Constitutional: Positive for chills.   HENT: Negative for trouble swallowing.    Respiratory: Positive for cough. Negative for shortness of breath.    Cardiovascular: Negative for chest pain.   Gastrointestinal: Positive for diarrhea, nausea and vomiting. Negative for blood in stool.   Genitourinary: Negative for dysuria and hematuria.   Musculoskeletal: Negative for neck pain and neck stiffness.   Skin: Negative for rash.   Neurological: Negative for headaches.   Psychiatric/Behavioral: Negative for confusion.       Physical Exam     Initial Vitals [02/04/18 1912]   BP Pulse Resp Temp SpO2   (!) 141/77 (!) 112 16 98.8 °F (37.1 °C) 98 %      MAP       98.33         Physical Exam    Nursing note and vitals reviewed.  Constitutional: She appears well-developed and well-nourished. She is not diaphoretic. No distress.   HENT:   Head: Normocephalic and atraumatic.   Eyes: Conjunctivae and EOM are normal.   Neck: Normal range of motion.   Cardiovascular: Regular rhythm and intact distal pulses.   No murmur heard.  tachycardic   Pulmonary/Chest: Breath sounds normal. No respiratory distress.   Dry cough   Abdominal: Soft. Bowel sounds are normal. She exhibits no distension. There is no tenderness.   Musculoskeletal: Normal range of motion. She exhibits no edema or tenderness.   Neurological: She is alert.   Skin: Skin is warm and dry. Capillary refill takes less than 2 seconds.         ED Course   Procedures  Labs Reviewed   CBC W/ AUTO DIFFERENTIAL - Abnormal; Notable for the following:        Result Value    WBC 12.77 (*)     RBC 2.79 (*)     Hemoglobin 8.1 (*)     Hematocrit 24.1 (*)     RDW 15.8  (*)     Immature Granulocytes 1.9 (*)     Gran # (ANC) 9.6 (*)     Immature Grans (Abs) 0.24 (*)     Gran% 75.3 (*)     Lymph% 17.1 (*)     All other components within normal limits   COMPREHENSIVE METABOLIC PANEL - Abnormal; Notable for the following:     Chloride 113 (*)     CO2 19 (*)     Glucose 135 (*)     Albumin 3.0 (*)     All other components within normal limits   URINALYSIS, REFLEX TO URINE CULTURE - Abnormal; Notable for the following:     Appearance, UA Hazy (*)     Protein, UA 1+ (*)     Occult Blood UA 1+ (*)     All other components within normal limits    Narrative:     Preferred Collection Type->Urine, Clean Catch   URINALYSIS MICROSCOPIC - Abnormal; Notable for the following:     Hyaline Casts, UA 12 (*)     All other components within normal limits    Narrative:     Preferred Collection Type->Urine, Clean Catch   POCT GLUCOSE - Abnormal; Notable for the following:     POCT Glucose 133 (*)     All other components within normal limits   CULTURE, BLOOD   CULTURE, BLOOD   PROTIME-INR   LACTIC ACID, PLASMA   APTT   TYPE & SCREEN   POCT GLUCOSE MONITORING CONTINUOUS                   APC / Resident Notes:   PGY-4 MDM: Stable appearing patient w/ recent significant diagnosis of e.coli bacteremia presents w/ return of symptoms. Tachycardia noted at triage but otherwise stable vitals, no acute findings on exam. Broad evaluation for sepsis shows mildly elevated WBC similar to previous w/ normal lactate, stable CXR independently reviewed w/o focal infiltrate, negative UA, and otherwise negative workup. Received fluids and antiemetics w/ significant improvement of symptoms, tolerating PO and resolution of tachycardia. Felt much improved and no episodes of diarrhea here in ED. Still on antibiotics, presentation today not consistent w/ c.diff. Discussed with patient findings, will encourage hydration at home, antiemetics prn, advised patient will be called if positive cultures, f/u PCP, close ED return  precautions. Pt stable for discharge.    Noa Horta MD  PGY-4 EM 4:45 AM 2/5/2018             Attending Attestation:   Physician Attestation Statement for Resident:  As the supervising MD   Physician Attestation Statement: I have personally seen and examined this patient.   I agree with the above history. -:   As the supervising MD I agree with the above PE.    As the supervising MD I agree with the above treatment, course, plan, and disposition.  I have reviewed and agree with the residents interpretation of the following: lab data and x-rays.                    ED Course      Clinical Impression:   The primary encounter diagnosis was Nausea and vomiting, intractability of vomiting not specified, unspecified vomiting type. Diagnoses of Cough and Fatigue, unspecified type were also pertinent to this visit.    Disposition:   Disposition: Discharged  Condition: Stable                        Noa Horta MD  Resident  02/05/18 0445       Josie Lee MD  02/21/18 0510

## 2018-02-06 ENCOUNTER — NURSE TRIAGE (OUTPATIENT)
Dept: ADMINISTRATIVE | Facility: CLINIC | Age: 73
End: 2018-02-06

## 2018-02-06 DIAGNOSIS — E78.5 HYPERLIPIDEMIA: ICD-10-CM

## 2018-02-06 RX ORDER — LOVASTATIN 40 MG/1
TABLET ORAL
Qty: 90 TABLET | Refills: 3 | Status: SHIPPED | OUTPATIENT
Start: 2018-02-06 | End: 2018-11-15 | Stop reason: SDUPTHER

## 2018-02-06 RX ORDER — LOSARTAN POTASSIUM AND HYDROCHLOROTHIAZIDE 25; 100 MG/1; MG/1
1 TABLET ORAL DAILY
Qty: 90 TABLET | Refills: 3 | Status: SHIPPED | OUTPATIENT
Start: 2018-02-06 | End: 2018-02-26

## 2018-02-06 NOTE — TELEPHONE ENCOUNTER
Reason for Disposition   Caller has medication question only, adult not sick, and triager answers question    Answer Assessment - Initial Assessment Questions  Pt had called for htn med for 10 days until her pharmacy scrip arrived.she spoke with pharmacy at 1600 and scrip not there    Protocols used: ST MEDICATION QUESTION CALL-A-

## 2018-02-06 NOTE — TELEPHONE ENCOUNTER
----- Message from Korin Hughes sent at 2/6/2018 12:48 PM CST -----  Contact: Patient [phone]  Rx Name:losartan-hydrochlorothiazide 100-25 mg (HYZAAR) 100-25 mg per tablet    Refill: yes     Pharmacy: [facility] [address] [facility] [place] [address]    Comments: need 10 day supply. Until mail order comes in. Call patient once sent. Need it today.     Please call and advise.    Thank You

## 2018-02-09 NOTE — DISCHARGE SUMMARY
[facility]  Discharge Summary  [place] Hospital Medicine      Admit Date: 1/26/2018    Discharge Date and Time: 1/31/2018 11:29 AM    Discharge Provider: Latricia Wynn    Reason for Admission: sepsis due to  E. Coli bacteremia    Hospital Course (synopsis of major diagnoses, care, treatment, and services provided during the course of the hospital stay):   sepsis due to e. Coli bacteremia and pyelonephritis with JACK  Patient was initially on ceftriaxone. ID consulted. She was switched to ciprofloxacin 500 mg BID for total of 14 days from last negative blood culture    Diarrhea due to sepsis - empirically treated with flagyl. When stool studies where negative source of sepsis was found to be above. Flagyl was discontinued. Diarrhea resolved as sepsis improved    Sinus tachycardia - patient HR 130s until day prior to discharge. Pulse 90s day of discharge.    JACK due to sepsis and dehydration. - SCr 3.1 at admission. ARB held. Improved with IV hydration. SCr 0.9    Type 2 diabetes mellitus with diabetic nephropathy, Polyneuropathy  Non-insulin diabetes medications are held and the patient's hyperglycemia is managed with a SQ basal, prandial, correction dose insulin regimen. Started patient on gabapentin 100 mg qhs for persisting complaints of burning feet.    HTN (hypertension), benign  Holding ARB and HCTZ due to JACK. BPs fairly well-controlled during stay otherwise.    Consults: Infectious Disease    Final Diagnoses:    Principal Problem: Severe sepsis with acute organ dysfunction due to Gram negative bacteria   Secondary Diagnoses:   Active Hospital Problems    Diagnosis  POA    *Severe sepsis with acute organ dysfunction due to Gram negative bacteria [A41.50, R65.20]  Yes    Bacteremia due to Escherichia coli [R78.81]  Yes    Acute kidney injury [N17.9]  Yes    Diarrhea of presumed infectious origin [A09]  Yes    Pyelonephritis due to Escherichia coli [N12, B96.20]  Yes    Type 2 diabetes mellitus with  diabetic nephropathy [E11.21]  Yes    Polyneuropathy [G62.9]  Yes    HTN (hypertension), benign [I10]  Yes      Resolved Hospital Problems    Diagnosis Date Resolved POA   No resolved problems to display.       Discharged Condition: stable    Disposition: Home or Self Care    Follow Up/Patient Instructions:     Medications:  Reconciled Home Medications:   Discharge Medication List as of 1/31/2018  9:54 AM      START taking these medications    Details   gabapentin (NEURONTIN) 100 MG capsule Take 1 capsule (100 mg total) by mouth every evening., Starting Tue 1/30/2018, Until Wed 1/30/2019, Normal         CONTINUE these medications which have CHANGED    Details   ciprofloxacin HCl (CIPRO) 500 MG tablet Take 1 tablet (500 mg total) by mouth 2 (two) times daily. FINISH ENTIRE BOTTLE, Starting Thu 2/1/2018, Until Tue 2/13/2018, Normal         CONTINUE these medications which have NOT CHANGED    Details   acetaminophen-codeine 300-30mg (TYLENOL #3) 300-30 mg Tab Take 1 tablet by mouth every 8 (eight) hours as needed., Starting Thu 1/11/2018, Print      diazePAM (VALIUM) 5 MG tablet Take 1 tablet (5 mg total) by mouth every 8 (eight) hours as needed (muscle spasms). May repeat if necessary, Starting Thu 1/11/2018, Print      metformin (GLUCOPHAGE) 500 MG tablet TAKE 2 TABLETS TWICE DAILY WITH MEALS, Normal      omeprazole (PRILOSEC) 20 MG capsule One capsule twice daily, Normal      acetaminophen (TYLENOL) 325 MG tablet Take 325 mg by mouth every 6 (six) hours as needed for Pain., Historical Med      alcohol swabs (BD ALCOHOL SWABS) PadM USE AS DIRECTED TO MONITOR BLOOD SUGARS TWICE DAILY, Print      allopurinol (ZYLOPRIM) 300 MG tablet 1 Tablet Oral Every day, Normal      benzonatate (TESSALON) 100 MG capsule TAKE 1 CAPSULE THREE TIMES DAILY AS NEEDED FOR COUGH, Normal      blood glucose control, normal Soln True Metrix control solution E11.9 - pt tests twice daily, Normal      blood sugar diagnostic (TRUE METRIX  GLUCOSE TEST STRIP) Strp Please give True metrix test strips and lancets to monitor blood sugars twice daily E11.21, Print      lancets (TRUEPLUS LANCETS) 28 gauge Misc 1 lancet by Misc.(Non-Drug; Combo Route) route 2 (two) times daily., Starting 2/27/2017, Until Discontinued, Normal      sitagliptin (JANUVIA) 100 MG Tab TAKE 1 TABLET ONE TIME DAILY, Normal      losartan-hydrochlorothiazide 100-25 mg (HYZAAR) 100-25 mg per tablet Take 1 tablet by mouth once daily., Starting 1/13/2017, Until Sat 1/13/18, Normal      lovastatin (MEVACOR) 40 MG tablet TAKE 1 TABLET EVERY EVENING, Normal         STOP taking these medications       blood-glucose meter (TRUE METRIX AIR GLUCOSE METER) kit Comments:   Reason for Stopping:         blood-glucose meter (TRUE METRIX AIR GLUCOSE METER) [facility] Comments:   Reason for Stopping:         PRODIGY CONTROL SOLUTION, LOW Soln Comments:   Reason for Stopping:               Discharge Procedure Orders  Call MD for:  temperature >100.4     Call MD for:  persistent nausea and vomiting or diarrhea     Call MD for:  severe uncontrolled pain     Call MD for:  redness, tenderness, or signs of infection (pain, swelling, redness, odor or green/yellow discharge around incision site)     Call MD for:  difficulty breathing or increased cough     Call MD for:  severe persistent headache     Call MD for:  worsening rash     Call MD for:  persistent dizziness, light-headedness, or visual disturbances     Call MD for:  increased confusion or weakness           I spent more than 30 minutes total on this discharge including seeing and examining patient, note writing, reconcilling medications, and discussion with other health providers on team.

## 2018-02-10 LAB
BACTERIA BLD CULT: NORMAL
BACTERIA BLD CULT: NORMAL

## 2018-02-18 ENCOUNTER — OFFICE VISIT (OUTPATIENT)
Dept: URGENT CARE | Facility: CLINIC | Age: 73
End: 2018-02-18
Payer: MEDICARE

## 2018-02-18 VITALS
HEART RATE: 114 BPM | HEIGHT: 66 IN | DIASTOLIC BLOOD PRESSURE: 77 MMHG | TEMPERATURE: 99 F | OXYGEN SATURATION: 99 % | RESPIRATION RATE: 16 BRPM | WEIGHT: 175 LBS | BODY MASS INDEX: 28.12 KG/M2 | SYSTOLIC BLOOD PRESSURE: 140 MMHG

## 2018-02-18 DIAGNOSIS — R11.2 NAUSEA AND VOMITING, INTRACTABILITY OF VOMITING NOT SPECIFIED, UNSPECIFIED VOMITING TYPE: Primary | ICD-10-CM

## 2018-02-18 DIAGNOSIS — R19.7 DIARRHEA, UNSPECIFIED TYPE: ICD-10-CM

## 2018-02-18 DIAGNOSIS — R19.8 ABDOMINAL FULLNESS: ICD-10-CM

## 2018-02-18 PROCEDURE — 99214 OFFICE O/P EST MOD 30 MIN: CPT | Mod: S$GLB,,, | Performed by: NURSE PRACTITIONER

## 2018-02-18 PROCEDURE — 1159F MED LIST DOCD IN RCRD: CPT | Mod: S$GLB,,, | Performed by: NURSE PRACTITIONER

## 2018-02-18 PROCEDURE — 3008F BODY MASS INDEX DOCD: CPT | Mod: S$GLB,,, | Performed by: NURSE PRACTITIONER

## 2018-02-18 RX ORDER — PROMETHAZINE HYDROCHLORIDE 12.5 MG/1
12.5 TABLET ORAL EVERY 6 HOURS PRN
Qty: 8 TABLET | Refills: 0 | Status: SHIPPED | OUTPATIENT
Start: 2018-02-18 | End: 2018-10-01

## 2018-02-18 RX ORDER — LOPERAMIDE HYDROCHLORIDE 2 MG/1
2 CAPSULE ORAL 4 TIMES DAILY PRN
Qty: 20 CAPSULE | Refills: 0 | Status: SHIPPED | OUTPATIENT
Start: 2018-02-18 | End: 2018-02-23

## 2018-02-18 NOTE — PROGRESS NOTES
"Subjective:       Patient ID: Lina Davis is a 72 y.o. female.    Vitals:  height is 5' 6" (1.676 m) and weight is 79.4 kg (175 lb). Her temperature is 98.8 °F (37.1 °C). Her blood pressure is 140/77 (abnormal) and her pulse is 114 (abnormal). Her respiration is 16 and oxygen saturation is 99%.     Chief Complaint: Emesis (Pt has been sick for 3 days) and Diarrhea (pt has been sick for 3 days)    PATIENT PRESENTS TO CLINIC TODAY WITH N/V/D. DENIES ABDOMINAL PAIN OR CRAMPING. SHE HAS A FEELING OF FULLNESS. SHE WAS MOST RECENTLY IN ER 13 DAYS AGO. ALSO ADMITTED TO HOSPITAL 1/26/18 WITH SEPSIS.   CHART REVIEW REVEALS HISTORY OF NAUSEA AND ABDOMINAL FULLNESS WITH ABDOMINAL ULTRASOUND.         Emesis    This is a recurrent problem. The current episode started in the past 7 days. The problem occurs 2 to 4 times per day. The problem has been unchanged. The emesis has an appearance of stomach contents. There has been no fever. Associated symptoms include diarrhea. Pertinent negatives include no abdominal pain, chest pain, chills or fever. She has tried increased fluids for the symptoms. The treatment provided no relief.   Diarrhea    This is a recurrent problem. The current episode started in the past 7 days. The problem occurs 2 to 4 times per day. The problem has been unchanged. The stool consistency is described as watery. Associated symptoms include vomiting. Pertinent negatives include no abdominal pain, chills or fever. Nothing aggravates the symptoms. The treatment provided no relief.     Review of Systems   Constitution: Negative for chills and fever.   Cardiovascular: Negative for chest pain.   Respiratory: Negative for shortness of breath.    Musculoskeletal: Negative for back pain.   Gastrointestinal: Positive for diarrhea, nausea and vomiting. Negative for abdominal pain, constipation, hematochezia and melena.   Genitourinary: Negative for dysuria.       Objective:      Physical Exam   Constitutional: She is " oriented to person, place, and time. She appears well-developed and well-nourished.   NAD   HENT:   Head: Normocephalic and atraumatic.   Right Ear: External ear normal.   Left Ear: External ear normal.   Nose: Nose normal.   Mouth/Throat: Mucous membranes are normal.   Eyes: Conjunctivae and lids are normal.   Neck: Trachea normal and full passive range of motion without pain. Neck supple.   Cardiovascular: Regular rhythm and normal heart sounds.  Tachycardia present.    Pulmonary/Chest: Effort normal and breath sounds normal. No respiratory distress.   Abdominal: Soft. Normal appearance and bowel sounds are normal. She exhibits no distension, no abdominal bruit, no pulsatile midline mass and no mass. There is no tenderness. There is no rigidity and no guarding.   Musculoskeletal: Normal range of motion. She exhibits no edema.   Neurological: She is alert and oriented to person, place, and time. She has normal strength.   Skin: Skin is warm, dry and intact. She is not diaphoretic. No pallor.   Psychiatric: She has a normal mood and affect. Her speech is normal and behavior is normal. Judgment and thought content normal. Cognition and memory are normal.   Nursing note and vitals reviewed.      Assessment:       1. Nausea and vomiting, intractability of vomiting not specified, unspecified vomiting type    2. Diarrhea, unspecified type    3. Abdominal fullness        Plan:         Nausea and vomiting, intractability of vomiting not specified, unspecified vomiting type  -     POCT Urinalysis, Dipstick, Automated, W/O Scope  -     Ambulatory referral to Gastroenterology  -     promethazine (PHENERGAN) 12.5 MG Tab; Take 1 tablet (12.5 mg total) by mouth every 6 (six) hours as needed.  Dispense: 8 tablet; Refill: 0    Diarrhea, unspecified type  -     POCT Urinalysis, Dipstick, Automated, W/O Scope  -     Ambulatory referral to Gastroenterology  -     loperamide (IMODIUM) 2 mg capsule; Take 1 capsule (2 mg total) by mouth  4 (four) times daily as needed for Diarrhea.  Dispense: 20 capsule; Refill: 0    Abdominal fullness  -     Ambulatory referral to Gastroenterology      Patient Instructions   A REFERRAL WAS SENT TO GASTROENTEROLOGY FOR YOUR CONVENIENCE. PLEASE CALL [phone] TO SCHEDULE AN APPOINTMENT  IF YOUR SYMPTOMS WORSEN GO TO THE EMERGENCY ROOM

## 2018-02-18 NOTE — PATIENT INSTRUCTIONS
A REFERRAL WAS SENT TO GASTROENTEROLOGY FOR YOUR CONVENIENCE. PLEASE CALL [phone] TO SCHEDULE AN APPOINTMENT  IF YOUR SYMPTOMS WORSEN GO TO THE EMERGENCY ROOM

## 2018-02-21 ENCOUNTER — TELEPHONE (OUTPATIENT)
Dept: INTERNAL MEDICINE | Facility: CLINIC | Age: 73
End: 2018-02-21

## 2018-02-21 DIAGNOSIS — N39.0 URINARY TRACT INFECTION WITHOUT HEMATURIA, SITE UNSPECIFIED: ICD-10-CM

## 2018-02-21 DIAGNOSIS — R19.7 DIARRHEA, UNSPECIFIED TYPE: ICD-10-CM

## 2018-02-21 DIAGNOSIS — E11.9 TYPE 2 DIABETES MELLITUS WITHOUT COMPLICATION, WITHOUT LONG-TERM CURRENT USE OF INSULIN: ICD-10-CM

## 2018-02-21 DIAGNOSIS — R11.2 NAUSEA AND VOMITING, INTRACTABILITY OF VOMITING NOT SPECIFIED, UNSPECIFIED VOMITING TYPE: Primary | ICD-10-CM

## 2018-02-21 NOTE — PROGRESS NOTES
CC: 4mo s/p below mentioned procedure    Right  1. Shoulder arthroscopic rotator cuff repair (transosseous equivalent, double row), massive  2. Shoulder arthroscopic biceps auto tenodesis   3. Shoulder arthroscopic subacromial decompression, bursectomy  4. Shoulder arthroscopic extensive debridement (anterior, posterior glenohumeral joint, subacromial space)    5. Shoulder arthroscopic lysis of adhesions/capsular release   6. Shoulder arthroscopic distal clavicle excision      Lina Davis reports to be doing ok 4mo s/p the above mentioned procedure. Unfortunately, she was admitted to the hospital with E Coli sepsis on 1/26/18. Also had a subsequent hospital stay and is being followed closely by Dr. Kay for continued workup. She has been dealing with the effects of this for the past 3.5 weeks, and unfortunately PT for her shoulder has taken a backseat. She has been doing stretches on her own as prescribed by Linda. She is open to trying to get back in for therapy with Linda, but due to low energy levels, she is unsure if she can return to her previous workload. Overall, shoulder still feels much better from preop.    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    Vitamin D deficiency disease        PAST SURGICAL HISTORY:  Past Surgical History:   Procedure Laterality Date    CARPAL TUNNEL RELEASE      bilateral    COLONOSCOPY W/ BIOPSIES AND POLYPECTOMY      HEMORRHOID SURGERY      HYSTERECTOMY      AUB    ROTATOR CUFF REPAIR      left shoulder    SHOULDER SURGERY         FAMILY HISTORY:  Family History   Problem Relation Age of Onset    Heart disease Mother     Diabetes Mother     Heart disease Father      Cancer Father      liver    Diabetes Sister     Cataracts Sister     Kidney disease Brother     Heart disease Brother     Diabetes Sister     COPD Brother     COPD Brother     Gout Brother     Benign prostatic hyperplasia Brother     Heart disease Brother     Kidney disease Brother     Heart attack Brother      heart attack    Kidney failure Brother     Lupus Sister     Heart attack Brother     Drug abuse Brother     No Known Problems Daughter     No Known Problems Son     Amblyopia Neg Hx     Blindness Neg Hx     Breast cancer Neg Hx     Colon cancer Neg Hx     Ovarian cancer Neg Hx        MEDICATIONS:    Current Outpatient Prescriptions:     acetaminophen (TYLENOL) 325 MG tablet, Take 325 mg by mouth every 6 (six) hours as needed for Pain., Disp: , Rfl:     acetaminophen-codeine 300-30mg (TYLENOL #3) 300-30 mg Tab, Take 1 tablet by mouth every 8 (eight) hours as needed., Disp: 60 tablet, Rfl: 0    alcohol swabs (BD ALCOHOL SWABS) PadM, USE AS DIRECTED TO MONITOR BLOOD SUGARS TWICE DAILY, Disp: 200 each, Rfl: 3    allopurinol (ZYLOPRIM) 300 MG tablet, 1 Tablet Oral Every day, Disp: 90 tablet, Rfl: 3    benzonatate (TESSALON) 100 MG capsule, TAKE 1 CAPSULE THREE TIMES DAILY AS NEEDED FOR COUGH, Disp: 30 capsule, Rfl: 0    blood glucose control, normal Soln, True Metrix control solution E11.9 - pt tests twice daily, Disp: 1 each, Rfl: 1    blood sugar diagnostic (TRUE METRIX GLUCOSE TEST STRIP) Strp, Please give True metrix test strips and lancets to monitor blood sugars twice daily E11.21, Disp: 200 each, Rfl: 2    diazePAM (VALIUM) 5 MG tablet, Take 1 tablet (5 mg total) by mouth every 8 (eight) hours as needed (muscle spasms). May repeat if necessary, Disp: 40 tablet, Rfl: 0    gabapentin (NEURONTIN) 100 MG capsule, Take 1 capsule (100 mg total) by mouth every evening., Disp: 30 capsule, Rfl: 11    lancets (TRUEPLUS LANCETS) 28 gauge Misc, 1 lancet by Misc.(Non-Drug; Combo Route)  "route 2 (two) times daily., Disp: 200 each, Rfl: 2    loperamide (IMODIUM) 2 mg capsule, Take 1 capsule (2 mg total) by mouth 4 (four) times daily as needed for Diarrhea., Disp: 20 capsule, Rfl: 0    losartan-hydrochlorothiazide 100-25 mg (HYZAAR) 100-25 mg per tablet, Take 1 tablet by mouth once daily., Disp: 90 tablet, Rfl: 3    lovastatin (MEVACOR) 40 MG tablet, TAKE 1 TABLET EVERY EVENING, Disp: 90 tablet, Rfl: 3    metformin (GLUCOPHAGE) 500 MG tablet, TAKE 2 TABLETS TWICE DAILY WITH MEALS, Disp: 360 tablet, Rfl: 3    omeprazole (PRILOSEC) 20 MG capsule, One capsule twice daily, Disp: 180 capsule, Rfl: 3    ondansetron (ZOFRAN) 4 MG tablet, Take 1 tablet (4 mg total) by mouth every 6 (six) hours., Disp: 6 tablet, Rfl: 0    promethazine (PHENERGAN) 12.5 MG Tab, Take 1 tablet (12.5 mg total) by mouth every 6 (six) hours as needed., Disp: 8 tablet, Rfl: 0    sitagliptin (JANUVIA) 100 MG Tab, TAKE 1 TABLET ONE TIME DAILY, Disp: 90 tablet, Rfl: 3    ALLERGIES:  Review of patient's allergies indicates:   Allergen Reactions    No known drug allergies           REVIEW OF SYSTEMS:  Constitution: Negative. Negative for chills, fever and night sweats.    Hematologic/Lymphatic: Negative for bleeding problem. Does not bruise/bleed easily.   Skin: Negative for dry skin, itching and rash.   Musculoskeletal: Negative for falls. Positive for right shoulder pain and  muscle weakness.     PHYSICAL EXAMINATION:  Vitals:  /76   Pulse (!) 122   Ht 5' 6" (1.676 m)   Wt 79.4 kg (175 lb)   BMI 28.25 kg/m²    General: Well-developed well-nourished 72 y.o. femalein no acute distress   Cardiovascular: Regular rhythm by palpation of distal pulse, normal color and temperature, no concerning varicosities on symptomatic side   Lungs: No labored breathing or wheezing appreciated   Neuro: Alert and oriented ×3   Psychiatric: well oriented to person, place and time, demonstrates normal mood and affect   Skin: No rashes, " lesions or ulcers, normal temperature, turgor, and texture on uninvolved extremity    Ortho/SPM Exam  Right shoulder examination demonstrates active forward elevation to 100° with scapular capture.  Active external rotation with arm at side to 30°.  Passive forward elevation to 140°.  Passive external rotation with arm at side to 45°. No sig scapular winging.  No unusual tenderness.    IMAGING: No new images indicated today.     ASSESSMENT:      ICD-10-CM ICD-9-CM   1. Shoulder stiffness, right M25.611 719.51   2. History of repair of right rotator cuff Z98.890 V15.29         PLAN:     Recovery setback due to recent medical issues.  The patient will reengage with physical therapy.  We discussed the potential benefit of steroid therapy.  She would like to hold off on this for now.  Return to clinic in 3 weeks to closely monitor things.     Procedures

## 2018-02-21 NOTE — TELEPHONE ENCOUNTER
Pt with hx of e coli sepsis took 14 days of abx - has had N/V/D - Please schedule lab, urine and stool studies this week and add for 3Pm either Monday or Tuesday next week - thanks.

## 2018-02-21 NOTE — TELEPHONE ENCOUNTER
with hx of e coli sepsis took 14 days of abx - has had N/V/D - Please schedule lab, urine and stool studies this week and add for 3Pm either Monday or Tuesday next week - thanks.         Documentation         Patient schedule for Ep appt..labs...will do urine and stool studies also.

## 2018-02-22 ENCOUNTER — OFFICE VISIT (OUTPATIENT)
Dept: SPORTS MEDICINE | Facility: CLINIC | Age: 73
End: 2018-02-22
Payer: MEDICARE

## 2018-02-22 ENCOUNTER — LAB VISIT (OUTPATIENT)
Dept: LAB | Facility: HOSPITAL | Age: 73
End: 2018-02-22
Attending: INTERNAL MEDICINE
Payer: MEDICARE

## 2018-02-22 VITALS
HEART RATE: 122 BPM | DIASTOLIC BLOOD PRESSURE: 76 MMHG | SYSTOLIC BLOOD PRESSURE: 121 MMHG | BODY MASS INDEX: 28.12 KG/M2 | HEIGHT: 66 IN | WEIGHT: 175 LBS

## 2018-02-22 DIAGNOSIS — M25.611 SHOULDER STIFFNESS, RIGHT: Primary | ICD-10-CM

## 2018-02-22 DIAGNOSIS — E11.9 TYPE 2 DIABETES MELLITUS WITHOUT COMPLICATION, WITHOUT LONG-TERM CURRENT USE OF INSULIN: ICD-10-CM

## 2018-02-22 DIAGNOSIS — Z98.890 HISTORY OF REPAIR OF RIGHT ROTATOR CUFF: ICD-10-CM

## 2018-02-22 DIAGNOSIS — R11.2 NAUSEA AND VOMITING, INTRACTABILITY OF VOMITING NOT SPECIFIED, UNSPECIFIED VOMITING TYPE: ICD-10-CM

## 2018-02-22 LAB
ALBUMIN SERPL BCP-MCNC: 3.8 G/DL
ALP SERPL-CCNC: 73 U/L
ALT SERPL W/O P-5'-P-CCNC: 7 U/L
ANION GAP SERPL CALC-SCNC: 11 MMOL/L
AST SERPL-CCNC: 12 U/L
BASOPHILS # BLD AUTO: 0.04 K/UL
BASOPHILS NFR BLD: 0.5 %
BILIRUB SERPL-MCNC: 0.5 MG/DL
BUN SERPL-MCNC: 40 MG/DL
CALCIUM SERPL-MCNC: 9.8 MG/DL
CHLORIDE SERPL-SCNC: 109 MMOL/L
CO2 SERPL-SCNC: 19 MMOL/L
CREAT SERPL-MCNC: 2 MG/DL
CRP SERPL-MCNC: 13.5 MG/L
DIFFERENTIAL METHOD: ABNORMAL
EOSINOPHIL # BLD AUTO: 0.1 K/UL
EOSINOPHIL NFR BLD: 1.6 %
ERYTHROCYTE [DISTWIDTH] IN BLOOD BY AUTOMATED COUNT: 15.9 %
ERYTHROCYTE [SEDIMENTATION RATE] IN BLOOD BY WESTERGREN METHOD: 48 MM/HR
EST. GFR  (AFRICAN AMERICAN): 28.5 ML/MIN/1.73 M^2
EST. GFR  (NON AFRICAN AMERICAN): 24.8 ML/MIN/1.73 M^2
ESTIMATED AVG GLUCOSE: 91 MG/DL
GLUCOSE SERPL-MCNC: 153 MG/DL
HBA1C MFR BLD HPLC: 4.8 %
HCT VFR BLD AUTO: 28.1 %
HGB BLD-MCNC: 9.1 G/DL
LYMPHOCYTES # BLD AUTO: 2.6 K/UL
LYMPHOCYTES NFR BLD: 35.6 %
MCH RBC QN AUTO: 27.3 PG
MCHC RBC AUTO-ENTMCNC: 32.4 G/DL
MCV RBC AUTO: 84 FL
MONOCYTES # BLD AUTO: 0.5 K/UL
MONOCYTES NFR BLD: 7 %
NEUTROPHILS # BLD AUTO: 4 K/UL
NEUTROPHILS NFR BLD: 54.6 %
NRBC BLD-RTO: 0 /100 WBC
PLATELET # BLD AUTO: 294 K/UL
PMV BLD AUTO: 11 FL
POTASSIUM SERPL-SCNC: 4.7 MMOL/L
PROT SERPL-MCNC: 7.9 G/DL
RBC # BLD AUTO: 3.33 M/UL
SODIUM SERPL-SCNC: 139 MMOL/L
WBC # BLD AUTO: 7.28 K/UL

## 2018-02-22 PROCEDURE — 85651 RBC SED RATE NONAUTOMATED: CPT

## 2018-02-22 PROCEDURE — 99213 OFFICE O/P EST LOW 20 MIN: CPT | Mod: S$GLB,,, | Performed by: ORTHOPAEDIC SURGERY

## 2018-02-22 PROCEDURE — 1159F MED LIST DOCD IN RCRD: CPT | Mod: S$GLB,,, | Performed by: ORTHOPAEDIC SURGERY

## 2018-02-22 PROCEDURE — 83036 HEMOGLOBIN GLYCOSYLATED A1C: CPT

## 2018-02-22 PROCEDURE — 3008F BODY MASS INDEX DOCD: CPT | Mod: S$GLB,,, | Performed by: ORTHOPAEDIC SURGERY

## 2018-02-22 PROCEDURE — 80053 COMPREHEN METABOLIC PANEL: CPT

## 2018-02-22 PROCEDURE — 99999 PR PBB SHADOW E&M-EST. PATIENT-LVL III: CPT | Mod: PBBFAC,,, | Performed by: ORTHOPAEDIC SURGERY

## 2018-02-22 PROCEDURE — 85025 COMPLETE CBC W/AUTO DIFF WBC: CPT

## 2018-02-22 PROCEDURE — 1125F AMNT PAIN NOTED PAIN PRSNT: CPT | Mod: S$GLB,,, | Performed by: ORTHOPAEDIC SURGERY

## 2018-02-22 PROCEDURE — 36415 COLL VENOUS BLD VENIPUNCTURE: CPT | Mod: PO

## 2018-02-22 PROCEDURE — 86140 C-REACTIVE PROTEIN: CPT

## 2018-02-26 ENCOUNTER — OFFICE VISIT (OUTPATIENT)
Dept: INTERNAL MEDICINE | Facility: CLINIC | Age: 73
End: 2018-02-26
Payer: MEDICARE

## 2018-02-26 VITALS
SYSTOLIC BLOOD PRESSURE: 121 MMHG | HEART RATE: 105 BPM | WEIGHT: 168.63 LBS | HEIGHT: 66 IN | BODY MASS INDEX: 27.1 KG/M2 | DIASTOLIC BLOOD PRESSURE: 74 MMHG

## 2018-02-26 DIAGNOSIS — N39.0 URINARY TRACT INFECTION WITHOUT HEMATURIA, SITE UNSPECIFIED: ICD-10-CM

## 2018-02-26 DIAGNOSIS — D64.9 ANEMIA, UNSPECIFIED TYPE: Primary | ICD-10-CM

## 2018-02-26 DIAGNOSIS — R94.6 ABNORMAL RESULTS OF THYROID FUNCTION STUDIES: ICD-10-CM

## 2018-02-26 DIAGNOSIS — R11.2 NAUSEA AND VOMITING, INTRACTABILITY OF VOMITING NOT SPECIFIED, UNSPECIFIED VOMITING TYPE: ICD-10-CM

## 2018-02-26 PROCEDURE — 99214 OFFICE O/P EST MOD 30 MIN: CPT | Mod: S$GLB,,, | Performed by: INTERNAL MEDICINE

## 2018-02-26 PROCEDURE — 99999 PR PBB SHADOW E&M-EST. PATIENT-LVL IV: CPT | Mod: PBBFAC,,, | Performed by: INTERNAL MEDICINE

## 2018-02-26 RX ORDER — ONDANSETRON 4 MG/1
4 TABLET, FILM COATED ORAL EVERY 8 HOURS PRN
Qty: 20 TABLET | Refills: 0 | Status: SHIPPED | OUTPATIENT
Start: 2018-02-26 | End: 2018-02-26 | Stop reason: SDUPTHER

## 2018-02-26 RX ORDER — ONDANSETRON 4 MG/1
4 TABLET, FILM COATED ORAL EVERY 8 HOURS PRN
Qty: 20 TABLET | Refills: 0 | Status: SHIPPED | OUTPATIENT
Start: 2018-02-26 | End: 2018-03-12

## 2018-02-26 RX ORDER — HYDROCODONE BITARTRATE AND ACETAMINOPHEN 5; 325 MG/1; MG/1
1 TABLET ORAL
Qty: 30 TABLET | Refills: 0 | Status: SHIPPED | OUTPATIENT
Start: 2018-02-26 | End: 2018-04-11 | Stop reason: SDUPTHER

## 2018-02-26 RX ORDER — LOSARTAN POTASSIUM 100 MG/1
100 TABLET ORAL DAILY
Qty: 30 TABLET | Refills: 3 | Status: SHIPPED | OUTPATIENT
Start: 2018-02-26 | End: 2018-03-12 | Stop reason: SDUPTHER

## 2018-02-27 ENCOUNTER — TELEPHONE (OUTPATIENT)
Dept: INTERNAL MEDICINE | Facility: CLINIC | Age: 73
End: 2018-02-27

## 2018-02-27 ENCOUNTER — HOSPITAL ENCOUNTER (OUTPATIENT)
Dept: RADIOLOGY | Facility: HOSPITAL | Age: 73
Discharge: HOME | End: 2018-02-27
Attending: INTERNAL MEDICINE
Payer: MEDICARE

## 2018-02-27 ENCOUNTER — PATIENT MESSAGE (OUTPATIENT)
Dept: INTERNAL MEDICINE | Facility: CLINIC | Age: 73
End: 2018-02-27

## 2018-02-27 DIAGNOSIS — R11.2 NAUSEA AND VOMITING, INTRACTABILITY OF VOMITING NOT SPECIFIED, UNSPECIFIED VOMITING TYPE: ICD-10-CM

## 2018-02-27 PROCEDURE — 74176 CT ABD & PELVIS W/O CONTRAST: CPT | Mod: TC

## 2018-02-27 PROCEDURE — 74176 CT ABD & PELVIS W/O CONTRAST: CPT | Mod: 26,,, | Performed by: RADIOLOGY

## 2018-02-27 NOTE — TELEPHONE ENCOUNTER
----- Message from Robert Carey sent at 2/27/2018  9:22 AM CST -----  Regarding: Lab Client Services  Contact: [phone]  Hi,           my name is Robert I work in the lab. We received a specimen for Clostridium Difficile test. The test was unable to be performed due to the stool sample was formed stool. If you have any questions regarding this please contact client services at [phone]. Thank You

## 2018-02-28 ENCOUNTER — TELEPHONE (OUTPATIENT)
Dept: INTERNAL MEDICINE | Facility: CLINIC | Age: 73
End: 2018-02-28

## 2018-02-28 ENCOUNTER — CLINICAL SUPPORT (OUTPATIENT)
Dept: REHABILITATION | Facility: HOSPITAL | Age: 73
End: 2018-02-28
Attending: PHYSICIAN ASSISTANT
Payer: MEDICARE

## 2018-02-28 DIAGNOSIS — M25.611 DECREASED ROM OF RIGHT SHOULDER: ICD-10-CM

## 2018-02-28 DIAGNOSIS — M25.511 ACUTE PAIN OF RIGHT SHOULDER: ICD-10-CM

## 2018-02-28 PROCEDURE — 97140 MANUAL THERAPY 1/> REGIONS: CPT

## 2018-02-28 RX ORDER — GLIMEPIRIDE 1 MG/1
1 TABLET ORAL
Qty: 30 TABLET | Refills: 3 | Status: SHIPPED | OUTPATIENT
Start: 2018-02-28 | End: 2018-04-16 | Stop reason: SDUPTHER

## 2018-02-28 NOTE — PROGRESS NOTES
Physical Therapy Daily Note     Date: 02/28/2018  Name: Lina Davis  [facility] Number: [id number]  Diagnosis:   Encounter Diagnoses   Name Primary?    Acute pain of right shoulder     Decreased ROM of right shoulder      Physician: MUNIRA Grier MD    Evaluation Date: 10/18/17  Date of Surgery: 10/13/17  Visit #: 19  Start Time:  1100  Stop Time: 1140  Total Treatment Time:  40 min    Precautions: standard, s/p massive RTR (NO AAROM UNTIL 9 WEEKS)      Subjective     Pt reports has been doing her exercises at home while she has been sick.   Pain: 0/10    Objective     Measurements taken:   R shoulder PROM:  Flex: 150deg  Abd: 143deg  ER: 75 deg at 90 deg abd  IR: 45 deg at 90 deg    Patient received group therapy to increase endurance, ROM, flexibility and posture with activities as follows:     Lina received therapeutic exercises to develop strength, endurance, ROM, flexibility and posture for 0 minutes including:   Treatment initiated with MHP prop x10 min    NP:  UBE 4'/4'  Pulleys 3'/3'-np  Table slides x2 min flex-np  Towel isometrics 10 sec x10 4 ways np  3 towel roll x5 min-np  Supine AAROM w/ dowel; flexion/abd/scaption  x 15 (VCs and demonstration needed)-np  Step outs OTB IR and ER 2x15 R (multiple VCs needed)-np  Wall slides with towel x15 (flex and abd)  AAROM shoulder x5  Dowel shoulder flex 10 sec x10 with 2# dowel      Lina received the following manual therapy techniques applied to the: R shoulder for 25 minutes.   Scapulothoracic motion into abd/add, upward rotation, elevation/depression  AP grade III joint movement R GH   PROM R shoulder flex, abd, IR, ER     Cold pack to R shoulder x 10 min. Skin check clear pre- and post-application.-np    Written Home Exercises Provided:  Pt demo good understanding of the education provided. Lina demonstrated good return demonstration of activities.     Education provided:  Lina verbalized good understanding of  education provided.   No spiritual or educational barriers to learning identified.    PT reviewed FOTO scores for Lina Davis on 12/18/17  FOTO score: 47    Functional Limitations Reports - G Codes  Category: mobility  Tool: FOTO      Current ():  CK  Goal (): CJ  Discharge ():  NA          Assessment     Patient was limited this visit secondary to decreased endurance due to missing 1 mos with illness. She has maintained most her her previous R shoulder ROM and did not appear to be developing capsular stiffness. She is demonstrating a good understanding of her HEP to regain functional mobility. Pt will benefit from continued skilled PT services to progress toward functional goals.    This is a 72 y.o. female referred to outpatient physical therapy and presents with a medical diagnosis of s/p R RTR massive and demonstrates limitations as described in the problem list Pt prognosis is Good. Pt will continue to benefit from skilled outpatient physical therapy to address the deficits listed below in the problem list, provide pt/family education and to maximize pt's level of independence in the home and community environment.    Will the patient continue to benefit from skilled PT intervention? yes        Medical necessity is demonstrated by:   - Pain limits function of effected part for all activities  - Unable to participate in daily activities     Progress towards goals: good    New/Revised Goals:none this visit       Plan   Continue with established Plan of Care towards PT goals.      Therapist: Linda Batista, PT, DPT

## 2018-03-01 ENCOUNTER — TELEPHONE (OUTPATIENT)
Dept: ENDOSCOPY | Facility: HOSPITAL | Age: 73
End: 2018-03-01

## 2018-03-01 DIAGNOSIS — Z12.11 SPECIAL SCREENING FOR MALIGNANT NEOPLASMS, COLON: Primary | ICD-10-CM

## 2018-03-01 RX ORDER — POLYETHYLENE GLYCOL 3350, SODIUM SULFATE ANHYDROUS, SODIUM BICARBONATE, SODIUM CHLORIDE, POTASSIUM CHLORIDE 236; 22.74; 6.74; 5.86; 2.97 G/4L; G/4L; G/4L; G/4L; G/4L
4 POWDER, FOR SOLUTION ORAL ONCE
Qty: 4000 ML | Refills: 0 | Status: SHIPPED | OUTPATIENT
Start: 2018-03-01 | End: 2018-03-01

## 2018-03-01 NOTE — PROGRESS NOTES
Subjective:       Patient ID: Lina Davis is a 72 y.o. female.    Chief Complaint: Follow-up    HPIPt with persistent nausea with weight loss and vomiting and diarrhea.  Looks dry on labs.  Was very anemic when in hospital with gram neg sepsis.  Better today no N/V since Friday.  Review of Systems   Constitutional: Positive for appetite change and fatigue.   Respiratory: Negative for shortness of breath (PND or orthopnea).    Cardiovascular: Negative for chest pain (arm pain or jaw pain).   Gastrointestinal: Positive for diarrhea, nausea and vomiting. Negative for abdominal pain.   Genitourinary: Negative for dysuria.   Neurological: Negative for seizures, syncope and headaches.       Objective:      Physical Exam   Constitutional: She is oriented to person, place, and time. She appears well-developed and well-nourished. No distress.   HENT:   Head: Normocephalic.   Mouth/Throat: Oropharynx is clear and moist.   Neck: Neck supple. No JVD present. No thyromegaly present.   Cardiovascular: Normal rate, regular rhythm, normal heart sounds and intact distal pulses.  Exam reveals no gallop and no friction rub.    No murmur heard.  Pulmonary/Chest: Effort normal and breath sounds normal. She has no wheezes. She has no rales.   Abdominal: Soft. Bowel sounds are normal. She exhibits no distension and no mass. There is no tenderness. There is no rebound and no guarding.   Genitourinary: Rectal exam shows guaiac positive stool (brown stool).   Musculoskeletal: She exhibits no edema.   Lymphadenopathy:     She has no cervical adenopathy.   Neurological: She is alert and oriented to person, place, and time. She has normal reflexes.   Skin: Skin is warm and dry.   Psychiatric: She has a normal mood and affect. Her behavior is normal. Judgment and thought content normal.       Assessment:       1. Anemia, unspecified type    2. Nausea and vomiting, intractability of vomiting not specified, unspecified vomiting type    3.  Urinary tract infection without hematuria, site unspecified    4. Abnormal results of thyroid function studies         Plan:   Anemia, unspecified type  -     CBC auto differential; Future; Expected date: 02/26/2018  -     Iron and TIBC; Future; Expected date: 02/26/2018  -     Ferritin; Future; Expected date: 02/26/2018  -     Case request GI: ESOPHAGOGASTRODUODENOSCOPY (EGD), COLONOSCOPY    Nausea and vomiting, intractability of vomiting not specified, unspecified vomiting type  -     Comprehensive metabolic panel; Future; Expected date: 02/26/2018  -     TSH; Future; Expected date: 02/26/2018  -     CT Renal Stone Study ABD Pelvis WO; Future; Expected date: 02/26/2018  -     Case request GI: ESOPHAGOGASTRODUODENOSCOPY (EGD), COLONOSCOPY  -     Amylase; Future; Expected date: 02/26/2018  -     Lipase; Future; Expected date: 02/26/2018  Stop metformin due to decreased renal function  Urinary tract infection without hematuria, site unspecified  -     Urinalysis  -     Urine culture  -     Urinalysis; Future; Expected date: 02/26/2018  -     Urine culture; Future; Expected date: 02/26/2018    Abnormal results of thyroid function studies   -     TSH; Future; Expected date: 02/26/2018    Other orders  -     Cancel: Urinalysis  -     Cancel: Urine culture  -     Discontinue: ondansetron (ZOFRAN) 4 MG tablet; Take 1 tablet (4 mg total) by mouth every 8 (eight) hours as needed for Nausea.  Dispense: 20 tablet; Refill: 0  -     losartan (COZAAR) 100 MG tablet; Take 1 tablet (100 mg total) by mouth once daily.  Dispense: 30 tablet; Refill: 3  -     ondansetron (ZOFRAN) 4 MG tablet; Take 1 tablet (4 mg total) by mouth every 8 (eight) hours as needed for Nausea.  Dispense: 20 tablet; Refill: 0  -     hydrocodone-acetaminophen 5-325mg (NORCO) 5-325 mg per tablet; Take 1 tablet by mouth every 24 hours as needed for Pain.  Dispense: 30 tablet; Refill: 0  -     glimepiride (AMARYL) 1 MG tablet; Take 1 tablet (1 mg total) by mouth  before breakfast.  Dispense: 30 tablet; Refill: 3    Eliminate diuretic from her BP med  Stop Metformin  May be pancreatitis from januvia?  CT to look for malignancy  Scope due to early satiety  And anemia  Increase oral fluids   Check to be sure urine is clean now

## 2018-03-01 NOTE — TELEPHONE ENCOUNTER
Pt with pancreatitis very mild she is feeling better today - stop januvia  And metformin - start half mg tablet of glimepiride -please add  get EGD and COlon - please add for 12:30 on 3/12/18 thanks.

## 2018-03-02 ENCOUNTER — PES CALL (OUTPATIENT)
Dept: ADMINISTRATIVE | Facility: CLINIC | Age: 73
End: 2018-03-02

## 2018-03-02 ENCOUNTER — CLINICAL SUPPORT (OUTPATIENT)
Dept: REHABILITATION | Facility: HOSPITAL | Age: 73
End: 2018-03-02
Attending: PHYSICIAN ASSISTANT
Payer: MEDICARE

## 2018-03-02 DIAGNOSIS — M25.611 DECREASED ROM OF RIGHT SHOULDER: ICD-10-CM

## 2018-03-02 DIAGNOSIS — M25.511 ACUTE PAIN OF RIGHT SHOULDER: ICD-10-CM

## 2018-03-02 PROCEDURE — G8978 MOBILITY CURRENT STATUS: HCPCS | Mod: CL

## 2018-03-02 PROCEDURE — 97140 MANUAL THERAPY 1/> REGIONS: CPT

## 2018-03-02 PROCEDURE — G8979 MOBILITY GOAL STATUS: HCPCS | Mod: CJ

## 2018-03-02 PROCEDURE — 97110 THERAPEUTIC EXERCISES: CPT

## 2018-03-02 NOTE — PROGRESS NOTES
Physical Therapy Daily Note     Date: 03/02/2018  Name: Lina Davis  [facility] Number: [id number]  Diagnosis:   Encounter Diagnoses   Name Primary?    Acute pain of right shoulder     Decreased ROM of right shoulder      Physician: MUNIRA Grier MD    Evaluation Date: 10/18/17  Date of Surgery: 10/13/17  Visit #: 20  Start Time:  1000  Stop Time: 1045  Total Treatment Time:  45 min    Precautions: standard, s/p massive RTR (NO AAROM UNTIL 9 WEEKS)      Subjective     Pt reports she threw up this morning, so is still feeling weak.  Pain: 0/10    Objective     Measurements taken:   R shoulder PROM:  Flex: 150deg  Abd: 143deg  ER: 75 deg at 90 deg abd  IR: 45 deg at 90 deg    Patient received group therapy to increase endurance, ROM, flexibility and posture with activities as follows:     Lina received therapeutic exercises to develop strength, endurance, ROM, flexibility and posture for 10 minutes including:   Treatment initiated with MHP prop x10 min  UBE 3'/3'  AROM shoulder flex and abd x5 ea  NP:  Pulleys 3'/3'-np  Table slides x2 min flex-np  Towel isometrics 10 sec x10 4 ways np  3 towel roll x5 min-np  Supine AAROM w/ dowel; flexion/abd/scaption  x 15 (VCs and demonstration needed)-np  Step outs OTB IR and ER 2x15 R (multiple VCs needed)-np  Wall slides with towel x15 (flex and abd)  Dowel shoulder flex 10 sec x10 with 2# dowel      Lina received the following manual therapy techniques applied to the: R shoulder for 25 minutes.   Scapulothoracic motion into abd/add, upward rotation, elevation/depression  AP grade III joint movement R GH   PROM R shoulder flex, abd, IR, ER     Cold pack to R shoulder x 10 min. Skin check clear pre- and post-application.-np    Written Home Exercises Provided:  Pt demo good understanding of the education provided. [place] demonstrated good return demonstration of activities.     Education provided:  Lina verbalized good  understanding of education provided.   No spiritual or educational barriers to learning identified.    PT reviewed FOTO scores for Lina Davis on 3/2/18  FOTO score: 61    Functional Limitations Reports - G Codes  Category: mobility  Tool: FOTO      Current ():  CJ  Goal (): CJ (75)  Discharge ():  NA          Assessment     Patient showing better AROM of R GH joint each visit, but still requires cues to avoid upper trap compensation.  She is demonstrating a good understanding of her HEP to regain functional mobility. Pt will benefit from continued skilled PT services to progress toward functional goals.    This is a 72 y.o. female referred to outpatient physical therapy and presents with a medical diagnosis of s/p R RTR massive and demonstrates limitations as described in the problem list Pt prognosis is Good. Pt will continue to benefit from skilled outpatient physical therapy to address the deficits listed below in the problem list, provide pt/family education and to maximize pt's level of independence in the home and community environment.    Will the patient continue to benefit from skilled PT intervention? yes        Medical necessity is demonstrated by:   - Pain limits function of effected part for all activities  - Unable to participate in daily activities     Progress towards goals: good    New/Revised Goals:none this visit       Plan   Continue with established Plan of Care towards PT goals.      Therapist: Linda Batista, PT, DPT

## 2018-03-06 ENCOUNTER — ANESTHESIA EVENT (OUTPATIENT)
Dept: ENDOSCOPY | Facility: HOSPITAL | Age: 73
End: 2018-03-06
Payer: MEDICARE

## 2018-03-07 ENCOUNTER — SURGERY (OUTPATIENT)
Age: 73
End: 2018-03-07

## 2018-03-07 ENCOUNTER — ANESTHESIA (OUTPATIENT)
Dept: ENDOSCOPY | Facility: HOSPITAL | Age: 73
End: 2018-03-07
Payer: MEDICARE

## 2018-03-07 ENCOUNTER — HOSPITAL ENCOUNTER (OUTPATIENT)
Facility: HOSPITAL | Age: 73
Discharge: HOME | End: 2018-03-07
Attending: INTERNAL MEDICINE | Admitting: INTERNAL MEDICINE
Payer: MEDICARE

## 2018-03-07 VITALS
HEART RATE: 84 BPM | BODY MASS INDEX: 27 KG/M2 | TEMPERATURE: 98 F | HEIGHT: 66 IN | DIASTOLIC BLOOD PRESSURE: 72 MMHG | OXYGEN SATURATION: 99 % | SYSTOLIC BLOOD PRESSURE: 142 MMHG | WEIGHT: 168 LBS | RESPIRATION RATE: 18 BRPM

## 2018-03-07 DIAGNOSIS — R11.2 NAUSEA & VOMITING: ICD-10-CM

## 2018-03-07 LAB — POCT GLUCOSE: 162 MG/DL (ref 70–110)

## 2018-03-07 PROCEDURE — D9220A PRA ANESTHESIA: Mod: ,,, | Performed by: ANESTHESIOLOGY

## 2018-03-07 PROCEDURE — 45380 COLONOSCOPY AND BIOPSY: CPT | Performed by: INTERNAL MEDICINE

## 2018-03-07 PROCEDURE — 82962 GLUCOSE BLOOD TEST: CPT | Performed by: INTERNAL MEDICINE

## 2018-03-07 PROCEDURE — 88305 TISSUE EXAM BY PATHOLOGIST: CPT

## 2018-03-07 PROCEDURE — 45380 COLONOSCOPY AND BIOPSY: CPT | Mod: PT,,, | Performed by: INTERNAL MEDICINE

## 2018-03-07 PROCEDURE — 37000008 HC ANESTHESIA 1ST 15 MINUTES: Performed by: INTERNAL MEDICINE

## 2018-03-07 PROCEDURE — 88305 TISSUE EXAM BY PATHOLOGIST: CPT | Mod: 26,,,

## 2018-03-07 PROCEDURE — 37000009 HC ANESTHESIA EA ADD 15 MINS: Performed by: INTERNAL MEDICINE

## 2018-03-07 PROCEDURE — 43235 EGD DIAGNOSTIC BRUSH WASH: CPT | Mod: 51,,, | Performed by: INTERNAL MEDICINE

## 2018-03-07 PROCEDURE — 63600175 PHARM REV CODE 636 W HCPCS: Performed by: NURSE ANESTHETIST, CERTIFIED REGISTERED

## 2018-03-07 PROCEDURE — 43235 EGD DIAGNOSTIC BRUSH WASH: CPT | Performed by: INTERNAL MEDICINE

## 2018-03-07 PROCEDURE — 25000003 PHARM REV CODE 250: Performed by: INTERNAL MEDICINE

## 2018-03-07 RX ORDER — PROPOFOL 10 MG/ML
VIAL (ML) INTRAVENOUS CONTINUOUS PRN
Status: DISCONTINUED | OUTPATIENT
Start: 2018-03-07 | End: 2018-03-07

## 2018-03-07 RX ORDER — SODIUM CHLORIDE 0.9 % (FLUSH) 0.9 %
3 SYRINGE (ML) INJECTION
Status: DISCONTINUED | OUTPATIENT
Start: 2018-03-07 | End: 2018-03-07 | Stop reason: HOSPADM

## 2018-03-07 RX ORDER — SODIUM CHLORIDE 9 MG/ML
INJECTION, SOLUTION INTRAVENOUS CONTINUOUS
Status: DISCONTINUED | OUTPATIENT
Start: 2018-03-07 | End: 2018-03-07 | Stop reason: HOSPADM

## 2018-03-07 RX ORDER — LIDOCAINE HCL/PF 100 MG/5ML
SYRINGE (ML) INTRAVENOUS
Status: DISCONTINUED | OUTPATIENT
Start: 2018-03-07 | End: 2018-03-07

## 2018-03-07 RX ORDER — PROPOFOL 10 MG/ML
VIAL (ML) INTRAVENOUS
Status: DISCONTINUED | OUTPATIENT
Start: 2018-03-07 | End: 2018-03-07

## 2018-03-07 RX ADMIN — PROPOFOL 40 MG: 10 INJECTION, EMULSION INTRAVENOUS at 10:03

## 2018-03-07 RX ADMIN — LIDOCAINE HYDROCHLORIDE 60 MG: 20 INJECTION, SOLUTION INTRAVENOUS at 10:03

## 2018-03-07 RX ADMIN — SODIUM CHLORIDE: 0.9 INJECTION, SOLUTION INTRAVENOUS at 09:03

## 2018-03-07 RX ADMIN — PROPOFOL 150 MCG/KG/MIN: 10 INJECTION, EMULSION INTRAVENOUS at 10:03

## 2018-03-07 NOTE — ANESTHESIA PREPROCEDURE EVALUATION
03/07/2018  Lina Davis is a 72 y.o., female.    Pre-op Assessment    I have reviewed the Patient Summary Reports.     I have reviewed the Nursing Notes.   I have reviewed the Medications.     Review of Systems  Anesthesia Hx:  No problems with previous Anesthesia  Denies Family Hx of Anesthesia complications.   Denies Personal Hx of Anesthesia complications.   Social:  Non-Smoker    Hematology/Oncology:     Oncology Normal    -- Anemia (hct 28.9):   EENT/Dental:EENT/Dental Normal   Cardiovascular:   Exercise tolerance: good Hypertension, well controlled Very active. Cuts grass at home.   Pulmonary:  Pulmonary Normal    Renal/:  Renal/ Normal     Hepatic/GI:   GERD    Musculoskeletal:   Arthritis     Neurological:   Neuromuscular Disease, polyneuropathy  Peripheral Neuropathy    Endocrine:   Diabetes, well controlled, type 2    Dermatological:  Skin Normal    Psych:  Psychiatric Normal           Physical Exam  General:  Well nourished    Airway/Jaw/Neck:  Airway Findings: Mouth Opening: Normal Tongue: Normal  General Airway Assessment: Adult  Mallampati: I  TM Distance: Normal, at least 6 cm  Jaw/Neck Findings:  Neck ROM: Normal ROM      Dental:  Dental Findings: In tact             Anesthesia Plan  Type of Anesthesia, risks & benefits discussed:  Anesthesia Type:  general  Patient's Preference: ga  Intra-op Monitoring Plan: standard ASA monitors  Intra-op Monitoring Plan Comments:   Post Op Pain Control Plan:   Post Op Pain Control Plan Comments:   Induction:   IV  Beta Blocker:  Patient is not currently on a Beta-Blocker (No further documentation required).       Informed Consent: Patient understands risks and agrees with Anesthesia plan.  Questions answered. Anesthesia consent signed with patient.  ASA Score: 3     Day of Surgery Review of History & Physical:    H&P update referred to the provider.          Ready For Surgery From Anesthesia Perspective.

## 2018-03-07 NOTE — H&P
Short Stay Endoscopy History and Physical    PCP - Dora Freedman MD  Referring Physician - Dora Freedman MD  [address] [place] [address]    Procedure - egd/colonoscopy  ASA - per anesthesia  Mallampati - per anesthesia  History of Anesthesia problems - no  Family history Anesthesia problems -  no   Plan of anesthesia - General    HPI:  This is a 72 y.o. 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    Surgical History:  has a past surgical history that includes Rotator cuff repair; Colonoscopy w/ biopsies and polypectomy; Carpal tunnel release; Hemorrhoid surgery; Hysterectomy; and Shoulder surgery.    Family History: family history includes Benign prostatic hyperplasia in her brother; COPD in her brother and brother; Cancer in her father; Cataracts in her sister; Diabetes in her mother, sister, and sister; Drug abuse in her brother; Gout in her brother; Heart attack in her brother and brother; Heart disease in her brother, brother, father, and mother; Kidney disease in her brother and brother; Kidney failure in her brother; Lupus in her sister; No Known Problems in her daughter and son..    Social History:  reports that she has never smoked. She has never used smokeless tobacco. She reports that she does not drink alcohol or use drugs.    Review of patient's allergies indicates:   Allergen Reactions    No known drug  allergies        Medications:   Prescriptions Prior to Admission   Medication Sig Dispense Refill Last Dose    acetaminophen (TYLENOL) 325 MG tablet Take 325 mg by mouth every 6 (six) hours as needed for Pain.   Past Week at Unknown time    acetaminophen-codeine 300-30mg (TYLENOL #3) 300-30 mg Tab Take 1 tablet by mouth every 8 (eight) hours as needed. 60 tablet 0 Past Week at Unknown time    allopurinol (ZYLOPRIM) 300 MG tablet 1 Tablet Oral Every day 90 tablet 3 3/6/2018 at Unknown time    diazePAM (VALIUM) 5 MG tablet Take 1 tablet (5 mg total) by mouth every 8 (eight) hours as needed (muscle spasms). May repeat if necessary 40 tablet 0 Past Month at Unknown time    gabapentin (NEURONTIN) 100 MG capsule Take 1 capsule (100 mg total) by mouth every evening. 30 capsule 11 Past Week at Unknown time    glimepiride (AMARYL) 1 MG tablet Take 1 tablet (1 mg total) by mouth before breakfast. 30 tablet 3 Past Week at Unknown time    hydrocodone-acetaminophen 5-325mg (NORCO) 5-325 mg per tablet Take 1 tablet by mouth every 24 hours as needed for Pain. 30 tablet 0 Past Week at Unknown time    losartan (COZAAR) 100 MG tablet Take 1 tablet (100 mg total) by mouth once daily. 30 tablet 3 3/7/2018 at Unknown time    lovastatin (MEVACOR) 40 MG tablet TAKE 1 TABLET EVERY EVENING 90 tablet 3 3/6/2018 at Unknown time    omeprazole (PRILOSEC) 20 MG capsule One capsule twice daily 180 capsule 3 Past Week at Unknown time    ondansetron (ZOFRAN) 4 MG tablet Take 1 tablet (4 mg total) by mouth every 8 (eight) hours as needed for Nausea. 20 tablet 0 Past Week at Unknown time    promethazine (PHENERGAN) 12.5 MG Tab Take 1 tablet (12.5 mg total) by mouth every 6 (six) hours as needed. 8 tablet 0 Past Week at Unknown time    sitagliptin (JANUVIA) 100 MG Tab TAKE 1 TABLET ONE TIME DAILY 90 tablet 3 Past Week at Unknown time    alcohol swabs (BD ALCOHOL SWABS) PadM USE AS DIRECTED TO MONITOR BLOOD SUGARS TWICE DAILY 200 each 3  Unknown at Unknown time    benzonatate (TESSALON) 100 MG capsule TAKE 1 CAPSULE THREE TIMES DAILY AS NEEDED FOR COUGH 30 capsule 0 More than a month at Unknown time    blood glucose control, normal Soln True Metrix control solution E11.9 - pt tests twice daily 1 each 1 Unknown at Unknown time    blood sugar diagnostic (TRUE METRIX GLUCOSE TEST STRIP) Strp Please give True metrix test strips and lancets to monitor blood sugars twice daily E11.21 200 each 2 Unknown at Unknown time    lancets (TRUEPLUS LANCETS) 28 gauge Misc 1 lancet by Misc.(Non-Drug; Combo Route) route 2 (two) times daily. 200 each 2 Unknown at Unknown time       Physical Exam:    Vital Signs:   Vitals:    03/07/18 0924   BP: (!) 186/84   Pulse: 105   Resp: 18   Temp: 97.7 °F (36.5 °C)       General Appearance: Well appearing in no acute distress  Eyes:    No scleral icterus  ENT: Neck supple  Lungs: CTA anteriorly  Heart:  Regular rate  Abdomen: Soft, non tender, non distended with normal bowel sounds.  Extremities: No edema  Skin: No rash    Labs:  Lab Results   Component Value Date    WBC 7.59 02/27/2018    HGB 9.2 (L) 02/27/2018    HCT 28.9 (L) 02/27/2018     02/27/2018    CHOL 155 11/08/2016    TRIG 108 11/08/2016    HDL 48 11/08/2016    ALT 6 (L) 02/27/2018    AST 11 02/27/2018     02/27/2018    K 5.2 (H) 02/27/2018     02/27/2018    CREATININE 1.4 02/27/2018    BUN 33 (H) 02/27/2018    CO2 22 (L) 02/27/2018    TSH 1.319 02/27/2018    INR 1.0 02/05/2018    HGBA1C 4.8 02/22/2018       I have explained the risks and benefits of this endoscopic procedure to the patient including but not limited to bleeding, inflammation, infection, perforation, and death.      Luís Soni MD

## 2018-03-07 NOTE — ANESTHESIA POSTPROCEDURE EVALUATION
"Anesthesia Post Evaluation    Patient: Lina Davis    Procedure(s) Performed: Procedure(s) (LRB):  ESOPHAGOGASTRODUODENOSCOPY (EGD) (N/A)  COLONOSCOPY (N/A)    Final Anesthesia Type: general  Patient location during evaluation: [facility]  Patient participation: Yes- Able to Participate  Level of consciousness: awake and alert  Post-procedure vital signs: reviewed and stable  Pain management: adequate  Airway patency: patent  PONV status at discharge: No PONV  Anesthetic complications: no      Cardiovascular status: blood pressure returned to baseline and stable  Respiratory status: unassisted, spontaneous ventilation and room air  Hydration status: euvolemic  Follow-up not needed.        Visit Vitals  BP (!) 142/72   Pulse 84   Temp 36.7 °C (98.1 °F) (Skin)   Resp 18   Ht 5' 6" (1.676 m)   Wt 76.2 kg (168 lb)   SpO2 99%   Breastfeeding? No   BMI 27.12 kg/m²       Pain/Adriana Score: Pain Assessment Performed: Yes (3/7/2018 11:45 AM)  Presence of Pain: complains of pain/discomfort (3/7/2018 11:45 AM)  Adriana Score: 10 (3/7/2018 11:45 AM)      "

## 2018-03-07 NOTE — PROVATION PATIENT INSTRUCTIONS
Discharge Summary/Instructions after an Endoscopic Procedure  Patient Name: Lina Davis  Patient MRN: [id number]  Patient YOB: 1945 Wednesday, March 07, 2018  Luís Soni MD  RESTRICTIONS:  During your procedure today, you received medications for sedation.  These   medications may affect your judgment, balance and coordination.  Therefore,   for 24 hours, you have the following restrictions:   - DO NOT drive a car, operate machinery, make legal/financial decisions,   sign important papers or drink alcohol.    ACTIVITY:  The following day: return to full activity including work, except no heavy   lifting, straining or running for 3 days if polyps were removed.  DIET:  Eat and drink normally unless instructed otherwise.     TREATMENT FOR COMMON SIDE EFFECTS:  - Mild abdominal pain, nausea, belching, bloating or excessive gas:  rest,   eat lightly and use a heating pad.  - Sore Throat: treat with throat lozenges and/or gargle with warm salt   water.  - Because air was used during the procedure, expelling large amounts of air   from your rectum or belching is normal.  - If a bowel prep was taken, you may not have a bowel movement for 1-3 days.    This is normal.  SYMPTOMS TO WATCH FOR AND REPORT TO YOUR PHYSICIAN:  1. Abdominal pain or bloating, other than gas cramps.  2. Chest pain.  3. Back pain.  4. Signs of infection such as: chills or fever occurring within 24 hours   after the procedure.  5. Rectal bleeding, which would show as bright red, maroon, or black stools.   (A tablespoon of blood from the rectum is not serious, especially if   hemorrhoids are present.)  6. Vomiting.  7. Weakness or dizziness.  GO DIRECTLY TO THE NEAREST EMERGENCY ROOM IF YOU HAVE ANY OF THE FOLLOWING:      Difficulty breathing  Chills and/or fever over 101 F   Persistent vomiting and/or vomiting blood   Severe abdominal pain   Severe chest pain   Black, tarry stools   Bleeding- more than one tablespoon   Any other symptom or  condition that you feel may need urgent attention  Your doctor recommends these additional instructions:  If any biopsies were taken, your doctors clinic will contact you in 1 to 2   weeks with any results.  You are being discharged to home.   Resume your previous diet.   Your physician has recommended a repeat colonoscopy in five years for   surveillance.   Return to your referring physician as previously scheduled.   Continue your present medications.  For questions, problems or results please call your physician - Luís Soni MD at Work:  ( [phone].  [facility], EMERGENCY ROOM PHONE NUMBER: [phone]  IF A COMPLICATION OR EMERGENCY SITUATION ARISES AND YOU ARE UNABLE TO REACH   YOUR PHYSICIAN - GO DIRECTLY TO THE EMERGENCY ROOM.  Luís Soni MD  3/7/2018 11:11:56 AM  This report has been verified and signed electronically.

## 2018-03-07 NOTE — PROVATION PATIENT INSTRUCTIONS
Discharge Summary/Instructions after an Endoscopic Procedure  Patient Name: Lina Davis  Patient MRN: [id number]  Patient YOB: 1945 Wednesday, March 07, 2018  Luís Soni MD  RESTRICTIONS:  During your procedure today, you received medications for sedation.  These   medications may affect your judgment, balance and coordination.  Therefore,   for 24 hours, you have the following restrictions:   - DO NOT drive a car, operate machinery, make legal/financial decisions,   sign important papers or drink alcohol.    ACTIVITY:  The following day: return to full activity including work, except no heavy   lifting, straining or running for 3 days if polyps were removed.  DIET:  Eat and drink normally unless instructed otherwise.     TREATMENT FOR COMMON SIDE EFFECTS:  - Mild abdominal pain, nausea, belching, bloating or excessive gas:  rest,   eat lightly and use a heating pad.  - Sore Throat: treat with throat lozenges and/or gargle with warm salt   water.  - Because air was used during the procedure, expelling large amounts of air   from your rectum or belching is normal.  - If a bowel prep was taken, you may not have a bowel movement for 1-3 days.    This is normal.  SYMPTOMS TO WATCH FOR AND REPORT TO YOUR PHYSICIAN:  1. Abdominal pain or bloating, other than gas cramps.  2. Chest pain.  3. Back pain.  4. Signs of infection such as: chills or fever occurring within 24 hours   after the procedure.  5. Rectal bleeding, which would show as bright red, maroon, or black stools.   (A tablespoon of blood from the rectum is not serious, especially if   hemorrhoids are present.)  6. Vomiting.  7. Weakness or dizziness.  GO DIRECTLY TO THE NEAREST EMERGENCY ROOM IF YOU HAVE ANY OF THE FOLLOWING:      Difficulty breathing  Chills and/or fever over 101 F   Persistent vomiting and/or vomiting blood   Severe abdominal pain   Severe chest pain   Black, tarry stools   Bleeding- more than one tablespoon   Any other symptom or  condition that you feel may need urgent attention  Your doctor recommends these additional instructions:  If any biopsies were taken, your doctors clinic will contact you in 1 to 2   weeks with any results.  You are being discharged to home.   Resume your previous diet.   Continue your present medications.   Return to your referring physician as previously scheduled.  For questions, problems or results please call your physician - Luís Soni MD at Work:  ( [phone].  [facility], EMERGENCY ROOM PHONE NUMBER: [phone]  IF A COMPLICATION OR EMERGENCY SITUATION ARISES AND YOU ARE UNABLE TO REACH   YOUR PHYSICIAN - GO DIRECTLY TO THE EMERGENCY ROOM.  Luís Soni MD  3/7/2018 11:04:42 AM  This report has been verified and signed electronically.

## 2018-03-07 NOTE — PLAN OF CARE
Discharge instructions reviewed with pt and . Understanding verbalized. MD Soni to bedside to discuss findings. Reports pain to shoulder to be tolerable. Able to tolerate PO intake. To be transported to car by PCT.

## 2018-03-08 LAB — POCT GLUCOSE: 166 MG/DL (ref 70–110)

## 2018-03-09 ENCOUNTER — CLINICAL SUPPORT (OUTPATIENT)
Dept: REHABILITATION | Facility: HOSPITAL | Age: 73
End: 2018-03-09
Attending: PHYSICIAN ASSISTANT
Payer: MEDICARE

## 2018-03-09 DIAGNOSIS — M25.511 ACUTE PAIN OF RIGHT SHOULDER: ICD-10-CM

## 2018-03-09 DIAGNOSIS — M25.611 DECREASED ROM OF RIGHT SHOULDER: ICD-10-CM

## 2018-03-09 PROCEDURE — 97110 THERAPEUTIC EXERCISES: CPT

## 2018-03-09 PROCEDURE — 97140 MANUAL THERAPY 1/> REGIONS: CPT

## 2018-03-09 NOTE — PROGRESS NOTES
Physical Therapy Daily Note     Date: 03/09/2018  Name: Lina Davis  [facility] Number: [id number]  Diagnosis:   Encounter Diagnoses   Name Primary?    Acute pain of right shoulder     Decreased ROM of right shoulder      Physician: MUNIRA Grier MD    Evaluation Date: 10/18/17  Date of Surgery: 10/13/17  Visit #: 21  Start Time:  1105  Stop Time: 1150  Total Treatment Time:  45 min    Precautions: standard, s/p massive RTR (NO AAROM UNTIL 9 WEEKS)      Subjective     Pt reports she is feeling better today.  Pain: 0/10    Objective     Measurements taken:   R shoulder PROM:  Flex: 160 deg  Abd: 160 deg  ER: 75 deg at 90 deg abd  IR: 50 deg at 90 deg    Patient received group therapy to increase endurance, ROM, flexibility and posture with activities as follows:     Lina received therapeutic exercises to develop strength, endurance, ROM, flexibility and posture for 35 minutes including:   Treatment initiated with MHP prop x10 min  UBE 3'/3'  AROM shoulder flex and abd x5 ea  Ball on wall roll up 2x 10 with 5 sh  Prone rows, hab and ext x10 R  NP:  3 towel roll x5 min-np  Supine AAROM w/ dowel; flexion/abd/scaption  x 15 (VCs and demonstration needed)-np  Step outs OTB IR and ER 2x15 R (multiple VCs needed)-np  Wall slides with towel x15 (flex and abd)  Dowel shoulder flex 10 sec x10 with 2# dowel      Emmalena received the following manual therapy techniques applied to the: R shoulder for 10 minutes.   Scapulothoracic motion into abd/add, upward rotation, elevation/depression  AP grade III joint movement R GH   PROM R shoulder flex, abd, IR, ER     Cold pack to R shoulder x 10 min. Skin check clear pre- and post-application.-np    Written Home Exercises Provided:  Pt demo good understanding of the education provided. Lina demonstrated good return demonstration of activities.     Education provided:  Emmalena verbalized good understanding of education provided.   No spiritual  or educational barriers to learning identified.    PT reviewed FOTO scores for Lina Davis on 3/2/18  FOTO score: 61    Functional Limitations Reports - G Codes  Category: mobility  Tool: FOTO      Current ():  CJ  Goal (): CJ (77)  Discharge ():  NA          Assessment     Upon reassessment, Mrs. Davis is showing better AROM of R shoulder in flexion and abduction. She improved PROM this visit and was able to tolerate more exercises compared to previous visits secondary to GI  Issues.   She is demonstrating a good understanding of her HEP to regain functional mobility. Pt will benefit from continued skilled PT services to progress toward functional goals.    This is a 72 y.o. female referred to outpatient physical therapy and presents with a medical diagnosis of s/p R RTR massive and demonstrates limitations as described in the problem list Pt prognosis is Good. Pt will continue to benefit from skilled outpatient physical therapy to address the deficits listed below in the problem list, provide pt/family education and to maximize pt's level of independence in the home and community environment.    Will the patient continue to benefit from skilled PT intervention? yes        Medical necessity is demonstrated by:   - Pain limits function of effected part for all activities  - Unable to participate in daily activities     Progress towards goals: good    New/Revised Goals:none this visit       Plan   Continue with established Plan of Care towards PT goals.      Therapist: Linda Batista, PT, DPT

## 2018-03-12 ENCOUNTER — LAB VISIT (OUTPATIENT)
Dept: LAB | Facility: HOSPITAL | Age: 73
End: 2018-03-12
Attending: INTERNAL MEDICINE
Payer: MEDICARE

## 2018-03-12 ENCOUNTER — OFFICE VISIT (OUTPATIENT)
Dept: INTERNAL MEDICINE | Facility: CLINIC | Age: 73
End: 2018-03-12
Payer: MEDICARE

## 2018-03-12 ENCOUNTER — PATIENT MESSAGE (OUTPATIENT)
Dept: GASTROENTEROLOGY | Facility: HOSPITAL | Age: 73
End: 2018-03-12

## 2018-03-12 VITALS
HEIGHT: 66 IN | SYSTOLIC BLOOD PRESSURE: 120 MMHG | OXYGEN SATURATION: 98 % | HEART RATE: 95 BPM | BODY MASS INDEX: 28.45 KG/M2 | DIASTOLIC BLOOD PRESSURE: 70 MMHG | WEIGHT: 177 LBS

## 2018-03-12 DIAGNOSIS — E78.2 HYPERLIPIDEMIA, MIXED: Primary | ICD-10-CM

## 2018-03-12 DIAGNOSIS — Z12.31 SCREENING MAMMOGRAM, ENCOUNTER FOR: ICD-10-CM

## 2018-03-12 DIAGNOSIS — K85.30 DRUG-INDUCED ACUTE PANCREATITIS, UNSPECIFIED COMPLICATION STATUS: ICD-10-CM

## 2018-03-12 DIAGNOSIS — E11.49 TYPE II DIABETES MELLITUS WITH NEUROLOGICAL MANIFESTATIONS: ICD-10-CM

## 2018-03-12 DIAGNOSIS — I10 HTN (HYPERTENSION), BENIGN: ICD-10-CM

## 2018-03-12 LAB
ALBUMIN SERPL BCP-MCNC: 3.8 G/DL
ALP SERPL-CCNC: 85 U/L
ALT SERPL W/O P-5'-P-CCNC: 7 U/L
AMYLASE SERPL-CCNC: 59 U/L
ANION GAP SERPL CALC-SCNC: 6 MMOL/L
AST SERPL-CCNC: 13 U/L
BASOPHILS # BLD AUTO: 0.03 K/UL
BASOPHILS NFR BLD: 0.4 %
BILIRUB SERPL-MCNC: 0.3 MG/DL
BUN SERPL-MCNC: 20 MG/DL
CALCIUM SERPL-MCNC: 10.1 MG/DL
CHLORIDE SERPL-SCNC: 108 MMOL/L
CO2 SERPL-SCNC: 27 MMOL/L
CREAT SERPL-MCNC: 0.9 MG/DL
DIFFERENTIAL METHOD: ABNORMAL
EOSINOPHIL # BLD AUTO: 0.1 K/UL
EOSINOPHIL NFR BLD: 1.2 %
ERYTHROCYTE [DISTWIDTH] IN BLOOD BY AUTOMATED COUNT: 16 %
EST. GFR  (AFRICAN AMERICAN): >60 ML/MIN/1.73 M^2
EST. GFR  (NON AFRICAN AMERICAN): >60 ML/MIN/1.73 M^2
GLUCOSE SERPL-MCNC: 154 MG/DL
HCT VFR BLD AUTO: 28.9 %
HGB BLD-MCNC: 9.4 G/DL
IMM GRANULOCYTES # BLD AUTO: 0.04 K/UL
IMM GRANULOCYTES NFR BLD AUTO: 0.5 %
LIPASE SERPL-CCNC: 39 U/L
LYMPHOCYTES # BLD AUTO: 2 K/UL
LYMPHOCYTES NFR BLD: 25.7 %
MCH RBC QN AUTO: 27.7 PG
MCHC RBC AUTO-ENTMCNC: 32.5 G/DL
MCV RBC AUTO: 85 FL
MONOCYTES # BLD AUTO: 0.5 K/UL
MONOCYTES NFR BLD: 5.8 %
NEUTROPHILS # BLD AUTO: 5.2 K/UL
NEUTROPHILS NFR BLD: 66.4 %
NRBC BLD-RTO: 0 /100 WBC
PLATELET # BLD AUTO: 259 K/UL
PMV BLD AUTO: 10.6 FL
POTASSIUM SERPL-SCNC: 4.1 MMOL/L
PROT SERPL-MCNC: 7.6 G/DL
RBC # BLD AUTO: 3.39 M/UL
SODIUM SERPL-SCNC: 141 MMOL/L
WBC # BLD AUTO: 7.82 K/UL

## 2018-03-12 PROCEDURE — 99499 UNLISTED E&M SERVICE: CPT | Mod: S$GLB,,, | Performed by: INTERNAL MEDICINE

## 2018-03-12 PROCEDURE — 99999 PR PBB SHADOW E&M-EST. PATIENT-LVL IV: CPT | Mod: PBBFAC,,, | Performed by: INTERNAL MEDICINE

## 2018-03-12 PROCEDURE — 3078F DIAST BP <80 MM HG: CPT | Mod: CPTII,S$GLB,, | Performed by: INTERNAL MEDICINE

## 2018-03-12 PROCEDURE — 80053 COMPREHEN METABOLIC PANEL: CPT

## 2018-03-12 PROCEDURE — 82150 ASSAY OF AMYLASE: CPT

## 2018-03-12 PROCEDURE — 36415 COLL VENOUS BLD VENIPUNCTURE: CPT | Mod: PO

## 2018-03-12 PROCEDURE — 3074F SYST BP LT 130 MM HG: CPT | Mod: CPTII,S$GLB,, | Performed by: INTERNAL MEDICINE

## 2018-03-12 PROCEDURE — 99213 OFFICE O/P EST LOW 20 MIN: CPT | Mod: S$GLB,,, | Performed by: INTERNAL MEDICINE

## 2018-03-12 PROCEDURE — 83690 ASSAY OF LIPASE: CPT

## 2018-03-12 PROCEDURE — 3044F HG A1C LEVEL LT 7.0%: CPT | Mod: CPTII,S$GLB,, | Performed by: INTERNAL MEDICINE

## 2018-03-12 PROCEDURE — 85025 COMPLETE CBC W/AUTO DIFF WBC: CPT

## 2018-03-12 RX ORDER — LOSARTAN POTASSIUM 100 MG/1
100 TABLET ORAL DAILY
Qty: 90 TABLET | Refills: 3 | Status: SHIPPED | OUTPATIENT
Start: 2018-03-12 | End: 2018-12-27 | Stop reason: SDUPTHER

## 2018-03-12 RX ORDER — LOSARTAN POTASSIUM 100 MG/1
100 TABLET ORAL DAILY
Qty: 30 TABLET | Refills: 3 | Status: SHIPPED | OUTPATIENT
Start: 2018-03-12 | End: 2018-03-12 | Stop reason: SDUPTHER

## 2018-03-13 ENCOUNTER — HOSPITAL ENCOUNTER (OUTPATIENT)
Dept: RADIOLOGY | Facility: HOSPITAL | Age: 73
Discharge: HOME | End: 2018-03-13
Attending: INTERNAL MEDICINE
Payer: MEDICARE

## 2018-03-13 DIAGNOSIS — Z12.31 SCREENING MAMMOGRAM, ENCOUNTER FOR: ICD-10-CM

## 2018-03-13 PROCEDURE — 77063 BREAST TOMOSYNTHESIS BI: CPT | Mod: 26,,, | Performed by: RADIOLOGY

## 2018-03-13 PROCEDURE — 77067 SCR MAMMO BI INCL CAD: CPT | Mod: TC

## 2018-03-13 PROCEDURE — 77067 SCR MAMMO BI INCL CAD: CPT | Mod: 26,,, | Performed by: RADIOLOGY

## 2018-03-14 ENCOUNTER — CLINICAL SUPPORT (OUTPATIENT)
Dept: REHABILITATION | Facility: HOSPITAL | Age: 73
End: 2018-03-14
Attending: PHYSICIAN ASSISTANT
Payer: MEDICARE

## 2018-03-14 DIAGNOSIS — M25.511 ACUTE PAIN OF RIGHT SHOULDER: ICD-10-CM

## 2018-03-14 DIAGNOSIS — M25.611 DECREASED ROM OF RIGHT SHOULDER: ICD-10-CM

## 2018-03-14 PROCEDURE — 97140 MANUAL THERAPY 1/> REGIONS: CPT

## 2018-03-14 PROCEDURE — 97110 THERAPEUTIC EXERCISES: CPT

## 2018-03-14 NOTE — PROGRESS NOTES
Physical Therapy Daily Note     Date: 03/14/2018  Name: Lina Davis  [facility] Number: [id number]  Diagnosis:   Encounter Diagnoses   Name Primary?    Acute pain of right shoulder     Decreased ROM of right shoulder      Physician: MUNIRA Grier MD    Evaluation Date: 10/18/17  Date of Surgery: 10/13/17  Visit #: 22  Start Time:  1100  Stop Time: 1150  Total Treatment Time:  50 min    Precautions: standard, s/p massive RTR (NO AAROM UNTIL 9 WEEKS)      Subjective     Pt reports she can move her arm further.  Pain: 0/10    Objective     Measurements taken:   R shoulder PROM:  Flex: 160 deg  Abd: 160 deg  ER: 75 deg at 90 deg abd  IR: 50 deg at 90 deg    Patient received group therapy to increase endurance, ROM, flexibility and posture with activities as follows:     Lina received therapeutic exercises to develop strength, endurance, ROM, flexibility and posture for 35 minutes including:   Treatment initiated with MHP prop x10 min-np  UBE 3'/3'  AROM shoulder flex and abd x5 ea-np  Ball on wall roll up 2x 10 with 5 sh-np  Prone rows, hab and ext x10 R  Wall slides with towel x15 (flex and abd)  IR/ER OTB 3x 10 R  SL ER     NP:  3 towel roll x5 min-np  Supine AAROM w/ dowel; flexion/abd/scaption  x 15 (VCs and demonstration needed)-np  Step outs OTB IR and ER 2x15 R (multiple VCs needed)-np    Dowel shoulder flex 10 sec x10 with 2# dowel      Lina received the following manual therapy techniques applied to the: R shoulder for 10 minutes.   Scapulothoracic motion into abd/add, upward rotation, elevation/depression  AP grade III joint movement R GH   PROM R shoulder flex, abd, IR, ER     Cold pack to R shoulder x 10 min. Skin check clear pre- and post-application.-np    Written Home Exercises Provided:  Pt demo good understanding of the education provided. Lina demonstrated good return demonstration of activities.     Education provided:  Lina verbalized good  understanding of education provided.   No spiritual or educational barriers to learning identified.    PT reviewed FOTO scores for Lina Davis on 3/2/18  FOTO score: 61    Functional Limitations Reports - G Codes  Category: mobility  Tool: FOTO      Current ():  CJ  Goal (): CJ (77)  Discharge ():  NA          Assessment     Patient showing improved AROM and PROM this visit. She is also able to tolerate more exercises again indicating improved endurance. She is demonstrating a good understanding of her HEP to regain functional mobility. Pt will benefit from continued skilled PT services to progress toward functional goals.    This is a 72 y.o. female referred to outpatient physical therapy and presents with a medical diagnosis of s/p R RTR massive and demonstrates limitations as described in the problem list Pt prognosis is Good. Pt will continue to benefit from skilled outpatient physical therapy to address the deficits listed below in the problem list, provide pt/family education and to maximize pt's level of independence in the home and community environment.    Will the patient continue to benefit from skilled PT intervention? yes        Medical necessity is demonstrated by:   - Pain limits function of effected part for all activities  - Unable to participate in daily activities     Progress towards goals: good    New/Revised Goals:none this visit       Plan   Continue with established Plan of Care towards PT goals.      Therapist: Linda Batista, PT, DPT

## 2018-03-16 ENCOUNTER — CLINICAL SUPPORT (OUTPATIENT)
Dept: REHABILITATION | Facility: HOSPITAL | Age: 73
End: 2018-03-16
Attending: PHYSICIAN ASSISTANT
Payer: MEDICARE

## 2018-03-16 DIAGNOSIS — M25.611 DECREASED ROM OF RIGHT SHOULDER: ICD-10-CM

## 2018-03-16 DIAGNOSIS — M25.511 ACUTE PAIN OF RIGHT SHOULDER: ICD-10-CM

## 2018-03-16 PROCEDURE — 97140 MANUAL THERAPY 1/> REGIONS: CPT

## 2018-03-16 PROCEDURE — 97110 THERAPEUTIC EXERCISES: CPT

## 2018-03-16 NOTE — PROGRESS NOTES
Physical Therapy Daily Note     Date: 03/16/2018  Name: Lina Davis  [facility] Number: [id number]  Diagnosis:   Encounter Diagnoses   Name Primary?    Acute pain of right shoulder     Decreased ROM of right shoulder      Physician: MUNIRA Grier MD    Evaluation Date: 10/18/17  Date of Surgery: 10/13/17  Visit #: 23  Start Time:  1100  Stop Time: 1155  Total Treatment Time:  55 min    Precautions: standard, s/p massive RTR (NO AAROM UNTIL 9 WEEKS)      Subjective     Pt reports she feels like her arm is moving better and she is using it more.  Pain: 0/10    Objective     Measurements taken:   R shoulder PROM:  Flex: 160 deg  Abd: 175 deg  ER: 85 deg at 90 deg abd  IR: 50 deg at 90 deg    Patient received group therapy to increase endurance, ROM, flexibility and posture with activities as follows:     Lina received therapeutic exercises to develop strength, endurance, ROM, flexibility and posture for 35 minutes including:   Treatment initiated with MHP prop x10 min-np  UBE 3'/3'  AROM shoulder flex and abd x5 ea-np  Ball on wall roll up 2x 10 with 5 sh-  Prone rows, hab and ext x15 R  Wall slides with towel x15 (1/2 circles R)  IR/ER OTB 3x 10 R  SL ER -np    NP:  3 towel roll x5 min-np  Supine AAROM w/ dowel; flexion/abd/scaption  x 15 (VCs and demonstration needed)-np  Step outs OTB IR and ER 2x15 R (multiple VCs needed)-np        Lina received the following manual therapy techniques applied to the: R shoulder for 10 minutes.   Scapulothoracic motion into abd/add, upward rotation, elevation/depression  AP grade III joint movement R GH   PROM R shoulder flex, abd, IR, ER     Cold pack to R shoulder x 10 min. Skin check clear pre- and post-application.    Written Home Exercises Provided:  Pt demo good understanding of the education provided. Lina demonstrated good return demonstration of activities.     Education provided:  [place] verbalized good understanding of  education provided.   No spiritual or educational barriers to learning identified.    PT reviewed FOTO scores for Lina Davis on 3/2/18  FOTO score: 61    Functional Limitations Reports - G Codes  Category: mobility  Tool: FOTO      Current ():  CJ  Goal (): CJ (77)  Discharge ():  NA          Assessment     Upon reassessment, patient is showing improved muscle strength and endurance. She continues to make less compensation with upper trap and elbow flexion with R shoulder flex and abduction.  She is demonstrating a good understanding of her HEP to regain functional mobility. Pt will benefit from continued skilled PT services to progress toward functional goals.    This is a 72 y.o. female referred to outpatient physical therapy and presents with a medical diagnosis of s/p R RTR massive and demonstrates limitations as described in the problem list Pt prognosis is Good. Pt will continue to benefit from skilled outpatient physical therapy to address the deficits listed below in the problem list, provide pt/family education and to maximize pt's level of independence in the home and community environment.    Will the patient continue to benefit from skilled PT intervention? yes        Medical necessity is demonstrated by:   - Pain limits function of effected part for all activities  - Unable to participate in daily activities     Progress towards goals: good    New/Revised Goals:none this visit       Plan   Continue with established Plan of Care towards PT goals.      Therapist: Linda Batista, PT, DPT

## 2018-03-23 ENCOUNTER — CLINICAL SUPPORT (OUTPATIENT)
Dept: REHABILITATION | Facility: HOSPITAL | Age: 73
End: 2018-03-23
Attending: PHYSICIAN ASSISTANT
Payer: MEDICARE

## 2018-03-23 ENCOUNTER — TELEPHONE (OUTPATIENT)
Dept: INTERNAL MEDICINE | Facility: CLINIC | Age: 73
End: 2018-03-23

## 2018-03-23 DIAGNOSIS — M25.611 DECREASED ROM OF RIGHT SHOULDER: ICD-10-CM

## 2018-03-23 DIAGNOSIS — M25.511 ACUTE PAIN OF RIGHT SHOULDER: ICD-10-CM

## 2018-03-23 PROCEDURE — 97110 THERAPEUTIC EXERCISES: CPT

## 2018-03-23 NOTE — TELEPHONE ENCOUNTER
Patient was told she would be advised how much metformin she should take after her lab results come back. She has not heard anything back yet. She states her sugar is steadily increasing on a daily basis, in the morning. In the morning the past few days it was 180, yesterday- 188. This morning 205. She wants to know how much she should start to take.     Please Advise  Thank You

## 2018-03-23 NOTE — PROGRESS NOTES
Physical Therapy Daily Note     Date: 03/23/2018  Name: Lina Davis  [facility] Number: [id number]  Diagnosis:   Encounter Diagnoses   Name Primary?    Acute pain of right shoulder     Decreased ROM of right shoulder      Physician: MUNIRA Grier MD    Evaluation Date: 10/18/17  Date of Surgery: 10/13/17  Visit #: 24  Start Time:  1100  Stop Time: 1155  Total Treatment Time:  55 min    Precautions: standard, s/p massive RTR (NO AAROM UNTIL 9 WEEKS)      Subjective     Pt reports she is feeling good today.  Pain: 0/10    Objective     Measurements taken:   R shoulder PROM:  Flex: 160 deg  Abd: 175 deg  ER: 85 deg at 90 deg abd  IR: 50 deg at 90 deg    Patient received group therapy to increase endurance, ROM, flexibility and posture with activities as follows:     Lina received therapeutic exercises to develop strength, endurance, ROM, flexibility and posture for 35 minutes including:   Treatment initiated with MHP prop x10 min-np  UBE 4'/4'  AROM shoulder flex and abd x5 ea-np  Ball on wall roll up 2x 10 with 5 sh-  Prone rows, hab and ext x15 R  Wall slides with towel x15 (1/2 circles R)  IR/ER GTB 3x 10 R  SL ER, abd, flex x20 1#     NP:  3 towel roll x5 min-np  Supine AAROM w/ dowel; flexion/abd/scaption  x 15 (VCs and demonstration needed)-np  Step outs OTB IR and ER 2x15 R (multiple VCs needed)-np        Lina received the following manual therapy techniques applied to the: R shoulder for 10 minutes.   Scapulothoracic motion into abd/add, upward rotation, elevation/depression  AP grade III joint movement R GH   PROM R shoulder flex, abd, IR, ER     Cold pack to R shoulder x 10 min. Skin check clear pre- and post-application.    Written Home Exercises Provided:  Pt demo good understanding of the education provided. Lina demonstrated good return demonstration of activities.     Education provided:  [place] verbalized good understanding of education provided.   No  spiritual or educational barriers to learning identified.    PT reviewed FOTO scores for Lina Davis on 3/2/18  FOTO score: 61    Functional Limitations Reports - G Codes  Category: mobility  Tool: FOTO      Current ():  CJ  Goal (): CJ (77)  Discharge ():  NA          Assessment     Patient tolerated sidelying exercises well and would benefit from continued strengthening in anti-gravity.  She continues to make less compensation with upper trap and elbow flexion with R shoulder flex and abduction.  She is demonstrating a good understanding of her HEP to regain functional mobility. Pt will benefit from continued skilled PT services to progress toward functional goals.    This is a 72 y.o. female referred to outpatient physical therapy and presents with a medical diagnosis of s/p R RTR massive and demonstrates limitations as described in the problem list Pt prognosis is Good. Pt will continue to benefit from skilled outpatient physical therapy to address the deficits listed below in the problem list, provide pt/family education and to maximize pt's level of independence in the home and community environment.    Will the patient continue to benefit from skilled PT intervention? yes        Medical necessity is demonstrated by:   - Pain limits function of effected part for all activities  - Unable to participate in daily activities     Progress towards goals: good    New/Revised Goals:none this visit       Plan   Continue with established Plan of Care towards PT goals.      Therapist: Linda Batista, PT, DPT

## 2018-03-23 NOTE — TELEPHONE ENCOUNTER
Sade, review of pt's lab(3/12) showed glucose at 154 and last HgbA1C(2/2018) was 4.8%, which is excellent. I believe pt needs to continue to hold the metformin and continue the glimepiride 1mg daily until Dr Freedman recommends otherwise.  Thanks

## 2018-03-28 ENCOUNTER — TELEPHONE (OUTPATIENT)
Dept: INTERNAL MEDICINE | Facility: CLINIC | Age: 73
End: 2018-03-28

## 2018-03-28 ENCOUNTER — CLINICAL SUPPORT (OUTPATIENT)
Dept: REHABILITATION | Facility: HOSPITAL | Age: 73
End: 2018-03-28
Attending: PHYSICIAN ASSISTANT
Payer: MEDICARE

## 2018-03-28 DIAGNOSIS — M25.611 DECREASED ROM OF RIGHT SHOULDER: Primary | ICD-10-CM

## 2018-03-28 DIAGNOSIS — M25.511 ACUTE PAIN OF RIGHT SHOULDER: ICD-10-CM

## 2018-03-28 PROCEDURE — 97110 THERAPEUTIC EXERCISES: CPT

## 2018-03-28 PROCEDURE — 97140 MANUAL THERAPY 1/> REGIONS: CPT

## 2018-03-28 NOTE — PROGRESS NOTES
Physical Therapy Daily Note     Date: 03/28/2018  Name: Lina Davis  [facility] Number: [id number]  Diagnosis:   Encounter Diagnoses   Name Primary?    Decreased ROM of right shoulder Yes    Acute pain of right shoulder      Physician: MUNIRA Grier MD    Evaluation Date: 10/18/17  Date of Surgery: 10/13/17  Visit #: 25  Start Time:  1100  Stop Time: 1200  Total Treatment Time:  60 min    Precautions: standard, s/p massive RTR (NO AAROM UNTIL 9 WEEKS)      Subjective     Pt reports she is still most limited with overhead activity.  Pain: 0/10    Objective     Measurements taken:   R shoulder PROM:  Flex: 160 deg  Abd: 175 deg  ER: 85 deg at 90 deg abd  IR: 50 deg at 90 deg    Patient received group therapy to increase endurance, ROM, flexibility and posture with activities as follows:     Lina received therapeutic exercises to develop strength, endurance, ROM, flexibility and posture for 40 minutes including:   Treatment initiated with MHP prop x10 min-np  UBE 5'/5'  AROM shoulder flex and abd x5 ea-np  Ball on wall roll up 2x 10 with 5 sh-  Prone rows, hab and ext x15 R  Wall slides with towel x15 (1/2 circles R)  IR/ER GTB 3x 10 R-np  SL ER, abd, flex, hab x30 1#       NP:  3 towel roll x5 min-np  Supine AAROM w/ dowel; flexion/abd/scaption  x 15 (VCs and demonstration needed)-np  Step outs OTB IR and ER 2x15 R (multiple VCs needed)-np        Lina received the following manual therapy techniques applied to the: R shoulder for 10 minutes.   Scapulothoracic motion into abd/add, upward rotation, elevation/depression  AP grade III joint movement R GH   PROM R shoulder flex, abd, IR, ER     Cold pack to R shoulder x 10 min. Skin check clear pre- and post-application.    Written Home Exercises Provided:  Pt demo good understanding of the education provided. Lina demonstrated good return demonstration of activities.     Education provided:  Lina verbalized good  understanding of education provided.   No spiritual or educational barriers to learning identified.    PT reviewed FOTO scores for Lina Davis on 3/2/18  FOTO score: 61    Functional Limitations Reports - G Codes  Category: mobility  Tool: FOTO      Current ():  CJ  Goal (): CJ (77)  Discharge ():  NA          Assessment     Patient showing improved AROM of R shoulder and improved strength. Patient remains most limited with PROM IR and ER strength.  She is demonstrating a good understanding of her HEP to regain functional mobility. Pt will benefit from continued skilled PT services to progress toward functional goals.    This is a 72 y.o. female referred to outpatient physical therapy and presents with a medical diagnosis of s/p R RTR massive and demonstrates limitations as described in the problem list Pt prognosis is Good. Pt will continue to benefit from skilled outpatient physical therapy to address the deficits listed below in the problem list, provide pt/family education and to maximize pt's level of independence in the home and community environment.    Will the patient continue to benefit from skilled PT intervention? yes        Medical necessity is demonstrated by:   - Pain limits function of effected part for all activities  - Unable to participate in daily activities     Progress towards goals: good    New/Revised Goals:none this visit       Plan   Continue with established Plan of Care towards PT goals.      Therapist: Linda Batista, PT, DPT

## 2018-03-29 NOTE — PROGRESS NOTES
Subjective:       Patient ID: Lina Davis is a 72 y.o. female.    Chief Complaint: Follow-up    HPIPt is doing much better - no further stomach pain, no N/V/D.  Has gained back some weight.  Sugars are a bit high.  No CP or SOB.  Review of Systems   Respiratory: Negative for shortness of breath (PND or orthopnea).    Cardiovascular: Negative for chest pain (arm pain or jaw pain).   Gastrointestinal: Negative for abdominal pain, diarrhea, nausea and vomiting.   Genitourinary: Negative for dysuria.   Neurological: Negative for seizures, syncope and headaches.       Objective:      Physical Exam   Constitutional: She is oriented to person, place, and time. She appears well-developed and well-nourished. No distress.   HENT:   Head: Normocephalic.   Mouth/Throat: Oropharynx is clear and moist.   Neck: Neck supple. No JVD present. No thyromegaly present.   Cardiovascular: Normal rate, regular rhythm, normal heart sounds and intact distal pulses.  Exam reveals no gallop and no friction rub.    No murmur heard.  Pulmonary/Chest: Effort normal and breath sounds normal. She has no wheezes. She has no rales.   Abdominal: Soft. Bowel sounds are normal. She exhibits no distension and no mass. There is no tenderness. There is no rebound and no guarding.   Musculoskeletal: She exhibits no edema.   Lymphadenopathy:     She has no cervical adenopathy.   Neurological: She is alert and oriented to person, place, and time. She has normal reflexes.   Skin: Skin is warm and dry.   Psychiatric: She has a normal mood and affect. Her behavior is normal. Judgment and thought content normal.       Assessment:       1. Hyperlipidemia, mixed    2. HTN (hypertension), benign    3. Type II diabetes mellitus with neurological manifestations    4. Screening mammogram, encounter for    5. Drug-induced acute pancreatitis, unspecified complication status        Plan:   Hyperlipidemia, mixed  Controlled - continue current meds    HTN (hypertension),  benign  Controlled - continue current meds    Type II diabetes mellitus with neurological manifestations  Watch sugars may restart one metformin if renal function gets back to baseline.  Screening mammogram, encounter for  -     Mammo Digital Screening Bilat with Tomosynthesis CAD; Future; Expected date: 03/12/2018    Drug-induced acute pancreatitis, unspecified complication status  -     CBC auto differential; Future; Expected date: 03/12/2018  -     Comprehensive metabolic panel; Future; Expected date: 03/12/2018  -     Amylase; Future; Expected date: 03/12/2018  -     Lipase; Future; Expected date: 03/12/2018    Other orders  -     Discontinue: losartan (COZAAR) 100 MG tablet; Take 1 tablet (100 mg total) by mouth once daily.  Dispense: 30 tablet; Refill: 3  -     losartan (COZAAR) 100 MG tablet; Take 1 tablet (100 mg total) by mouth once daily.  Dispense: 90 tablet; Refill: 3

## 2018-04-04 ENCOUNTER — CLINICAL SUPPORT (OUTPATIENT)
Dept: REHABILITATION | Facility: HOSPITAL | Age: 73
End: 2018-04-04
Attending: PHYSICIAN ASSISTANT
Payer: MEDICARE

## 2018-04-04 DIAGNOSIS — M25.611 DECREASED ROM OF RIGHT SHOULDER: ICD-10-CM

## 2018-04-04 DIAGNOSIS — M25.511 ACUTE PAIN OF RIGHT SHOULDER: ICD-10-CM

## 2018-04-04 PROCEDURE — 97110 THERAPEUTIC EXERCISES: CPT

## 2018-04-04 PROCEDURE — 97140 MANUAL THERAPY 1/> REGIONS: CPT

## 2018-04-04 NOTE — PROGRESS NOTES
Physical Therapy Daily Note     Date: 04/04/2018  Name: Lina Davis  [facility] Number: [id number]  Diagnosis:   Encounter Diagnoses   Name Primary?    Acute pain of right shoulder     Decreased ROM of right shoulder      Physician: MUNIRA Grier MD    Evaluation Date: 10/18/17  Date of Surgery: 10/13/17  Visit #: 26  Start Time:  1100  Stop Time: 1200  Total Treatment Time:  60 min    Precautions: standard, s/p massive RTR (NO AAROM UNTIL 9 WEEKS)      Subjective     Pt reports she has been doing her exercises everyday.  Pain: 0/10    Objective     Measurements taken:   R shoulder PROM:  Flex: 160 deg  Abd: 175 deg  ER: 85 deg at 90 deg abd  IR: 50 deg at 90 deg    Patient received group therapy to increase endurance, ROM, flexibility and posture with activities as follows:     Lina received therapeutic exercises to develop strength, endurance, ROM, flexibility and posture for 40 minutes including:   Treatment initiated with MHP prop x10 min-np  UBE 5'/5'  AROM shoulder flex and abd x5 ea-np  Ball on wall roll up 2x 10 with 5 sh-  Prone rows, hab and ext x15 R 1#  Wall slides with towel x15 (1/2 circles R)  ER GTB 3x 10 R-  SL ER, abd, flex, hab x30 1#       NP:  3 towel roll x5 min-np  Supine AAROM w/ dowel; flexion/abd/scaption  x 15 (VCs and demonstration needed)-np  Step outs OTB IR and ER 2x15 R (multiple VCs needed)-np        Lina received the following manual therapy techniques applied to the: R shoulder for 10 minutes.   Scapulothoracic motion into abd/add, upward rotation, elevation/depression  AP grade III joint movement R GH   PROM R shoulder flex, abd, IR, ER     Cold pack to R shoulder x 10 min. Skin check clear pre- and post-application.-np    Written Home Exercises Provided:  Pt demo good understanding of the education provided. Lina demonstrated good return demonstration of activities.     Education provided:  [place] verbalized good understanding of  education provided.   No spiritual or educational barriers to learning identified.    PT reviewed FOTO scores for Lina Davis on 3/2/18  FOTO score: 61    Functional Limitations Reports - G Codes  Category: mobility  Tool: FOTO      Current ():  CJ  Goal (): CJ (77)  Discharge ():  NA          Assessment     Patient required multiple cues to avoid compensations, especially during prone and SL based exercises. Patient remains most limited with PROM IR and ER strength.  She is demonstrating a good understanding of her HEP to regain functional mobility. Pt will benefit from continued skilled PT services to progress toward functional goals.    This is a 72 y.o. female referred to outpatient physical therapy and presents with a medical diagnosis of s/p R RTR massive and demonstrates limitations as described in the problem list Pt prognosis is Good. Pt will continue to benefit from skilled outpatient physical therapy to address the deficits listed below in the problem list, provide pt/family education and to maximize pt's level of independence in the home and community environment.    Will the patient continue to benefit from skilled PT intervention? yes        Medical necessity is demonstrated by:   - Pain limits function of effected part for all activities  - Unable to participate in daily activities     Progress towards goals: good    New/Revised Goals:none this visit       Plan   Continue with established Plan of Care towards PT goals.      Therapist: Linda Batista, PT, DPT

## 2018-04-09 ENCOUNTER — CLINICAL SUPPORT (OUTPATIENT)
Dept: REHABILITATION | Facility: HOSPITAL | Age: 73
End: 2018-04-09
Attending: PHYSICIAN ASSISTANT
Payer: MEDICARE

## 2018-04-09 DIAGNOSIS — M25.611 DECREASED ROM OF RIGHT SHOULDER: ICD-10-CM

## 2018-04-09 DIAGNOSIS — M25.511 ACUTE PAIN OF RIGHT SHOULDER: ICD-10-CM

## 2018-04-09 PROCEDURE — 97140 MANUAL THERAPY 1/> REGIONS: CPT

## 2018-04-09 PROCEDURE — 97110 THERAPEUTIC EXERCISES: CPT

## 2018-04-09 NOTE — PROGRESS NOTES
Physical Therapy Daily Note     Date: 04/09/2018  Name: Lina Davis  [facility] Number: [id number]  Diagnosis:   Encounter Diagnoses   Name Primary?    Acute pain of right shoulder     Decreased ROM of right shoulder      Physician: MUNIRA Grier MD    Evaluation Date: 10/18/17  Date of Surgery: 10/13/17  Visit #: 27  Start Time:  1100  Stop Time: 1200  Total Treatment Time:  60 min    Precautions: standard, s/p massive RTR (NO AAROM UNTIL 9 WEEKS)      Subjective      Pt reports she feels good today.  Pain: 0/10    Objective     Measurements taken: (4/9/18)  R shoulder PROM:  Flex: 165 deg  Abd: 175 deg  ER: 90 deg at 90 deg abd  IR: 55 deg at 90 deg    Patient received group therapy to increase endurance, ROM, flexibility and posture with activities as follows:     Lina received therapeutic exercises to develop strength, endurance, ROM, flexibility and posture for 50 minutes including:   UBE 5'/5'  AROM shoulder flex and abd x5 ea-np  Ball on wall roll up 2x 10 with 5 sh-  Prone rows, hab and ext x15 R 1#  Wall slides with towel 2x15 (1/2 circles R)  ER GTB 3x 10 R-  SL ER, abd, flex, hab x30 1#       NP:  3 towel roll x5 min-np  Supine AAROM w/ dowel; flexion/abd/scaption  x 15 (VCs and demonstration needed)-np  Step outs OTB IR and ER 2x15 R (multiple VCs needed)-np        Lina received the following manual therapy techniques applied to the: R shoulder for 10 minutes.   Scapulothoracic motion into abd/add, upward rotation, elevation/depression  AP grade III joint movement R GH   PROM R shoulder flex, abd, IR, ER     Cold pack to R shoulder x 10 min. Skin check clear pre- and post-application.-np    Written Home Exercises Provided: updated this visit  Pt demo good understanding of the education provided. Lina demonstrated good return demonstration of activities.     Education provided:  Lina verbalized good understanding of education provided.   No spiritual or  educational barriers to learning identified.    PT reviewed FOTO scores for Lina Davis on 3/2/18  FOTO score: 61    Functional Limitations Reports - G Codes  Category: mobility  Tool: FOTO      Current ():  CJ  Goal (): CJ (77)  Discharge ():  NA          Assessment     Upon reassessment patient remains most limited with PROM IR and ER strength. She is showing better AROM with RTC exercises and is improving her endurance each visit. She still requires cuing to avoid elbow flexion and upper trap compensation. She is demonstrating a good understanding of her HEP to regain functional mobility. Pt will benefit from continued skilled PT services to progress toward functional goals.    This is a 72 y.o. female referred to outpatient physical therapy and presents with a medical diagnosis of s/p R RTR massive and demonstrates limitations as described in the problem list Pt prognosis is Good. Pt will continue to benefit from skilled outpatient physical therapy to address the deficits listed below in the problem list, provide pt/family education and to maximize pt's level of independence in the home and community environment.    Will the patient continue to benefit from skilled PT intervention? yes        Medical necessity is demonstrated by:   - Pain limits function of effected part for all activities  - Unable to participate in daily activities     Progress towards goals: good    New/Revised Goals:none this visit       Plan   Continue with established Plan of Care towards PT goals.      Therapist: Linda Batista, PT, DPT

## 2018-04-09 NOTE — PROGRESS NOTES
CC: ~6mo s/p below mentioned procedure    Right  1. Shoulder arthroscopic rotator cuff repair (transosseous equivalent, double row), massive  2. Shoulder arthroscopic biceps auto tenodesis   3. Shoulder arthroscopic subacromial decompression, bursectomy  4. Shoulder arthroscopic extensive debridement (anterior, posterior glenohumeral joint, subacromial space)    5. Shoulder arthroscopic lysis of adhesions/capsular release   6. Shoulder arthroscopic distal clavicle excision      Lina KING Ryan reports to clinic ~6mo s/p the above mentioned procedure. Previously dealt with unrelated E Coli sepsis which required 2 hospital stays. Overall, doing better in that regard, continues to be closely followed by Dr. Kay. She has been back in PT with Linda 2xWeek and believes she is back on track. Seeing continued progress with ROM and strength. No pain complaints. Very pleased with outcome to this point.      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    Vitamin D deficiency disease        PAST SURGICAL HISTORY:  Past Surgical History:   Procedure Laterality Date    CARPAL TUNNEL RELEASE      bilateral    COLONOSCOPY N/A 3/7/2018    Procedure: COLONOSCOPY;  Surgeon: Luís Soni MD;  Location: [facility] ([address] [place] [address]);  Service: Endoscopy;  Laterality: N/A;  ok to schedule per Elizabeth    COLONOSCOPY W/ BIOPSIES AND POLYPECTOMY      HEMORRHOID SURGERY      HYSTERECTOMY      AUB    ROTATOR CUFF REPAIR      left shoulder    SHOULDER SURGERY         FAMILY HISTORY:  Family History   Problem Relation Age of Onset    Heart disease Mother     Diabetes Mother     Heart disease Father     Cancer Father      liver    Diabetes Sister      Cataracts Sister     Kidney disease Brother     Heart disease Brother     Diabetes Sister     COPD Brother     COPD Brother     Gout Brother     Benign prostatic hyperplasia Brother     Heart disease Brother     Kidney disease Brother     Heart attack Brother      heart attack    Kidney failure Brother     Lupus Sister     Heart attack Brother     Drug abuse Brother     No Known Problems Daughter     No Known Problems Son     Amblyopia Neg Hx     Blindness Neg Hx     Breast cancer Neg Hx     Colon cancer Neg Hx     Ovarian cancer Neg Hx        MEDICATIONS:    Current Outpatient Prescriptions:     acetaminophen (TYLENOL) 325 MG tablet, Take 325 mg by mouth every 6 (six) hours as needed for Pain., Disp: , Rfl:     alcohol swabs (BD ALCOHOL SWABS) PadM, USE AS DIRECTED TO MONITOR BLOOD SUGARS TWICE DAILY, Disp: 200 each, Rfl: 3    allopurinol (ZYLOPRIM) 300 MG tablet, 1 Tablet Oral Every day, Disp: 90 tablet, Rfl: 3    blood glucose control, normal Soln, True Metrix control solution E11.9 - pt tests twice daily, Disp: 1 each, Rfl: 1    blood sugar diagnostic (TRUE METRIX GLUCOSE TEST STRIP) Strp, Please give True metrix test strips and lancets to monitor blood sugars twice daily E11.21, Disp: 200 each, Rfl: 2    glimepiride (AMARYL) 1 MG tablet, Take 1 tablet (1 mg total) by mouth before breakfast., Disp: 30 tablet, Rfl: 3    hydrocodone-acetaminophen 5-325mg (NORCO) 5-325 mg per tablet, Take 1 tablet by mouth every 24 hours as needed for Pain., Disp: 30 tablet, Rfl: 0    lancets (TRUEPLUS LANCETS) 28 gauge Misc, 1 lancet by Misc.(Non-Drug; Combo Route) route 2 (two) times daily., Disp: 200 each, Rfl: 2    losartan (COZAAR) 100 MG tablet, Take 1 tablet (100 mg total) by mouth once daily., Disp: 90 tablet, Rfl: 3    lovastatin (MEVACOR) 40 MG tablet, TAKE 1 TABLET EVERY EVENING, Disp: 90 tablet, Rfl: 3    omeprazole (PRILOSEC) 20 MG capsule, One capsule twice daily, Disp: 180 capsule,  "Rfl: 3    promethazine (PHENERGAN) 12.5 MG Tab, Take 1 tablet (12.5 mg total) by mouth every 6 (six) hours as needed., Disp: 8 tablet, Rfl: 0    ALLERGIES:  Review of patient's allergies indicates:   Allergen Reactions    No known drug allergies           REVIEW OF SYSTEMS:  Constitution: Negative. Negative for chills, fever and night sweats.    Hematologic/Lymphatic: Negative for bleeding problem. Does not bruise/bleed easily.   Skin: Negative for dry skin, itching and rash.   Musculoskeletal: Negative for falls. Positive for right shoulder pain and  muscle weakness.     PHYSICAL EXAMINATION:  Vitals:  /81   Pulse 88   Ht 5' 6" (1.676 m)   Wt 81.6 kg (180 lb)   BMI 29.05 kg/m²    General: Well-developed well-nourished 72 y.o. femalein no acute distress   Cardiovascular: Regular rhythm by palpation of distal pulse, normal color and temperature, no concerning varicosities on symptomatic side   Lungs: No labored breathing or wheezing appreciated   Neuro: Alert and oriented ×3   Psychiatric: well oriented to person, place and time, demonstrates normal mood and affect   Skin: No rashes, lesions or ulcers, normal temperature, turgor, and texture on uninvolved extremity    Ortho/SPM Exam  Right shoulder examination demonstrates active forward elevation to 140 degrees. Passive to 150.  Active external rotation with arm at side to 30°.  Passive to 50-60°. No pain on ROM. No scapular winging.  No focal TTP. Good cuff strength on resistance testing. Neg lag signs.     IMAGING: No new images indicated today.     ASSESSMENT:      ICD-10-CM ICD-9-CM   1. Shoulder stiffness, right M25.611 719.51         PLAN:     Ms. Davis denies pain. ROM improving. Nearly back to most preop baseline activities. States she's happy with the shoulder. Continue stretching and strengthening exercises. May transition to HEP at discretion of her therapist. Cautioned against falls, heavy lifting, overhead strenuous activity. RTC in 2 mos " as needed.          Procedures

## 2018-04-10 ENCOUNTER — OFFICE VISIT (OUTPATIENT)
Dept: SPORTS MEDICINE | Facility: CLINIC | Age: 73
End: 2018-04-10
Payer: MEDICARE

## 2018-04-10 VITALS
WEIGHT: 180 LBS | BODY MASS INDEX: 28.93 KG/M2 | DIASTOLIC BLOOD PRESSURE: 81 MMHG | HEART RATE: 88 BPM | HEIGHT: 66 IN | SYSTOLIC BLOOD PRESSURE: 137 MMHG

## 2018-04-10 DIAGNOSIS — M25.611 SHOULDER STIFFNESS, RIGHT: Primary | ICD-10-CM

## 2018-04-10 PROCEDURE — 99213 OFFICE O/P EST LOW 20 MIN: CPT | Mod: S$GLB,,, | Performed by: ORTHOPAEDIC SURGERY

## 2018-04-10 PROCEDURE — 99999 PR PBB SHADOW E&M-EST. PATIENT-LVL III: CPT | Mod: PBBFAC,,, | Performed by: ORTHOPAEDIC SURGERY

## 2018-04-10 PROCEDURE — 3075F SYST BP GE 130 - 139MM HG: CPT | Mod: CPTII,S$GLB,, | Performed by: ORTHOPAEDIC SURGERY

## 2018-04-10 PROCEDURE — 3079F DIAST BP 80-89 MM HG: CPT | Mod: CPTII,S$GLB,, | Performed by: ORTHOPAEDIC SURGERY

## 2018-04-11 ENCOUNTER — CLINICAL SUPPORT (OUTPATIENT)
Dept: REHABILITATION | Facility: HOSPITAL | Age: 73
End: 2018-04-11
Attending: PHYSICIAN ASSISTANT
Payer: MEDICARE

## 2018-04-11 DIAGNOSIS — M25.611 DECREASED ROM OF RIGHT SHOULDER: Primary | ICD-10-CM

## 2018-04-11 PROCEDURE — 97110 THERAPEUTIC EXERCISES: CPT

## 2018-04-11 PROCEDURE — 97140 MANUAL THERAPY 1/> REGIONS: CPT

## 2018-04-11 NOTE — PROGRESS NOTES
Physical Therapy Daily Note     Date: 04/11/2018  Name: Lina Davis  [facility] Number: [id number]  Diagnosis:   Encounter Diagnosis   Name Primary?    Decreased ROM of right shoulder Yes     Physician: MUNIRA Grier MD    Evaluation Date: 10/18/17  Date of Surgery: 10/13/17  Visit #: 28  Start Time:  1100  Stop Time: 1150  Total Treatment Time:  50 min    Precautions: standard, s/p massive RTR (NO AAROM UNTIL 9 WEEKS)      Subjective      Pt reports she said the doctor is impressed with her progress.  Pain: 0/10    Objective     Measurements taken: (4/9/18)  R shoulder PROM:  Flex: 165 deg  Abd: 175 deg  ER: 90 deg at 90 deg abd  IR: 55 deg at 90 deg    Patient received group therapy to increase endurance, ROM, flexibility and posture with activities as follows:     Lina received therapeutic exercises to develop strength, endurance, ROM, flexibility and posture for 40 minutes including:   UBE 5'/5'  AROM shoulder flex and abd x10 ea  Ball on wall roll up 2x 10 with 5 sh-  Prone rows x15 R 1#  Wall slides with towel x15 (1/2 circles R)  ER/IR GTB 3x 10 R-  SL ER, abd, flex, hab x30 1#     NP:  3 towel roll x5 min-np  Supine AAROM w/ dowel; flexion/abd/scaption  x 15 (VCs and demonstration needed)-np  Step outs OTB IR and ER 2x15 R (multiple VCs needed)-np        Lina received the following manual therapy techniques applied to the: R shoulder for 10 minutes.   Scapulothoracic motion into abd/add, upward rotation, elevation/depression  AP grade III joint movement R GH   PROM R shoulder flex, abd, IR, ER     Cold pack to R shoulder x 10 min. Skin check clear pre- and post-application.-np    Written Home Exercises Provided: updated this visit  Pt demo good understanding of the education provided. Lina demonstrated good return demonstration of activities.     Education provided:  [place] verbalized good understanding of education provided.   No spiritual or educational  barriers to learning identified.    PT reviewed FOTO scores for Lina Davis on 3/2/18  FOTO score: 61    Functional Limitations Reports - G Codes  Category: mobility  Tool: FOTO      Current ():  CJ  Goal (): CJ (77)  Discharge ():  NA          Assessment     Patient is showing good AROM of shoulder and is subjectively reporting improved function.  She is demonstrating a good understanding of her HEP to regain functional mobility. Pt will benefit from continued skilled PT services to progress toward functional goals.    This is a 72 y.o. female referred to outpatient physical therapy and presents with a medical diagnosis of s/p R RTR massive and demonstrates limitations as described in the problem list Pt prognosis is Good. Pt will continue to benefit from skilled outpatient physical therapy to address the deficits listed below in the problem list, provide pt/family education and to maximize pt's level of independence in the home and community environment.    Will the patient continue to benefit from skilled PT intervention? yes        Medical necessity is demonstrated by:   - Pain limits function of effected part for all activities  - Unable to participate in daily activities     Progress towards goals: good    New/Revised Goals:none this visit       Plan   Continue with established Plan of Care towards PT goals.      Therapist: Linda Batista, PT, DPT

## 2018-04-11 NOTE — TELEPHONE ENCOUNTER
----- Message from Robyn Das MA sent at 4/11/2018  8:56 AM CDT -----  Contact: [phone]  Patient requesting Rx refill/[facility]..please call patient for Rx .

## 2018-04-13 RX ORDER — HYDROCODONE BITARTRATE AND ACETAMINOPHEN 5; 325 MG/1; MG/1
1 TABLET ORAL
Qty: 30 TABLET | Refills: 0 | Status: SHIPPED | OUTPATIENT
Start: 2018-04-13 | End: 2018-06-12 | Stop reason: SDUPTHER

## 2018-04-16 ENCOUNTER — TELEPHONE (OUTPATIENT)
Dept: INTERNAL MEDICINE | Facility: CLINIC | Age: 73
End: 2018-04-16

## 2018-04-16 RX ORDER — GLIMEPIRIDE 1 MG/1
1 TABLET ORAL
Qty: 90 TABLET | Refills: 1 | Status: SHIPPED | OUTPATIENT
Start: 2018-04-16 | End: 2018-10-01 | Stop reason: SDUPTHER

## 2018-04-16 NOTE — TELEPHONE ENCOUNTER
Called informed patient of /rx ready for  at office.     States she needs her Diabetes Medication refilled. Did not know the name of medication.     Please Advise  Thank You

## 2018-04-18 ENCOUNTER — CLINICAL SUPPORT (OUTPATIENT)
Dept: REHABILITATION | Facility: HOSPITAL | Age: 73
End: 2018-04-18
Attending: PHYSICIAN ASSISTANT
Payer: MEDICARE

## 2018-04-18 DIAGNOSIS — M25.511 ACUTE PAIN OF RIGHT SHOULDER: ICD-10-CM

## 2018-04-18 DIAGNOSIS — M25.611 DECREASED ROM OF RIGHT SHOULDER: ICD-10-CM

## 2018-04-18 PROCEDURE — 97110 THERAPEUTIC EXERCISES: CPT

## 2018-04-18 NOTE — PROGRESS NOTES
Physical Therapy Daily Note     Date: 04/18/2018  Name: Lina Davis  [facility] Number: [id number]  Diagnosis:   Encounter Diagnoses   Name Primary?    Acute pain of right shoulder     Decreased ROM of right shoulder      Physician: MUNIRA Grier MD    Evaluation Date: 10/18/17  Date of Surgery: 10/13/17  Visit #: 29  Start Time:  1100  Stop Time: 1155  Total Treatment Time:  55 min    Precautions: standard, s/p massive RTR (NO AAROM UNTIL 9 WEEKS)      Subjective      Pt reports she is feeling good today.  Pain: 0/10    Objective     Measurements taken: (4/9/18)  R shoulder PROM:  Flex: 165 deg  Abd: 175 deg  ER: 90 deg at 90 deg abd  IR: 55 deg at 90 deg    Patient received group therapy to increase endurance, ROM, flexibility and posture with activities as follows:     Lina received therapeutic exercises to develop strength, endurance, ROM, flexibility and posture for 50 minutes including:   UBE 5'/5'-np  AROM shoulder flex and abd x15 ea  Ball on wall roll up 2x 10 with 5 sh-  Prone rows x15 R 1#  Wall slides with towel x15 (1/2 circles R)  ER/IR GTB 3x 10 R-  SL ER, abd, flex, hab x30 1#     NP:  3 towel roll x5 min-np  Supine AAROM w/ dowel; flexion/abd/scaption  x 15 (VCs and demonstration needed)-np  Step outs OTB IR and ER 2x15 R (multiple VCs needed)-np        Lina received the following manual therapy techniques applied to the: R shoulder for 5 minutes.   Scapulothoracic motion into abd/add, upward rotation, elevation/depression  AP grade III joint movement R GH   PROM R shoulder flex, abd, IR, ER     Cold pack to R shoulder x 10 min. Skin check clear pre- and post-application.-np    Written Home Exercises Provided: updated this visit  Pt demo good understanding of the education provided. Lina demonstrated good return demonstration of activities.     Education provided:  Lina verbalized good understanding of education provided.   No spiritual or  educational barriers to learning identified.    PT reviewed FOTO scores for Lina Davis on 3/2/18  FOTO score: 61    Functional Limitations Reports - G Codes  Category: mobility  Tool: FOTO      Current ():  CJ  Goal (): CJ (77)  Discharge ():  NA          Assessment     Patient progressing well and showing good understanding of HEP to reach remaining functional goals. Pt will benefit from continued skilled PT services to progress toward functional goals.    This is a 72 y.o. female referred to outpatient physical therapy and presents with a medical diagnosis of s/p R RTR massive and demonstrates limitations as described in the problem list Pt prognosis is Good. Pt will continue to benefit from skilled outpatient physical therapy to address the deficits listed below in the problem list, provide pt/family education and to maximize pt's level of independence in the home and community environment.    Will the patient continue to benefit from skilled PT intervention? yes        Medical necessity is demonstrated by:   - Pain limits function of effected part for all activities  - Unable to participate in daily activities     Progress towards goals: good    New/Revised Goals:none this visit       Plan   Continue with established Plan of Care towards PT goals.      Therapist: Linda Batista, PT, DPT

## 2018-04-25 ENCOUNTER — CLINICAL SUPPORT (OUTPATIENT)
Dept: REHABILITATION | Facility: HOSPITAL | Age: 73
End: 2018-04-25
Attending: PHYSICIAN ASSISTANT
Payer: MEDICARE

## 2018-04-25 DIAGNOSIS — M25.511 ACUTE PAIN OF RIGHT SHOULDER: ICD-10-CM

## 2018-04-25 DIAGNOSIS — M25.611 DECREASED ROM OF RIGHT SHOULDER: ICD-10-CM

## 2018-04-25 PROCEDURE — 97110 THERAPEUTIC EXERCISES: CPT

## 2018-04-25 PROCEDURE — G8979 MOBILITY GOAL STATUS: HCPCS | Mod: CJ

## 2018-04-25 PROCEDURE — G8978 MOBILITY CURRENT STATUS: HCPCS | Mod: CJ

## 2018-04-25 PROCEDURE — 97140 MANUAL THERAPY 1/> REGIONS: CPT

## 2018-04-25 NOTE — PROGRESS NOTES
Physical Therapy Daily Note     Date: 04/25/2018  Name: Lina Davis  [facility] Number: [id number]  Diagnosis:   Encounter Diagnoses   Name Primary?    Acute pain of right shoulder     Decreased ROM of right shoulder      Physician: MUNIRA Grier MD    Evaluation Date: 10/18/17  Date of Surgery: 10/13/17  Visit #: 30  Start Time:  1100  Stop Time: 1140  Total Treatment Time:  40 min    Precautions: standard, s/p massive RTR (NO AAROM UNTIL 9 WEEKS)      Subjective      Pt reports she is feeling okay today.  Pain: 0/10    Objective     Measurements taken: (4/9/18)  R shoulder PROM:  Flex: 165 deg  Abd: 175 deg  ER: 90 deg at 90 deg abd  IR: 55 deg at 90 deg    Patient received group therapy to increase endurance, ROM, flexibility and posture with activities as follows:     Lina received therapeutic exercises to develop strength, endurance, ROM, flexibility and posture for 30 minutes including:   UBE 5'/5'  AROM shoulder flex and abd x15 ea  Ball on wall roll up 2x 10 with 5 sh-  Prone rows x15 R 1#  Wall slides with towel x15 (1/2 circles R)  ER/IR GTB 3x 10 R-  SL ER, abd, flex, hab x30 1#     NP:  3 towel roll x5 min-np  Supine AAROM w/ dowel; flexion/abd/scaption  x 15 (VCs and demonstration needed)-np  Step outs OTB IR and ER 2x15 R (multiple VCs needed)-np        Lina received the following manual therapy techniques applied to the: R shoulder for 10 minutes.   Scapulothoracic motion into abd/add, upward rotation, elevation/depression  AP grade III joint movement R GH   PROM R shoulder flex, abd, IR, ER     Cold pack to R shoulder x 10 min. Skin check clear pre- and post-application.-np    Written Home Exercises Provided: updated this visit  Pt demo good understanding of the education provided. Lina demonstrated good return demonstration of activities.     Education provided:  Lina verbalized good understanding of education provided.   No spiritual or educational  barriers to learning identified.    PT reviewed FOTO scores for Lina Davis on 4/25/18  FOTO score: 61    Functional Limitations Reports - G Codes  Category: mobility  Tool: FOTO      Current ():  CJ  Goal (): CJ (77)  Discharge ():  NA          Assessment     Patient shows less upper trap compensation with repetition. She continues to show good progress with PROM. Pt will benefit from continued skilled PT services to progress toward functional goals.    This is a 73 y.o. female referred to outpatient physical therapy and presents with a medical diagnosis of s/p R RTR massive and demonstrates limitations as described in the problem list Pt prognosis is Good. Pt will continue to benefit from skilled outpatient physical therapy to address the deficits listed below in the problem list, provide pt/family education and to maximize pt's level of independence in the home and community environment.    Will the patient continue to benefit from skilled PT intervention? yes        Medical necessity is demonstrated by:   - Pain limits function of effected part for all activities  - Unable to participate in daily activities     Progress towards goals: good    New/Revised Goals:none this visit       Plan   Continue with established Plan of Care towards PT goals.      Therapist: Linda Batista, PT, DPT

## 2018-05-02 ENCOUNTER — CLINICAL SUPPORT (OUTPATIENT)
Dept: REHABILITATION | Facility: HOSPITAL | Age: 73
End: 2018-05-02
Attending: PHYSICIAN ASSISTANT
Payer: MEDICARE

## 2018-05-02 DIAGNOSIS — M25.511 ACUTE PAIN OF RIGHT SHOULDER: ICD-10-CM

## 2018-05-02 DIAGNOSIS — M25.611 DECREASED ROM OF RIGHT SHOULDER: ICD-10-CM

## 2018-05-02 PROCEDURE — 97110 THERAPEUTIC EXERCISES: CPT

## 2018-05-02 PROCEDURE — 97140 MANUAL THERAPY 1/> REGIONS: CPT

## 2018-05-02 NOTE — PROGRESS NOTES
Physical Therapy Daily Note     Date: 05/02/2018  Name: Lina Davis  [facility] Number: [id number]  Diagnosis:   Encounter Diagnoses   Name Primary?    Acute pain of right shoulder     Decreased ROM of right shoulder      Physician: MUNIRA Grier MD    Evaluation Date: 10/18/17  Date of Surgery: 10/13/17  Visit #: 31  Start Time:  1100  Stop Time: 1145  Total Treatment Time:  45 min    Precautions: standard, s/p massive RTR (NO AAROM UNTIL 9 WEEKS)      Subjective      Pt reports she is feeling like she is close to being done.  Pain: 0/10    Objective     Measurements taken: (4/9/18)  R shoulder PROM:  Flex: 165 deg  Abd: 175 deg  ER: 90 deg at 90 deg abd  IR: 55 deg at 90 deg    Patient received group therapy to increase endurance, ROM, flexibility and posture with activities as follows:     Lina received therapeutic exercises to develop strength, endurance, ROM, flexibility and posture for 30 minutes including:   UBE 5'/5'-np  AROM shoulder flex and abd x15 ea-np  Ball on wall roll up 2x 10 with 5 sh-  Prone rows x15 R 2#  Wall slides with towel x15 (1/2 circles R)-np  ER/IR GTB 3x 10 R-np  SL ER, abd, flex  x30 2#     NP:  3 towel roll x5 min-np  Supine AAROM w/ dowel; flexion/abd/scaption  x 15 (VCs and demonstration needed)-np  Step outs OTB IR and ER 2x15 R (multiple VCs needed)-np        Lina received the following manual therapy techniques applied to the: R shoulder for 15 minutes.   Scapulothoracic motion into abd/add, upward rotation, elevation/depression-np  AP grade III joint movement R GH   PROM R shoulder flex, abd, IR, ER   C4/5 transverse in SL grade III  Wrist and radial mobs R grade III    Cold pack to R shoulder x 10 min. Skin check clear pre- and post-application.-np    Written Home Exercises Provided: updated this visit  Pt demo good understanding of the education provided. Lina demonstrated good return demonstration of activities.     Education  provided:  Lina verbalized good understanding of education provided.   No spiritual or educational barriers to learning identified.    PT reviewed FOTO scores for Lina Davis on 4/25/18  FOTO score: 63    Functional Limitations Reports - G Codes  Category: mobility  Tool: FOTO      Current ():  CJ  Goal (): CJ (77)  Discharge ():  NA          Assessment     Patient responded well with gentle mobs to cervical, elbow and wrist to improve PROM. Patient showed improved FOTO score. She continues to show good progress with PROM. Pt will benefit from continued skilled PT services to progress toward functional goals.    This is a 73 y.o. female referred to outpatient physical therapy and presents with a medical diagnosis of s/p R RTR massive and demonstrates limitations as described in the problem list Pt prognosis is Good. Pt will continue to benefit from skilled outpatient physical therapy to address the deficits listed below in the problem list, provide pt/family education and to maximize pt's level of independence in the home and community environment.    Will the patient continue to benefit from skilled PT intervention? yes        Medical necessity is demonstrated by:   - Pain limits function of effected part for all activities  - Unable to participate in daily activities     Progress towards goals: good    New/Revised Goals:none this visit       Plan   Continue with established Plan of Care towards PT goals.      Therapist: Linda Batista, PT, DPT

## 2018-05-09 ENCOUNTER — CLINICAL SUPPORT (OUTPATIENT)
Dept: REHABILITATION | Facility: HOSPITAL | Age: 73
End: 2018-05-09
Attending: PHYSICIAN ASSISTANT
Payer: MEDICARE

## 2018-05-09 DIAGNOSIS — M25.511 ACUTE PAIN OF RIGHT SHOULDER: ICD-10-CM

## 2018-05-09 DIAGNOSIS — M25.611 DECREASED ROM OF RIGHT SHOULDER: ICD-10-CM

## 2018-05-09 PROCEDURE — 97110 THERAPEUTIC EXERCISES: CPT

## 2018-05-09 PROCEDURE — 97140 MANUAL THERAPY 1/> REGIONS: CPT

## 2018-05-09 NOTE — PROGRESS NOTES
Physical Therapy Daily Note     Date: 05/09/2018  Name: Lina Davis  [facility] Number: [id number]  Diagnosis:   Encounter Diagnoses   Name Primary?    Acute pain of right shoulder     Decreased ROM of right shoulder      Physician: MUNIRA Grier MD    Evaluation Date: 10/18/17  Date of Surgery: 10/13/17  Visit #: 32  Start Time:  1115  Stop Time: 1200  Total Treatment Time:  45 min    Precautions: standard, s/p massive RTR (NO AAROM UNTIL 9 WEEKS)      Subjective      Pt reports she is feeling good today.  Pain: 0/10    Objective     Measurements taken: (4/9/18)  R shoulder PROM:  Flex: 165 deg  Abd: 175 deg  ER: 90 deg at 90 deg abd  IR: 55 deg at 90 deg    Patient received group therapy to increase endurance, ROM, flexibility and posture with activities as follows:     Lina received therapeutic exercises to develop strength, endurance, ROM, flexibility and posture for 25 minutes including:   UBE 5'/5'-np  AROM shoulder flex and abd x15 ea-np  Ball on wall roll up 2x 10 with 5 sh-  Prone rows x15 R 2#  Wall slides with towel x15 (1/2 circles R)-  ER/IR GTB 3x 10 R-np  SL ER, abd, flex  x30 2#     NP:  3 towel roll x5 min-np  Supine AAROM w/ dowel; flexion/abd/scaption  x 15 (VCs and demonstration needed)-np  Step outs OTB IR and ER 2x15 R (multiple VCs needed)-np        Lina received the following manual therapy techniques applied to the: R shoulder for 10 minutes.   Scapulothoracic motion into abd/add, upward rotation, elevation/depression-np  AP grade III joint movement R GH   PROM R shoulder flex, abd, IR, ER   C4/5 transverse in SL grade III  Wrist and radial mobs R grade III    Cold pack to R shoulder x 10 min. Skin check clear pre- and post-application.-np    Written Home Exercises Provided: updated this visit  Pt demo good understanding of the education provided. Lina demonstrated good return demonstration of activities.     Education provided:  Lina  verbalized good understanding of education provided.   No spiritual or educational barriers to learning identified.    PT reviewed FOTO scores for Lina Davis on 4/25/18  FOTO score: 63    Functional Limitations Reports - G Codes  Category: mobility  Tool: FOTO      Current ():  CJ  Goal (): CJ (77)  Discharge ():  NA          Assessment     Patient would benefit from just a few more PT visits to hit remaining ROM goals. She continues to show good progress with PROM. Pt will benefit from continued skilled PT services to progress toward functional goals.    This is a 73 y.o. female referred to outpatient physical therapy and presents with a medical diagnosis of s/p R RTR massive and demonstrates limitations as described in the problem list Pt prognosis is Good. Pt will continue to benefit from skilled outpatient physical therapy to address the deficits listed below in the problem list, provide pt/family education and to maximize pt's level of independence in the home and community environment.    Will the patient continue to benefit from skilled PT intervention? yes        Medical necessity is demonstrated by:   - Pain limits function of effected part for all activities  - Unable to participate in daily activities     Progress towards goals: good    New/Revised Goals:none this visit       Plan   Continue with established Plan of Care towards PT goals.      Therapist: Linda Batista, PT, DPT

## 2018-05-16 ENCOUNTER — CLINICAL SUPPORT (OUTPATIENT)
Dept: REHABILITATION | Facility: HOSPITAL | Age: 73
End: 2018-05-16
Attending: PHYSICIAN ASSISTANT
Payer: MEDICARE

## 2018-05-16 DIAGNOSIS — M25.611 DECREASED ROM OF RIGHT SHOULDER: ICD-10-CM

## 2018-05-16 DIAGNOSIS — M25.511 ACUTE PAIN OF RIGHT SHOULDER: ICD-10-CM

## 2018-05-16 PROCEDURE — G8980 MOBILITY D/C STATUS: HCPCS | Mod: CJ

## 2018-05-16 PROCEDURE — G8979 MOBILITY GOAL STATUS: HCPCS | Mod: CJ

## 2018-05-16 PROCEDURE — 97110 THERAPEUTIC EXERCISES: CPT

## 2018-05-16 NOTE — PROGRESS NOTES
Physical Therapy Daily Note     Date: 05/16/2018  Name: Lina Davis  [facility] Number: [id number]  Diagnosis:   Encounter Diagnoses   Name Primary?    Acute pain of right shoulder     Decreased ROM of right shoulder      Physician: MUNIRA Grier MD    Evaluation Date: 10/18/17  Date of Surgery: 10/13/17  Visit #: [id number]  Start Time:  1110  Stop Time:  1140  Total Treatment Time:  35 min    Precautions: standard, s/p massive RTR (NO AAROM UNTIL 9 WEEKS)      Subjective      Pt reports she is feeling good today and feels ready for discharge.  Pain: 0/10    Objective     Measurements taken: (5/16/18)  R shoulder PROM:  Flex: 175 deg  Abd: 175 deg  ER: 90 deg at 90 deg abd  IR: 70 deg at 90 deg    Patient received group therapy to increase endurance, ROM, flexibility and posture with activities as follows:     Lina received therapeutic exercises to develop strength, endurance, ROM, flexibility and posture for 20 minutes including:   UBE 5'/5'-np  AROM shoulder flex and abd x15 ea-np  Ball on wall roll up 2x 10 with 5 sh-  Prone rows 3x10 R 2#-np  Wall slides with towel x15 (1/2 circles R)-np  ER/IR GTB 3x 10 R-np  SL ER  x30 2#     NP:  3 towel roll x5 min-np  Supine AAROM w/ dowel; flexion/abd/scaption  x 15 (VCs and demonstration needed)-np  Step outs OTB IR and ER 2x15 R (multiple VCs needed)-np        Lina received the following manual therapy techniques applied to the: R shoulder for 10 minutes.   Scapulothoracic motion into abd/add, upward rotation, elevation/depression-np  AP grade III joint movement R GH   PROM R shoulder flex, abd, IR, ER     Cold pack to R shoulder x 10 min. Skin check clear pre- and post-application.-np    Written Home Exercises Provided: updated this visit  Pt demo good understanding of the education provided. Lina demonstrated good return demonstration of activities.     Education provided:  Durant verbalized good understanding of education  provided.   No spiritual or educational barriers to learning identified.    PT reviewed FOTO scores for Lina Davis on 4/25/18  FOTO score: 67    Functional Limitations Reports - G Codes  Category: mobility  Tool: FOTO      Current ():  CJ  Goal (): CJ (75)  Discharge ():  NA          Assessment       This is a 73 y.o. female referred to outpatient physical therapy and presents with a medical diagnosis of s/p R RTR massive. Patient has shown good benefit from PT intervention and has reached ROM and strength goals. She demonstrates a good understanding of HEP and understands the importance of maintaining program to ensure she does not get a frozen shoulder. Patient encouraged to call/email with any additional questions regarding care.    Progress towards goals: goals met       Plan   Discontinue PT secondary to patient meeting all PT goals.      Therapist: Linda Batista, PT, DPT

## 2018-06-12 ENCOUNTER — OFFICE VISIT (OUTPATIENT)
Dept: INTERNAL MEDICINE | Facility: CLINIC | Age: 73
End: 2018-06-12
Payer: MEDICARE

## 2018-06-12 VITALS
OXYGEN SATURATION: 99 % | DIASTOLIC BLOOD PRESSURE: 64 MMHG | HEART RATE: 85 BPM | BODY MASS INDEX: 29.58 KG/M2 | SYSTOLIC BLOOD PRESSURE: 122 MMHG | HEIGHT: 66 IN | WEIGHT: 184.06 LBS | TEMPERATURE: 98 F

## 2018-06-12 DIAGNOSIS — I10 HTN (HYPERTENSION), BENIGN: ICD-10-CM

## 2018-06-12 DIAGNOSIS — E55.9 VITAMIN D DEFICIENCY: ICD-10-CM

## 2018-06-12 DIAGNOSIS — E11.21 TYPE 2 DIABETES MELLITUS WITH DIABETIC NEPHROPATHY, WITHOUT LONG-TERM CURRENT USE OF INSULIN: ICD-10-CM

## 2018-06-12 DIAGNOSIS — E78.2 HYPERLIPIDEMIA, MIXED: Primary | ICD-10-CM

## 2018-06-12 DIAGNOSIS — E78.00 PURE HYPERCHOLESTEROLEMIA: ICD-10-CM

## 2018-06-12 PROCEDURE — 3044F HG A1C LEVEL LT 7.0%: CPT | Mod: CPTII,S$GLB,, | Performed by: INTERNAL MEDICINE

## 2018-06-12 PROCEDURE — 3078F DIAST BP <80 MM HG: CPT | Mod: CPTII,S$GLB,, | Performed by: INTERNAL MEDICINE

## 2018-06-12 PROCEDURE — 99999 PR PBB SHADOW E&M-EST. PATIENT-LVL IV: CPT | Mod: PBBFAC,,, | Performed by: INTERNAL MEDICINE

## 2018-06-12 PROCEDURE — 3074F SYST BP LT 130 MM HG: CPT | Mod: CPTII,S$GLB,, | Performed by: INTERNAL MEDICINE

## 2018-06-12 PROCEDURE — 99214 OFFICE O/P EST MOD 30 MIN: CPT | Mod: S$GLB,,, | Performed by: INTERNAL MEDICINE

## 2018-06-12 PROCEDURE — 99499 UNLISTED E&M SERVICE: CPT | Mod: S$PBB,,, | Performed by: INTERNAL MEDICINE

## 2018-06-12 RX ORDER — ECONAZOLE NITRATE 10 MG/G
CREAM TOPICAL 2 TIMES DAILY
Qty: 85 G | Refills: 1 | Status: SHIPPED | OUTPATIENT
Start: 2018-06-12 | End: 2019-09-05 | Stop reason: ALTCHOICE

## 2018-06-12 RX ORDER — HYDROCODONE BITARTRATE AND ACETAMINOPHEN 5; 325 MG/1; MG/1
1 TABLET ORAL
Qty: 30 TABLET | Refills: 0 | Status: SHIPPED | OUTPATIENT
Start: 2018-06-12 | End: 2018-08-23 | Stop reason: SDUPTHER

## 2018-06-12 RX ORDER — METFORMIN HYDROCHLORIDE 500 MG/1
500 TABLET ORAL
Qty: 30 TABLET | Refills: 6
Start: 2018-06-12 | End: 2018-09-17

## 2018-06-15 ENCOUNTER — LAB VISIT (OUTPATIENT)
Dept: LAB | Facility: HOSPITAL | Age: 73
End: 2018-06-15
Attending: INTERNAL MEDICINE
Payer: MEDICARE

## 2018-06-15 DIAGNOSIS — E55.9 VITAMIN D DEFICIENCY: ICD-10-CM

## 2018-06-15 DIAGNOSIS — E78.2 HYPERLIPIDEMIA, MIXED: ICD-10-CM

## 2018-06-15 DIAGNOSIS — E11.21 TYPE 2 DIABETES MELLITUS WITH DIABETIC NEPHROPATHY, WITHOUT LONG-TERM CURRENT USE OF INSULIN: ICD-10-CM

## 2018-06-15 LAB
25(OH)D3+25(OH)D2 SERPL-MCNC: 26 NG/ML
ALBUMIN SERPL BCP-MCNC: 3.7 G/DL
ALP SERPL-CCNC: 84 U/L
ALT SERPL W/O P-5'-P-CCNC: 12 U/L
ANION GAP SERPL CALC-SCNC: 7 MMOL/L
AST SERPL-CCNC: 12 U/L
BASOPHILS # BLD AUTO: 0.02 K/UL
BASOPHILS NFR BLD: 0.2 %
BILIRUB SERPL-MCNC: 0.4 MG/DL
BUN SERPL-MCNC: 30 MG/DL
CALCIUM SERPL-MCNC: 10 MG/DL
CHLORIDE SERPL-SCNC: 111 MMOL/L
CHOLEST SERPL-MCNC: 187 MG/DL
CHOLEST/HDLC SERPL: 3 {RATIO}
CO2 SERPL-SCNC: 22 MMOL/L
CREAT SERPL-MCNC: 0.8 MG/DL
DIFFERENTIAL METHOD: ABNORMAL
EOSINOPHIL # BLD AUTO: 0.2 K/UL
EOSINOPHIL NFR BLD: 1.9 %
ERYTHROCYTE [DISTWIDTH] IN BLOOD BY AUTOMATED COUNT: 13.8 %
EST. GFR  (AFRICAN AMERICAN): >60 ML/MIN/1.73 M^2
EST. GFR  (NON AFRICAN AMERICAN): >60 ML/MIN/1.73 M^2
ESTIMATED AVG GLUCOSE: 126 MG/DL
GLUCOSE SERPL-MCNC: 148 MG/DL
HBA1C MFR BLD HPLC: 6 %
HCT VFR BLD AUTO: 32 %
HDLC SERPL-MCNC: 62 MG/DL
HDLC SERPL: 33.2 %
HGB BLD-MCNC: 10.8 G/DL
LDLC SERPL CALC-MCNC: 107.4 MG/DL
LYMPHOCYTES # BLD AUTO: 2.8 K/UL
LYMPHOCYTES NFR BLD: 35.1 %
MCH RBC QN AUTO: 29.3 PG
MCHC RBC AUTO-ENTMCNC: 33.8 G/DL
MCV RBC AUTO: 87 FL
MONOCYTES # BLD AUTO: 0.7 K/UL
MONOCYTES NFR BLD: 8.2 %
NEUTROPHILS # BLD AUTO: 4.4 K/UL
NEUTROPHILS NFR BLD: 54.1 %
NONHDLC SERPL-MCNC: 125 MG/DL
PLATELET # BLD AUTO: 292 K/UL
PMV BLD AUTO: 10 FL
POTASSIUM SERPL-SCNC: 4.4 MMOL/L
PROT SERPL-MCNC: 7.6 G/DL
RBC # BLD AUTO: 3.69 M/UL
SODIUM SERPL-SCNC: 140 MMOL/L
TRIGL SERPL-MCNC: 88 MG/DL
WBC # BLD AUTO: 8.08 K/UL

## 2018-06-15 PROCEDURE — 36415 COLL VENOUS BLD VENIPUNCTURE: CPT

## 2018-06-15 PROCEDURE — 80053 COMPREHEN METABOLIC PANEL: CPT

## 2018-06-15 PROCEDURE — 83036 HEMOGLOBIN GLYCOSYLATED A1C: CPT

## 2018-06-15 PROCEDURE — 85025 COMPLETE CBC W/AUTO DIFF WBC: CPT

## 2018-06-15 PROCEDURE — 82306 VITAMIN D 25 HYDROXY: CPT

## 2018-06-15 PROCEDURE — 80061 LIPID PANEL: CPT

## 2018-06-21 NOTE — PROGRESS NOTES
Subjective:       Patient ID: Lina Davis is a 73 y.o. female.    Chief Complaint: Follow-up and Rash (under breast)    HPIPt with itchy rash under breasts and in groin.  No CP or SOB.  No GI symptoms.  Review of Systems   Respiratory: Negative for shortness of breath (PND or orthopnea).    Cardiovascular: Negative for chest pain (arm pain or jaw pain).   Gastrointestinal: Negative for abdominal pain, diarrhea, nausea and vomiting.   Genitourinary: Negative for dysuria.   Neurological: Negative for seizures, syncope and headaches.       Objective:      Physical Exam   Constitutional: She is oriented to person, place, and time. She appears well-developed and well-nourished. No distress.   HENT:   Head: Normocephalic.   Mouth/Throat: Oropharynx is clear and moist.   Neck: Neck supple. No JVD present. No thyromegaly present.   Cardiovascular: Normal rate, regular rhythm, normal heart sounds and intact distal pulses.  Exam reveals no gallop and no friction rub.    No murmur heard.  Pulmonary/Chest: Effort normal and breath sounds normal. She has no wheezes. She has no rales.   Abdominal: Soft. Bowel sounds are normal. She exhibits no distension and no mass. There is no tenderness. There is no rebound and no guarding.   Musculoskeletal: She exhibits no edema.   Lymphadenopathy:     She has no cervical adenopathy.   Neurological: She is alert and oriented to person, place, and time. She has normal reflexes.   Skin: Skin is warm and dry.   Rash consistent with candida under breasts.   Psychiatric: She has a normal mood and affect. Her behavior is normal. Judgment and thought content normal.       Assessment:       1. Hyperlipidemia, mixed    2. Pure hypercholesterolemia    3. HTN (hypertension), benign    4. Type 2 diabetes mellitus with diabetic nephropathy, without long-term current use of insulin    5. Vitamin D deficiency        Plan:   Hyperlipidemia, mixed  -     Lipid panel; Future; Expected date:  06/12/2018      HTN (hypertension), benign  Controlled - continue current meds    Type 2 diabetes mellitus with diabetic nephropathy, without long-term current use of insulin  -     CBC auto differential; Future; Expected date: 06/12/2018  -     Comprehensive metabolic panel; Future; Expected date: 06/12/2018  -     Hemoglobin A1c; Future; Expected date: 06/12/2018    Vitamin D deficiency  -     Vitamin D; Future; Expected date: 06/12/2018    Other orders  -     econazole nitrate 1 % cream; Apply topically 2 (two) times daily.  Dispense: 85 g; Refill: 1  -     metFORMIN (GLUCOPHAGE) 500 MG tablet; Take 1 tablet (500 mg total) by mouth daily with breakfast.  Dispense: 30 tablet; Refill: 6  -     HYDROcodone-acetaminophen (NORCO) 5-325 mg per tablet; Take 1 tablet by mouth every 24 hours as needed for Pain.  Dispense: 30 tablet; Refill: 0 - for chronic arm/should pain and radicular pain in left arm.

## 2018-08-01 ENCOUNTER — PES CALL (OUTPATIENT)
Dept: ADMINISTRATIVE | Facility: CLINIC | Age: 73
End: 2018-08-01

## 2018-08-23 NOTE — TELEPHONE ENCOUNTER
----- Message from Bing Willams sent at 8/23/2018 10:30 AM CDT -----  Contact: self  Pt called in about needing a refill on medication: HYDROcodone-acetaminophen (NORCO) 5-325 mg per tablet  Pt is running low      Pt can be reached at [phone]        TY

## 2018-08-24 ENCOUNTER — PATIENT MESSAGE (OUTPATIENT)
Dept: INTERNAL MEDICINE | Facility: CLINIC | Age: 73
End: 2018-08-24

## 2018-08-24 RX ORDER — HYDROCODONE BITARTRATE AND ACETAMINOPHEN 5; 325 MG/1; MG/1
1 TABLET ORAL
Qty: 30 TABLET | Refills: 0 | Status: SHIPPED | OUTPATIENT
Start: 2018-08-24 | End: 2018-10-01 | Stop reason: SDUPTHER

## 2018-08-24 NOTE — TELEPHONE ENCOUNTER
Spoke with patients  Mr. Davis, informed him both him and his wife. The patient, prescription is ready for  at the office. He understood and will come to pick it up

## 2018-09-04 ENCOUNTER — LAB VISIT (OUTPATIENT)
Dept: LAB | Facility: HOSPITAL | Age: 73
End: 2018-09-04
Attending: INTERNAL MEDICINE
Payer: MEDICARE

## 2018-09-04 ENCOUNTER — TELEPHONE (OUTPATIENT)
Dept: INTERNAL MEDICINE | Facility: CLINIC | Age: 73
End: 2018-09-04

## 2018-09-04 DIAGNOSIS — E11.9 TYPE 2 DIABETES MELLITUS WITHOUT COMPLICATION, WITHOUT LONG-TERM CURRENT USE OF INSULIN: Primary | ICD-10-CM

## 2018-09-04 DIAGNOSIS — E11.9 TYPE 2 DIABETES MELLITUS WITHOUT COMPLICATION, WITHOUT LONG-TERM CURRENT USE OF INSULIN: ICD-10-CM

## 2018-09-04 LAB
ALBUMIN/CREAT UR: 359.6 UG/MG
BACTERIA #/AREA URNS AUTO: NORMAL /HPF
BILIRUB UR QL STRIP: NEGATIVE
CLARITY UR REFRACT.AUTO: ABNORMAL
COLOR UR AUTO: YELLOW
CREAT UR-MCNC: 136 MG/DL
GLUCOSE UR QL STRIP: NEGATIVE
HGB UR QL STRIP: NEGATIVE
HYALINE CASTS UR QL AUTO: 0 /LPF
KETONES UR QL STRIP: NEGATIVE
LEUKOCYTE ESTERASE UR QL STRIP: ABNORMAL
MICROALBUMIN UR DL<=1MG/L-MCNC: 489 UG/ML
MICROSCOPIC COMMENT: NORMAL
NITRITE UR QL STRIP: NEGATIVE
PH UR STRIP: 5 [PH] (ref 5–8)
PROT UR QL STRIP: ABNORMAL
RBC #/AREA URNS AUTO: 1 /HPF (ref 0–4)
SP GR UR STRIP: 1.02 (ref 1–1.03)
SQUAMOUS #/AREA URNS AUTO: 3 /HPF
URN SPEC COLLECT METH UR: ABNORMAL
UROBILINOGEN UR STRIP-ACNC: NEGATIVE EU/DL
WBC #/AREA URNS AUTO: 1 /HPF (ref 0–5)

## 2018-09-04 PROCEDURE — 81001 URINALYSIS AUTO W/SCOPE: CPT

## 2018-09-04 PROCEDURE — 82043 UR ALBUMIN QUANTITATIVE: CPT

## 2018-09-12 RX ORDER — ALLOPURINOL 300 MG/1
TABLET ORAL
Qty: 90 TABLET | Refills: 0 | Status: SHIPPED | OUTPATIENT
Start: 2018-09-12 | End: 2018-11-15 | Stop reason: SDUPTHER

## 2018-09-17 ENCOUNTER — TELEPHONE (OUTPATIENT)
Dept: INTERNAL MEDICINE | Facility: CLINIC | Age: 73
End: 2018-09-17

## 2018-09-17 RX ORDER — METFORMIN HYDROCHLORIDE 500 MG/1
500 TABLET ORAL 2 TIMES DAILY WITH MEALS
Qty: 30 TABLET | Refills: 6
Start: 2018-09-17 | End: 2019-03-04 | Stop reason: SDUPTHER

## 2018-09-17 NOTE — TELEPHONE ENCOUNTER
----- Message from Robyn Das MA sent at 9/17/2018 10:45 AM CDT -----  Contact: [phone]  Patient would like to DM medications Metformin and Glimepiride. Patient stated that after getting her labs and urine done...Dr. Freedman is going to readjust her DM medications.

## 2018-10-01 ENCOUNTER — IMMUNIZATION (OUTPATIENT)
Dept: INTERNAL MEDICINE | Facility: CLINIC | Age: 73
End: 2018-10-01
Payer: MEDICARE

## 2018-10-01 ENCOUNTER — OFFICE VISIT (OUTPATIENT)
Dept: INTERNAL MEDICINE | Facility: CLINIC | Age: 73
End: 2018-10-01
Payer: MEDICARE

## 2018-10-01 ENCOUNTER — LAB VISIT (OUTPATIENT)
Dept: LAB | Facility: HOSPITAL | Age: 73
End: 2018-10-01
Attending: INTERNAL MEDICINE
Payer: MEDICARE

## 2018-10-01 VITALS
DIASTOLIC BLOOD PRESSURE: 70 MMHG | HEIGHT: 66 IN | SYSTOLIC BLOOD PRESSURE: 138 MMHG | BODY MASS INDEX: 31.53 KG/M2 | OXYGEN SATURATION: 98 % | WEIGHT: 196.19 LBS | TEMPERATURE: 99 F | HEART RATE: 99 BPM

## 2018-10-01 DIAGNOSIS — E55.9 VITAMIN D DEFICIENCY: ICD-10-CM

## 2018-10-01 DIAGNOSIS — I10 DIABETES MELLITUS WITH COINCIDENT HYPERTENSION: ICD-10-CM

## 2018-10-01 DIAGNOSIS — M89.9 DISORDER OF BONE: ICD-10-CM

## 2018-10-01 DIAGNOSIS — E11.9 DIABETES MELLITUS WITH COINCIDENT HYPERTENSION: ICD-10-CM

## 2018-10-01 DIAGNOSIS — R35.0 URINARY FREQUENCY: Primary | ICD-10-CM

## 2018-10-01 DIAGNOSIS — I25.10 CVD (CARDIOVASCULAR DISEASE): ICD-10-CM

## 2018-10-01 LAB
25(OH)D3+25(OH)D2 SERPL-MCNC: 24 NG/ML
ALBUMIN SERPL BCP-MCNC: 3.7 G/DL
ALP SERPL-CCNC: 88 U/L
ALT SERPL W/O P-5'-P-CCNC: 13 U/L
ANION GAP SERPL CALC-SCNC: 10 MMOL/L
AST SERPL-CCNC: 13 U/L
BACTERIA #/AREA URNS AUTO: ABNORMAL /HPF
BILIRUB SERPL-MCNC: 0.3 MG/DL
BILIRUB UR QL STRIP: NEGATIVE
BUN SERPL-MCNC: 28 MG/DL
CALCIUM SERPL-MCNC: 10.3 MG/DL
CHLORIDE SERPL-SCNC: 109 MMOL/L
CLARITY UR REFRACT.AUTO: ABNORMAL
CO2 SERPL-SCNC: 22 MMOL/L
COLOR UR AUTO: YELLOW
CREAT SERPL-MCNC: 1 MG/DL
EST. GFR  (AFRICAN AMERICAN): >60 ML/MIN/1.73 M^2
EST. GFR  (NON AFRICAN AMERICAN): 56.8 ML/MIN/1.73 M^2
GLUCOSE SERPL-MCNC: 179 MG/DL
GLUCOSE UR QL STRIP: NEGATIVE
HGB UR QL STRIP: NEGATIVE
HYALINE CASTS UR QL AUTO: 20 /LPF
KETONES UR QL STRIP: NEGATIVE
LEUKOCYTE ESTERASE UR QL STRIP: NEGATIVE
MICROSCOPIC COMMENT: ABNORMAL
NITRITE UR QL STRIP: NEGATIVE
PH UR STRIP: 5 [PH] (ref 5–8)
POTASSIUM SERPL-SCNC: 4.3 MMOL/L
PROT SERPL-MCNC: 7.9 G/DL
PROT UR QL STRIP: ABNORMAL
RBC #/AREA URNS AUTO: 1 /HPF (ref 0–4)
SODIUM SERPL-SCNC: 141 MMOL/L
SP GR UR STRIP: 1.02 (ref 1–1.03)
SQUAMOUS #/AREA URNS AUTO: 6 /HPF
TSH SERPL DL<=0.005 MIU/L-ACNC: 1.32 UIU/ML
URN SPEC COLLECT METH UR: ABNORMAL
UROBILINOGEN UR STRIP-ACNC: NEGATIVE EU/DL
WBC #/AREA URNS AUTO: 1 /HPF (ref 0–5)

## 2018-10-01 PROCEDURE — 99215 OFFICE O/P EST HI 40 MIN: CPT | Mod: PBBFAC,25,PO | Performed by: INTERNAL MEDICINE

## 2018-10-01 PROCEDURE — 84443 ASSAY THYROID STIM HORMONE: CPT

## 2018-10-01 PROCEDURE — 3075F SYST BP GE 130 - 139MM HG: CPT | Mod: CPTII,,, | Performed by: INTERNAL MEDICINE

## 2018-10-01 PROCEDURE — 3078F DIAST BP <80 MM HG: CPT | Mod: CPTII,,, | Performed by: INTERNAL MEDICINE

## 2018-10-01 PROCEDURE — 99999 PR PBB SHADOW E&M-EST. PATIENT-LVL V: CPT | Mod: PBBFAC,,, | Performed by: INTERNAL MEDICINE

## 2018-10-01 PROCEDURE — 87086 URINE CULTURE/COLONY COUNT: CPT

## 2018-10-01 PROCEDURE — 80053 COMPREHEN METABOLIC PANEL: CPT

## 2018-10-01 PROCEDURE — 3044F HG A1C LEVEL LT 7.0%: CPT | Mod: CPTII,,, | Performed by: INTERNAL MEDICINE

## 2018-10-01 PROCEDURE — 36415 COLL VENOUS BLD VENIPUNCTURE: CPT | Mod: PO

## 2018-10-01 PROCEDURE — 1101F PT FALLS ASSESS-DOCD LE1/YR: CPT | Mod: CPTII,,, | Performed by: INTERNAL MEDICINE

## 2018-10-01 PROCEDURE — 81001 URINALYSIS AUTO W/SCOPE: CPT

## 2018-10-01 PROCEDURE — 90662 IIV NO PRSV INCREASED AG IM: CPT | Mod: PBBFAC,PO

## 2018-10-01 PROCEDURE — 99214 OFFICE O/P EST MOD 30 MIN: CPT | Mod: S$PBB,,, | Performed by: INTERNAL MEDICINE

## 2018-10-01 PROCEDURE — 82306 VITAMIN D 25 HYDROXY: CPT

## 2018-10-01 RX ORDER — GLIMEPIRIDE 1 MG/1
1 TABLET ORAL
Qty: 90 TABLET | Refills: 1 | Status: SHIPPED | OUTPATIENT
Start: 2018-10-01 | End: 2018-10-01 | Stop reason: SDUPTHER

## 2018-10-01 RX ORDER — HYDROCODONE BITARTRATE AND ACETAMINOPHEN 5; 325 MG/1; MG/1
1 TABLET ORAL
Qty: 30 TABLET | Refills: 0 | Status: SHIPPED | OUTPATIENT
Start: 2018-10-01 | End: 2019-01-11 | Stop reason: SDUPTHER

## 2018-10-01 RX ORDER — GLIMEPIRIDE 1 MG/1
1 TABLET ORAL
Qty: 90 TABLET | Refills: 1 | Status: SHIPPED | OUTPATIENT
Start: 2018-10-01 | End: 2019-03-04 | Stop reason: SDUPTHER

## 2018-10-01 NOTE — PROGRESS NOTES
Administered high dose flu ,0.5ml via the right deltoid, no c/o pain or redness noted, VIS given, advised to wait 15mins pass injection, complied.

## 2018-10-03 LAB
BACTERIA UR CULT: NORMAL
BACTERIA UR CULT: NORMAL

## 2018-10-14 NOTE — PROGRESS NOTES
Subjective:       Patient ID: Lina Davis is a 73 y.o. female.    Chief Complaint: Follow-up    HPIPt reports some occasional glucose in 180 but HgbA1c is controlled.  No CP or SOB.  Has chronic shoulder and arm pain.  No numbness or weakness.  Review of Systems   Respiratory: Negative for shortness of breath (PND or orthopnea).    Cardiovascular: Negative for chest pain (arm pain or jaw pain).   Gastrointestinal: Negative for abdominal pain, diarrhea, nausea and vomiting.   Genitourinary: Negative for dysuria.   Neurological: Negative for seizures, syncope and headaches.       Objective:      Physical Exam   Constitutional: She is oriented to person, place, and time. She appears well-developed and well-nourished. No distress.   HENT:   Head: Normocephalic.   Mouth/Throat: Oropharynx is clear and moist.   Neck: Neck supple. No JVD present. No thyromegaly present.   Cardiovascular: Normal rate, regular rhythm, normal heart sounds and intact distal pulses. Exam reveals no gallop and no friction rub.   No murmur heard.  Pulmonary/Chest: Effort normal and breath sounds normal. She has no wheezes. She has no rales.   Abdominal: Soft. Bowel sounds are normal. She exhibits no distension and no mass. There is no tenderness. There is no rebound and no guarding.   Musculoskeletal: She exhibits no edema.   Lymphadenopathy:     She has no cervical adenopathy.   Neurological: She is alert and oriented to person, place, and time. She has normal reflexes.   Skin: Skin is warm and dry.   Psychiatric: She has a normal mood and affect. Her behavior is normal. Judgment and thought content normal.       Assessment:       1. Urinary frequency    2. Diabetes mellitus with coincident hypertension    3. Vitamin D deficiency    4. Disorder of bone    5. CVD (cardiovascular disease)        Plan:   Urinary frequency  -     Urinalysis  -     Urine culture    Diabetes mellitus with coincident hypertension  -     Comprehensive metabolic panel;  Future; Expected date: 10/01/2018  -     TSH; Future; Expected date: 10/01/2018  -     DIABETIC SHOES FOR HOME USE  Controlled - continue current meds    Vitamin D deficiency  -     Vitamin D; Future; Expected date: 10/01/2018    Disorder of bone  -     DXA Bone Density Spine And Hip; Future; Expected date: 10/01/2018    CVD (cardiovascular disease)  -     US Carotid Bilateral; Future; Expected date: 10/01/2018    DM  -     Discontinue: glimepiride (AMARYL) 1 MG tablet; Take 1 tablet (1 mg total) by mouth before breakfast.  Dispense: 90 tablet; Refill: 1  -     HYDROcodone-acetaminophen (NORCO) 5-325 mg per tablet; Take 1 tablet by mouth every 24 hours as needed for Pain.  Dispense: 30 tablet; Refill: 0  -     glimepiride (AMARYL) 1 MG tablet; Take 1 tablet (1 mg total) by mouth before breakfast.  Dispense: 90 tablet; Refill: 1  -     Urinalysis Microscopic  Work on diet and exercise

## 2018-11-09 RX ORDER — CALCIUM CITRATE/VITAMIN D3 200MG-6.25
TABLET ORAL
Qty: 200 STRIP | Refills: 2 | Status: SHIPPED | OUTPATIENT
Start: 2018-11-09 | End: 2019-08-12 | Stop reason: SDUPTHER

## 2018-11-15 DIAGNOSIS — E78.5 HYPERLIPIDEMIA: ICD-10-CM

## 2018-11-18 RX ORDER — ALLOPURINOL 300 MG/1
TABLET ORAL
Qty: 90 TABLET | Refills: 0 | Status: SHIPPED | OUTPATIENT
Start: 2018-11-18 | End: 2019-01-21 | Stop reason: SDUPTHER

## 2018-11-18 RX ORDER — LOVASTATIN 40 MG/1
TABLET ORAL
Qty: 90 TABLET | Refills: 3 | Status: SHIPPED | OUTPATIENT
Start: 2018-11-18 | End: 2019-01-11

## 2018-12-19 ENCOUNTER — HOSPITAL ENCOUNTER (OUTPATIENT)
Dept: RADIOLOGY | Facility: HOSPITAL | Age: 73
Discharge: HOME | End: 2018-12-19
Attending: INTERNAL MEDICINE
Payer: MEDICARE

## 2018-12-19 ENCOUNTER — HOSPITAL ENCOUNTER (OUTPATIENT)
Dept: RADIOLOGY | Facility: CLINIC | Age: 73
Discharge: HOME | End: 2018-12-19
Attending: INTERNAL MEDICINE
Payer: MEDICARE

## 2018-12-19 DIAGNOSIS — I25.10 CVD (CARDIOVASCULAR DISEASE): ICD-10-CM

## 2018-12-19 DIAGNOSIS — M89.9 DISORDER OF BONE: ICD-10-CM

## 2018-12-19 PROCEDURE — 93880 EXTRACRANIAL BILAT STUDY: CPT | Mod: TC

## 2018-12-19 PROCEDURE — 93880 EXTRACRANIAL BILAT STUDY: CPT | Mod: 26,,, | Performed by: RADIOLOGY

## 2018-12-19 PROCEDURE — 77080 DXA BONE DENSITY AXIAL: CPT | Mod: TC

## 2018-12-19 PROCEDURE — 77080 DXA BONE DENSITY AXIAL: CPT | Mod: 26,,, | Performed by: INTERNAL MEDICINE

## 2018-12-27 RX ORDER — LOSARTAN POTASSIUM 100 MG/1
TABLET ORAL
Qty: 90 TABLET | Refills: 3 | Status: SHIPPED | OUTPATIENT
Start: 2018-12-27 | End: 2019-09-05

## 2019-01-11 ENCOUNTER — LAB VISIT (OUTPATIENT)
Dept: LAB | Facility: HOSPITAL | Age: 74
End: 2019-01-11
Attending: INTERNAL MEDICINE
Payer: MEDICARE

## 2019-01-11 ENCOUNTER — OFFICE VISIT (OUTPATIENT)
Dept: INTERNAL MEDICINE | Facility: CLINIC | Age: 74
End: 2019-01-11
Payer: MEDICARE

## 2019-01-11 VITALS
DIASTOLIC BLOOD PRESSURE: 68 MMHG | OXYGEN SATURATION: 97 % | TEMPERATURE: 98 F | BODY MASS INDEX: 31.89 KG/M2 | HEIGHT: 66 IN | HEART RATE: 80 BPM | WEIGHT: 198.44 LBS | SYSTOLIC BLOOD PRESSURE: 124 MMHG

## 2019-01-11 DIAGNOSIS — E11.49 TYPE II DIABETES MELLITUS WITH NEUROLOGICAL MANIFESTATIONS: ICD-10-CM

## 2019-01-11 DIAGNOSIS — I25.10 CVD (CARDIOVASCULAR DISEASE): Primary | ICD-10-CM

## 2019-01-11 DIAGNOSIS — Z12.31 SCREENING MAMMOGRAM, ENCOUNTER FOR: ICD-10-CM

## 2019-01-11 DIAGNOSIS — D64.9 ANEMIA, UNSPECIFIED TYPE: ICD-10-CM

## 2019-01-11 DIAGNOSIS — H92.01 EAR PAIN, RIGHT: ICD-10-CM

## 2019-01-11 LAB
ALBUMIN SERPL BCP-MCNC: 3.7 G/DL
ALP SERPL-CCNC: 93 U/L
ALT SERPL W/O P-5'-P-CCNC: 13 U/L
ANION GAP SERPL CALC-SCNC: 10 MMOL/L
AST SERPL-CCNC: 13 U/L
BASOPHILS # BLD AUTO: 0.02 K/UL
BASOPHILS NFR BLD: 0.2 %
BILIRUB SERPL-MCNC: 0.4 MG/DL
BUN SERPL-MCNC: 26 MG/DL
CALCIUM SERPL-MCNC: 10.3 MG/DL
CHLORIDE SERPL-SCNC: 108 MMOL/L
CO2 SERPL-SCNC: 23 MMOL/L
CREAT SERPL-MCNC: 1.2 MG/DL
DIFFERENTIAL METHOD: ABNORMAL
EOSINOPHIL # BLD AUTO: 0.1 K/UL
EOSINOPHIL NFR BLD: 0.7 %
ERYTHROCYTE [DISTWIDTH] IN BLOOD BY AUTOMATED COUNT: 13.6 %
EST. GFR  (AFRICAN AMERICAN): 52.5 ML/MIN/1.73 M^2
EST. GFR  (NON AFRICAN AMERICAN): 45.6 ML/MIN/1.73 M^2
ESTIMATED AVG GLUCOSE: 146 MG/DL
FERRITIN SERPL-MCNC: 684 NG/ML
GLUCOSE SERPL-MCNC: 201 MG/DL
HBA1C MFR BLD HPLC: 6.7 %
HCT VFR BLD AUTO: 35.9 %
HGB BLD-MCNC: 11.6 G/DL
IMM GRANULOCYTES # BLD AUTO: 0.05 K/UL
IMM GRANULOCYTES NFR BLD AUTO: 0.5 %
IRON SERPL-MCNC: 64 UG/DL
LYMPHOCYTES # BLD AUTO: 2.3 K/UL
LYMPHOCYTES NFR BLD: 24.6 %
MCH RBC QN AUTO: 29.1 PG
MCHC RBC AUTO-ENTMCNC: 32.3 G/DL
MCV RBC AUTO: 90 FL
MONOCYTES # BLD AUTO: 0.5 K/UL
MONOCYTES NFR BLD: 5.1 %
NEUTROPHILS # BLD AUTO: 6.3 K/UL
NEUTROPHILS NFR BLD: 68.9 %
NRBC BLD-RTO: 0 /100 WBC
PLATELET # BLD AUTO: 323 K/UL
PMV BLD AUTO: 10.6 FL
POTASSIUM SERPL-SCNC: 5.1 MMOL/L
PROT SERPL-MCNC: 8 G/DL
RBC # BLD AUTO: 3.98 M/UL
SATURATED IRON: 20 %
SODIUM SERPL-SCNC: 141 MMOL/L
TOTAL IRON BINDING CAPACITY: 317 UG/DL
TRANSFERRIN SERPL-MCNC: 214 MG/DL
WBC # BLD AUTO: 9.14 K/UL

## 2019-01-11 PROCEDURE — 1101F PT FALLS ASSESS-DOCD LE1/YR: CPT | Mod: CPTII,S$GLB,, | Performed by: INTERNAL MEDICINE

## 2019-01-11 PROCEDURE — 36415 COLL VENOUS BLD VENIPUNCTURE: CPT | Mod: PO

## 2019-01-11 PROCEDURE — 99999 PR PBB SHADOW E&M-EST. PATIENT-LVL V: ICD-10-PCS | Mod: PBBFAC,,, | Performed by: INTERNAL MEDICINE

## 2019-01-11 PROCEDURE — 3044F HG A1C LEVEL LT 7.0%: CPT | Mod: CPTII,S$GLB,, | Performed by: INTERNAL MEDICINE

## 2019-01-11 PROCEDURE — 99214 OFFICE O/P EST MOD 30 MIN: CPT | Mod: S$GLB,,, | Performed by: INTERNAL MEDICINE

## 2019-01-11 PROCEDURE — 3074F SYST BP LT 130 MM HG: CPT | Mod: CPTII,S$GLB,, | Performed by: INTERNAL MEDICINE

## 2019-01-11 PROCEDURE — 83036 HEMOGLOBIN GLYCOSYLATED A1C: CPT

## 2019-01-11 PROCEDURE — 3078F DIAST BP <80 MM HG: CPT | Mod: CPTII,S$GLB,, | Performed by: INTERNAL MEDICINE

## 2019-01-11 PROCEDURE — 3074F PR MOST RECENT SYSTOLIC BLOOD PRESSURE < 130 MM HG: ICD-10-PCS | Mod: CPTII,S$GLB,, | Performed by: INTERNAL MEDICINE

## 2019-01-11 PROCEDURE — 80053 COMPREHEN METABOLIC PANEL: CPT

## 2019-01-11 PROCEDURE — 82728 ASSAY OF FERRITIN: CPT

## 2019-01-11 PROCEDURE — 3044F PR MOST RECENT HEMOGLOBIN A1C LEVEL <7.0%: ICD-10-PCS | Mod: CPTII,S$GLB,, | Performed by: INTERNAL MEDICINE

## 2019-01-11 PROCEDURE — 3078F PR MOST RECENT DIASTOLIC BLOOD PRESSURE < 80 MM HG: ICD-10-PCS | Mod: CPTII,S$GLB,, | Performed by: INTERNAL MEDICINE

## 2019-01-11 PROCEDURE — 85025 COMPLETE CBC W/AUTO DIFF WBC: CPT

## 2019-01-11 PROCEDURE — 99214 PR OFFICE/OUTPT VISIT, EST, LEVL IV, 30-39 MIN: ICD-10-PCS | Mod: S$GLB,,, | Performed by: INTERNAL MEDICINE

## 2019-01-11 PROCEDURE — 83540 ASSAY OF IRON: CPT

## 2019-01-11 PROCEDURE — 99999 PR PBB SHADOW E&M-EST. PATIENT-LVL V: CPT | Mod: PBBFAC,,, | Performed by: INTERNAL MEDICINE

## 2019-01-11 PROCEDURE — 1101F PR PT FALLS ASSESS DOC 0-1 FALLS W/OUT INJ PAST YR: ICD-10-PCS | Mod: CPTII,S$GLB,, | Performed by: INTERNAL MEDICINE

## 2019-01-11 RX ORDER — ATORVASTATIN CALCIUM 40 MG/1
40 TABLET, FILM COATED ORAL DAILY
Qty: 90 TABLET | Refills: 3 | Status: SHIPPED | OUTPATIENT
Start: 2019-01-11 | End: 2019-07-22 | Stop reason: SDUPTHER

## 2019-01-11 RX ORDER — HYDROCODONE BITARTRATE AND ACETAMINOPHEN 5; 325 MG/1; MG/1
1 TABLET ORAL
Qty: 30 TABLET | Refills: 0 | Status: SHIPPED | OUTPATIENT
Start: 2019-01-11 | End: 2019-02-20 | Stop reason: SDUPTHER

## 2019-01-11 NOTE — PATIENT INSTRUCTIONS
Metformin total of 1500mg per day 2  tabs in AM one in PM    Glimiperide 1mg  One in AM and half in PM    Stop lovastatin for two weeks and replace with atorvastatin.

## 2019-01-21 NOTE — PROGRESS NOTES
Subjective:       Patient ID: Lina Davis is a 73 y.o. female.    Chief Complaint: Follow-up (right ear clogged up) and Leg Pain (right leg cramps)    HPIPt reports blood sugars running slightly high.  No CP or SOB.  Review of Systems   Respiratory: Negative for shortness of breath (PND or orthopnea).    Cardiovascular: Negative for chest pain (arm pain or jaw pain).   Gastrointestinal: Negative for abdominal pain, diarrhea, nausea and vomiting.   Genitourinary: Negative for dysuria.   Neurological: Negative for seizures, syncope and headaches.       Objective:      Physical Exam   Constitutional: She is oriented to person, place, and time. She appears well-developed and well-nourished. No distress.   HENT:   Head: Normocephalic.   Mouth/Throat: Oropharynx is clear and moist.   Neck: Neck supple. No JVD present. No thyromegaly present.   Cardiovascular: Normal rate, regular rhythm, normal heart sounds and intact distal pulses. Exam reveals no gallop and no friction rub.   No murmur heard.  Pulmonary/Chest: Effort normal and breath sounds normal. She has no wheezes. She has no rales.   Abdominal: Soft. Bowel sounds are normal. She exhibits no distension and no mass. There is no tenderness. There is no rebound and no guarding.   Musculoskeletal: She exhibits no edema.   Lymphadenopathy:     She has no cervical adenopathy.   Neurological: She is alert and oriented to person, place, and time. She has normal reflexes.   Skin: Skin is warm and dry.   Psychiatric: She has a normal mood and affect. Her behavior is normal. Judgment and thought content normal.       Assessment:       1. CVD (cardiovascular disease)    2. Screening mammogram, encounter for    3. Ear pain, right    4. Anemia, unspecified type    5. Type II diabetes mellitus with neurological manifestations        Plan:   CVD (cardiovascular disease)  -     US Carotid Bilateral; Future; Expected date: 01/11/2019  Recheck carotid in 6 months  Screening  mammogram, encounter for  -     Mammo Digital Screening Bilat w/ Efrem; Future; Expected date: 01/11/2019    Ear pain, right  -     Ambulatory consult to ENT    Anemia, unspecified type  -     CBC auto differential; Future; Expected date: 01/11/2019  -     Iron and TIBC; Future; Expected date: 01/11/2019  -     Ferritin; Future; Expected date: 01/11/2019    Type II diabetes mellitus with neurological manifestations  -     Comprehensive metabolic panel; Future; Expected date: 01/11/2019  -     Hemoglobin A1c; Future; Expected date: 01/11/2019  -     Ambulatory consult to Optometry    Other orders  -     HYDROcodone-acetaminophen (NORCO) 5-325 mg per tablet; Take 1 tablet by mouth every 24 hours as needed for Pain.  Dispense: 30 tablet; Refill: 0  -     atorvastatin (LIPITOR) 40 MG tablet; Take 1 tablet (40 mg total) by mouth once daily.  Dispense: 90 tablet; Refill: 3

## 2019-01-22 RX ORDER — ALLOPURINOL 300 MG/1
TABLET ORAL
Qty: 90 TABLET | Refills: 0 | Status: SHIPPED | OUTPATIENT
Start: 2019-01-22 | End: 2019-03-27 | Stop reason: SDUPTHER

## 2019-02-11 ENCOUNTER — OFFICE VISIT (OUTPATIENT)
Dept: OPTOMETRY | Facility: CLINIC | Age: 74
End: 2019-02-11
Payer: MEDICARE

## 2019-02-11 DIAGNOSIS — H52.4 PRESBYOPIA: ICD-10-CM

## 2019-02-11 DIAGNOSIS — I10 HTN (HYPERTENSION), BENIGN: ICD-10-CM

## 2019-02-11 DIAGNOSIS — E11.49 TYPE II DIABETES MELLITUS WITH NEUROLOGICAL MANIFESTATIONS: Primary | ICD-10-CM

## 2019-02-11 DIAGNOSIS — H04.123 DRY EYE SYNDROME, BILATERAL: ICD-10-CM

## 2019-02-11 DIAGNOSIS — H10.13 ALLERGIC CONJUNCTIVITIS OF BOTH EYES: ICD-10-CM

## 2019-02-11 DIAGNOSIS — H25.13 NS (NUCLEAR SCLEROSIS), BILATERAL: ICD-10-CM

## 2019-02-11 PROCEDURE — 92014 PR EYE EXAM, EST PATIENT,COMPREHESV: ICD-10-PCS | Mod: HCNC,S$GLB,, | Performed by: OPTOMETRIST

## 2019-02-11 PROCEDURE — 99999 PR PBB SHADOW E&M-EST. PATIENT-LVL III: ICD-10-PCS | Mod: PBBFAC,HCNC,, | Performed by: OPTOMETRIST

## 2019-02-11 PROCEDURE — 92014 COMPRE OPH EXAM EST PT 1/>: CPT | Mod: HCNC,S$GLB,, | Performed by: OPTOMETRIST

## 2019-02-11 PROCEDURE — 99999 PR PBB SHADOW E&M-EST. PATIENT-LVL III: CPT | Mod: PBBFAC,HCNC,, | Performed by: OPTOMETRIST

## 2019-02-11 NOTE — LETTER
February 14, 2019      Dora Freedman MD  [address]  [facility] [address]           [facility] - Optometry  [address]  [facility] [address]  Phone: [phone]  Fax: [phone]          Patient: Lina Davis   MR Number: [id number]   YOB: 1945   Date of Visit: 2/11/2019       Dear Dr. Dora Freedman:    Thank you for referring Lina Davis to me for evaluation. Attached you will find relevant portions of my assessment and plan of care.    If you have questions, please do not hesitate to call me. I look forward to following Lina Davis along with you.    Sincerely,    Helen Fernando  CC:  No Recipients    If you would like to receive this communication electronically, please contact [email or web address] or [phone] to request more information on [employer] Link access.    For providers and/or their staff who would like to refer a patient to [facility], please contact us through our one-stop-shop provider referral line, [facility] , at [phone].    If you feel you have received this communication in error or would no longer like to receive these types of communications, please e-mail [email or web address]

## 2019-02-14 NOTE — PROGRESS NOTES
HPI     DLS 1/3/17  Here for overdue annual exam  BS unstable  Uses +2.50 OTC reading glasses. Nothing for distance  No flashes or floaters  Eyes itch, no drops  Has used AT in the past. Improved      Last edited by Linette Castro on 2/11/2019  9:20 AM. (History)            Assessment /Plan     For exam results, see Encounter Report.        Dry eye syndrome, bilateral  Allergic conjunctivitis of both eyes  Use Zaditor BID OU    Type II diabetes mellitus with neurological manifestations  HTN (hypertension), benign  No retinopathy, monitor yearly     Nuclear sclerosis, bilateral  Cortical cataract of both eyes  Mild, not visually significant     PVD (posterior vitreous detachment), bilateral  Stable  Refractive error  Ok to continue with readers      RTC 1 year, sooner PRN

## 2019-02-20 NOTE — TELEPHONE ENCOUNTER
"----- Message from Caryljt Swanson sent at 2/20/2019  1:05 PM CST -----  Contact: self/[phone]  RX request - refill or new RX.  Is this a refill or new RX:  Refill  RX name and strength: HYDROcodone-acetaminophen (NORCO) 5-325 mg per tablet  Directions:   Is this a 30 day or 90 day RX:    Local pharmacy or mail order pharmacy:  Local pharmacy  Pharmacy name and phone # (DON'T enter "on file" or "in chart"): [facility] [address] [place] [address] [address] [facility] AT [facility] [address] [facility] [phone] (Phone) [phone] (Fax)  Comments:  Patient also would like a courtesy call concerning the colon test. Please call and advise. Thank you!!!        "

## 2019-02-26 ENCOUNTER — CLINICAL SUPPORT (OUTPATIENT)
Dept: AUDIOLOGY | Facility: CLINIC | Age: 74
End: 2019-02-26
Payer: MEDICARE

## 2019-02-26 ENCOUNTER — HOSPITAL ENCOUNTER (OUTPATIENT)
Dept: RADIOLOGY | Facility: HOSPITAL | Age: 74
Discharge: HOME | End: 2019-02-26
Attending: INTERNAL MEDICINE
Payer: MEDICARE

## 2019-02-26 ENCOUNTER — OFFICE VISIT (OUTPATIENT)
Dept: OTOLARYNGOLOGY | Facility: CLINIC | Age: 74
End: 2019-02-26
Payer: MEDICARE

## 2019-02-26 VITALS
WEIGHT: 200 LBS | DIASTOLIC BLOOD PRESSURE: 92 MMHG | BODY MASS INDEX: 32.28 KG/M2 | HEART RATE: 102 BPM | SYSTOLIC BLOOD PRESSURE: 182 MMHG

## 2019-02-26 DIAGNOSIS — H61.23 IMPACTED CERUMEN, BILATERAL: Primary | ICD-10-CM

## 2019-02-26 DIAGNOSIS — I25.10 CVD (CARDIOVASCULAR DISEASE): ICD-10-CM

## 2019-02-26 DIAGNOSIS — L29.9 ITCHING OF EAR: ICD-10-CM

## 2019-02-26 DIAGNOSIS — H93.13 TINNITUS, BILATERAL: ICD-10-CM

## 2019-02-26 DIAGNOSIS — S09.91XA TRAUMA OF EAR CANAL, INITIAL ENCOUNTER: ICD-10-CM

## 2019-02-26 DIAGNOSIS — H92.01 OTALGIA, RIGHT: ICD-10-CM

## 2019-02-26 DIAGNOSIS — H90.3 SENSORINEURAL HEARING LOSS, BILATERAL: ICD-10-CM

## 2019-02-26 DIAGNOSIS — H90.3 SENSORINEURAL HEARING LOSS, BILATERAL: Primary | ICD-10-CM

## 2019-02-26 PROCEDURE — 99203 PR OFFICE/OUTPT VISIT, NEW, LEVL III, 30-44 MIN: ICD-10-PCS | Mod: HCNC,S$GLB,, | Performed by: OTOLARYNGOLOGY

## 2019-02-26 PROCEDURE — 1101F PT FALLS ASSESS-DOCD LE1/YR: CPT | Mod: HCNC,CPTII,S$GLB, | Performed by: OTOLARYNGOLOGY

## 2019-02-26 PROCEDURE — 93880 EXTRACRANIAL BILAT STUDY: CPT | Mod: 26,HCNC,, | Performed by: RADIOLOGY

## 2019-02-26 PROCEDURE — 99999 PR PBB SHADOW E&M-EST. PATIENT-LVL IV: CPT | Mod: PBBFAC,HCNC,, | Performed by: OTOLARYNGOLOGY

## 2019-02-26 PROCEDURE — 3077F SYST BP >= 140 MM HG: CPT | Mod: HCNC,CPTII,S$GLB, | Performed by: OTOLARYNGOLOGY

## 2019-02-26 PROCEDURE — 93880 EXTRACRANIAL BILAT STUDY: CPT | Mod: TC,HCNC

## 2019-02-26 PROCEDURE — 3080F DIAST BP >= 90 MM HG: CPT | Mod: HCNC,CPTII,S$GLB, | Performed by: OTOLARYNGOLOGY

## 2019-02-26 PROCEDURE — 92557 PR COMPREHENSIVE HEARING TEST: ICD-10-PCS | Mod: HCNC,S$GLB,, | Performed by: AUDIOLOGIST-HEARING AID FITTER

## 2019-02-26 PROCEDURE — 93880 US CAROTID BILATERAL: ICD-10-PCS | Mod: 26,HCNC,, | Performed by: RADIOLOGY

## 2019-02-26 PROCEDURE — 1101F PR PT FALLS ASSESS DOC 0-1 FALLS W/OUT INJ PAST YR: ICD-10-PCS | Mod: HCNC,CPTII,S$GLB, | Performed by: OTOLARYNGOLOGY

## 2019-02-26 PROCEDURE — 92557 COMPREHENSIVE HEARING TEST: CPT | Mod: HCNC,S$GLB,, | Performed by: AUDIOLOGIST-HEARING AID FITTER

## 2019-02-26 PROCEDURE — 99203 OFFICE O/P NEW LOW 30 MIN: CPT | Mod: HCNC,S$GLB,, | Performed by: OTOLARYNGOLOGY

## 2019-02-26 PROCEDURE — 3077F PR MOST RECENT SYSTOLIC BLOOD PRESSURE >= 140 MM HG: ICD-10-PCS | Mod: HCNC,CPTII,S$GLB, | Performed by: OTOLARYNGOLOGY

## 2019-02-26 PROCEDURE — 3080F PR MOST RECENT DIASTOLIC BLOOD PRESSURE >= 90 MM HG: ICD-10-PCS | Mod: HCNC,CPTII,S$GLB, | Performed by: OTOLARYNGOLOGY

## 2019-02-26 PROCEDURE — 92567 TYMPANOMETRY: CPT | Mod: HCNC,S$GLB,, | Performed by: AUDIOLOGIST-HEARING AID FITTER

## 2019-02-26 PROCEDURE — 92567 PR TYMPA2METRY: ICD-10-PCS | Mod: HCNC,S$GLB,, | Performed by: AUDIOLOGIST-HEARING AID FITTER

## 2019-02-26 PROCEDURE — 99999 PR PBB SHADOW E&M-EST. PATIENT-LVL IV: ICD-10-PCS | Mod: PBBFAC,HCNC,, | Performed by: OTOLARYNGOLOGY

## 2019-02-26 NOTE — PATIENT INSTRUCTIONS
Cerumen removed from AD eac floor; no obvious eac trauma; some wax removed from AS eac  Audiometry reviewed  Pt. is a candidate for amplification for one or both ears  Copy of audiogram/RACHNA Cardenas's card/Rx to obtain hearing aid(s) provided  Tinnitus literature provided  RX for Valisone lotion; 3 drops to affected ear BID prn

## 2019-02-26 NOTE — LETTER
February 27, 2019      Dora Freedman MD  [address]  [facility] [address]           [facility]  [address]  [facility] [address]  Phone: [phone]  Fax: [phone]          Patient: Lina Davis   MR Number: [id number]   YOB: 1945   Date of Visit: 2/26/2019       Dear Dr. Dora Freedman:    Thank you for referring Lina Davis to me for evaluation. Attached you will find relevant portions of my assessment and plan of care.    If you have questions, please do not hesitate to call me. I look forward to following Lina Davis along with you.    Sincerely,    Bogdan Arana III, MD    Enclosure  CC:  No Recipients    If you would like to receive this communication electronically, please contact [email or web address] or [phone] to request more information on [employer] Link access.    For providers and/or their staff who would like to refer a patient to [facility], please contact us through our one-stop-shop provider referral line, [facility], at [phone].    If you feel you have received this communication in error or would no longer like to receive these types of communications, please e-mail [email or web address]

## 2019-02-26 NOTE — PROGRESS NOTES
Lina Davis was seen in the clinic today for a hearing evaluation. Ms. Davis reported right otalgia.    Audiological testing revealed a mild to moderate sensorineural hearing loss in the left ear and a mild to moderately severe sensorineural hearing loss in the right ear. A speech reception threshold was obtained at 35 dBHL in the left ear and 40 dBHL in the right ear. Speech recognition was 88% in the left ear and 96% in the right ear.    Tympanometry revealed a normal Type A tympanogram in the left ear and a seal could not be obtained in the right ear.    Recommendations:  1. Otologic evaluation  2. Annual hearing evaluation  3. Hearing aid consult

## 2019-02-27 RX ORDER — HYDROCODONE BITARTRATE AND ACETAMINOPHEN 5; 325 MG/1; MG/1
1 TABLET ORAL
Qty: 30 TABLET | Refills: 0 | Status: SHIPPED | OUTPATIENT
Start: 2019-02-27 | End: 2019-04-09 | Stop reason: SDUPTHER

## 2019-02-27 NOTE — PROGRESS NOTES
Subjective:       Patient ID: Lina Davis is a 73 y.o. female.    Chief Complaint: Otalgia (itching in ears)    HPI: Ms. Davis is a 73-year-old  whose hand written reason for the visit is ear pain .  She made the appointment 1 month ago.  In the intervening time frame her ear is getting better.  The right ear was draining initially.  She admitted to cleaning her ears with cotton swabs when the door accidentally bumped her arm jetting the swab into her ear canal. There was subsequent right ear discharge with a malodor for a while which dissipated.  She was never initially evaluated for the injury until now.  She denies a history of frequent childhood ear infections.  She denies a family history of hearing loss.  Denies any history of ototoxic drug exposure.      PMH:  High blood pressure, diabetes, arthritis, hearing loss  Past Surgical History:   Procedure Laterality Date    BLOCK, NERVE, FACET JOINT, LUMBAR, MEDIAL BRANCH Bilateral 12/18/2013    Performed by Sheri Kay MD at [facility] PAIN MGT    CARPAL TUNNEL RELEASE      bilateral    COLONOSCOPY N/A 3/7/2018    Performed by [facility]ís [facility] at [facility] ENDO (2ND FLR)    COLONOSCOPY N/A 6/3/2013    Performed by Dread Forman MD at [facility] ENDO (4TH FLR)    COLONOSCOPY W/ BIOPSIES AND POLYPECTOMY      DEBRIDEMENT-SHOULDER-ARTHROSCOPIC Right 10/13/2017    Performed by MUNIRA Grier MD at [facility] OR    ESOPHAGOGASTRODUODENOSCOPY (EGD) N/A 3/7/2018    Performed by Luís [facility]i [facility] at [facility] ENDO (2ND FLR)    ESOPHAGOGASTRODUODENOSCOPY (EGD) N/A 2/15/2016    Performed by Evangelista Redman MD at [facility] ENDO (4TH FLR)    BFKLQOMR-JHBRRJKP-DZLLSP END Right 10/13/2017    Performed by MUNIRA Grier MD at [facility] OR    HEMORRHOID SURGERY      HYSTERECTOMY      AUB    RELEASE-TENDON Right 10/13/2017    Performed by MUNIRA Grier MD at [facility] OR    REPAIR ROTATOR CUFF ARTHROSCOPIC Right 10/13/2017    Performed by MUNIRA Grier MD at [facility] OR     ROTATOR CUFF REPAIR      left shoulder    SHOULDER SURGERY      Family history:  Heart disease, high blood pressure, emphysema, alcoholism, diabetes, arthritis, kidney  Allergies:  Januvia      Review of Systems   Ears: Positive for ear pain and ear discharge.    Other:  Negative for rash.     Current Outpatient Medications on File Prior to Visit   Medication Sig Dispense Refill    acetaminophen (TYLENOL) 325 MG tablet Take 325 mg by mouth every 6 (six) hours as needed for Pain.      alcohol swabs (BD ALCOHOL SWABS) PadM USE AS DIRECTED TO MONITOR BLOOD SUGARS TWICE DAILY 200 each 3    allopurinol (ZYLOPRIM) 300 MG tablet TAKE 1 TABLET EVERY DAY 90 tablet 0    atorvastatin (LIPITOR) 40 MG tablet Take 1 tablet (40 mg total) by mouth once daily. 90 tablet 3    blood glucose control, normal Soln True Metrix control solution E11.9 - pt tests twice daily 1 each 1    econazole nitrate 1 % cream Apply topically 2 (two) times daily. 85 g 1    glimepiride (AMARYL) 1 MG tablet Take 1 tablet (1 mg total) by mouth before breakfast. 90 tablet 1    HYDROcodone-acetaminophen (NORCO) 5-325 mg per tablet Take 1 tablet by mouth every 24 hours as needed for Pain. 30 tablet 0    lancets (TRUEPLUS LANCETS) 28 gauge Misc 1 lancet by Misc.(Non-Drug; Combo Route) route 2 (two) times daily. 200 each 2    losartan (COZAAR) 100 MG tablet TAKE 1 TABLET ONE TIME DAILY 90 tablet 3    omeprazole (PRILOSEC) 20 MG capsule One capsule twice daily 180 capsule 3    TRUE METRIX GLUCOSE TEST STRIP Strp TEST BLOOD SUGAR TWICE DAILY 200 strip 2    metFORMIN (GLUCOPHAGE) 500 MG tablet Take 1 tablet (500 mg total) by mouth 2 (two) times daily with meals. 30 tablet 6     No current facility-administered medications on file prior to visit.       Her medical problem list includes mixed hyperlipidemia, vitamin-D deficiency disease, hypertension, protein urea,, type 2 diabetes with polyneuropathy, GERD, diabetic nephropathy, acute pain of right  shoulder with right rotator cuff tear, pyelonephritis due to E coli, severe sepsis due to gram-negative bacteria      The patient completed an audiometric study performed by the [facility] audiology service.  The study is duplicated below and the results are reviewed with her in detail  Objective:         Blood pressure 182/92 pulse 102 height 5 ft 6 in weight 200  General:  Alert and oriented lady in no acute distress  Both ears were examined under the microscope in the micro procedure room  Physical Exam   Constitutional: She is oriented to person, place, and time. She appears well-developed and well-nourished.   HENT:   Head: Normocephalic.   Right Ear: Tympanic membrane and external ear normal. No drainage. No foreign bodies. No mastoid tenderness. Tympanic membrane is not perforated. No decreased hearing is noted.   Left Ear: Tympanic membrane and external ear normal. No drainage. No foreign bodies. No mastoid tenderness. Tympanic membrane is not perforated. No decreased hearing is noted.   Ears:    Nose: Nose normal. No nasal deformity, septal deviation or nasal septal hematoma. No epistaxis. Right sinus exhibits no maxillary sinus tenderness and no frontal sinus tenderness. Left sinus exhibits no maxillary sinus tenderness and no frontal sinus tenderness.   Mouth/Throat: Uvula is midline, oropharynx is clear and moist and mucous membranes are normal. No oral lesions. No trismus in the jaw. No uvula swelling. No oropharyngeal exudate or tonsillar abscesses.   Neck: Neck supple. No tracheal deviation present. No thyromegaly present.   Pulmonary/Chest: Effort normal. No stridor.   Lymphadenopathy:     She has no cervical adenopathy.   Neurological: She is alert and oriented to person, place, and time.   Skin: No rash noted.       Assessment:       1. Impacted cerumen, bilateral    2. Trauma of ear canal, initial encounter    3. Sensorineural hearing loss, bilateral    4. Tinnitus, bilateral    5. Itching of ear    6.  Otalgia, right        Plan:         Cerumen removed from AD eac floor; no obvious eac trauma; some wax removed from AS eac  Audiometry reviewed  Pt. is a candidate for amplification for one or both ears  Copy of audiogram/RACHNA Cardenas's card/Rx to obtain hearing aid(s) provided  Tinnitus literature provided  RX for Valisone lotion; 3 drops to affected ear BID prn

## 2019-02-27 NOTE — TELEPHONE ENCOUNTER
Informed Spouse, Rx for Hydrocodone is ready for  at pcp office, expressed understanding and will come

## 2019-03-04 DIAGNOSIS — E11.21 DIABETIC NEPHROPATHY ASSOCIATED WITH TYPE 2 DIABETES MELLITUS: ICD-10-CM

## 2019-03-10 RX ORDER — ISOPROPYL ALCOHOL 70 ML/100ML
SWAB TOPICAL
Qty: 200 EACH | Refills: 3 | Status: SHIPPED | OUTPATIENT
Start: 2019-03-10 | End: 2020-01-02

## 2019-03-10 RX ORDER — METFORMIN HYDROCHLORIDE 500 MG/1
TABLET ORAL
Qty: 360 TABLET | Refills: 3 | Status: SHIPPED | OUTPATIENT
Start: 2019-03-10 | End: 2019-10-21

## 2019-03-10 RX ORDER — GLIMEPIRIDE 1 MG/1
TABLET ORAL
Qty: 90 TABLET | Refills: 1 | Status: SHIPPED | OUTPATIENT
Start: 2019-03-10 | End: 2019-05-15 | Stop reason: SDUPTHER

## 2019-03-15 ENCOUNTER — HOSPITAL ENCOUNTER (OUTPATIENT)
Dept: RADIOLOGY | Facility: HOSPITAL | Age: 74
Discharge: HOME | End: 2019-03-15
Attending: INTERNAL MEDICINE
Payer: MEDICARE

## 2019-03-15 DIAGNOSIS — Z12.31 SCREENING MAMMOGRAM, ENCOUNTER FOR: ICD-10-CM

## 2019-03-15 PROCEDURE — 77067 SCR MAMMO BI INCL CAD: CPT | Mod: TC,HCNC

## 2019-03-15 PROCEDURE — 77067 MAMMO DIGITAL SCREENING BILAT WITH TOMOSYNTHESIS_CAD: ICD-10-PCS | Mod: 26,HCNC,, | Performed by: RADIOLOGY

## 2019-03-15 PROCEDURE — 77063 BREAST TOMOSYNTHESIS BI: CPT | Mod: 26,HCNC,, | Performed by: RADIOLOGY

## 2019-03-15 PROCEDURE — 77067 SCR MAMMO BI INCL CAD: CPT | Mod: 26,HCNC,, | Performed by: RADIOLOGY

## 2019-03-15 PROCEDURE — 77063 MAMMO DIGITAL SCREENING BILAT WITH TOMOSYNTHESIS_CAD: ICD-10-PCS | Mod: 26,HCNC,, | Performed by: RADIOLOGY

## 2019-03-21 NOTE — PROGRESS NOTES
See POC for patient evaluation   [FreeTextEntry1] : HTN- doing well\par hyperlipemia- doing well\par seborrhea- improving\par Anemia- dietary- advise increase fruits and vegetable and will follow up next lab draw/ will start MV\par low Vit D- start Vit d\par elevated TSH- asymptomatic- lab today- continue close follow up\par diabetes- stable/ Diet and advise to cut carbs- reiterated again\par \par need for inf- given\par \par follow up in 6 weeks and get Flu vaccine

## 2019-03-28 RX ORDER — ALLOPURINOL 300 MG/1
TABLET ORAL
Qty: 90 TABLET | Refills: 0 | Status: SHIPPED | OUTPATIENT
Start: 2019-03-28 | End: 2019-06-04 | Stop reason: SDUPTHER

## 2019-04-08 ENCOUNTER — TELEPHONE (OUTPATIENT)
Dept: INTERNAL MEDICINE | Facility: CLINIC | Age: 74
End: 2019-04-08

## 2019-04-08 NOTE — TELEPHONE ENCOUNTER
LVM confirming AWV 04/09/19 @ 11am [facility] location, left [phone] to cancel/reschedule

## 2019-04-09 ENCOUNTER — OFFICE VISIT (OUTPATIENT)
Dept: INTERNAL MEDICINE | Facility: CLINIC | Age: 74
End: 2019-04-09
Payer: MEDICARE

## 2019-04-09 VITALS
TEMPERATURE: 98 F | WEIGHT: 199.75 LBS | SYSTOLIC BLOOD PRESSURE: 144 MMHG | BODY MASS INDEX: 32.1 KG/M2 | DIASTOLIC BLOOD PRESSURE: 64 MMHG | HEART RATE: 83 BPM | OXYGEN SATURATION: 99 % | HEIGHT: 66 IN

## 2019-04-09 DIAGNOSIS — E78.5 HYPERLIPIDEMIA, UNSPECIFIED HYPERLIPIDEMIA TYPE: ICD-10-CM

## 2019-04-09 DIAGNOSIS — I25.10 CVD (CARDIOVASCULAR DISEASE): ICD-10-CM

## 2019-04-09 DIAGNOSIS — N39.0 URINARY TRACT INFECTION WITHOUT HEMATURIA, SITE UNSPECIFIED: ICD-10-CM

## 2019-04-09 DIAGNOSIS — E55.9 MILD VITAMIN D DEFICIENCY: ICD-10-CM

## 2019-04-09 DIAGNOSIS — I10 HTN (HYPERTENSION), BENIGN: Primary | ICD-10-CM

## 2019-04-09 DIAGNOSIS — E11.49 TYPE II DIABETES MELLITUS WITH NEUROLOGICAL MANIFESTATIONS: ICD-10-CM

## 2019-04-09 LAB
BACTERIA #/AREA URNS AUTO: ABNORMAL /HPF
BILIRUB UR QL STRIP: NEGATIVE
CLARITY UR REFRACT.AUTO: ABNORMAL
COLOR UR AUTO: YELLOW
GLUCOSE UR QL STRIP: NEGATIVE
HGB UR QL STRIP: ABNORMAL
HYALINE CASTS UR QL AUTO: 0 /LPF
KETONES UR QL STRIP: NEGATIVE
LEUKOCYTE ESTERASE UR QL STRIP: ABNORMAL
MICROSCOPIC COMMENT: ABNORMAL
NITRITE UR QL STRIP: NEGATIVE
PH UR STRIP: 5 [PH] (ref 5–8)
PROT UR QL STRIP: ABNORMAL
RBC #/AREA URNS AUTO: 1 /HPF (ref 0–4)
SP GR UR STRIP: 1.02 (ref 1–1.03)
SQUAMOUS #/AREA URNS AUTO: 0 /HPF
URN SPEC COLLECT METH UR: ABNORMAL
WBC #/AREA URNS AUTO: 55 /HPF (ref 0–5)
WBC CLUMPS UR QL AUTO: ABNORMAL

## 2019-04-09 PROCEDURE — 99214 PR OFFICE/OUTPT VISIT, EST, LEVL IV, 30-39 MIN: ICD-10-PCS | Mod: HCNC,S$GLB,, | Performed by: INTERNAL MEDICINE

## 2019-04-09 PROCEDURE — 3078F PR MOST RECENT DIASTOLIC BLOOD PRESSURE < 80 MM HG: ICD-10-PCS | Mod: HCNC,CPTII,S$GLB, | Performed by: INTERNAL MEDICINE

## 2019-04-09 PROCEDURE — 81001 URINALYSIS AUTO W/SCOPE: CPT | Mod: HCNC

## 2019-04-09 PROCEDURE — 99999 PR PBB SHADOW E&M-EST. PATIENT-LVL V: CPT | Mod: PBBFAC,HCNC,, | Performed by: INTERNAL MEDICINE

## 2019-04-09 PROCEDURE — 87086 URINE CULTURE/COLONY COUNT: CPT | Mod: HCNC

## 2019-04-09 PROCEDURE — 3077F SYST BP >= 140 MM HG: CPT | Mod: HCNC,CPTII,S$GLB, | Performed by: INTERNAL MEDICINE

## 2019-04-09 PROCEDURE — 99499 UNLISTED E&M SERVICE: CPT | Mod: HCNC,S$GLB,, | Performed by: INTERNAL MEDICINE

## 2019-04-09 PROCEDURE — 1101F PR PT FALLS ASSESS DOC 0-1 FALLS W/OUT INJ PAST YR: ICD-10-PCS | Mod: HCNC,CPTII,S$GLB, | Performed by: INTERNAL MEDICINE

## 2019-04-09 PROCEDURE — 1101F PT FALLS ASSESS-DOCD LE1/YR: CPT | Mod: HCNC,CPTII,S$GLB, | Performed by: INTERNAL MEDICINE

## 2019-04-09 PROCEDURE — 99499 RISK ADDL DX/OHS AUDIT: ICD-10-PCS | Mod: HCNC,S$GLB,, | Performed by: INTERNAL MEDICINE

## 2019-04-09 PROCEDURE — 3044F HG A1C LEVEL LT 7.0%: CPT | Mod: HCNC,CPTII,S$GLB, | Performed by: INTERNAL MEDICINE

## 2019-04-09 PROCEDURE — 87077 CULTURE AEROBIC IDENTIFY: CPT | Mod: HCNC

## 2019-04-09 PROCEDURE — 99999 PR PBB SHADOW E&M-EST. PATIENT-LVL V: ICD-10-PCS | Mod: PBBFAC,HCNC,, | Performed by: INTERNAL MEDICINE

## 2019-04-09 PROCEDURE — 87088 URINE BACTERIA CULTURE: CPT | Mod: HCNC

## 2019-04-09 PROCEDURE — 3044F PR MOST RECENT HEMOGLOBIN A1C LEVEL <7.0%: ICD-10-PCS | Mod: HCNC,CPTII,S$GLB, | Performed by: INTERNAL MEDICINE

## 2019-04-09 PROCEDURE — 3077F PR MOST RECENT SYSTOLIC BLOOD PRESSURE >= 140 MM HG: ICD-10-PCS | Mod: HCNC,CPTII,S$GLB, | Performed by: INTERNAL MEDICINE

## 2019-04-09 PROCEDURE — 3078F DIAST BP <80 MM HG: CPT | Mod: HCNC,CPTII,S$GLB, | Performed by: INTERNAL MEDICINE

## 2019-04-09 PROCEDURE — 99214 OFFICE O/P EST MOD 30 MIN: CPT | Mod: HCNC,S$GLB,, | Performed by: INTERNAL MEDICINE

## 2019-04-09 PROCEDURE — 87186 SC STD MICRODIL/AGAR DIL: CPT | Mod: HCNC

## 2019-04-09 RX ORDER — HYDROCHLOROTHIAZIDE 12.5 MG/1
12.5 TABLET ORAL DAILY
Qty: 30 TABLET | Refills: 1 | Status: SHIPPED | OUTPATIENT
Start: 2019-04-09 | End: 2019-04-11 | Stop reason: SDUPTHER

## 2019-04-09 RX ORDER — HYDROCODONE BITARTRATE AND ACETAMINOPHEN 5; 325 MG/1; MG/1
1 TABLET ORAL
Qty: 30 TABLET | Refills: 0 | Status: SHIPPED | OUTPATIENT
Start: 2019-04-09 | End: 2019-05-20 | Stop reason: SDUPTHER

## 2019-04-09 RX ORDER — LOSARTAN POTASSIUM AND HYDROCHLOROTHIAZIDE 12.5; 1 MG/1; MG/1
1 TABLET ORAL DAILY
Qty: 90 TABLET | Refills: 3 | Status: SHIPPED | OUTPATIENT
Start: 2019-04-09 | End: 2020-04-20

## 2019-04-12 ENCOUNTER — LAB VISIT (OUTPATIENT)
Dept: LAB | Facility: HOSPITAL | Age: 74
End: 2019-04-12
Attending: INTERNAL MEDICINE
Payer: MEDICARE

## 2019-04-12 DIAGNOSIS — E11.49 TYPE II DIABETES MELLITUS WITH NEUROLOGICAL MANIFESTATIONS: ICD-10-CM

## 2019-04-12 DIAGNOSIS — I10 HTN (HYPERTENSION), BENIGN: ICD-10-CM

## 2019-04-12 DIAGNOSIS — E55.9 MILD VITAMIN D DEFICIENCY: ICD-10-CM

## 2019-04-12 DIAGNOSIS — E78.5 HYPERLIPIDEMIA, UNSPECIFIED HYPERLIPIDEMIA TYPE: ICD-10-CM

## 2019-04-12 LAB
25(OH)D3+25(OH)D2 SERPL-MCNC: 31 NG/ML (ref 30–96)
ALBUMIN SERPL BCP-MCNC: 3.6 G/DL (ref 3.5–5.2)
ALP SERPL-CCNC: 88 U/L (ref 55–135)
ALT SERPL W/O P-5'-P-CCNC: 11 U/L (ref 10–44)
ANION GAP SERPL CALC-SCNC: 8 MMOL/L (ref 8–16)
AST SERPL-CCNC: 13 U/L (ref 10–40)
BACTERIA UR CULT: NORMAL
BASOPHILS # BLD AUTO: 0.03 K/UL (ref 0–0.2)
BASOPHILS NFR BLD: 0.4 % (ref 0–1.9)
BILIRUB SERPL-MCNC: 0.4 MG/DL (ref 0.1–1)
BUN SERPL-MCNC: 25 MG/DL (ref 8–23)
CALCIUM SERPL-MCNC: 10.3 MG/DL (ref 8.7–10.5)
CHLORIDE SERPL-SCNC: 109 MMOL/L (ref 95–110)
CHOLEST SERPL-MCNC: 137 MG/DL (ref 120–199)
CHOLEST/HDLC SERPL: 2.6 {RATIO} (ref 2–5)
CO2 SERPL-SCNC: 23 MMOL/L (ref 23–29)
CREAT SERPL-MCNC: 1 MG/DL (ref 0.5–1.4)
DIFFERENTIAL METHOD: ABNORMAL
EOSINOPHIL # BLD AUTO: 0.1 K/UL (ref 0–0.5)
EOSINOPHIL NFR BLD: 1.7 % (ref 0–8)
ERYTHROCYTE [DISTWIDTH] IN BLOOD BY AUTOMATED COUNT: 13.8 % (ref 11.5–14.5)
EST. GFR  (AFRICAN AMERICAN): >60 ML/MIN/1.73 M^2
EST. GFR  (NON AFRICAN AMERICAN): 57 ML/MIN/1.73 M^2
ESTIMATED AVG GLUCOSE: 134 MG/DL (ref 68–131)
GLUCOSE SERPL-MCNC: 163 MG/DL (ref 70–110)
HBA1C MFR BLD HPLC: 6.3 % (ref 4–5.6)
HCT VFR BLD AUTO: 31.8 % (ref 37–48.5)
HDLC SERPL-MCNC: 53 MG/DL (ref 40–75)
HDLC SERPL: 38.7 % (ref 20–50)
HGB BLD-MCNC: 10.4 G/DL (ref 12–16)
LDLC SERPL CALC-MCNC: 69.4 MG/DL (ref 63–159)
LYMPHOCYTES # BLD AUTO: 2.3 K/UL (ref 1–4.8)
LYMPHOCYTES NFR BLD: 28.7 % (ref 18–48)
MCH RBC QN AUTO: 29 PG (ref 27–31)
MCHC RBC AUTO-ENTMCNC: 32.7 G/DL (ref 32–36)
MCV RBC AUTO: 89 FL (ref 82–98)
MONOCYTES # BLD AUTO: 0.6 K/UL (ref 0.3–1)
MONOCYTES NFR BLD: 7.5 % (ref 4–15)
NEUTROPHILS # BLD AUTO: 5 K/UL (ref 1.8–7.7)
NEUTROPHILS NFR BLD: 61.5 % (ref 38–73)
NONHDLC SERPL-MCNC: 84 MG/DL
PLATELET # BLD AUTO: 287 K/UL (ref 150–350)
PMV BLD AUTO: 9.9 FL (ref 9.2–12.9)
POTASSIUM SERPL-SCNC: 4.5 MMOL/L (ref 3.5–5.1)
PROT SERPL-MCNC: 7.5 G/DL (ref 6–8.4)
RBC # BLD AUTO: 3.59 M/UL (ref 4–5.4)
SODIUM SERPL-SCNC: 140 MMOL/L (ref 136–145)
TRIGL SERPL-MCNC: 73 MG/DL (ref 30–150)
TSH SERPL DL<=0.005 MIU/L-ACNC: 1.29 UIU/ML (ref 0.4–4)
WBC # BLD AUTO: 8.15 K/UL (ref 3.9–12.7)

## 2019-04-12 PROCEDURE — 36415 COLL VENOUS BLD VENIPUNCTURE: CPT | Mod: HCNC

## 2019-04-12 PROCEDURE — 83036 HEMOGLOBIN GLYCOSYLATED A1C: CPT | Mod: HCNC

## 2019-04-12 PROCEDURE — 80053 COMPREHEN METABOLIC PANEL: CPT | Mod: HCNC

## 2019-04-12 PROCEDURE — 80061 LIPID PANEL: CPT | Mod: HCNC

## 2019-04-12 PROCEDURE — 85025 COMPLETE CBC W/AUTO DIFF WBC: CPT | Mod: HCNC

## 2019-04-12 PROCEDURE — 84443 ASSAY THYROID STIM HORMONE: CPT | Mod: HCNC

## 2019-04-12 PROCEDURE — 82306 VITAMIN D 25 HYDROXY: CPT | Mod: HCNC

## 2019-04-12 RX ORDER — HYDROCHLOROTHIAZIDE 12.5 MG/1
TABLET ORAL
Qty: 30 TABLET | Refills: 1 | Status: SHIPPED | OUTPATIENT
Start: 2019-04-12 | End: 2019-09-05

## 2019-04-12 RX ORDER — SULFAMETHOXAZOLE AND TRIMETHOPRIM 800; 160 MG/1; MG/1
1 TABLET ORAL 2 TIMES DAILY
Qty: 14 TABLET | Refills: 0 | Status: SHIPPED | OUTPATIENT
Start: 2019-04-12 | End: 2019-04-19

## 2019-04-12 NOTE — TELEPHONE ENCOUNTER
----- Message from Dora Freedman MD sent at 4/12/2019  1:04 PM CDT -----  Please inform pt of UTI and that antibiotics were sent in.

## 2019-04-20 NOTE — PROGRESS NOTES
Subjective:       Patient ID: Lina Davis is a 73 y.o. female.    Chief Complaint: Follow-up and Medication Refill (hydrcodone)    HPIPt has strong smelling urine.  No CP or SOB.  She is very active.   Review of Systems   Respiratory: Negative for shortness of breath (PND or orthopnea).    Cardiovascular: Negative for chest pain (arm pain or jaw pain).   Gastrointestinal: Negative for abdominal pain, diarrhea, nausea and vomiting.   Genitourinary: Negative for dysuria.   Neurological: Negative for seizures, syncope and headaches.       Objective:      Physical Exam   Constitutional: She is oriented to person, place, and time. She appears well-developed and well-nourished. No distress.   HENT:   Head: Normocephalic.   Mouth/Throat: Oropharynx is clear and moist.   Neck: Neck supple. No JVD present. No thyromegaly present.   Cardiovascular: Normal rate, regular rhythm, normal heart sounds and intact distal pulses. Exam reveals no gallop and no friction rub.   No murmur heard.  Pulmonary/Chest: Effort normal and breath sounds normal. She has no wheezes. She has no rales.   Abdominal: Soft. Bowel sounds are normal. She exhibits no distension and no mass. There is no tenderness. There is no rebound and no guarding.   Musculoskeletal: She exhibits no edema.   Lymphadenopathy:     She has no cervical adenopathy.   Neurological: She is alert and oriented to person, place, and time. She has normal reflexes.   Skin: Skin is warm and dry.   Psychiatric: She has a normal mood and affect. Her behavior is normal. Judgment and thought content normal.       Assessment:       1. HTN (hypertension), benign    2. Type II diabetes mellitus with neurological manifestations    3. Hyperlipidemia, unspecified hyperlipidemia type    4. Mild vitamin D deficiency    5. Urinary tract infection without hematuria, site unspecified    6. CVD (cardiovascular disease)        Plan:   HTN (hypertension), benign  -     CBC auto differential; Future;  Expected date: 04/09/2019  -     Comprehensive metabolic panel; Future; Expected date: 04/09/2019  -     TSH; Future; Expected date: 04/09/2019  Controlled - continue current meds    Type II diabetes mellitus with neurological manifestations  -     Hemoglobin A1c; Future; Expected date: 04/09/2019    Hyperlipidemia, unspecified hyperlipidemia type  -     Lipid panel; Future; Expected date: 04/09/2019    Mild vitamin D deficiency  -     Vitamin D; Future; Expected date: 04/09/2019    Urinary tract infection without hematuria, site unspecified  -     Urinalysis  -     Urine culture    CVD (cardiovascular disease)  -     Ambulatory consult to Vascular Surgery    Other orders  -     Discontinue: hydroCHLOROthiazide (HYDRODIURIL) 12.5 MG Tab; Take 1 tablet (12.5 mg total) by mouth once daily.  Dispense: 30 tablet; Refill: 1  -     losartan-hydrochlorothiazide 100-12.5 mg (HYZAAR) 100-12.5 mg Tab; Take 1 tablet by mouth once daily.  Dispense: 90 tablet; Refill: 3  -     HYDROcodone-acetaminophen (NORCO) 5-325 mg per tablet; Take 1 tablet by mouth every 24 hours as needed for Pain.  Dispense: 30 tablet; Refill: 0  -     Urinalysis Microscopic

## 2019-04-22 ENCOUNTER — OFFICE VISIT (OUTPATIENT)
Dept: VASCULAR SURGERY | Facility: CLINIC | Age: 74
End: 2019-04-22
Payer: MEDICARE

## 2019-04-22 VITALS
DIASTOLIC BLOOD PRESSURE: 72 MMHG | SYSTOLIC BLOOD PRESSURE: 155 MMHG | HEART RATE: 105 BPM | TEMPERATURE: 98 F | WEIGHT: 194 LBS | BODY MASS INDEX: 31.18 KG/M2 | HEIGHT: 66 IN

## 2019-04-22 DIAGNOSIS — I65.22 STENOSIS OF LEFT CAROTID ARTERY: ICD-10-CM

## 2019-04-22 PROCEDURE — 99203 OFFICE O/P NEW LOW 30 MIN: CPT | Mod: HCNC,S$GLB,, | Performed by: SURGERY

## 2019-04-22 PROCEDURE — 99499 UNLISTED E&M SERVICE: CPT | Mod: HCNC,S$GLB,, | Performed by: SURGERY

## 2019-04-22 PROCEDURE — 3078F PR MOST RECENT DIASTOLIC BLOOD PRESSURE < 80 MM HG: ICD-10-PCS | Mod: HCNC,CPTII,S$GLB, | Performed by: SURGERY

## 2019-04-22 PROCEDURE — 3078F DIAST BP <80 MM HG: CPT | Mod: HCNC,CPTII,S$GLB, | Performed by: SURGERY

## 2019-04-22 PROCEDURE — 3077F SYST BP >= 140 MM HG: CPT | Mod: HCNC,CPTII,S$GLB, | Performed by: SURGERY

## 2019-04-22 PROCEDURE — 99999 PR PBB SHADOW E&M-EST. PATIENT-LVL III: ICD-10-PCS | Mod: PBBFAC,HCNC,, | Performed by: SURGERY

## 2019-04-22 PROCEDURE — 1101F PT FALLS ASSESS-DOCD LE1/YR: CPT | Mod: HCNC,CPTII,S$GLB, | Performed by: SURGERY

## 2019-04-22 PROCEDURE — 3077F PR MOST RECENT SYSTOLIC BLOOD PRESSURE >= 140 MM HG: ICD-10-PCS | Mod: HCNC,CPTII,S$GLB, | Performed by: SURGERY

## 2019-04-22 PROCEDURE — 99999 PR PBB SHADOW E&M-EST. PATIENT-LVL III: CPT | Mod: PBBFAC,HCNC,, | Performed by: SURGERY

## 2019-04-22 PROCEDURE — 99499 RISK ADDL DX/OHS AUDIT: ICD-10-PCS | Mod: HCNC,S$GLB,, | Performed by: SURGERY

## 2019-04-22 PROCEDURE — 1101F PR PT FALLS ASSESS DOC 0-1 FALLS W/OUT INJ PAST YR: ICD-10-PCS | Mod: HCNC,CPTII,S$GLB, | Performed by: SURGERY

## 2019-04-22 PROCEDURE — 99203 PR OFFICE/OUTPT VISIT, NEW, LEVL III, 30-44 MIN: ICD-10-PCS | Mod: HCNC,S$GLB,, | Performed by: SURGERY

## 2019-04-22 NOTE — LETTER
April 22, 2019      Dora Freedman MD  [address]  [facility] [address]           [facility]  [address]  [facility] [address]  Phone: [phone]  Fax: [phone]          Patient: Lina Davis   MR Number: [id number]   YOB: 1945   Date of Visit: 4/22/2019       Dear Dr. Dora Freedman:    Thank you for referring Lina Davis to me for evaluation. Attached you will find relevant portions of my assessment and plan of care.    If you have questions, please do not hesitate to call me. I look forward to following Lina Davis along with you.    Sincerely,    Jewels Fishman MD    Enclosure  CC:  No Recipients    If you would like to receive this communication electronically, please contact [email or web address] or [phone] to request more information on [employer] access.    For providers and/or their staff who would like to refer a patient to [facility], please contact us through our one-stop-shop provider referral line, [facility], [place], at [phone].    If you feel you have received this communication in error or would no longer like to receive these types of communications, please e-mail [email or web address]

## 2019-04-22 NOTE — PROGRESS NOTES
Patient ID: Lina Davis is a 73 y.o. female.    I. HISTORY     Chief Complaint: No chief complaint on file.      HPI Lina Davis is a 73 y.o. female referred from Dr. Dora Freedman for evaluation and management of asymptomatic carotid stenosis. Denies any history of arm or leg numbness or weakness, facial droop, vision changes or slurred speech. No significant complaints today.    No history or CVA or MI  No CABG or PCI      Review of Systems   Constitution: Negative for decreased appetite and fever.   HENT: Negative for ear pain and tinnitus.    Eyes: Negative for blurred vision and double vision.   Cardiovascular: Positive for claudication (BLE at night).   Respiratory: Negative.    Endocrine: Negative.    Hematologic/Lymphatic: Negative.    Skin: Negative.    Musculoskeletal: Negative.    Gastrointestinal: Negative for abdominal pain and anorexia.   Genitourinary: Negative.    Neurological: Negative for difficulty with concentration, disturbances in coordination, focal weakness, light-headedness, loss of balance and seizures.   Psychiatric/Behavioral: Negative.    Allergic/Immunologic: Negative.        II. PHYSICAL EXAM      Right Arm BP - Sittin/72 (19 1014)  Left Arm BP - Sittin/69 (19 1014)  Pulse: 105  Temp: 98.3 °F (36.8 °C)  Body mass index is 31.31 kg/m².      Physical Exam   Constitutional: She is oriented to person, place, and time. She appears well-developed and well-nourished. No distress.   HENT:   Head: Normocephalic and atraumatic.   Eyes: No scleral icterus.   Neck: Normal carotid pulses present. No tracheal deviation present.   Cardiovascular: Normal rate and regular rhythm.   Pulmonary/Chest: Effort normal.   Abdominal: Soft. She exhibits no distension.   Musculoskeletal: Normal range of motion.   Neurological: She is alert and oriented to person, place, and time. No cranial nerve deficit.   Skin: Skin is warm and dry.   Psychiatric: Her behavior is normal.           III. ASSESSMENT & PLAN (MEDICAL DECISION MAKING)     1. Stenosis of left carotid artery        Imaging Results:  Carotid Duplex:   R L   1-49%  PSV 91 50-69%         Assessment/Diagnosis and Plan:  Lina Davis is a 73 y.o. female with asymptomatic carotid stenosis.  Continue statin medication. Can get yearly carotid US to monitor. Follow up PRN.     Jewels Fishman MD FACS VI  Vascular & Endovascular Surgery

## 2019-05-19 RX ORDER — GLIMEPIRIDE 1 MG/1
TABLET ORAL
Qty: 90 TABLET | Refills: 1 | Status: SHIPPED | OUTPATIENT
Start: 2019-05-19 | End: 2019-07-10 | Stop reason: SDUPTHER

## 2019-05-20 NOTE — TELEPHONE ENCOUNTER
----- Message from Anamaria Lee sent at 5/20/2019 11:48 AM CDT -----  Contact: [phone]  Type: Rx    Name of medication(s): HYDROcodone-acetaminophen (NORCO) 5-325 mg per tablet    Is this a refill? New rx? refill    Who prescribed medication?  Jasmina    Pharmacy Name, Phone, & Location:  Patient will p/u when ready    Comments: Please advise, thank y alexey

## 2019-05-22 RX ORDER — HYDROCODONE BITARTRATE AND ACETAMINOPHEN 5; 325 MG/1; MG/1
1 TABLET ORAL
Qty: 30 TABLET | Refills: 0 | Status: SHIPPED | OUTPATIENT
Start: 2019-05-22 | End: 2019-07-10 | Stop reason: SDUPTHER

## 2019-05-22 NOTE — TELEPHONE ENCOUNTER
Spoke with patient,let her know her Rx for Hydrocodone is ready for  at the office, patient understood and will come  from pcp office.

## 2019-06-05 RX ORDER — ALLOPURINOL 300 MG/1
TABLET ORAL
Qty: 90 TABLET | Refills: 0 | Status: SHIPPED | OUTPATIENT
Start: 2019-06-05 | End: 2019-08-12 | Stop reason: SDUPTHER

## 2019-07-08 ENCOUNTER — TELEPHONE (OUTPATIENT)
Dept: INTERNAL MEDICINE | Facility: CLINIC | Age: 74
End: 2019-07-08

## 2019-07-08 NOTE — TELEPHONE ENCOUNTER
Left a message for the patient to call the office back. To reschedule appointment with pcp to 07/10/19 at 9:30am,     Please Advise  Thank You

## 2019-07-10 ENCOUNTER — LAB VISIT (OUTPATIENT)
Dept: LAB | Facility: HOSPITAL | Age: 74
End: 2019-07-10
Attending: INTERNAL MEDICINE
Payer: MEDICARE

## 2019-07-10 ENCOUNTER — OFFICE VISIT (OUTPATIENT)
Dept: INTERNAL MEDICINE | Facility: CLINIC | Age: 74
End: 2019-07-10
Payer: MEDICARE

## 2019-07-10 VITALS
BODY MASS INDEX: 31.18 KG/M2 | DIASTOLIC BLOOD PRESSURE: 80 MMHG | WEIGHT: 194 LBS | TEMPERATURE: 98 F | HEART RATE: 91 BPM | HEIGHT: 66 IN | OXYGEN SATURATION: 98 % | SYSTOLIC BLOOD PRESSURE: 126 MMHG

## 2019-07-10 DIAGNOSIS — E11.49 TYPE II DIABETES MELLITUS WITH NEUROLOGICAL MANIFESTATIONS: ICD-10-CM

## 2019-07-10 DIAGNOSIS — N39.0 URINARY TRACT INFECTION WITHOUT HEMATURIA, SITE UNSPECIFIED: ICD-10-CM

## 2019-07-10 DIAGNOSIS — I25.10 CVD (CARDIOVASCULAR DISEASE): Primary | ICD-10-CM

## 2019-07-10 LAB
BACTERIA #/AREA URNS AUTO: ABNORMAL /HPF
BILIRUB UR QL STRIP: NEGATIVE
CLARITY UR REFRACT.AUTO: ABNORMAL
COLOR UR AUTO: YELLOW
ESTIMATED AVG GLUCOSE: 143 MG/DL (ref 68–131)
GLUCOSE UR QL STRIP: NEGATIVE
HBA1C MFR BLD HPLC: 6.6 % (ref 4–5.6)
HGB UR QL STRIP: ABNORMAL
HYALINE CASTS UR QL AUTO: 0 /LPF
KETONES UR QL STRIP: NEGATIVE
LEUKOCYTE ESTERASE UR QL STRIP: ABNORMAL
MICROSCOPIC COMMENT: ABNORMAL
NITRITE UR QL STRIP: POSITIVE
PH UR STRIP: 5 [PH] (ref 5–8)
PROT UR QL STRIP: ABNORMAL
RBC #/AREA URNS AUTO: 21 /HPF (ref 0–4)
SP GR UR STRIP: 1.01 (ref 1–1.03)
SQUAMOUS #/AREA URNS AUTO: 2 /HPF
URN SPEC COLLECT METH UR: ABNORMAL
WBC #/AREA URNS AUTO: >100 /HPF (ref 0–5)
WBC CLUMPS UR QL AUTO: ABNORMAL

## 2019-07-10 PROCEDURE — 99999 PR PBB SHADOW E&M-EST. PATIENT-LVL V: ICD-10-PCS | Mod: PBBFAC,HCNC,, | Performed by: INTERNAL MEDICINE

## 2019-07-10 PROCEDURE — 1101F PR PT FALLS ASSESS DOC 0-1 FALLS W/OUT INJ PAST YR: ICD-10-PCS | Mod: HCNC,CPTII,S$GLB, | Performed by: INTERNAL MEDICINE

## 2019-07-10 PROCEDURE — 99999 PR PBB SHADOW E&M-EST. PATIENT-LVL V: CPT | Mod: PBBFAC,HCNC,, | Performed by: INTERNAL MEDICINE

## 2019-07-10 PROCEDURE — 3044F HG A1C LEVEL LT 7.0%: CPT | Mod: HCNC,CPTII,S$GLB, | Performed by: INTERNAL MEDICINE

## 2019-07-10 PROCEDURE — 3079F DIAST BP 80-89 MM HG: CPT | Mod: HCNC,CPTII,S$GLB, | Performed by: INTERNAL MEDICINE

## 2019-07-10 PROCEDURE — 3044F PR MOST RECENT HEMOGLOBIN A1C LEVEL <7.0%: ICD-10-PCS | Mod: HCNC,CPTII,S$GLB, | Performed by: INTERNAL MEDICINE

## 2019-07-10 PROCEDURE — 83036 HEMOGLOBIN GLYCOSYLATED A1C: CPT | Mod: HCNC

## 2019-07-10 PROCEDURE — 1101F PT FALLS ASSESS-DOCD LE1/YR: CPT | Mod: HCNC,CPTII,S$GLB, | Performed by: INTERNAL MEDICINE

## 2019-07-10 PROCEDURE — 99214 OFFICE O/P EST MOD 30 MIN: CPT | Mod: HCNC,S$GLB,, | Performed by: INTERNAL MEDICINE

## 2019-07-10 PROCEDURE — 99214 PR OFFICE/OUTPT VISIT, EST, LEVL IV, 30-39 MIN: ICD-10-PCS | Mod: HCNC,S$GLB,, | Performed by: INTERNAL MEDICINE

## 2019-07-10 PROCEDURE — 81001 URINALYSIS AUTO W/SCOPE: CPT | Mod: HCNC

## 2019-07-10 PROCEDURE — 36415 COLL VENOUS BLD VENIPUNCTURE: CPT | Mod: HCNC,PO

## 2019-07-10 PROCEDURE — 3074F PR MOST RECENT SYSTOLIC BLOOD PRESSURE < 130 MM HG: ICD-10-PCS | Mod: HCNC,CPTII,S$GLB, | Performed by: INTERNAL MEDICINE

## 2019-07-10 PROCEDURE — 3079F PR MOST RECENT DIASTOLIC BLOOD PRESSURE 80-89 MM HG: ICD-10-PCS | Mod: HCNC,CPTII,S$GLB, | Performed by: INTERNAL MEDICINE

## 2019-07-10 PROCEDURE — 3074F SYST BP LT 130 MM HG: CPT | Mod: HCNC,CPTII,S$GLB, | Performed by: INTERNAL MEDICINE

## 2019-07-10 RX ORDER — GLIMEPIRIDE 1 MG/1
TABLET ORAL
Qty: 180 TABLET | Refills: 3 | Status: SHIPPED | OUTPATIENT
Start: 2019-07-10 | End: 2020-07-16

## 2019-07-10 RX ORDER — HYDROCODONE BITARTRATE AND ACETAMINOPHEN 5; 325 MG/1; MG/1
1 TABLET ORAL
Qty: 30 TABLET | Refills: 0 | Status: SHIPPED | OUTPATIENT
Start: 2019-07-10 | End: 2019-08-26 | Stop reason: SDUPTHER

## 2019-07-11 ENCOUNTER — HOSPITAL ENCOUNTER (OUTPATIENT)
Dept: RADIOLOGY | Facility: HOSPITAL | Age: 74
Discharge: HOME | End: 2019-07-11
Attending: INTERNAL MEDICINE
Payer: MEDICARE

## 2019-07-11 DIAGNOSIS — I25.10 CVD (CARDIOVASCULAR DISEASE): ICD-10-CM

## 2019-07-11 PROCEDURE — 93880 US CAROTID BILATERAL: ICD-10-PCS | Mod: 26,HCNC,, | Performed by: RADIOLOGY

## 2019-07-11 PROCEDURE — 93880 EXTRACRANIAL BILAT STUDY: CPT | Mod: 26,HCNC,, | Performed by: RADIOLOGY

## 2019-07-11 PROCEDURE — 93880 EXTRACRANIAL BILAT STUDY: CPT | Mod: TC,HCNC

## 2019-07-12 NOTE — PROGRESS NOTES
Subjective:       Patient ID: Lina Davis is a 74 y.o. female.    Chief Complaint: Follow-up    HPIPt is feeling well except sugars are running high in the AM (200's).  No CP or SOB.  Urine has an odor again.  Review of Systems   Respiratory: Negative for shortness of breath (PND or orthopnea).    Cardiovascular: Negative for chest pain (arm pain or jaw pain).   Gastrointestinal: Negative for abdominal pain, diarrhea, nausea and vomiting.   Genitourinary: Negative for dysuria.   Neurological: Negative for seizures, syncope and headaches.       Objective:      Physical Exam   Constitutional: She is oriented to person, place, and time. She appears well-developed and well-nourished. No distress.   HENT:   Head: Normocephalic.   Mouth/Throat: Oropharynx is clear and moist.   Neck: Neck supple. No JVD present. No thyromegaly present.   Cardiovascular: Normal rate, regular rhythm, normal heart sounds and intact distal pulses. Exam reveals no gallop and no friction rub.   No murmur heard.  Pulmonary/Chest: Effort normal and breath sounds normal. She has no wheezes. She has no rales.   Abdominal: Soft. Bowel sounds are normal. She exhibits no distension and no mass. There is no tenderness. There is no rebound and no guarding.   Musculoskeletal: She exhibits no edema.   Lymphadenopathy:     She has no cervical adenopathy.   Neurological: She is alert and oriented to person, place, and time. She has normal reflexes.   Skin: Skin is warm and dry.   Psychiatric: She has a normal mood and affect. Her behavior is normal. Judgment and thought content normal.       Assessment:       1. CVD (cardiovascular disease)    2. Urinary tract infection without hematuria, site unspecified    3. Type II diabetes mellitus with neurological manifestations        Plan:   CVD (cardiovascular disease)  -     US Carotid Bilateral; Future; Expected date: 07/10/2019 - check in 6 months - August 2019    Urinary tract infection without hematuria,  site unspecified  -     Urinalysis  -     Urine culture    Type II diabetes mellitus with neurological manifestations  -     Hemoglobin A1c; Future; Expected date: 07/10/2019    Other orders  -     HYDROcodone-acetaminophen (NORCO) 5-325 mg per tablet; Take 1 tablet by mouth every 24 hours as needed for Pain.  Dispense: 30 tablet; Refill: 0  -     glimepiride (AMARYL) 1 MG tablet; One in AM , half in PM  Dispense: 180 tablet; Refill: 3  -     Urinalysis Microscopic       report to Tamiko JACOME

## 2019-07-22 RX ORDER — ATORVASTATIN CALCIUM 40 MG/1
40 TABLET, FILM COATED ORAL DAILY
Qty: 90 TABLET | Refills: 3 | Status: SHIPPED | OUTPATIENT
Start: 2019-07-22 | End: 2020-08-10

## 2019-07-22 NOTE — TELEPHONE ENCOUNTER
----- Message from Danielle Nickerson sent at 7/22/2019  9:36 AM CDT -----  Contact: Patient [phone] cell or [phone] home  Prescription Request:     Name of medication: High Cholesterol - did not know the name    Reason for request: Refill    Pharmacy: [facility] [address]    Please advise.    Thank You

## 2019-07-25 ENCOUNTER — TELEPHONE (OUTPATIENT)
Dept: INTERNAL MEDICINE | Facility: CLINIC | Age: 74
End: 2019-07-25

## 2019-07-25 RX ORDER — CIPROFLOXACIN 500 MG/1
500 TABLET ORAL EVERY 12 HOURS
Qty: 14 TABLET | Refills: 0 | Status: SHIPPED | OUTPATIENT
Start: 2019-07-25 | End: 2019-09-05 | Stop reason: ALTCHOICE

## 2019-08-14 RX ORDER — ALLOPURINOL 300 MG/1
TABLET ORAL
Qty: 90 TABLET | Refills: 0 | Status: SHIPPED | OUTPATIENT
Start: 2019-08-14 | End: 2019-10-16 | Stop reason: SDUPTHER

## 2019-08-14 RX ORDER — CALCIUM CITRATE/VITAMIN D3 200MG-6.25
TABLET ORAL
Qty: 200 STRIP | Refills: 2 | Status: SHIPPED | OUTPATIENT
Start: 2019-08-14 | End: 2020-07-24 | Stop reason: SDUPTHER

## 2019-08-26 ENCOUNTER — TELEPHONE (OUTPATIENT)
Dept: INTERNAL MEDICINE | Facility: CLINIC | Age: 74
End: 2019-08-26

## 2019-08-26 DIAGNOSIS — N39.0 URINARY TRACT INFECTION WITHOUT HEMATURIA, SITE UNSPECIFIED: Primary | ICD-10-CM

## 2019-08-26 RX ORDER — INSULIN PUMP SYRINGE, 3 ML
EACH MISCELLANEOUS
Qty: 1 EACH | Refills: 0 | Status: SHIPPED | OUTPATIENT
Start: 2019-08-26 | End: 2022-01-05 | Stop reason: SDUPTHER

## 2019-08-26 RX ORDER — HYDROCODONE BITARTRATE AND ACETAMINOPHEN 5; 325 MG/1; MG/1
1 TABLET ORAL
Qty: 30 TABLET | Refills: 0 | Status: SHIPPED | OUTPATIENT
Start: 2019-08-26 | End: 2019-10-21 | Stop reason: SDUPTHER

## 2019-08-27 ENCOUNTER — LAB VISIT (OUTPATIENT)
Dept: LAB | Facility: HOSPITAL | Age: 74
End: 2019-08-27
Attending: INTERNAL MEDICINE
Payer: MEDICARE

## 2019-08-27 DIAGNOSIS — N39.0 URINARY TRACT INFECTION WITHOUT HEMATURIA, SITE UNSPECIFIED: ICD-10-CM

## 2019-08-27 LAB
BACTERIA #/AREA URNS AUTO: ABNORMAL /HPF
BILIRUB UR QL STRIP: NEGATIVE
CLARITY UR REFRACT.AUTO: ABNORMAL
COLOR UR AUTO: YELLOW
GLUCOSE UR QL STRIP: NEGATIVE
HGB UR QL STRIP: ABNORMAL
HYALINE CASTS UR QL AUTO: 7 /LPF
KETONES UR QL STRIP: NEGATIVE
LEUKOCYTE ESTERASE UR QL STRIP: ABNORMAL
MICROSCOPIC COMMENT: ABNORMAL
NITRITE UR QL STRIP: POSITIVE
NON-SQ EPI CELLS #/AREA URNS AUTO: <1 /HPF
PH UR STRIP: 5 [PH] (ref 5–8)
PROT UR QL STRIP: ABNORMAL
RBC #/AREA URNS AUTO: 0 /HPF (ref 0–4)
SP GR UR STRIP: 1.02 (ref 1–1.03)
SQUAMOUS #/AREA URNS AUTO: 4 /HPF
URN SPEC COLLECT METH UR: ABNORMAL
WBC #/AREA URNS AUTO: 45 /HPF (ref 0–5)
WBC CLUMPS UR QL AUTO: ABNORMAL

## 2019-08-27 PROCEDURE — 87088 URINE BACTERIA CULTURE: CPT | Mod: HCNC

## 2019-08-27 PROCEDURE — 81001 URINALYSIS AUTO W/SCOPE: CPT | Mod: HCNC

## 2019-08-27 PROCEDURE — 87077 CULTURE AEROBIC IDENTIFY: CPT | Mod: HCNC

## 2019-08-27 PROCEDURE — 87086 URINE CULTURE/COLONY COUNT: CPT | Mod: HCNC

## 2019-08-27 PROCEDURE — 87186 SC STD MICRODIL/AGAR DIL: CPT | Mod: HCNC

## 2019-08-29 ENCOUNTER — TELEPHONE (OUTPATIENT)
Dept: INTERNAL MEDICINE | Facility: CLINIC | Age: 74
End: 2019-08-29

## 2019-08-29 DIAGNOSIS — N39.0 RECURRENT UTI: Primary | ICD-10-CM

## 2019-08-29 LAB — BACTERIA UR CULT: ABNORMAL

## 2019-08-29 RX ORDER — AMOXICILLIN AND CLAVULANATE POTASSIUM 500; 125 MG/1; MG/1
1 TABLET, FILM COATED ORAL 2 TIMES DAILY
Qty: 14 TABLET | Refills: 0 | Status: SHIPPED | OUTPATIENT
Start: 2019-08-29 | End: 2019-09-05 | Stop reason: ALTCHOICE

## 2019-08-30 ENCOUNTER — TELEPHONE (OUTPATIENT)
Dept: INTERNAL MEDICINE | Facility: CLINIC | Age: 74
End: 2019-08-30

## 2019-08-30 NOTE — TELEPHONE ENCOUNTER
----- Message from Dora Freedman MD sent at 8/29/2019  8:29 PM CDT -----  Please inform pt of UTI and that antibiotics were sent in, will place a referral to urology for recurrent UTI.

## 2019-09-05 ENCOUNTER — OFFICE VISIT (OUTPATIENT)
Dept: HOME HEALTH SERVICES | Facility: CLINIC | Age: 74
End: 2019-09-05
Payer: MEDICARE

## 2019-09-05 VITALS
SYSTOLIC BLOOD PRESSURE: 148 MMHG | WEIGHT: 190 LBS | BODY MASS INDEX: 30.53 KG/M2 | DIASTOLIC BLOOD PRESSURE: 78 MMHG | HEIGHT: 66 IN

## 2019-09-05 DIAGNOSIS — I65.22 STENOSIS OF LEFT CAROTID ARTERY: ICD-10-CM

## 2019-09-05 DIAGNOSIS — I70.0 ATHEROSCLEROSIS OF AORTA: ICD-10-CM

## 2019-09-05 DIAGNOSIS — I10 HTN (HYPERTENSION), BENIGN: ICD-10-CM

## 2019-09-05 DIAGNOSIS — Z00.00 ENCOUNTER FOR PREVENTIVE HEALTH EXAMINATION: Primary | ICD-10-CM

## 2019-09-05 DIAGNOSIS — E78.2 HYPERLIPIDEMIA, MIXED: ICD-10-CM

## 2019-09-05 DIAGNOSIS — K57.90 DIVERTICULOSIS OF INTESTINE WITHOUT BLEEDING, UNSPECIFIED INTESTINAL TRACT LOCATION: ICD-10-CM

## 2019-09-05 DIAGNOSIS — M47.819 FACET ARTHROPATHY: ICD-10-CM

## 2019-09-05 DIAGNOSIS — M51.36 DDD (DEGENERATIVE DISC DISEASE), LUMBAR: ICD-10-CM

## 2019-09-05 DIAGNOSIS — K21.9 GASTROESOPHAGEAL REFLUX DISEASE, ESOPHAGITIS PRESENCE NOT SPECIFIED: ICD-10-CM

## 2019-09-05 DIAGNOSIS — E78.00 PURE HYPERCHOLESTEROLEMIA: ICD-10-CM

## 2019-09-05 DIAGNOSIS — G62.9 POLYNEUROPATHY: ICD-10-CM

## 2019-09-05 DIAGNOSIS — E11.21 TYPE 2 DIABETES MELLITUS WITH DIABETIC NEPHROPATHY, WITHOUT LONG-TERM CURRENT USE OF INSULIN: ICD-10-CM

## 2019-09-05 DIAGNOSIS — E11.49 TYPE II DIABETES MELLITUS WITH NEUROLOGICAL MANIFESTATIONS: ICD-10-CM

## 2019-09-05 PROCEDURE — 99499 RISK ADDL DX/OHS AUDIT: ICD-10-PCS | Mod: S$GLB,,, | Performed by: NURSE PRACTITIONER

## 2019-09-05 PROCEDURE — 3078F PR MOST RECENT DIASTOLIC BLOOD PRESSURE < 80 MM HG: ICD-10-PCS | Mod: CPTII,S$GLB,, | Performed by: NURSE PRACTITIONER

## 2019-09-05 PROCEDURE — 3077F SYST BP >= 140 MM HG: CPT | Mod: CPTII,S$GLB,, | Performed by: NURSE PRACTITIONER

## 2019-09-05 PROCEDURE — 3078F DIAST BP <80 MM HG: CPT | Mod: CPTII,S$GLB,, | Performed by: NURSE PRACTITIONER

## 2019-09-05 PROCEDURE — G0439 PPPS, SUBSEQ VISIT: HCPCS | Mod: S$GLB,,, | Performed by: NURSE PRACTITIONER

## 2019-09-05 PROCEDURE — 3077F PR MOST RECENT SYSTOLIC BLOOD PRESSURE >= 140 MM HG: ICD-10-PCS | Mod: CPTII,S$GLB,, | Performed by: NURSE PRACTITIONER

## 2019-09-05 PROCEDURE — 3044F HG A1C LEVEL LT 7.0%: CPT | Mod: CPTII,S$GLB,, | Performed by: NURSE PRACTITIONER

## 2019-09-05 PROCEDURE — 3044F PR MOST RECENT HEMOGLOBIN A1C LEVEL <7.0%: ICD-10-PCS | Mod: CPTII,S$GLB,, | Performed by: NURSE PRACTITIONER

## 2019-09-05 PROCEDURE — G0439 PR MEDICARE ANNUAL WELLNESS SUBSEQUENT VISIT: ICD-10-PCS | Mod: S$GLB,,, | Performed by: NURSE PRACTITIONER

## 2019-09-05 PROCEDURE — 99499 UNLISTED E&M SERVICE: CPT | Mod: S$GLB,,, | Performed by: NURSE PRACTITIONER

## 2019-09-05 RX ORDER — CHOLECALCIFEROL (VITAMIN D3) 25 MCG
1000 TABLET ORAL DAILY
Status: ON HOLD | COMMUNITY
End: 2020-03-20 | Stop reason: HOSPADM

## 2019-09-05 RX ORDER — ASPIRIN 81 MG/1
81 TABLET ORAL NIGHTLY
Status: ON HOLD | COMMUNITY
End: 2020-03-20 | Stop reason: HOSPADM

## 2019-09-05 NOTE — PROGRESS NOTES
"Lina Davis presented for a  Medicare AWV and comprehensive Health Risk Assessment today. The following components were reviewed and updated:    · Medical history  · Family History  · Social history  · Allergies and Current Medications  · Health Risk Assessment  · Health Maintenance  · Care Team     ** See Completed Assessments for Annual Wellness Visit within the encounter summary.**       The following assessments were completed:  · Living Situation  · CAGE  · Depression Screening  · Timed Get Up and Go  · Whisper Test  · Cognitive Function Screening  ·   ·   ·   · Nutrition Screening  · ADL Screening  · PAQ Screening    Vitals:    09/05/19 0901   BP: (!) 148/78   Weight: 86.2 kg (190 lb)   Height: 5' 6" (1.676 m)     Body mass index is 30.67 kg/m².  Physical Exam   Constitutional: She is oriented to person, place, and time. She appears well-developed.   HENT:   Head: Normocephalic and atraumatic.   Eyes: Pupils are equal, round, and reactive to light. EOM are normal.   Neck: Normal range of motion.   Cardiovascular: Normal rate and regular rhythm.   Pulmonary/Chest: Effort normal and breath sounds normal. No respiratory distress.   Abdominal: Soft. Bowel sounds are normal. She exhibits no distension.   Musculoskeletal: Normal range of motion. She exhibits no edema.   Neurological: She is alert and oriented to person, place, and time.   Skin: Skin is warm and dry.   Psychiatric: She has a normal mood and affect. Her behavior is normal.         Diagnoses and health risks identified today and associated recommendations/orders:    1. Encounter for preventive health examination  Assessment completed. Preventive measures reviewed.    2. DDD (degenerative disc disease), lumbar  Stable, followed by PCP.    3. Polyneuropathy  Stable, followed by PCP.    4. Hyperlipidemia, mixed  Stable, followed by PCP.    5. Pure hypercholesterolemia  Stable, followed by PCP.    6. HTN (hypertension), benign  Stable, followed by " PCP.    7. Stenosis of left carotid artery  Stable, followed by PCP.    8. Type 2 diabetes mellitus with diabetic nephropathy, without long-term current use of insulin  Stable, followed by PCP.    9. Type II diabetes mellitus with neurological manifestations  Stable, followed by PCP.    10. Gastroesophageal reflux disease, esophagitis presence not specified  Stable, followed by PCP.    11. Atherosclerosis of aorta  Stable, followed by PCP.    12. Facet arthropathy  Stable, followed by PCP.    13. Diverticulosis of intestine without bleeding, unspecified intestinal tract location  Stable, followed by PCP.    Provided Lina with a 5-10 year written screening schedule and personal prevention plan. Recommendations were developed using the USPSTF age appropriate recommendations. Education, counseling, and referrals were provided as needed. After Visit Summary printed and given to patient which includes a list of additional screenings\tests needed.    No follow-ups on file.    Tiana Capellan NP  I offered to discuss end of life issues, including information on how to make advance directives that the patient could use to name someone who would make medical decisions on their behalf if they became too ill to make themselves.    ___Patient declined  _X_Patient is interested, I provided paper work and offered to discuss.

## 2019-09-05 NOTE — PATIENT INSTRUCTIONS
Counseling and Referral of Other Preventative  (Italic type indicates deductible and co-insurance are waived)    Patient Name: Lina Davis  Today's Date: 9/5/2019    Health Maintenance       Date Due Completion Date    Sign Pain Contract 04/23/1965 ---    Complete Opioid Risk Tool 04/23/1965 ---    Urine Drug Screen 04/23/1965 ---    Aspirin/Antiplatelet Therapy 04/23/1965 ---    Foot Exam 11/10/2017 11/10/2016    Influenza Vaccine (1) 09/01/2019 10/1/2018    Hemoglobin A1c 01/10/2020 7/10/2019    Eye Exam 02/14/2020 2/14/2019 (Done)    Override on 2/14/2019: Done    Mammogram 03/15/2020 3/15/2019    Lipid Panel 04/12/2020 4/12/2019    High Dose Statin 07/22/2020 7/22/2019    Colonoscopy 03/07/2023 3/7/2018    TETANUS VACCINE 01/15/2026 1/15/2016        No orders of the defined types were placed in this encounter.    The following information is provided to all patients.  This information is to help you find resources for any of the problems found today that may be affecting your health:                Living healthy guide: www.[facility].[place].gov      Understanding Diabetes: www.diabetes.org      Eating healthy: www.cdc.gov/healthyweight      CDC home safety checklist: www.cdc.gov/steadi/patient.html      Agency on Aging: www.goea.[place].[facility]      Alcoholics anonymous (AA): www.aa.org      Physical Activity: www.rashida.nih.gov/hh6nexz      Tobacco use: www.quitwithusla.org

## 2019-09-06 ENCOUNTER — OFFICE VISIT (OUTPATIENT)
Dept: UROLOGY | Facility: CLINIC | Age: 74
End: 2019-09-06
Payer: MEDICARE

## 2019-09-06 VITALS
WEIGHT: 191.81 LBS | HEART RATE: 88 BPM | HEIGHT: 66 IN | DIASTOLIC BLOOD PRESSURE: 63 MMHG | SYSTOLIC BLOOD PRESSURE: 110 MMHG | BODY MASS INDEX: 30.82 KG/M2

## 2019-09-06 DIAGNOSIS — E11.49 TYPE II DIABETES MELLITUS WITH NEUROLOGICAL MANIFESTATIONS: ICD-10-CM

## 2019-09-06 DIAGNOSIS — E11.21 TYPE 2 DIABETES MELLITUS WITH DIABETIC NEPHROPATHY, WITHOUT LONG-TERM CURRENT USE OF INSULIN: Primary | ICD-10-CM

## 2019-09-06 DIAGNOSIS — N95.1 MENOPAUSAL SYMPTOMS: ICD-10-CM

## 2019-09-06 DIAGNOSIS — N39.0 RECURRENT UTI (URINARY TRACT INFECTION): ICD-10-CM

## 2019-09-06 LAB
BACTERIA #/AREA URNS AUTO: ABNORMAL /HPF
HYALINE CASTS UR QL AUTO: 9 /LPF
MICROSCOPIC COMMENT: ABNORMAL
RBC #/AREA URNS AUTO: 0 /HPF (ref 0–4)
SQUAMOUS #/AREA URNS AUTO: 1 /HPF
WBC #/AREA URNS AUTO: 2 /HPF (ref 0–5)

## 2019-09-06 PROCEDURE — 3078F PR MOST RECENT DIASTOLIC BLOOD PRESSURE < 80 MM HG: ICD-10-PCS | Mod: HCNC,CPTII,S$GLB, | Performed by: UROLOGY

## 2019-09-06 PROCEDURE — 81001 URINALYSIS AUTO W/SCOPE: CPT | Mod: HCNC

## 2019-09-06 PROCEDURE — 3074F PR MOST RECENT SYSTOLIC BLOOD PRESSURE < 130 MM HG: ICD-10-PCS | Mod: HCNC,CPTII,S$GLB, | Performed by: UROLOGY

## 2019-09-06 PROCEDURE — 99214 OFFICE O/P EST MOD 30 MIN: CPT | Mod: HCNC,S$GLB,, | Performed by: UROLOGY

## 2019-09-06 PROCEDURE — 99999 PR PBB SHADOW E&M-EST. PATIENT-LVL III: CPT | Mod: PBBFAC,HCNC,,

## 2019-09-06 PROCEDURE — 1101F PR PT FALLS ASSESS DOC 0-1 FALLS W/OUT INJ PAST YR: ICD-10-PCS | Mod: HCNC,CPTII,S$GLB, | Performed by: UROLOGY

## 2019-09-06 PROCEDURE — 3078F DIAST BP <80 MM HG: CPT | Mod: HCNC,CPTII,S$GLB, | Performed by: UROLOGY

## 2019-09-06 PROCEDURE — 87086 URINE CULTURE/COLONY COUNT: CPT | Mod: HCNC

## 2019-09-06 PROCEDURE — 3044F PR MOST RECENT HEMOGLOBIN A1C LEVEL <7.0%: ICD-10-PCS | Mod: HCNC,CPTII,S$GLB, | Performed by: UROLOGY

## 2019-09-06 PROCEDURE — 3074F SYST BP LT 130 MM HG: CPT | Mod: HCNC,CPTII,S$GLB, | Performed by: UROLOGY

## 2019-09-06 PROCEDURE — 99999 PR PBB SHADOW E&M-EST. PATIENT-LVL III: ICD-10-PCS | Mod: PBBFAC,HCNC,,

## 2019-09-06 PROCEDURE — 1101F PT FALLS ASSESS-DOCD LE1/YR: CPT | Mod: HCNC,CPTII,S$GLB, | Performed by: UROLOGY

## 2019-09-06 PROCEDURE — 3044F HG A1C LEVEL LT 7.0%: CPT | Mod: HCNC,CPTII,S$GLB, | Performed by: UROLOGY

## 2019-09-06 PROCEDURE — 99214 PR OFFICE/OUTPT VISIT, EST, LEVL IV, 30-39 MIN: ICD-10-PCS | Mod: HCNC,S$GLB,, | Performed by: UROLOGY

## 2019-09-06 NOTE — H&P (VIEW-ONLY)
Urology - [facility]  Resident Clinic  Staff - Iram Curry MD    SUBJECTIVE:     Chief Complaint: recurrent UTI    History of Present Illness:  Lina Davis is a 74 y.o. female who presents to clinic for evaluation of recurrent UTI. She first began to have UTI's in 4/2019. UCx at that time grew pan-sensitive Klebsiella, treated with culture appropriate Bactrim. Since then, she has had two more UTIs, one was not sent for culture and she was treated with cipro, and the other grew E. Coli, which was treated with culture appropriate augmentin - finished yesterday. She feels as though the infection clears, but then it comes back.     She noticed that her urine had a strange odor which is what brought her to her PCP initially. Reports urinary frequency early in the morning but it does not continue throughout the day. She does double void. Denies dysuria, urgency, gross hematuria denies MAXI, UUI, does not wear depends. Denies fevers, chills, nausea, vomiting, pelvic pain/pressure.     Denies any recent diet changes, no new sexual partners, denies anal sex.     Reports that she drinks lots of water - drinks 3 16 oz bottles water during the day, 2 bottles before bed. Rarely drinks lemonade, tea.     From chart review, it seems like she has had UTIs in the past but has been asymptomatic up until 4/2019.     She does have hx diabetes, last A1c 6.6 7/10/19, controlled with metformin, glimiprimide.     PSH includes hysterectomy.     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    Vitamin D deficiency disease       Past Surgical History:   Procedure Laterality Date    BLOCK, NERVE, FACET JOINT, LUMBAR, MEDIAL BRANCH Bilateral 12/18/2013    Performed by [facility] at [facility] PAIN MGT    CARPAL TUNNEL RELEASE      bilateral    COLONOSCOPY N/A 3/7/2018    [facility]formed by [facility] at [facility] ENDO (2ND FLR)    COLONOSCOPY N/A 6/3/2013    Performed by Dread Forman MD at [facility] ENDO (4TH FLR)    COLONOSCOPY W/ BIOPSIES AND POLYPECTOMY      DEBRIDEMENT-SHOULDER-ARTHROSCOPIC Right 10/13/2017    Per[facility] by [facility]rier [facility] at [facility] OR    ESOPHAGOGASTRODUODENOSCOPY (EGD) N/A 3/7/2018    Performed by [facility] at [facility] ENDO (2ND FLR)    ESOPHAGOGASTRODUODENOSCOPY (EGD) N/A 2/15/2016    Performed by Evangelista Redman MD at [facility] ENDO (4TH FLR)    DNUIXNCG-LRGHTROV-MHDOCZ END Right 10/13/2017    [facility] at [facility] OR    HEMORRHOID SURGERY      HYSTERECTOMY      AUB    RELEASE-TENDON Right 10/13/2017    [facility] at [facility] OR    REPAIR ROTATOR CUFF ARTHROSCOPIC Right 10/13/2017    [facility] at [facility] OR    ROTATOR CUFF REPAIR      left shoulder    SHOULDER SURGERY       Family History   Problem Relation Age of Onset    Heart disease Mother     Diabetes Mother     Heart disease Father     Cancer Father         liver    Diabetes Sister     Cataracts Sister     Kidney disease Brother     Heart disease Brother     Diabetes Sister     COPD Brother     COPD Brother     Gout Brother     Benign prostatic hyperplasia Brother     Heart disease Brother     Kidney disease Brother     Heart attack Brother         heart attack    Kidney failure Brother     Lupus Sister     Heart attack Brother     Drug abuse Brother     No Known Problems Daughter     No Known Problems Son     Amblyopia Neg Hx     Blindness Neg Hx     Breast cancer Neg Hx     Colon cancer Neg Hx     Ovarian cancer Neg Hx      Social History     Tobacco Use  "   Smoking status: Never Smoker    Smokeless tobacco: Never Used   Substance Use Topics    Alcohol use: No    Drug use: No        Review of Systems   Constitutional: Negative for activity change, appetite change, fatigue and fever.   HENT: Negative for facial swelling and hearing loss.    Eyes: Negative for discharge and itching.   Respiratory: Negative for chest tightness and shortness of breath.    Cardiovascular: Negative for chest pain and leg swelling.   Gastrointestinal: Negative for abdominal distention, abdominal pain, constipation, diarrhea, nausea and vomiting.   Genitourinary: Negative for difficulty urinating, dyspareunia, dysuria, flank pain, frequency, hematuria, nocturia and pelvic pain.   Musculoskeletal: Positive for arthralgias. Negative for back pain and neck pain.   Skin: Negative for color change and pallor.   Neurological: Negative for dizziness, facial asymmetry and light-headedness.   Hematological: Negative for adenopathy.   Psychiatric/Behavioral: Negative for agitation and decreased concentration. The patient is not nervous/anxious.        OBJECTIVE:     Anticoagulation:  81mg ASA    Estimated body mass index is 30.96 kg/m² as calculated from the following:    Height as of this encounter: 5' 6" (1.676 m).    Weight as of this encounter: 87 kg (191 lb 12.8 oz).    Vital Signs (Most Recent)  Pulse: 88 (09/06/19 1423)  BP: 110/63 (09/06/19 1423)    Physical Exam   Constitutional: She is oriented to person, place, and time. She appears well-developed and well-nourished. No distress.   HENT:   Head: Normocephalic and atraumatic.   Eyes: No scleral icterus.   Neck: No tracheal deviation present.   Cardiovascular: Normal rate.    Pulmonary/Chest: Effort normal. No respiratory distress.   Abdominal: Soft. She exhibits no distension. There is no tenderness.   Genitourinary:   Genitourinary Comments: The external genitalia were normal. No rash. Atrophic vaginitis was present. The urethra showed " no evidence of a urethral diverticulum. And in and out cath was performed under sterile conditions immediately after voiding. The PVR was 20 ml. Perineum normal. A small cystocele was present.     Musculoskeletal: Normal range of motion.   Neurological: She is alert and oriented to person, place, and time.   Skin: Skin is warm and dry.     Psychiatric: She has a normal mood and affect. Her behavior is normal.       BMP  Lab Results   Component Value Date     04/12/2019    K 4.5 04/12/2019     04/12/2019    CO2 23 04/12/2019    BUN 25 (H) 04/12/2019    CREATININE 1.0 04/12/2019    CALCIUM 10.3 04/12/2019    ANIONGAP 8 04/12/2019    ESTGFRAFRICA >60 04/12/2019    EGFRNONAA 57 (A) 04/12/2019       Lab Results   Component Value Date    WBC 8.15 04/12/2019    HGB 10.4 (L) 04/12/2019    HCT 31.8 (L) 04/12/2019    MCV 89 04/12/2019     04/12/2019       Imaging:  None    ASSESSMENT     1. Type 2 diabetes mellitus with diabetic nephropathy, without long-term current use of insulin    2. Menopausal symptoms    3. Type II diabetes mellitus with neurological manifestations    4. Recurrent UTI (urinary tract infection)        PLAN:     - catheterized urine specimen obtained - PVR ~20cc   - we discussed that malodorous urine alone does not warrant treatment for a UTI and is likely asymptomatic bacteriuria  - asymptomatic bacteriuria does require work up: we will obtain retroperitoneal US,  Urine culture, UA micro, and cystoscopy in resident clinic   - will arrange f/u in resident clinic in 1-2 weeks      Priti Javier MD

## 2019-09-06 NOTE — PROGRESS NOTES
Urology - [facility]  Resident Clinic  Staff - Iram Curry MD    SUBJECTIVE:     Chief Complaint: recurrent UTI    History of Present Illness:  Lina Davis is a 74 y.o. female who presents to clinic for evaluation of recurrent UTI. She first began to have UTI's in 4/2019. UCx at that time grew pan-sensitive Klebsiella, treated with culture appropriate Bactrim. Since then, she has had two more UTIs, one was not sent for culture and she was treated with cipro, and the other grew E. Coli, which was treated with culture appropriate augmentin - finished yesterday. She feels as though the infection clears, but then it comes back.     She noticed that her urine had a strange odor which is what brought her to her PCP initially. Reports urinary frequency early in the morning but it does not continue throughout the day. She does double void. Denies dysuria, urgency, gross hematuria denies MAXI, UUI, does not wear depends. Denies fevers, chills, nausea, vomiting, pelvic pain/pressure.     Denies any recent diet changes, no new sexual partners, denies anal sex.     Reports that she drinks lots of water - drinks 3 16 oz bottles water during the day, 2 bottles before bed. Rarely drinks lemonade, tea.     From chart review, it seems like she has had UTIs in the past but has been asymptomatic up until 4/2019.     She does have hx diabetes, last A1c 6.6 7/10/19, controlled with metformin, glimiprimide.     PSH includes hysterectomy.     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    Vitamin D deficiency disease       Past Surgical History:   Procedure Laterality Date    BLOCK, NERVE, FACET JOINT, LUMBAR, MEDIAL BRANCH Bilateral 12/18/2013    Performed by [facility] at [facility] PAIN MGT    CARPAL TUNNEL RELEASE      bilateral    COLONOSCOPY N/A 3/7/2018    [facility]formed by [facility] at [facility] ENDO (2ND FLR)    COLONOSCOPY N/A 6/3/2013    Performed by Dread Forman MD at [facility] ENDO (4TH FLR)    COLONOSCOPY W/ BIOPSIES AND POLYPECTOMY      DEBRIDEMENT-SHOULDER-ARTHROSCOPIC Right 10/13/2017    [facility]rier [facility] at [facility] OR    ESOPHAGOGASTRODUODENOSCOPY (EGD) N/A 3/7/2018    Performed by Luís [facility] MD at [facility] ENDO (2ND FLR)    ESOPHAGOGASTRODUODENOSCOPY (EGD) N/A 2/15/2016    Performed by Evangelista Redman MD at [facility] ENDO (4TH FLR)    FPESPKLR-RCSGNXCQ-RAYJMW END Right 10/13/2017    [facility]formed [facility]rier [facility] at [facility] OR    HEMORRHOID SURGERY      HYSTERECTOMY      AUB    RELEASE-TENDON Right 10/13/2017    [facility]formed [facility] at [facility] OR    REPAIR ROTATOR CUFF ARTHROSCOPIC Right 10/13/2017    Performed by [facility] at [facility] OR    ROTATOR CUFF REPAIR      left shoulder    SHOULDER SURGERY       Family History   Problem Relation Age of Onset    Heart disease Mother     Diabetes Mother     Heart disease Father     Cancer Father         liver    Diabetes Sister     Cataracts Sister     Kidney disease Brother     Heart disease Brother     Diabetes Sister     COPD Brother     COPD Brother     Gout Brother     Benign prostatic hyperplasia Brother     Heart disease Brother     Kidney disease Brother     Heart attack Brother         heart attack    Kidney failure Brother     Lupus Sister     Heart attack Brother     Drug abuse Brother     No Known Problems Daughter     No Known Problems Son     Amblyopia Neg Hx     Blindness Neg Hx     Breast cancer Neg Hx     Colon cancer Neg Hx     Ovarian cancer Neg Hx      Social History     Tobacco Use  "   Smoking status: Never Smoker    Smokeless tobacco: Never Used   Substance Use Topics    Alcohol use: No    Drug use: No        Review of Systems   Constitutional: Negative for activity change, appetite change, fatigue and fever.   HENT: Negative for facial swelling and hearing loss.    Eyes: Negative for discharge and itching.   Respiratory: Negative for chest tightness and shortness of breath.    Cardiovascular: Negative for chest pain and leg swelling.   Gastrointestinal: Negative for abdominal distention, abdominal pain, constipation, diarrhea, nausea and vomiting.   Genitourinary: Negative for difficulty urinating, dyspareunia, dysuria, flank pain, frequency, hematuria, nocturia and pelvic pain.   Musculoskeletal: Positive for arthralgias. Negative for back pain and neck pain.   Skin: Negative for color change and pallor.   Neurological: Negative for dizziness, facial asymmetry and light-headedness.   Hematological: Negative for adenopathy.   Psychiatric/Behavioral: Negative for agitation and decreased concentration. The patient is not nervous/anxious.        OBJECTIVE:     Anticoagulation:  81mg ASA    Estimated body mass index is 30.96 kg/m² as calculated from the following:    Height as of this encounter: 5' 6" (1.676 m).    Weight as of this encounter: 87 kg (191 lb 12.8 oz).    Vital Signs (Most Recent)  Pulse: 88 (09/06/19 1423)  BP: 110/63 (09/06/19 1423)    Physical Exam   Constitutional: She is oriented to person, place, and time. She appears well-developed and well-nourished. No distress.   HENT:   Head: Normocephalic and atraumatic.   Eyes: No scleral icterus.   Neck: No tracheal deviation present.   Cardiovascular: Normal rate.    Pulmonary/Chest: Effort normal. No respiratory distress.   Abdominal: Soft. She exhibits no distension. There is no tenderness.   Genitourinary:   Genitourinary Comments: The external genitalia were normal. No rash. Atrophic vaginitis was present. The urethra showed " no evidence of a urethral diverticulum. And in and out cath was performed under sterile conditions immediately after voiding. The PVR was 20 ml. Perineum normal. A small cystocele was present.     Musculoskeletal: Normal range of motion.   Neurological: She is alert and oriented to person, place, and time.   Skin: Skin is warm and dry.     Psychiatric: She has a normal mood and affect. Her behavior is normal.       BMP  Lab Results   Component Value Date     04/12/2019    K 4.5 04/12/2019     04/12/2019    CO2 23 04/12/2019    BUN 25 (H) 04/12/2019    CREATININE 1.0 04/12/2019    CALCIUM 10.3 04/12/2019    ANIONGAP 8 04/12/2019    ESTGFRAFRICA >60 04/12/2019    EGFRNONAA 57 (A) 04/12/2019       Lab Results   Component Value Date    WBC 8.15 04/12/2019    HGB 10.4 (L) 04/12/2019    HCT 31.8 (L) 04/12/2019    MCV 89 04/12/2019     04/12/2019       Imaging:  None    ASSESSMENT     1. Type 2 diabetes mellitus with diabetic nephropathy, without long-term current use of insulin    2. Menopausal symptoms    3. Type II diabetes mellitus with neurological manifestations    4. Recurrent UTI (urinary tract infection)        PLAN:     - catheterized urine specimen obtained - PVR ~20cc   - we discussed that malodorous urine alone does not warrant treatment for a UTI and is likely asymptomatic bacteriuria  - asymptomatic bacteriuria does require work up: we will obtain retroperitoneal US,  Urine culture, UA micro, and cystoscopy in resident clinic   - will arrange f/u in resident clinic in 1-2 weeks      Priti Javier MD

## 2019-09-07 LAB — BACTERIA UR CULT: NO GROWTH

## 2019-09-13 ENCOUNTER — HOSPITAL ENCOUNTER (OUTPATIENT)
Dept: UROLOGY | Facility: HOSPITAL | Age: 74
Discharge: HOME | End: 2019-09-13
Attending: UROLOGY
Payer: MEDICARE

## 2019-09-13 ENCOUNTER — HOSPITAL ENCOUNTER (OUTPATIENT)
Dept: RADIOLOGY | Facility: HOSPITAL | Age: 74
Discharge: HOME | End: 2019-09-13
Attending: STUDENT IN AN ORGANIZED HEALTH CARE EDUCATION/TRAINING PROGRAM
Payer: MEDICARE

## 2019-09-13 VITALS
SYSTOLIC BLOOD PRESSURE: 145 MMHG | WEIGHT: 191.81 LBS | DIASTOLIC BLOOD PRESSURE: 78 MMHG | RESPIRATION RATE: 17 BRPM | HEIGHT: 66 IN | TEMPERATURE: 98 F | BODY MASS INDEX: 30.82 KG/M2 | HEART RATE: 119 BPM

## 2019-09-13 DIAGNOSIS — E11.49 TYPE II DIABETES MELLITUS WITH NEUROLOGICAL MANIFESTATIONS: ICD-10-CM

## 2019-09-13 DIAGNOSIS — E11.21 TYPE 2 DIABETES MELLITUS WITH DIABETIC NEPHROPATHY, WITHOUT LONG-TERM CURRENT USE OF INSULIN: ICD-10-CM

## 2019-09-13 DIAGNOSIS — N39.0 RECURRENT UTI (URINARY TRACT INFECTION): Primary | ICD-10-CM

## 2019-09-13 DIAGNOSIS — N39.0 RECURRENT UTI (URINARY TRACT INFECTION): ICD-10-CM

## 2019-09-13 PROCEDURE — 52000 CYSTOURETHROSCOPY: CPT | Mod: HCNC

## 2019-09-13 PROCEDURE — 52000 CYSTOURETHROSCOPY: CPT | Mod: HCNC,,, | Performed by: UROLOGY

## 2019-09-13 PROCEDURE — 52000 PR CYSTOURETHROSCOPY: ICD-10-PCS | Mod: HCNC,,, | Performed by: UROLOGY

## 2019-09-13 PROCEDURE — 76770 US EXAM ABDO BACK WALL COMP: CPT | Mod: TC,HCNC

## 2019-09-13 PROCEDURE — 76770 US EXAM ABDO BACK WALL COMP: CPT | Mod: 26,HCNC,, | Performed by: RADIOLOGY

## 2019-09-13 PROCEDURE — 76770 US RETROPERITONEAL COMPLETE: ICD-10-PCS | Mod: 26,HCNC,, | Performed by: RADIOLOGY

## 2019-09-13 RX ORDER — LIDOCAINE HYDROCHLORIDE 20 MG/ML
JELLY TOPICAL
Status: COMPLETED | OUTPATIENT
Start: 2019-09-13 | End: 2019-09-13

## 2019-09-13 RX ORDER — D-MANNOSE
2 POWDER (GRAM) ORAL DAILY
Qty: 300 G | Refills: 2 | Status: SHIPPED | OUTPATIENT
Start: 2019-09-13 | End: 2020-09-22

## 2019-09-13 RX ADMIN — LIDOCAINE HYDROCHLORIDE: 20 JELLY TOPICAL at 01:09

## 2019-09-13 NOTE — INTERVAL H&P NOTE
The patient has been examined and the H&P has been reviewed:    I concur with the findings and no changes have occurred since H&P was written.     Urine culture negative from last visit. No symptoms today.         There are no hospital problems to display for this patient.

## 2019-09-13 NOTE — PROCEDURES
The patient was prepped and draped in normal sterile fashion. Consents signed. Lidocaine jelly was injected in the urethra. The flexible cystoscope was inserted into the urethra. The bladder was examined systematically. There was irritation near the trigone of the bladder which is consistent with recent UTI. No additional mucosal abnormalities, papillary lesions or lesions concerning for malignancy. The UOs were visualized in normal anatomic position. The scope was retroflexed to examine the bladder neck. The scope was then removed and the urethra was visualized. There were no urethral abnormalities noted. The patient tolerated the procedure well.       PLAN:    - She has a renal US scheduled, will follow up and call patient with results.   - No anatomic findings that would contribute to her recurrent UTI  - Discussed d-Mannose with the patient. Will send a script to her pharmacy.   - Follow up in 6 months unless having symptoms.

## 2019-09-13 NOTE — PATIENT INSTRUCTIONS
What to Expect After a Cystoscopy  For the next 24-48 hours, you may feel a mild burning when you urinate. This burning is normal and expected. Drink plenty of water to dilute the urine to help relieve the burning sensation. You may also see a small amount of blood in your urine after the procedure.    Unless you are already taking antibiotics, you may be given an antibiotic after the test to prevent infection.    Signs and Symptoms to Report  Call the [facility] at [phone] if you develop any of the following:  · Fever of 101 degrees or higher  · Chills or persistent bleeding  · Inability to urinate

## 2019-10-15 ENCOUNTER — TELEPHONE (OUTPATIENT)
Dept: INTERNAL MEDICINE | Facility: CLINIC | Age: 74
End: 2019-10-15

## 2019-10-15 NOTE — TELEPHONE ENCOUNTER
I spoke with patient and confirmed appointments on 10/21/19 at 9:30am and 10am for Spouse with pcp

## 2019-10-15 NOTE — TELEPHONE ENCOUNTER
----- Message from Louann Biggs sent at 10/15/2019  8:44 AM CDT -----  Contact: [phone] or [phone]  Patient is requesting to speak to Bri, she stated she spoke to you to reschedule her appt. She stated you told her that her appt would be 10/21.  Please advise, thanks

## 2019-10-17 RX ORDER — ALLOPURINOL 300 MG/1
TABLET ORAL
Qty: 90 TABLET | Refills: 0 | Status: SHIPPED | OUTPATIENT
Start: 2019-10-17 | End: 2020-08-12

## 2019-10-21 ENCOUNTER — OFFICE VISIT (OUTPATIENT)
Dept: INTERNAL MEDICINE | Facility: CLINIC | Age: 74
End: 2019-10-21
Payer: MEDICARE

## 2019-10-21 ENCOUNTER — LAB VISIT (OUTPATIENT)
Dept: LAB | Facility: HOSPITAL | Age: 74
End: 2019-10-21
Attending: INTERNAL MEDICINE
Payer: MEDICARE

## 2019-10-21 ENCOUNTER — IMMUNIZATION (OUTPATIENT)
Dept: INTERNAL MEDICINE | Facility: CLINIC | Age: 74
End: 2019-10-21
Payer: MEDICARE

## 2019-10-21 VITALS
SYSTOLIC BLOOD PRESSURE: 132 MMHG | WEIGHT: 191.81 LBS | DIASTOLIC BLOOD PRESSURE: 62 MMHG | BODY MASS INDEX: 30.82 KG/M2 | OXYGEN SATURATION: 96 % | HEIGHT: 66 IN | TEMPERATURE: 98 F | HEART RATE: 94 BPM

## 2019-10-21 DIAGNOSIS — E11.21 TYPE 2 DIABETES MELLITUS WITH DIABETIC NEPHROPATHY, WITHOUT LONG-TERM CURRENT USE OF INSULIN: ICD-10-CM

## 2019-10-21 DIAGNOSIS — M54.10 RADICULOPATHY, UNSPECIFIED SPINAL REGION: ICD-10-CM

## 2019-10-21 DIAGNOSIS — D64.9 ANEMIA, UNSPECIFIED TYPE: Primary | ICD-10-CM

## 2019-10-21 DIAGNOSIS — D64.9 ANEMIA, UNSPECIFIED TYPE: ICD-10-CM

## 2019-10-21 LAB
ALBUMIN SERPL BCP-MCNC: 3.7 G/DL (ref 3.5–5.2)
ALP SERPL-CCNC: 83 U/L (ref 55–135)
ALT SERPL W/O P-5'-P-CCNC: 13 U/L (ref 10–44)
ANION GAP SERPL CALC-SCNC: 5 MMOL/L (ref 8–16)
AST SERPL-CCNC: 13 U/L (ref 10–40)
BASOPHILS # BLD AUTO: 0.04 K/UL (ref 0–0.2)
BASOPHILS NFR BLD: 0.5 % (ref 0–1.9)
BILIRUB SERPL-MCNC: 0.3 MG/DL (ref 0.1–1)
BUN SERPL-MCNC: 30 MG/DL (ref 8–23)
CALCIUM SERPL-MCNC: 9.9 MG/DL (ref 8.7–10.5)
CHLORIDE SERPL-SCNC: 109 MMOL/L (ref 95–110)
CO2 SERPL-SCNC: 23 MMOL/L (ref 23–29)
CREAT SERPL-MCNC: 1.2 MG/DL (ref 0.5–1.4)
DIFFERENTIAL METHOD: ABNORMAL
EOSINOPHIL # BLD AUTO: 0.1 K/UL (ref 0–0.5)
EOSINOPHIL NFR BLD: 0.9 % (ref 0–8)
ERYTHROCYTE [DISTWIDTH] IN BLOOD BY AUTOMATED COUNT: 14.6 % (ref 11.5–14.5)
EST. GFR  (AFRICAN AMERICAN): 52.2 ML/MIN/1.73 M^2
EST. GFR  (NON AFRICAN AMERICAN): 45.3 ML/MIN/1.73 M^2
ESTIMATED AVG GLUCOSE: 134 MG/DL (ref 68–131)
FERRITIN SERPL-MCNC: 576 NG/ML (ref 20–300)
GLUCOSE SERPL-MCNC: 203 MG/DL (ref 70–110)
HBA1C MFR BLD HPLC: 6.3 % (ref 4–5.6)
HCT VFR BLD AUTO: 32.2 % (ref 37–48.5)
HGB BLD-MCNC: 10.1 G/DL (ref 12–16)
IMM GRANULOCYTES # BLD AUTO: 0.02 K/UL (ref 0–0.04)
IMM GRANULOCYTES NFR BLD AUTO: 0.2 % (ref 0–0.5)
IRON SERPL-MCNC: 77 UG/DL (ref 30–160)
LYMPHOCYTES # BLD AUTO: 2.3 K/UL (ref 1–4.8)
LYMPHOCYTES NFR BLD: 27.4 % (ref 18–48)
MCH RBC QN AUTO: 28.5 PG (ref 27–31)
MCHC RBC AUTO-ENTMCNC: 31.4 G/DL (ref 32–36)
MCV RBC AUTO: 91 FL (ref 82–98)
MONOCYTES # BLD AUTO: 0.5 K/UL (ref 0.3–1)
MONOCYTES NFR BLD: 6.2 % (ref 4–15)
NEUTROPHILS # BLD AUTO: 5.5 K/UL (ref 1.8–7.7)
NEUTROPHILS NFR BLD: 64.8 % (ref 38–73)
NRBC BLD-RTO: 0 /100 WBC
PLATELET # BLD AUTO: 276 K/UL (ref 150–350)
PMV BLD AUTO: 10.9 FL (ref 9.2–12.9)
POTASSIUM SERPL-SCNC: 5 MMOL/L (ref 3.5–5.1)
PROT SERPL-MCNC: 7.6 G/DL (ref 6–8.4)
RBC # BLD AUTO: 3.55 M/UL (ref 4–5.4)
SATURATED IRON: 26 % (ref 20–50)
SODIUM SERPL-SCNC: 137 MMOL/L (ref 136–145)
TOTAL IRON BINDING CAPACITY: 300 UG/DL (ref 250–450)
TRANSFERRIN SERPL-MCNC: 203 MG/DL (ref 200–375)
WBC # BLD AUTO: 8.44 K/UL (ref 3.9–12.7)

## 2019-10-21 PROCEDURE — 85025 COMPLETE CBC W/AUTO DIFF WBC: CPT | Mod: HCNC

## 2019-10-21 PROCEDURE — 99214 PR OFFICE/OUTPT VISIT, EST, LEVL IV, 30-39 MIN: ICD-10-PCS | Mod: 25,HCNC,S$GLB, | Performed by: INTERNAL MEDICINE

## 2019-10-21 PROCEDURE — 83540 ASSAY OF IRON: CPT | Mod: HCNC

## 2019-10-21 PROCEDURE — 90662 IIV NO PRSV INCREASED AG IM: CPT | Mod: HCNC,S$GLB,, | Performed by: INTERNAL MEDICINE

## 2019-10-21 PROCEDURE — 99999 PR PBB SHADOW E&M-EST. PATIENT-LVL V: ICD-10-PCS | Mod: PBBFAC,HCNC,, | Performed by: INTERNAL MEDICINE

## 2019-10-21 PROCEDURE — 83036 HEMOGLOBIN GLYCOSYLATED A1C: CPT | Mod: HCNC

## 2019-10-21 PROCEDURE — 36415 COLL VENOUS BLD VENIPUNCTURE: CPT | Mod: HCNC

## 2019-10-21 PROCEDURE — 3075F PR MOST RECENT SYSTOLIC BLOOD PRESS GE 130-139MM HG: ICD-10-PCS | Mod: HCNC,CPTII,S$GLB, | Performed by: INTERNAL MEDICINE

## 2019-10-21 PROCEDURE — 3044F PR MOST RECENT HEMOGLOBIN A1C LEVEL <7.0%: ICD-10-PCS | Mod: HCNC,CPTII,S$GLB, | Performed by: INTERNAL MEDICINE

## 2019-10-21 PROCEDURE — 80053 COMPREHEN METABOLIC PANEL: CPT | Mod: HCNC

## 2019-10-21 PROCEDURE — 1101F PR PT FALLS ASSESS DOC 0-1 FALLS W/OUT INJ PAST YR: ICD-10-PCS | Mod: HCNC,CPTII,S$GLB, | Performed by: INTERNAL MEDICINE

## 2019-10-21 PROCEDURE — G0008 FLU VACCINE - HIGH DOSE (65+) PRESERVATIVE FREE IM: ICD-10-PCS | Mod: HCNC,S$GLB,, | Performed by: INTERNAL MEDICINE

## 2019-10-21 PROCEDURE — 3078F PR MOST RECENT DIASTOLIC BLOOD PRESSURE < 80 MM HG: ICD-10-PCS | Mod: HCNC,CPTII,S$GLB, | Performed by: INTERNAL MEDICINE

## 2019-10-21 PROCEDURE — 90662 FLU VACCINE - HIGH DOSE (65+) PRESERVATIVE FREE IM: ICD-10-PCS | Mod: HCNC,S$GLB,, | Performed by: INTERNAL MEDICINE

## 2019-10-21 PROCEDURE — 3044F HG A1C LEVEL LT 7.0%: CPT | Mod: HCNC,CPTII,S$GLB, | Performed by: INTERNAL MEDICINE

## 2019-10-21 PROCEDURE — 99214 OFFICE O/P EST MOD 30 MIN: CPT | Mod: 25,HCNC,S$GLB, | Performed by: INTERNAL MEDICINE

## 2019-10-21 PROCEDURE — 82728 ASSAY OF FERRITIN: CPT | Mod: HCNC

## 2019-10-21 PROCEDURE — 1101F PT FALLS ASSESS-DOCD LE1/YR: CPT | Mod: HCNC,CPTII,S$GLB, | Performed by: INTERNAL MEDICINE

## 2019-10-21 PROCEDURE — 3075F SYST BP GE 130 - 139MM HG: CPT | Mod: HCNC,CPTII,S$GLB, | Performed by: INTERNAL MEDICINE

## 2019-10-21 PROCEDURE — 99999 PR PBB SHADOW E&M-EST. PATIENT-LVL V: CPT | Mod: PBBFAC,HCNC,, | Performed by: INTERNAL MEDICINE

## 2019-10-21 PROCEDURE — G0008 ADMIN INFLUENZA VIRUS VAC: HCPCS | Mod: HCNC,S$GLB,, | Performed by: INTERNAL MEDICINE

## 2019-10-21 PROCEDURE — 3078F DIAST BP <80 MM HG: CPT | Mod: HCNC,CPTII,S$GLB, | Performed by: INTERNAL MEDICINE

## 2019-10-21 RX ORDER — ALPRAZOLAM 0.25 MG/1
TABLET ORAL
Qty: 4 TABLET | Refills: 0 | Status: SHIPPED | OUTPATIENT
Start: 2019-10-21 | End: 2019-12-24

## 2019-10-21 RX ORDER — METFORMIN HYDROCHLORIDE 500 MG/1
TABLET ORAL
Qty: 360 TABLET | Refills: 3
Start: 2019-10-21 | End: 2020-01-02

## 2019-10-21 RX ORDER — TOPIRAMATE 25 MG/1
TABLET ORAL
Qty: 60 TABLET | Refills: 1 | Status: SHIPPED | OUTPATIENT
Start: 2019-10-21 | End: 2019-10-21 | Stop reason: SDUPTHER

## 2019-10-21 RX ORDER — HYDROCODONE BITARTRATE AND ACETAMINOPHEN 5; 325 MG/1; MG/1
1 TABLET ORAL
Qty: 30 TABLET | Refills: 0 | Status: SHIPPED | OUTPATIENT
Start: 2019-10-21 | End: 2019-12-24 | Stop reason: SDUPTHER

## 2019-10-22 RX ORDER — TOPIRAMATE 25 MG/1
TABLET ORAL
Qty: 60 TABLET | Refills: 1 | Status: SHIPPED | OUTPATIENT
Start: 2019-10-22 | End: 2020-01-31

## 2019-10-25 ENCOUNTER — HOSPITAL ENCOUNTER (OUTPATIENT)
Dept: RADIOLOGY | Facility: HOSPITAL | Age: 74
Discharge: HOME | End: 2019-10-25
Attending: INTERNAL MEDICINE
Payer: MEDICARE

## 2019-10-25 DIAGNOSIS — M54.10 RADICULOPATHY, UNSPECIFIED SPINAL REGION: ICD-10-CM

## 2019-10-25 PROCEDURE — 72141 MRI NECK SPINE W/O DYE: CPT | Mod: TC,HCNC

## 2019-10-25 PROCEDURE — 72141 MRI NECK SPINE W/O DYE: CPT | Mod: 26,HCNC,, | Performed by: RADIOLOGY

## 2019-10-25 PROCEDURE — 72141 MRI CERVICAL SPINE WITHOUT CONTRAST: ICD-10-PCS | Mod: 26,HCNC,, | Performed by: RADIOLOGY

## 2019-10-28 ENCOUNTER — HOSPITAL ENCOUNTER (OUTPATIENT)
Dept: RADIOLOGY | Facility: HOSPITAL | Age: 74
Discharge: HOME | End: 2019-10-28
Attending: INTERNAL MEDICINE
Payer: MEDICARE

## 2019-10-28 DIAGNOSIS — M54.10 RADICULOPATHY, UNSPECIFIED SPINAL REGION: ICD-10-CM

## 2019-10-28 PROCEDURE — 72141 MRI CERVICAL SPINE WITHOUT CONTRAST: ICD-10-PCS | Mod: 26,HCNC,, | Performed by: RADIOLOGY

## 2019-10-28 PROCEDURE — 72141 MRI NECK SPINE W/O DYE: CPT | Mod: TC,HCNC

## 2019-10-28 PROCEDURE — 72141 MRI NECK SPINE W/O DYE: CPT | Mod: 26,HCNC,, | Performed by: RADIOLOGY

## 2019-12-03 NOTE — TELEPHONE ENCOUNTER
----- Message from Kasey Rankin sent at 12/3/2019  1:58 PM CST -----  Contact: Pt self Mobile [phone]  Patient is calling for an RX refill or new RX.  Is this a refill or new RX:  Refill  RX name and strength: Oxycodone   Directions (copy/paste from chart):  N/A  Is this a 30 day or 90 day RX:  30  Local pharmacy or mail order pharmacy:     Pharmacy name and phone #

## 2019-12-04 ENCOUNTER — OFFICE VISIT (OUTPATIENT)
Dept: PODIATRY | Facility: CLINIC | Age: 74
End: 2019-12-04
Payer: MEDICARE

## 2019-12-04 VITALS
SYSTOLIC BLOOD PRESSURE: 174 MMHG | WEIGHT: 191 LBS | DIASTOLIC BLOOD PRESSURE: 76 MMHG | BODY MASS INDEX: 30.7 KG/M2 | HEIGHT: 66 IN | HEART RATE: 85 BPM

## 2019-12-04 DIAGNOSIS — E11.21 TYPE 2 DIABETES MELLITUS WITH DIABETIC NEPHROPATHY, WITHOUT LONG-TERM CURRENT USE OF INSULIN: Primary | ICD-10-CM

## 2019-12-04 PROCEDURE — 3077F SYST BP >= 140 MM HG: CPT | Mod: HCNC,CPTII,S$GLB, | Performed by: PODIATRIST

## 2019-12-04 PROCEDURE — 3044F PR MOST RECENT HEMOGLOBIN A1C LEVEL <7.0%: ICD-10-PCS | Mod: HCNC,CPTII,S$GLB, | Performed by: PODIATRIST

## 2019-12-04 PROCEDURE — 1126F AMNT PAIN NOTED NONE PRSNT: CPT | Mod: HCNC,S$GLB,, | Performed by: PODIATRIST

## 2019-12-04 PROCEDURE — 1101F PT FALLS ASSESS-DOCD LE1/YR: CPT | Mod: HCNC,CPTII,S$GLB, | Performed by: PODIATRIST

## 2019-12-04 PROCEDURE — 99499 UNLISTED E&M SERVICE: CPT | Mod: HCNC,S$GLB,, | Performed by: PODIATRIST

## 2019-12-04 PROCEDURE — 99499 RISK ADDL DX/OHS AUDIT: ICD-10-PCS | Mod: HCNC,S$GLB,, | Performed by: PODIATRIST

## 2019-12-04 PROCEDURE — 1126F PR PAIN SEVERITY QUANTIFIED, NO PAIN PRESENT: ICD-10-PCS | Mod: HCNC,S$GLB,, | Performed by: PODIATRIST

## 2019-12-04 PROCEDURE — 1159F PR MEDICATION LIST DOCUMENTED IN MEDICAL RECORD: ICD-10-PCS | Mod: HCNC,S$GLB,, | Performed by: PODIATRIST

## 2019-12-04 PROCEDURE — 3078F DIAST BP <80 MM HG: CPT | Mod: HCNC,CPTII,S$GLB, | Performed by: PODIATRIST

## 2019-12-04 PROCEDURE — 1101F PR PT FALLS ASSESS DOC 0-1 FALLS W/OUT INJ PAST YR: ICD-10-PCS | Mod: HCNC,CPTII,S$GLB, | Performed by: PODIATRIST

## 2019-12-04 PROCEDURE — 3078F PR MOST RECENT DIASTOLIC BLOOD PRESSURE < 80 MM HG: ICD-10-PCS | Mod: HCNC,CPTII,S$GLB, | Performed by: PODIATRIST

## 2019-12-04 PROCEDURE — 99203 PR OFFICE/OUTPT VISIT, NEW, LEVL III, 30-44 MIN: ICD-10-PCS | Mod: HCNC,S$GLB,, | Performed by: PODIATRIST

## 2019-12-04 PROCEDURE — 3044F HG A1C LEVEL LT 7.0%: CPT | Mod: HCNC,CPTII,S$GLB, | Performed by: PODIATRIST

## 2019-12-04 PROCEDURE — 99203 OFFICE O/P NEW LOW 30 MIN: CPT | Mod: HCNC,S$GLB,, | Performed by: PODIATRIST

## 2019-12-04 PROCEDURE — 99999 PR PBB SHADOW E&M-EST. PATIENT-LVL III: CPT | Mod: PBBFAC,HCNC,, | Performed by: PODIATRIST

## 2019-12-04 PROCEDURE — 1159F MED LIST DOCD IN RCRD: CPT | Mod: HCNC,S$GLB,, | Performed by: PODIATRIST

## 2019-12-04 PROCEDURE — 99999 PR PBB SHADOW E&M-EST. PATIENT-LVL III: ICD-10-PCS | Mod: PBBFAC,HCNC,, | Performed by: PODIATRIST

## 2019-12-04 PROCEDURE — 3077F PR MOST RECENT SYSTOLIC BLOOD PRESSURE >= 140 MM HG: ICD-10-PCS | Mod: HCNC,CPTII,S$GLB, | Performed by: PODIATRIST

## 2019-12-04 NOTE — LETTER
December 4, 2019      Dora Freedman MD  [address]  [facility] [id number]           [facility] - Podiatry  [address]  [facility] [address]  Phone: [phone]          Patient: Lina Davis   MR Number: [id number]   YOB: 1945   Date of Visit: 12/4/2019       Dear Dr. Dora Freedman:    Thank you for referring Lina Davis to me for evaluation. Attached you will find relevant portions of my assessment and plan of care.    If you have questions, please do not hesitate to call me. I look forward to following Lina Davis along with you.    Sincerely,    Karol Vance, KOLTON    Enclosure  CC:  No Recipients    If you would like to receive this communication electronically, please contact [email or web address] or [phone] to request more information on [employer] Link access.    For providers and/or their staff who would like to refer a patient to [facility], please contact us through our one-stop-shop provider referral line, [facility], at [phone].    If you feel you have received this communication in error or would no longer like to receive these types of communications, please e-mail [email or web address]

## 2019-12-04 NOTE — PROGRESS NOTES
Subjective:      Patient ID: Lina Davis is a 74 y.o. female.    Chief Complaint: Diabetic Foot Exam (dr dora navarro)    Lina is a 74 y.o. female who presents to the clinic upon referral from Dr. Freedman  for evaluation and treatment of diabetic feet. Lina has a past medical history of Anemia, Arthritis, Cataract, Diabetes mellitus type II, Diabetes with neurologic complications, Gout, arthritis, HTN (hypertension), benign, Hyperlipidemia, Polyneuropathy, Type 2 diabetes mellitus with diabetic nephropathy (4/12/2016), and Vitamin D deficiency disease. Patient relates no major problem with feet. Only complaints today consist of .yearly comprehensive diabetic  foot examination  .    PCP: Dora Freedman MD    Date Last Seen by PCP:   Chief Complaint   Patient presents with    Diabetic Foot Exam     dr dora navarro         Current shoe gear: Casual shoes    Hemoglobin A1C   Date Value Ref Range Status   10/21/2019 6.3 (H) 4.0 - 5.6 % Final     Comment:     ADA Screening Guidelines:  5.7-6.4%  Consistent with prediabetes  >or=6.5%  Consistent with diabetes  High levels of fetal hemoglobin interfere with the HbA1C  assay. Heterozygous hemoglobin variants (HbS, HgC, etc)do  not significantly interfere with this assay.   However, presence of multiple variants may affect accuracy.     07/10/2019 6.6 (H) 4.0 - 5.6 % Final     Comment:     ADA Screening Guidelines:  5.7-6.4%  Consistent with prediabetes  >or=6.5%  Consistent with diabetes  High levels of fetal hemoglobin interfere with the HbA1C  assay. Heterozygous hemoglobin variants (HbS, HgC, etc)do  not significantly interfere with this assay.   However, presence of multiple variants may affect accuracy.     04/12/2019 6.3 (H) 4.0 - 5.6 % Final     Comment:     ADA Screening Guidelines:  5.7-6.4%  Consistent with prediabetes  >or=6.5%  Consistent with diabetes  High levels of fetal hemoglobin interfere with the HbA1C  assay. Heterozygous hemoglobin  variants (HbS, HgC, etc)do  not significantly interfere with this assay.   However, presence of multiple variants may affect accuracy.             Review of Systems   Constitution: Negative for chills, decreased appetite and fever.   Cardiovascular: Negative for leg swelling.   Skin: Positive for dry skin.   Musculoskeletal: Negative for arthritis, joint pain, joint swelling and myalgias.   Gastrointestinal: Negative for nausea and vomiting.   Neurological: Negative for loss of balance, numbness and paresthesias.           Objective:      Physical Exam   Constitutional: She appears well-developed and well-nourished.  Non-toxic appearance. She does not have a sickly appearance. No distress.   alert and oriented x 3.    Cardiovascular:   Pulses:       Dorsalis pedis pulses are 2+ on the right side, and 2+ on the left side.        Posterior tibial pulses are 2+ on the right side, and 2+ on the left side.    Capillary refill time is within normal limits. Digital hair present.    Pulmonary/Chest: No respiratory distress.   Musculoskeletal: She exhibits no deformity.        Right ankle: No tenderness. No lateral malleolus, no medial malleolus, no AITFL, no CF ligament and no posterior TFL tenderness found. Achilles tendon exhibits no pain, no defect and normal Durham's test results.        Left ankle: No tenderness. No lateral malleolus, no medial malleolus, no AITFL, no CF ligament and no posterior TFL tenderness found. Achilles tendon exhibits no pain, no defect and normal Durham's test results.        Right foot: There is no tenderness and no bony tenderness.        Left foot: There is no tenderness and no bony tenderness.   Adequate joint range of motion without pain, limitation, nor crepitation Bilateral feet and ankle joints. Muscle strength is 5/5 in all groups bilaterally.           Feet:   Right Foot:   Protective Sensation: 5 sites tested. 5 sites sensed.   Left Foot:   Protective Sensation: 5 sites tested.  5 sites sensed.   Lymphadenopathy:   No lymphatic streaking     Neurological: She displays no atrophy. No sensory deficit.   Light touch present     Skin: Skin is warm, dry and intact. No rash noted. She is not diaphoretic. No cyanosis. No pallor. Nails show no clubbing.   Skin is of normal turgor.   Normal temperature gradient.  Examination of the skin reveals no evidence of significant rashes, open lesions, suspicious appearing nevi or other concerning lesions.      Toenails 1-5 bilaterally are neatly trimmed; of normal color and thickness  Xerosis b/l calcaneal rim      Psychiatric: Her mood appears not anxious. Her affect is not inappropriate. Her speech is not slurred. She is not combative. She is communicative. She is attentive.   Nursing note and vitals reviewed.            Assessment:       Encounter Diagnosis   Name Primary?    Type 2 diabetes mellitus with diabetic nephropathy, without long-term current use of insulin Yes         Plan:       Lina was seen today for diabetic foot exam.    Diagnoses and all orders for this visit:    Type 2 diabetes mellitus with diabetic nephropathy, without long-term current use of insulin      I counseled the patient on her conditions, their implications and medical management.      - Shoe inspection. Diabetic Foot Education. Patient reminded of the importance of good nutrition and blood sugar control to help prevent podiatric complications of diabetes. Patient instructed on proper foot hygeine. We discussed wearing proper shoe gear, daily foot inspections, never walking without protective shoe gear, caution putting sharp instruments to feet     - Discussed DM foot care:  Wear comfortable, proper fitting shoes. Wash feet daily. Dry well. After drying, apply moisturizer to feet (no lotion to webspaces). Inspect feet daily for skin breaks, blisters, swelling, or redness. Wear cotton socks (preferably white)  Change socks every day. Do NOT walk barefoot. Do NOT use heating  pads or warm/hot water soaks     - Discussed importance of daily moisturizer to the feet such as Gold bonds diabetic foot cream    - Patient is low risk for developing lower extremity issues secondary to diabetes. I recommend continued yearly diabetic foot examinations.     - Patients PCP can perform yearly foot checks . Currently  patient has no pedal manifestations of DM    - Patients with out pedal manifestations of DM, do not qualify for nail/callus trimming     - RTC in  PRN     .

## 2019-12-24 ENCOUNTER — TELEPHONE (OUTPATIENT)
Dept: INTERNAL MEDICINE | Facility: CLINIC | Age: 74
End: 2019-12-24

## 2019-12-24 RX ORDER — OXYCODONE HYDROCHLORIDE 5 MG/1
5 TABLET ORAL EVERY 4 HOURS PRN
Refills: 0 | OUTPATIENT
Start: 2019-12-24

## 2019-12-24 RX ORDER — HYDROCODONE BITARTRATE AND ACETAMINOPHEN 5; 325 MG/1; MG/1
1 TABLET ORAL
Qty: 30 TABLET | Refills: 0 | Status: SHIPPED | OUTPATIENT
Start: 2019-12-24 | End: 2020-01-31 | Stop reason: SDUPTHER

## 2020-01-02 DIAGNOSIS — E11.21 DIABETIC NEPHROPATHY ASSOCIATED WITH TYPE 2 DIABETES MELLITUS: ICD-10-CM

## 2020-01-02 RX ORDER — METFORMIN HYDROCHLORIDE 500 MG/1
TABLET ORAL
Qty: 360 TABLET | Refills: 3 | Status: SHIPPED | OUTPATIENT
Start: 2020-01-02 | End: 2020-07-16

## 2020-01-02 RX ORDER — ISOPROPYL ALCOHOL 70 ML/100ML
SWAB TOPICAL
Qty: 200 EACH | Refills: 3 | Status: SHIPPED | OUTPATIENT
Start: 2020-01-02 | End: 2020-10-28

## 2020-01-15 ENCOUNTER — PATIENT OUTREACH (OUTPATIENT)
Dept: ADMINISTRATIVE | Facility: HOSPITAL | Age: 75
End: 2020-01-15

## 2020-01-31 ENCOUNTER — OFFICE VISIT (OUTPATIENT)
Dept: INTERNAL MEDICINE | Facility: CLINIC | Age: 75
End: 2020-01-31
Payer: MEDICARE

## 2020-01-31 VITALS
OXYGEN SATURATION: 99 % | SYSTOLIC BLOOD PRESSURE: 126 MMHG | HEART RATE: 93 BPM | BODY MASS INDEX: 29.76 KG/M2 | WEIGHT: 185.19 LBS | DIASTOLIC BLOOD PRESSURE: 72 MMHG | HEIGHT: 66 IN

## 2020-01-31 DIAGNOSIS — E78.00 PURE HYPERCHOLESTEROLEMIA: ICD-10-CM

## 2020-01-31 DIAGNOSIS — E11.21 TYPE 2 DIABETES MELLITUS WITH DIABETIC NEPHROPATHY, WITHOUT LONG-TERM CURRENT USE OF INSULIN: ICD-10-CM

## 2020-01-31 DIAGNOSIS — Z12.31 SCREENING MAMMOGRAM, ENCOUNTER FOR: ICD-10-CM

## 2020-01-31 DIAGNOSIS — R00.0 TACHYCARDIA: ICD-10-CM

## 2020-01-31 DIAGNOSIS — N39.0 RECURRENT UTI (URINARY TRACT INFECTION): ICD-10-CM

## 2020-01-31 DIAGNOSIS — M48.02 SPINAL STENOSIS, CERVICAL REGION: ICD-10-CM

## 2020-01-31 DIAGNOSIS — I10 HTN (HYPERTENSION), BENIGN: Primary | ICD-10-CM

## 2020-01-31 DIAGNOSIS — I65.22 STENOSIS OF LEFT CAROTID ARTERY: ICD-10-CM

## 2020-01-31 DIAGNOSIS — E55.9 MILD VITAMIN D DEFICIENCY: ICD-10-CM

## 2020-01-31 PROCEDURE — 87088 URINE BACTERIA CULTURE: CPT | Mod: HCNC

## 2020-01-31 PROCEDURE — 99499 UNLISTED E&M SERVICE: CPT | Mod: HCNC,S$GLB,, | Performed by: INTERNAL MEDICINE

## 2020-01-31 PROCEDURE — 99499 RISK ADDL DX/OHS AUDIT: ICD-10-PCS | Mod: HCNC,S$GLB,, | Performed by: INTERNAL MEDICINE

## 2020-01-31 PROCEDURE — 87077 CULTURE AEROBIC IDENTIFY: CPT | Mod: HCNC

## 2020-01-31 PROCEDURE — 1159F MED LIST DOCD IN RCRD: CPT | Mod: HCNC,S$GLB,, | Performed by: INTERNAL MEDICINE

## 2020-01-31 PROCEDURE — 1126F PR PAIN SEVERITY QUANTIFIED, NO PAIN PRESENT: ICD-10-PCS | Mod: HCNC,S$GLB,, | Performed by: INTERNAL MEDICINE

## 2020-01-31 PROCEDURE — 1159F PR MEDICATION LIST DOCUMENTED IN MEDICAL RECORD: ICD-10-PCS | Mod: HCNC,S$GLB,, | Performed by: INTERNAL MEDICINE

## 2020-01-31 PROCEDURE — 3044F PR MOST RECENT HEMOGLOBIN A1C LEVEL <7.0%: ICD-10-PCS | Mod: HCNC,CPTII,S$GLB, | Performed by: INTERNAL MEDICINE

## 2020-01-31 PROCEDURE — 1126F AMNT PAIN NOTED NONE PRSNT: CPT | Mod: HCNC,S$GLB,, | Performed by: INTERNAL MEDICINE

## 2020-01-31 PROCEDURE — 99999 PR PBB SHADOW E&M-EST. PATIENT-LVL V: ICD-10-PCS | Mod: PBBFAC,HCNC,, | Performed by: INTERNAL MEDICINE

## 2020-01-31 PROCEDURE — 93010 EKG 12-LEAD: ICD-10-PCS | Mod: HCNC,S$GLB,, | Performed by: INTERNAL MEDICINE

## 2020-01-31 PROCEDURE — 3078F PR MOST RECENT DIASTOLIC BLOOD PRESSURE < 80 MM HG: ICD-10-PCS | Mod: HCNC,CPTII,S$GLB, | Performed by: INTERNAL MEDICINE

## 2020-01-31 PROCEDURE — 3074F PR MOST RECENT SYSTOLIC BLOOD PRESSURE < 130 MM HG: ICD-10-PCS | Mod: HCNC,CPTII,S$GLB, | Performed by: INTERNAL MEDICINE

## 2020-01-31 PROCEDURE — 3044F HG A1C LEVEL LT 7.0%: CPT | Mod: HCNC,CPTII,S$GLB, | Performed by: INTERNAL MEDICINE

## 2020-01-31 PROCEDURE — 99214 OFFICE O/P EST MOD 30 MIN: CPT | Mod: HCNC,S$GLB,, | Performed by: INTERNAL MEDICINE

## 2020-01-31 PROCEDURE — 1101F PR PT FALLS ASSESS DOC 0-1 FALLS W/OUT INJ PAST YR: ICD-10-PCS | Mod: HCNC,CPTII,S$GLB, | Performed by: INTERNAL MEDICINE

## 2020-01-31 PROCEDURE — 93010 ELECTROCARDIOGRAM REPORT: CPT | Mod: HCNC,S$GLB,, | Performed by: INTERNAL MEDICINE

## 2020-01-31 PROCEDURE — 99214 PR OFFICE/OUTPT VISIT, EST, LEVL IV, 30-39 MIN: ICD-10-PCS | Mod: HCNC,S$GLB,, | Performed by: INTERNAL MEDICINE

## 2020-01-31 PROCEDURE — 3074F SYST BP LT 130 MM HG: CPT | Mod: HCNC,CPTII,S$GLB, | Performed by: INTERNAL MEDICINE

## 2020-01-31 PROCEDURE — 1101F PT FALLS ASSESS-DOCD LE1/YR: CPT | Mod: HCNC,CPTII,S$GLB, | Performed by: INTERNAL MEDICINE

## 2020-01-31 PROCEDURE — 93005 ELECTROCARDIOGRAM TRACING: CPT | Mod: HCNC,S$GLB,, | Performed by: INTERNAL MEDICINE

## 2020-01-31 PROCEDURE — 99999 PR PBB SHADOW E&M-EST. PATIENT-LVL V: CPT | Mod: PBBFAC,HCNC,, | Performed by: INTERNAL MEDICINE

## 2020-01-31 PROCEDURE — 87186 SC STD MICRODIL/AGAR DIL: CPT | Mod: HCNC

## 2020-01-31 PROCEDURE — 93005 EKG 12-LEAD: ICD-10-PCS | Mod: HCNC,S$GLB,, | Performed by: INTERNAL MEDICINE

## 2020-01-31 PROCEDURE — 3078F DIAST BP <80 MM HG: CPT | Mod: HCNC,CPTII,S$GLB, | Performed by: INTERNAL MEDICINE

## 2020-01-31 RX ORDER — HYDROCODONE BITARTRATE AND ACETAMINOPHEN 5; 325 MG/1; MG/1
1 TABLET ORAL
Qty: 30 TABLET | Refills: 0 | Status: SHIPPED | OUTPATIENT
Start: 2020-01-31 | End: 2020-03-11 | Stop reason: SDUPTHER

## 2020-02-03 ENCOUNTER — LAB VISIT (OUTPATIENT)
Dept: LAB | Facility: HOSPITAL | Age: 75
End: 2020-02-03
Attending: INTERNAL MEDICINE
Payer: MEDICARE

## 2020-02-03 DIAGNOSIS — E11.21 TYPE 2 DIABETES MELLITUS WITH DIABETIC NEPHROPATHY, WITHOUT LONG-TERM CURRENT USE OF INSULIN: ICD-10-CM

## 2020-02-03 DIAGNOSIS — E55.9 MILD VITAMIN D DEFICIENCY: ICD-10-CM

## 2020-02-03 DIAGNOSIS — E78.00 PURE HYPERCHOLESTEROLEMIA: ICD-10-CM

## 2020-02-03 DIAGNOSIS — I10 HTN (HYPERTENSION), BENIGN: ICD-10-CM

## 2020-02-03 LAB
25(OH)D3+25(OH)D2 SERPL-MCNC: 35 NG/ML (ref 30–96)
ALBUMIN SERPL BCP-MCNC: 3.8 G/DL (ref 3.5–5.2)
ALP SERPL-CCNC: 83 U/L (ref 55–135)
ALT SERPL W/O P-5'-P-CCNC: 12 U/L (ref 10–44)
ANION GAP SERPL CALC-SCNC: 9 MMOL/L (ref 8–16)
AST SERPL-CCNC: 14 U/L (ref 10–40)
BACTERIA #/AREA URNS AUTO: ABNORMAL /HPF
BASOPHILS # BLD AUTO: 0.03 K/UL (ref 0–0.2)
BASOPHILS NFR BLD: 0.3 % (ref 0–1.9)
BILIRUB SERPL-MCNC: 0.4 MG/DL (ref 0.1–1)
BILIRUB UR QL STRIP: NEGATIVE
BUN SERPL-MCNC: 26 MG/DL (ref 8–23)
CALCIUM SERPL-MCNC: 10 MG/DL (ref 8.7–10.5)
CHLORIDE SERPL-SCNC: 107 MMOL/L (ref 95–110)
CHOLEST SERPL-MCNC: 125 MG/DL (ref 120–199)
CHOLEST/HDLC SERPL: 2.6 {RATIO} (ref 2–5)
CLARITY UR REFRACT.AUTO: ABNORMAL
CO2 SERPL-SCNC: 21 MMOL/L (ref 23–29)
COLOR UR AUTO: YELLOW
CREAT SERPL-MCNC: 1 MG/DL (ref 0.5–1.4)
DIFFERENTIAL METHOD: ABNORMAL
EOSINOPHIL # BLD AUTO: 0.1 K/UL (ref 0–0.5)
EOSINOPHIL NFR BLD: 1.1 % (ref 0–8)
ERYTHROCYTE [DISTWIDTH] IN BLOOD BY AUTOMATED COUNT: 14.2 % (ref 11.5–14.5)
EST. GFR  (AFRICAN AMERICAN): >60 ML/MIN/1.73 M^2
EST. GFR  (NON AFRICAN AMERICAN): 56.4 ML/MIN/1.73 M^2
ESTIMATED AVG GLUCOSE: 137 MG/DL (ref 68–131)
GLUCOSE SERPL-MCNC: 178 MG/DL (ref 70–110)
GLUCOSE UR QL STRIP: NEGATIVE
HBA1C MFR BLD HPLC: 6.4 % (ref 4–5.6)
HCT VFR BLD AUTO: 32.9 % (ref 37–48.5)
HDLC SERPL-MCNC: 49 MG/DL (ref 40–75)
HDLC SERPL: 39.2 % (ref 20–50)
HGB BLD-MCNC: 10.3 G/DL (ref 12–16)
HGB UR QL STRIP: NEGATIVE
IMM GRANULOCYTES # BLD AUTO: 0.03 K/UL (ref 0–0.04)
IMM GRANULOCYTES NFR BLD AUTO: 0.3 % (ref 0–0.5)
KETONES UR QL STRIP: NEGATIVE
LDLC SERPL CALC-MCNC: 61.6 MG/DL (ref 63–159)
LEUKOCYTE ESTERASE UR QL STRIP: ABNORMAL
LYMPHOCYTES # BLD AUTO: 2.5 K/UL (ref 1–4.8)
LYMPHOCYTES NFR BLD: 28.4 % (ref 18–48)
MCH RBC QN AUTO: 28.7 PG (ref 27–31)
MCHC RBC AUTO-ENTMCNC: 31.3 G/DL (ref 32–36)
MCV RBC AUTO: 92 FL (ref 82–98)
MICROSCOPIC COMMENT: ABNORMAL
MONOCYTES # BLD AUTO: 0.5 K/UL (ref 0.3–1)
MONOCYTES NFR BLD: 5.9 % (ref 4–15)
NEUTROPHILS # BLD AUTO: 5.6 K/UL (ref 1.8–7.7)
NEUTROPHILS NFR BLD: 64 % (ref 38–73)
NITRITE UR QL STRIP: POSITIVE
NONHDLC SERPL-MCNC: 76 MG/DL
NRBC BLD-RTO: 0 /100 WBC
PH UR STRIP: 5 [PH] (ref 5–8)
PLATELET # BLD AUTO: 280 K/UL (ref 150–350)
PMV BLD AUTO: 10.9 FL (ref 9.2–12.9)
POTASSIUM SERPL-SCNC: 5 MMOL/L (ref 3.5–5.1)
PROT SERPL-MCNC: 7.7 G/DL (ref 6–8.4)
PROT UR QL STRIP: NEGATIVE
RBC # BLD AUTO: 3.59 M/UL (ref 4–5.4)
RBC #/AREA URNS AUTO: 0 /HPF (ref 0–4)
SODIUM SERPL-SCNC: 137 MMOL/L (ref 136–145)
SP GR UR STRIP: 1.01 (ref 1–1.03)
SQUAMOUS #/AREA URNS AUTO: 0 /HPF
TRIGL SERPL-MCNC: 72 MG/DL (ref 30–150)
TSH SERPL DL<=0.005 MIU/L-ACNC: 1.18 UIU/ML (ref 0.4–4)
URN SPEC COLLECT METH UR: ABNORMAL
WBC # BLD AUTO: 8.7 K/UL (ref 3.9–12.7)
WBC #/AREA URNS AUTO: 11 /HPF (ref 0–5)

## 2020-02-03 PROCEDURE — 82306 VITAMIN D 25 HYDROXY: CPT | Mod: HCNC

## 2020-02-03 PROCEDURE — 87086 URINE CULTURE/COLONY COUNT: CPT | Mod: HCNC

## 2020-02-03 PROCEDURE — 80053 COMPREHEN METABOLIC PANEL: CPT | Mod: HCNC

## 2020-02-03 PROCEDURE — 81001 URINALYSIS AUTO W/SCOPE: CPT | Mod: HCNC

## 2020-02-03 PROCEDURE — 82043 UR ALBUMIN QUANTITATIVE: CPT | Mod: HCNC

## 2020-02-03 PROCEDURE — 80061 LIPID PANEL: CPT | Mod: HCNC

## 2020-02-03 PROCEDURE — 85025 COMPLETE CBC W/AUTO DIFF WBC: CPT | Mod: HCNC

## 2020-02-03 PROCEDURE — 36415 COLL VENOUS BLD VENIPUNCTURE: CPT | Mod: HCNC

## 2020-02-03 PROCEDURE — 84443 ASSAY THYROID STIM HORMONE: CPT | Mod: HCNC

## 2020-02-03 PROCEDURE — 83036 HEMOGLOBIN GLYCOSYLATED A1C: CPT | Mod: HCNC

## 2020-02-04 LAB
ALBUMIN/CREAT UR: 418.8 UG/MG (ref 0–30)
CREAT UR-MCNC: 48 MG/DL (ref 15–325)
MICROALBUMIN UR DL<=1MG/L-MCNC: 201 UG/ML

## 2020-02-04 NOTE — PROGRESS NOTES
Subjective:       Patient ID: Lina Davis is a 74 y.o. female.    Chief Complaint: Follow-up and Medication Refill    HPIPt is feling well but has lost some weight.  Some decreased appetite but no N/V/D.  L arm still painful - has cervical spinal stenosis.  Review of Systems   Respiratory: Negative for shortness of breath (PND or orthopnea).    Cardiovascular: Negative for chest pain (arm pain or jaw pain).   Gastrointestinal: Negative for abdominal pain, diarrhea, nausea and vomiting.   Genitourinary: Negative for dysuria.   Neurological: Negative for seizures, syncope and headaches.       Objective:      Physical Exam   Constitutional: She is oriented to person, place, and time. She appears well-developed and well-nourished. No distress.   HENT:   Head: Normocephalic.   Mouth/Throat: Oropharynx is clear and moist.   Neck: Neck supple. No JVD present. No thyromegaly present.   Cardiovascular: Normal rate, regular rhythm, normal heart sounds and intact distal pulses. Exam reveals no gallop and no friction rub.   No murmur heard.  Pulmonary/Chest: Effort normal and breath sounds normal. She has no wheezes. She has no rales.   Abdominal: Soft. Bowel sounds are normal. She exhibits no distension and no mass. There is no tenderness. There is no rebound and no guarding.   Musculoskeletal: She exhibits no edema.   Lymphadenopathy:     She has no cervical adenopathy.   Neurological: She is alert and oriented to person, place, and time. She has normal reflexes.   Skin: Skin is warm and dry.   Psychiatric: She has a normal mood and affect. Her behavior is normal. Judgment and thought content normal.       Assessment:       1. HTN (hypertension), benign    2. Pure hypercholesterolemia    3. Recurrent UTI (urinary tract infection)    4. Stenosis of left carotid artery    5. Type 2 diabetes mellitus with diabetic nephropathy, without long-term current use of insulin    6. Mild vitamin D deficiency    7. Spinal stenosis,  cervical region    8. Screening mammogram, encounter for    9. Tachycardia        Plan:   HTN (hypertension), benign  -     CBC auto differential; Future; Expected date: 01/31/2020  -     Comprehensive metabolic panel; Future; Expected date: 01/31/2020  -     TSH; Future; Expected date: 01/31/2020  Controlled - continue current meds    Pure hypercholesterolemia  -     Lipid panel; Future; Expected date: 01/31/2020    Recurrent UTI (urinary tract infection)  -     Urine culture  -     Ambulatory consult to Urology    Stenosis of left carotid artery  Repeat carotid in July 2020  Type 2 diabetes mellitus with diabetic nephropathy, without long-term current use of insulin  -     Hemoglobin A1c; Future; Expected date: 01/31/2020  -     Urinalysis  -     Microalbumin/creatinine urine ratio  -     Ambulatory consult to Optometry    Mild vitamin D deficiency  -     Vitamin D; Future; Expected date: 01/31/2020    Spinal stenosis, cervical region  -     Ambulatory consult to Neurosurgery    Screening mammogram, encounter for  -     Mammo Digital Screening Bilat w/ Efrem; Future; Expected date: 07/31/2020    Tachycardia  -     EKG 12-lead    Other orders  -     HYDROcodone-acetaminophen (NORCO) 5-325 mg per tablet; Take 1 tablet by mouth every 24 hours as needed for Pain. Chronic pain - needs >7 day supply - medically necessary  Dispense: 30 tablet; Refill: 0  -     Urinalysis Microscopic

## 2020-02-06 LAB — BACTERIA UR CULT: ABNORMAL

## 2020-02-26 ENCOUNTER — TELEPHONE (OUTPATIENT)
Dept: NEUROSURGERY | Facility: CLINIC | Age: 75
End: 2020-02-26

## 2020-02-26 ENCOUNTER — PATIENT OUTREACH (OUTPATIENT)
Dept: ADMINISTRATIVE | Facility: OTHER | Age: 75
End: 2020-02-26

## 2020-02-26 ENCOUNTER — OFFICE VISIT (OUTPATIENT)
Dept: NEUROSURGERY | Facility: CLINIC | Age: 75
End: 2020-02-26
Payer: MEDICARE

## 2020-02-26 VITALS
WEIGHT: 192.88 LBS | BODY MASS INDEX: 31.14 KG/M2 | TEMPERATURE: 98 F | HEART RATE: 100 BPM | SYSTOLIC BLOOD PRESSURE: 147 MMHG | DIASTOLIC BLOOD PRESSURE: 76 MMHG

## 2020-02-26 DIAGNOSIS — M47.12 CERVICAL SPONDYLOSIS WITH MYELOPATHY: Primary | ICD-10-CM

## 2020-02-26 DIAGNOSIS — M50.00 HERNIATED NUCLEUS PULPOSUS WITH MYELOPATHY, CERVICAL: ICD-10-CM

## 2020-02-26 PROCEDURE — 1101F PR PT FALLS ASSESS DOC 0-1 FALLS W/OUT INJ PAST YR: ICD-10-PCS | Mod: HCNC,CPTII,S$GLB, | Performed by: NEUROLOGICAL SURGERY

## 2020-02-26 PROCEDURE — 3077F SYST BP >= 140 MM HG: CPT | Mod: HCNC,CPTII,S$GLB, | Performed by: NEUROLOGICAL SURGERY

## 2020-02-26 PROCEDURE — 1125F PR PAIN SEVERITY QUANTIFIED, PAIN PRESENT: ICD-10-PCS | Mod: HCNC,S$GLB,, | Performed by: NEUROLOGICAL SURGERY

## 2020-02-26 PROCEDURE — 1159F PR MEDICATION LIST DOCUMENTED IN MEDICAL RECORD: ICD-10-PCS | Mod: HCNC,S$GLB,, | Performed by: NEUROLOGICAL SURGERY

## 2020-02-26 PROCEDURE — 99999 PR PBB SHADOW E&M-EST. PATIENT-LVL III: ICD-10-PCS | Mod: PBBFAC,HCNC,, | Performed by: NEUROLOGICAL SURGERY

## 2020-02-26 PROCEDURE — 3077F PR MOST RECENT SYSTOLIC BLOOD PRESSURE >= 140 MM HG: ICD-10-PCS | Mod: HCNC,CPTII,S$GLB, | Performed by: NEUROLOGICAL SURGERY

## 2020-02-26 PROCEDURE — 1159F MED LIST DOCD IN RCRD: CPT | Mod: HCNC,S$GLB,, | Performed by: NEUROLOGICAL SURGERY

## 2020-02-26 PROCEDURE — 3078F PR MOST RECENT DIASTOLIC BLOOD PRESSURE < 80 MM HG: ICD-10-PCS | Mod: HCNC,CPTII,S$GLB, | Performed by: NEUROLOGICAL SURGERY

## 2020-02-26 PROCEDURE — 99999 PR PBB SHADOW E&M-EST. PATIENT-LVL III: CPT | Mod: PBBFAC,HCNC,, | Performed by: NEUROLOGICAL SURGERY

## 2020-02-26 PROCEDURE — 1101F PT FALLS ASSESS-DOCD LE1/YR: CPT | Mod: HCNC,CPTII,S$GLB, | Performed by: NEUROLOGICAL SURGERY

## 2020-02-26 PROCEDURE — 3078F DIAST BP <80 MM HG: CPT | Mod: HCNC,CPTII,S$GLB, | Performed by: NEUROLOGICAL SURGERY

## 2020-02-26 PROCEDURE — 99499 UNLISTED E&M SERVICE: CPT | Mod: HCNC,S$GLB,, | Performed by: NEUROLOGICAL SURGERY

## 2020-02-26 PROCEDURE — 99204 PR OFFICE/OUTPT VISIT, NEW, LEVL IV, 45-59 MIN: ICD-10-PCS | Mod: HCNC,S$GLB,, | Performed by: NEUROLOGICAL SURGERY

## 2020-02-26 PROCEDURE — 99499 RISK ADDL DX/OHS AUDIT: ICD-10-PCS | Mod: HCNC,S$GLB,, | Performed by: NEUROLOGICAL SURGERY

## 2020-02-26 PROCEDURE — 99204 OFFICE O/P NEW MOD 45 MIN: CPT | Mod: HCNC,S$GLB,, | Performed by: NEUROLOGICAL SURGERY

## 2020-02-26 PROCEDURE — 1125F AMNT PAIN NOTED PAIN PRSNT: CPT | Mod: HCNC,S$GLB,, | Performed by: NEUROLOGICAL SURGERY

## 2020-02-26 NOTE — LETTER
February 27, 2020      Dora Freedman MD  [address]  [facility] [address]           [facility] - Neurosurgery 7th Fl  [address]  [facility] [address]  Phone: [phone]  Fax: [phone]          Patient: Lina Davis   MR Number: [id number]   YOB: 1945   Date of Visit: 2/26/2020       Dear Dr. Dora Freedman:    Thank you for referring Lina Davis to me for evaluation. Attached you will find relevant portions of my assessment and plan of care.    If you have questions, please do not hesitate to call me. I look forward to following Lina Davis along with you.    Sincerely,    Joseph Walton MD    Enclosure  CC:  No Recipients    If you would like to receive this communication electronically, please contact [email or web address] or [phone] to request more information on [employer] Link access.    For providers and/or their staff who would like to refer a patient to [facility], please contact us through our one-stop-shop provider referral line, [facility], at [phone].    If you feel you have received this communication in error or would no longer like to receive these types of communications, please e-mail [email or web address]

## 2020-02-26 NOTE — PATIENT INSTRUCTIONS
I have personally reviewed the MRI cervical spine with the pt which shows multilevel degenerative changes with a small disc bulge at C2-3 without stenosis. There are disc bulges at C3-4, C4-5, and C5-6 associated with severe stenosis and myelomalacia from C4 to C6.      Given these findings and the pt's presentation, I propose a C3-4, C4-5, and C5-6 Anterior Cervical Discectomy and Fusion (ACDF). I have discussed the risks/benefits, indications, and alternatives for the proposed procedure in detail.  I have answered all of their questions and the pt wishes to proceed with surgery.  We will schedule the pt on Thursday, March 19, 2020.    Will also order CT and XR of the cervical spine.    Pt informed that the surgery itself is of 3 hour duration and she would need to stay in the hospital for approximately 3 days before being discharged home. She would need to refrain from strenuous activity and wear a neck brace for several months after the surgery until there is sufficient bone growth.

## 2020-02-26 NOTE — H&P (VIEW-ONLY)
Subjective:   I, Rayo Masterson, attest that this documentation has been prepared under the direction and in the presence of Joseph Walton MD.     Patient ID: Lina Davis is a 74 y.o. female     Chief Complaint: No chief complaint on file.      HPI  MsOumar Davis is a pleasant 74 y.o. woman with DM and HTN who presents today for consultation. Pt states she had an acute onset of left lateral neck pain and LUE radiculopathy several months ago. She was seen by her PCP who ordered imaging and referred to me for evaluation. She states she has not tried injections or physical therapy. She states she is fairly active and able to walk long distances without great difficulty. She endorses bilateral hand weakness causing her to frequently drop objects. She is right hand dominant.       Review of Systems   Constitutional: Negative for activity change, fatigue, fever and unexpected weight change.   HENT: Negative for facial swelling.    Eyes: Negative.    Respiratory: Negative.    Cardiovascular: Negative.    Gastrointestinal: Negative for diarrhea, nausea and vomiting.   Genitourinary: Negative.    Musculoskeletal: Negative for back pain, joint swelling, myalgias and neck pain.   Neurological: Positive for weakness (BUE). Negative for dizziness, numbness and headaches.   Psychiatric/Behavioral: Negative.       Past Medical History:   Diagnosis Date    Anemia     Arthritis     Cataract     Diabetes mellitus type II     Diabetes with neurologic complications     Gout, arthritis     HTN (hypertension), benign     Hyperlipidemia     Polyneuropathy     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    Vitamin D deficiency disease        Objective:      Vitals:    02/26/20 1003   BP: (!) 147/76   Pulse: 100   Temp: 98.2 °F (36.8 °C)      Physical Exam   Constitutional: She is oriented to person, place, and time. She appears well-developed and well-nourished.   HENT:   Head: Normocephalic and atraumatic.    Neck: Neck supple.   Neurological: She is alert and oriented to person, place, and time. No cranial nerve deficit. She displays a negative Romberg sign. GCS eye subscore is 4. GCS verbal subscore is 5. GCS motor subscore is 6.   Strength intact in the BLE. Decreased sensation to pinprick in the C6 distrubution of the lower extremities She is hyperreflexic in the BLE. Decreased sensation to pinprick in the C8 distribution of the upper extremities. Decreased strength with elbow flexion and extension bilaterally.          IMAGING:  MRI Cervical Spine Without Contrast (10/28/2019) shows multilevel degenerative changes with a small disc bulge at C2-3 without stenosis. There are disc bulges at C3-4, C4-5, and C5-6 associated with severe stenosis and myelomalacia from C4 to C6.        Labs (02/03/2020)   Ref Range & Units 3wk ago 4mo ago 7mo ago   Hemoglobin A1C 4.0 - 5.6 % 6.4High   6.3High  CM 6.6High  CM       I have personally reviewed the images with the pt.      I, Dr. Joseph Walton, personally performed the services described in this documentation. All medical record entries made by the scribe, Rayo Masterson, were at my direction and in my presence.  I have reviewed the chart and agree that the record reflects my personal performance and is accurate and complete. Joseph Walton MD. 02/26/2020    Assessment:       1. Cervical spondylosis with myelopathy    2. Herniated nucleus pulposus with myelopathy, cervical         Plan:   I have personally reviewed the MRI cervical spine with the pt which shows multilevel degenerative changes with a small disc bulge at C2-3 without stenosis. There are disc bulges at C3-4, C4-5, and C5-6 associated with severe stenosis and myelomalacia from C4 to C6.      Given these findings and the pt's presentation, I propose a C3-4, C4-5, and C5-6 Anterior Cervical Discectomy and Fusion (ACDF). I have discussed the risks/benefits, indications, and alternatives for the proposed procedure in  detail.  I have answered all of their questions and the pt wishes to proceed with surgery.  We will schedule the pt on Thursday, March 19, 2020.    Will also order CT and XR of the cervical spine.    Pt informed that the surgery itself is of 3 hour duration and she would need to stay in the hospital for approximately 3 days before being discharged home. She would need to refrain from strenuous activity and wear a neck brace for several months after the surgery until there is sufficient bone growth.

## 2020-02-26 NOTE — PROGRESS NOTES
Chart Reviewed  Care Everywhere updated  Immunizations reconciled  Health Maintenance updated  Orders placed n/a  Upcoming Appts: 2/28/2020 Optometry

## 2020-02-27 DIAGNOSIS — Z01.818 PREOPERATIVE TESTING: Primary | ICD-10-CM

## 2020-02-27 DIAGNOSIS — M79.602 PAIN OF LEFT UPPER EXTREMITY: ICD-10-CM

## 2020-02-27 NOTE — ANESTHESIA PAT ROS NOTE
02/27/2020  Lina Davis is a 74 y.o., female.      Pre-op Assessment         Review of Systems  Anesthesia Hx:  No problems with previous Anesthesia  History of prior surgery of interest to airway management or planning: Previous anesthesia: MAC  3/2018 colonoscopy/EGD with MAC.  Procedure performed at an [facility] Facility. Denies Family Hx of Anesthesia complications.   Denies Personal Hx of Anesthesia complications.   Social:  No Alcohol Use, Non-Smoker    Hematology/Oncology:     Oncology Normal     EENT/Dental:EENT/Dental Normal   Cardiovascular:    Denies Angina.  Functional Capacity good / => 4 METS  Hypertension    Pulmonary:   Denies Recent URI.  Pulmonary Symptoms:  are shortness of breath with activity.    Renal/:  Renal Symptoms/Infections/Stones:  Urinary Tract Infection (UTI) (HX recurrent UTI-will get UA 3/13/20) HX nephropathy   Hepatic/GI:  Esophageal / Stomach Disorders Gerd Controlled by PRN antireflux medication.  Liver Disease, Fatty Liver    Musculoskeletal:  Musculoskeletal General/Symptoms: neck pain. Functional capacity is ambulatory without assistance.  Cervical Spine Disorder, Cervical Disc Disease, Spondylolisthesis    Neurological:  Neuro Symptoms of weakness, numbness Pain , onset is chronic , location of neck , alleviating factors are 1 norco/night and tylenol prn.   Endocrine:  Diabetes, Type 2 Diabetes , controlled by oral hypoglycemics. Typical AM glucose range: 119- 160 , most recent HgA1c value was 6.4 on 2/3/20.    Psych:  Psychiatric Normal           Physical Exam  General:  Well nourished    Airway/Jaw/Neck:  Airway Findings: Mouth Opening: Normal Jaw/Neck Findings:  Neck ROM: Extension Decreased, Mod., Decreased Lateral Motion, to the right      Dental:  Dental Findings: In tact   Chest/Lungs:  Chest/Lungs Findings: Clear to auscultation     Heart/Vascular:  Heart  Findings: Rate: Normal  Rhythm: Regular Rhythm  Sounds: Normal        Mental Status:  Mental Status Findings:  Cooperative, Alert and Oriented         3/16/20 Urine pending. Lab and Dr Wilcox clearance noted:  Preoperative cardiac risk assessment-  The patient does not have any active cardiac conditions . Revised cardiac risk index predictors-0 ---.Functional capacity is more than 4 Mets. She will be undergoing a Spine procedure that carries a intermediate risk      Risk of a major Cardiac event ( Defined as death, myocardial infarction, or cardiac arrest at 30 days after noncardiac surgery), based on RCRI score      -3.9%            No further cardiac work up is indicated prior to proceeding with the surgery          Orders Placed This Encounter    Urinalysis, Reflex to Urine Culture Urine, Clean Catch         American Society of Anesthesiologists Physical status classification ( ASA ) class: 3      Postoperative pulmonary complication risk assessment:      ARISCAT ( Canet) risk index- risk class -  Low, if duration of surgery is under 3 hours, intermediate, if duration of surgery is over 3 hours      [facility] Respiratory failure index- percentage risk of respiratory failure: 4.2 %       Anesthesia Assessment: Preoperative EQUATION    Planned Procedure: Procedure(s) (LRB):  DISCECTOMY, SPINE, CERVICAL, ANTERIOR APPROACH, WITH FUSION (N/A)  Requested Anesthesia Type:General  Surgeon: Joseph Walton MD  Service: Neurosurgery  Known or anticipated Date of Surgery:3/19/2020    Surgeon notes: reviewed    Electronic QUestionnaire Assessment completed via nurse interview with patient.        Triage considerations:     The patient has no apparent active cardiac condition (No unstable coronary Syndrome such as severe unstable angina or recent [<1 month] myocardial infarction, decompensated CHF, severe valvular   disease or significant arrhythmia)    Previous anesthesia records:MAC and No problems  3/7/2018  ESOPHAGOGASTRODUODENOSCOPY (EGD) (N/A ) COLONOSCOPY (N/A )   Airway/Jaw/Neck:  Airway Findings: Mouth Opening: Normal Tongue: Normal  General Airway Assessment: Adult  Mallampati: I  TM Distance: Normal, at least 6 cm  Jaw/Neck Findings:  Neck ROM: Normal ROM        Last PCP note: within 3 months , within [facility]   Subspecialty notes: Neurosurgery, Urology, Vascular Surgery, Podiatry    Other important co-morbidities:per epic: DM2, GERD, HLD, HTN, Obesity and H/O ANEMIA & PYELONEHRITIS      Tests already available:  Available tests,  within 3 months , 3-6 months ago , within [facility] .2/3/2020 HGA1C, TSH, LIPIDS, CMP, CBC, UA, MICRO UA, 1/31/2020 EKG, 10/28/2019 MRI CERVICAL SPINE W/O CONTRAST             Instructions given. (See in Nurse's note)    Optimization:  Anesthesia Preop Clinic Assessment  Indicated    Medical Opinion Indicated        Plan:    Testing:  PT/INR and T&S   Pre-anesthesia  visit       Visit focus: concerns in complex and/or prolonged anesthesia     Consultation:IM Perioperative Hospitalist     Patient  has previously scheduled Medical Appointment:2/28 OPTOMETRY,3/11 UROLOGY, 3/16 MAMMOGRAM    Navigation: Tests Scheduled. TBD             Consults scheduled.TBD             Results will be tracked by Preop Clinic.

## 2020-02-27 NOTE — PRE-PROCEDURE INSTRUCTIONS
Patient stated has not had any problems with anesthesia in the past. Will need medical optimization by Dr. Wilcox. Will also need poc appt and labs.  Our  will call to set up these appts.Preop instructions given. Hold asa, asa containing products, nsaids, vitamins and supplements one week prior to surgery.Patient said she was taking asa 81 mg for prevention.Verbalizes understanding.

## 2020-02-28 ENCOUNTER — OFFICE VISIT (OUTPATIENT)
Dept: OPTOMETRY | Facility: CLINIC | Age: 75
End: 2020-02-28
Payer: MEDICARE

## 2020-02-28 DIAGNOSIS — H35.89 RPE MOTTLING OF MACULA: ICD-10-CM

## 2020-02-28 DIAGNOSIS — E11.49 TYPE II DIABETES MELLITUS WITH NEUROLOGICAL MANIFESTATIONS: Primary | ICD-10-CM

## 2020-02-28 DIAGNOSIS — H10.13 ALLERGIC CONJUNCTIVITIS OF BOTH EYES: ICD-10-CM

## 2020-02-28 DIAGNOSIS — I10 HTN (HYPERTENSION), BENIGN: ICD-10-CM

## 2020-02-28 DIAGNOSIS — H04.123 DRY EYE SYNDROME, BILATERAL: ICD-10-CM

## 2020-02-28 DIAGNOSIS — H52.4 PRESBYOPIA: ICD-10-CM

## 2020-02-28 DIAGNOSIS — H25.13 NS (NUCLEAR SCLEROSIS), BILATERAL: ICD-10-CM

## 2020-02-28 DIAGNOSIS — E11.9 TYPE 2 DIABETES MELLITUS WITHOUT OPHTHALMIC MANIFESTATIONS: ICD-10-CM

## 2020-02-28 PROCEDURE — 92015 DETERMINE REFRACTIVE STATE: CPT | Mod: HCNC,S$GLB,, | Performed by: OPTOMETRIST

## 2020-02-28 PROCEDURE — 92014 COMPRE OPH EXAM EST PT 1/>: CPT | Mod: HCNC,S$GLB,, | Performed by: OPTOMETRIST

## 2020-02-28 PROCEDURE — 99999 PR PBB SHADOW E&M-EST. PATIENT-LVL II: CPT | Mod: PBBFAC,HCNC,, | Performed by: OPTOMETRIST

## 2020-02-28 PROCEDURE — 92014 PR EYE EXAM, EST PATIENT,COMPREHESV: ICD-10-PCS | Mod: HCNC,S$GLB,, | Performed by: OPTOMETRIST

## 2020-02-28 PROCEDURE — 92015 PR REFRACTION: ICD-10-PCS | Mod: HCNC,S$GLB,, | Performed by: OPTOMETRIST

## 2020-02-28 PROCEDURE — 99999 PR PBB SHADOW E&M-EST. PATIENT-LVL II: ICD-10-PCS | Mod: PBBFAC,HCNC,, | Performed by: OPTOMETRIST

## 2020-02-28 NOTE — PROGRESS NOTES
HPI     Pt here for annual  No complaints about va lately  Using +2.75 readers  Patient denies diplopia, headaches, flashes/floaters, pain, and   itching/burning/tearing.    Pt does not use any eye drops.    Hemoglobin A1C       Date                     Value               Ref Range             Status                02/03/2020               6.4 (H)             4.0 - 5.6 %           Final                     10/21/2019               6.3 (H)             4.0 - 5.6 %           Final                     07/10/2019               6.6 (H)             4.0 - 5.6 %           Final                  Last edited by Susi Quintero on 2/28/2020  2:38 PM. (History)            Assessment /Plan     For exam results, see Encounter Report.        Presbyopia   Rx specs    RPE mottling of macula OS   Stable, monitor     Dry eye syndrome, bilateral  Allergic conjunctivitis of both eyes  Use Zaditor BID OU     Type II diabetes mellitus with neurological manifestations  HTN (hypertension), benign  No retinopathy, monitor yearly     Nuclear sclerosis, bilateral  Cortical cataract of both eyes  Mild, not visually significant     PVD (posterior vitreous detachment), bilateral  Stable        RTC 1 year, sooner PRN

## 2020-02-28 NOTE — LETTER
February 28, 2020      Dora Freedman MD  [address]  [facility] [address]           [facility] - Optometry  [address]  [facility] [address]  Phone: [phone]  Fax: [phone]          Patient: Lina Davis   MR Number: [id number]   YOB: 1945   Date of Visit: 2/28/2020       Dear Dr. Dora Freedman:    Thank you for referring Lina Davis to me for evaluation. Attached you will find relevant portions of my assessment and plan of care.    If you have questions, please do not hesitate to call me. I look forward to following Lina Davis along with you.    Sincerely,    Kiersten Real, OD    Enclosure  CC:  No Recipients    If you would like to receive this communication electronically, please contact [email or web address] or [phone] to request more information on [email or web address] Link access.    For providers and/or their staff who would like to refer a patient to [facility], please contact us through our one-stop-shop provider referral line, [facility], at [phone].    If you feel you have received this communication in error or would no longer like to receive these types of communications, please e-mail [email or web address]

## 2020-03-03 ENCOUNTER — TELEPHONE (OUTPATIENT)
Dept: PREADMISSION TESTING | Facility: HOSPITAL | Age: 75
End: 2020-03-03

## 2020-03-03 NOTE — TELEPHONE ENCOUNTER
----- Message from Belen Gomez RN sent at 2/27/2020  8:49 AM CST -----  SURGERY 3/19  Please schedule Dr. Wilcox, poc appt, and labs.    Thanks!

## 2020-03-06 ENCOUNTER — TELEPHONE (OUTPATIENT)
Dept: NEUROSURGERY | Facility: CLINIC | Age: 75
End: 2020-03-06

## 2020-03-06 DIAGNOSIS — M47.12 CERVICAL SPONDYLOSIS WITH MYELOPATHY: Primary | ICD-10-CM

## 2020-03-06 DIAGNOSIS — M50.00 HERNIATED NUCLEUS PULPOSUS WITH MYELOPATHY, CERVICAL: ICD-10-CM

## 2020-03-12 RX ORDER — HYDROCODONE BITARTRATE AND ACETAMINOPHEN 5; 325 MG/1; MG/1
1 TABLET ORAL
Qty: 30 TABLET | Refills: 0 | Status: ON HOLD | OUTPATIENT
Start: 2020-03-12 | End: 2020-03-20 | Stop reason: HOSPADM

## 2020-03-13 ENCOUNTER — INITIAL CONSULT (OUTPATIENT)
Dept: INTERNAL MEDICINE | Facility: CLINIC | Age: 75
End: 2020-03-13
Payer: MEDICARE

## 2020-03-13 ENCOUNTER — HOSPITAL ENCOUNTER (OUTPATIENT)
Dept: RADIOLOGY | Facility: HOSPITAL | Age: 75
Discharge: HOME | End: 2020-03-13
Attending: NEUROLOGICAL SURGERY
Payer: MEDICARE

## 2020-03-13 ENCOUNTER — HOSPITAL ENCOUNTER (OUTPATIENT)
Dept: PREADMISSION TESTING | Facility: HOSPITAL | Age: 75
Discharge: HOME | End: 2020-03-13
Attending: ANESTHESIOLOGY
Payer: MEDICARE

## 2020-03-13 VITALS
HEART RATE: 92 BPM | OXYGEN SATURATION: 98 % | DIASTOLIC BLOOD PRESSURE: 80 MMHG | TEMPERATURE: 99 F | SYSTOLIC BLOOD PRESSURE: 126 MMHG | BODY MASS INDEX: 30.76 KG/M2 | WEIGHT: 191.38 LBS | HEIGHT: 66 IN

## 2020-03-13 DIAGNOSIS — E11.21 TYPE 2 DIABETES MELLITUS WITH DIABETIC NEPHROPATHY, WITHOUT LONG-TERM CURRENT USE OF INSULIN: ICD-10-CM

## 2020-03-13 DIAGNOSIS — G62.9 POLYNEUROPATHY: ICD-10-CM

## 2020-03-13 DIAGNOSIS — E11.49 TYPE II DIABETES MELLITUS WITH NEUROLOGICAL MANIFESTATIONS: ICD-10-CM

## 2020-03-13 DIAGNOSIS — M50.00 HERNIATED NUCLEUS PULPOSUS WITH MYELOPATHY, CERVICAL: ICD-10-CM

## 2020-03-13 DIAGNOSIS — M79.602 LEFT ARM PAIN: ICD-10-CM

## 2020-03-13 DIAGNOSIS — M47.12 CERVICAL SPONDYLOSIS WITH MYELOPATHY: ICD-10-CM

## 2020-03-13 DIAGNOSIS — N95.1 MENOPAUSAL SYMPTOMS: ICD-10-CM

## 2020-03-13 DIAGNOSIS — Z82.49 FAMILY HISTORY OF HEART DISEASE: ICD-10-CM

## 2020-03-13 DIAGNOSIS — S46.011D TRAUMATIC COMPLETE TEAR OF RIGHT ROTATOR CUFF, SUBSEQUENT ENCOUNTER: ICD-10-CM

## 2020-03-13 DIAGNOSIS — Z01.818 PREOP EXAMINATION: Primary | ICD-10-CM

## 2020-03-13 DIAGNOSIS — Z87.19 HISTORY OF FATTY INFILTRATION OF LIVER: ICD-10-CM

## 2020-03-13 DIAGNOSIS — D64.9 ANEMIA, UNSPECIFIED TYPE: ICD-10-CM

## 2020-03-13 DIAGNOSIS — I65.22 STENOSIS OF LEFT CAROTID ARTERY: ICD-10-CM

## 2020-03-13 DIAGNOSIS — I10 HTN (HYPERTENSION), BENIGN: ICD-10-CM

## 2020-03-13 DIAGNOSIS — E78.5 HYPERLIPIDEMIA, UNSPECIFIED HYPERLIPIDEMIA TYPE: ICD-10-CM

## 2020-03-13 DIAGNOSIS — K21.9 GASTROESOPHAGEAL REFLUX DISEASE, ESOPHAGITIS PRESENCE NOT SPECIFIED: ICD-10-CM

## 2020-03-13 DIAGNOSIS — N39.0 RECURRENT UTI (URINARY TRACT INFECTION): ICD-10-CM

## 2020-03-13 DIAGNOSIS — Z87.39 HISTORY OF GOUT: ICD-10-CM

## 2020-03-13 PROBLEM — R78.81 BACTEREMIA DUE TO ESCHERICHIA COLI: Status: RESOLVED | Noted: 2018-01-28 | Resolved: 2020-03-13

## 2020-03-13 PROBLEM — B96.20 BACTEREMIA DUE TO ESCHERICHIA COLI: Status: RESOLVED | Noted: 2018-01-28 | Resolved: 2020-03-13

## 2020-03-13 PROBLEM — A41.50 SEVERE SEPSIS WITH ACUTE ORGAN DYSFUNCTION DUE TO GRAM NEGATIVE BACTERIA: Status: RESOLVED | Noted: 2018-01-27 | Resolved: 2020-03-13

## 2020-03-13 PROBLEM — B96.20 PYELONEPHRITIS DUE TO ESCHERICHIA COLI: Status: RESOLVED | Noted: 2018-01-27 | Resolved: 2020-03-13

## 2020-03-13 PROBLEM — R65.20 SEVERE SEPSIS WITH ACUTE ORGAN DYSFUNCTION DUE TO GRAM NEGATIVE BACTERIA: Status: RESOLVED | Noted: 2018-01-27 | Resolved: 2020-03-13

## 2020-03-13 PROBLEM — N17.9 ACUTE KIDNEY INJURY: Status: RESOLVED | Noted: 2018-01-27 | Resolved: 2020-03-13

## 2020-03-13 PROBLEM — R19.7 DIARRHEA OF PRESUMED INFECTIOUS ORIGIN: Status: RESOLVED | Noted: 2018-01-27 | Resolved: 2020-03-13

## 2020-03-13 PROBLEM — N12 PYELONEPHRITIS DUE TO ESCHERICHIA COLI: Status: RESOLVED | Noted: 2018-01-27 | Resolved: 2020-03-13

## 2020-03-13 PROBLEM — R11.2 NAUSEA & VOMITING: Status: RESOLVED | Noted: 2018-03-07 | Resolved: 2020-03-13

## 2020-03-13 PROCEDURE — 72125 CT NECK SPINE W/O DYE: CPT | Mod: TC,HCNC

## 2020-03-13 PROCEDURE — 1159F MED LIST DOCD IN RCRD: CPT | Mod: HCNC,S$GLB,, | Performed by: HOSPITALIST

## 2020-03-13 PROCEDURE — 3079F DIAST BP 80-89 MM HG: CPT | Mod: HCNC,CPTII,S$GLB, | Performed by: HOSPITALIST

## 2020-03-13 PROCEDURE — 3074F PR MOST RECENT SYSTOLIC BLOOD PRESSURE < 130 MM HG: ICD-10-PCS | Mod: HCNC,CPTII,S$GLB, | Performed by: HOSPITALIST

## 2020-03-13 PROCEDURE — 99499 RISK ADDL DX/OHS AUDIT: ICD-10-PCS | Mod: HCNC,S$GLB,, | Performed by: HOSPITALIST

## 2020-03-13 PROCEDURE — 1126F PR PAIN SEVERITY QUANTIFIED, NO PAIN PRESENT: ICD-10-PCS | Mod: HCNC,S$GLB,, | Performed by: HOSPITALIST

## 2020-03-13 PROCEDURE — 99214 OFFICE O/P EST MOD 30 MIN: CPT | Mod: HCNC,S$GLB,, | Performed by: HOSPITALIST

## 2020-03-13 PROCEDURE — 1101F PR PT FALLS ASSESS DOC 0-1 FALLS W/OUT INJ PAST YR: ICD-10-PCS | Mod: HCNC,CPTII,S$GLB, | Performed by: HOSPITALIST

## 2020-03-13 PROCEDURE — 1101F PT FALLS ASSESS-DOCD LE1/YR: CPT | Mod: HCNC,CPTII,S$GLB, | Performed by: HOSPITALIST

## 2020-03-13 PROCEDURE — 3044F PR MOST RECENT HEMOGLOBIN A1C LEVEL <7.0%: ICD-10-PCS | Mod: HCNC,CPTII,S$GLB, | Performed by: HOSPITALIST

## 2020-03-13 PROCEDURE — 72040 X-RAY EXAM NECK SPINE 2-3 VW: CPT | Mod: TC,HCNC

## 2020-03-13 PROCEDURE — 1126F AMNT PAIN NOTED NONE PRSNT: CPT | Mod: HCNC,S$GLB,, | Performed by: HOSPITALIST

## 2020-03-13 PROCEDURE — 72125 CT NECK SPINE W/O DYE: CPT | Mod: 26,HCNC,, | Performed by: RADIOLOGY

## 2020-03-13 PROCEDURE — 3079F PR MOST RECENT DIASTOLIC BLOOD PRESSURE 80-89 MM HG: ICD-10-PCS | Mod: HCNC,CPTII,S$GLB, | Performed by: HOSPITALIST

## 2020-03-13 PROCEDURE — 3074F SYST BP LT 130 MM HG: CPT | Mod: HCNC,CPTII,S$GLB, | Performed by: HOSPITALIST

## 2020-03-13 PROCEDURE — 72040 X-RAY EXAM NECK SPINE 2-3 VW: CPT | Mod: 26,HCNC,, | Performed by: RADIOLOGY

## 2020-03-13 PROCEDURE — 99999 PR PBB SHADOW E&M-EST. PATIENT-LVL III: CPT | Mod: PBBFAC,HCNC,, | Performed by: HOSPITALIST

## 2020-03-13 PROCEDURE — 99999 PR PBB SHADOW E&M-EST. PATIENT-LVL III: ICD-10-PCS | Mod: PBBFAC,HCNC,, | Performed by: HOSPITALIST

## 2020-03-13 PROCEDURE — 72125 CT CERVICAL SPINE WITHOUT CONTRAST: ICD-10-PCS | Mod: 26,HCNC,, | Performed by: RADIOLOGY

## 2020-03-13 PROCEDURE — 99214 PR OFFICE/OUTPT VISIT, EST, LEVL IV, 30-39 MIN: ICD-10-PCS | Mod: HCNC,S$GLB,, | Performed by: HOSPITALIST

## 2020-03-13 PROCEDURE — 3044F HG A1C LEVEL LT 7.0%: CPT | Mod: HCNC,CPTII,S$GLB, | Performed by: HOSPITALIST

## 2020-03-13 PROCEDURE — 72040 XR CERVICAL SPINE AP LATERAL: ICD-10-PCS | Mod: 26,HCNC,, | Performed by: RADIOLOGY

## 2020-03-13 PROCEDURE — 1159F PR MEDICATION LIST DOCUMENTED IN MEDICAL RECORD: ICD-10-PCS | Mod: HCNC,S$GLB,, | Performed by: HOSPITALIST

## 2020-03-13 PROCEDURE — 99499 UNLISTED E&M SERVICE: CPT | Mod: HCNC,S$GLB,, | Performed by: HOSPITALIST

## 2020-03-13 RX ORDER — ACETAMINOPHEN 500 MG
500 TABLET ORAL
Status: ON HOLD | COMMUNITY
End: 2020-03-20 | Stop reason: HOSPADM

## 2020-03-13 NOTE — ASSESSMENT & PLAN NOTE
Type 2  Diabetes Mellitus  On treatment with oral agents,not on  Insulin    Hemoglobin A1c- 6.4- Feb 2020  Capillary glucose check-yes   Pre breakfast -variable 165-170  Pre supper-variable - 130-140    Care suggested with hypoglycemia     I had educated that uncontrolled DM can cause post op complications,risk of infection, wound healing problem,increased length of stay in hospital and its associated complications.I suggest exercise as much as possible and follow diabetic diet      Diabetes Mellitus-I suggest monitoring the glucose in the perioperative period ( Before meals and bed time,if the patient is on oral feeds or every 6 hourly ,if the patient is NPO )  Blood glucose target in hospitalized patients is 140-180. Oral Hypoglycemic agents are generally avoided during the hospital stay . If glucose is consistently elevated ,I suggest using basal ,prandial Insulin regimen to control the glucose , as elevated glucose can be associated with adverse surgical out comes. Please consider involving Hospital Medicine or Endocrinology ,if any help is needed with Glucose control. Patient will be instructed based on the pre op clinic guidelines  about adjustment of diabetic treatment (If applicable )  considering the NPO status for Surgery

## 2020-03-13 NOTE — ASSESSMENT & PLAN NOTE
Base line creatinine about 1    Stages of CKD discussed  Deleterious effects NSAID's ( except ASA 81 mg ) , Beneficial effects of Hydration discussed   Tylenol as needed for pain     I  suggest monitoring renal function, in put and out put status renetta-operatively. I  suggest avoiding nephrotoxic medication including NSAIDs, COX2 inhibitors, intravenous contrast agent,avoiding hypotension to prevent further renal impairment.

## 2020-03-13 NOTE — LETTER
March 13, 2020      Joseph Walton MD  [address]  [place] [address]           [facility]  [address] [facility] [address]  Phone: [phone]          Patient: Lina Davis   MR Number: [id number]   YOB: 1945   Date of Visit: 3/13/2020       Dear Dr. Joseph Walton:    Thank you for referring Lina Davis to me for evaluation. Attached you will find relevant portions of my assessment and plan of care.    If you have questions, please do not hesitate to call me. I look forward to following Lina Daivs along with you.    Sincerely,    Preethi Wilcox MD    Enclosure  CC:  No Recipients    If you would like to receive this communication electronically, please contact [email or web address] or [phone] to request more information on [employer] Link access.    For providers and/or their staff who would like to refer a patient to [facility], please contact us through our one-stop-shop provider referral line, [facility], at [phone].    If you feel you have received this communication in error or would no longer like to receive these types of communications, please e-mail [email or web address]

## 2020-03-13 NOTE — ASSESSMENT & PLAN NOTE
Low at about 10   Stable   Anemia:  I suggest monitoring her Hemoglobin perioperatively in view of her history of pre existing anemia.

## 2020-03-13 NOTE — PROGRESS NOTES
[facility] - Pre Op Consult  Progress Note    Patient Name: Lina Davis  MRN: [id number]  Date of Evaluation- 03/18/2020  PCP- Dora Freedman MD    Future cases for Lina Davis [[id number]]     Case ID Status Date Time Mikal Procedure Provider Location    [id number] [facility] 3/19/2020 12:30  DISCECTOMY, SPINE, CERVICAL, ANTERIOR APPROACH, WITH FUSION Joseph Walton MD [[id number]] [facility] OR 2ND FLR          HPI:  History of present illness- I had the pleasure of meeting this pleasant 74 y.o. lady in the pre op clinic prior to her elective spine surgery. The patient is new to me .Ms Mills was accompanied by  Mr Tong .    I have obtained the history by speaking to the patient and by reviewing the electronic health records.    Events leading up to surgery / History of presenting illness -    Numbness of the left arm -1 year  Pain on the Left neck down to the Left hand     She has been troubled with moderate-severe   pain for 1 year  . Pain increases with activity and decreases with resting.    Relevant health conditions of significance for the perioperative period/ History of presenting illness -    Subjectively describes health as fair    Lives with  - single level house    and daughter going to help     Health conditions of significance for the perioperative period     DM    HTN    Acid reflux     CKD     Not known to have heart disease ,  Lung disease         Subjective/ Objective:       Chief complaint-Preoperative evaluation, Perioperative Medical management, complication reduction plan     Active cardiac conditions- none    Revised cardiac risk index predictors- none    Functional capacity -Examples of physical activity , stays active , drives , was cutting grass until 2 years ago, picks her grand daughter in the evening , house  can take 1 flight of stairs----- She can undertake all the above activities without  chest pain,chest tightness, Shortness of breath ,dizziness,lightheadedness making her  "exercise tolerance more,   than 4 Mets.       Review of Systems   Constitutional: Negative for chills and fever.          Weight gain from reduced activity- encouraged weight loss   HENT:        KENDAL score  / 8        HTN  Age over 50        Eyes:        No new visual changes   Respiratory:        No cough , phlegm    No Hemoptysis   Cardiovascular:        As noted   Gastrointestinal:        No overt GI/ blood losses  Bowel movements- Regular    Endocrine:        Prednisone use > 20 mg daily for 3 weeks- none   Genitourinary: Negative for dysuria.        No urinary hesitancy   Has urine odor - symptom of UTI   Musculoskeletal:        As above      Skin: Negative for rash.   Neurological: Negative for syncope.        No unilateral weakness   Hematological:        Current use of Anticoagulants  None   Aspirin use - Yes   Psychiatric/Behavioral:        No Depression,Anxiety     No vascular stenting             No anesthesia, bleeding, cardiac problems, PONV with previous surgeries/procedures.  FH- No anesthesia,bleeding / venous thrombosis ,  in family   Medications and Allergies reviewed in epic.     Physical Exam  Blood pressure 126/80, pulse 92, temperature 98.5 °F (36.9 °C), height 5' 6" (1.676 m), weight 86.8 kg (191 lb 6.4 oz), SpO2 98 %.      Physical Exam  Constitutional- Vitals - Body mass index is 30.89 kg/m².,   Vitals:    03/13/20 1128   BP: 126/80   Pulse: 92   Temp: 98.5 °F (36.9 °C)     General appearance-Conscious,Coherent  Eyes- No conjunctival icterus,pupils  round  and reactive to light   ENT-Oral cavity- moist  , Hearing grossly normal   Neck- No thyromegaly ,Trachea -central, No jugular venous distension,   No Carotid Bruit   Cardiovascular -Heart Sounds- Normal  and  no murmur   , No gallop rhythm   Respiratory - Normal Respiratory Effort, Normal breath sounds,  no wheeze  and  no forced expiratory wheeze    Peripheral pitting pedal edema-- none , no calf pain   Gastrointestinal -Soft " abdomen, No palpable masses, Non Tender,Liver,Spleen not palpable. No-- free fluid and shifting dullness  Musculoskeletal- No finger Clubbing. Strength grossly normal   Lymphatic-No Palpable cervical, axillary,Inguinal lymphadenopathy   Psychiatric - normal effect,Orientation  Rt Dorsalis pedis pulses-palpable    Lt Dorsalis pedis pulses- palpable   Rt Posterior tibial pulses -palpable   Left posterior tibial pulses -palpable   Miscellaneous -  no renal bruit  Investigations  Lab and Imaging have been reviewed in [facility].    Review of Medicine tests    EKG- I had independently reviewed the EKG from--1/31/2020   It was reported to be showing     Normal sinus rhythm  Voltage criteria for left ventricular hypertrophy  Nonspecific T wave abnormality  Abnormal ECG  When compared with ECG of 29-JAN-2018 08:02,  Nonspecific T wave abnormality, improved in Anterior leads  Voltage criteria for left ventricular hypertrophy Now present    Review of clinical lab tests:  Lab Results   Component Value Date    CREATININE 1.0 02/03/2020    HGB 10.3 (L) 02/03/2020     02/03/2020       Review of old records- Was done and information gathered regards to events leading to surgery and health conditions of significance in the perioperative period.        Preoperative cardiac risk assessment-  The patient does not have any active cardiac conditions . Revised cardiac risk index predictors-0 ---.Functional capacity is more than 4 Mets. She will be undergoing a Spine procedure that carries a intermediate risk     Risk of a major Cardiac event ( Defined as death, myocardial infarction, or cardiac arrest at 30 days after noncardiac surgery), based on RCRI score     -3.9%         No further cardiac work up is indicated prior to proceeding with the surgery     Orders Placed This Encounter    Urinalysis, Reflex to Urine Culture Urine, Clean Catch       American Society of Anesthesiologists Physical status classification ( ASA ) class: 3       Postoperative pulmonary complication risk assessment:      ARISCAT ( Canet) risk index- risk class -  Low, if duration of surgery is under 3 hours, intermediate, if duration of surgery is over 3 hours      Ezio Respiratory failure index- percentage risk of respiratory failure: 4.2 %     Assessment/Plan:     HTN (hypertension), benign  Losartan- HCTZ    Home BP readings -none   Recent BP readings in the record-130/70-80- Avg  Hypertension-  Blood pressure is acceptable .  I suggest holding   Losartan- HCTZ- on the morning of the surgery and can continue that  post operatively under blood pressure, electrolyte and renal function monitoring as long as they are acceptable.I suggest addressing pain control as uncontrolled pain can increased blood pressure     Stenosis of left carotid artery  July 2019     1-39% stenosis at the right carotid bifurcation.    40-59% stenosis at the left carotid bifurcation, unchanged compared to ultrasound 02/26/2019.    No CVA/TIA    On ASA    Risk , benefits discussed  Will check with surgeon   For now , suggested holding for surgery    -  Staying on ASA for surgery      Polyneuropathy  Feet care suggested     Complete tear of right rotator cuff  Fell ,from  slipping on moist stairs   Was operated    Subjectively feels good     GERD (gastroesophageal reflux disease)  Takes Prilosec as needed   Burping relieves the discomfort   Acid reflux with certain foods   GERD-  I suggest continuation of the Proton pump inhibitor in the perioperative period . I suggest aspiration precautions    Type II diabetes mellitus with neurological manifestations  Type 2  Diabetes Mellitus  On treatment with oral agents,not on  Insulin    Hemoglobin A1c- 6.4- Feb 2020  Capillary glucose check-yes   Pre breakfast -variable 165-170  Pre supper-variable - 130-140    Care suggested with hypoglycemia     I had educated that uncontrolled DM can cause post op complications,risk of infection, wound healing  problem,increased length of stay in hospital and its associated complications.I suggest exercise as much as possible and follow diabetic diet      Diabetes Mellitus-I suggest monitoring the glucose in the perioperative period ( Before meals and bed time,if the patient is on oral feeds or every 6 hourly ,if the patient is NPO )  Blood glucose target in hospitalized patients is 140-180. Oral Hypoglycemic agents are generally avoided during the hospital stay . If glucose is consistently elevated ,I suggest using basal ,prandial Insulin regimen to control the glucose , as elevated glucose can be associated with adverse surgical out comes. Please consider involving Hospital Medicine or Endocrinology ,if any help is needed with Glucose control. Patient will be instructed based on the pre op clinic guidelines  about adjustment of diabetic treatment (If applicable )  considering the NPO status for Surgery           Type 2 diabetes mellitus with diabetic nephropathy  Base line creatinine about 1    Stages of CKD discussed  Deleterious effects NSAID's ( except ASA 81 mg ) , Beneficial effects of Hydration discussed   Tylenol as needed for pain     I  suggest monitoring renal function, in put and out put status renetta-operatively. I  suggest avoiding nephrotoxic medication including NSAIDs, COX2 inhibitors, intravenous contrast agent,avoiding hypotension to prevent further renal impairment.     Recurrent UTI (urinary tract infection)  Check Urinalysis  No fever    -   Urine odor resolved      Menopausal symptoms  Feeling better     Hyperlipidemia  HLD-I  suggest continuation of statin during the entire perioperative period.    History of gout  Takes nightly Allopurinol   No recent problem  Gout -  I suggest continuation of the maintenance treatment for gout and to keep the patient adequately hydrated.  Please note that surgical stress ,dehydration can precipitate acute gout         Left arm pain  Spine reason   Does not sound  Heart related   On Norco for about 1 year   Chronic continuous opioid use- In view of the opioid use, the patient may have opioid tolerance . If possible , suggested reducing opioid , to help reduced opioid tolerance .  I suggest continuation of the maintenance scheduled opioid during the perioperative period. I suggest considering the possibility of opioid tolerance  in planning post operative pain control     Anemia  Low at about 10   Stable   Anemia:  I suggest monitoring her Hemoglobin perioperatively in view of her history of pre existing anemia.      Family history of heart disease  No suggestion of symptomatic CAD with good functional capacity     History of fatty infiltration of liver  Nov 2015   ]Hepatomegaly with findings suggestive of steatosis  Weight loss suggested     No suggestion of  hepatic decompensation     PLT- N        Preventive perioperative care    Thromboembolic prophylaxis:  Her risk factors for thrombosis include surgical procedure and age.I suggest  thromboembolic prophylaxis ( mechanical/pharmacological, weighing the risk benefits of pharmacological agent use considering renetta procedural bleeding )  during the perioperative period.I suggested being active in the post operative period.      Postoperative pulmonary complication prophylaxis-Risk factors for post operative pulmonary complications include age over 65 years and ASA class >2- I suggest incentive spirometry use, early ambulation and end tidal carbon dioxide monitoring  , oral care , head end of bed elevation      Renal complication prophylaxis-Risk factors for renal complications include pre-existing renal disease, diabetes mellitus and hypertension . I suggest keeping her well hydrated and avoidance/ minimizing the use of  NSAID's,SY 2 Inhibitors ,IV contrast if possible in the perioperative period.     Surgical site Infection Prophylaxis-I  suggest appropriate antibiotic for Prophylaxis against Surgical site infections      Delirium prophylaxis-Risk factors - opioid use - I suggest avoidance / minimizing the use of  Benzodiazepines ( unless the patient has been taking it on a regular basis ),Anticholinergic medication,Antihistamines ( like  Benadryl).I suggest minimizing the use of opioid medication and use of IV tylenol,if it is appropriate. I suggest using the lowest possible dose of opioids for the shortest duration possible in the perioperative period. I suggest to Keep shades/blinds open during the day, lights off and shades closed at night to encourage normal sleep/wake cycle.I encourage the presence of the family member with the patient at all times, if at all possible as mental status changes can be picked up early by the family members and they help with reorientation. I encouraged the presence of family to help with orientation in the perioperative period. Benadryl avoidance suggested      In view of Spine procedure the patient  is at risk of postoperative urinary retention.  I suggest avoidance / minimizing the of  Benzodiazepines,Anticholinergic medication,antihistamines ( Benadryl) , if possible in the perioperative period. I suggest using the minimum possible use of opioids for the minimum period of time in the perioperative period. Benadryl avoidance suggested      This visit was focused on Preoperative evaluation, Perioperative Medical management, complication reduction plans. I suggest that the patient follows up with primary care or relevant sub specialists for ongoing health care.    I appreciate the opportunity to be involved in this patients care. Please feel free to contact me if there were any questions about this consultation.    Patient is optimized       Patient was instructed to call and update me about any changes to health,  medication, office visits ,testing out side of the renetta operative care center , hospitalizations between now and surgery     Preethi Wilcox MD  Perioperative Medicine  [facility]  [facility]   Pager [phone]  --  3/13- 18 56       Using D-mannose to help with recurrent UTI's   She feels that it may not be helping her  - Suggested holding for surgery     Messaged surgeon- about Jennifer op use of ASA -   -  3/13- 2002   Called and spoke to he   Had Urinalysis   Will wait urinalysis   Thyroid Nodules - suggested follow up - no swallowing problem   Holding ASA   Suggested avoidance of excess fruit - Potassium on high side  -  3/16- 17 02     Correspondence from Dr Walton from 3/16   Fine with her staying on the low-dose aspirin.     Nurse Left her a message regarding the Aspirin   Called and spoke to her - can stay on ASA 81 mg po daily   She Is scheduled for surgery on 3/19   She has urinary odor on and off   No fever , Does not feel sick   Discussed that with the viral ( Covid ) concerns, will try to limit the hospital exposure   Will work with surgeon on this   Had UTI when had had odor to her urine , was treated with antibiotic   Had odor come back in 2 weeks and was commenced on D mannose , continues to have Odor   No vaginal discharge     No overt GI/ blood losses     Messaged surgeon about her Urinary symptoms   -  3/18- 15 55     Called to follow up , spoke to her  to address any concerns with the up coming surgery or any questions on Medication instructions -  Doing well ,No changes to Medication, Health -  Urine odor resolved - none for 4-5 days   No fever   Care suggested with Tylenol use   Suggested glucose control   Hold oral hypoglycemic agents night before and AM of surgery   Suggested follow up

## 2020-03-13 NOTE — ASSESSMENT & PLAN NOTE
Takes nightly Allopurinol   No recent problem  Gout -  I suggest continuation of the maintenance treatment for gout and to keep the patient adequately hydrated.  Please note that surgical stress ,dehydration can precipitate acute gout

## 2020-03-13 NOTE — ASSESSMENT & PLAN NOTE
Spine reason   Does not sound Heart related   On [place] for about 1 year   Chronic continuous opioid use- In view of the opioid use, the patient may have opioid tolerance . If possible , suggested reducing opioid , to help reduced opioid tolerance .  I suggest continuation of the maintenance scheduled opioid during the perioperative period. I suggest considering the possibility of opioid tolerance  in planning post operative pain control

## 2020-03-13 NOTE — ASSESSMENT & PLAN NOTE
Losartan- HCTZ    Home BP readings -none   Recent BP readings in the record-130/70-80- Avg  Hypertension-  Blood pressure is acceptable .  I suggest holding   Losartan- HCTZ- on the morning of the surgery and can continue that  post operatively under blood pressure, electrolyte and renal function monitoring as long as they are acceptable.I suggest addressing pain control as uncontrolled pain can increased blood pressure

## 2020-03-13 NOTE — ASSESSMENT & PLAN NOTE
Nov 2015   ]Hepatomegaly with findings suggestive of steatosis  Weight loss suggested     No suggestion of  hepatic decompensation     PLT- N

## 2020-03-13 NOTE — DISCHARGE INSTRUCTIONS
Your surgery has been scheduled for:__________________________________________    You should report to:  ____[facility], located on the [place] side of the first floor of the           [facility] ([phone])  ____[facility], located on the [facility] side of the            Second floor of the [facility] ([phone])  ____[facility] ([facility]) [facility] [address]  Please Note   - Tell your doctor if you take Aspirin, products containing Aspirin, herbal medications  or blood thinners, such as Coumadin, Ticlid, or Plavix.  (Consult your provider regarding holding or stopping before surgery).  - Arrange for someone to drive you home following surgery.  You will not be allowed to leave the surgical facility alone or drive yourself home following sedation and anesthesia.  Before Surgery  - Stop taking all herbal medications 14days prior to surgery  - No Motrin/Advil (Ibuprofen) 7 days before surgery  - No Aleve (Naproxen) 7 days before surgery  - Stop Taking Aspirin, products containing Aspirin _____days before surgery  - Stop taking blood thinners_______days before surgery  - No Goody's/BC  Powder 7 days before surgery  - Refrain from drinking alcoholic beverages for 24hours before and after surgery  - Stop or limit smoking _________days before surgery  - You may take Tylenol for pain  Night before Surgery   Stop ALL solid food, gum, candy (including vitamins) 8 hours before arrival time.  (Please note: If your surgeon gives you different eating and drinking instructions, please follow surgeon's directions.)   Stop all CLOUDY liquids: coffee with creamer, formula, tube feeds, cloudy juices, non-human milk and breast milk with additives, 6 hours prior to arrival time.   Stop plain breast milk 4 hours prior to arrival time.   The patient should be ENCOURAGED to drink carbohydrate-rich clear liquids (sports  drinks, clear juices) until 2 hours prior to arrival time.   CLEAR liquids include only water, black coffee NO creamer, clear oral rehydration drinks, clear sports drinks or clear fruit juices (no orange juice, no pulpy juices, no apple cider). Advise patients if they can read newsprint through the liquid, it qualifies as clear liquid.    IF IN DOUBT, drink water instead.   - Take a shower or bath (shower is recommended).  Bathe with Hibiclens soap or an antibacterial soap from the neck down.  If not supplied by your surgeon, hibiclens soap will need to be purchased over the counter in pharmacy.  Rinse soap off thoroughly.  - Shampoo your hair with your regular shampoo  The Day of Surgery  · NOTHING TO  DRINK 2 hours before arrival time. If you are told to take medication on the morning of surgery, it may be taken with a sip of water.   - Take another bath or shower with hibiclens or any antibacterial soap, to reduce the chance of infection.  - Take heart and blood pressure medications with a small sip of water, as advised by the perioperative team.  - Do not take fluid pills  - You may brush your teeth and rinse your mouth, but do not swallow any additional water.   - Do not apply perfumes, powder, body lotions or deodorant on the day of surgery.  - Nail polish should be removed.  - Do not wear makeup or moisturizer  - Wear comfortable clothes, such as a button front shirt and loose fitting pants.  - Leave all jewelry, including body piercings, and valuables at home.    - Bring any devices you will neeed after surgery such as crutches or canes.  - If you have sleep apnea, please bring your CPAP machine  In the event that your physical condition changes including the onset of a cold or respiratory illness, or if you have to delay or cancel your surgery, please notify your surgeon.  Anesthesia: General Anesthesia     You are watched continuously during your procedure by your anesthesia provider.     Youre due to  have surgery. During surgery, youll be given medicine called anesthesia or anesthetic. This will keep you comfortable and pain-free. Your anesthesia provider will use general anesthesia.  What is general anesthesia?  General anesthesia puts you into a state like deep sleep. It goes into the bloodstream (IV anesthetics), into the lungs (gas anesthetics), or both. You feel nothing during the procedure. You will not remember it. During the procedure, the anesthesia provider monitors you continuously. He or she checks your heart rate and rhythm, blood pressure, breathing, and blood oxygen.  · IV anesthetics. IV anesthetics are given through an IV line in your arm. Theyre often given first. This is so you are asleep before a gas anesthetic is started. Some kinds of IV anesthetics relieve pain. Others relax you. Your doctor will decide which kind is best in your case.  · Gas anesthetics. Gas anesthetics are breathed into the lungs. They are often used to keep you asleep. They can be given through a facemask or a tube placed in your larynx or trachea (breathing tube).  ? If you have a facemask, your anesthesia provider will most likely place it over your nose and mouth while youre still awake. Youll breathe oxygen through the mask as your IV anesthetic is started. Gas anesthetic may be added through the mask.  ? If you have a tube in the larynx or trachea, it will be inserted into your throat after youre asleep.  Anesthesia tools and medicines  You will likely have:  · IV anesthetics. These are put into an IV line into your bloodstream.  · Gas anesthetics. You breathe these anesthetics into your lungs, where they pass into your bloodstream.  · Pulse oximeter. This is a small clip that is attached to the end of your finger. This measures your blood oxygen level.  · Electrocardiography leads (electrodes). These are small sticky pads that are placed on your chest. They record your heart rate and rhythm.  · Blood pressure  cuff. This reads your blood pressure.  Risks and possible complications  General anesthesia has some risks. These include:  · Breathing problems  · Nausea and vomiting  · Sore throat or hoarseness (usually temporary)  · Allergic reaction to the anesthetic  · Irregular heartbeat (rare)  · Cardiac arrest (rare)   Anesthesia safety  · Follow all instructions you are given for how long not to eat or drink before your procedure.  · Be sure your doctor knows what medicines and drugs you take. This includes over-the-counter medicines, herbs, supplements, alcohol or other drugs. You will be asked when those were last taken.  · Have an adult family member or friend drive you home after the procedure.  · For the first 24 hours after your surgery:  ? Do not drive or use heavy equipment.  ? Do not make important decisions or sign legal documents. If important decisions or signing legal documents is necessary during the first 24 hours after surgery, have a trusted family member or spouse act on your behalf.  ? Avoid alcohol.  ? Have a responsible adult stay with you. He or she can watch for problems and help keep you safe.  Date Last Reviewed: 12/1/2016 © 2000-2017 [employer]. [address] [place] [id number]. All rights reserved. This information is not intended as a substitute for professional medical care. Always follow your healthcare professional's instructions

## 2020-03-13 NOTE — HPI
History of present illness- I had the pleasure of meeting this pleasant 74 y.o. lady in the pre op clinic prior to her elective spine surgery. The patient is new to me .Ms Mills was accompanied by  Mr Tong .    I have obtained the history by speaking to the patient and by reviewing the electronic health records.    Events leading up to surgery / History of presenting illness -    Numbness of the left arm -1 year  Pain on the Left neck down to the Left hand     She has been troubled with moderate-severe   pain for 1 year  . Pain increases with activity and decreases with resting.    Relevant health conditions of significance for the perioperative period/ History of presenting illness -    Subjectively describes health as fair    Lives with  - single level house    and daughter going to help     Health conditions of significance for the perioperative period     DM    HTN    Acid reflux     CKD     Not known to have heart disease ,  Lung disease

## 2020-03-13 NOTE — OUTPATIENT SUBJECTIVE & OBJECTIVE
"Outpatient Subjective & Objective     Chief complaint-Preoperative evaluation, Perioperative Medical management, complication reduction plan     Active cardiac conditions- none    Revised cardiac risk index predictors- none    Functional capacity -Examples of physical activity , stays active , drives , was cutting grass until 2 years ago, picks her grand daughter in the evening , house  can take 1 flight of stairs----- She can undertake all the above activities without  chest pain,chest tightness, Shortness of breath ,dizziness,lightheadedness making her exercise tolerance more,   than 4 Mets.       Review of Systems   Constitutional: Negative for chills and fever.          Weight gain from reduced activity- encouraged weight loss   HENT:        KENDAL score  / 8        HTN  Age over 50        Eyes:        No new visual changes   Respiratory:        No cough , phlegm    No Hemoptysis   Cardiovascular:        As noted   Gastrointestinal:        No overt GI/ blood losses  Bowel movements- Regular    Endocrine:        Prednisone use > 20 mg daily for 3 weeks- none   Genitourinary: Negative for dysuria.        No urinary hesitancy   Has urine odor - symptom of UTI   Musculoskeletal:        As above      Skin: Negative for rash.   Neurological: Negative for syncope.        No unilateral weakness   Hematological:        Current use of Anticoagulants  None   Aspirin use - Yes   Psychiatric/Behavioral:        No Depression,Anxiety     No vascular stenting             No anesthesia, bleeding, cardiac problems, PONV with previous surgeries/procedures.  FH- No anesthesia,bleeding / venous thrombosis ,  in family   Medications and Allergies reviewed in epic.     Physical Exam  Blood pressure 126/80, pulse 92, temperature 98.5 °F (36.9 °C), height 5' 6" (1.676 m), weight 86.8 kg (191 lb 6.4 oz), SpO2 98 %.      Physical Exam  Constitutional- Vitals - Body mass index is 30.89 kg/m².,   Vitals:    03/13/20 1128   BP: 126/80 "   Pulse: 92   Temp: 98.5 °F (36.9 °C)     General appearance-Conscious,Coherent  Eyes- No conjunctival icterus,pupils  round  and reactive to light   ENT-Oral cavity- moist  , Hearing grossly normal   Neck- No thyromegaly ,Trachea -central, No jugular venous distension,   No Carotid Bruit   Cardiovascular -Heart Sounds- Normal  and  no murmur   , No gallop rhythm   Respiratory - Normal Respiratory Effort, Normal breath sounds,  no wheeze  and  no forced expiratory wheeze    Peripheral pitting pedal edema-- none , no calf pain   Gastrointestinal -Soft abdomen, No palpable masses, Non Tender,Liver,Spleen not palpable. No-- free fluid and shifting dullness  Musculoskeletal- No finger Clubbing. Strength grossly normal   Lymphatic-No Palpable cervical, axillary,Inguinal lymphadenopathy   Psychiatric - normal effect,Orientation  Rt Dorsalis pedis pulses-palpable    Lt Dorsalis pedis pulses- palpable   Rt Posterior tibial pulses -palpable   Left posterior tibial pulses -palpable   Miscellaneous -  no renal bruit  Investigations  Lab and Imaging have been reviewed in epic.    Review of Medicine tests    EKG- I had independently reviewed the EKG from--1/31/2020   It was reported to be showing     Normal sinus rhythm  Voltage criteria for left ventricular hypertrophy  Nonspecific T wave abnormality  Abnormal ECG  When compared with ECG of 29-JAN-2018 08:02,  Nonspecific T wave abnormality, improved in Anterior leads  Voltage criteria for left ventricular hypertrophy Now present    Review of clinical lab tests:  Lab Results   Component Value Date    CREATININE 1.0 02/03/2020    HGB 10.3 (L) 02/03/2020     02/03/2020       Review of old records- Was done and information gathered regards to events leading to surgery and health conditions of significance in the perioperative period.    Outpatient Subjective & Objective

## 2020-03-13 NOTE — ASSESSMENT & PLAN NOTE
Takes Prilosec as needed   Burping relieves the discomfort   Acid reflux with certain foods   GERD-  I suggest continuation of the Proton pump inhibitor in the perioperative period . I suggest aspiration precautions

## 2020-03-13 NOTE — ASSESSMENT & PLAN NOTE
July 2019     1-39% stenosis at the right carotid bifurcation.    40-59% stenosis at the left carotid bifurcation, unchanged compared to ultrasound 02/26/2019.    No CVA/TIA    On ASA    Risk , benefits discussed  Will check with surgeon   For now , suggested holding for surgery    -  Staying on ASA for surgery

## 2020-03-18 ENCOUNTER — ANESTHESIA EVENT (OUTPATIENT)
Dept: SURGERY | Facility: HOSPITAL | Age: 75
DRG: 472 | End: 2020-03-18
Payer: MEDICARE

## 2020-03-18 ENCOUNTER — TELEPHONE (OUTPATIENT)
Dept: NEUROSURGERY | Facility: CLINIC | Age: 75
End: 2020-03-18

## 2020-03-18 NOTE — TELEPHONE ENCOUNTER
Patient contacted. Advised to arrive for surgery at 1000, DOSC, NPO after midnight the night before, shower x 2 with Hibiclens or Dial antibacterial soap. Understanding verbalized by patient.

## 2020-03-18 NOTE — ADDENDUM NOTE
Addended by: HAI HERRERA on: 3/18/2020 04:11 PM     Modules accepted: Level of Service     pt to ed co pain to lower back no fall no loss of bladder no r bowel no weakness pain radiating to left upper leg and groin no numbness sudden onset no alleviating factors nor prior treatment  able to walk pain more with change of position and bending no numbness no fever no dizzy no headache no chills no NVD no chest pain no SOB no shakes no aches no other  injury no other complaints

## 2020-03-19 ENCOUNTER — HOSPITAL ENCOUNTER (INPATIENT)
Facility: HOSPITAL | Age: 75
LOS: 2 days | Discharge: HOME HEALTH | DRG: 472 | End: 2020-03-21
Attending: NEUROLOGICAL SURGERY | Admitting: NEUROLOGICAL SURGERY
Payer: MEDICARE

## 2020-03-19 ENCOUNTER — ANESTHESIA (OUTPATIENT)
Dept: SURGERY | Facility: HOSPITAL | Age: 75
DRG: 472 | End: 2020-03-19
Payer: MEDICARE

## 2020-03-19 DIAGNOSIS — M47.819 FACET ARTHROPATHY: ICD-10-CM

## 2020-03-19 DIAGNOSIS — M48.02 CERVICAL STENOSIS OF SPINAL CANAL: Primary | ICD-10-CM

## 2020-03-19 DIAGNOSIS — Z98.1 S/P CERVICAL SPINAL FUSION: ICD-10-CM

## 2020-03-19 DIAGNOSIS — Z01.818 PREOPERATIVE TESTING: ICD-10-CM

## 2020-03-19 LAB
ABO + RH BLD: NORMAL
BLD GP AB SCN CELLS X3 SERPL QL: NORMAL
POCT GLUCOSE: 197 MG/DL (ref 70–110)
POCT GLUCOSE: 199 MG/DL (ref 70–110)
POCT GLUCOSE: 283 MG/DL (ref 70–110)

## 2020-03-19 PROCEDURE — 36620 PR INSERT CATH,ART,PERCUT,SHORTTERM: ICD-10-PCS | Mod: 59,,, | Performed by: ANESTHESIOLOGY

## 2020-03-19 PROCEDURE — D9220A PRA ANESTHESIA: Mod: HCNC,,, | Performed by: NURSE ANESTHETIST, CERTIFIED REGISTERED

## 2020-03-19 PROCEDURE — 22552 ARTHRD ANT NTRBD CERVICAL EA: CPT | Mod: ,,, | Performed by: NEUROLOGICAL SURGERY

## 2020-03-19 PROCEDURE — 63600175 PHARM REV CODE 636 W HCPCS: Mod: HCNC | Performed by: ANESTHESIOLOGY

## 2020-03-19 PROCEDURE — 25000003 PHARM REV CODE 250: Mod: HCNC | Performed by: NEUROLOGICAL SURGERY

## 2020-03-19 PROCEDURE — 63600175 PHARM REV CODE 636 W HCPCS: Mod: HCNC | Performed by: NEUROLOGICAL SURGERY

## 2020-03-19 PROCEDURE — 37000009 HC ANESTHESIA EA ADD 15 MINS: Mod: HCNC | Performed by: NEUROLOGICAL SURGERY

## 2020-03-19 PROCEDURE — 71000033 HC RECOVERY, INTIAL HOUR: Mod: HCNC | Performed by: NEUROLOGICAL SURGERY

## 2020-03-19 PROCEDURE — D9220A PRA ANESTHESIA: Mod: HCNC,,, | Performed by: ANESTHESIOLOGY

## 2020-03-19 PROCEDURE — 22552 PR ARTHRODESIS ANT INTERBODY INC DISCECTOMY, CERVICAL BELOW C2 EACH ADDL: ICD-10-PCS | Mod: ,,, | Performed by: NEUROLOGICAL SURGERY

## 2020-03-19 PROCEDURE — 63600175 PHARM REV CODE 636 W HCPCS: Mod: HCNC | Performed by: STUDENT IN AN ORGANIZED HEALTH CARE EDUCATION/TRAINING PROGRAM

## 2020-03-19 PROCEDURE — 36000710: Mod: HCNC | Performed by: NEUROLOGICAL SURGERY

## 2020-03-19 PROCEDURE — D9220A PRA ANESTHESIA: ICD-10-PCS | Mod: HCNC,,, | Performed by: ANESTHESIOLOGY

## 2020-03-19 PROCEDURE — D9220A PRA ANESTHESIA: ICD-10-PCS | Mod: HCNC,,, | Performed by: NURSE ANESTHETIST, CERTIFIED REGISTERED

## 2020-03-19 PROCEDURE — 86920 COMPATIBILITY TEST SPIN: CPT | Mod: HCNC

## 2020-03-19 PROCEDURE — 94761 N-INVAS EAR/PLS OXIMETRY MLT: CPT | Mod: HCNC

## 2020-03-19 PROCEDURE — 36620 INSERTION CATHETER ARTERY: CPT | Mod: 59,,, | Performed by: ANESTHESIOLOGY

## 2020-03-19 PROCEDURE — 82962 GLUCOSE BLOOD TEST: CPT | Mod: HCNC | Performed by: NEUROLOGICAL SURGERY

## 2020-03-19 PROCEDURE — 22846 PR ANTERIOR INSTRUMENTATION 4-7 VERTEBRAL SEGMENTS: ICD-10-PCS | Mod: 59,,, | Performed by: NEUROLOGICAL SURGERY

## 2020-03-19 PROCEDURE — 25000003 PHARM REV CODE 250: Mod: HCNC | Performed by: STUDENT IN AN ORGANIZED HEALTH CARE EDUCATION/TRAINING PROGRAM

## 2020-03-19 PROCEDURE — 37000008 HC ANESTHESIA 1ST 15 MINUTES: Mod: HCNC | Performed by: NEUROLOGICAL SURGERY

## 2020-03-19 PROCEDURE — 22846 INSERT SPINE FIXATION DEVICE: CPT | Mod: 59,,, | Performed by: NEUROLOGICAL SURGERY

## 2020-03-19 PROCEDURE — 20930 SP BONE ALGRFT MORSEL ADD-ON: CPT | Mod: ,,, | Performed by: NEUROLOGICAL SURGERY

## 2020-03-19 PROCEDURE — C1713 ANCHOR/SCREW BN/BN,TIS/BN: HCPCS | Mod: HCNC | Performed by: NEUROLOGICAL SURGERY

## 2020-03-19 PROCEDURE — 11000001 HC ACUTE MED/SURG PRIVATE ROOM: Mod: HCNC

## 2020-03-19 PROCEDURE — 27200677 HC TRANSDUCER MONITOR KIT SINGLE: Mod: HCNC | Performed by: ANESTHESIOLOGY

## 2020-03-19 PROCEDURE — 71000039 HC RECOVERY, EACH ADD'L HOUR: Mod: HCNC | Performed by: NEUROLOGICAL SURGERY

## 2020-03-19 PROCEDURE — 63600175 PHARM REV CODE 636 W HCPCS: Mod: HCNC | Performed by: NURSE ANESTHETIST, CERTIFIED REGISTERED

## 2020-03-19 PROCEDURE — 86901 BLOOD TYPING SEROLOGIC RH(D): CPT | Mod: HCNC

## 2020-03-19 PROCEDURE — 22551 PR ARTHRODESIS ANT INTERBODY INC DISCECTOMY, CERVICAL BELOW C2: ICD-10-PCS | Mod: ,,, | Performed by: NEUROLOGICAL SURGERY

## 2020-03-19 PROCEDURE — 22853 INSJ BIOMECHANICAL DEVICE: CPT | Mod: ,,, | Performed by: NEUROLOGICAL SURGERY

## 2020-03-19 PROCEDURE — 25000003 PHARM REV CODE 250: Mod: HCNC | Performed by: NURSE ANESTHETIST, CERTIFIED REGISTERED

## 2020-03-19 PROCEDURE — 71000015 HC POSTOP RECOV 1ST HR: Mod: HCNC | Performed by: NEUROLOGICAL SURGERY

## 2020-03-19 PROCEDURE — 20930 PR ALLOGRAFT FOR SPINE SURGERY ONLY MORSELIZED: ICD-10-PCS | Mod: ,,, | Performed by: NEUROLOGICAL SURGERY

## 2020-03-19 PROCEDURE — C1751 CATH, INF, PER/CENT/MIDLINE: HCPCS | Mod: HCNC | Performed by: ANESTHESIOLOGY

## 2020-03-19 PROCEDURE — 22853 PR INSERT BIOMECH DEV W/INTERBODY ARTHRODESIS, EA CONTIGUOUS DEFECT: ICD-10-PCS | Mod: ,,, | Performed by: NEUROLOGICAL SURGERY

## 2020-03-19 PROCEDURE — 36000711: Mod: HCNC | Performed by: NEUROLOGICAL SURGERY

## 2020-03-19 PROCEDURE — 71000016 HC POSTOP RECOV ADDL HR: Mod: HCNC | Performed by: NEUROLOGICAL SURGERY

## 2020-03-19 PROCEDURE — 27201423 OPTIME MED/SURG SUP & DEVICES STERILE SUPPLY: Mod: HCNC | Performed by: NEUROLOGICAL SURGERY

## 2020-03-19 PROCEDURE — 22551 ARTHRD ANT NTRBDY CERVICAL: CPT | Mod: ,,, | Performed by: NEUROLOGICAL SURGERY

## 2020-03-19 DEVICE — IMPLANTABLE DEVICE: Type: IMPLANTABLE DEVICE | Site: SPINE CERVICAL | Status: FUNCTIONAL

## 2020-03-19 RX ORDER — PROPOFOL 10 MG/ML
VIAL (ML) INTRAVENOUS
Status: DISCONTINUED | OUTPATIENT
Start: 2020-03-19 | End: 2020-03-19

## 2020-03-19 RX ORDER — BUPIVACAINE HYDROCHLORIDE AND EPINEPHRINE 5; 5 MG/ML; UG/ML
INJECTION, SOLUTION EPIDURAL; INTRACAUDAL; PERINEURAL
Status: DISCONTINUED | OUTPATIENT
Start: 2020-03-19 | End: 2020-03-19

## 2020-03-19 RX ORDER — ACETAMINOPHEN 325 MG/1
650 TABLET ORAL EVERY 4 HOURS PRN
Status: DISCONTINUED | OUTPATIENT
Start: 2020-03-19 | End: 2020-03-20

## 2020-03-19 RX ORDER — SODIUM CHLORIDE 9 MG/ML
INJECTION, SOLUTION INTRAVENOUS CONTINUOUS
Status: DISCONTINUED | OUTPATIENT
Start: 2020-03-19 | End: 2020-03-20

## 2020-03-19 RX ORDER — GLUCAGON 1 MG
1 KIT INJECTION
Status: DISCONTINUED | OUTPATIENT
Start: 2020-03-19 | End: 2020-03-21 | Stop reason: HOSPADM

## 2020-03-19 RX ORDER — MUPIROCIN 20 MG/G
1 OINTMENT TOPICAL 2 TIMES DAILY
Status: DISCONTINUED | OUTPATIENT
Start: 2020-03-19 | End: 2020-03-19

## 2020-03-19 RX ORDER — MORPHINE SULFATE 2 MG/ML
0.5 INJECTION, SOLUTION INTRAMUSCULAR; INTRAVENOUS
Status: DISCONTINUED | OUTPATIENT
Start: 2020-03-19 | End: 2020-03-20

## 2020-03-19 RX ORDER — MORPHINE SULFATE 2 MG/ML
2 INJECTION, SOLUTION INTRAMUSCULAR; INTRAVENOUS
Status: DISCONTINUED | OUTPATIENT
Start: 2020-03-19 | End: 2020-03-19 | Stop reason: SDUPTHER

## 2020-03-19 RX ORDER — GLUCAGON 1 MG
1 KIT INJECTION
Status: DISCONTINUED | OUTPATIENT
Start: 2020-03-19 | End: 2020-03-19

## 2020-03-19 RX ORDER — ONDANSETRON 4 MG/1
4 TABLET, FILM COATED ORAL EVERY 8 HOURS PRN
Status: DISCONTINUED | OUTPATIENT
Start: 2020-03-19 | End: 2020-03-20

## 2020-03-19 RX ORDER — PROPOFOL 10 MG/ML
VIAL (ML) INTRAVENOUS CONTINUOUS PRN
Status: DISCONTINUED | OUTPATIENT
Start: 2020-03-19 | End: 2020-03-19

## 2020-03-19 RX ORDER — OXYCODONE HYDROCHLORIDE 10 MG/1
10 TABLET ORAL EVERY 4 HOURS PRN
Status: DISCONTINUED | OUTPATIENT
Start: 2020-03-19 | End: 2020-03-21 | Stop reason: HOSPADM

## 2020-03-19 RX ORDER — SUCCINYLCHOLINE CHLORIDE 20 MG/ML
INJECTION INTRAMUSCULAR; INTRAVENOUS
Status: DISCONTINUED | OUTPATIENT
Start: 2020-03-19 | End: 2020-03-19

## 2020-03-19 RX ORDER — IBUPROFEN 200 MG
16 TABLET ORAL
Status: DISCONTINUED | OUTPATIENT
Start: 2020-03-19 | End: 2020-03-21 | Stop reason: HOSPADM

## 2020-03-19 RX ORDER — KETAMINE HCL IN 0.9 % NACL 50 MG/5 ML
SYRINGE (ML) INTRAVENOUS
Status: DISCONTINUED | OUTPATIENT
Start: 2020-03-19 | End: 2020-03-19

## 2020-03-19 RX ORDER — PHENYLEPHRINE HYDROCHLORIDE 10 MG/ML
INJECTION INTRAVENOUS
Status: DISCONTINUED | OUTPATIENT
Start: 2020-03-19 | End: 2020-03-19

## 2020-03-19 RX ORDER — ROCURONIUM BROMIDE 10 MG/ML
INJECTION, SOLUTION INTRAVENOUS
Status: DISCONTINUED | OUTPATIENT
Start: 2020-03-19 | End: 2020-03-19

## 2020-03-19 RX ORDER — VANCOMYCIN HYDROCHLORIDE 500 MG/10ML
INJECTION, POWDER, LYOPHILIZED, FOR SOLUTION INTRAVENOUS
Status: DISCONTINUED | OUTPATIENT
Start: 2020-03-19 | End: 2020-03-19

## 2020-03-19 RX ORDER — ATORVASTATIN CALCIUM 20 MG/1
40 TABLET, FILM COATED ORAL DAILY
Status: DISCONTINUED | OUTPATIENT
Start: 2020-03-20 | End: 2020-03-21 | Stop reason: HOSPADM

## 2020-03-19 RX ORDER — LABETALOL HCL 20 MG/4 ML
10 SYRINGE (ML) INTRAVENOUS EVERY 10 MIN PRN
Status: DISCONTINUED | OUTPATIENT
Start: 2020-03-19 | End: 2020-03-21 | Stop reason: HOSPADM

## 2020-03-19 RX ORDER — MIDAZOLAM HYDROCHLORIDE 1 MG/ML
INJECTION, SOLUTION INTRAMUSCULAR; INTRAVENOUS
Status: DISCONTINUED | OUTPATIENT
Start: 2020-03-19 | End: 2020-03-19

## 2020-03-19 RX ORDER — BISACODYL 10 MG
10 SUPPOSITORY, RECTAL RECTAL DAILY
Status: DISCONTINUED | OUTPATIENT
Start: 2020-03-19 | End: 2020-03-20

## 2020-03-19 RX ORDER — ALLOPURINOL 100 MG/1
300 TABLET ORAL DAILY
Status: DISCONTINUED | OUTPATIENT
Start: 2020-03-20 | End: 2020-03-21 | Stop reason: HOSPADM

## 2020-03-19 RX ORDER — MAG HYDROX/ALUMINUM HYD/SIMETH 200-200-20
30 SUSPENSION, ORAL (FINAL DOSE FORM) ORAL EVERY 4 HOURS PRN
Status: DISCONTINUED | OUTPATIENT
Start: 2020-03-19 | End: 2020-03-21 | Stop reason: HOSPADM

## 2020-03-19 RX ORDER — OXYCODONE AND ACETAMINOPHEN 5; 325 MG/1; MG/1
1 TABLET ORAL EVERY 4 HOURS PRN
Status: DISCONTINUED | OUTPATIENT
Start: 2020-03-19 | End: 2020-03-19 | Stop reason: SDUPTHER

## 2020-03-19 RX ORDER — DEXAMETHASONE SODIUM PHOSPHATE 4 MG/ML
INJECTION, SOLUTION INTRA-ARTICULAR; INTRALESIONAL; INTRAMUSCULAR; INTRAVENOUS; SOFT TISSUE
Status: DISCONTINUED | OUTPATIENT
Start: 2020-03-19 | End: 2020-03-19

## 2020-03-19 RX ORDER — MUPIROCIN 20 MG/G
OINTMENT TOPICAL 2 TIMES DAILY
Status: DISCONTINUED | OUTPATIENT
Start: 2020-03-19 | End: 2020-03-21 | Stop reason: HOSPADM

## 2020-03-19 RX ORDER — ONDANSETRON 2 MG/ML
INJECTION INTRAMUSCULAR; INTRAVENOUS
Status: DISCONTINUED | OUTPATIENT
Start: 2020-03-19 | End: 2020-03-19

## 2020-03-19 RX ORDER — HEPARIN SODIUM 5000 [USP'U]/ML
5000 INJECTION, SOLUTION INTRAVENOUS; SUBCUTANEOUS EVERY 8 HOURS
Status: DISCONTINUED | OUTPATIENT
Start: 2020-03-20 | End: 2020-03-21 | Stop reason: HOSPADM

## 2020-03-19 RX ORDER — HYDROMORPHONE HYDROCHLORIDE 1 MG/ML
0.2 INJECTION, SOLUTION INTRAMUSCULAR; INTRAVENOUS; SUBCUTANEOUS EVERY 5 MIN PRN
Status: COMPLETED | OUTPATIENT
Start: 2020-03-19 | End: 2020-03-19

## 2020-03-19 RX ORDER — CEFAZOLIN SODIUM 1 G/3ML
2 INJECTION, POWDER, FOR SOLUTION INTRAMUSCULAR; INTRAVENOUS
Status: DISCONTINUED | OUTPATIENT
Start: 2020-03-19 | End: 2020-03-19 | Stop reason: SDUPTHER

## 2020-03-19 RX ORDER — INSULIN ASPART 100 [IU]/ML
0-5 INJECTION, SOLUTION INTRAVENOUS; SUBCUTANEOUS EVERY 6 HOURS PRN
Status: DISCONTINUED | OUTPATIENT
Start: 2020-03-19 | End: 2020-03-19

## 2020-03-19 RX ORDER — HYDRALAZINE HYDROCHLORIDE 20 MG/ML
20 INJECTION INTRAMUSCULAR; INTRAVENOUS EVERY 6 HOURS PRN
Status: DISCONTINUED | OUTPATIENT
Start: 2020-03-19 | End: 2020-03-21 | Stop reason: HOSPADM

## 2020-03-19 RX ORDER — MORPHINE SULFATE 2 MG/ML
1 INJECTION, SOLUTION INTRAMUSCULAR; INTRAVENOUS
Status: DISCONTINUED | OUTPATIENT
Start: 2020-03-19 | End: 2020-03-20

## 2020-03-19 RX ORDER — LIDOCAINE HYDROCHLORIDE 40 MG/ML
SOLUTION TOPICAL
Status: DISCONTINUED | OUTPATIENT
Start: 2020-03-19 | End: 2020-03-19

## 2020-03-19 RX ORDER — LOSARTAN POTASSIUM AND HYDROCHLOROTHIAZIDE 12.5; 1 MG/1; MG/1
1 TABLET ORAL DAILY
Status: DISCONTINUED | OUTPATIENT
Start: 2020-03-20 | End: 2020-03-21 | Stop reason: HOSPADM

## 2020-03-19 RX ORDER — CEFAZOLIN SODIUM 1 G/3ML
1 INJECTION, POWDER, FOR SOLUTION INTRAMUSCULAR; INTRAVENOUS
Status: DISCONTINUED | OUTPATIENT
Start: 2020-03-19 | End: 2020-03-21 | Stop reason: HOSPADM

## 2020-03-19 RX ORDER — IBUPROFEN 200 MG
24 TABLET ORAL
Status: DISCONTINUED | OUTPATIENT
Start: 2020-03-19 | End: 2020-03-21 | Stop reason: HOSPADM

## 2020-03-19 RX ORDER — CEFAZOLIN SODIUM 1 G/3ML
INJECTION, POWDER, FOR SOLUTION INTRAMUSCULAR; INTRAVENOUS
Status: DISCONTINUED | OUTPATIENT
Start: 2020-03-19 | End: 2020-03-19

## 2020-03-19 RX ORDER — FENTANYL CITRATE 50 UG/ML
INJECTION, SOLUTION INTRAMUSCULAR; INTRAVENOUS
Status: DISCONTINUED | OUTPATIENT
Start: 2020-03-19 | End: 2020-03-19

## 2020-03-19 RX ORDER — LIDOCAINE HYDROCHLORIDE AND EPINEPHRINE 10; 10 MG/ML; UG/ML
INJECTION, SOLUTION INFILTRATION; PERINEURAL
Status: DISCONTINUED | OUTPATIENT
Start: 2020-03-19 | End: 2020-03-19

## 2020-03-19 RX ORDER — INSULIN ASPART 100 [IU]/ML
0-5 INJECTION, SOLUTION INTRAVENOUS; SUBCUTANEOUS
Status: DISCONTINUED | OUTPATIENT
Start: 2020-03-19 | End: 2020-03-21 | Stop reason: HOSPADM

## 2020-03-19 RX ORDER — PANTOPRAZOLE SODIUM 40 MG/1
40 TABLET, DELAYED RELEASE ORAL DAILY
Status: DISCONTINUED | OUTPATIENT
Start: 2020-03-20 | End: 2020-03-21 | Stop reason: HOSPADM

## 2020-03-19 RX ORDER — ONDANSETRON 8 MG/1
8 TABLET, ORALLY DISINTEGRATING ORAL EVERY 6 HOURS PRN
Status: DISCONTINUED | OUTPATIENT
Start: 2020-03-19 | End: 2020-03-19 | Stop reason: SDUPTHER

## 2020-03-19 RX ORDER — PROCHLORPERAZINE EDISYLATE 5 MG/ML
2.5 INJECTION INTRAMUSCULAR; INTRAVENOUS ONCE AS NEEDED
Status: DISCONTINUED | OUTPATIENT
Start: 2020-03-19 | End: 2020-03-19 | Stop reason: HOSPADM

## 2020-03-19 RX ORDER — METHYLPREDNISOLONE ACETATE 40 MG/ML
INJECTION, SUSPENSION INTRA-ARTICULAR; INTRALESIONAL; INTRAMUSCULAR; SOFT TISSUE
Status: DISCONTINUED | OUTPATIENT
Start: 2020-03-19 | End: 2020-03-19 | Stop reason: HOSPADM

## 2020-03-19 RX ORDER — BACITRACIN 50000 [IU]/1
INJECTION, POWDER, FOR SOLUTION INTRAMUSCULAR
Status: DISCONTINUED | OUTPATIENT
Start: 2020-03-19 | End: 2020-03-19

## 2020-03-19 RX ORDER — LIDOCAINE HYDROCHLORIDE 20 MG/ML
INJECTION INTRAVENOUS
Status: DISCONTINUED | OUTPATIENT
Start: 2020-03-19 | End: 2020-03-19

## 2020-03-19 RX ORDER — OXYCODONE HYDROCHLORIDE 5 MG/1
5 TABLET ORAL EVERY 4 HOURS PRN
Status: DISCONTINUED | OUTPATIENT
Start: 2020-03-19 | End: 2020-03-21 | Stop reason: HOSPADM

## 2020-03-19 RX ORDER — SODIUM CHLORIDE 9 MG/ML
INJECTION, SOLUTION INTRAVENOUS CONTINUOUS
Status: DISCONTINUED | OUTPATIENT
Start: 2020-03-19 | End: 2020-03-19

## 2020-03-19 RX ORDER — AMOXICILLIN 250 MG
2 CAPSULE ORAL NIGHTLY PRN
Status: DISCONTINUED | OUTPATIENT
Start: 2020-03-19 | End: 2020-03-21 | Stop reason: HOSPADM

## 2020-03-19 RX ADMIN — HYDROMORPHONE HYDROCHLORIDE 0.2 MG: 1 INJECTION, SOLUTION INTRAMUSCULAR; INTRAVENOUS; SUBCUTANEOUS at 04:03

## 2020-03-19 RX ADMIN — FENTANYL CITRATE 100 MCG: 50 INJECTION, SOLUTION INTRAMUSCULAR; INTRAVENOUS at 11:03

## 2020-03-19 RX ADMIN — Medication 20 MG: at 11:03

## 2020-03-19 RX ADMIN — PHENYLEPHRINE HYDROCHLORIDE 200 MCG: 10 INJECTION INTRAVENOUS at 11:03

## 2020-03-19 RX ADMIN — MUPIROCIN 1 G: 20 OINTMENT TOPICAL at 09:03

## 2020-03-19 RX ADMIN — PROPOFOL 200 MCG/KG/MIN: 10 INJECTION, EMULSION INTRAVENOUS at 11:03

## 2020-03-19 RX ADMIN — SODIUM CHLORIDE 0.3 MCG/KG/MIN: 9 INJECTION, SOLUTION INTRAVENOUS at 11:03

## 2020-03-19 RX ADMIN — MORPHINE SULFATE 1 MG: 2 INJECTION, SOLUTION INTRAMUSCULAR; INTRAVENOUS at 06:03

## 2020-03-19 RX ADMIN — HYDRALAZINE HYDROCHLORIDE 20 MG: 20 INJECTION INTRAMUSCULAR; INTRAVENOUS at 03:03

## 2020-03-19 RX ADMIN — PHENYLEPHRINE HYDROCHLORIDE 100 MCG: 10 INJECTION INTRAVENOUS at 11:03

## 2020-03-19 RX ADMIN — ONDANSETRON 4 MG: 2 INJECTION INTRAMUSCULAR; INTRAVENOUS at 02:03

## 2020-03-19 RX ADMIN — FENTANYL CITRATE 50 MCG: 50 INJECTION, SOLUTION INTRAMUSCULAR; INTRAVENOUS at 03:03

## 2020-03-19 RX ADMIN — LIDOCAINE HYDROCHLORIDE 100 MG: 20 INJECTION, SOLUTION INTRAVENOUS at 11:03

## 2020-03-19 RX ADMIN — MIDAZOLAM HYDROCHLORIDE 2 MG: 1 INJECTION, SOLUTION INTRAMUSCULAR; INTRAVENOUS at 10:03

## 2020-03-19 RX ADMIN — HYDROMORPHONE HYDROCHLORIDE 0.2 MG: 1 INJECTION, SOLUTION INTRAMUSCULAR; INTRAVENOUS; SUBCUTANEOUS at 03:03

## 2020-03-19 RX ADMIN — Medication 30 MG: at 11:03

## 2020-03-19 RX ADMIN — CEFAZOLIN 2 G: 330 INJECTION, POWDER, FOR SOLUTION INTRAMUSCULAR; INTRAVENOUS at 11:03

## 2020-03-19 RX ADMIN — SODIUM CHLORIDE: 0.9 INJECTION, SOLUTION INTRAVENOUS at 10:03

## 2020-03-19 RX ADMIN — ROCURONIUM BROMIDE 5 MG: 10 INJECTION, SOLUTION INTRAVENOUS at 11:03

## 2020-03-19 RX ADMIN — SUCCINYLCHOLINE CHLORIDE 160 MG: 20 INJECTION, SOLUTION INTRAMUSCULAR; INTRAVENOUS at 11:03

## 2020-03-19 RX ADMIN — INSULIN ASPART 1 UNITS: 100 INJECTION, SOLUTION INTRAVENOUS; SUBCUTANEOUS at 09:03

## 2020-03-19 RX ADMIN — CEFAZOLIN 1 G: 1 INJECTION, POWDER, FOR SOLUTION INTRAMUSCULAR; INTRAVENOUS at 08:03

## 2020-03-19 RX ADMIN — PROPOFOL 150 MG: 10 INJECTION, EMULSION INTRAVENOUS at 11:03

## 2020-03-19 RX ADMIN — OXYCODONE HYDROCHLORIDE 10 MG: 10 TABLET ORAL at 03:03

## 2020-03-19 RX ADMIN — REMIFENTANIL HYDROCHLORIDE: 1 INJECTION, POWDER, LYOPHILIZED, FOR SOLUTION INTRAVENOUS at 01:03

## 2020-03-19 RX ADMIN — REMIFENTANIL HYDROCHLORIDE 0.2 MCG/KG/MIN: 1 INJECTION, POWDER, LYOPHILIZED, FOR SOLUTION INTRAVENOUS at 11:03

## 2020-03-19 RX ADMIN — DEXAMETHASONE SODIUM PHOSPHATE 12 MG: 4 INJECTION, SOLUTION INTRAMUSCULAR; INTRAVENOUS at 11:03

## 2020-03-19 RX ADMIN — SODIUM CHLORIDE, SODIUM GLUCONATE, SODIUM ACETATE, POTASSIUM CHLORIDE, MAGNESIUM CHLORIDE, SODIUM PHOSPHATE, DIBASIC, AND POTASSIUM PHOSPHATE: .53; .5; .37; .037; .03; .012; .00082 INJECTION, SOLUTION INTRAVENOUS at 11:03

## 2020-03-19 RX ADMIN — LIDOCAINE HYDROCHLORIDE 2 ML: 40 SOLUTION TOPICAL at 11:03

## 2020-03-19 RX ADMIN — SODIUM CHLORIDE: 0.9 INJECTION, SOLUTION INTRAVENOUS at 09:03

## 2020-03-19 RX ADMIN — SODIUM CHLORIDE: 0.9 INJECTION, SOLUTION INTRAVENOUS at 04:03

## 2020-03-19 RX ADMIN — MUPIROCIN: 20 OINTMENT TOPICAL at 08:03

## 2020-03-19 NOTE — NURSING TRANSFER
Nursing Transfer Note      3/19/2020     Transfer To: 930A    Transfer via bed    Transfer with IV pole    Transported by Hospital transport x2    Medicines sent: IV fluid    Chart send with patient: Yes    Notified:     Patient reassessed at: 3/19/2020

## 2020-03-19 NOTE — ANESTHESIA POSTPROCEDURE EVALUATION
Anesthesia Post Evaluation    Patient: Lina Davis    Procedure(s) Performed: Procedure(s) (LRB):  DISCECTOMY, SPINE, CERVICAL, ANTERIOR APPROACH, WITH FUSION, C3-C4, C4-C5, C5-C6 (N/A)    Final Anesthesia Type: general    Patient location during evaluation: PACU  Patient participation: Yes- Able to Participate  Level of consciousness: awake and alert and oriented  Post-procedure vital signs: reviewed and stable  Pain management: adequate  Airway patency: patent    PONV status at discharge: No PONV  Anesthetic complications: no      Cardiovascular status: blood pressure returned to baseline and hemodynamically stable  Respiratory status: unassisted  Hydration status: euvolemic  Follow-up not needed.          Vitals Value Taken Time   /56 3/19/2020  4:17 PM   Temp 36.7 °C (98.1 °F) 3/19/2020  3:32 PM   Pulse 101 3/19/2020  4:18 PM   Resp 22 3/19/2020  4:18 PM   SpO2 93 % 3/19/2020  4:18 PM   Vitals shown include unvalidated device data.      No case tracking events are documented in the log.      Pain/Adriana Score: Pain Rating Prior to Med Admin: 5 (3/19/2020  4:10 PM)  Adriana Score: 9 (3/19/2020  4:15 PM)

## 2020-03-19 NOTE — ANESTHESIA PROCEDURE NOTES
Arterial    Diagnosis: Cervical stenosis    Patient location during procedure: done in OR  Procedure start time: 3/19/2020 11:10 AM  Timeout: 3/19/2020 11:10 AM  Procedure end time: 3/19/2020 11:20 AM    Staffing  Authorizing Provider: Mango Brown MD  Performing Provider: Mango Brown MD    Anesthesiologist was present at the time of the procedure.  Arterial  Skin Prep: chlorhexidine gluconate  Local Infiltration: none  Orientation: right  Location: radial  Catheter Size: 20 G  Catheter placement by Anatomical landmarks. Heme positive aspiration all ports.Insertion Attempts: 1  Assessment  Dressing: secured with tape and tegaderm  Patient: Tolerated well

## 2020-03-19 NOTE — BRIEF OP NOTE
[facility]  Brief Operative Note    SUMMARY     Surgery Date: 3/19/2020     Surgeon(s) and Role:     * Joseph Walton MD - Primary    Assisting Surgeon: None    Pre-op Diagnosis:  Cervical spondylosis with myelopathy [M47.12]  Herniated nucleus pulposus with myelopathy, cervical [M50.00]    Post-op Diagnosis:  Post-Op Diagnosis Codes:     * Cervical spondylosis with myelopathy [M47.12]     * Herniated nucleus pulposus with myelopathy, cervical [M50.00]    Procedure(s) (LRB):  DISCECTOMY, SPINE, CERVICAL, ANTERIOR APPROACH, WITH FUSION, C3-C4, C4-C5, C5-C6 (N/A)    Anesthesia: General    Description of Procedure: C3-6 ACDF    Description of the findings of the procedure: stenosis    ORDERS:     -- C collar when OOB  -- XR cervical spine  -- SBP <160  -- Pain control  -- TLS drain    Estimated Blood Loss: * No values recorded between 3/19/2020 11:49 AM and 3/19/2020  3:20 PM *         Specimens:   Specimen (12h ago, onward)    None

## 2020-03-19 NOTE — ANESTHESIA PREPROCEDURE EVALUATION
03/19/2020  Lina Davis is a 74 y.o., female with PMH of HTN, DMII, and HLD scheduled for ACDF C3-4, 4-5, and 5-6.     Anesthesia Evaluation    I have reviewed the Patient Summary Reports.    I have reviewed the Nursing Notes.   I have reviewed the Medications.     Review of Systems  Anesthesia Hx:  No problems with previous Anesthesia  History of prior surgery of interest to airway management or planning: Previous anesthesia: General  Denies Personal Hx of Anesthesia complications.   Cardiovascular:   Hypertension    Pulmonary:  Pulmonary Normal    Renal/:   Chronic Renal Disease    Hepatic/GI:   GERD    Musculoskeletal:   Arthritis     Neurological:   Neuromuscular Disease,    Endocrine:   Diabetes, type 2      Patient Active Problem List   Diagnosis    Hyperlipidemia, mixed    Hyperlipidemia    HTN (hypertension), benign    Proteinuria    Facet arthropathy    DDD (degenerative disc disease), lumbar    Type II diabetes mellitus with neurological manifestations    Polyneuropathy    Diverticulosis    Menopausal symptoms    GERD (gastroesophageal reflux disease)    Type 2 diabetes mellitus with diabetic nephropathy    Complete tear of right rotator cuff    Acute pain of right shoulder    Decreased ROM of right shoulder    Stenosis of left carotid artery    Atherosclerosis of aorta    Recurrent UTI (urinary tract infection)    History of gout    Left arm pain    Anemia    Family history of heart disease    History of fatty infiltration of liver    Cervical stenosis of spinal canal     Past Medical History:   Diagnosis Date    Anemia     Arthritis     Cataract     Diabetes mellitus type II     Diabetes with neurologic complications     Gout, arthritis     HTN (hypertension), benign     Hyperlipidemia     Polyneuropathy     Type 2 diabetes mellitus with diabetic nephropathy  4/12/2016    Vitamin D deficiency disease      Past Surgical History:   Procedure Laterality Date    CARPAL TUNNEL RELEASE      bilateral    COLONOSCOPY N/A 3/7/2018    Procedure: COLONOSCOPY;  Surgeon: Luís Soni MD;  Location: [address]);  Service: Endoscopy;  Laterality: N/A;  ok to schedule per Elizabeth    COLONOSCOPY W/ BIOPSIES AND POLYPECTOMY      HEMORRHOID SURGERY      HYSTERECTOMY      AUB    ROTATOR CUFF REPAIR      bilateral         Physical Exam  General:  Well nourished    Airway/Jaw/Neck:  Airway Findings: Mouth Opening: Normal Tongue: Normal  General Airway Assessment: Adult  Mallampati: II  TM Distance: Normal, at least 6 cm  Jaw/Neck Findings:  Neck ROM: Normal ROM      Dental:  Dental Findings: In tact   Chest/Lungs:  Chest/Lungs Findings: Clear to auscultation     Heart/Vascular:  Heart Findings: Rate: Normal  Rhythm: Regular Rhythm  Sounds: Normal        Mental Status:  Mental Status Findings:  Cooperative, Alert and Oriented         Anesthesia Plan  Type of Anesthesia, risks & benefits discussed:  Anesthesia Type:  general  Patient's Preference:   Intra-op Monitoring Plan: standard ASA monitors  Intra-op Monitoring Plan Comments:   Post Op Pain Control Plan: per primary service following discharge from PACU  Post Op Pain Control Plan Comments:   Induction:   IV  Beta Blocker:  Patient is not currently on a Beta-Blocker (No further documentation required).       Informed Consent: Patient understands risks and agrees with Anesthesia plan.  Questions answered. Anesthesia consent signed with patient.  ASA Score: 2     Day of Surgery Review of History & Physical:    H&P update referred to the surgeon.         Ready For Surgery From Anesthesia Perspective.

## 2020-03-19 NOTE — OP NOTE
DATE OF PROCEDURE:  3/19/2020     SURGEON:  Joseph Walton M.D.,Ph.D.     ASSISTANT:  ANA Solano M.D. (RES)     PREOPERATIVE DIAGNOSES:  1. Cervical spondylosis.  2. Cervical myelopathy.  3. HNP C3-4.  4. HNP C4-5.  5. HNP C5-6.      POSTOPERATIVE DIAGNOSES:  1. Cervical spondylosis.  2. Cervical myelopathy.  3. HNP C3-4.  4. HNP C4-5.  5. HNP C5-6.      PROCEDURES:  1.    Open anterior cervical approach.  2.    C3-4 diskectomy.  3.    Placement of interbody PEEK cage at C3-4.   4.    Allograft at C3-4.   5.    C4-5 diskectomy.  6.    Placement of interbody PEEK cage at C4-5.   7.    Allograft at C4-5.  8.    C5-6 diskectomy.  9.    Placement of interbody PEEK cage at C5-6.   10.  Allograft at C5-6.  11.  Placement of anterior cervical plate with associated fusion.  12.  Neuromonitoring with SSEPs, MEPs and EMG.     INDICATIONS IN DETAIL:  Ms. Lina Davis is a pleasant 74 y.o. woman with DM and HTN who presents today for surgery. Pt states she had an acute onset of left lateral neck pain and LUE radiculopathy several months ago. She was seen by her PCP who ordered imaging and referred to me for evaluation. She states she has not tried injections or physical therapy. She states she is fairly active and able to walk long distances without great difficulty. She endorses bilateral hand weakness causing her to frequently drop objects. She is right hand dominant. MRI Cervical Spine Without Contrast (10/28/2019) shows multilevel degenerative changes with a small disc bulge at C2-3 without stenosis. There are disc bulges at C3-4, C4-5, and C5-6 associated with severe stenosis and myelomalacia from C4 to C6.      PROCEDURE IN DETAIL:    The patient was seen in the pretreatment area and the risks, benefits and alternatives were discussed.  The patient wished to proceed.  The patient was brought to the Operating Room and general anesthetic was administered.  All proper lines were placed.  The patient was placed in the  supine position with a bolster between her shoulder blades and her neck in slight extension.  The AP and lateral fluoroscopy was performed and the C4-5 was marked on the patient's neck.  The patient's neck was cleaned, prepped and draped in the usual fashion.  A line was drawn in a crease at the C4-5 level.   After the patient was prepped, a lidocaine-bupivacaine mix was infiltrated under  the skin and an incision of approximately 6 cm was made.  This was carried down to the level of platysma using Bovie cautery.  The platysma was then dissected and cut sharply using Metzenbaum scissors.  We then dissected the subplatysmal plane and placed a Weitlaner retractor.  We then dissected along the medial border of the sternocleidomastoid and cut sharply the anterior cervical.  Once this was performed, we dissected more medially and were able to see the carotid sheath.  We dissected medial to this and was able to palpate the osteophytes.  A CloAddThis handheld retractor was then used to retract the esophagus and trachea medially and allow exposure of the anterior cervical area.  Then, using a Kittner, blunt dissection was then made to the osteophyte at C5-6.  This was verified by placing a bayoneted needle in the interspace and performing a lateral fluoroscopy.   We drilled the anterior osteophytes so that we were flush with the body of C5 and C6.   Trimline retractors were placed.   A distracting pin was placed in either one and distraction was made across the level.  The annulus was cut and diskectomy was performed using curettes and rongeurs in the usual fashion.  Once we removed the majority of disk, we drilled the osteophytes at the edge of the body at C5 and C6 to make a larger trough to remove the calcified disc.  Once we were able to dissect above and below the large disc osteophyte complex we were then able to cut the PLL after using a nerve hook.  Then using a Kerrison 1 rongeur, we were able to remove the disc  osteophyte.   Once this was complete, we had completely removed the mass effect,  the dura could be seen.  We performed foraminotomies bilaterally at this level.    An 8 mm PEEK cage was then placed with demineralized bone matrix at its center.  Once this was in place,   the wound was irrigated copiously.  All bleeding points were coagulated.  The Trimline retractors  and distracting pins were then moved to the C4-5 level.  We performed diskectomy in the exact same fashion, however disc at this level was somewhat smaller.  Once we had performed diskectomy at that level and foraminotomies, we placed a 7 mm PEEK cage with demineralized bone matrix at it center.  Once this level was complete we moved our distracting pins and Trimline retractors to the C3-4 level.  We performed diskectomy at this level using the exact same technique.  The disc at this level was calcified but also not as large as at C6-7.  We were able to remove the disc in its entirety and remove all mass effect at this level using the same technique.  A 7 mm peek cage with demineralized bone matrix placed at that level also.  The Trimline retractors were removed.  We obtained a 51 mm anterior cervical plate.  We placed this from C3 through C6.  This was secured with 214 mm screws at each level.  Once the plate was in place and secured, we obtained AP and lateral images to ensure excellent placement.  This was indeed the case.    The wound was irrigated copiously.  All bleeding points were coagulated.  A TLS drain was placed.  The platysmal layer was closed.  The skin was then closed using a 4-0 Monocryl suture in a subcuticular fashion.  A clean dressing was placed.  The patient was then awakened by the Anesthesia staff.  At the point, the patient was awake and following commands, she was extubated.    The patient had continuous SSEP's and EMG recorded during the case.  She had intermittent motor evoked potentials.  There were no problems with any of  the monitoring during the case.     EBL was less than 50 mL.      There were no intraprocedural complications.     All counts were correct at the end of surgery.     Dr. Joseph Walton was present during the entire procedure.

## 2020-03-19 NOTE — TRANSFER OF CARE
"Anesthesia Transfer of Care Note    Patient: Lina Davis    Procedure(s) Performed: Procedure(s) (LRB):  DISCECTOMY, SPINE, CERVICAL, ANTERIOR APPROACH, WITH FUSION, C3-C4, C4-C5, C5-C6 (N/A)    Patient location: PACU    Anesthesia Type: general    Transport from OR: Transported from OR on 6-10 L/min O2 by face mask with adequate spontaneous ventilation    Post pain: adequate analgesia    Post assessment: no apparent anesthetic complications and tolerated procedure well    Post vital signs: stable    Level of consciousness: responds to stimulation (voice)    Nausea/Vomiting: no nausea/vomiting    Complications: none    Transfer of care protocol was followed      Last vitals:   Visit Vitals  '68   Pulse 82   Temp 36.9 °C (98.4 °F) (Oral)   Resp 18   Ht 5' 6" (1.676 m)   Wt 86.6 kg (191 lb)   SpO2 100%   Breastfeeding? No   BMI 30.83 kg/m²     "

## 2020-03-19 NOTE — INTERVAL H&P NOTE
The patient has been examined and the H&P has been reviewed:    I concur with the findings and no changes have occurred since H&P was written.    Anesthesia/Surgery risks, benefits and alternative options discussed and understood by patient/family.    -- Patient evaluated prior to surgery.  -- All diagnostics and imaging reviewed.  -- Patient NPO since midnight.  -- No anticogulant medication in the last 72 hours.  -- Patient marked, consented, and all questions answered.  -- Further orders to follow s/p surgery.  -- To OR for surgery as planned.         Active Hospital Problems    Diagnosis  POA    Cervical stenosis of spinal canal [M48.02]  Yes      Resolved Hospital Problems   No resolved problems to display.

## 2020-03-19 NOTE — PLAN OF CARE
Vital signs are stable and within normal limit. Surgical site is clean, dry and intact-dressing in place. Patient is awake and oriented-pain is controlled, denies ponv.

## 2020-03-19 NOTE — ANESTHESIA PROCEDURE NOTES
Intubation  Performed by: Mary Lou Corea CRNA  Authorized by: Mango Brown MD     Intubation:     Induction:  Intravenous    Intubated:  Postinduction    Mask Ventilation:  Easy with oral airway    Attempts:  1    Attempted By:  CRNA    Method of Intubation:  Direct    Blade:  Esposito 2    Laryngeal View Grade: Grade I - full view of chords      Difficult Airway Encountered?: No      Complications:  None    Airway Device:  Oral endotracheal tube    Airway Device Size:  7.0    Style/Cuff Inflation:  Cuffed (inflated to minimal occlusive pressure)    Tube secured:  22    Secured at:  The lips    Placement Verified By:  Capnometry    Complicating Factors:  Anterior larynx    Findings Post-Intubation:  BS equal bilateral and atraumatic/condition of teeth unchanged

## 2020-03-20 LAB
ANION GAP SERPL CALC-SCNC: 12 MMOL/L (ref 8–16)
BASOPHILS # BLD AUTO: 0.02 K/UL (ref 0–0.2)
BASOPHILS NFR BLD: 0.1 % (ref 0–1.9)
BUN SERPL-MCNC: 19 MG/DL (ref 8–23)
CALCIUM SERPL-MCNC: 9.4 MG/DL (ref 8.7–10.5)
CHLORIDE SERPL-SCNC: 111 MMOL/L (ref 95–110)
CO2 SERPL-SCNC: 19 MMOL/L (ref 23–29)
CREAT SERPL-MCNC: 1.1 MG/DL (ref 0.5–1.4)
DIFFERENTIAL METHOD: ABNORMAL
EOSINOPHIL # BLD AUTO: 0 K/UL (ref 0–0.5)
EOSINOPHIL NFR BLD: 0 % (ref 0–8)
ERYTHROCYTE [DISTWIDTH] IN BLOOD BY AUTOMATED COUNT: 14.3 % (ref 11.5–14.5)
EST. GFR  (AFRICAN AMERICAN): 58 ML/MIN/1.73 M^2
EST. GFR  (NON AFRICAN AMERICAN): 50.3 ML/MIN/1.73 M^2
GLUCOSE SERPL-MCNC: 216 MG/DL (ref 70–110)
HCT VFR BLD AUTO: 31.4 % (ref 37–48.5)
HGB BLD-MCNC: 9.9 G/DL (ref 12–16)
IMM GRANULOCYTES # BLD AUTO: 0.12 K/UL (ref 0–0.04)
IMM GRANULOCYTES NFR BLD AUTO: 0.7 % (ref 0–0.5)
LYMPHOCYTES # BLD AUTO: 1.7 K/UL (ref 1–4.8)
LYMPHOCYTES NFR BLD: 9.3 % (ref 18–48)
MCH RBC QN AUTO: 28.6 PG (ref 27–31)
MCHC RBC AUTO-ENTMCNC: 31.5 G/DL (ref 32–36)
MCV RBC AUTO: 91 FL (ref 82–98)
MONOCYTES # BLD AUTO: 0.9 K/UL (ref 0.3–1)
MONOCYTES NFR BLD: 5 % (ref 4–15)
NEUTROPHILS # BLD AUTO: 15.2 K/UL (ref 1.8–7.7)
NEUTROPHILS NFR BLD: 84.9 % (ref 38–73)
NRBC BLD-RTO: 0 /100 WBC
PLATELET # BLD AUTO: 256 K/UL (ref 150–350)
PMV BLD AUTO: 10.4 FL (ref 9.2–12.9)
POCT GLUCOSE: 199 MG/DL (ref 70–110)
POCT GLUCOSE: 207 MG/DL (ref 70–110)
POCT GLUCOSE: 214 MG/DL (ref 70–110)
POTASSIUM SERPL-SCNC: 4.6 MMOL/L (ref 3.5–5.1)
RBC # BLD AUTO: 3.46 M/UL (ref 4–5.4)
SODIUM SERPL-SCNC: 142 MMOL/L (ref 136–145)
WBC # BLD AUTO: 17.88 K/UL (ref 3.9–12.7)

## 2020-03-20 PROCEDURE — 25000003 PHARM REV CODE 250: Mod: HCNC | Performed by: STUDENT IN AN ORGANIZED HEALTH CARE EDUCATION/TRAINING PROGRAM

## 2020-03-20 PROCEDURE — 63600175 PHARM REV CODE 636 W HCPCS: Mod: HCNC | Performed by: STUDENT IN AN ORGANIZED HEALTH CARE EDUCATION/TRAINING PROGRAM

## 2020-03-20 PROCEDURE — 11000001 HC ACUTE MED/SURG PRIVATE ROOM: Mod: HCNC

## 2020-03-20 PROCEDURE — 80048 BASIC METABOLIC PNL TOTAL CA: CPT | Mod: HCNC

## 2020-03-20 PROCEDURE — 85025 COMPLETE CBC W/AUTO DIFF WBC: CPT | Mod: HCNC

## 2020-03-20 PROCEDURE — 99024 PR POST-OP FOLLOW-UP VISIT: ICD-10-PCS | Mod: HCNC,,, | Performed by: PHYSICIAN ASSISTANT

## 2020-03-20 PROCEDURE — 36415 COLL VENOUS BLD VENIPUNCTURE: CPT | Mod: HCNC

## 2020-03-20 PROCEDURE — 99024 POSTOP FOLLOW-UP VISIT: CPT | Mod: HCNC,,, | Performed by: PHYSICIAN ASSISTANT

## 2020-03-20 PROCEDURE — 25000003 PHARM REV CODE 250: Mod: HCNC | Performed by: PHYSICIAN ASSISTANT

## 2020-03-20 RX ORDER — AMLODIPINE BESYLATE 5 MG/1
5 TABLET ORAL DAILY
Status: DISCONTINUED | OUTPATIENT
Start: 2020-03-20 | End: 2020-03-21 | Stop reason: HOSPADM

## 2020-03-20 RX ORDER — ONDANSETRON 2 MG/ML
4 INJECTION INTRAMUSCULAR; INTRAVENOUS EVERY 6 HOURS PRN
Status: DISCONTINUED | OUTPATIENT
Start: 2020-03-20 | End: 2020-03-21 | Stop reason: HOSPADM

## 2020-03-20 RX ORDER — POLYETHYLENE GLYCOL 3350 17 G/17G
17 POWDER, FOR SOLUTION ORAL DAILY
Status: DISCONTINUED | OUTPATIENT
Start: 2020-03-20 | End: 2020-03-21 | Stop reason: HOSPADM

## 2020-03-20 RX ORDER — BISACODYL 10 MG
10 SUPPOSITORY, RECTAL RECTAL DAILY PRN
Status: DISCONTINUED | OUTPATIENT
Start: 2020-03-20 | End: 2020-03-21 | Stop reason: HOSPADM

## 2020-03-20 RX ORDER — AMOXICILLIN 250 MG
1 CAPSULE ORAL 2 TIMES DAILY
Status: DISCONTINUED | OUTPATIENT
Start: 2020-03-20 | End: 2020-03-21 | Stop reason: HOSPADM

## 2020-03-20 RX ORDER — MORPHINE SULFATE 2 MG/ML
1 INJECTION, SOLUTION INTRAMUSCULAR; INTRAVENOUS
Status: DISCONTINUED | OUTPATIENT
Start: 2020-03-20 | End: 2020-03-21 | Stop reason: HOSPADM

## 2020-03-20 RX ORDER — ACETAMINOPHEN 500 MG
500 TABLET ORAL EVERY 6 HOURS PRN
Status: DISCONTINUED | OUTPATIENT
Start: 2020-03-20 | End: 2020-03-21 | Stop reason: HOSPADM

## 2020-03-20 RX ADMIN — SODIUM CHLORIDE: 0.9 INJECTION, SOLUTION INTRAVENOUS at 04:03

## 2020-03-20 RX ADMIN — OXYCODONE HYDROCHLORIDE 10 MG: 10 TABLET ORAL at 05:03

## 2020-03-20 RX ADMIN — HEPARIN SODIUM 5000 UNITS: 5000 INJECTION INTRAVENOUS; SUBCUTANEOUS at 08:03

## 2020-03-20 RX ADMIN — CEFAZOLIN 1 G: 1 INJECTION, POWDER, FOR SOLUTION INTRAMUSCULAR; INTRAVENOUS at 04:03

## 2020-03-20 RX ADMIN — BISACODYL 10 MG: 10 SUPPOSITORY RECTAL at 09:03

## 2020-03-20 RX ADMIN — SENNOSIDES AND DOCUSATE SODIUM 1 TABLET: 8.6; 5 TABLET ORAL at 08:03

## 2020-03-20 RX ADMIN — CEFAZOLIN 1 G: 1 INJECTION, POWDER, FOR SOLUTION INTRAMUSCULAR; INTRAVENOUS at 08:03

## 2020-03-20 RX ADMIN — PANTOPRAZOLE SODIUM 40 MG: 40 TABLET, DELAYED RELEASE ORAL at 09:03

## 2020-03-20 RX ADMIN — LOSARTAN POTASSIUM AND HYDROCHLOROTHIAZIDE 1 TABLET: 100; 12.5 TABLET, FILM COATED ORAL at 09:03

## 2020-03-20 RX ADMIN — OXYCODONE HYDROCHLORIDE 10 MG: 10 TABLET ORAL at 07:03

## 2020-03-20 RX ADMIN — ALLOPURINOL 300 MG: 100 TABLET ORAL at 09:03

## 2020-03-20 RX ADMIN — MUPIROCIN: 20 OINTMENT TOPICAL at 08:03

## 2020-03-20 NOTE — SUBJECTIVE & OBJECTIVE
Interval History:   NAEON. Patient resting comfortably in bed. No family at bedside. She reports improvement in UE paresthesias, including hand numbness. Denies any new paresthesias. States neck pain is well controlled with current regimen. Denies any dysphagia or odynophagia. Tolerating diet without coughing or choking. Voiding appropriately.     Medications:  Continuous Infusions:  Scheduled Meds:   allopurinoL  300 mg Oral Daily    amLODIPine  5 mg Oral Daily    atorvastatin  40 mg Oral Daily    ceFAZolin (ANCEF) IVPB  1 g Intravenous Q8H    heparin (porcine)  5,000 Units Subcutaneous Q8H    losartan-hydrochlorothiazide 100-12.5 mg  1 tablet Oral Daily    mupirocin   Nasal BID    pantoprazole  40 mg Oral Daily    polyethylene glycol  17 g Oral Daily    senna-docusate 8.6-50 mg  1 tablet Oral BID     PRN Meds:acetaminophen, aluminum-magnesium hydroxide-simethicone, bisacodyL, Dextrose 10% Bolus, Dextrose 10% Bolus, glucagon (human recombinant), glucose, glucose, hydrALAZINE, insulin aspart U-100, labetalol, morphine, ondansetron, oxyCODONE, oxyCODONE, promethazine (PHENERGAN) IVPB, senna-docusate 8.6-50 mg     Review of Systems  Objective:     Weight: 97.2 kg (214 lb 4.6 oz)  Body mass index is 34.59 kg/m².  Vital Signs (Most Recent):  Temp: 98.5 °F (36.9 °C) (03/20/20 1600)  Pulse: 93 (03/20/20 1600)  Resp: 18 (03/20/20 1600)  BP: (!) 160/70 (03/20/20 1600)  SpO2: 98 % (03/20/20 1600) Vital Signs (24h Range):  Temp:  [98.1 °F (36.7 °C)-99.7 °F (37.6 °C)] 98.5 °F (36.9 °C)  Pulse:  [] 93  Resp:  [15-20] 18  SpO2:  [94 %-100 %] 98 %  BP: (122-176)/(60-84) 160/70                          Closed/Suction Drain 03/19/20 1459 Medial Neck Other (Comment) (Active)   Site Description Unable to view 3/19/2020  8:00 PM   Dressing Type Transparent (Tegaderm) 3/19/2020  8:00 PM   Dressing Status Dry;Intact;Old drainage 3/19/2020  8:00 PM   Dressing Intervention Integrity maintained 3/19/2020  8:00 PM   Drainage  Serosanguineous 3/19/2020  8:00 PM   Output (mL) 2.5 mL 3/20/2020  5:37 AM       Female External Urinary Catheter 03/20/20 0800 (Active)       Neurosurgery Physical Exam  General: well developed, well nourished, no distress.   Head: normocephalic, atraumatic  Neurologic: Alert and oriented. Thought content appropriate.  GCS: Motor: 6/Verbal: 5/Eyes: 4 GCS Total: 15  Mental Status: Awake, Alert, Oriented x 4  Language: No aphasia  Speech: No dysarthria  Cranial nerves: face symmetric, tongue midline, CN II-XII grossly intact.   Eyes: pupils equal, round, reactive to light with accomodation, EOMI.   Pulmonary: normal respirations, no signs of respiratory distress  Abdomen: soft, non-distended, not tender to palpation  Skin: Skin is warm, dry and intact.  Sensory: intact to light touch throughout  Motor Strength:Moves all extremities spontaneously with good tone.  Full strength upper and lower extremities. No abnormal movements seen.   Arndt's: Negative.  Clonus: Negative.       Incision:  Clean, dry, intact bandage. No surrounding erythema or edema. No drainage noted. Neck is soft. No evidence of a palpable underlying fluid collection. 1 TLS drain in place.       Significant Labs:  Recent Labs   Lab 03/20/20  0602   *      K 4.6   *   CO2 19*   BUN 19   CREATININE 1.1   CALCIUM 9.4     Recent Labs   Lab 03/20/20  0602   WBC 17.88*   HGB 9.9*   HCT 31.4*          Significant Diagnostics:  Xray cervical spine 3/20:  I independently reviewed the imaging.     Postoperative changes relating to an interval anterior cervical spinal fusion procedure extending from C3-C6.  No unusual postoperative findings or significant detrimental interval change since the preoperative exam of 03/13/2020 appreciated.

## 2020-03-20 NOTE — PROGRESS NOTES
[facility]  Neurosurgery  Progress Note    Subjective:     History of Present Illness: Ms. Lina Davis is a 74 year old female with disc herniations at C3-4, C4-5, and C5-6 contributing to severe stenosis with myelomalacia from C4 to C6, who presents for elective ACDF today.     Post-Op Info:  Procedure(s) (LRB):  DISCECTOMY, SPINE, CERVICAL, ANTERIOR APPROACH, WITH FUSION, C3-C4, C4-C5, C5-C6 (N/A)   1 Day Post-Op     Interval History:   NAEON. Patient resting comfortably in bed. No family at bedside. She reports improvement in UE paresthesias, including hand numbness. Denies any new paresthesias. States neck pain is well controlled with current regimen. Denies any dysphagia or odynophagia. Tolerating diet without coughing or choking. Voiding appropriately.     Medications:  Continuous Infusions:  Scheduled Meds:   allopurinoL  300 mg Oral Daily    amLODIPine  5 mg Oral Daily    atorvastatin  40 mg Oral Daily    ceFAZolin (ANCEF) IVPB  1 g Intravenous Q8H    heparin (porcine)  5,000 Units Subcutaneous Q8H    losartan-hydrochlorothiazide 100-12.5 mg  1 tablet Oral Daily    mupirocin   Nasal BID    pantoprazole  40 mg Oral Daily    polyethylene glycol  17 g Oral Daily    senna-docusate 8.6-50 mg  1 tablet Oral BID     PRN Meds:acetaminophen, aluminum-magnesium hydroxide-simethicone, bisacodyL, Dextrose 10% Bolus, Dextrose 10% Bolus, glucagon (human recombinant), glucose, glucose, hydrALAZINE, insulin aspart U-100, labetalol, morphine, ondansetron, oxyCODONE, oxyCODONE, promethazine (PHENERGAN) IVPB, senna-docusate 8.6-50 mg     Review of Systems  Objective:     Weight: 97.2 kg (214 lb 4.6 oz)  Body mass index is 34.59 kg/m².  Vital Signs (Most Recent):  Temp: 98.5 °F (36.9 °C) (03/20/20 1600)  Pulse: 93 (03/20/20 1600)  Resp: 18 (03/20/20 1600)  BP: (!) 160/70 (03/20/20 1600)  SpO2: 98 % (03/20/20 1600) Vital Signs (24h Range):  Temp:  [98.1 °F (36.7 °C)-99.7 °F (37.6 °C)] 98.5 °F (36.9  °C)  Pulse:  [] 93  Resp:  [15-20] 18  SpO2:  [94 %-100 %] 98 %  BP: (122-176)/(60-84) 160/70                          Closed/Suction Drain 03/19/20 1459 Medial Neck Other (Comment) (Active)   Site Description Unable to view 3/19/2020  8:00 PM   Dressing Type Transparent (Tegaderm) 3/19/2020  8:00 PM   Dressing Status Dry;Intact;Old drainage 3/19/2020  8:00 PM   Dressing Intervention Integrity maintained 3/19/2020  8:00 PM   Drainage Serosanguineous 3/19/2020  8:00 PM   Output (mL) 2.5 mL 3/20/2020  5:37 AM       Female External Urinary Catheter 03/20/20 0800 (Active)       Neurosurgery Physical Exam  General: well developed, well nourished, no distress.   Head: normocephalic, atraumatic  Neurologic: Alert and oriented. Thought content appropriate.  GCS: Motor: 6/Verbal: 5/Eyes: 4 GCS Total: 15  Mental Status: Awake, Alert, Oriented x 4  Language: No aphasia  Speech: No dysarthria  Cranial nerves: face symmetric, tongue midline, CN II-XII grossly intact.   Eyes: pupils equal, round, reactive to light with accomodation, EOMI.   Pulmonary: normal respirations, no signs of respiratory distress  Abdomen: soft, non-distended, not tender to palpation  Skin: Skin is warm, dry and intact.  Sensory: intact to light touch throughout  Motor Strength:Moves all extremities spontaneously with good tone.  Full strength upper and lower extremities. No abnormal movements seen.   Arndt's: Negative.  Clonus: Negative.       Incision:  Clean, dry, intact bandage. No surrounding erythema or edema. No drainage noted. Neck is soft. No evidence of a palpable underlying fluid collection. 1 TLS drain in place.       Significant Labs:  Recent Labs   Lab 03/20/20  0602   *      K 4.6   *   CO2 19*   BUN 19   CREATININE 1.1   CALCIUM 9.4     Recent Labs   Lab 03/20/20  0602   WBC 17.88*   HGB 9.9*   HCT 31.4*          Significant Diagnostics:  Xray cervical spine 3/20:  I independently reviewed the imaging.      Postoperative changes relating to an interval anterior cervical spinal fusion procedure extending from C3-C6.  No unusual postoperative findings or significant detrimental interval change since the preoperative exam of 03/13/2020 appreciated.    Assessment/Plan:     * Cervical stenosis of spinal canal  74 year old female with disc herniations at C3-4, C4-5, and C5-6 contributing to severe stenosis with myelomalacia from C4 to C6. Now s/p C3-C6 ACDF on 3/19.    -Patient neurologically stable on exam  -TLS output: 31.5 cc. Will continue drain. Abx while in place.   -Brace on when upright, OOB, or working with therapy. OK to remove when lying in bed. Brace ordered from [facility].   -Post op xrays show good positioning of the hardware  -Pain: Well controlled. Continue current regimen.  -Leukocytosis: Patient afebrile. She has a history of recurrent UTI's. Will check a UA.   -HTN: -176. Begin Norvasc 5 mg daily. Continue home dose of Losartan-HCTZ 100-12.5 mg daily.   -DMT2: Hold home PO medications. Continue ISS, POCT 4x daily, and diabetic diet.   -Gout: Continue home dose of Allopurinol 300 mg daily  -DVT prophylaxis: BETH's, SCD's, SQH  -Bowel regimen: senna BID and miralax daily  -Atelectasis prevention: IS hourly while awake, PT/OT, OOB > 6 hours per day  -HOLD home dose of ASA for 5 days post op. OK to resume on 3/25.  -Follow up in Neurosurgery clinic in 2 weeks for a wound check and in 6 weeks with imaging.       DISPO: Pending PT eval. Plan to DC home tomorrow.       Patient seen by Dr. Walton. Intra op and post op imaging reviewed with the patient. All of her questions were answered.     Discussed with Dr. Walton  Please call with any questions      Pati Colin PA-C   Neurosurgery   Pager: [phone]

## 2020-03-20 NOTE — ASSESSMENT & PLAN NOTE
74 year old female with disc herniations at C3-4, C4-5, and C5-6 contributing to severe stenosis with myelomalacia from C4 to C6. Now s/p C3-C6 ACDF on 3/19.    -Patient neurologically stable on exam  -TLS output: 31.5 cc. Will continue drain. Abx while in place.   -Brace on when upright, OOB, or working with therapy. OK to remove when lying in bed. Brace ordered from [facility].   -Post op xrays show good positioning of the hardware  -Pain: Well controlled. Continue current regimen.  -Leukocytosis: Patient afebrile. She has a history of recurrent UTI's. Will check a UA.   -HTN: -176. Begin Norvasc 5 mg daily. Continue home dose of Losartan-HCTZ 100-12.5 mg daily.   -DMT2: Hold home PO medications. Continue ISS, POCT 4x daily, and diabetic diet.   -Gout: Continue home dose of Allopurinol 300 mg daily  -DVT prophylaxis: BETH's, SCD's, SQH  -Bowel regimen: senna BID and miralax daily  -Atelectasis prevention: IS hourly while awake, PT/OT, OOB > 6 hours per day  -HOLD home dose of ASA for 5 days post op. OK to resume on 3/25.  -Follow up in Neurosurgery clinic in 2 weeks for a wound check and in 6 weeks with imaging.       DISPO: Pending PT eval. Plan to DC home tomorrow.

## 2020-03-20 NOTE — PLAN OF CARE
Problem: Adult Inpatient Plan of Care  Goal: Plan of Care Review  Outcome: Ongoing, Progressing  Flowsheets (Taken 3/20/2020 0316)  Plan of Care Reviewed With: patient  Goal: Optimal Comfort and Wellbeing  Outcome: Ongoing, Progressing  Intervention: Provide Person-Centered Care  Flowsheets (Taken 3/20/2020 0316)  Trust Relationship/Rapport: care explained; thoughts/feelings acknowledged   POC reviewed with patient. All questions and concerns reviewed. Fall/safety precautions implemented and maintained. [place] care provided. Blood glucose monitored. Drain care provided. IVF maintained. No acute events noted this shift. Please see flow sheet for full assessment and vitals. Bed locked in lowest position. Call bell within reach. Will continue to monitor.

## 2020-03-20 NOTE — PLAN OF CARE
Cm met with patient to obtain discharge planning assessment.  Patient is POD 1 for cervical discectomy and fusion.  Planned discharge is home with family - Plan (A) or home with family with home health - Plan (B).    PCP:  Dora Freedman MD     Payor:  Payor: [employer] MANAGED MEDICARE / Plan: HU[employer] TOTAL CARE ADVANTAGE / Product Type: Medicare Advantage /      Pharmacy:    [facility] - [place], [place] - [address]  [address] [address]  Phone: [phone] Fax: [phone]    [id number] - [place] - [address] [facility] [address] [address]  [address]  [place] [address]  Phone: [phone] Fax: [phone]         03/20/20 111   Discharge Assessment   Assessment Type Discharge Planning Assessment   Confirmed/corrected address and phone number on facesheet? Yes   Assessment information obtained from? Patient   Expected Length of Stay (days) 2   Communicated expected length of stay with patient/caregiver yes   Prior to hospitilization cognitive status: Alert/Oriented   Prior to hospitalization functional status: Independent   Current cognitive status: Alert/Oriented   Current Functional Status: Independent   Lives With spouse   Able to Return to Prior Arrangements yes   Is patient able to care for self after discharge? Yes   Who are your caregiver(s) and their phone number(s)? Ran - spouse [phone]   Patient's perception of discharge disposition home or selfcare   Readmission Within the Last 30 Days no previous admission in last 30 days   Patient currently being followed by outpatient case management? No   Patient currently receives any other outside agency services? No   Equipment Currently Used at Home none   Do you have any problems affording any of your prescribed medications? No   Is the patient taking medications as prescribed? yes   Does the patient have transportation home? Yes   Transportation  Anticipated family or friend will provide   Does the patient receive services at the [facility]mad[facility] Clinic? No   Discharge Plan A Home with family   Discharge Plan B Home with family;Home Health   Patient/Family in Agreement with Plan yes

## 2020-03-20 NOTE — DISCHARGE INSTRUCTIONS
Neurosurgery Patient Information. Please follow ONLY the instructions that are checked below.    Activity Restrictions:  [x]  Return to work will be determined on an individual basis.  [x]  No lifting greater than 5-10 pounds.  [x]  Avoid bending and twisting the area of your surgery more than 45 degrees from neutral position in any direction.  [x]  No driving or operating machinery:  [x]  until cleared by your surgeon.  [x]  while taking narcotic pain medications or muscle relaxants.  [x]  Wear your brace at all times except when lying in bed, showering, using the restroom, or performing hygiene tasks.   [x]  Slowly increase your ambulation [walking] over the next 2 weeks as tolerated. The goal is to be walking 1-2 miles by the time of your 2 week post op appointment.   [x]  Walk on paved surfaces only. It is okay to walk up and down stairs while holding onto a side rail.  [x]  No sexual activity for 6 weeks.    Discharge Medication/Follow-up:  [x]  Please refer to discharge medication reconciliation form.  [x]  Do not take ANY Aspirin or Aspirin containing products after surgery. It is ok for you to resume your daily Aspirin on 3/25/20.  [x]  Do not take ANY herbal supplements for 2 weeks after surgery.    [x]  Do not take ANY non-steroidal anti-inflammatory drugs (NSAIDS), including the following: ibuprofen, naprosyn, Aleve, Advil, Indocin, Mobic, or Celebrex for 6 weeks after surgery.   [x]  Prescriptions for appropriate medication will be given upon discharge.  [x]  Take docusate (Colace 100 mg): take one capsule a day as needed for constipation. You can get this over the counter.  [x]  Follow-up appointment:  [x]  10-14 days post-op for wound check by physician assistant/nurse  [x]  4-6 weeks with MD:  [x]  with x-rays  [x]  An appointment will be mailed to you.    Wound Care:  [x]  No bandage required. Keep your incision open to the air.  [x]  You may shower on the 2nd day after your surgery. Keep the  incision clean and dry at all times. Please cover the incision while showering and REMOVE once you have completed taking your shower. Do not allow the force of water to hit the incision. If the incision gets damp, pat it dry. Do not rub or scrub the incision.  [x]  You cannot take a bath until 8 weeks after surgery.    Call your doctor or go to the Emergency Room for any signs of infection, including: increased redness, drainage, pain, or fever (temperature ?101.5 for 24 hours). Call your doctor or go to the Emergency Room if there are any localized neurological changes; problems with speech, vision, numbness, tingling, weakness, or severe headache; or for other concerns.    Special Instructions:  [x]  No use of tobacco products.  [x]  Diet: Please eat a diabetic diet as tolerated.      Physicians need 3 days' notice for pain medicine to be refilled. Pain medicine will only be refilled between 8 AM and 5 PM, Monday through Friday, due to Food and Drug Administration regulation of documentation.        [facility] Neurosurgery Office: [phone]              [facility] Neurosurgery Office: [phone]   [place] Neurosurgery Office: [phone]

## 2020-03-20 NOTE — HPI
Ms. Lina Davis is a 74 year old female with disc herniations at C3-4, C4-5, and C5-6 contributing to severe stenosis with myelomalacia from C4 to C6, who presents for elective ACDF today.

## 2020-03-20 NOTE — NURSING
1940: Patient arrived to unit via bed per transportation in stable condition. AAOx4. Respirations even and unlabored. No s/s of distress noted. IVF maintained. Neck drain in place. Costello catheter draining clear yellow urine. Oriented patient to room and call bell. Bed locked in lowest position. Call bell within reach. Will  Continue to monitor.

## 2020-03-20 NOTE — HOSPITAL COURSE
3/19: OR for elective C3-C6 ACDF. No intra op complications. Patient tolerated procedure well. Recovered in PACU.   3/20: NAEON. Post op xrays show good positioning of the hardware. TLS drain with 31.5 cc output, will continue. She reports improvement in UE paresthesias, including hand numbness. Denies any new paresthesias. States neck pain is well controlled with current regimen. Denies any dysphagia or odynophagia. Tolerating diet without coughing or choking. SBP elevated. Started on Norvasc. Pending PT evaluation. Plan to DC home tomorrow.   3/21:

## 2020-03-21 VITALS
WEIGHT: 214.31 LBS | SYSTOLIC BLOOD PRESSURE: 143 MMHG | BODY MASS INDEX: 34.44 KG/M2 | DIASTOLIC BLOOD PRESSURE: 63 MMHG | RESPIRATION RATE: 18 BRPM | TEMPERATURE: 99 F | OXYGEN SATURATION: 95 % | HEIGHT: 66 IN | HEART RATE: 101 BPM

## 2020-03-21 LAB
ANION GAP SERPL CALC-SCNC: 10 MMOL/L (ref 8–16)
BASOPHILS # BLD AUTO: 0.04 K/UL (ref 0–0.2)
BASOPHILS NFR BLD: 0.3 % (ref 0–1.9)
BUN SERPL-MCNC: 20 MG/DL (ref 8–23)
CALCIUM SERPL-MCNC: 9.6 MG/DL (ref 8.7–10.5)
CHLORIDE SERPL-SCNC: 108 MMOL/L (ref 95–110)
CO2 SERPL-SCNC: 23 MMOL/L (ref 23–29)
CREAT SERPL-MCNC: 0.9 MG/DL (ref 0.5–1.4)
DIFFERENTIAL METHOD: ABNORMAL
EOSINOPHIL # BLD AUTO: 0.1 K/UL (ref 0–0.5)
EOSINOPHIL NFR BLD: 0.8 % (ref 0–8)
ERYTHROCYTE [DISTWIDTH] IN BLOOD BY AUTOMATED COUNT: 14.2 % (ref 11.5–14.5)
EST. GFR  (AFRICAN AMERICAN): >60 ML/MIN/1.73 M^2
EST. GFR  (NON AFRICAN AMERICAN): >60 ML/MIN/1.73 M^2
GLUCOSE SERPL-MCNC: 211 MG/DL (ref 70–110)
HCT VFR BLD AUTO: 28.7 % (ref 37–48.5)
HGB BLD-MCNC: 9.4 G/DL (ref 12–16)
IMM GRANULOCYTES # BLD AUTO: 0.06 K/UL (ref 0–0.04)
IMM GRANULOCYTES NFR BLD AUTO: 0.5 % (ref 0–0.5)
LYMPHOCYTES # BLD AUTO: 2.6 K/UL (ref 1–4.8)
LYMPHOCYTES NFR BLD: 20 % (ref 18–48)
MCH RBC QN AUTO: 29.2 PG (ref 27–31)
MCHC RBC AUTO-ENTMCNC: 32.8 G/DL (ref 32–36)
MCV RBC AUTO: 89 FL (ref 82–98)
MONOCYTES # BLD AUTO: 1.1 K/UL (ref 0.3–1)
MONOCYTES NFR BLD: 8.3 % (ref 4–15)
NEUTROPHILS # BLD AUTO: 9.2 K/UL (ref 1.8–7.7)
NEUTROPHILS NFR BLD: 70.1 % (ref 38–73)
NRBC BLD-RTO: 0 /100 WBC
PLATELET # BLD AUTO: 241 K/UL (ref 150–350)
PMV BLD AUTO: 10.4 FL (ref 9.2–12.9)
POCT GLUCOSE: 216 MG/DL (ref 70–110)
POTASSIUM SERPL-SCNC: 3.9 MMOL/L (ref 3.5–5.1)
RBC # BLD AUTO: 3.22 M/UL (ref 4–5.4)
SODIUM SERPL-SCNC: 141 MMOL/L (ref 136–145)
WBC # BLD AUTO: 13.18 K/UL (ref 3.9–12.7)

## 2020-03-21 PROCEDURE — 25000003 PHARM REV CODE 250: Mod: HCNC | Performed by: PHYSICIAN ASSISTANT

## 2020-03-21 PROCEDURE — 80048 BASIC METABOLIC PNL TOTAL CA: CPT | Mod: HCNC

## 2020-03-21 PROCEDURE — 25000003 PHARM REV CODE 250: Mod: HCNC | Performed by: STUDENT IN AN ORGANIZED HEALTH CARE EDUCATION/TRAINING PROGRAM

## 2020-03-21 PROCEDURE — 97116 GAIT TRAINING THERAPY: CPT | Mod: HCNC

## 2020-03-21 PROCEDURE — 63600175 PHARM REV CODE 636 W HCPCS: Mod: HCNC | Performed by: STUDENT IN AN ORGANIZED HEALTH CARE EDUCATION/TRAINING PROGRAM

## 2020-03-21 PROCEDURE — 36415 COLL VENOUS BLD VENIPUNCTURE: CPT | Mod: HCNC

## 2020-03-21 PROCEDURE — 85025 COMPLETE CBC W/AUTO DIFF WBC: CPT | Mod: HCNC

## 2020-03-21 PROCEDURE — 97161 PT EVAL LOW COMPLEX 20 MIN: CPT | Mod: HCNC

## 2020-03-21 RX ORDER — AMLODIPINE BESYLATE 5 MG/1
10 TABLET ORAL DAILY
Qty: 60 TABLET | Refills: 11 | Status: SHIPPED | OUTPATIENT
Start: 2020-03-22 | End: 2020-07-14 | Stop reason: SDUPTHER

## 2020-03-21 RX ORDER — AMLODIPINE BESYLATE 5 MG/1
10 TABLET ORAL DAILY
Qty: 60 TABLET | Refills: 11 | Status: SHIPPED | OUTPATIENT
Start: 2020-03-22 | End: 2020-03-21

## 2020-03-21 RX ORDER — OXYCODONE AND ACETAMINOPHEN 5; 325 MG/1; MG/1
1 TABLET ORAL EVERY 4 HOURS PRN
Qty: 40 TABLET | Refills: 0 | Status: SHIPPED | OUTPATIENT
Start: 2020-03-21 | End: 2020-05-06 | Stop reason: SDUPTHER

## 2020-03-21 RX ADMIN — POLYETHYLENE GLYCOL 3350 17 G: 17 POWDER, FOR SOLUTION ORAL at 09:03

## 2020-03-21 RX ADMIN — ALLOPURINOL 300 MG: 100 TABLET ORAL at 09:03

## 2020-03-21 RX ADMIN — LOSARTAN POTASSIUM AND HYDROCHLOROTHIAZIDE 1 TABLET: 100; 12.5 TABLET, FILM COATED ORAL at 09:03

## 2020-03-21 RX ADMIN — SENNOSIDES AND DOCUSATE SODIUM 1 TABLET: 8.6; 5 TABLET ORAL at 09:03

## 2020-03-21 RX ADMIN — OXYCODONE HYDROCHLORIDE 5 MG: 5 TABLET ORAL at 12:03

## 2020-03-21 RX ADMIN — MUPIROCIN: 20 OINTMENT TOPICAL at 09:03

## 2020-03-21 RX ADMIN — CEFAZOLIN 1 G: 1 INJECTION, POWDER, FOR SOLUTION INTRAMUSCULAR; INTRAVENOUS at 12:03

## 2020-03-21 RX ADMIN — HEPARIN SODIUM 5000 UNITS: 5000 INJECTION INTRAVENOUS; SUBCUTANEOUS at 05:03

## 2020-03-21 RX ADMIN — PANTOPRAZOLE SODIUM 40 MG: 40 TABLET, DELAYED RELEASE ORAL at 09:03

## 2020-03-21 RX ADMIN — OXYCODONE HYDROCHLORIDE 10 MG: 10 TABLET ORAL at 05:03

## 2020-03-21 RX ADMIN — CEFAZOLIN 1 G: 1 INJECTION, POWDER, FOR SOLUTION INTRAMUSCULAR; INTRAVENOUS at 05:03

## 2020-03-21 RX ADMIN — ATORVASTATIN CALCIUM 40 MG: 20 TABLET, FILM COATED ORAL at 09:03

## 2020-03-21 RX ADMIN — AMLODIPINE BESYLATE 5 MG: 5 TABLET ORAL at 09:03

## 2020-03-21 NOTE — NURSING
I received report from Damien at the bedside patient in bed resting quietly no signs of distress eyes closed bed alarm activated call light within the patient reach

## 2020-03-21 NOTE — NURSING
The patient has discharge orders the patient INT(2) removed pressure applied and a pressure dressing in place. The patient neck dressing in place dry and intact. I spoke to Dr. Chaidez the patient med reconciliation need to be completed. The pharmacy was contacted for the patient prescriptions.The patient has her neck brace in place. The patient has called for a ride home and is waiting transportation.

## 2020-03-21 NOTE — PT/OT/SLP EVAL
Physical Therapy Evaluation and Discharge Note    Patient Name:  Lina Davis   MRN:  [id number]    Recommendations:     Discharge Recommendations:  home   Discharge Equipment Recommendations: none   Barriers to discharge: None    Assessment:     Lina Davis is a 74 y.o. female admitted with a medical diagnosis of Cervical stenosis of spinal canal. .  At this time, patient is functioning at their prior level of function and does not require further acute PT services.     Recent Surgery: Procedure(s) (LRB):  DISCECTOMY, SPINE, CERVICAL, ANTERIOR APPROACH, WITH FUSION, C3-C4, C4-C5, C5-C6 (N/A) 2 Days Post-Op    Plan:     During this hospitalization, patient does not require further acute PT services.  Please re-consult if situation changes.      Subjective     Chief Complaint: none   Patient/Family Comments/goals: to go home  Pain/Comfort:  · Pain Rating 1: 0/10  · Pain Rating Post-Intervention 1: 0/10    Patients cultural, spiritual, Sabianist conflicts given the current situation: no    Living Environment:  Patient lives with spouse in [facility] and [address] [employer] ([place]). She was (I) PTA and uses no DME. She drives, but no longer is working.   Prior to admission, patients level of function was independent.  Equipment used at home: none.  DME owned (not currently used): rolling walker.  Upon discharge, patient will have assistance from spouse and dtr.    Objective:     Communicated with RN prior to session.  Patient found up in chair with (no lines attached) upon PT entry to room.    General Precautions: Standard, fall   Orthopedic Precautions:spinal precautions   Braces: Cervical collar     Exams:  · Gross Motor Coordination:  WFL  · Postural Exam:  Patient presented with the following abnormalities:    · -       No postural abnormalities identified  · Sensation:    · -       Intact  light/touch BUE/BLE  · RUE ROM: WFL  · RUE Strength: WFL  · LUE ROM: WFL  · LUE Strength: WFL  · RLE ROM: WFL  · RLE Strength: WFL  · LLE ROM:  WFL  · LLE Strength: WFL    Functional Mobility:  · Transfers:     · Sit to Stand:  independence with no AD  · Gait: 40ft with (I), no gait deviations, no LOB; steady   · Balance: good throughout    AM-PAC 6 CLICK MOBILITY  Total Score:23       Therapeutic Activities and Exercises:   Patient educated on role and goal of PT   White board updated   Questions and concerns answered within PT scope     AM-PAC 6 CLICK MOBILITY  Total Score:23     Patient left up in chair with all lines intact, call button in reach and RN notified.    GOALS:   Multidisciplinary Problems     Physical Therapy Goals     Not on file          Multidisciplinary Problems (Resolved)        Problem: Physical Therapy Goal    Goal Priority Disciplines Outcome Goal Variances Interventions   Physical Therapy Goal   (Resolved)     PT, PT/OT Met     Description:  Pt evaluation completed.                     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    Vitamin D deficiency disease        Past Surgical History:   Procedure Laterality Date    ANTERIOR CERVICAL DISCECTOMY W/ FUSION N/A 3/19/2020    Procedure: DISCECTOMY, SPINE, CERVICAL, ANTERIOR APPROACH, WITH FUSION, C3-C4, C4-C5, C5-C6;  Surgeon: Joseph Walton MD;  Location: [facility] OR [address] [place] [address];  Service: Neurosurgery;  Laterality: N/A;    CARPAL TUNNEL RELEASE      bilateral    COLONOSCOPY N/A 3/7/2018    Procedure: COLONOSCOPY;  Surgeon: Luís Soni MD;  Location: [facility] ([address] [place] [address]);  Service: Endoscopy;  Laterality: N/A;  ok to schedule per Elizabeth    COLONOSCOPY W/ BIOPSIES AND POLYPECTOMY      HEMORRHOID SURGERY      HYSTERECTOMY      AUB    ROTATOR CUFF REPAIR      bilateral       Time Tracking:     PT Received  On: 03/21/20  PT Start Time: 0824     PT Stop Time: 0840  PT Total Time (min): 16 min     Billable Minutes: Evaluation 8 and Gait Training 8      Earnestine Lopez, PT  03/21/2020

## 2020-03-21 NOTE — DISCHARGE SUMMARY
[facility]  Neurosurgery  Discharge Summary      Patient Name: Lina Davis  MRN: [id number]  Admission Date: 3/19/2020  Hospital Length of Stay: 2 days  Discharge Date and Time:  03/21/2020 12:14 PM  Attending Physician: Joseph Walton MD  Discharging Provider: Richard Abreu MD  Primary Care Physician: Dora Freedman MD    HPI: Ms. Lina Davis is a 73 yo female with a PMH of DM, HTN, HLD, arthritis who presented with left lateral neck pain along with LUE radicular pain with bilateral hand weakness with symptoms of myelopathy including frequently dropping objects. She had an elective C3-C6 ACDF on 3/19 without perioperative complications.     Procedure(s) (LRB):  DISCECTOMY, SPINE, CERVICAL, ANTERIOR APPROACH, WITH FUSION, C3-C4, C4-C5, C5-C6 (N/A)     Indwelling Lines/Drains at time of discharge:  Lines/Drains/Airways     Drain                 Closed/Suction Drain 03/19/20 1459 Medial Neck Other (Comment) 1 day    Female External Urinary Catheter 03/20/20 0800 1 day                Hospital Course (synopsis of major diagnoses, care, treatment, and services provided during the course of the hospital stay):     3/20: NAEON. Patient resting comfortably in bed. No family at bedside. She reports improvement in UE paresthesias, including hand numbness. Denies any new paresthesias. States neck pain is well controlled with current regimen. Denies any dysphagia or odynophagia. Tolerating diet without coughing or choking. Voiding appropriately.     3/21: POD 2. Eating, drinking, voiding, had a BM, up working with PTOumar Barrera with 24 cc removed with incision site c/d/i. Plan to DC with Home Health service today. No new paresthesias, hand strength improved.       Consults:     Significant Diagnostic Studies: Labs:   BMP:   Recent Labs   Lab 03/20/20  0602 03/21/20  0450   * 211*    141   K 4.6 3.9   * 108   CO2 19* 23   BUN 19 20   CREATININE 1.1 0.9   CALCIUM 9.4 9.6   , CMP   Recent  Labs   Lab 03/20/20  0602 03/21/20  0450    141   K 4.6 3.9   * 108   CO2 19* 23   * 211*   BUN 19 20   CREATININE 1.1 0.9   CALCIUM 9.4 9.6   ANIONGAP 12 10   ESTGFRAFRICA 58.0* >60.0   EGFRNONAA 50.3* >60.0   , CBC   Recent Labs   Lab 03/20/20  0602 03/21/20  0450   WBC 17.88* 13.18*   HGB 9.9* 9.4*   HCT 31.4* 28.7*    241    and INR   Lab Results   Component Value Date    INR 1.0 03/13/2020    INR 1.0 02/05/2018     Microbiology:   Blood Culture   Lab Results   Component Value Date    LABBLOO No growth after 5 days. 02/05/2018    LABBLOO No growth after 5 days. 02/05/2018     Radiology: X-Ray: CXR: X-Ray Chest 1 View (CXR): No results found for this visit on 03/19/20.    Pending Diagnostic Studies:     None          Final Active Diagnoses:    Diagnosis Date Noted POA    PRINCIPAL PROBLEM:  Cervical stenosis of spinal canal [M48.02] 03/19/2020 Yes      Problems Resolved During this Admission:       Discharged Condition: good    Follow Up:  Follow-up Information     Dora Freedman MD.    Specialty:  Internal Medicine  Why:  Outpatient Services  Contact information:  [address]  [place] [address]  [phone]             [facility].    Specialty:  Home Health Services  Why:  Home Health will be out to see patient the day after discharge  Contact information:  [address] [address]  [phone]                 Patient Instructions:      Ambulatory referral/consult to Home Health   Standing Status: Future   Referral Priority: Routine Referral Type: Home Health   Referral Reason: Specialty Services Required   Requested Specialty: Home Health Services   Number of Visits Requested: 1     Ambulatory referral/consult to Home Health   Standing Status: Future   Referral Priority: Routine Referral Type: Home Health Care   Referral Reason: Specialty Services Required   Requested Specialty: Home Health Services   Number of Visits  Requested: 1     Diet Cardiac     SUBSEQUENT HOME HEALTH ORDERS   Order Comments: Subsequent Home Health Orders    Current Medications:  Current Facility-Administered Medications:  acetaminophen tablet 500 mg, 500 mg, Oral, Q6H PRN, BOZENA Hurley  allopurinoL tablet 300 mg, 300 mg, Oral, Daily, Luís Solano MD, 300 mg at 03/21/20 0957  aluminum-magnesium hydroxide-simethicone 200-200-20 mg/5 mL suspension 30 mL, 30 mL, Oral, Q4H PRN, Sumi Novak MD  amLODIPine tablet 5 mg, 5 mg, Oral, Daily, BOZENA Hurley, 5 mg at 03/21/20 0957  atorvastatin tablet 40 mg, 40 mg, Oral, Daily, Luís Solano MD, 40 mg at 03/21/20 0957  bisacodyL suppository 10 mg, 10 mg, Rectal, Daily PRN, BOZENA Hurley, 10 mg at 03/20/20 0933  ceFAZolin injection 1 g, 1 g, Intravenous, Q8H, Luís Solano MD, 1 g at 03/21/20 0511  dextrose 10% (D10W) Bolus, 12.5 g, Intravenous, PRN, Luís Solano MD  dextrose 10% (D10W) Bolus, 25 g, Intravenous, PRN, Luís Solano MD  glucagon (human recombinant) injection 1 mg, 1 mg, Intramuscular, PRN, Luís Solano MD  glucose chewable tablet 16 g, 16 g, Oral, PRN, Luís Solano MD  glucose chewable tablet 24 g, 24 g, Oral, PRN, Luís Solano MD  heparin (porcine) injection 5,000 Units, 5,000 Units, Subcutaneous, Q8H, Luís Solano MD, 5,000 Units at 03/21/20 0511  hydrALAZINE injection 20 mg, 20 mg, Intravenous, Q6H PRN, Luís Solano MD, 20 mg at 03/19/20 1554  insulin aspart U-100 pen 0-5 Units, 0-5 Units, Subcutaneous, QID (AC + HS) PRN, Luís Solano MD, 1 Units at 03/19/20 2113  labetalol 20 mg/4 mL (5 mg/mL) IV syring, 10 mg, Intravenous, Q10 Min PRN, Luís Solano MD  losartan-hydrochlorothiazide 100-12.5 mg per tablet 1 tablet, 1 tablet, Oral, Daily, Luís Solano MD, 1 tablet at 03/21/20 0957  morphine injection 1 mg, 1 mg, Intravenous, Q3H PRN, BOZENA Hurley  mupirocin 2 % ointment, , Nasal, BID, Sumi Novak MD  ondansetron injection 4 mg, 4 mg, Intravenous, Q6H  PRN, BOZENA Hurley  oxyCODONE immediate release tablet 10 mg, 10 mg, Oral, Q4H PRN, Sumi Novak MD, 10 mg at 03/21/20 0512  oxyCODONE immediate release tablet 5 mg, 5 mg, Oral, Q4H PRN, Sumi Novak MD  pantoprazole EC tablet 40 mg, 40 mg, Oral, Daily, Luís Solano MD, 40 mg at 03/21/20 0957  polyethylene glycol packet 17 g, 17 g, Oral, Daily, BOZENA Hurley, 17 g at 03/21/20 0958  promethazine (PHENERGAN) 6.25 mg in dextrose 5 % 50 mL IVPB, 6.25 mg, Intravenous, Q6H PRN, Sumi Novak MD  senna-docusate 8.6-50 mg per tablet 1 tablet, 1 tablet, Oral, BID, BOZENA Hurley, 1 tablet at 03/21/20 0957  senna-docusate 8.6-50 mg per tablet 2 tablet, 2 tablet, Oral, Nightly PRN, Sumi Novak MD        Nursing:   N/A    Diet:   cardiac diet    Activities:   activity as tolerated    Labs:  none    Referrals/ Consults  Physical Therapy to evaluate and treat. Evaluate for home safety and equipment needs; Establish/upgrade home exercise program. Perform / instruct on therapeutic exercises, gait training, transfer training, and Range of Motion.  Occupational Therapy to evaluate and treat. Evaluate home environment for safety and equipment needs. Perform/Instruct on transfers, ADL training, ROM, and therapeutic exercises.    Home Health Aide:  Physical Therapy Three times weekly and Occupational Therapy Three times weekly     Order Specific Question Answer Comments   What Home Health Agency is the patient currently using? [facility]      Notify your health care provider if you experience any of the following:  increased confusion or weakness     Notify your health care provider if you experience any of the following:  persistent dizziness, light-headedness, or visual disturbances     Notify your health care provider if you experience any of the following:  worsening rash     Notify your health care provider if you experience any of the following:  severe persistent headache     Notify your  health care provider if you experience any of the following:  difficulty breathing or increased cough     Notify your health care provider if you experience any of the following:  redness, tenderness, or signs of infection (pain, swelling, redness, odor or green/yellow discharge around incision site)     Notify your health care provider if you experience any of the following:  severe uncontrolled pain     Notify your health care provider if you experience any of the following:  persistent nausea and vomiting or diarrhea     Notify your health care provider if you experience any of the following:  temperature >100.4     Activity as tolerated     Medications:  Reconciled Home Medications:      Medication List      START taking these medications    amLODIPine 5 MG tablet  Commonly known as:  NORVASC  Take 2 tablets (10 mg total) by mouth once daily.  Start taking on:  March 22, 2020     oxyCODONE-acetaminophen 5-325 mg per tablet  Commonly known as:  PERCOCET  Take 1 tablet by mouth every 4 (four) hours as needed for Pain (pain 7-10/10).        CHANGE how you take these medications    allopurinoL 300 MG tablet  Commonly known as:  ZYLOPRIM  TAKE 1 TABLET EVERY DAY  What changed:    · how much to take  · how to take this  · when to take this  · additional instructions     atorvastatin 40 MG tablet  Commonly known as:  LIPITOR  Take 1 tablet (40 mg total) by mouth once daily.  What changed:  additional instructions     glimepiride 1 MG tablet  Commonly known as:  AMARYL  One in AM , half in PM  What changed:  additional instructions     losartan-hydrochlorothiazide 100-12.5 mg 100-12.5 mg Tab  Commonly known as:  HYZAAR  Take 1 tablet by mouth once daily.  What changed:  additional instructions     metFORMIN 500 MG tablet  Commonly known as:  GLUCOPHAGE  TAKE 2 TABLETS TWICE DAILY WITH MEALS  What changed:    · how much to take  · additional instructions     omeprazole 20 MG capsule  Commonly known as:  PRILOSEC  One  capsule twice daily  What changed:    · how much to take  · how to take this  · when to take this  · additional instructions        CONTINUE taking these medications    alcohol swabs Padm  Commonly known as:  BD ALCOHOL SWABS  USE TWICE DAILY AS DIRECTED     blood glucose control, normal Soln  True Metrix control solution E11.9 - pt tests twice daily     blood-glucose meter kit  Check glucose daily E11.9 meter covered by insurance Don Metrix     d-mannose Powd  Take 2 g by mouth once daily. Mix with 200mL of water.     lancets 28 gauge Misc  Commonly known as:  TRUEPLUS LANCETS  1 lancet by Misc.(Non-Drug; Combo Route) route 2 (two) times daily.     TRUE METRIX GLUCOSE TEST STRIP Strp  Generic drug:  blood sugar diagnostic  TEST BLOOD SUGAR TWICE DAILY        STOP taking these medications    acetaminophen 325 MG tablet  Commonly known as:  TYLENOL     acetaminophen 500 MG tablet  Commonly known as:  TYLENOL     aspirin 81 MG EC tablet  Commonly known as:  ECOTRIN     HYDROcodone-acetaminophen 5-325 mg per tablet  Commonly known as:  NORCO     vitamin D 1000 units Tab  Commonly known as:  VITAMIN D3            Time spent on the discharge of patient: 30 minutes    Richard Abreu MD  Neurosurgery  [facility]

## 2020-03-21 NOTE — PLAN OF CARE
Problem: Physical Therapy Goal  Goal: Physical Therapy Goal  Description  Pt evaluation completed.    Outcome: Met   PT evaluation complete. No goals established secondary to patient functional at their baseline with mobility and has no acute PT needs at this time. D/C from PT services.  Earnestine Lopez, PT, DPT  3/21/2020

## 2020-03-21 NOTE — PLAN OF CARE
Patient discharged home.  The patient will have home health with [facility].  Family provided transportation home.  Neurosurgery clinic to schedule follow up appointment.    Future Appointments   Date Time Provider Department Center   4/6/2020  1:40 PM Slime Brunson RN [facility] NEUROS7 [facility]   5/6/2020  9:45 AM [facility] OIC-XRAY [facility]   5/6/2020 10:45 AM Joseph Walton MD [facility] NEUROS7 [facility] [place]   5/29/2020  2:00 PM Dora Freedman MD [facility]        03/21/20 1453   Final Note   Assessment Type Final Discharge Note   Anticipated Discharge Disposition Home-Health   Hospital Follow Up  Appt(s) scheduled?   (Neurosurgery clinic to schedule follow up appointment.)

## 2020-03-21 NOTE — PLAN OF CARE
Pt AAOx4.  POC and meds reviewed with patient.  Questions encouraged and answered.    Patient verbalized understanding.  V/S stable throughout shift; please see flowsheet for details and full assessments.  NAEO.  Siderails up x 2, bed locked and in low position.  Call bell in reach.  02 and suction at bedside.  Will CTM.

## 2020-03-21 NOTE — PLAN OF CARE
Pt accepted to [facility]. Pt will be seen tomorrow.      03/21/20 1116   Post-Acute Status   Post-Acute Authorization HME   HME Status Set-up Complete     Joanne Romero LMSW  [facility]

## 2020-03-22 PROCEDURE — G0180 MD CERTIFICATION HHA PATIENT: HCPCS | Mod: ,,, | Performed by: NEUROLOGICAL SURGERY

## 2020-03-22 PROCEDURE — G0180 PR HOME HEALTH MD CERTIFICATION: ICD-10-PCS | Mod: ,,, | Performed by: NEUROLOGICAL SURGERY

## 2020-03-23 ENCOUNTER — TELEPHONE (OUTPATIENT)
Dept: INTERNAL MEDICINE | Facility: CLINIC | Age: 75
End: 2020-03-23

## 2020-03-23 ENCOUNTER — TELEPHONE (OUTPATIENT)
Dept: RADIOLOGY | Facility: HOSPITAL | Age: 75
End: 2020-03-23

## 2020-03-23 LAB
BLD PROD TYP BPU: NORMAL
BLD PROD TYP BPU: NORMAL
BLOOD UNIT EXPIRATION DATE: NORMAL
BLOOD UNIT EXPIRATION DATE: NORMAL
BLOOD UNIT TYPE CODE: 5100
BLOOD UNIT TYPE CODE: 5100
BLOOD UNIT TYPE: NORMAL
BLOOD UNIT TYPE: NORMAL
CODING SYSTEM: NORMAL
CODING SYSTEM: NORMAL
DISPENSE STATUS: NORMAL
DISPENSE STATUS: NORMAL
TRANS ERYTHROCYTES VOL PATIENT: NORMAL ML
TRANS ERYTHROCYTES VOL PATIENT: NORMAL ML

## 2020-03-23 NOTE — TELEPHONE ENCOUNTER
----- Message from Melissa Haque sent at 3/23/2020  2:20 PM CDT -----  Contact: [facility] [place] [phone]  Type: Home Health (orders, updates, clarifications, etc.)    Home Health Agency/Nurse: [facility]    Phone number: [phone]    Reason for call:  BEN gonzalez started;   1.Patient is confused about being on a Cardiac diet; She is on a regular diet  2. Was advised to stop the d-mannose Powd; can she resume;  3. Patient was advised to take 1000 metformin in the morning and 500 in the evening but Medication bottle does not reflect the change.  4. Bottle of omeprazole (PRILOSEC) 20 MG caps take one twice daily but put said she was advised to take as needed.   5.Man was told to discontinue Asprin and Vitamin D  Should she resume.   6. Femi has other medication that she is taking but has no labeling  7. Patient had one PT visit and is progressing well and independent. Good balance no walker use.  Comments:

## 2020-04-03 ENCOUNTER — PATIENT OUTREACH (OUTPATIENT)
Dept: ADMINISTRATIVE | Facility: OTHER | Age: 75
End: 2020-04-03

## 2020-04-06 ENCOUNTER — TELEPHONE (OUTPATIENT)
Dept: NEUROSURGERY | Facility: CLINIC | Age: 75
End: 2020-04-06

## 2020-04-14 ENCOUNTER — PATIENT MESSAGE (OUTPATIENT)
Dept: NEUROSURGERY | Facility: CLINIC | Age: 75
End: 2020-04-14

## 2020-04-20 RX ORDER — LOSARTAN POTASSIUM AND HYDROCHLOROTHIAZIDE 12.5; 1 MG/1; MG/1
TABLET ORAL
Qty: 90 TABLET | Refills: 3 | Status: SHIPPED | OUTPATIENT
Start: 2020-04-20 | End: 2020-08-21

## 2020-04-27 ENCOUNTER — EXTERNAL HOME HEALTH (OUTPATIENT)
Dept: HOME HEALTH SERVICES | Facility: HOSPITAL | Age: 75
End: 2020-04-27
Payer: MEDICARE

## 2020-05-04 ENCOUNTER — TELEPHONE (OUTPATIENT)
Dept: NEUROSURGERY | Facility: CLINIC | Age: 75
End: 2020-05-04

## 2020-05-05 ENCOUNTER — HOSPITAL ENCOUNTER (OUTPATIENT)
Dept: RADIOLOGY | Facility: HOSPITAL | Age: 75
Discharge: HOME | End: 2020-05-05
Attending: NEUROLOGICAL SURGERY
Payer: MEDICARE

## 2020-05-05 ENCOUNTER — TELEPHONE (OUTPATIENT)
Dept: NEUROSURGERY | Facility: CLINIC | Age: 75
End: 2020-05-05

## 2020-05-05 DIAGNOSIS — M47.12 CERVICAL SPONDYLOSIS WITH MYELOPATHY: ICD-10-CM

## 2020-05-05 DIAGNOSIS — M50.00 HERNIATED NUCLEUS PULPOSUS WITH MYELOPATHY, CERVICAL: ICD-10-CM

## 2020-05-05 PROCEDURE — 72040 X-RAY EXAM NECK SPINE 2-3 VW: CPT | Mod: 26,HCNC,, | Performed by: RADIOLOGY

## 2020-05-05 PROCEDURE — 72040 X-RAY EXAM NECK SPINE 2-3 VW: CPT | Mod: TC,HCNC

## 2020-05-05 PROCEDURE — 72040 XR CERVICAL SPINE AP LATERAL: ICD-10-PCS | Mod: 26,HCNC,, | Performed by: RADIOLOGY

## 2020-05-06 ENCOUNTER — OFFICE VISIT (OUTPATIENT)
Dept: NEUROSURGERY | Facility: CLINIC | Age: 75
End: 2020-05-06
Payer: MEDICARE

## 2020-05-06 DIAGNOSIS — Z98.1 S/P CERVICAL SPINAL FUSION: Primary | ICD-10-CM

## 2020-05-06 PROCEDURE — 99499 UNLISTED E&M SERVICE: CPT | Mod: HCNC,95,, | Performed by: NEUROLOGICAL SURGERY

## 2020-05-06 PROCEDURE — 99024 POSTOP FOLLOW-UP VISIT: CPT | Mod: HCNC,95,, | Performed by: NEUROLOGICAL SURGERY

## 2020-05-06 PROCEDURE — 99024 PR POST-OP FOLLOW-UP VISIT: ICD-10-PCS | Mod: HCNC,95,, | Performed by: NEUROLOGICAL SURGERY

## 2020-05-06 PROCEDURE — 99499 RISK ADDL DX/OHS AUDIT: ICD-10-PCS | Mod: HCNC,95,, | Performed by: NEUROLOGICAL SURGERY

## 2020-05-06 NOTE — PATIENT INSTRUCTIONS
I have personally reviewed the XR Cervical spine with the pt which shows postoperative changes of C3 through C6 ACDF with proper hardware placement and alignment.    I will schedule the patient for 3 month follow up with CT Cervical spine.    Pt cleared to drive without the cervical collar over short distances. She should use the collar when she is a passenger. Instructed to refrain from strenuous activity or lifting more than 10-15 lbs at a time. She may sweep/mop gently.    She may shower as she normally would and the sutures will fall off on their own.

## 2020-05-06 NOTE — PROGRESS NOTES
Subjective:   I, Rayo Masterson, attest that this documentation has been prepared under the direction and in the presence of Joseph Walton MD.     Patient ID: Lina Davis is a 75 y.o. female     Chief Complaint: No chief complaint on file.        The patient location is: Home  The chief complaint leading to consultation is: cervical stenosis  Visit type: Virtual visit with synchronous audio and video  Total time spent with patient: 10 minutes  Each patient to whom he or she provides medical services by telemedicine is:  (1) informed of the relationship between the physician and patient and the respective role of any other health care provider with respect to management of the patient; and (2) notified that he or she may decline to receive medical services by telemedicine and may withdraw from such care at any time.        HPI  Ms. Lina Davis is a pleasant 75 y.o. woman who is s/p C3 through C6 ACDF on 03/19/2020 and presents today for her 6 week PO follow up. This is pt who had an acute onset of left lateral neck pain and LUE radiculopathy several months ago. She endorsed bilateral hand weakness causing her to frequently drop objects during her follow up visit with me. Upon my review, MRI Cervical Spine from 0/28/2019 showed multilevel degenerative changes with a small disc bulge at C2-3 without stenosis. There are disc bulges at C3-4, C4-5, and C5-6 associated with severe stenosis and myelomalacia from C4 to C6.  Based on these findings, surgery was offered and pt wished to proceed.    Pt states she has been doing well since the surgery. She states she still has some numbness in her finger tips, as was present prior to surgery. She denies neck pain or any new issues. She states only uses the cervical collar only when she active around her home.    Review of Systems   Constitutional: Negative for activity change, fatigue, fever and unexpected weight change.   HENT: Negative for facial swelling.    Eyes: Negative.     Respiratory: Negative.    Cardiovascular: Negative.    Gastrointestinal: Negative for diarrhea, nausea and vomiting.   Genitourinary: Negative.    Musculoskeletal: Negative for back pain, joint swelling, myalgias and neck pain.   Neurological: Positive for numbness (fingers). Negative for dizziness, weakness and headaches.   Psychiatric/Behavioral: Negative.       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   Physical Exam   Constitutional: She is oriented to person, place, and time. She appears well-developed and well-nourished. No distress.   HENT:   Head: Normocephalic and atraumatic.   Neurological: She is alert and oriented to person, place, and time.        IMAGING:  X-Ray Cervical Spine AP And Lateral (05/05/2020) shows postoperative changes of C3 through C6 ACDF with proper hardware placement and alignment      I have personally reviewed the images with the pt.      I, Dr. Joseph Walton, personally performed the services described in this documentation. All medical record entries made by the scribe, Rayo Masterson, were at my direction and in my presence.  I have reviewed the chart and agree that the record reflects my personal performance and is accurate and complete. Joseph Walton MD. 05/06/2020    Assessment:       1. S/P cervical spinal fusion         Plan:   I have personally reviewed the XR Cervical spine with the pt which shows postoperative changes of C3 through C6 ACDF with proper hardware placement and alignment.    I will schedule the patient for 3 month follow up with CT Cervical spine.    Pt cleared to drive without the cervical collar  over short distances. She should use the collar when she is a passenger. Instructed to refrain from strenuous activity or lifting more than 10-15 lbs at a time. She may sweep/mop gently.    She may shower as she normally would and the sutures will fall off on their own.

## 2020-06-11 ENCOUNTER — TELEPHONE (OUTPATIENT)
Dept: INTERNAL MEDICINE | Facility: CLINIC | Age: 75
End: 2020-06-11

## 2020-06-30 ENCOUNTER — TELEPHONE (OUTPATIENT)
Dept: INTERNAL MEDICINE | Facility: CLINIC | Age: 75
End: 2020-06-30

## 2020-06-30 ENCOUNTER — PES CALL (OUTPATIENT)
Dept: ADMINISTRATIVE | Facility: CLINIC | Age: 75
End: 2020-06-30

## 2020-07-02 ENCOUNTER — LAB VISIT (OUTPATIENT)
Dept: INTERNAL MEDICINE | Facility: CLINIC | Age: 75
End: 2020-07-02
Payer: MEDICARE

## 2020-07-02 PROCEDURE — U0003 INFECTIOUS AGENT DETECTION BY NUCLEIC ACID (DNA OR RNA); SEVERE ACUTE RESPIRATORY SYNDROME CORONAVIRUS 2 (SARS-COV-2) (CORONAVIRUS DISEASE [COVID-19]), AMPLIFIED PROBE TECHNIQUE, MAKING USE OF HIGH THROUGHPUT TECHNOLOGIES AS DESCRIBED BY CMS-2020-01-R: HCPCS | Mod: HCNC

## 2020-07-03 LAB — SARS-COV-2 RNA RESP QL NAA+PROBE: NOT DETECTED

## 2020-07-14 ENCOUNTER — LAB VISIT (OUTPATIENT)
Dept: LAB | Facility: HOSPITAL | Age: 75
End: 2020-07-14
Attending: INTERNAL MEDICINE
Payer: MEDICARE

## 2020-07-14 ENCOUNTER — OFFICE VISIT (OUTPATIENT)
Dept: INTERNAL MEDICINE | Facility: CLINIC | Age: 75
End: 2020-07-14
Payer: MEDICARE

## 2020-07-14 VITALS
BODY MASS INDEX: 30.08 KG/M2 | OXYGEN SATURATION: 97 % | DIASTOLIC BLOOD PRESSURE: 74 MMHG | HEIGHT: 66 IN | WEIGHT: 187.19 LBS | SYSTOLIC BLOOD PRESSURE: 148 MMHG | HEART RATE: 99 BPM

## 2020-07-14 DIAGNOSIS — Z12.31 SCREENING MAMMOGRAM, ENCOUNTER FOR: ICD-10-CM

## 2020-07-14 DIAGNOSIS — E11.49 TYPE II DIABETES MELLITUS WITH NEUROLOGICAL MANIFESTATIONS: ICD-10-CM

## 2020-07-14 DIAGNOSIS — R11.2 NAUSEA AND VOMITING, INTRACTABILITY OF VOMITING NOT SPECIFIED, UNSPECIFIED VOMITING TYPE: ICD-10-CM

## 2020-07-14 DIAGNOSIS — D64.9 ANEMIA, UNSPECIFIED TYPE: ICD-10-CM

## 2020-07-14 DIAGNOSIS — N39.0 URINARY TRACT INFECTION WITHOUT HEMATURIA, SITE UNSPECIFIED: Primary | ICD-10-CM

## 2020-07-14 LAB
ALBUMIN SERPL BCP-MCNC: 4.4 G/DL (ref 3.5–5.2)
ALBUMIN/CREAT UR: 197.4 UG/MG (ref 0–30)
ALP SERPL-CCNC: 83 U/L (ref 55–135)
ALT SERPL W/O P-5'-P-CCNC: 10 U/L (ref 10–44)
AMYLASE SERPL-CCNC: 72 U/L (ref 20–110)
ANION GAP SERPL CALC-SCNC: 11 MMOL/L (ref 8–16)
AST SERPL-CCNC: 14 U/L (ref 10–40)
BACTERIA #/AREA URNS AUTO: ABNORMAL /HPF
BASOPHILS # BLD AUTO: 0.04 K/UL (ref 0–0.2)
BASOPHILS NFR BLD: 0.4 % (ref 0–1.9)
BILIRUB SERPL-MCNC: 0.4 MG/DL (ref 0.1–1)
BILIRUB SERPL-MCNC: NORMAL MG/DL
BILIRUB UR QL STRIP: NEGATIVE
BLOOD URINE, POC: NORMAL
BUN SERPL-MCNC: 44 MG/DL (ref 8–23)
CALCIUM SERPL-MCNC: 10.4 MG/DL (ref 8.7–10.5)
CHLORIDE SERPL-SCNC: 112 MMOL/L (ref 95–110)
CLARITY UR REFRACT.AUTO: ABNORMAL
CLARITY, POC UA: NORMAL
CO2 SERPL-SCNC: 18 MMOL/L (ref 23–29)
COLOR UR AUTO: YELLOW
COLOR, POC UA: YELLOW
CREAT SERPL-MCNC: 1.4 MG/DL (ref 0.5–1.4)
CREAT UR-MCNC: 76 MG/DL (ref 15–325)
CRP SERPL-MCNC: 5.2 MG/L (ref 0–8.2)
DIFFERENTIAL METHOD: ABNORMAL
EOSINOPHIL # BLD AUTO: 0.1 K/UL (ref 0–0.5)
EOSINOPHIL NFR BLD: 0.8 % (ref 0–8)
ERYTHROCYTE [DISTWIDTH] IN BLOOD BY AUTOMATED COUNT: 14.1 % (ref 11.5–14.5)
ERYTHROCYTE [SEDIMENTATION RATE] IN BLOOD BY WESTERGREN METHOD: 74 MM/HR (ref 0–36)
EST. GFR  (AFRICAN AMERICAN): 43 ML/MIN/1.73 M^2
EST. GFR  (NON AFRICAN AMERICAN): 37.3 ML/MIN/1.73 M^2
ESTIMATED AVG GLUCOSE: 140 MG/DL (ref 68–131)
FERRITIN SERPL-MCNC: 776 NG/ML (ref 20–300)
GLUCOSE SERPL-MCNC: 153 MG/DL (ref 70–110)
GLUCOSE UR QL STRIP: NEGATIVE
GLUCOSE UR QL STRIP: NORMAL
HBA1C MFR BLD HPLC: 6.5 % (ref 4–5.6)
HCT VFR BLD AUTO: 33.3 % (ref 37–48.5)
HGB BLD-MCNC: 11.1 G/DL (ref 12–16)
HGB UR QL STRIP: ABNORMAL
IMM GRANULOCYTES # BLD AUTO: 0.04 K/UL (ref 0–0.04)
IMM GRANULOCYTES NFR BLD AUTO: 0.4 % (ref 0–0.5)
IRON SERPL-MCNC: 66 UG/DL (ref 30–160)
KETONES UR QL STRIP: NEGATIVE
KETONES UR QL STRIP: NORMAL
LACTATE SERPL-SCNC: 2.3 MMOL/L (ref 0.5–2.2)
LEUKOCYTE ESTERASE UR QL STRIP: ABNORMAL
LEUKOCYTE ESTERASE URINE, POC: NORMAL
LIPASE SERPL-CCNC: 60 U/L (ref 4–60)
LYMPHOCYTES # BLD AUTO: 2.6 K/UL (ref 1–4.8)
LYMPHOCYTES NFR BLD: 27.3 % (ref 18–48)
MCH RBC QN AUTO: 30.2 PG (ref 27–31)
MCHC RBC AUTO-ENTMCNC: 33.3 G/DL (ref 32–36)
MCV RBC AUTO: 91 FL (ref 82–98)
MICROALBUMIN UR DL<=1MG/L-MCNC: 150 UG/ML
MICROSCOPIC COMMENT: ABNORMAL
MONOCYTES # BLD AUTO: 0.5 K/UL (ref 0.3–1)
MONOCYTES NFR BLD: 5.2 % (ref 4–15)
NEUTROPHILS # BLD AUTO: 6.3 K/UL (ref 1.8–7.7)
NEUTROPHILS NFR BLD: 65.9 % (ref 38–73)
NITRITE UR QL STRIP: POSITIVE
NITRITE, POC UA: NORMAL
NRBC BLD-RTO: 0 /100 WBC
PH UR STRIP: 5 [PH] (ref 5–8)
PH, POC UA: 5
PLATELET # BLD AUTO: 285 K/UL (ref 150–350)
PMV BLD AUTO: 11.3 FL (ref 9.2–12.9)
POTASSIUM SERPL-SCNC: 5.4 MMOL/L (ref 3.5–5.1)
PROT SERPL-MCNC: 8.4 G/DL (ref 6–8.4)
PROT UR QL STRIP: NEGATIVE
PROTEIN, POC: NORMAL
RBC # BLD AUTO: 3.67 M/UL (ref 4–5.4)
RBC #/AREA URNS AUTO: 1 /HPF (ref 0–4)
SATURATED IRON: 19 % (ref 20–50)
SODIUM SERPL-SCNC: 141 MMOL/L (ref 136–145)
SP GR UR STRIP: 1.01 (ref 1–1.03)
SPECIFIC GRAVITY, POC UA: 1.01
SQUAMOUS #/AREA URNS AUTO: 1 /HPF
TOTAL IRON BINDING CAPACITY: 342 UG/DL (ref 250–450)
TRANSFERRIN SERPL-MCNC: 231 MG/DL (ref 200–375)
URN SPEC COLLECT METH UR: ABNORMAL
UROBILINOGEN, POC UA: NORMAL
WBC # BLD AUTO: 9.56 K/UL (ref 3.9–12.7)
WBC #/AREA URNS AUTO: 8 /HPF (ref 0–5)

## 2020-07-14 PROCEDURE — 3044F HG A1C LEVEL LT 7.0%: CPT | Mod: HCNC,CPTII,S$GLB, | Performed by: INTERNAL MEDICINE

## 2020-07-14 PROCEDURE — 99214 OFFICE O/P EST MOD 30 MIN: CPT | Mod: HCNC,25,S$GLB, | Performed by: INTERNAL MEDICINE

## 2020-07-14 PROCEDURE — 87086 URINE CULTURE/COLONY COUNT: CPT | Mod: HCNC

## 2020-07-14 PROCEDURE — 82150 ASSAY OF AMYLASE: CPT | Mod: HCNC

## 2020-07-14 PROCEDURE — 81002 POCT URINE DIPSTICK WITHOUT MICROSCOPE: ICD-10-PCS | Mod: HCNC,S$GLB,, | Performed by: INTERNAL MEDICINE

## 2020-07-14 PROCEDURE — 83540 ASSAY OF IRON: CPT | Mod: HCNC

## 2020-07-14 PROCEDURE — 3008F PR BODY MASS INDEX (BMI) DOCUMENTED: ICD-10-PCS | Mod: HCNC,CPTII,S$GLB, | Performed by: INTERNAL MEDICINE

## 2020-07-14 PROCEDURE — 1159F PR MEDICATION LIST DOCUMENTED IN MEDICAL RECORD: ICD-10-PCS | Mod: HCNC,S$GLB,, | Performed by: INTERNAL MEDICINE

## 2020-07-14 PROCEDURE — 96372 PR INJECTION,THERAP/PROPH/DIAG2ST, IM OR SUBCUT: ICD-10-PCS | Mod: HCNC,S$GLB,, | Performed by: INTERNAL MEDICINE

## 2020-07-14 PROCEDURE — 36415 COLL VENOUS BLD VENIPUNCTURE: CPT | Mod: HCNC

## 2020-07-14 PROCEDURE — 3078F PR MOST RECENT DIASTOLIC BLOOD PRESSURE < 80 MM HG: ICD-10-PCS | Mod: HCNC,CPTII,S$GLB, | Performed by: INTERNAL MEDICINE

## 2020-07-14 PROCEDURE — 1126F AMNT PAIN NOTED NONE PRSNT: CPT | Mod: HCNC,S$GLB,, | Performed by: INTERNAL MEDICINE

## 2020-07-14 PROCEDURE — 85652 RBC SED RATE AUTOMATED: CPT | Mod: HCNC

## 2020-07-14 PROCEDURE — 1101F PR PT FALLS ASSESS DOC 0-1 FALLS W/OUT INJ PAST YR: ICD-10-PCS | Mod: HCNC,CPTII,S$GLB, | Performed by: INTERNAL MEDICINE

## 2020-07-14 PROCEDURE — 81002 URINALYSIS NONAUTO W/O SCOPE: CPT | Mod: HCNC,S$GLB,, | Performed by: INTERNAL MEDICINE

## 2020-07-14 PROCEDURE — 3077F SYST BP >= 140 MM HG: CPT | Mod: HCNC,CPTII,S$GLB, | Performed by: INTERNAL MEDICINE

## 2020-07-14 PROCEDURE — 1101F PT FALLS ASSESS-DOCD LE1/YR: CPT | Mod: HCNC,CPTII,S$GLB, | Performed by: INTERNAL MEDICINE

## 2020-07-14 PROCEDURE — 87186 SC STD MICRODIL/AGAR DIL: CPT | Mod: HCNC

## 2020-07-14 PROCEDURE — 86140 C-REACTIVE PROTEIN: CPT | Mod: HCNC

## 2020-07-14 PROCEDURE — 87077 CULTURE AEROBIC IDENTIFY: CPT | Mod: HCNC

## 2020-07-14 PROCEDURE — 3078F DIAST BP <80 MM HG: CPT | Mod: HCNC,CPTII,S$GLB, | Performed by: INTERNAL MEDICINE

## 2020-07-14 PROCEDURE — 87088 URINE BACTERIA CULTURE: CPT | Mod: HCNC

## 2020-07-14 PROCEDURE — 83036 HEMOGLOBIN GLYCOSYLATED A1C: CPT | Mod: HCNC

## 2020-07-14 PROCEDURE — 83605 ASSAY OF LACTIC ACID: CPT | Mod: HCNC

## 2020-07-14 PROCEDURE — 81001 URINALYSIS AUTO W/SCOPE: CPT | Mod: HCNC

## 2020-07-14 PROCEDURE — 99999 PR PBB SHADOW E&M-EST. PATIENT-LVL IV: ICD-10-PCS | Mod: PBBFAC,HCNC,, | Performed by: INTERNAL MEDICINE

## 2020-07-14 PROCEDURE — 1126F PR PAIN SEVERITY QUANTIFIED, NO PAIN PRESENT: ICD-10-PCS | Mod: HCNC,S$GLB,, | Performed by: INTERNAL MEDICINE

## 2020-07-14 PROCEDURE — 3008F BODY MASS INDEX DOCD: CPT | Mod: HCNC,CPTII,S$GLB, | Performed by: INTERNAL MEDICINE

## 2020-07-14 PROCEDURE — 82728 ASSAY OF FERRITIN: CPT | Mod: HCNC

## 2020-07-14 PROCEDURE — 85025 COMPLETE CBC W/AUTO DIFF WBC: CPT | Mod: HCNC

## 2020-07-14 PROCEDURE — 3044F PR MOST RECENT HEMOGLOBIN A1C LEVEL <7.0%: ICD-10-PCS | Mod: HCNC,CPTII,S$GLB, | Performed by: INTERNAL MEDICINE

## 2020-07-14 PROCEDURE — 99999 PR PBB SHADOW E&M-EST. PATIENT-LVL IV: CPT | Mod: PBBFAC,HCNC,, | Performed by: INTERNAL MEDICINE

## 2020-07-14 PROCEDURE — 1159F MED LIST DOCD IN RCRD: CPT | Mod: HCNC,S$GLB,, | Performed by: INTERNAL MEDICINE

## 2020-07-14 PROCEDURE — 99214 PR OFFICE/OUTPT VISIT, EST, LEVL IV, 30-39 MIN: ICD-10-PCS | Mod: HCNC,25,S$GLB, | Performed by: INTERNAL MEDICINE

## 2020-07-14 PROCEDURE — 96372 THER/PROPH/DIAG INJ SC/IM: CPT | Mod: HCNC,S$GLB,, | Performed by: INTERNAL MEDICINE

## 2020-07-14 PROCEDURE — 3077F PR MOST RECENT SYSTOLIC BLOOD PRESSURE >= 140 MM HG: ICD-10-PCS | Mod: HCNC,CPTII,S$GLB, | Performed by: INTERNAL MEDICINE

## 2020-07-14 PROCEDURE — 82043 UR ALBUMIN QUANTITATIVE: CPT | Mod: HCNC

## 2020-07-14 PROCEDURE — 83690 ASSAY OF LIPASE: CPT | Mod: HCNC

## 2020-07-14 PROCEDURE — 80053 COMPREHEN METABOLIC PANEL: CPT | Mod: HCNC

## 2020-07-14 RX ORDER — ONDANSETRON 4 MG/1
4 TABLET, FILM COATED ORAL EVERY 8 HOURS PRN
Qty: 200 TABLET | Refills: 0 | Status: SHIPPED | OUTPATIENT
Start: 2020-07-14 | End: 2020-07-16

## 2020-07-14 RX ORDER — AMLODIPINE BESYLATE 5 MG/1
5 TABLET ORAL DAILY
Qty: 30 TABLET | Refills: 6 | Status: SHIPPED | OUTPATIENT
Start: 2020-07-14 | End: 2020-08-21

## 2020-07-14 NOTE — PROGRESS NOTES
Subjective:       Patient ID: Lina Davis is a 75 y.o. female.    Chief Complaint: Follow-up    HPIPt is feeling nauseated constantly with occasional vomiting over the last week. No abdominal pain or diarrhea.   Possible UTI.  Review of Systems   Respiratory: Negative for shortness of breath (PND or orthopnea).    Cardiovascular: Negative for chest pain (arm pain or jaw pain).   Gastrointestinal: Negative for abdominal pain, diarrhea, nausea and vomiting.   Genitourinary: Negative for dysuria.   Neurological: Negative for seizures, syncope and headaches.       Objective:      Physical Exam  Constitutional:       General: She is not in acute distress.     Appearance: She is well-developed.   HENT:      Head: Normocephalic.   Eyes:      Pupils: Pupils are equal, round, and reactive to light.   Neck:      Musculoskeletal: Neck supple.      Thyroid: No thyromegaly.      Vascular: No JVD.   Cardiovascular:      Rate and Rhythm: Normal rate and regular rhythm.      Heart sounds: Normal heart sounds. No murmur. No friction rub. No gallop.    Pulmonary:      Effort: Pulmonary effort is normal.      Breath sounds: Normal breath sounds. No wheezing or rales.   Abdominal:      General: Bowel sounds are normal. There is no distension.      Palpations: Abdomen is soft. There is no mass.      Tenderness: There is no abdominal tenderness. There is no guarding or rebound.   Genitourinary:     Rectum: Guaiac result negative (brown stool no masses).   Lymphadenopathy:      Cervical: No cervical adenopathy.   Skin:     General: Skin is warm and dry.   Neurological:      Mental Status: She is alert and oriented to person, place, and time.      Deep Tendon Reflexes: Reflexes are normal and symmetric.   Psychiatric:         Behavior: Behavior normal.         Thought Content: Thought content normal.         Judgment: Judgment normal.         Assessment:       1. Urinary tract infection without hematuria, site unspecified    2. Type II  diabetes mellitus with neurological manifestations    3. Nausea and vomiting, intractability of vomiting not specified, unspecified vomiting type    4. Anemia, unspecified type    5. Screening mammogram, encounter for        Plan:   Urinary tract infection without hematuria, site unspecified  -     Urinalysis  -     Urine culture  -     POCT URINE DIPSTICK WITHOUT MICROSCOPE    Type II diabetes mellitus with neurological manifestations  -     Microalbumin/creatinine urine ratio  -     Hemoglobin A1C; Future; Expected date: 07/14/2020  -     LACTIC ACID, PLASMA; Future; Expected date: 07/14/2020  Stop metformin ? Lactic acidosis  Nausea and vomiting, intractability of vomiting not specified, unspecified vomiting type  -     CBC auto differential; Future; Expected date: 07/14/2020  -     Comprehensive metabolic panel; Future; Expected date: 07/14/2020  -     Amylase; Future; Expected date: 07/14/2020  -     Lipase; Future; Expected date: 07/14/2020  -     Sedimentation rate; Future; Expected date: 07/14/2020  -     C-Reactive Protein; Future; Expected date: 07/14/2020    Anemia, unspecified type  -     Iron and TIBC; Future; Expected date: 07/14/2020  -     Ferritin; Future; Expected date: 07/14/2020    Screening mammogram, encounter for  -     Mammo Digital Screening Bilat w/ Efrem; Future; Expected date: 01/14/2021    Other orders  -     Start for nausea ondansetron (ZOFRAN) 4 MG tablet; Take 1 tablet (4 mg total) by mouth every 8 (eight) hours as needed for Nausea.  Dispense: 200 tablet; Refill: 0  -     amLODIPine (NORVASC) 5 MG tablet; Take 1 tablet (5 mg total) by mouth once daily.  Dispense: 30 tablet; Refill: 6  -     cefTRIAXone (ROCEPHIN) 1 g in lidocaine HCL 10 mg/ml (1%) IM only syringe - for UTI  -     Urinalysis Microscopic

## 2020-07-15 ENCOUNTER — TELEPHONE (OUTPATIENT)
Dept: INTERNAL MEDICINE | Facility: CLINIC | Age: 75
End: 2020-07-15

## 2020-07-15 NOTE — TELEPHONE ENCOUNTER
----- Message from Hang Younger sent at 7/15/2020  2:37 PM CDT -----  Regarding: Advice  Contact: Spouse [phone]  Patient would like to get medical advice.    Pharmacy name and phone #:  [id number] - [place], [place] - [address] [place] [address] AT [facility] [address] [phone] (Phone)  [phone] (Fax)    Comments: Spouse of patient calling regarding the Rx  ondansetron (ZOFRAN) 4 MG tablet  The Quantity was not added to the order request, call to inform has been added.    Please call an advise  Thank you

## 2020-07-16 RX ORDER — GLIMEPIRIDE 1 MG/1
TABLET ORAL
Qty: 180 TABLET | Refills: 3
Start: 2020-07-16 | End: 2020-08-13

## 2020-07-16 RX ORDER — CIPROFLOXACIN 500 MG/1
500 TABLET ORAL EVERY 12 HOURS
Qty: 14 TABLET | Refills: 0 | Status: SHIPPED | OUTPATIENT
Start: 2020-07-16 | End: 2020-08-18

## 2020-07-16 NOTE — TELEPHONE ENCOUNTER
Please confirm the quantity of tabs Thx    I called pharmacy and told them 20 tablets - called in cipro 500mg BID #14 no RF. I advised to stop metformin due to elevated lactic acid.  SHe is better today no nausea - eating.

## 2020-07-17 LAB — BACTERIA UR CULT: ABNORMAL

## 2020-07-29 ENCOUNTER — TELEPHONE (OUTPATIENT)
Dept: INTERNAL MEDICINE | Facility: CLINIC | Age: 75
End: 2020-07-29

## 2020-07-29 NOTE — TELEPHONE ENCOUNTER
Pt feels great off metformin - advised to increase glimiperide to three mg daily if sugars do not improve then increase to 4 mg daily.

## 2020-07-29 NOTE — TELEPHONE ENCOUNTER
----- Message from Cassandra Riley sent at 7/29/2020 11:22 AM CDT -----  Contact: Pt-- [phone]  Type:  Needs Medical Advice    Who Called:  Pt    Symptoms (please be specific): medication questions    Would the patient rather a call back or a response via [facility]? Call    Best Call Back Number:  [phone]    Additional Information:  Pt called to speak with dr or nurse. She is requesting a call back.

## 2020-08-07 RX ORDER — GLIMEPIRIDE 1 MG/1
TABLET ORAL
Qty: 180 TABLET | Refills: 3
Start: 2020-08-07

## 2020-08-13 ENCOUNTER — TELEPHONE (OUTPATIENT)
Dept: INTERNAL MEDICINE | Facility: CLINIC | Age: 75
End: 2020-08-13

## 2020-08-13 RX ORDER — GLIMEPIRIDE 1 MG/1
TABLET ORAL
Qty: 180 TABLET | Refills: 3
Start: 2020-08-13 | End: 2020-08-25

## 2020-08-18 ENCOUNTER — TELEPHONE (OUTPATIENT)
Dept: INTERNAL MEDICINE | Facility: CLINIC | Age: 75
End: 2020-08-18

## 2020-08-18 ENCOUNTER — PES CALL (OUTPATIENT)
Dept: ADMINISTRATIVE | Facility: CLINIC | Age: 75
End: 2020-08-18

## 2020-08-18 ENCOUNTER — PATIENT OUTREACH (OUTPATIENT)
Dept: ADMINISTRATIVE | Facility: OTHER | Age: 75
End: 2020-08-18

## 2020-08-18 DIAGNOSIS — E11.21 TYPE 2 DIABETES MELLITUS WITH DIABETIC NEPHROPATHY, WITHOUT LONG-TERM CURRENT USE OF INSULIN: ICD-10-CM

## 2020-08-18 DIAGNOSIS — N39.0 URINARY TRACT INFECTION WITHOUT HEMATURIA, SITE UNSPECIFIED: Primary | ICD-10-CM

## 2020-08-18 NOTE — TELEPHONE ENCOUNTER
----- Message from Jackelyn Ku sent at 8/18/2020  1:35 PM CDT -----  Regarding: diabetes  Contact: [phone]  Needs Advice    Reason for call:      Pt would like to speak to the provider about her diabetes. No other message left.    Communication Preference: [phone]    Additional Information:    Please have provider call

## 2020-08-18 NOTE — TELEPHONE ENCOUNTER
Pt advised to increase glimepiride to 4mg  twice daily - please schedule lab and urine for tomorrow at main campus for 3:30 as she has another appt over there tomorrow thanks.

## 2020-08-18 NOTE — PROGRESS NOTES
Care Everywhere: updated  Immunization:   Health Maintenance:   Media Review: reviewed for outside mammogram report  Legacy Review:   Order placed:   Upcoming appts:  Mammogram scheduling ticket sent to patient's portal on 7/14/2020

## 2020-08-19 ENCOUNTER — OFFICE VISIT (OUTPATIENT)
Dept: NEUROSURGERY | Facility: CLINIC | Age: 75
End: 2020-08-19
Payer: MEDICARE

## 2020-08-19 ENCOUNTER — HOSPITAL ENCOUNTER (OUTPATIENT)
Dept: RADIOLOGY | Facility: HOSPITAL | Age: 75
Discharge: HOME | End: 2020-08-19
Attending: NEUROLOGICAL SURGERY
Payer: MEDICARE

## 2020-08-19 VITALS
WEIGHT: 189.63 LBS | HEART RATE: 105 BPM | TEMPERATURE: 98 F | SYSTOLIC BLOOD PRESSURE: 125 MMHG | BODY MASS INDEX: 30.6 KG/M2 | DIASTOLIC BLOOD PRESSURE: 65 MMHG

## 2020-08-19 DIAGNOSIS — Z98.1 S/P CERVICAL SPINAL FUSION: ICD-10-CM

## 2020-08-19 DIAGNOSIS — M50.00 HERNIATED NUCLEUS PULPOSUS WITH MYELOPATHY, CERVICAL: ICD-10-CM

## 2020-08-19 DIAGNOSIS — Z98.1 S/P CERVICAL SPINAL FUSION: Primary | ICD-10-CM

## 2020-08-19 DIAGNOSIS — M47.12 CERVICAL SPONDYLOSIS WITH MYELOPATHY: ICD-10-CM

## 2020-08-19 PROCEDURE — 99214 OFFICE O/P EST MOD 30 MIN: CPT | Mod: HCNC,S$GLB,, | Performed by: NEUROLOGICAL SURGERY

## 2020-08-19 PROCEDURE — 1101F PT FALLS ASSESS-DOCD LE1/YR: CPT | Mod: HCNC,CPTII,S$GLB, | Performed by: NEUROLOGICAL SURGERY

## 2020-08-19 PROCEDURE — 1126F PR PAIN SEVERITY QUANTIFIED, NO PAIN PRESENT: ICD-10-PCS | Mod: HCNC,S$GLB,, | Performed by: NEUROLOGICAL SURGERY

## 2020-08-19 PROCEDURE — 99999 PR PBB SHADOW E&M-EST. PATIENT-LVL III: ICD-10-PCS | Mod: PBBFAC,HCNC,, | Performed by: NEUROLOGICAL SURGERY

## 2020-08-19 PROCEDURE — 1126F AMNT PAIN NOTED NONE PRSNT: CPT | Mod: HCNC,S$GLB,, | Performed by: NEUROLOGICAL SURGERY

## 2020-08-19 PROCEDURE — 72125 CT CERVICAL SPINE WITHOUT CONTRAST: ICD-10-PCS | Mod: 26,HCNC,, | Performed by: RADIOLOGY

## 2020-08-19 PROCEDURE — 1159F MED LIST DOCD IN RCRD: CPT | Mod: HCNC,S$GLB,, | Performed by: NEUROLOGICAL SURGERY

## 2020-08-19 PROCEDURE — 99999 PR PBB SHADOW E&M-EST. PATIENT-LVL III: CPT | Mod: PBBFAC,HCNC,, | Performed by: NEUROLOGICAL SURGERY

## 2020-08-19 PROCEDURE — 1101F PR PT FALLS ASSESS DOC 0-1 FALLS W/OUT INJ PAST YR: ICD-10-PCS | Mod: HCNC,CPTII,S$GLB, | Performed by: NEUROLOGICAL SURGERY

## 2020-08-19 PROCEDURE — 3008F BODY MASS INDEX DOCD: CPT | Mod: HCNC,CPTII,S$GLB, | Performed by: NEUROLOGICAL SURGERY

## 2020-08-19 PROCEDURE — 3074F SYST BP LT 130 MM HG: CPT | Mod: HCNC,CPTII,S$GLB, | Performed by: NEUROLOGICAL SURGERY

## 2020-08-19 PROCEDURE — 3078F DIAST BP <80 MM HG: CPT | Mod: HCNC,CPTII,S$GLB, | Performed by: NEUROLOGICAL SURGERY

## 2020-08-19 PROCEDURE — 99214 PR OFFICE/OUTPT VISIT, EST, LEVL IV, 30-39 MIN: ICD-10-PCS | Mod: HCNC,S$GLB,, | Performed by: NEUROLOGICAL SURGERY

## 2020-08-19 PROCEDURE — 3008F PR BODY MASS INDEX (BMI) DOCUMENTED: ICD-10-PCS | Mod: HCNC,CPTII,S$GLB, | Performed by: NEUROLOGICAL SURGERY

## 2020-08-19 PROCEDURE — 72125 CT NECK SPINE W/O DYE: CPT | Mod: TC,HCNC

## 2020-08-19 PROCEDURE — 3074F PR MOST RECENT SYSTOLIC BLOOD PRESSURE < 130 MM HG: ICD-10-PCS | Mod: HCNC,CPTII,S$GLB, | Performed by: NEUROLOGICAL SURGERY

## 2020-08-19 PROCEDURE — 72125 CT NECK SPINE W/O DYE: CPT | Mod: 26,HCNC,, | Performed by: RADIOLOGY

## 2020-08-19 PROCEDURE — 1159F PR MEDICATION LIST DOCUMENTED IN MEDICAL RECORD: ICD-10-PCS | Mod: HCNC,S$GLB,, | Performed by: NEUROLOGICAL SURGERY

## 2020-08-19 PROCEDURE — 3078F PR MOST RECENT DIASTOLIC BLOOD PRESSURE < 80 MM HG: ICD-10-PCS | Mod: HCNC,CPTII,S$GLB, | Performed by: NEUROLOGICAL SURGERY

## 2020-08-19 NOTE — PATIENT INSTRUCTIONS
I have personally reviewed the CT cervical spine with the pt which shows postoperative changes of C3 through C6 ACDF with proper hardware placement and alignment and gross bone growth throughout.     Pt will follow up with me on an as-needed basis and was advised to contact us with any questions, concerns, or if she experiences any new or worsening symptoms.    She may discontinue the bone growth stimulator and cervical collar.  She is cleared to drive.

## 2020-08-19 NOTE — PROGRESS NOTES
Subjective:   I, Rayo Masterson, attest that this documentation has been prepared under the direction and in the presence of Joseph Walton MD.     Patient ID: Lina Davis is a 75 y.o. female     Chief Complaint: No chief complaint on file.        HPI  Ms. Lina Davis is a pleasant 75 y.o. woman who is s/p C3 through C6 ACDF on 03/19/2020 and presents today for her 3 month follow up with CT cervical spine.This is pt who had an acute onset of left lateral neck pain and LUE radiculopathy several months ago. She endorsed bilateral hand weakness causing her to frequently drop objects during her follow up visit with me. Upon my review, MRI Cervical Spine from 0/28/2019 showed multilevel degenerative changes with a small disc bulge at C2-3 without stenosis. There are disc bulges at C3-4, C4-5, and C5-6 associated with severe stenosis and myelomalacia from C4 to C6.  Based on these findings, surgery was offered and pt wished to proceed. Pt was last seen in clinic on 05/06/2020, at which time she complained of some numbness in her finger tips, as was present prior to surgery. She denies neck pain or any new issues.     Pt states she has been great despite continued numbness/paraesthesias the fingertips on the left. She states she is active and has no additional/new complaints at this time.    Review of Systems   Constitutional: Negative for activity change, fatigue, fever and unexpected weight change.   HENT: Negative for facial swelling.    Eyes: Negative.    Respiratory: Negative.    Cardiovascular: Negative.    Gastrointestinal: Negative for diarrhea, nausea and vomiting.   Genitourinary: Negative.    Musculoskeletal: Negative for back pain, joint swelling, myalgias and neck pain.   Neurological: Negative for dizziness, weakness, numbness and headaches.        + left fingertip numbness/paraesthesias; unchanged   Psychiatric/Behavioral: Negative.       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   HENT:      Head: Normocephalic and atraumatic.   Neck:      Musculoskeletal: Neck supple.   Neurological:      Mental Status: She is alert and oriented to person, place, and time.      GCS: GCS eye subscore is 4. GCS verbal subscore is 5. GCS motor subscore is 6.      Cranial Nerves: No cranial nerve deficit.      Motor: Motor function is intact.            IMAGING:  CT Cervical Spine Without Contrast (08/19/2020)  shows postoperative changes of C3 through C6 ACDF with proper hardware placement and alignment and gross bone growth throughout.    I have personally reviewed the images with the pt.      I, Dr. Joseph Walton, personally performed the services described in this documentation. All medical record entries made by the scribe, Rayo Masterson, were at my direction and in my presence.  I have reviewed the chart and agree that the record reflects my personal performance and is accurate and complete. Joseph Walton MD. 08/19/2020    Assessment:      1. Herniated cervical disc.  2. S/p cervical disc.    Plan:   I have personally reviewed the CT cervical spine with the pt which shows postoperative changes of C3 through C6 ACDF with proper hardware placement and alignment and gross bone growth throughout.     Pt will follow up with me on an as-needed basis and was advised to contact us with any questions, concerns, or if she experiences any new or worsening symptoms.    She may discontinue the bone growth stimulator and cervical collar.  She is cleared to drive.

## 2020-08-21 ENCOUNTER — PATIENT OUTREACH (OUTPATIENT)
Dept: ADMINISTRATIVE | Facility: HOSPITAL | Age: 75
End: 2020-08-21

## 2020-08-21 ENCOUNTER — CLINICAL SUPPORT (OUTPATIENT)
Dept: INTERNAL MEDICINE | Facility: CLINIC | Age: 75
End: 2020-08-21
Payer: MEDICARE

## 2020-08-21 ENCOUNTER — TELEPHONE (OUTPATIENT)
Dept: INTERNAL MEDICINE | Facility: CLINIC | Age: 75
End: 2020-08-21

## 2020-08-21 DIAGNOSIS — D64.9 ANEMIA, UNSPECIFIED TYPE: Primary | ICD-10-CM

## 2020-08-21 DIAGNOSIS — E11.21 TYPE 2 DIABETES MELLITUS WITH DIABETIC NEPHROPATHY, WITHOUT LONG-TERM CURRENT USE OF INSULIN: ICD-10-CM

## 2020-08-21 RX ORDER — PEN NEEDLE, DIABETIC 33 GX5/32"
NEEDLE, DISPOSABLE MISCELLANEOUS
Qty: 100 EACH | Refills: 3 | Status: SHIPPED | OUTPATIENT
Start: 2020-08-21 | End: 2020-12-01 | Stop reason: SDUPTHER

## 2020-08-21 RX ORDER — AMLODIPINE BESYLATE 5 MG/1
10 TABLET ORAL DAILY
Qty: 30 TABLET | Refills: 6
Start: 2020-08-21 | End: 2020-09-22 | Stop reason: SDUPTHER

## 2020-08-21 NOTE — PROGRESS NOTES
Assisted patient with teaching to use flexpen, said nurse demonstrated to patient on how to use, patient then verbalized and demonstrated use of flexpen.

## 2020-08-24 RX ORDER — GLIMEPIRIDE 4 MG/1
TABLET ORAL
Qty: 180 TABLET | Refills: 3 | Status: SHIPPED | OUTPATIENT
Start: 2020-08-24 | End: 2020-08-28 | Stop reason: SDUPTHER

## 2020-08-24 RX ORDER — GLIMEPIRIDE 1 MG/1
1 TABLET ORAL
Qty: 90 TABLET | Refills: 3 | OUTPATIENT
Start: 2020-08-24 | End: 2021-08-24

## 2020-08-25 ENCOUNTER — TELEPHONE (OUTPATIENT)
Dept: INTERNAL MEDICINE | Facility: CLINIC | Age: 75
End: 2020-08-25

## 2020-08-26 ENCOUNTER — LAB VISIT (OUTPATIENT)
Dept: LAB | Facility: HOSPITAL | Age: 75
End: 2020-08-26
Attending: INTERNAL MEDICINE
Payer: MEDICARE

## 2020-08-26 DIAGNOSIS — E11.21 TYPE 2 DIABETES MELLITUS WITH DIABETIC NEPHROPATHY, WITHOUT LONG-TERM CURRENT USE OF INSULIN: ICD-10-CM

## 2020-08-26 DIAGNOSIS — D64.9 ANEMIA, UNSPECIFIED TYPE: ICD-10-CM

## 2020-08-26 LAB
ALBUMIN SERPL BCP-MCNC: 3.9 G/DL (ref 3.5–5.2)
ALP SERPL-CCNC: 97 U/L (ref 55–135)
ALT SERPL W/O P-5'-P-CCNC: 16 U/L (ref 10–44)
ANION GAP SERPL CALC-SCNC: 8 MMOL/L (ref 8–16)
AST SERPL-CCNC: 15 U/L (ref 10–40)
BILIRUB SERPL-MCNC: 0.3 MG/DL (ref 0.1–1)
BUN SERPL-MCNC: 34 MG/DL (ref 8–23)
CALCIUM SERPL-MCNC: 9.6 MG/DL (ref 8.7–10.5)
CHLORIDE SERPL-SCNC: 109 MMOL/L (ref 95–110)
CO2 SERPL-SCNC: 20 MMOL/L (ref 23–29)
CREAT SERPL-MCNC: 1.3 MG/DL (ref 0.5–1.4)
EST. GFR  (AFRICAN AMERICAN): 47 ML/MIN/1.73 M^2
EST. GFR  (NON AFRICAN AMERICAN): 40.8 ML/MIN/1.73 M^2
FERRITIN SERPL-MCNC: 690 NG/ML (ref 20–300)
GLUCOSE SERPL-MCNC: 269 MG/DL (ref 70–110)
IRON SERPL-MCNC: 87 UG/DL (ref 30–160)
POTASSIUM SERPL-SCNC: 4.9 MMOL/L (ref 3.5–5.1)
PROT SERPL-MCNC: 7.7 G/DL (ref 6–8.4)
SATURATED IRON: 28 % (ref 20–50)
SODIUM SERPL-SCNC: 137 MMOL/L (ref 136–145)
TOTAL IRON BINDING CAPACITY: 315 UG/DL (ref 250–450)
TRANSFERRIN SERPL-MCNC: 213 MG/DL (ref 200–375)

## 2020-08-26 PROCEDURE — 82728 ASSAY OF FERRITIN: CPT | Mod: HCNC

## 2020-08-26 PROCEDURE — 80053 COMPREHEN METABOLIC PANEL: CPT | Mod: HCNC

## 2020-08-26 PROCEDURE — 36415 COLL VENOUS BLD VENIPUNCTURE: CPT | Mod: HCNC

## 2020-08-26 PROCEDURE — 83540 ASSAY OF IRON: CPT | Mod: HCNC

## 2020-08-28 ENCOUNTER — OFFICE VISIT (OUTPATIENT)
Dept: INTERNAL MEDICINE | Facility: CLINIC | Age: 75
End: 2020-08-28
Payer: MEDICARE

## 2020-08-28 ENCOUNTER — LAB VISIT (OUTPATIENT)
Dept: LAB | Facility: HOSPITAL | Age: 75
End: 2020-08-28
Attending: INTERNAL MEDICINE
Payer: MEDICARE

## 2020-08-28 VITALS
SYSTOLIC BLOOD PRESSURE: 118 MMHG | HEART RATE: 98 BPM | BODY MASS INDEX: 30.82 KG/M2 | WEIGHT: 191.81 LBS | OXYGEN SATURATION: 98 % | DIASTOLIC BLOOD PRESSURE: 70 MMHG | HEIGHT: 66 IN

## 2020-08-28 DIAGNOSIS — D64.9 ANEMIA, UNSPECIFIED TYPE: Primary | ICD-10-CM

## 2020-08-28 DIAGNOSIS — D64.9 ANEMIA, UNSPECIFIED TYPE: ICD-10-CM

## 2020-08-28 LAB — URATE SERPL-MCNC: 5 MG/DL (ref 2.4–5.7)

## 2020-08-28 PROCEDURE — 3008F BODY MASS INDEX DOCD: CPT | Mod: HCNC,CPTII,S$GLB, | Performed by: INTERNAL MEDICINE

## 2020-08-28 PROCEDURE — 1101F PT FALLS ASSESS-DOCD LE1/YR: CPT | Mod: HCNC,CPTII,S$GLB, | Performed by: INTERNAL MEDICINE

## 2020-08-28 PROCEDURE — 3078F DIAST BP <80 MM HG: CPT | Mod: HCNC,CPTII,S$GLB, | Performed by: INTERNAL MEDICINE

## 2020-08-28 PROCEDURE — 1159F PR MEDICATION LIST DOCUMENTED IN MEDICAL RECORD: ICD-10-PCS | Mod: HCNC,S$GLB,, | Performed by: INTERNAL MEDICINE

## 2020-08-28 PROCEDURE — 36415 COLL VENOUS BLD VENIPUNCTURE: CPT | Mod: HCNC

## 2020-08-28 PROCEDURE — 1159F MED LIST DOCD IN RCRD: CPT | Mod: HCNC,S$GLB,, | Performed by: INTERNAL MEDICINE

## 2020-08-28 PROCEDURE — 84550 ASSAY OF BLOOD/URIC ACID: CPT | Mod: HCNC

## 2020-08-28 PROCEDURE — 99213 OFFICE O/P EST LOW 20 MIN: CPT | Mod: HCNC,S$GLB,, | Performed by: INTERNAL MEDICINE

## 2020-08-28 PROCEDURE — 99999 PR PBB SHADOW E&M-EST. PATIENT-LVL IV: CPT | Mod: PBBFAC,HCNC,, | Performed by: INTERNAL MEDICINE

## 2020-08-28 PROCEDURE — 3074F PR MOST RECENT SYSTOLIC BLOOD PRESSURE < 130 MM HG: ICD-10-PCS | Mod: HCNC,CPTII,S$GLB, | Performed by: INTERNAL MEDICINE

## 2020-08-28 PROCEDURE — 1101F PR PT FALLS ASSESS DOC 0-1 FALLS W/OUT INJ PAST YR: ICD-10-PCS | Mod: HCNC,CPTII,S$GLB, | Performed by: INTERNAL MEDICINE

## 2020-08-28 PROCEDURE — 99999 PR PBB SHADOW E&M-EST. PATIENT-LVL IV: ICD-10-PCS | Mod: PBBFAC,HCNC,, | Performed by: INTERNAL MEDICINE

## 2020-08-28 PROCEDURE — 3074F SYST BP LT 130 MM HG: CPT | Mod: HCNC,CPTII,S$GLB, | Performed by: INTERNAL MEDICINE

## 2020-08-28 PROCEDURE — 99213 PR OFFICE/OUTPT VISIT, EST, LEVL III, 20-29 MIN: ICD-10-PCS | Mod: HCNC,S$GLB,, | Performed by: INTERNAL MEDICINE

## 2020-08-28 PROCEDURE — 3008F PR BODY MASS INDEX (BMI) DOCUMENTED: ICD-10-PCS | Mod: HCNC,CPTII,S$GLB, | Performed by: INTERNAL MEDICINE

## 2020-08-28 PROCEDURE — 3078F PR MOST RECENT DIASTOLIC BLOOD PRESSURE < 80 MM HG: ICD-10-PCS | Mod: HCNC,CPTII,S$GLB, | Performed by: INTERNAL MEDICINE

## 2020-08-28 RX ORDER — GABAPENTIN 100 MG/1
CAPSULE ORAL
Qty: 90 CAPSULE | Refills: 3 | Status: SHIPPED | OUTPATIENT
Start: 2020-08-28 | End: 2021-08-09

## 2020-08-28 RX ORDER — GLIMEPIRIDE 4 MG/1
TABLET ORAL
Qty: 30 TABLET | Refills: 1 | Status: SHIPPED | OUTPATIENT
Start: 2020-08-28 | End: 2020-11-15 | Stop reason: SDUPTHER

## 2020-08-28 NOTE — PROGRESS NOTES
Subjective:       Patient ID: Lina Davis is a 75 y.o. female.    Chief Complaint: Follow-up    HPIBLood sugars still high but she ran out of glimepiride.  DOing fine on amlodipine - renal function better off losartan.  No CP or SOB.  No GI issues.   Review of Systems   Respiratory: Negative for shortness of breath (PND or orthopnea).    Cardiovascular: Negative for chest pain (arm pain or jaw pain).   Gastrointestinal: Negative for abdominal pain, diarrhea, nausea and vomiting.   Genitourinary: Negative for dysuria.   Neurological: Negative for seizures, syncope and headaches.       Objective:      Physical Exam  Constitutional:       General: She is not in acute distress.     Appearance: She is well-developed.   HENT:      Head: Normocephalic.   Neck:      Musculoskeletal: Neck supple.      Thyroid: No thyromegaly.      Vascular: No JVD.   Cardiovascular:      Rate and Rhythm: Normal rate and regular rhythm.      Heart sounds: Normal heart sounds. No murmur. No friction rub. No gallop.    Pulmonary:      Effort: Pulmonary effort is normal.      Breath sounds: Normal breath sounds. No wheezing or rales.   Abdominal:      General: Bowel sounds are normal. There is no distension.      Palpations: Abdomen is soft. There is no mass.      Tenderness: There is no abdominal tenderness. There is no guarding or rebound.   Genitourinary:     Rectum: Guaiac result negative.   Lymphadenopathy:      Cervical: No cervical adenopathy.   Neurological:      Mental Status: She is alert and oriented to person, place, and time.      Deep Tendon Reflexes: Reflexes are normal and symmetric.   Psychiatric:         Behavior: Behavior normal.         Thought Content: Thought content normal.         Judgment: Judgment normal.         Assessment:       1. Anemia, unspecified type        Plan:   Anemia, unspecified type  -     CBC auto differential; Future; Expected date: 08/28/2020  -     Uric acid; Future; Expected date:  08/28/2020    Other orders  -     glimepiride (AMARYL) 4 MG tablet; One twice daily  Dispense: 30 tablet; Refill: 1- small amt locally until 3 month supply is delivered  -   Increase   insulin detemir U-100 (LEVEMIR FLEXTOUCH) 100 unit/mL (3 mL) SubQ InPn pen; Inject 15 Units subcutaneous daily  Dispense: 1 Box; Refill: 3  -     gabapentin (NEURONTIN) 100 MG capsule; One at bedtime for four days then two at bedtime for 4 days then three at bedtime indefinitely  Dispense: 90 capsule; Refill: 3 - for paresthesia R hand (first and second fingers)

## 2020-09-01 ENCOUNTER — TELEPHONE (OUTPATIENT)
Dept: INTERNAL MEDICINE | Facility: CLINIC | Age: 75
End: 2020-09-01

## 2020-09-01 NOTE — TELEPHONE ENCOUNTER
Spoke with Juan Diego(client services), will call patient to get labs re drawn----- Message from Maya Spencer sent at 9/1/2020  5:35 AM CDT -----  Regarding: Lab Client Services  Contact: [phone]  Good Morning,    My name is Maya Spencer I work in the Lab Client Services. We had a problem with some lab work on this patient. If someone from your office could call us at [phone] or ext. [phone] that would be great. Anyone in my department can help.     Thank you

## 2020-09-09 ENCOUNTER — TELEPHONE (OUTPATIENT)
Dept: INTERNAL MEDICINE | Facility: CLINIC | Age: 75
End: 2020-09-09

## 2020-09-09 NOTE — TELEPHONE ENCOUNTER
----- Message from Hood Fishman sent at 9/9/2020 10:45 AM CDT -----  Regarding: [phone]  Lina is requesting a callback.  She states that her diabetes level is going up.  She states that she was advised by the doctor to keep her informed.    Callback number: [phone]

## 2020-09-09 NOTE — TELEPHONE ENCOUNTER
Patient states that her blood sugar level has been running a bit high. On Friday blood sugar was 275, today 222. Patient is concerned.    Please advise!

## 2020-09-11 ENCOUNTER — TELEPHONE (OUTPATIENT)
Dept: INTERNAL MEDICINE | Facility: CLINIC | Age: 75
End: 2020-09-11

## 2020-09-17 ENCOUNTER — PES CALL (OUTPATIENT)
Dept: ADMINISTRATIVE | Facility: CLINIC | Age: 75
End: 2020-09-17

## 2020-09-22 ENCOUNTER — OFFICE VISIT (OUTPATIENT)
Dept: FAMILY MEDICINE | Facility: CLINIC | Age: 75
End: 2020-09-22
Payer: MEDICARE

## 2020-09-22 VITALS — WEIGHT: 191.81 LBS | HEIGHT: 66 IN | BODY MASS INDEX: 30.82 KG/M2

## 2020-09-22 DIAGNOSIS — I10 HTN (HYPERTENSION), BENIGN: ICD-10-CM

## 2020-09-22 DIAGNOSIS — E78.2 HYPERLIPIDEMIA, MIXED: ICD-10-CM

## 2020-09-22 DIAGNOSIS — Z00.00 ENCOUNTER FOR PREVENTIVE HEALTH EXAMINATION: Primary | ICD-10-CM

## 2020-09-22 DIAGNOSIS — K21.9 GASTROESOPHAGEAL REFLUX DISEASE, ESOPHAGITIS PRESENCE NOT SPECIFIED: ICD-10-CM

## 2020-09-22 DIAGNOSIS — N18.30 CKD (CHRONIC KIDNEY DISEASE) STAGE 3, GFR 30-59 ML/MIN: ICD-10-CM

## 2020-09-22 DIAGNOSIS — E66.9 OBESITY (BMI 30.0-34.9): ICD-10-CM

## 2020-09-22 DIAGNOSIS — E11.21 TYPE 2 DIABETES MELLITUS WITH DIABETIC NEPHROPATHY, WITH LONG-TERM CURRENT USE OF INSULIN: ICD-10-CM

## 2020-09-22 DIAGNOSIS — I77.9 BILATERAL CAROTID ARTERY DISEASE, UNSPECIFIED TYPE: ICD-10-CM

## 2020-09-22 DIAGNOSIS — E11.42 DIABETIC POLYNEUROPATHY ASSOCIATED WITH TYPE 2 DIABETES MELLITUS: ICD-10-CM

## 2020-09-22 DIAGNOSIS — Z87.39 HISTORY OF GOUT: ICD-10-CM

## 2020-09-22 DIAGNOSIS — I70.0 ATHEROSCLEROSIS OF AORTA: ICD-10-CM

## 2020-09-22 DIAGNOSIS — Z79.4 TYPE 2 DIABETES MELLITUS WITH DIABETIC NEPHROPATHY, WITH LONG-TERM CURRENT USE OF INSULIN: ICD-10-CM

## 2020-09-22 PROBLEM — M25.511 ACUTE PAIN OF RIGHT SHOULDER: Status: RESOLVED | Noted: 2017-10-18 | Resolved: 2020-09-22

## 2020-09-22 PROBLEM — N39.0 RECURRENT UTI (URINARY TRACT INFECTION): Status: RESOLVED | Noted: 2019-09-06 | Resolved: 2020-09-22

## 2020-09-22 PROBLEM — E66.811 OBESITY (BMI 30.0-34.9): Status: ACTIVE | Noted: 2020-09-22

## 2020-09-22 PROCEDURE — 3051F HG A1C>EQUAL 7.0%<8.0%: CPT | Mod: HCNC,CPTII,, | Performed by: NURSE PRACTITIONER

## 2020-09-22 PROCEDURE — G0439 PPPS, SUBSEQ VISIT: HCPCS | Mod: 95,HCNC,, | Performed by: NURSE PRACTITIONER

## 2020-09-22 PROCEDURE — 3051F PR MOST RECENT HEMOGLOBIN A1C LEVEL 7.0 - < 8.0%: ICD-10-PCS | Mod: HCNC,CPTII,, | Performed by: NURSE PRACTITIONER

## 2020-09-22 PROCEDURE — G0439 PR MEDICARE ANNUAL WELLNESS SUBSEQUENT VISIT: ICD-10-PCS | Mod: 95,HCNC,, | Performed by: NURSE PRACTITIONER

## 2020-09-22 PROCEDURE — 99999 PR PBB SHADOW E&M-EST. PATIENT-LVL III: CPT | Mod: PBBFAC,HCNC,, | Performed by: NURSE PRACTITIONER

## 2020-09-22 PROCEDURE — 99999 PR PBB SHADOW E&M-EST. PATIENT-LVL III: ICD-10-PCS | Mod: PBBFAC,HCNC,, | Performed by: NURSE PRACTITIONER

## 2020-09-22 RX ORDER — ONDANSETRON 4 MG/1
1 TABLET, FILM COATED ORAL
COMMUNITY
Start: 2020-07-16 | End: 2020-12-04

## 2020-09-22 NOTE — PROGRESS NOTES
The patient location is: [place], [place]  The chief complaint leading to consultation is: Medicare Annual Wellness Visit    Visit type: audio only    Audio time with patient: 36  60 minutes of total time spent on the encounter, which includes face to face time and non-face to face time preparing to see the patient (eg, review of tests), Obtaining and/or reviewing separately obtained history, Documenting clinical information in the electronic or other health record, Independently interpreting results (not separately reported) and communicating results to the patient/family/caregiver, or Care coordination (not separately reported).         Each patient to whom he or she provides medical services by telemedicine is:  (1) informed of the relationship between the physician and patient and the respective role of any other health care provider with respect to management of the patient; and (2) notified that he or she may decline to receive medical services by telemedicine and may withdraw from such care at any time.    Notes: Tried assisting patient with set-up of [employer] raz. Patient reports she is unable to download the raz to her phone. Attempted to log in with 's phone but could not log in. Says [employer] account was created by surgery center but she does not know the username or password. Switched to Audio Visit d/t technical difficulties.      Lina Davis presented for a  Medicare AWV and comprehensive Health Risk Assessment today. The following components were reviewed and updated:    · Medical history  · Family History  · Social history  · Allergies and Current Medications  · Health Risk Assessment  · Health Maintenance  · Care Team     ** See Completed Assessments for Annual Wellness Visit within the encounter summary.**         The following assessments were completed:  · Living Situation  · CAGE  · Depression Screening  · Fall Risk Assessment (MACH 10)  · Hearing Assessment(HHI)  · Cognitive Function  "Screening  · Nutrition Screening  · ADL Screening  · PAQ Screening      Vitals:    09/22/20 0925   Weight: 87 kg (191 lb 12.8 oz)   Height: 5' 6" (1.676 m)     Body mass index is 30.96 kg/m².     Physical Exam  Constitutional:       General: She is not in acute distress.  Pulmonary:      Effort: No respiratory distress.   Neurological:      Mental Status: She is alert and oriented to person, place, and time.   Psychiatric:         Attention and Perception: Attention normal.         Speech: Speech normal.         Cognition and Memory: She exhibits impaired recent memory.      Comments: Mini Cog Test Score of 2       PE limited d/t audio visit. Patient did not sound to be in any respiratory distress. Able to answer questions and complete sentences without becoming short of breath. Not complaining of any pain at this time.        Diagnoses and health risks identified today and associated recommendations/orders:    1. Encounter for preventive health examination    2. HTN (hypertension), benign  Chronic; stable on medication. Follow up with PCP.    3. Hyperlipidemia, mixed  Chronic; stable on medication. Follow up with PCP.    4. Bilateral carotid artery disease, unspecified type  Chronic; stable on medication. Follow up with PCP.    5. Atherosclerosis of aorta  Chronic; stable on medication. Follow up with PCP.    6. Type 2 diabetes mellitus with diabetic nephropathy, with long-term current use of insulin  Chronic; stable on medication. Follow up with PCP.    7. Diabetic polyneuropathy associated with type 2 diabetes mellitus  Chronic; stable on medication. Follow up with PCP.    8. CKD (chronic kidney disease) stage 3, GFR 30-59 ml/min  Chronic; stable on medication. Follow up with PCP.    9. Gastroesophageal reflux disease, esophagitis presence not specified  Chronic; stable on medication. Follow up with PCP.    10. History of gout  Chronic; stable on medication. Follow up with PCP.    11. Obesity (BMI " 30.0-34.9)  Encouraged patient to continue to eat a low salt/low fat ADA diet and discussed importance of engaging in physical activity at least 5x/week for a minimum of 30 min/day.      Provided Lina with a 5-10 year written screening schedule and personal prevention plan. Recommendations were developed using the USPSTF age appropriate recommendations. Education, counseling, and referrals were provided as needed. After Visit Summary printed and given to patient which includes a list of additional screenings/tests needed.    I offered to discuss end of life issues, including information on how to make advance directives that the patient could use to name someone who would make medical decisions on their behalf if they became too ill to make themselves.    ___Patient declined  _X_Patient is interested, I provided paper work and offered to discuss.      Follow up for your next annual wellness visit.      Caryl Crews NP

## 2020-09-22 NOTE — PATIENT INSTRUCTIONS
Counseling and Referral of Other Preventative  (Italic type indicates deductible and co-insurance are waived)    Patient Name: Lina Davis  Today's Date: 9/22/2020    Health Maintenance       Date Due Completion Date    Mammogram 03/15/2020 3/15/2019    Influenza Vaccine (1) 10/09/2020 (Originally 8/1/2020) 10/21/2019    Foot Exam 12/04/2020 12/4/2019 (Done)    Override on 12/4/2019: Done    Lipid Panel 02/03/2021 2/3/2020    Hemoglobin A1c 02/19/2021 8/19/2020    Eye Exam 02/28/2021 2/28/2020    Override on 2/14/2019: Done    Urine Microalbumin 07/14/2021 7/14/2020    High Dose Statin 09/22/2021 9/22/2020    Colorectal Cancer Screening 03/07/2023 3/7/2018    TETANUS VACCINE 01/15/2026 1/15/2016        No orders of the defined types were placed in this encounter.    The following information is provided to all patients.  This information is to help you find resources for any of the problems found today that may be affecting your health:                Living healthy guide: www.[facility].[place].gov      Understanding Diabetes: www.diabetes.org      Eating healthy: www.cdc.gov/healthyweight      CDC home safety checklist: www.cdc.gov/steadi/patient.html      Agency on Aging: www.goea.[place].[facility]      Alcoholics anonymous (AA): www.aa.org      Physical Activity: www.rashida.nih.gov/na2cjfv      Tobacco use: www.quitwithusla.org

## 2020-09-29 ENCOUNTER — TELEPHONE (OUTPATIENT)
Dept: INTERNAL MEDICINE | Facility: CLINIC | Age: 75
End: 2020-09-29

## 2020-09-29 NOTE — TELEPHONE ENCOUNTER
----- Message from Kaci Armstrong sent at 9/29/2020 10:51 AM CDT -----  Contact: PT Lina@[phone]---  Patient Returning Call from [facility]    Who Left Message for Patient:--Nurse-    Communication Preference:--Lina--[phone]---    Additional Information:Pt calling to speak with the nurse regarding diabetic medication because her sugar high. Please delio to advise.

## 2020-10-01 ENCOUNTER — PATIENT MESSAGE (OUTPATIENT)
Dept: INTERNAL MEDICINE | Facility: CLINIC | Age: 75
End: 2020-10-01

## 2020-10-09 ENCOUNTER — TELEPHONE (OUTPATIENT)
Dept: INTERNAL MEDICINE | Facility: CLINIC | Age: 75
End: 2020-10-09

## 2020-10-09 ENCOUNTER — PATIENT MESSAGE (OUTPATIENT)
Dept: INTERNAL MEDICINE | Facility: CLINIC | Age: 75
End: 2020-10-09

## 2020-10-09 NOTE — TELEPHONE ENCOUNTER
Unable to reach patient. Sent patient a message through patient portal advising her to increase insulin to 35 units(if blood sugar levels are still running high) per Dr Freedman

## 2020-10-09 NOTE — TELEPHONE ENCOUNTER
----- Message from Jennifer Parker sent at 10/9/2020 11:19 AM CDT -----  Contact: [phone]  Pt states she would like to speak to cristian in ref to BS levels. Please call and advise

## 2020-10-23 ENCOUNTER — HOSPITAL ENCOUNTER (OUTPATIENT)
Dept: RADIOLOGY | Facility: HOSPITAL | Age: 75
Discharge: HOME | End: 2020-10-23
Attending: INTERNAL MEDICINE
Payer: MEDICARE

## 2020-10-23 VITALS — HEIGHT: 66 IN | BODY MASS INDEX: 31.18 KG/M2 | WEIGHT: 194 LBS

## 2020-10-23 DIAGNOSIS — Z12.31 SCREENING MAMMOGRAM, ENCOUNTER FOR: ICD-10-CM

## 2020-10-23 PROCEDURE — 77067 MAMMO DIGITAL SCREENING BILAT WITH TOMOSYNTHESIS_CAD: ICD-10-PCS | Mod: 26,HCNC,, | Performed by: RADIOLOGY

## 2020-10-23 PROCEDURE — 77067 SCR MAMMO BI INCL CAD: CPT | Mod: TC,HCNC

## 2020-10-23 PROCEDURE — 77063 MAMMO DIGITAL SCREENING BILAT WITH TOMOSYNTHESIS_CAD: ICD-10-PCS | Mod: 26,HCNC,, | Performed by: RADIOLOGY

## 2020-10-23 PROCEDURE — 77063 BREAST TOMOSYNTHESIS BI: CPT | Mod: 26,HCNC,, | Performed by: RADIOLOGY

## 2020-10-23 PROCEDURE — 77067 SCR MAMMO BI INCL CAD: CPT | Mod: 26,HCNC,, | Performed by: RADIOLOGY

## 2020-11-02 ENCOUNTER — TELEPHONE (OUTPATIENT)
Dept: INTERNAL MEDICINE | Facility: CLINIC | Age: 75
End: 2020-11-02

## 2020-12-04 ENCOUNTER — HOSPITAL ENCOUNTER (EMERGENCY)
Facility: HOSPITAL | Age: 75
Discharge: HOME | End: 2020-12-05
Attending: EMERGENCY MEDICINE
Payer: MEDICARE

## 2020-12-04 ENCOUNTER — OFFICE VISIT (OUTPATIENT)
Dept: INTERNAL MEDICINE | Facility: CLINIC | Age: 75
End: 2020-12-04
Payer: MEDICARE

## 2020-12-04 ENCOUNTER — TELEPHONE (OUTPATIENT)
Dept: INTERNAL MEDICINE | Facility: CLINIC | Age: 75
End: 2020-12-04

## 2020-12-04 ENCOUNTER — HOSPITAL ENCOUNTER (OUTPATIENT)
Dept: RADIOLOGY | Facility: HOSPITAL | Age: 75
Discharge: HOME | End: 2020-12-04
Attending: INTERNAL MEDICINE
Payer: MEDICARE

## 2020-12-04 VITALS
OXYGEN SATURATION: 98 % | BODY MASS INDEX: 32.59 KG/M2 | HEART RATE: 97 BPM | HEIGHT: 66 IN | DIASTOLIC BLOOD PRESSURE: 60 MMHG | WEIGHT: 202.81 LBS | SYSTOLIC BLOOD PRESSURE: 132 MMHG

## 2020-12-04 DIAGNOSIS — R89.9 ABNORMAL LABORATORY TEST: Primary | ICD-10-CM

## 2020-12-04 DIAGNOSIS — E11.21 TYPE 2 DIABETES MELLITUS WITH DIABETIC NEPHROPATHY, WITH LONG-TERM CURRENT USE OF INSULIN: Primary | ICD-10-CM

## 2020-12-04 DIAGNOSIS — M54.31 SCIATICA OF RIGHT SIDE: ICD-10-CM

## 2020-12-04 DIAGNOSIS — I25.10 CVD (CARDIOVASCULAR DISEASE): ICD-10-CM

## 2020-12-04 DIAGNOSIS — E87.5 HYPERKALEMIA: ICD-10-CM

## 2020-12-04 DIAGNOSIS — Z79.4 TYPE 2 DIABETES MELLITUS WITH DIABETIC NEPHROPATHY, WITH LONG-TERM CURRENT USE OF INSULIN: Primary | ICD-10-CM

## 2020-12-04 LAB
BUN SERPL-MCNC: 59 MG/DL (ref 6–30)
CHLORIDE SERPL-SCNC: 113 MMOL/L (ref 95–110)
CREAT SERPL-MCNC: 0.9 MG/DL (ref 0.5–1.4)
GLUCOSE SERPL-MCNC: 186 MG/DL (ref 70–110)
HCT VFR BLD CALC: 33 %PCV (ref 36–54)
POC IONIZED CALCIUM: 1.28 MMOL/L (ref 1.06–1.42)
POC TCO2 (MEASURED): 23 MMOL/L (ref 23–29)
POTASSIUM BLD-SCNC: 6.2 MMOL/L (ref 3.5–5.1)
SAMPLE: ABNORMAL
SODIUM BLD-SCNC: 140 MMOL/L (ref 136–145)

## 2020-12-04 PROCEDURE — 1101F PR PT FALLS ASSESS DOC 0-1 FALLS W/OUT INJ PAST YR: ICD-10-PCS | Mod: HCNC,CPTII,S$GLB, | Performed by: INTERNAL MEDICINE

## 2020-12-04 PROCEDURE — 1126F AMNT PAIN NOTED NONE PRSNT: CPT | Mod: HCNC,S$GLB,, | Performed by: INTERNAL MEDICINE

## 2020-12-04 PROCEDURE — 3288F PR FALLS RISK ASSESSMENT DOCUMENTED: ICD-10-PCS | Mod: HCNC,CPTII,S$GLB, | Performed by: INTERNAL MEDICINE

## 2020-12-04 PROCEDURE — 93010 ELECTROCARDIOGRAM REPORT: CPT | Mod: HCNC,,, | Performed by: INTERNAL MEDICINE

## 2020-12-04 PROCEDURE — 99999 PR PBB SHADOW E&M-EST. PATIENT-LVL IV: ICD-10-PCS | Mod: PBBFAC,HCNC,, | Performed by: INTERNAL MEDICINE

## 2020-12-04 PROCEDURE — 73502 XR HIP 2 VIEW RIGHT: ICD-10-PCS | Mod: 26,HCNC,RT, | Performed by: RADIOLOGY

## 2020-12-04 PROCEDURE — 73502 X-RAY EXAM HIP UNI 2-3 VIEWS: CPT | Mod: 26,HCNC,RT, | Performed by: RADIOLOGY

## 2020-12-04 PROCEDURE — 99214 PR OFFICE/OUTPT VISIT, EST, LEVL IV, 30-39 MIN: ICD-10-PCS | Mod: HCNC,S$GLB,, | Performed by: INTERNAL MEDICINE

## 2020-12-04 PROCEDURE — 3072F LOW RISK FOR RETINOPATHY: CPT | Mod: HCNC,S$GLB,, | Performed by: INTERNAL MEDICINE

## 2020-12-04 PROCEDURE — 3078F PR MOST RECENT DIASTOLIC BLOOD PRESSURE < 80 MM HG: ICD-10-PCS | Mod: HCNC,CPTII,S$GLB, | Performed by: INTERNAL MEDICINE

## 2020-12-04 PROCEDURE — 99284 EMERGENCY DEPT VISIT MOD MDM: CPT | Mod: HCNC,,, | Performed by: PHYSICIAN ASSISTANT

## 2020-12-04 PROCEDURE — 80048 BASIC METABOLIC PNL TOTAL CA: CPT | Mod: HCNC

## 2020-12-04 PROCEDURE — 3288F FALL RISK ASSESSMENT DOCD: CPT | Mod: HCNC,CPTII,S$GLB, | Performed by: INTERNAL MEDICINE

## 2020-12-04 PROCEDURE — 3075F PR MOST RECENT SYSTOLIC BLOOD PRESS GE 130-139MM HG: ICD-10-PCS | Mod: HCNC,CPTII,S$GLB, | Performed by: INTERNAL MEDICINE

## 2020-12-04 PROCEDURE — 3051F HG A1C>EQUAL 7.0%<8.0%: CPT | Mod: HCNC,CPTII,S$GLB, | Performed by: INTERNAL MEDICINE

## 2020-12-04 PROCEDURE — 1159F PR MEDICATION LIST DOCUMENTED IN MEDICAL RECORD: ICD-10-PCS | Mod: HCNC,S$GLB,, | Performed by: INTERNAL MEDICINE

## 2020-12-04 PROCEDURE — 3078F DIAST BP <80 MM HG: CPT | Mod: HCNC,CPTII,S$GLB, | Performed by: INTERNAL MEDICINE

## 2020-12-04 PROCEDURE — 1101F PT FALLS ASSESS-DOCD LE1/YR: CPT | Mod: HCNC,CPTII,S$GLB, | Performed by: INTERNAL MEDICINE

## 2020-12-04 PROCEDURE — 1159F MED LIST DOCD IN RCRD: CPT | Mod: HCNC,S$GLB,, | Performed by: INTERNAL MEDICINE

## 2020-12-04 PROCEDURE — 3008F PR BODY MASS INDEX (BMI) DOCUMENTED: ICD-10-PCS | Mod: HCNC,CPTII,S$GLB, | Performed by: INTERNAL MEDICINE

## 2020-12-04 PROCEDURE — 25000003 PHARM REV CODE 250: Mod: HCNC | Performed by: PHYSICIAN ASSISTANT

## 2020-12-04 PROCEDURE — 99284 PR EMERGENCY DEPT VISIT,LEVEL IV: ICD-10-PCS | Mod: HCNC,,, | Performed by: PHYSICIAN ASSISTANT

## 2020-12-04 PROCEDURE — 99999 PR PBB SHADOW E&M-EST. PATIENT-LVL IV: CPT | Mod: PBBFAC,HCNC,, | Performed by: INTERNAL MEDICINE

## 2020-12-04 PROCEDURE — 99214 OFFICE O/P EST MOD 30 MIN: CPT | Mod: HCNC,S$GLB,, | Performed by: INTERNAL MEDICINE

## 2020-12-04 PROCEDURE — 3072F PR LOW RISK FOR RETINOPATHY: ICD-10-PCS | Mod: HCNC,S$GLB,, | Performed by: INTERNAL MEDICINE

## 2020-12-04 PROCEDURE — 93005 ELECTROCARDIOGRAM TRACING: CPT | Mod: HCNC

## 2020-12-04 PROCEDURE — 1126F PR PAIN SEVERITY QUANTIFIED, NO PAIN PRESENT: ICD-10-PCS | Mod: HCNC,S$GLB,, | Performed by: INTERNAL MEDICINE

## 2020-12-04 PROCEDURE — 3008F BODY MASS INDEX DOCD: CPT | Mod: HCNC,CPTII,S$GLB, | Performed by: INTERNAL MEDICINE

## 2020-12-04 PROCEDURE — 3051F PR MOST RECENT HEMOGLOBIN A1C LEVEL 7.0 - < 8.0%: ICD-10-PCS | Mod: HCNC,CPTII,S$GLB, | Performed by: INTERNAL MEDICINE

## 2020-12-04 PROCEDURE — 99284 EMERGENCY DEPT VISIT MOD MDM: CPT | Mod: 25,HCNC

## 2020-12-04 PROCEDURE — 99499 RISK ADDL DX/OHS AUDIT: ICD-10-PCS | Mod: S$GLB,,, | Performed by: INTERNAL MEDICINE

## 2020-12-04 PROCEDURE — 93010 EKG 12-LEAD: ICD-10-PCS | Mod: HCNC,,, | Performed by: INTERNAL MEDICINE

## 2020-12-04 PROCEDURE — 96360 HYDRATION IV INFUSION INIT: CPT | Mod: HCNC

## 2020-12-04 PROCEDURE — 3075F SYST BP GE 130 - 139MM HG: CPT | Mod: HCNC,CPTII,S$GLB, | Performed by: INTERNAL MEDICINE

## 2020-12-04 PROCEDURE — 99499 UNLISTED E&M SERVICE: CPT | Mod: S$GLB,,, | Performed by: INTERNAL MEDICINE

## 2020-12-04 PROCEDURE — 73502 X-RAY EXAM HIP UNI 2-3 VIEWS: CPT | Mod: TC,HCNC,RT

## 2020-12-04 RX ORDER — ALBUTEROL SULFATE 2.5 MG/.5ML
10 SOLUTION RESPIRATORY (INHALATION)
Status: COMPLETED | OUTPATIENT
Start: 2020-12-04 | End: 2020-12-05

## 2020-12-04 RX ORDER — OXYCODONE AND ACETAMINOPHEN 5; 325 MG/1; MG/1
1 TABLET ORAL EVERY 12 HOURS PRN
Qty: 40 TABLET | Refills: 0 | Status: SHIPPED | OUTPATIENT
Start: 2020-12-04 | End: 2021-05-31 | Stop reason: SDUPTHER

## 2020-12-04 RX ORDER — ALLOPURINOL 300 MG/1
300 TABLET ORAL DAILY
Qty: 90 TABLET | Refills: 3 | Status: SHIPPED | OUTPATIENT
Start: 2020-12-04 | End: 2021-11-07

## 2020-12-04 RX ORDER — OMEPRAZOLE 20 MG/1
CAPSULE, DELAYED RELEASE ORAL
Qty: 180 CAPSULE | Refills: 3 | Status: SHIPPED | OUTPATIENT
Start: 2020-12-04 | End: 2022-09-13 | Stop reason: SDUPTHER

## 2020-12-04 RX ORDER — ALBUTEROL SULFATE 2.5 MG/.5ML
15 SOLUTION RESPIRATORY (INHALATION)
Status: DISCONTINUED | OUTPATIENT
Start: 2020-12-04 | End: 2020-12-04

## 2020-12-04 RX ORDER — GLIMEPIRIDE 4 MG/1
TABLET ORAL
Qty: 180 TABLET | Refills: 1 | Status: SHIPPED | OUTPATIENT
Start: 2020-12-04 | End: 2021-05-16 | Stop reason: SDUPTHER

## 2020-12-04 RX ORDER — GABAPENTIN 300 MG/1
300 CAPSULE ORAL NIGHTLY
Qty: 90 CAPSULE | Refills: 3 | Status: SHIPPED | OUTPATIENT
Start: 2020-12-04 | End: 2021-01-07 | Stop reason: SDUPTHER

## 2020-12-04 RX ADMIN — SODIUM CHLORIDE 1000 ML: 0.9 INJECTION, SOLUTION INTRAVENOUS at 11:12

## 2020-12-04 NOTE — PROGRESS NOTES
Subjective:       Patient ID: Lina Davis is a 75 y.o. female.    Chief Complaint: Follow-up    HPIPt is feeling well but blood sugars are running too high.  No CP or SOB.  Some R sided sciatica.    Review of Systems   Respiratory: Negative for shortness of breath (PND or orthopnea).    Cardiovascular: Negative for chest pain (arm pain or jaw pain).   Gastrointestinal: Negative for abdominal pain, diarrhea, nausea and vomiting.   Genitourinary: Negative for dysuria.   Neurological: Negative for seizures, syncope and headaches.       Objective:      Physical Exam  Constitutional:       General: She is not in acute distress.     Appearance: She is well-developed.   HENT:      Head: Normocephalic.   Neck:      Musculoskeletal: Neck supple.      Thyroid: No thyromegaly.      Vascular: No JVD.   Cardiovascular:      Rate and Rhythm: Normal rate and regular rhythm.      Heart sounds: Normal heart sounds. No murmur. No friction rub. No gallop.    Pulmonary:      Effort: Pulmonary effort is normal.      Breath sounds: Normal breath sounds. No wheezing or rales.   Abdominal:      General: Bowel sounds are normal. There is no distension.      Palpations: Abdomen is soft. There is no mass.      Tenderness: There is no abdominal tenderness. There is no guarding or rebound.   Lymphadenopathy:      Cervical: No cervical adenopathy.   Skin:     General: Skin is warm and dry.   Neurological:      Mental Status: She is alert and oriented to person, place, and time.      Deep Tendon Reflexes: Reflexes are normal and symmetric.   Psychiatric:         Behavior: Behavior normal.         Thought Content: Thought content normal.         Judgment: Judgment normal.         Assessment:       1. Type 2 diabetes mellitus with diabetic nephropathy, with long-term current use of insulin    2. CVD (cardiovascular disease)    3. Sciatica of right side        Plan:   Type 2 diabetes mellitus with diabetic nephropathy, with long-term current use  of insulin  -     CBC Auto Differential; Future; Expected date: 12/04/2020  -     Comprehensive Metabolic Panel; Future; Expected date: 12/04/2020  -     Hemoglobin A1C; Future; Expected date: 12/04/2020    CVD (cardiovascular disease)  -     US Carotid Bilateral; Future; Expected date: 12/04/2020    Sciatica of right side  -     X-Ray Hip 2 or 3 views Right; Future; Expected date: 12/04/2020    Other orders  -     gabapentin (NEURONTIN) 300 MG capsule; Take 1 capsule (300 mg total) by mouth every evening.  Dispense: 90 capsule; Refill: 3  -     oxyCODONE-acetaminophen (PERCOCET) 5-325 mg per tablet; Take 1 tablet by mouth every 12 (twelve) hours as needed for Pain.  Dispense: 40 tablet; Refill: 0  -     omeprazole (PRILOSEC) 20 MG capsule; One capsule twice daily  Dispense: 180 capsule; Refill: 3  -     glimepiride (AMARYL) 4 MG tablet; One twice daily  Dispense: 180 tablet; Refill: 1  -     allopurinoL (ZYLOPRIM) 300 MG tablet; Take 1 tablet (300 mg total) by mouth once daily.  Dispense: 90 tablet; Refill: 3

## 2020-12-05 VITALS
HEIGHT: 66 IN | TEMPERATURE: 99 F | DIASTOLIC BLOOD PRESSURE: 79 MMHG | WEIGHT: 202 LBS | OXYGEN SATURATION: 97 % | SYSTOLIC BLOOD PRESSURE: 168 MMHG | HEART RATE: 89 BPM | BODY MASS INDEX: 32.47 KG/M2 | RESPIRATION RATE: 16 BRPM

## 2020-12-05 LAB
ANION GAP SERPL CALC-SCNC: 11 MMOL/L (ref 8–16)
BUN SERPL-MCNC: 43 MG/DL (ref 8–23)
CALCIUM SERPL-MCNC: 9.9 MG/DL (ref 8.7–10.5)
CHLORIDE SERPL-SCNC: 111 MMOL/L (ref 95–110)
CO2 SERPL-SCNC: 19 MMOL/L (ref 23–29)
CREAT SERPL-MCNC: 1.2 MG/DL (ref 0.5–1.4)
EST. GFR  (AFRICAN AMERICAN): 51.8 ML/MIN/1.73 M^2
EST. GFR  (NON AFRICAN AMERICAN): 44.9 ML/MIN/1.73 M^2
GLUCOSE SERPL-MCNC: 188 MG/DL (ref 70–110)
POTASSIUM SERPL-SCNC: 4.6 MMOL/L (ref 3.5–5.1)
SODIUM SERPL-SCNC: 141 MMOL/L (ref 136–145)

## 2020-12-05 PROCEDURE — 94761 N-INVAS EAR/PLS OXIMETRY MLT: CPT | Mod: HCNC

## 2020-12-05 PROCEDURE — 94640 AIRWAY INHALATION TREATMENT: CPT | Mod: HCNC

## 2020-12-05 PROCEDURE — 25000242 PHARM REV CODE 250 ALT 637 W/ HCPCS: Mod: HCNC | Performed by: PHYSICIAN ASSISTANT

## 2020-12-05 RX ADMIN — ALBUTEROL SULFATE 10 MG: 2.5 SOLUTION RESPIRATORY (INHALATION) at 12:12

## 2020-12-05 NOTE — TELEPHONE ENCOUNTER
Received call from lab regarding elevated potassium. Contacted patient and advised her to go to the ER

## 2020-12-05 NOTE — DISCHARGE INSTRUCTIONS
Diagnosis:  Abnormal lab test    Your potassium was high when it was checked in clinic but when we rechecked it here in the emergency department is normal.  I suspect that the original lab was incorrect.    Please schedule a follow-up appointment with your primary care doctor next 1-2 weeks.  If you have any new symptoms such as chest pain, racing or abnormal heartbeat or other concerning symptoms please return to the emergency department.

## 2020-12-05 NOTE — ED NOTES
I-STAT Chem-8+ Results:   Value Reference Range   Sodium 140 136-145 mmol/L   Potassium  6.2 3.5-5.1 mmol/L   Chloride 113  mmol/L   Ionized Calcium 1.28 1.06-1.42 mmol/L   CO2 (measured) 23 23-29 mmol/L   Glucose 186  mg/dL   BUN 59 6-30 mg/dL   Creatinine 0.9 0.5-1.4 mg/dL   Hematocrit 33 36-54%

## 2020-12-05 NOTE — ED TRIAGE NOTES
Lina Davis, a 75 y.o. female presents to the ED w/ complaint of possible hyperkalemia. Pt was sent from PCP because lab work earlier today showed elevated potassium.     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 Affect is appropriate.  Speech is appropriate and clear.   APPEARANCE: Patient resting comfortably in no acute distress.  Patient is clean and well groomed.  SKIN: The skin is warm and dry; color consistent with ethnicity.  Patient has normal skin turgor and moist mucus membranes.  Skin intact; no breakdown or bruising noted.   MUSCULOSKELETAL: Patient moving upper and lower extremities without difficulty.  Denies weakness.   RESPIRATORY: Airway is open and patent. Respirations spontaneous, even, easy, and non-labored.  Patient has a normal effort and rate.  No accessory muscle use noted. Denies cough.   CARDIAC:  Normal rhythm and rate noted.  No peripheral edema noted. No complaints of chest pain.    ABDOMEN:  No distention noted.   NEUROLOGIC: Eyes open spontaneously.  Behavior appropriate to situation.  Follows commands; facial expression symmetrical.  Purposeful motor response noted; normal sensation in all extremities.

## 2020-12-06 NOTE — ED PROVIDER NOTES
Encounter Date: 12/4/2020       History     Chief Complaint   Patient presents with    Hyperkalemia     had labwork and her primary dr John told her to come in due to her potassium being elevated, denies chest pain, SOB, muscle cramps      75-year-old female with DM 2, CKD 3, hypertension presents for hyperkalemia noted on outpatient labs.  She was seen by her PCP for routine checkup as well as glucose monitoring earlier today and lab work was notable for an elevated potassium.  She denies any complaints at this time, no chest pain, palpitations, shortness of breath, abdominal pain, nausea/vomiting/diarrhea, fatigue, fever/chills, decreased urine output or other complaints.  She denies any changes in her medication regimen or history of hyperkalemia.        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    Vitamin D deficiency disease      Past Surgical History:   Procedure Laterality Date    ANTERIOR CERVICAL DISCECTOMY W/ FUSION N/A 3/19/2020    Procedure: DISCECTOMY, SPINE, CERVICAL, ANTERIOR APPROACH, WITH FUSION, C3-C4, C4-C5, C5-C6;  Surgeon: Joseph Walton MD;  Location: [facility] OR [address] [place] [address];  Service: Neurosurgery;  Laterality: N/A;    CARPAL TUNNEL RELEASE      bilateral    COLONOSCOPY N/A 3/7/2018    Procedure: COLONOSCOPY;  Surgeon: Luís Soni MD;  Location: [facility] ([address] [place] [address]);  Service: Endoscopy;  Laterality: N/A;  ok to schedule per Elizabeth    COLONOSCOPY W/ BIOPSIES AND POLYPECTOMY      HEMORRHOID SURGERY      HYSTERECTOMY      AUB    ROTATOR CUFF REPAIR      bilateral     Family History   Problem Relation Age of Onset    Heart disease Mother      Diabetes Mother     Heart disease Father     Cancer Father         liver    Diabetes Sister     Cataracts Sister     Kidney disease Brother     Heart disease Brother     Diabetes Sister     COPD Brother     COPD Brother     Gout Brother     Benign prostatic hyperplasia Brother     Heart disease Brother     Kidney disease Brother     Heart attack Brother         heart attack    Kidney failure Brother     Lupus Sister     Heart attack Brother     Drug abuse Brother     No Known Problems Daughter     No Known Problems Son     Amblyopia Neg Hx     Blindness Neg Hx     Breast cancer Neg Hx     Colon cancer Neg Hx     Ovarian cancer Neg Hx      Social History     Tobacco Use    Smoking status: Never Smoker    Smokeless tobacco: Never Used   Substance Use Topics    Alcohol use: No    Drug use: No     Review of Systems   Constitutional: Negative for fever.   HENT: Negative for sore throat.    Respiratory: Negative for cough and shortness of breath.    Cardiovascular: Negative for chest pain, palpitations and leg swelling.   Gastrointestinal: Negative for nausea.   Endocrine: Negative for polydipsia and polyuria.   Genitourinary: Negative for decreased urine volume and dysuria.   Musculoskeletal: Negative for back pain.   Skin: Negative for rash.   Neurological: Negative for weakness.   Hematological: Does not bruise/bleed easily.       Physical Exam     Initial Vitals [12/04/20 2103]   BP Pulse Resp Temp SpO2   (!) 175/81 82 19 98.7 °F (37.1 °C) 99 %      MAP       --         Physical Exam    Nursing note and vitals reviewed.  Constitutional: She appears well-developed and well-nourished. She is not diaphoretic. No distress.   HENT:   Head: Normocephalic and atraumatic.   Eyes: EOM are normal. Pupils are equal, round, and reactive to light.   Neck: Normal range of motion.   Cardiovascular: Normal rate, regular rhythm, normal heart sounds and intact distal pulses. Exam reveals no gallop and no  friction rub.    No murmur heard.  Pulmonary/Chest: Breath sounds normal. No respiratory distress. She has no wheezes. She has no rhonchi. She has no rales. She exhibits no tenderness.   Abdominal: Soft. Bowel sounds are normal. She exhibits no distension and no mass. There is no abdominal tenderness. There is no rebound and no guarding.   Musculoskeletal: Normal range of motion.   Neurological: She is alert and oriented to person, place, and time.   Skin: Skin is warm and dry.   Psychiatric: She has a normal mood and affect.         ED Course   Procedures  Labs Reviewed   BASIC METABOLIC PANEL - Abnormal; Notable for the following components:       Result Value    Chloride 111 (*)     CO2 19 (*)     Glucose 188 (*)     BUN 43 (*)     eGFR if  51.8 (*)     eGFR if non  44.9 (*)     All other components within normal limits   ISTAT PROCEDURE - Abnormal; Notable for the following components:    POC Glucose 186 (*)     POC BUN 59 (*)     POC Potassium 6.2 (*)     POC Chloride 113 (*)     POC Hematocrit 33 (*)     All other components within normal limits     EKG Readings: (Independently Interpreted)   Initial Reading: No STEMI. Previous EKG: Compared with most recent EKG Previous EKG Date: 1/31/2020. Rhythm: Normal Sinus Rhythm. Heart Rate: 91. Ectopy: No Ectopy. ST Segments: Normal ST Segments. T Waves: Normal. Clinical Impression: Normal Sinus Rhythm Other Impression: LVH     ECG Results          EKG 12-lead (Final result)  Result time 12/05/20 11:20:22    Final result by Interface, Lab In [facility] (12/05/20 11:20:22)                 Narrative:    Test Reason : E87.5,    Vent. Rate : 091 BPM     Atrial Rate : 091 BPM     P-R Int : 178 ms          QRS Dur : 084 ms      QT Int : 332 ms       P-R-T Axes : 068 -25 -05 degrees     QTc Int : 408 ms    Normal sinus rhythm with sinus arrhythmia  Voltage criteria for left ventricular hypertrophy  Nonspecific T wave abnormality  Abnormal  ECG  When compared with ECG of 31-JAN-2020 10:24,  No significant change was found  Confirmed by TRI DUKES MD ([id number]) on 12/5/2020 11:20:09 AM    Referred By: NENA   SELF           Confirmed By:TRI DUKES MD                            Imaging Results    None          Medical Decision Making:   History:   Old Medical Records: I decided to obtain old medical records.  Old Records Summarized: records from clinic visits.       <> Summary of Records: Patient had lab work checked earlier today notable for potassium of 6.3.  Stable was noted to be hemolyzed, BUN was elevated 34, creatinine 1.3.  Initial Assessment:   75-year-old female presenting for hyperkalemia noted on outpatient labs.  She is asymptomatic.  She is hypertensive with otherwise normal vitals.  Well-appearing.  Differential Diagnosis:   Given that previous lab was hemolyzed, possible lab error  Renal dysfunction  Dehydration  Dysrhythmia  Electrolyte derangement  Independently Interpreted Test(s):   I have ordered and independently interpreted EKG Reading(s) - see prior notes  Clinical Tests:   Lab Tests: Ordered and Reviewed  Medical Tests: Ordered and Reviewed  ED Management:  Will do EKG, check i-STAT and reassess.    EKG without any changes suggestive of hyperkalemia.  I-STAT does show hyperkalemia potassium of 6.2 as well as elevated BUN at 59.  However, creatinine is 0.9, strongly suspect that this may be hemolyzed sample.  Will give fluids, albuterol and check basic metabolic panel for verification.    BMP notable for normal potassium at 4.6.  BUN is elevated to 43, creatinine 1.2.  Mild acidosis with CO2 of 19, however this appears to be chronic for the patient.  Given her reassuring workup, I do not believe that she requires further ED treatment and is stable for discharge.  I instructed her to follow up with her PCP for recheck of lab work next week and return to the ED for any concerning symptoms. Stressed the importance of  follow-up, strict ED return precautions given.  Patient voiced understanding and is comfortable with discharge. I discussed this patient with my supervising physician.                       ED Course as of Dec 05 2042   Fri Dec 04, 2020   2329 POC Potassium(!!): 6.2 [CC]   2330 POC BUN(!): 59 [CC]   2358 POC Creatinine: 0.9 [CC]   Sat Dec 05, 2020   0024 Potassium: 4.6 [CC]      ED Course User Index  [CC] Glenny Hurt PA-C            Clinical Impression:       ICD-10-CM ICD-9-CM   1. Abnormal laboratory test  R89.9 796.4   2. Hyperkalemia  E87.5 276.7                      Disposition:   Disposition: Discharged  Condition: Stable     ED Disposition Condition    Discharge Stable        ED Prescriptions     None        Follow-up Information     Follow up With Specialties Details Why Contact Info    Dora Freedman MD Internal Medicine Schedule an appointment as soon as possible for a visit in 1 week  [address]  [place] [address]  [phone]      [facility] Emergency Medicine Go to  If symptoms worsen [address] [facility] [id number] [phone]                                       Glenny Hurt PA-C  12/05/20 2042

## 2020-12-07 ENCOUNTER — HOSPITAL ENCOUNTER (OUTPATIENT)
Dept: RADIOLOGY | Facility: HOSPITAL | Age: 75
Discharge: HOME | End: 2020-12-07
Attending: INTERNAL MEDICINE
Payer: MEDICARE

## 2020-12-07 DIAGNOSIS — I25.10 CVD (CARDIOVASCULAR DISEASE): ICD-10-CM

## 2020-12-07 PROCEDURE — 93880 US CAROTID BILATERAL: ICD-10-PCS | Mod: 26,HCNC,, | Performed by: RADIOLOGY

## 2020-12-07 PROCEDURE — 93880 EXTRACRANIAL BILAT STUDY: CPT | Mod: TC,HCNC

## 2020-12-07 PROCEDURE — 93880 EXTRACRANIAL BILAT STUDY: CPT | Mod: 26,HCNC,, | Performed by: RADIOLOGY

## 2020-12-07 RX ORDER — PEN NEEDLE, DIABETIC 33 GX5/32"
NEEDLE, DISPOSABLE MISCELLANEOUS
Qty: 100 EACH | Refills: 3 | Status: SHIPPED | OUTPATIENT
Start: 2020-12-07 | End: 2022-12-28 | Stop reason: SDUPTHER

## 2020-12-10 ENCOUNTER — PATIENT MESSAGE (OUTPATIENT)
Dept: INTERNAL MEDICINE | Facility: CLINIC | Age: 75
End: 2020-12-10

## 2020-12-11 ENCOUNTER — PATIENT MESSAGE (OUTPATIENT)
Dept: OTHER | Facility: OTHER | Age: 75
End: 2020-12-11

## 2020-12-11 ENCOUNTER — OFFICE VISIT (OUTPATIENT)
Dept: PODIATRY | Facility: CLINIC | Age: 75
End: 2020-12-11
Payer: MEDICARE

## 2020-12-11 VITALS
SYSTOLIC BLOOD PRESSURE: 142 MMHG | HEIGHT: 66 IN | HEART RATE: 102 BPM | BODY MASS INDEX: 32.45 KG/M2 | DIASTOLIC BLOOD PRESSURE: 80 MMHG | WEIGHT: 201.94 LBS

## 2020-12-11 DIAGNOSIS — E11.21 TYPE 2 DIABETES MELLITUS WITH DIABETIC NEPHROPATHY, WITHOUT LONG-TERM CURRENT USE OF INSULIN: Primary | ICD-10-CM

## 2020-12-11 PROCEDURE — 3051F PR MOST RECENT HEMOGLOBIN A1C LEVEL 7.0 - < 8.0%: ICD-10-PCS | Mod: HCNC,CPTII,S$GLB, | Performed by: PODIATRIST

## 2020-12-11 PROCEDURE — 1159F PR MEDICATION LIST DOCUMENTED IN MEDICAL RECORD: ICD-10-PCS | Mod: HCNC,S$GLB,, | Performed by: PODIATRIST

## 2020-12-11 PROCEDURE — 3077F PR MOST RECENT SYSTOLIC BLOOD PRESSURE >= 140 MM HG: ICD-10-PCS | Mod: HCNC,CPTII,S$GLB, | Performed by: PODIATRIST

## 2020-12-11 PROCEDURE — 3008F BODY MASS INDEX DOCD: CPT | Mod: HCNC,CPTII,S$GLB, | Performed by: PODIATRIST

## 2020-12-11 PROCEDURE — 99213 PR OFFICE/OUTPT VISIT, EST, LEVL III, 20-29 MIN: ICD-10-PCS | Mod: HCNC,S$GLB,, | Performed by: PODIATRIST

## 2020-12-11 PROCEDURE — 3077F SYST BP >= 140 MM HG: CPT | Mod: HCNC,CPTII,S$GLB, | Performed by: PODIATRIST

## 2020-12-11 PROCEDURE — 3079F DIAST BP 80-89 MM HG: CPT | Mod: HCNC,CPTII,S$GLB, | Performed by: PODIATRIST

## 2020-12-11 PROCEDURE — 99999 PR PBB SHADOW E&M-EST. PATIENT-LVL III: CPT | Mod: PBBFAC,HCNC,, | Performed by: PODIATRIST

## 2020-12-11 PROCEDURE — 3008F PR BODY MASS INDEX (BMI) DOCUMENTED: ICD-10-PCS | Mod: HCNC,CPTII,S$GLB, | Performed by: PODIATRIST

## 2020-12-11 PROCEDURE — 1126F AMNT PAIN NOTED NONE PRSNT: CPT | Mod: HCNC,S$GLB,, | Performed by: PODIATRIST

## 2020-12-11 PROCEDURE — 1159F MED LIST DOCD IN RCRD: CPT | Mod: HCNC,S$GLB,, | Performed by: PODIATRIST

## 2020-12-11 PROCEDURE — 3051F HG A1C>EQUAL 7.0%<8.0%: CPT | Mod: HCNC,CPTII,S$GLB, | Performed by: PODIATRIST

## 2020-12-11 PROCEDURE — 3072F LOW RISK FOR RETINOPATHY: CPT | Mod: HCNC,S$GLB,, | Performed by: PODIATRIST

## 2020-12-11 PROCEDURE — 3072F PR LOW RISK FOR RETINOPATHY: ICD-10-PCS | Mod: HCNC,S$GLB,, | Performed by: PODIATRIST

## 2020-12-11 PROCEDURE — 99999 PR PBB SHADOW E&M-EST. PATIENT-LVL III: ICD-10-PCS | Mod: PBBFAC,HCNC,, | Performed by: PODIATRIST

## 2020-12-11 PROCEDURE — 3079F PR MOST RECENT DIASTOLIC BLOOD PRESSURE 80-89 MM HG: ICD-10-PCS | Mod: HCNC,CPTII,S$GLB, | Performed by: PODIATRIST

## 2020-12-11 PROCEDURE — 1126F PR PAIN SEVERITY QUANTIFIED, NO PAIN PRESENT: ICD-10-PCS | Mod: HCNC,S$GLB,, | Performed by: PODIATRIST

## 2020-12-11 PROCEDURE — 99213 OFFICE O/P EST LOW 20 MIN: CPT | Mod: HCNC,S$GLB,, | Performed by: PODIATRIST

## 2020-12-15 NOTE — PROGRESS NOTES
Subjective:      Patient ID: Lina Davis is a 75 y.o. female.    Chief Complaint: Diabetic Foot Exam (pcp      Dora Freedman,   12/04/20) and Nail Care    Lina is a 75 y.o. female who presents to the clinic upon referral from Dr. Ayde nova. provider found  for evaluation and treatment of diabetic feet. Lina has a past medical history of Anemia, Arthritis, Cataract, Gout, arthritis, HTN (hypertension), benign, Polyneuropathy, Type 2 diabetes mellitus with diabetic nephropathy (4/12/2016), and Vitamin D deficiency disease. Patient relates no major problem with feet. Only complaints today consist of .yearly comprehensive diabetic  foot examination  .    PCP: Dora Freedman MD    Date Last Seen by PCP:   Chief Complaint   Patient presents with    Diabetic Foot Exam     pcp      Dora Freedman,   12/04/20    Nail Care         Current shoe gear: Casual shoes    Hemoglobin A1C   Date Value Ref Range Status   12/04/2020 7.8 (H) 4.0 - 5.6 % Final     Comment:     ADA Screening Guidelines:  5.7-6.4%  Consistent with prediabetes  >or=6.5%  Consistent with diabetes  High levels of fetal hemoglobin interfere with the HbA1C  assay. Heterozygous hemoglobin variants (HbS, HgC, etc)do  not significantly interfere with this assay.   However, presence of multiple variants may affect accuracy.     08/19/2020 7.5 (H) 4.0 - 5.6 % Final     Comment:     ADA Screening Guidelines:  5.7-6.4%  Consistent with prediabetes  >or=6.5%  Consistent with diabetes  High levels of fetal hemoglobin interfere with the HbA1C  assay. Heterozygous hemoglobin variants (HbS, HgC, etc)do  not significantly interfere with this assay.   However, presence of multiple variants may affect accuracy.     07/14/2020 6.5 (H) 4.0 - 5.6 % Final     Comment:     ADA Screening Guidelines:  5.7-6.4%  Consistent with prediabetes  >or=6.5%  Consistent with diabetes  High levels of fetal hemoglobin interfere with the HbA1C  assay. Heterozygous hemoglobin  variants (HbS, HgC, etc)do  not significantly interfere with this assay.   However, presence of multiple variants may affect accuracy.             Review of Systems   Constitution: Negative for chills, decreased appetite and fever.   Cardiovascular: Negative for leg swelling.   Skin: Positive for dry skin. Negative for color change, flushing and itching.   Musculoskeletal: Negative for arthritis, joint pain, joint swelling and myalgias.   Gastrointestinal: Negative for nausea and vomiting.   Neurological: Negative for loss of balance, numbness and paresthesias.           Objective:      Physical Exam  Vitals signs and nursing note reviewed.   Constitutional:       General: She is not in acute distress.     Appearance: She is well-developed. She is not toxic-appearing or diaphoretic.      Comments: alert and oriented x 3.    Cardiovascular:      Pulses:           Dorsalis pedis pulses are 2+ on the right side and 2+ on the left side.        Posterior tibial pulses are 2+ on the right side and 2+ on the left side.      Comments:  Capillary refill time is within normal limits. Digital hair present.   Pulmonary:      Effort: No respiratory distress.   Musculoskeletal:         General: No deformity.      Right ankle: No tenderness. No lateral malleolus, no medial malleolus, no AITFL, no CF ligament and no posterior TFL tenderness found. Achilles tendon exhibits no pain, no defect and normal Durham's test results.      Left ankle: No tenderness. No lateral malleolus, no medial malleolus, no AITFL, no CF ligament and no posterior TFL tenderness found. Achilles tendon exhibits no pain, no defect and normal Durham's test results.      Right foot: No tenderness or bony tenderness.      Left foot: No tenderness or bony tenderness.      Comments: Adequate joint range of motion without pain, limitation, nor crepitation Bilateral feet and ankle joints. Muscle strength is 5/5 in all groups bilaterally.           Feet:       Right foot:      Protective Sensation: 5 sites tested. 5 sites sensed.      Left foot:      Protective Sensation: 5 sites tested. 5 sites sensed.   Lymphadenopathy:      Comments: No lymphatic streaking     Skin:     General: Skin is warm and dry.      Coloration: Skin is not ashen, jaundiced or pale.      Findings: No abscess or rash.      Nails: There is no clubbing.        Comments: Skin is of normal turgor.   Normal temperature gradient.  Examination of the skin reveals no evidence of significant rashes, open lesions, suspicious appearing nevi or other concerning lesions.      Toenails 1-5 bilaterally are neatly trimmed; of normal color and thickness  Xerosis b/l calcaneal rim      Neurological:      Sensory: No sensory deficit.      Motor: No atrophy.      Comments: Light touch present     Psychiatric:         Attention and Perception: She is attentive.         Mood and Affect: Mood is not anxious. Affect is not inappropriate.         Speech: She is communicative. Speech is not slurred.         Behavior: Behavior is not combative.               Assessment:       Encounter Diagnosis   Name Primary?    Type 2 diabetes mellitus with diabetic nephropathy, without long-term current use of insulin Yes         Plan:       Lina was seen today for diabetic foot exam and nail care.    Diagnoses and all orders for this visit:    Type 2 diabetes mellitus with diabetic nephropathy, without long-term current use of insulin      I counseled the patient on her conditions, their implications and medical management.      - Shoe inspection. Diabetic Foot Education. Patient reminded of the importance of good nutrition and blood sugar control to help prevent podiatric complications of diabetes. Patient instructed on proper foot hygeine. We discussed wearing proper shoe gear, daily foot inspections, never walking without protective shoe gear, caution putting sharp instruments to feet     - Discussed DM foot care:  Wear comfortable,  proper fitting shoes. Wash feet daily. Dry well. After drying, apply moisturizer to feet (no lotion to webspaces). Inspect feet daily for skin breaks, blisters, swelling, or redness. Wear cotton socks (preferably white)  Change socks every day. Do NOT walk barefoot. Do NOT use heating pads or warm/hot water soaks     - Discussed importance of daily moisturizer to the feet such as Gold bonds diabetic foot cream    - Patient is low risk for developing lower extremity issues secondary to diabetes. I recommend continued yearly diabetic foot examinations.     - Patients PCP can perform yearly foot checks . Currently  patient has no pedal manifestations of DM    - Patients with out pedal manifestations of DM, do not qualify for nail/callus trimming     - RTC in  PRN     .

## 2020-12-27 ENCOUNTER — TELEPHONE (OUTPATIENT)
Dept: INTERNAL MEDICINE | Facility: CLINIC | Age: 75
End: 2020-12-27

## 2020-12-27 RX ORDER — PIOGLITAZONEHYDROCHLORIDE 15 MG/1
15 TABLET ORAL DAILY
Qty: 90 TABLET | Refills: 3 | Status: SHIPPED | OUTPATIENT
Start: 2020-12-27 | End: 2021-02-05 | Stop reason: SDUPTHER

## 2020-12-28 ENCOUNTER — TELEPHONE (OUTPATIENT)
Dept: INTERNAL MEDICINE | Facility: CLINIC | Age: 75
End: 2020-12-28

## 2020-12-28 NOTE — TELEPHONE ENCOUNTER
Spoke with pt . Informed her of adding the Actos to her med list . Pt made an appt to see the Dr . Thx Griselda

## 2021-01-07 ENCOUNTER — HOSPITAL ENCOUNTER (OUTPATIENT)
Dept: RADIOLOGY | Facility: HOSPITAL | Age: 76
Discharge: HOME | End: 2021-01-07
Attending: INTERNAL MEDICINE
Payer: MEDICARE

## 2021-01-07 ENCOUNTER — OFFICE VISIT (OUTPATIENT)
Dept: INTERNAL MEDICINE | Facility: CLINIC | Age: 76
End: 2021-01-07
Payer: MEDICARE

## 2021-01-07 VITALS
WEIGHT: 207.25 LBS | BODY MASS INDEX: 33.31 KG/M2 | HEIGHT: 66 IN | HEART RATE: 100 BPM | OXYGEN SATURATION: 99 % | DIASTOLIC BLOOD PRESSURE: 74 MMHG | SYSTOLIC BLOOD PRESSURE: 140 MMHG

## 2021-01-07 DIAGNOSIS — R60.9 EDEMA, UNSPECIFIED TYPE: Primary | ICD-10-CM

## 2021-01-07 DIAGNOSIS — R60.9 EDEMA, UNSPECIFIED TYPE: ICD-10-CM

## 2021-01-07 DIAGNOSIS — M54.9 DORSALGIA, UNSPECIFIED: ICD-10-CM

## 2021-01-07 DIAGNOSIS — E87.5 HYPERKALEMIA: ICD-10-CM

## 2021-01-07 PROCEDURE — 3078F PR MOST RECENT DIASTOLIC BLOOD PRESSURE < 80 MM HG: ICD-10-PCS | Mod: HCNC,CPTII,S$GLB, | Performed by: INTERNAL MEDICINE

## 2021-01-07 PROCEDURE — 3078F DIAST BP <80 MM HG: CPT | Mod: HCNC,CPTII,S$GLB, | Performed by: INTERNAL MEDICINE

## 2021-01-07 PROCEDURE — 99214 OFFICE O/P EST MOD 30 MIN: CPT | Mod: HCNC,S$GLB,, | Performed by: INTERNAL MEDICINE

## 2021-01-07 PROCEDURE — 3072F LOW RISK FOR RETINOPATHY: CPT | Mod: HCNC,S$GLB,, | Performed by: INTERNAL MEDICINE

## 2021-01-07 PROCEDURE — 93970 EXTREMITY STUDY: CPT | Mod: 26,HCNC,, | Performed by: RADIOLOGY

## 2021-01-07 PROCEDURE — 3077F SYST BP >= 140 MM HG: CPT | Mod: HCNC,CPTII,S$GLB, | Performed by: INTERNAL MEDICINE

## 2021-01-07 PROCEDURE — 99999 PR PBB SHADOW E&M-EST. PATIENT-LVL III: ICD-10-PCS | Mod: PBBFAC,HCNC,, | Performed by: INTERNAL MEDICINE

## 2021-01-07 PROCEDURE — 3072F PR LOW RISK FOR RETINOPATHY: ICD-10-PCS | Mod: HCNC,S$GLB,, | Performed by: INTERNAL MEDICINE

## 2021-01-07 PROCEDURE — 93970 EXTREMITY STUDY: CPT | Mod: TC,HCNC

## 2021-01-07 PROCEDURE — 93970 US LOWER EXTREMITY VEINS BILATERAL: ICD-10-PCS | Mod: 26,HCNC,, | Performed by: RADIOLOGY

## 2021-01-07 PROCEDURE — 99214 PR OFFICE/OUTPT VISIT, EST, LEVL IV, 30-39 MIN: ICD-10-PCS | Mod: HCNC,S$GLB,, | Performed by: INTERNAL MEDICINE

## 2021-01-07 PROCEDURE — 1101F PT FALLS ASSESS-DOCD LE1/YR: CPT | Mod: HCNC,CPTII,S$GLB, | Performed by: INTERNAL MEDICINE

## 2021-01-07 PROCEDURE — 3288F FALL RISK ASSESSMENT DOCD: CPT | Mod: HCNC,CPTII,S$GLB, | Performed by: INTERNAL MEDICINE

## 2021-01-07 PROCEDURE — 3288F PR FALLS RISK ASSESSMENT DOCUMENTED: ICD-10-PCS | Mod: HCNC,CPTII,S$GLB, | Performed by: INTERNAL MEDICINE

## 2021-01-07 PROCEDURE — 99499 RISK ADDL DX/OHS AUDIT: ICD-10-PCS | Mod: HCNC,S$GLB,, | Performed by: INTERNAL MEDICINE

## 2021-01-07 PROCEDURE — 1159F PR MEDICATION LIST DOCUMENTED IN MEDICAL RECORD: ICD-10-PCS | Mod: HCNC,S$GLB,, | Performed by: INTERNAL MEDICINE

## 2021-01-07 PROCEDURE — 99499 UNLISTED E&M SERVICE: CPT | Mod: HCNC,S$GLB,, | Performed by: INTERNAL MEDICINE

## 2021-01-07 PROCEDURE — 1159F MED LIST DOCD IN RCRD: CPT | Mod: HCNC,S$GLB,, | Performed by: INTERNAL MEDICINE

## 2021-01-07 PROCEDURE — 3008F PR BODY MASS INDEX (BMI) DOCUMENTED: ICD-10-PCS | Mod: HCNC,CPTII,S$GLB, | Performed by: INTERNAL MEDICINE

## 2021-01-07 PROCEDURE — 3008F BODY MASS INDEX DOCD: CPT | Mod: HCNC,CPTII,S$GLB, | Performed by: INTERNAL MEDICINE

## 2021-01-07 PROCEDURE — 3077F PR MOST RECENT SYSTOLIC BLOOD PRESSURE >= 140 MM HG: ICD-10-PCS | Mod: HCNC,CPTII,S$GLB, | Performed by: INTERNAL MEDICINE

## 2021-01-07 PROCEDURE — 99999 PR PBB SHADOW E&M-EST. PATIENT-LVL III: CPT | Mod: PBBFAC,HCNC,, | Performed by: INTERNAL MEDICINE

## 2021-01-07 PROCEDURE — 1101F PR PT FALLS ASSESS DOC 0-1 FALLS W/OUT INJ PAST YR: ICD-10-PCS | Mod: HCNC,CPTII,S$GLB, | Performed by: INTERNAL MEDICINE

## 2021-01-07 RX ORDER — AMOXICILLIN AND CLAVULANATE POTASSIUM 875; 125 MG/1; MG/1
1 TABLET, FILM COATED ORAL 2 TIMES DAILY
Qty: 20 TABLET | Refills: 0 | Status: SHIPPED | OUTPATIENT
Start: 2021-01-07 | End: 2021-01-17

## 2021-01-07 RX ORDER — GABAPENTIN 300 MG/1
300 CAPSULE ORAL 2 TIMES DAILY
Qty: 180 CAPSULE | Refills: 3 | Status: SHIPPED | OUTPATIENT
Start: 2021-01-07 | End: 2021-08-09

## 2021-01-07 RX ORDER — TRIAMCINOLONE ACETONIDE 1 MG/G
CREAM TOPICAL 2 TIMES DAILY
Qty: 80 G | Refills: 0 | Status: SHIPPED | OUTPATIENT
Start: 2021-01-07 | End: 2022-04-21

## 2021-01-11 ENCOUNTER — OFFICE VISIT (OUTPATIENT)
Dept: INTERNAL MEDICINE | Facility: CLINIC | Age: 76
End: 2021-01-11
Payer: MEDICARE

## 2021-01-11 VITALS
BODY MASS INDEX: 33.98 KG/M2 | SYSTOLIC BLOOD PRESSURE: 136 MMHG | OXYGEN SATURATION: 98 % | DIASTOLIC BLOOD PRESSURE: 60 MMHG | HEART RATE: 78 BPM | WEIGHT: 210.56 LBS

## 2021-01-11 DIAGNOSIS — E11.42 DIABETIC POLYNEUROPATHY ASSOCIATED WITH TYPE 2 DIABETES MELLITUS: ICD-10-CM

## 2021-01-11 DIAGNOSIS — I10 HTN (HYPERTENSION), BENIGN: ICD-10-CM

## 2021-01-11 DIAGNOSIS — L03.116 CELLULITIS OF LEFT LOWER LEG: Primary | ICD-10-CM

## 2021-01-11 DIAGNOSIS — E78.2 HYPERLIPIDEMIA, MIXED: ICD-10-CM

## 2021-01-11 DIAGNOSIS — E66.9 OBESITY (BMI 30.0-34.9): ICD-10-CM

## 2021-01-11 PROCEDURE — 3008F PR BODY MASS INDEX (BMI) DOCUMENTED: ICD-10-PCS | Mod: HCNC,CPTII,S$GLB, | Performed by: NURSE PRACTITIONER

## 2021-01-11 PROCEDURE — 3288F FALL RISK ASSESSMENT DOCD: CPT | Mod: HCNC,CPTII,S$GLB, | Performed by: NURSE PRACTITIONER

## 2021-01-11 PROCEDURE — 3078F DIAST BP <80 MM HG: CPT | Mod: HCNC,CPTII,S$GLB, | Performed by: NURSE PRACTITIONER

## 2021-01-11 PROCEDURE — 3051F HG A1C>EQUAL 7.0%<8.0%: CPT | Mod: HCNC,CPTII,S$GLB, | Performed by: NURSE PRACTITIONER

## 2021-01-11 PROCEDURE — 1126F PR PAIN SEVERITY QUANTIFIED, NO PAIN PRESENT: ICD-10-PCS | Mod: HCNC,S$GLB,, | Performed by: NURSE PRACTITIONER

## 2021-01-11 PROCEDURE — 1101F PR PT FALLS ASSESS DOC 0-1 FALLS W/OUT INJ PAST YR: ICD-10-PCS | Mod: HCNC,CPTII,S$GLB, | Performed by: NURSE PRACTITIONER

## 2021-01-11 PROCEDURE — 99213 PR OFFICE/OUTPT VISIT, EST, LEVL III, 20-29 MIN: ICD-10-PCS | Mod: HCNC,S$GLB,, | Performed by: NURSE PRACTITIONER

## 2021-01-11 PROCEDURE — 3008F BODY MASS INDEX DOCD: CPT | Mod: HCNC,CPTII,S$GLB, | Performed by: NURSE PRACTITIONER

## 2021-01-11 PROCEDURE — 3288F PR FALLS RISK ASSESSMENT DOCUMENTED: ICD-10-PCS | Mod: HCNC,CPTII,S$GLB, | Performed by: NURSE PRACTITIONER

## 2021-01-11 PROCEDURE — 3075F PR MOST RECENT SYSTOLIC BLOOD PRESS GE 130-139MM HG: ICD-10-PCS | Mod: HCNC,CPTII,S$GLB, | Performed by: NURSE PRACTITIONER

## 2021-01-11 PROCEDURE — 1159F MED LIST DOCD IN RCRD: CPT | Mod: HCNC,S$GLB,, | Performed by: NURSE PRACTITIONER

## 2021-01-11 PROCEDURE — 3051F PR MOST RECENT HEMOGLOBIN A1C LEVEL 7.0 - < 8.0%: ICD-10-PCS | Mod: HCNC,CPTII,S$GLB, | Performed by: NURSE PRACTITIONER

## 2021-01-11 PROCEDURE — 3075F SYST BP GE 130 - 139MM HG: CPT | Mod: HCNC,CPTII,S$GLB, | Performed by: NURSE PRACTITIONER

## 2021-01-11 PROCEDURE — 99999 PR PBB SHADOW E&M-EST. PATIENT-LVL IV: ICD-10-PCS | Mod: PBBFAC,HCNC,, | Performed by: NURSE PRACTITIONER

## 2021-01-11 PROCEDURE — 99213 OFFICE O/P EST LOW 20 MIN: CPT | Mod: HCNC,S$GLB,, | Performed by: NURSE PRACTITIONER

## 2021-01-11 PROCEDURE — 1101F PT FALLS ASSESS-DOCD LE1/YR: CPT | Mod: HCNC,CPTII,S$GLB, | Performed by: NURSE PRACTITIONER

## 2021-01-11 PROCEDURE — 3078F PR MOST RECENT DIASTOLIC BLOOD PRESSURE < 80 MM HG: ICD-10-PCS | Mod: HCNC,CPTII,S$GLB, | Performed by: NURSE PRACTITIONER

## 2021-01-11 PROCEDURE — 3072F LOW RISK FOR RETINOPATHY: CPT | Mod: HCNC,S$GLB,, | Performed by: NURSE PRACTITIONER

## 2021-01-11 PROCEDURE — 3072F PR LOW RISK FOR RETINOPATHY: ICD-10-PCS | Mod: HCNC,S$GLB,, | Performed by: NURSE PRACTITIONER

## 2021-01-11 PROCEDURE — 1126F AMNT PAIN NOTED NONE PRSNT: CPT | Mod: HCNC,S$GLB,, | Performed by: NURSE PRACTITIONER

## 2021-01-11 PROCEDURE — 1159F PR MEDICATION LIST DOCUMENTED IN MEDICAL RECORD: ICD-10-PCS | Mod: HCNC,S$GLB,, | Performed by: NURSE PRACTITIONER

## 2021-01-11 PROCEDURE — 99999 PR PBB SHADOW E&M-EST. PATIENT-LVL IV: CPT | Mod: PBBFAC,HCNC,, | Performed by: NURSE PRACTITIONER

## 2021-01-15 ENCOUNTER — HOSPITAL ENCOUNTER (OUTPATIENT)
Dept: RADIOLOGY | Facility: HOSPITAL | Age: 76
Discharge: HOME | End: 2021-01-15
Attending: INTERNAL MEDICINE
Payer: MEDICARE

## 2021-01-15 DIAGNOSIS — M54.9 DORSALGIA, UNSPECIFIED: ICD-10-CM

## 2021-01-15 PROCEDURE — 72148 MRI LUMBAR SPINE W/O DYE: CPT | Mod: 26,HCNC,, | Performed by: RADIOLOGY

## 2021-01-15 PROCEDURE — 72148 MRI LUMBAR SPINE W/O DYE: CPT | Mod: TC,HCNC

## 2021-01-15 PROCEDURE — 72148 MRI LUMBAR SPINE WITHOUT CONTRAST: ICD-10-PCS | Mod: 26,HCNC,, | Performed by: RADIOLOGY

## 2021-01-22 ENCOUNTER — PATIENT MESSAGE (OUTPATIENT)
Dept: ADMINISTRATIVE | Facility: OTHER | Age: 76
End: 2021-01-22

## 2021-02-07 RX ORDER — PIOGLITAZONEHYDROCHLORIDE 15 MG/1
15 TABLET ORAL DAILY
Qty: 90 TABLET | Refills: 3 | Status: SHIPPED | OUTPATIENT
Start: 2021-02-07 | End: 2021-02-19 | Stop reason: SDUPTHER

## 2021-02-08 RX ORDER — PIOGLITAZONEHYDROCHLORIDE 15 MG/1
15 TABLET ORAL DAILY
Qty: 90 TABLET | Refills: 3 | OUTPATIENT
Start: 2021-02-08 | End: 2022-02-08

## 2021-02-10 ENCOUNTER — TELEPHONE (OUTPATIENT)
Dept: INTERNAL MEDICINE | Facility: CLINIC | Age: 76
End: 2021-02-10

## 2021-02-17 RX ORDER — PIOGLITAZONEHYDROCHLORIDE 15 MG/1
15 TABLET ORAL DAILY
Qty: 90 TABLET | Refills: 3 | Status: CANCELLED | OUTPATIENT
Start: 2021-02-17 | End: 2022-02-17

## 2021-02-18 ENCOUNTER — TELEPHONE (OUTPATIENT)
Dept: INTERNAL MEDICINE | Facility: CLINIC | Age: 76
End: 2021-02-18

## 2021-02-19 RX ORDER — PIOGLITAZONEHYDROCHLORIDE 15 MG/1
15 TABLET ORAL DAILY
Qty: 30 TABLET | Refills: 3 | Status: SHIPPED | OUTPATIENT
Start: 2021-02-19 | End: 2021-02-25

## 2021-02-22 ENCOUNTER — OFFICE VISIT (OUTPATIENT)
Dept: OPTOMETRY | Facility: CLINIC | Age: 76
End: 2021-02-22
Payer: MEDICARE

## 2021-02-22 DIAGNOSIS — H10.13 ALLERGIC CONJUNCTIVITIS OF BOTH EYES: ICD-10-CM

## 2021-02-22 DIAGNOSIS — H35.89 RPE MOTTLING OF MACULA: ICD-10-CM

## 2021-02-22 DIAGNOSIS — H04.123 DRY EYE SYNDROME, BILATERAL: ICD-10-CM

## 2021-02-22 DIAGNOSIS — H25.13 NS (NUCLEAR SCLEROSIS), BILATERAL: ICD-10-CM

## 2021-02-22 DIAGNOSIS — E11.49 TYPE II DIABETES MELLITUS WITH NEUROLOGICAL MANIFESTATIONS: Primary | ICD-10-CM

## 2021-02-22 DIAGNOSIS — H52.4 PRESBYOPIA: ICD-10-CM

## 2021-02-22 DIAGNOSIS — E11.9 TYPE 2 DIABETES MELLITUS WITHOUT OPHTHALMIC MANIFESTATIONS: ICD-10-CM

## 2021-02-22 DIAGNOSIS — I10 HTN (HYPERTENSION), BENIGN: ICD-10-CM

## 2021-02-22 PROCEDURE — 3288F FALL RISK ASSESSMENT DOCD: CPT | Mod: CPTII,S$GLB,, | Performed by: OPTOMETRIST

## 2021-02-22 PROCEDURE — 92015 DETERMINE REFRACTIVE STATE: CPT | Mod: S$GLB,,, | Performed by: OPTOMETRIST

## 2021-02-22 PROCEDURE — 92015 PR REFRACTION: ICD-10-PCS | Mod: S$GLB,,, | Performed by: OPTOMETRIST

## 2021-02-22 PROCEDURE — 99999 PR PBB SHADOW E&M-EST. PATIENT-LVL III: CPT | Mod: PBBFAC,,, | Performed by: OPTOMETRIST

## 2021-02-22 PROCEDURE — 1126F PR PAIN SEVERITY QUANTIFIED, NO PAIN PRESENT: ICD-10-PCS | Mod: S$GLB,,, | Performed by: OPTOMETRIST

## 2021-02-22 PROCEDURE — 2023F PR DILATED RETINAL EXAM W/O EVID OF RETINOPATHY: ICD-10-PCS | Mod: S$GLB,,, | Performed by: OPTOMETRIST

## 2021-02-22 PROCEDURE — 3288F PR FALLS RISK ASSESSMENT DOCUMENTED: ICD-10-PCS | Mod: CPTII,S$GLB,, | Performed by: OPTOMETRIST

## 2021-02-22 PROCEDURE — 1101F PR PT FALLS ASSESS DOC 0-1 FALLS W/OUT INJ PAST YR: ICD-10-PCS | Mod: CPTII,S$GLB,, | Performed by: OPTOMETRIST

## 2021-02-22 PROCEDURE — 2023F DILAT RTA XM W/O RTNOPTHY: CPT | Mod: S$GLB,,, | Performed by: OPTOMETRIST

## 2021-02-22 PROCEDURE — 92014 PR EYE EXAM, EST PATIENT,COMPREHESV: ICD-10-PCS | Mod: S$GLB,,, | Performed by: OPTOMETRIST

## 2021-02-22 PROCEDURE — 1101F PT FALLS ASSESS-DOCD LE1/YR: CPT | Mod: CPTII,S$GLB,, | Performed by: OPTOMETRIST

## 2021-02-22 PROCEDURE — 92014 COMPRE OPH EXAM EST PT 1/>: CPT | Mod: S$GLB,,, | Performed by: OPTOMETRIST

## 2021-02-22 PROCEDURE — 99999 PR PBB SHADOW E&M-EST. PATIENT-LVL III: ICD-10-PCS | Mod: PBBFAC,,, | Performed by: OPTOMETRIST

## 2021-02-22 PROCEDURE — 1126F AMNT PAIN NOTED NONE PRSNT: CPT | Mod: S$GLB,,, | Performed by: OPTOMETRIST

## 2021-02-24 ENCOUNTER — TELEPHONE (OUTPATIENT)
Dept: INTERNAL MEDICINE | Facility: CLINIC | Age: 76
End: 2021-02-24

## 2021-02-25 RX ORDER — PIOGLITAZONEHYDROCHLORIDE 30 MG/1
30 TABLET ORAL DAILY
Qty: 90 TABLET | Refills: 3 | Status: SHIPPED | OUTPATIENT
Start: 2021-02-25 | End: 2022-01-21

## 2021-03-23 ENCOUNTER — PES CALL (OUTPATIENT)
Dept: ADMINISTRATIVE | Facility: CLINIC | Age: 76
End: 2021-03-23

## 2021-04-05 ENCOUNTER — PATIENT MESSAGE (OUTPATIENT)
Dept: INTERNAL MEDICINE | Facility: CLINIC | Age: 76
End: 2021-04-05

## 2021-04-07 ENCOUNTER — TELEPHONE (OUTPATIENT)
Dept: INTERNAL MEDICINE | Facility: CLINIC | Age: 76
End: 2021-04-07

## 2021-04-07 ENCOUNTER — PATIENT MESSAGE (OUTPATIENT)
Dept: INTERNAL MEDICINE | Facility: CLINIC | Age: 76
End: 2021-04-07

## 2021-04-29 ENCOUNTER — PES CALL (OUTPATIENT)
Dept: ADMINISTRATIVE | Facility: CLINIC | Age: 76
End: 2021-04-29

## 2021-04-30 ENCOUNTER — OFFICE VISIT (OUTPATIENT)
Dept: DERMATOLOGY | Facility: CLINIC | Age: 76
End: 2021-04-30
Payer: MEDICARE

## 2021-04-30 DIAGNOSIS — M79.3 LIPODERMATOSCLEROSIS OF BOTH LOWER EXTREMITIES: Primary | ICD-10-CM

## 2021-04-30 PROCEDURE — 3288F FALL RISK ASSESSMENT DOCD: CPT | Mod: CPTII,S$GLB,, | Performed by: DERMATOLOGY

## 2021-04-30 PROCEDURE — 1101F PT FALLS ASSESS-DOCD LE1/YR: CPT | Mod: CPTII,S$GLB,, | Performed by: DERMATOLOGY

## 2021-04-30 PROCEDURE — 99999 PR PBB SHADOW E&M-EST. PATIENT-LVL III: CPT | Mod: PBBFAC,,, | Performed by: DERMATOLOGY

## 2021-04-30 PROCEDURE — 1126F PR PAIN SEVERITY QUANTIFIED, NO PAIN PRESENT: ICD-10-PCS | Mod: S$GLB,,, | Performed by: DERMATOLOGY

## 2021-04-30 PROCEDURE — 3072F PR LOW RISK FOR RETINOPATHY: ICD-10-PCS | Mod: S$GLB,,, | Performed by: DERMATOLOGY

## 2021-04-30 PROCEDURE — 3072F LOW RISK FOR RETINOPATHY: CPT | Mod: S$GLB,,, | Performed by: DERMATOLOGY

## 2021-04-30 PROCEDURE — 3288F PR FALLS RISK ASSESSMENT DOCUMENTED: ICD-10-PCS | Mod: CPTII,S$GLB,, | Performed by: DERMATOLOGY

## 2021-04-30 PROCEDURE — 1159F PR MEDICATION LIST DOCUMENTED IN MEDICAL RECORD: ICD-10-PCS | Mod: S$GLB,,, | Performed by: DERMATOLOGY

## 2021-04-30 PROCEDURE — 99499 UNLISTED E&M SERVICE: CPT | Mod: S$GLB,,, | Performed by: DERMATOLOGY

## 2021-04-30 PROCEDURE — 99203 OFFICE O/P NEW LOW 30 MIN: CPT | Mod: S$GLB,,, | Performed by: DERMATOLOGY

## 2021-04-30 PROCEDURE — 1126F AMNT PAIN NOTED NONE PRSNT: CPT | Mod: S$GLB,,, | Performed by: DERMATOLOGY

## 2021-04-30 PROCEDURE — 99999 PR PBB SHADOW E&M-EST. PATIENT-LVL III: ICD-10-PCS | Mod: PBBFAC,,, | Performed by: DERMATOLOGY

## 2021-04-30 PROCEDURE — 99203 PR OFFICE/OUTPT VISIT, NEW, LEVL III, 30-44 MIN: ICD-10-PCS | Mod: S$GLB,,, | Performed by: DERMATOLOGY

## 2021-04-30 PROCEDURE — 1101F PR PT FALLS ASSESS DOC 0-1 FALLS W/OUT INJ PAST YR: ICD-10-PCS | Mod: CPTII,S$GLB,, | Performed by: DERMATOLOGY

## 2021-04-30 PROCEDURE — 99499 RISK ADDL DX/OHS AUDIT: ICD-10-PCS | Mod: S$GLB,,, | Performed by: DERMATOLOGY

## 2021-04-30 PROCEDURE — 1159F MED LIST DOCD IN RCRD: CPT | Mod: S$GLB,,, | Performed by: DERMATOLOGY

## 2021-04-30 RX ORDER — TRIAMCINOLONE ACETONIDE 1 MG/G
CREAM TOPICAL
Qty: 454 G | Refills: 3 | Status: SHIPPED | OUTPATIENT
Start: 2021-04-30 | End: 2022-04-21

## 2021-04-30 RX ORDER — TRIAMCINOLONE ACETONIDE 1 MG/G
CREAM TOPICAL
Qty: 454 G | Refills: 3 | Status: SHIPPED | OUTPATIENT
Start: 2021-04-30 | End: 2021-04-30 | Stop reason: SDUPTHER

## 2021-05-11 RX ORDER — ATORVASTATIN CALCIUM 40 MG/1
40 TABLET, FILM COATED ORAL DAILY
Qty: 90 TABLET | Refills: 3 | Status: CANCELLED | OUTPATIENT
Start: 2021-05-11

## 2021-05-16 ENCOUNTER — PATIENT MESSAGE (OUTPATIENT)
Dept: INTERNAL MEDICINE | Facility: CLINIC | Age: 76
End: 2021-05-16

## 2021-05-28 ENCOUNTER — TELEPHONE (OUTPATIENT)
Dept: INTERNAL MEDICINE | Facility: CLINIC | Age: 76
End: 2021-05-28

## 2021-05-28 DIAGNOSIS — E11.21 TYPE 2 DIABETES MELLITUS WITH DIABETIC NEPHROPATHY, WITH LONG-TERM CURRENT USE OF INSULIN: ICD-10-CM

## 2021-05-28 DIAGNOSIS — E55.9 MILD VITAMIN D DEFICIENCY: ICD-10-CM

## 2021-05-28 DIAGNOSIS — E78.5 HYPERLIPIDEMIA, UNSPECIFIED HYPERLIPIDEMIA TYPE: ICD-10-CM

## 2021-05-28 DIAGNOSIS — I77.9 BILATERAL CAROTID ARTERY DISEASE, UNSPECIFIED TYPE: Primary | ICD-10-CM

## 2021-05-28 DIAGNOSIS — Z79.4 TYPE 2 DIABETES MELLITUS WITH DIABETIC NEPHROPATHY, WITH LONG-TERM CURRENT USE OF INSULIN: ICD-10-CM

## 2021-05-28 NOTE — HPI
72 y.o. female with DM-2 (A1c 5.1) with nephropathy and neuropathy, arthritis, gout, hypertension who initially presented for diarrhea associated with fatigue. Patient reports she was her usual state of health until an acute onset of nausea/vomiting beginning 10 days prior to admission followed by diarrhea x 1 week associated with generalized weakness, fatigue, poor appetite/PO intake which progressed to chills x 2 days prior to presenting to the ED.  She reports subjective fevers on and off for a week with a measured temp of 103.  On presentation to the ED she was found to have and JACK and UTI and was started on IV ceftriaxone and Fluids.  Patients blood cultures came back positive for Ecoli.  Since admission patient reports significant improvement in her weakness and fatigue.  On further questioning she does report some left sided flank pain and cloudy urine x2 days , but denied change inurine frequency, odor, or dysuria.  She currently denies chills, abdominal pain, nausea/vomiting, HA, new rashes, or any new complaints.     Fever of 101.5 everyday evening.     glasses

## 2021-05-31 ENCOUNTER — TELEPHONE (OUTPATIENT)
Dept: INTERNAL MEDICINE | Facility: CLINIC | Age: 76
End: 2021-05-31

## 2021-05-31 ENCOUNTER — LAB VISIT (OUTPATIENT)
Dept: LAB | Facility: HOSPITAL | Age: 76
End: 2021-05-31
Attending: INTERNAL MEDICINE
Payer: MEDICARE

## 2021-05-31 DIAGNOSIS — Z79.4 TYPE 2 DIABETES MELLITUS WITH DIABETIC NEPHROPATHY, WITH LONG-TERM CURRENT USE OF INSULIN: Primary | ICD-10-CM

## 2021-05-31 DIAGNOSIS — E11.21 TYPE 2 DIABETES MELLITUS WITH DIABETIC NEPHROPATHY, WITH LONG-TERM CURRENT USE OF INSULIN: Primary | ICD-10-CM

## 2021-05-31 DIAGNOSIS — Z79.4 TYPE 2 DIABETES MELLITUS WITH DIABETIC NEPHROPATHY, WITH LONG-TERM CURRENT USE OF INSULIN: ICD-10-CM

## 2021-05-31 DIAGNOSIS — E11.21 TYPE 2 DIABETES MELLITUS WITH DIABETIC NEPHROPATHY, WITH LONG-TERM CURRENT USE OF INSULIN: ICD-10-CM

## 2021-05-31 DIAGNOSIS — I77.9 BILATERAL CAROTID ARTERY DISEASE, UNSPECIFIED TYPE: ICD-10-CM

## 2021-05-31 DIAGNOSIS — E78.5 HYPERLIPIDEMIA, UNSPECIFIED HYPERLIPIDEMIA TYPE: ICD-10-CM

## 2021-05-31 DIAGNOSIS — E55.9 MILD VITAMIN D DEFICIENCY: ICD-10-CM

## 2021-05-31 LAB
25(OH)D3+25(OH)D2 SERPL-MCNC: 29 NG/ML (ref 30–96)
ALBUMIN SERPL BCP-MCNC: 3.5 G/DL (ref 3.5–5.2)
ALP SERPL-CCNC: 94 U/L (ref 55–135)
ALT SERPL W/O P-5'-P-CCNC: 12 U/L (ref 10–44)
ANION GAP SERPL CALC-SCNC: 12 MMOL/L (ref 8–16)
AST SERPL-CCNC: 16 U/L (ref 10–40)
BASOPHILS # BLD AUTO: 0.05 K/UL (ref 0–0.2)
BASOPHILS NFR BLD: 0.6 % (ref 0–1.9)
BILIRUB SERPL-MCNC: 0.4 MG/DL (ref 0.1–1)
BUN SERPL-MCNC: 30 MG/DL (ref 8–23)
CALCIUM SERPL-MCNC: 9.9 MG/DL (ref 8.7–10.5)
CHLORIDE SERPL-SCNC: 109 MMOL/L (ref 95–110)
CHOLEST SERPL-MCNC: 142 MG/DL (ref 120–199)
CHOLEST/HDLC SERPL: 2.8 {RATIO} (ref 2–5)
CO2 SERPL-SCNC: 21 MMOL/L (ref 23–29)
CREAT SERPL-MCNC: 1.2 MG/DL (ref 0.5–1.4)
DIFFERENTIAL METHOD: ABNORMAL
EOSINOPHIL # BLD AUTO: 0.2 K/UL (ref 0–0.5)
EOSINOPHIL NFR BLD: 2.1 % (ref 0–8)
ERYTHROCYTE [DISTWIDTH] IN BLOOD BY AUTOMATED COUNT: 14.7 % (ref 11.5–14.5)
EST. GFR  (AFRICAN AMERICAN): 51.4 ML/MIN/1.73 M^2
EST. GFR  (NON AFRICAN AMERICAN): 44.6 ML/MIN/1.73 M^2
ESTIMATED AVG GLUCOSE: 166 MG/DL (ref 68–131)
GLUCOSE SERPL-MCNC: 156 MG/DL (ref 70–110)
HBA1C MFR BLD: 7.4 % (ref 4–5.6)
HCT VFR BLD AUTO: 31.6 % (ref 37–48.5)
HDLC SERPL-MCNC: 50 MG/DL (ref 40–75)
HDLC SERPL: 35.2 % (ref 20–50)
HGB BLD-MCNC: 10 G/DL (ref 12–16)
IMM GRANULOCYTES # BLD AUTO: 0.07 K/UL (ref 0–0.04)
IMM GRANULOCYTES NFR BLD AUTO: 0.8 % (ref 0–0.5)
LDLC SERPL CALC-MCNC: 74.4 MG/DL (ref 63–159)
LYMPHOCYTES # BLD AUTO: 2.7 K/UL (ref 1–4.8)
LYMPHOCYTES NFR BLD: 31.7 % (ref 18–48)
MCH RBC QN AUTO: 27.4 PG (ref 27–31)
MCHC RBC AUTO-ENTMCNC: 31.6 G/DL (ref 32–36)
MCV RBC AUTO: 87 FL (ref 82–98)
MONOCYTES # BLD AUTO: 0.7 K/UL (ref 0.3–1)
MONOCYTES NFR BLD: 7.8 % (ref 4–15)
NEUTROPHILS # BLD AUTO: 4.9 K/UL (ref 1.8–7.7)
NEUTROPHILS NFR BLD: 57 % (ref 38–73)
NONHDLC SERPL-MCNC: 92 MG/DL
NRBC BLD-RTO: 0 /100 WBC
PLATELET # BLD AUTO: 307 K/UL (ref 150–450)
PMV BLD AUTO: 10.4 FL (ref 9.2–12.9)
POTASSIUM SERPL-SCNC: 4.3 MMOL/L (ref 3.5–5.1)
PROT SERPL-MCNC: 7.5 G/DL (ref 6–8.4)
RBC # BLD AUTO: 3.65 M/UL (ref 4–5.4)
SODIUM SERPL-SCNC: 142 MMOL/L (ref 136–145)
TRIGL SERPL-MCNC: 88 MG/DL (ref 30–150)
TSH SERPL DL<=0.005 MIU/L-ACNC: 2.41 UIU/ML (ref 0.4–4)
WBC # BLD AUTO: 8.62 K/UL (ref 3.9–12.7)

## 2021-05-31 PROCEDURE — 82306 VITAMIN D 25 HYDROXY: CPT | Performed by: INTERNAL MEDICINE

## 2021-05-31 PROCEDURE — 36415 COLL VENOUS BLD VENIPUNCTURE: CPT | Performed by: INTERNAL MEDICINE

## 2021-05-31 PROCEDURE — 84443 ASSAY THYROID STIM HORMONE: CPT | Performed by: INTERNAL MEDICINE

## 2021-05-31 PROCEDURE — 83036 HEMOGLOBIN GLYCOSYLATED A1C: CPT | Performed by: INTERNAL MEDICINE

## 2021-05-31 PROCEDURE — 80053 COMPREHEN METABOLIC PANEL: CPT | Performed by: INTERNAL MEDICINE

## 2021-05-31 PROCEDURE — 80061 LIPID PANEL: CPT | Performed by: INTERNAL MEDICINE

## 2021-05-31 PROCEDURE — 85025 COMPLETE CBC W/AUTO DIFF WBC: CPT | Performed by: INTERNAL MEDICINE

## 2021-05-31 RX ORDER — OXYCODONE AND ACETAMINOPHEN 5; 325 MG/1; MG/1
1 TABLET ORAL
Qty: 30 TABLET | Refills: 0 | Status: SHIPPED | OUTPATIENT
Start: 2021-05-31 | End: 2021-11-11 | Stop reason: SDUPTHER

## 2021-07-02 ENCOUNTER — OFFICE VISIT (OUTPATIENT)
Dept: ENDOCRINOLOGY | Facility: CLINIC | Age: 76
End: 2021-07-02
Payer: MEDICARE

## 2021-07-02 VITALS
DIASTOLIC BLOOD PRESSURE: 62 MMHG | SYSTOLIC BLOOD PRESSURE: 144 MMHG | RESPIRATION RATE: 18 BRPM | WEIGHT: 210 LBS | HEART RATE: 98 BPM | BODY MASS INDEX: 33.75 KG/M2 | OXYGEN SATURATION: 100 % | HEIGHT: 66 IN

## 2021-07-02 DIAGNOSIS — Z79.4 TYPE 2 DIABETES MELLITUS WITH DIABETIC NEPHROPATHY, WITH LONG-TERM CURRENT USE OF INSULIN: Primary | ICD-10-CM

## 2021-07-02 DIAGNOSIS — D64.9 ANEMIA, UNSPECIFIED TYPE: ICD-10-CM

## 2021-07-02 DIAGNOSIS — E11.21 TYPE 2 DIABETES MELLITUS WITH DIABETIC NEPHROPATHY, WITH LONG-TERM CURRENT USE OF INSULIN: Primary | ICD-10-CM

## 2021-07-02 DIAGNOSIS — N18.31 STAGE 3A CHRONIC KIDNEY DISEASE: ICD-10-CM

## 2021-07-02 DIAGNOSIS — E66.9 OBESITY (BMI 30.0-34.9): ICD-10-CM

## 2021-07-02 PROCEDURE — 3051F PR MOST RECENT HEMOGLOBIN A1C LEVEL 7.0 - < 8.0%: ICD-10-PCS | Mod: CPTII,S$GLB,, | Performed by: INTERNAL MEDICINE

## 2021-07-02 PROCEDURE — 1159F PR MEDICATION LIST DOCUMENTED IN MEDICAL RECORD: ICD-10-PCS | Mod: S$GLB,,, | Performed by: INTERNAL MEDICINE

## 2021-07-02 PROCEDURE — 99204 OFFICE O/P NEW MOD 45 MIN: CPT | Mod: GC,S$GLB,, | Performed by: INTERNAL MEDICINE

## 2021-07-02 PROCEDURE — 1159F MED LIST DOCD IN RCRD: CPT | Mod: S$GLB,,, | Performed by: INTERNAL MEDICINE

## 2021-07-02 PROCEDURE — 1101F PR PT FALLS ASSESS DOC 0-1 FALLS W/OUT INJ PAST YR: ICD-10-PCS | Mod: CPTII,S$GLB,, | Performed by: INTERNAL MEDICINE

## 2021-07-02 PROCEDURE — 99999 PR PBB SHADOW E&M-EST. PATIENT-LVL V: CPT | Mod: PBBFAC,,, | Performed by: INTERNAL MEDICINE

## 2021-07-02 PROCEDURE — 3008F BODY MASS INDEX DOCD: CPT | Mod: CPTII,S$GLB,, | Performed by: INTERNAL MEDICINE

## 2021-07-02 PROCEDURE — 1126F AMNT PAIN NOTED NONE PRSNT: CPT | Mod: S$GLB,,, | Performed by: INTERNAL MEDICINE

## 2021-07-02 PROCEDURE — 3051F HG A1C>EQUAL 7.0%<8.0%: CPT | Mod: CPTII,S$GLB,, | Performed by: INTERNAL MEDICINE

## 2021-07-02 PROCEDURE — 3072F LOW RISK FOR RETINOPATHY: CPT | Mod: S$GLB,,, | Performed by: INTERNAL MEDICINE

## 2021-07-02 PROCEDURE — 99204 PR OFFICE/OUTPT VISIT, NEW, LEVL IV, 45-59 MIN: ICD-10-PCS | Mod: GC,S$GLB,, | Performed by: INTERNAL MEDICINE

## 2021-07-02 PROCEDURE — 3008F PR BODY MASS INDEX (BMI) DOCUMENTED: ICD-10-PCS | Mod: CPTII,S$GLB,, | Performed by: INTERNAL MEDICINE

## 2021-07-02 PROCEDURE — 3072F PR LOW RISK FOR RETINOPATHY: ICD-10-PCS | Mod: S$GLB,,, | Performed by: INTERNAL MEDICINE

## 2021-07-02 PROCEDURE — 1101F PT FALLS ASSESS-DOCD LE1/YR: CPT | Mod: CPTII,S$GLB,, | Performed by: INTERNAL MEDICINE

## 2021-07-02 PROCEDURE — 1126F PR PAIN SEVERITY QUANTIFIED, NO PAIN PRESENT: ICD-10-PCS | Mod: S$GLB,,, | Performed by: INTERNAL MEDICINE

## 2021-07-02 PROCEDURE — 3288F PR FALLS RISK ASSESSMENT DOCUMENTED: ICD-10-PCS | Mod: CPTII,S$GLB,, | Performed by: INTERNAL MEDICINE

## 2021-07-02 PROCEDURE — 99999 PR PBB SHADOW E&M-EST. PATIENT-LVL V: ICD-10-PCS | Mod: PBBFAC,,, | Performed by: INTERNAL MEDICINE

## 2021-07-02 PROCEDURE — 3288F FALL RISK ASSESSMENT DOCD: CPT | Mod: CPTII,S$GLB,, | Performed by: INTERNAL MEDICINE

## 2021-07-02 RX ORDER — INSULIN ASPART 100 [IU]/ML
10 INJECTION, SOLUTION INTRAVENOUS; SUBCUTANEOUS
Qty: 30 ML | Refills: 3 | Status: SHIPPED | OUTPATIENT
Start: 2021-07-02 | End: 2021-11-23 | Stop reason: SDUPTHER

## 2021-07-02 RX ORDER — METFORMIN HYDROCHLORIDE 500 MG/1
500 TABLET, FILM COATED, EXTENDED RELEASE ORAL
Qty: 90 TABLET | Refills: 3 | Status: SHIPPED | OUTPATIENT
Start: 2021-07-02 | End: 2021-07-19

## 2021-07-13 ENCOUNTER — TELEPHONE (OUTPATIENT)
Dept: INTERNAL MEDICINE | Facility: CLINIC | Age: 76
End: 2021-07-13

## 2021-07-14 ENCOUNTER — CLINICAL SUPPORT (OUTPATIENT)
Dept: DIABETES | Facility: CLINIC | Age: 76
End: 2021-07-14
Payer: MEDICARE

## 2021-07-14 DIAGNOSIS — Z79.4 TYPE 2 DIABETES MELLITUS WITH DIABETIC NEPHROPATHY, WITH LONG-TERM CURRENT USE OF INSULIN: ICD-10-CM

## 2021-07-14 DIAGNOSIS — E11.21 TYPE 2 DIABETES MELLITUS WITH DIABETIC NEPHROPATHY, WITH LONG-TERM CURRENT USE OF INSULIN: ICD-10-CM

## 2021-07-14 PROCEDURE — G0108 PR DIAB MANAGE TRN  PER INDIV: ICD-10-PCS | Mod: S$GLB,,, | Performed by: INTERNAL MEDICINE

## 2021-07-14 PROCEDURE — 99999 PR PBB SHADOW E&M-EST. PATIENT-LVL I: ICD-10-PCS | Mod: PBBFAC,,,

## 2021-07-14 PROCEDURE — 99999 PR PBB SHADOW E&M-EST. PATIENT-LVL I: CPT | Mod: PBBFAC,,,

## 2021-07-14 PROCEDURE — G0108 DIAB MANAGE TRN  PER INDIV: HCPCS | Mod: S$GLB,,, | Performed by: INTERNAL MEDICINE

## 2021-07-15 VITALS — WEIGHT: 208.44 LBS | BODY MASS INDEX: 33.64 KG/M2

## 2021-07-15 DIAGNOSIS — Z79.4 TYPE 2 DIABETES MELLITUS WITH DIABETIC NEPHROPATHY, WITH LONG-TERM CURRENT USE OF INSULIN: Primary | ICD-10-CM

## 2021-07-15 DIAGNOSIS — E11.21 TYPE 2 DIABETES MELLITUS WITH DIABETIC NEPHROPATHY, WITH LONG-TERM CURRENT USE OF INSULIN: Primary | ICD-10-CM

## 2021-07-19 ENCOUNTER — TELEPHONE (OUTPATIENT)
Dept: ENDOCRINOLOGY | Facility: CLINIC | Age: 76
End: 2021-07-19

## 2021-07-19 DIAGNOSIS — Z79.4 TYPE 2 DIABETES MELLITUS WITH DIABETIC NEPHROPATHY, WITH LONG-TERM CURRENT USE OF INSULIN: Primary | ICD-10-CM

## 2021-07-19 DIAGNOSIS — E11.21 TYPE 2 DIABETES MELLITUS WITH DIABETIC NEPHROPATHY, WITH LONG-TERM CURRENT USE OF INSULIN: Primary | ICD-10-CM

## 2021-07-19 RX ORDER — METFORMIN HYDROCHLORIDE 500 MG/1
500 TABLET, EXTENDED RELEASE ORAL 2 TIMES DAILY WITH MEALS
Qty: 180 TABLET | Refills: 3 | Status: SHIPPED | OUTPATIENT
Start: 2021-07-19 | End: 2022-01-13 | Stop reason: SDUPTHER

## 2021-07-26 ENCOUNTER — PATIENT OUTREACH (OUTPATIENT)
Dept: ADMINISTRATIVE | Facility: HOSPITAL | Age: 76
End: 2021-07-26

## 2021-07-26 DIAGNOSIS — E11.21 TYPE 2 DIABETES MELLITUS WITH DIABETIC NEPHROPATHY, WITH LONG-TERM CURRENT USE OF INSULIN: Primary | ICD-10-CM

## 2021-07-26 DIAGNOSIS — Z79.4 TYPE 2 DIABETES MELLITUS WITH DIABETIC NEPHROPATHY, WITH LONG-TERM CURRENT USE OF INSULIN: Primary | ICD-10-CM

## 2021-08-09 ENCOUNTER — LAB VISIT (OUTPATIENT)
Dept: LAB | Facility: HOSPITAL | Age: 76
End: 2021-08-09
Attending: INTERNAL MEDICINE
Payer: MEDICARE

## 2021-08-09 ENCOUNTER — OFFICE VISIT (OUTPATIENT)
Dept: INTERNAL MEDICINE | Facility: CLINIC | Age: 76
End: 2021-08-09
Payer: MEDICARE

## 2021-08-09 VITALS
DIASTOLIC BLOOD PRESSURE: 64 MMHG | OXYGEN SATURATION: 96 % | SYSTOLIC BLOOD PRESSURE: 134 MMHG | TEMPERATURE: 98 F | BODY MASS INDEX: 33.41 KG/M2 | HEIGHT: 66 IN | WEIGHT: 207.88 LBS | HEART RATE: 95 BPM

## 2021-08-09 DIAGNOSIS — D64.9 ANEMIA, UNSPECIFIED TYPE: ICD-10-CM

## 2021-08-09 DIAGNOSIS — E11.21 TYPE 2 DIABETES MELLITUS WITH DIABETIC NEPHROPATHY, WITHOUT LONG-TERM CURRENT USE OF INSULIN: ICD-10-CM

## 2021-08-09 DIAGNOSIS — D64.9 ANEMIA, UNSPECIFIED TYPE: Primary | ICD-10-CM

## 2021-08-09 DIAGNOSIS — Z12.31 SCREENING MAMMOGRAM, ENCOUNTER FOR: ICD-10-CM

## 2021-08-09 DIAGNOSIS — Z87.39 HISTORY OF GOUT: ICD-10-CM

## 2021-08-09 DIAGNOSIS — I25.10 CVD (CARDIOVASCULAR DISEASE): ICD-10-CM

## 2021-08-09 LAB
ALBUMIN SERPL BCP-MCNC: 3.7 G/DL (ref 3.5–5.2)
ALP SERPL-CCNC: 87 U/L (ref 55–135)
ALT SERPL W/O P-5'-P-CCNC: 11 U/L (ref 10–44)
ANION GAP SERPL CALC-SCNC: 12 MMOL/L (ref 8–16)
AST SERPL-CCNC: 16 U/L (ref 10–40)
BASOPHILS # BLD AUTO: 0.05 K/UL (ref 0–0.2)
BASOPHILS NFR BLD: 0.5 % (ref 0–1.9)
BILIRUB SERPL-MCNC: 0.4 MG/DL (ref 0.1–1)
BUN SERPL-MCNC: 34 MG/DL (ref 8–23)
CALCIUM SERPL-MCNC: 10.5 MG/DL (ref 8.7–10.5)
CHLORIDE SERPL-SCNC: 112 MMOL/L (ref 95–110)
CO2 SERPL-SCNC: 20 MMOL/L (ref 23–29)
CREAT SERPL-MCNC: 1.2 MG/DL (ref 0.5–1.4)
CRP SERPL-MCNC: 14.5 MG/L (ref 0–8.2)
DIFFERENTIAL METHOD: ABNORMAL
EOSINOPHIL # BLD AUTO: 0.1 K/UL (ref 0–0.5)
EOSINOPHIL NFR BLD: 1.1 % (ref 0–8)
ERYTHROCYTE [DISTWIDTH] IN BLOOD BY AUTOMATED COUNT: 14.9 % (ref 11.5–14.5)
ERYTHROCYTE [SEDIMENTATION RATE] IN BLOOD BY WESTERGREN METHOD: 63 MM/HR (ref 0–36)
EST. GFR  (AFRICAN AMERICAN): 51.4 ML/MIN/1.73 M^2
EST. GFR  (NON AFRICAN AMERICAN): 44.6 ML/MIN/1.73 M^2
ESTIMATED AVG GLUCOSE: 123 MG/DL (ref 68–131)
FERRITIN SERPL-MCNC: 559 NG/ML (ref 20–300)
GLUCOSE SERPL-MCNC: 99 MG/DL (ref 70–110)
HBA1C MFR BLD: 5.9 % (ref 4–5.6)
HCT VFR BLD AUTO: 31.1 % (ref 37–48.5)
HGB BLD-MCNC: 10.4 G/DL (ref 12–16)
IMM GRANULOCYTES # BLD AUTO: 0.06 K/UL (ref 0–0.04)
IMM GRANULOCYTES NFR BLD AUTO: 0.6 % (ref 0–0.5)
IRON SERPL-MCNC: 49 UG/DL (ref 30–160)
LYMPHOCYTES # BLD AUTO: 2.7 K/UL (ref 1–4.8)
LYMPHOCYTES NFR BLD: 26 % (ref 18–48)
MCH RBC QN AUTO: 29.1 PG (ref 27–31)
MCHC RBC AUTO-ENTMCNC: 33.4 G/DL (ref 32–36)
MCV RBC AUTO: 87 FL (ref 82–98)
MONOCYTES # BLD AUTO: 0.8 K/UL (ref 0.3–1)
MONOCYTES NFR BLD: 7.5 % (ref 4–15)
NEUTROPHILS # BLD AUTO: 6.7 K/UL (ref 1.8–7.7)
NEUTROPHILS NFR BLD: 64.3 % (ref 38–73)
NRBC BLD-RTO: 0 /100 WBC
PLATELET # BLD AUTO: 282 K/UL (ref 150–450)
PLATELET BLD QL SMEAR: ABNORMAL
PMV BLD AUTO: ABNORMAL FL (ref 9.2–12.9)
POTASSIUM SERPL-SCNC: 4.5 MMOL/L (ref 3.5–5.1)
PROT SERPL-MCNC: 8.1 G/DL (ref 6–8.4)
RBC # BLD AUTO: 3.57 M/UL (ref 4–5.4)
SATURATED IRON: 16 % (ref 20–50)
SODIUM SERPL-SCNC: 144 MMOL/L (ref 136–145)
TOTAL IRON BINDING CAPACITY: 297 UG/DL (ref 250–450)
TRANSFERRIN SERPL-MCNC: 201 MG/DL (ref 200–375)
URATE SERPL-MCNC: 3.9 MG/DL (ref 2.4–5.7)
WBC # BLD AUTO: 10.44 K/UL (ref 3.9–12.7)

## 2021-08-09 PROCEDURE — 99999 PR PBB SHADOW E&M-EST. PATIENT-LVL V: CPT | Mod: PBBFAC,,, | Performed by: INTERNAL MEDICINE

## 2021-08-09 PROCEDURE — 82043 UR ALBUMIN QUANTITATIVE: CPT | Performed by: INTERNAL MEDICINE

## 2021-08-09 PROCEDURE — 1125F PR PAIN SEVERITY QUANTIFIED, PAIN PRESENT: ICD-10-PCS | Mod: CPTII,S$GLB,, | Performed by: INTERNAL MEDICINE

## 2021-08-09 PROCEDURE — 3051F HG A1C>EQUAL 7.0%<8.0%: CPT | Mod: CPTII,S$GLB,, | Performed by: INTERNAL MEDICINE

## 2021-08-09 PROCEDURE — 99214 PR OFFICE/OUTPT VISIT, EST, LEVL IV, 30-39 MIN: ICD-10-PCS | Mod: S$GLB,,, | Performed by: INTERNAL MEDICINE

## 2021-08-09 PROCEDURE — 85652 RBC SED RATE AUTOMATED: CPT | Performed by: INTERNAL MEDICINE

## 2021-08-09 PROCEDURE — 3078F PR MOST RECENT DIASTOLIC BLOOD PRESSURE < 80 MM HG: ICD-10-PCS | Mod: CPTII,S$GLB,, | Performed by: INTERNAL MEDICINE

## 2021-08-09 PROCEDURE — 3072F PR LOW RISK FOR RETINOPATHY: ICD-10-PCS | Mod: CPTII,S$GLB,, | Performed by: INTERNAL MEDICINE

## 2021-08-09 PROCEDURE — 3288F PR FALLS RISK ASSESSMENT DOCUMENTED: ICD-10-PCS | Mod: CPTII,S$GLB,, | Performed by: INTERNAL MEDICINE

## 2021-08-09 PROCEDURE — 83036 HEMOGLOBIN GLYCOSYLATED A1C: CPT | Performed by: INTERNAL MEDICINE

## 2021-08-09 PROCEDURE — 82570 ASSAY OF URINE CREATININE: CPT | Performed by: INTERNAL MEDICINE

## 2021-08-09 PROCEDURE — 3078F DIAST BP <80 MM HG: CPT | Mod: CPTII,S$GLB,, | Performed by: INTERNAL MEDICINE

## 2021-08-09 PROCEDURE — 81001 URINALYSIS AUTO W/SCOPE: CPT | Performed by: INTERNAL MEDICINE

## 2021-08-09 PROCEDURE — 1125F AMNT PAIN NOTED PAIN PRSNT: CPT | Mod: CPTII,S$GLB,, | Performed by: INTERNAL MEDICINE

## 2021-08-09 PROCEDURE — 1159F MED LIST DOCD IN RCRD: CPT | Mod: CPTII,S$GLB,, | Performed by: INTERNAL MEDICINE

## 2021-08-09 PROCEDURE — 85025 COMPLETE CBC W/AUTO DIFF WBC: CPT | Performed by: INTERNAL MEDICINE

## 2021-08-09 PROCEDURE — 82728 ASSAY OF FERRITIN: CPT | Performed by: INTERNAL MEDICINE

## 2021-08-09 PROCEDURE — 3072F LOW RISK FOR RETINOPATHY: CPT | Mod: CPTII,S$GLB,, | Performed by: INTERNAL MEDICINE

## 2021-08-09 PROCEDURE — 99214 OFFICE O/P EST MOD 30 MIN: CPT | Mod: S$GLB,,, | Performed by: INTERNAL MEDICINE

## 2021-08-09 PROCEDURE — 3288F FALL RISK ASSESSMENT DOCD: CPT | Mod: CPTII,S$GLB,, | Performed by: INTERNAL MEDICINE

## 2021-08-09 PROCEDURE — 84550 ASSAY OF BLOOD/URIC ACID: CPT | Performed by: INTERNAL MEDICINE

## 2021-08-09 PROCEDURE — 3051F PR MOST RECENT HEMOGLOBIN A1C LEVEL 7.0 - < 8.0%: ICD-10-PCS | Mod: CPTII,S$GLB,, | Performed by: INTERNAL MEDICINE

## 2021-08-09 PROCEDURE — 3008F PR BODY MASS INDEX (BMI) DOCUMENTED: ICD-10-PCS | Mod: CPTII,S$GLB,, | Performed by: INTERNAL MEDICINE

## 2021-08-09 PROCEDURE — 86140 C-REACTIVE PROTEIN: CPT | Performed by: INTERNAL MEDICINE

## 2021-08-09 PROCEDURE — 3075F PR MOST RECENT SYSTOLIC BLOOD PRESS GE 130-139MM HG: ICD-10-PCS | Mod: CPTII,S$GLB,, | Performed by: INTERNAL MEDICINE

## 2021-08-09 PROCEDURE — 1159F PR MEDICATION LIST DOCUMENTED IN MEDICAL RECORD: ICD-10-PCS | Mod: CPTII,S$GLB,, | Performed by: INTERNAL MEDICINE

## 2021-08-09 PROCEDURE — 80053 COMPREHEN METABOLIC PANEL: CPT | Performed by: INTERNAL MEDICINE

## 2021-08-09 PROCEDURE — 36415 COLL VENOUS BLD VENIPUNCTURE: CPT | Performed by: INTERNAL MEDICINE

## 2021-08-09 PROCEDURE — 1101F PT FALLS ASSESS-DOCD LE1/YR: CPT | Mod: CPTII,S$GLB,, | Performed by: INTERNAL MEDICINE

## 2021-08-09 PROCEDURE — 1101F PR PT FALLS ASSESS DOC 0-1 FALLS W/OUT INJ PAST YR: ICD-10-PCS | Mod: CPTII,S$GLB,, | Performed by: INTERNAL MEDICINE

## 2021-08-09 PROCEDURE — 99999 PR PBB SHADOW E&M-EST. PATIENT-LVL V: ICD-10-PCS | Mod: PBBFAC,,, | Performed by: INTERNAL MEDICINE

## 2021-08-09 PROCEDURE — 3075F SYST BP GE 130 - 139MM HG: CPT | Mod: CPTII,S$GLB,, | Performed by: INTERNAL MEDICINE

## 2021-08-09 PROCEDURE — 84466 ASSAY OF TRANSFERRIN: CPT | Performed by: INTERNAL MEDICINE

## 2021-08-09 PROCEDURE — 3008F BODY MASS INDEX DOCD: CPT | Mod: CPTII,S$GLB,, | Performed by: INTERNAL MEDICINE

## 2021-08-09 RX ORDER — MELOXICAM 7.5 MG/1
7.5 TABLET ORAL DAILY
Qty: 15 TABLET | Refills: 0 | Status: SHIPPED | OUTPATIENT
Start: 2021-08-09 | End: 2021-08-21

## 2021-08-10 LAB
ALBUMIN/CREAT UR: 147.7 UG/MG (ref 0–30)
BACTERIA #/AREA URNS AUTO: ABNORMAL /HPF
BILIRUB UR QL STRIP: NEGATIVE
CLARITY UR REFRACT.AUTO: CLEAR
COLOR UR AUTO: YELLOW
CREAT UR-MCNC: 111 MG/DL (ref 15–325)
GLUCOSE UR QL STRIP: NEGATIVE
HGB UR QL STRIP: NEGATIVE
HYALINE CASTS UR QL AUTO: 5 /LPF
KETONES UR QL STRIP: NEGATIVE
LEUKOCYTE ESTERASE UR QL STRIP: ABNORMAL
MICROALBUMIN UR DL<=1MG/L-MCNC: 164 UG/ML
MICROSCOPIC COMMENT: ABNORMAL
NITRITE UR QL STRIP: NEGATIVE
PH UR STRIP: 5 [PH] (ref 5–8)
PROT UR QL STRIP: ABNORMAL
RBC #/AREA URNS AUTO: 0 /HPF (ref 0–4)
SP GR UR STRIP: 1.01 (ref 1–1.03)
SQUAMOUS #/AREA URNS AUTO: 1 /HPF
URN SPEC COLLECT METH UR: ABNORMAL
WBC #/AREA URNS AUTO: 1 /HPF (ref 0–5)

## 2021-08-27 ENCOUNTER — HOSPITAL ENCOUNTER (OUTPATIENT)
Dept: RADIOLOGY | Facility: HOSPITAL | Age: 76
Discharge: HOME | End: 2021-08-27
Attending: INTERNAL MEDICINE
Payer: MEDICARE

## 2021-08-27 DIAGNOSIS — I25.10 CVD (CARDIOVASCULAR DISEASE): ICD-10-CM

## 2021-08-27 DIAGNOSIS — Z87.39 HISTORY OF GOUT: ICD-10-CM

## 2021-08-27 PROCEDURE — 93880 US CAROTID BILATERAL: ICD-10-PCS | Mod: 26,,, | Performed by: RADIOLOGY

## 2021-08-27 PROCEDURE — 73130 X-RAY EXAM OF HAND: CPT | Mod: 26,LT,, | Performed by: RADIOLOGY

## 2021-08-27 PROCEDURE — 73130 X-RAY EXAM OF HAND: CPT | Mod: TC,LT

## 2021-08-27 PROCEDURE — 93880 EXTRACRANIAL BILAT STUDY: CPT | Mod: TC

## 2021-08-27 PROCEDURE — 93880 EXTRACRANIAL BILAT STUDY: CPT | Mod: 26,,, | Performed by: RADIOLOGY

## 2021-08-27 PROCEDURE — 73130 XR HAND COMPLETE 3 VIEW LEFT: ICD-10-PCS | Mod: 26,LT,, | Performed by: RADIOLOGY

## 2021-08-28 ENCOUNTER — TELEPHONE (OUTPATIENT)
Dept: INTERNAL MEDICINE | Facility: CLINIC | Age: 76
End: 2021-08-28
Payer: MEDICARE

## 2021-08-28 DIAGNOSIS — I25.10 CVD (CARDIOVASCULAR DISEASE): Primary | ICD-10-CM

## 2021-09-17 ENCOUNTER — PES CALL (OUTPATIENT)
Dept: ADMINISTRATIVE | Facility: CLINIC | Age: 76
End: 2021-09-17

## 2021-09-22 DIAGNOSIS — E11.21 DIABETIC NEPHROPATHY ASSOCIATED WITH TYPE 2 DIABETES MELLITUS: ICD-10-CM

## 2021-09-22 RX ORDER — ISOPROPYL ALCOHOL 70 ML/100ML
SWAB TOPICAL
Qty: 200 EACH | Refills: 3 | Status: SHIPPED | OUTPATIENT
Start: 2021-09-22 | End: 2022-04-01 | Stop reason: SDUPTHER

## 2021-10-11 DIAGNOSIS — Z79.4 TYPE 2 DIABETES MELLITUS WITH DIABETIC NEPHROPATHY, WITH LONG-TERM CURRENT USE OF INSULIN: ICD-10-CM

## 2021-10-11 DIAGNOSIS — E11.21 TYPE 2 DIABETES MELLITUS WITH DIABETIC NEPHROPATHY, WITH LONG-TERM CURRENT USE OF INSULIN: ICD-10-CM

## 2021-10-14 ENCOUNTER — TELEPHONE (OUTPATIENT)
Dept: ENDOCRINOLOGY | Facility: CLINIC | Age: 76
End: 2021-10-14

## 2021-10-14 DIAGNOSIS — E11.21 TYPE 2 DIABETES MELLITUS WITH DIABETIC NEPHROPATHY, WITH LONG-TERM CURRENT USE OF INSULIN: ICD-10-CM

## 2021-10-14 DIAGNOSIS — Z79.4 TYPE 2 DIABETES MELLITUS WITH DIABETIC NEPHROPATHY, WITH LONG-TERM CURRENT USE OF INSULIN: ICD-10-CM

## 2021-10-19 ENCOUNTER — PATIENT MESSAGE (OUTPATIENT)
Dept: RESEARCH | Facility: HOSPITAL | Age: 76
End: 2021-10-19
Payer: MEDICARE

## 2021-10-25 ENCOUNTER — TELEPHONE (OUTPATIENT)
Dept: ADMINISTRATIVE | Facility: HOSPITAL | Age: 76
End: 2021-10-25
Payer: MEDICARE

## 2021-11-11 ENCOUNTER — IMMUNIZATION (OUTPATIENT)
Dept: INTERNAL MEDICINE | Facility: CLINIC | Age: 76
End: 2021-11-11
Payer: MEDICARE

## 2021-11-11 ENCOUNTER — LAB VISIT (OUTPATIENT)
Dept: LAB | Facility: HOSPITAL | Age: 76
End: 2021-11-11
Attending: INTERNAL MEDICINE
Payer: MEDICARE

## 2021-11-11 ENCOUNTER — OFFICE VISIT (OUTPATIENT)
Dept: INTERNAL MEDICINE | Facility: CLINIC | Age: 76
End: 2021-11-11
Payer: MEDICARE

## 2021-11-11 VITALS
OXYGEN SATURATION: 99 % | BODY MASS INDEX: 32.95 KG/M2 | WEIGHT: 205 LBS | HEIGHT: 66 IN | HEART RATE: 93 BPM | SYSTOLIC BLOOD PRESSURE: 142 MMHG | DIASTOLIC BLOOD PRESSURE: 70 MMHG | TEMPERATURE: 98 F

## 2021-11-11 DIAGNOSIS — D64.9 ANEMIA, UNSPECIFIED TYPE: ICD-10-CM

## 2021-11-11 DIAGNOSIS — E11.21 TYPE 2 DIABETES MELLITUS WITH DIABETIC NEPHROPATHY, WITHOUT LONG-TERM CURRENT USE OF INSULIN: ICD-10-CM

## 2021-11-11 DIAGNOSIS — I25.10 CVD (CARDIOVASCULAR DISEASE): ICD-10-CM

## 2021-11-11 DIAGNOSIS — M79.642 LEFT HAND PAIN: ICD-10-CM

## 2021-11-11 DIAGNOSIS — Z12.31 SCREENING MAMMOGRAM, ENCOUNTER FOR: ICD-10-CM

## 2021-11-11 DIAGNOSIS — M79.642 LEFT HAND PAIN: Primary | ICD-10-CM

## 2021-11-11 LAB
ALBUMIN SERPL BCP-MCNC: 4 G/DL (ref 3.5–5.2)
ALP SERPL-CCNC: 100 U/L (ref 55–135)
ALT SERPL W/O P-5'-P-CCNC: 12 U/L (ref 10–44)
ANION GAP SERPL CALC-SCNC: 14 MMOL/L (ref 8–16)
AST SERPL-CCNC: 15 U/L (ref 10–40)
BASOPHILS # BLD AUTO: 0.04 K/UL (ref 0–0.2)
BASOPHILS NFR BLD: 0.4 % (ref 0–1.9)
BILIRUB SERPL-MCNC: 0.4 MG/DL (ref 0.1–1)
BUN SERPL-MCNC: 26 MG/DL (ref 8–23)
CALCIUM SERPL-MCNC: 10.6 MG/DL (ref 8.7–10.5)
CCP AB SER IA-ACNC: 1.3 U/ML
CHLORIDE SERPL-SCNC: 110 MMOL/L (ref 95–110)
CO2 SERPL-SCNC: 18 MMOL/L (ref 23–29)
CREAT SERPL-MCNC: 1.2 MG/DL (ref 0.5–1.4)
CRP SERPL-MCNC: 17.7 MG/L (ref 0–8.2)
DIFFERENTIAL METHOD: ABNORMAL
EOSINOPHIL # BLD AUTO: 0.1 K/UL (ref 0–0.5)
EOSINOPHIL NFR BLD: 0.7 % (ref 0–8)
ERYTHROCYTE [DISTWIDTH] IN BLOOD BY AUTOMATED COUNT: 15.6 % (ref 11.5–14.5)
ERYTHROCYTE [SEDIMENTATION RATE] IN BLOOD BY WESTERGREN METHOD: 90 MM/HR (ref 0–36)
EST. GFR  (AFRICAN AMERICAN): 51.4 ML/MIN/1.73 M^2
EST. GFR  (NON AFRICAN AMERICAN): 44.6 ML/MIN/1.73 M^2
ESTIMATED AVG GLUCOSE: 111 MG/DL (ref 68–131)
FERRITIN SERPL-MCNC: 742 NG/ML (ref 20–300)
GLUCOSE SERPL-MCNC: 64 MG/DL (ref 70–110)
HBA1C MFR BLD: 5.5 % (ref 4–5.6)
HCT VFR BLD AUTO: 32.5 % (ref 37–48.5)
HGB BLD-MCNC: 10.3 G/DL (ref 12–16)
IMM GRANULOCYTES # BLD AUTO: 0.06 K/UL (ref 0–0.04)
IMM GRANULOCYTES NFR BLD AUTO: 0.6 % (ref 0–0.5)
IRON SERPL-MCNC: 63 UG/DL (ref 30–160)
LYMPHOCYTES # BLD AUTO: 2.1 K/UL (ref 1–4.8)
LYMPHOCYTES NFR BLD: 21.6 % (ref 18–48)
MCH RBC QN AUTO: 28.2 PG (ref 27–31)
MCHC RBC AUTO-ENTMCNC: 31.7 G/DL (ref 32–36)
MCV RBC AUTO: 89 FL (ref 82–98)
MONOCYTES # BLD AUTO: 0.5 K/UL (ref 0.3–1)
MONOCYTES NFR BLD: 4.9 % (ref 4–15)
NEUTROPHILS # BLD AUTO: 7.1 K/UL (ref 1.8–7.7)
NEUTROPHILS NFR BLD: 71.8 % (ref 38–73)
NRBC BLD-RTO: 0 /100 WBC
PLATELET # BLD AUTO: 339 K/UL (ref 150–450)
PMV BLD AUTO: 10.4 FL (ref 9.2–12.9)
POTASSIUM SERPL-SCNC: 4.3 MMOL/L (ref 3.5–5.1)
PROT SERPL-MCNC: 8.7 G/DL (ref 6–8.4)
PTH-INTACT SERPL-MCNC: 104.8 PG/ML (ref 9–77)
RBC # BLD AUTO: 3.65 M/UL (ref 4–5.4)
RHEUMATOID FACT SERPL-ACNC: 60 IU/ML (ref 0–15)
SATURATED IRON: 20 % (ref 20–50)
SODIUM SERPL-SCNC: 142 MMOL/L (ref 136–145)
TOTAL IRON BINDING CAPACITY: 314 UG/DL (ref 250–450)
TRANSFERRIN SERPL-MCNC: 212 MG/DL (ref 200–375)
WBC # BLD AUTO: 9.88 K/UL (ref 3.9–12.7)

## 2021-11-11 PROCEDURE — 99214 OFFICE O/P EST MOD 30 MIN: CPT | Mod: HCNC,S$GLB,, | Performed by: INTERNAL MEDICINE

## 2021-11-11 PROCEDURE — 86140 C-REACTIVE PROTEIN: CPT | Mod: HCNC | Performed by: INTERNAL MEDICINE

## 2021-11-11 PROCEDURE — 3066F PR DOCUMENTATION OF TREATMENT FOR NEPHROPATHY: ICD-10-PCS | Mod: HCNC,CPTII,S$GLB, | Performed by: INTERNAL MEDICINE

## 2021-11-11 PROCEDURE — 84165 PROTEIN E-PHORESIS SERUM: CPT | Mod: 26,HCNC,, | Performed by: PATHOLOGY

## 2021-11-11 PROCEDURE — 4010F PR ACE/ARB THEARPY RXD/TAKEN: ICD-10-PCS | Mod: HCNC,CPTII,S$GLB, | Performed by: INTERNAL MEDICINE

## 2021-11-11 PROCEDURE — 36415 COLL VENOUS BLD VENIPUNCTURE: CPT | Mod: HCNC | Performed by: INTERNAL MEDICINE

## 2021-11-11 PROCEDURE — 99499 RISK ADDL DX/OHS AUDIT: ICD-10-PCS | Mod: S$GLB,,, | Performed by: INTERNAL MEDICINE

## 2021-11-11 PROCEDURE — 86431 RHEUMATOID FACTOR QUANT: CPT | Mod: HCNC | Performed by: INTERNAL MEDICINE

## 2021-11-11 PROCEDURE — 80053 COMPREHEN METABOLIC PANEL: CPT | Mod: HCNC | Performed by: INTERNAL MEDICINE

## 2021-11-11 PROCEDURE — 85652 RBC SED RATE AUTOMATED: CPT | Mod: HCNC | Performed by: INTERNAL MEDICINE

## 2021-11-11 PROCEDURE — 4010F ACE/ARB THERAPY RXD/TAKEN: CPT | Mod: HCNC,CPTII,S$GLB, | Performed by: INTERNAL MEDICINE

## 2021-11-11 PROCEDURE — 90694 VACC AIIV4 NO PRSRV 0.5ML IM: CPT | Mod: HCNC,S$GLB,, | Performed by: INTERNAL MEDICINE

## 2021-11-11 PROCEDURE — 3066F NEPHROPATHY DOC TX: CPT | Mod: HCNC,CPTII,S$GLB, | Performed by: INTERNAL MEDICINE

## 2021-11-11 PROCEDURE — 82728 ASSAY OF FERRITIN: CPT | Mod: HCNC | Performed by: INTERNAL MEDICINE

## 2021-11-11 PROCEDURE — 84165 PROTEIN E-PHORESIS SERUM: CPT | Mod: HCNC | Performed by: INTERNAL MEDICINE

## 2021-11-11 PROCEDURE — G0008 FLU VACCINE - QUADRIVALENT - ADJUVANTED: ICD-10-PCS | Mod: HCNC,S$GLB,, | Performed by: INTERNAL MEDICINE

## 2021-11-11 PROCEDURE — 85025 COMPLETE CBC W/AUTO DIFF WBC: CPT | Mod: HCNC | Performed by: INTERNAL MEDICINE

## 2021-11-11 PROCEDURE — 90694 FLU VACCINE - QUADRIVALENT - ADJUVANTED: ICD-10-PCS | Mod: HCNC,S$GLB,, | Performed by: INTERNAL MEDICINE

## 2021-11-11 PROCEDURE — G0008 ADMIN INFLUENZA VIRUS VAC: HCPCS | Mod: HCNC,S$GLB,, | Performed by: INTERNAL MEDICINE

## 2021-11-11 PROCEDURE — 99999 PR PBB SHADOW E&M-EST. PATIENT-LVL V: CPT | Mod: PBBFAC,HCNC,, | Performed by: INTERNAL MEDICINE

## 2021-11-11 PROCEDURE — 83970 ASSAY OF PARATHORMONE: CPT | Mod: HCNC | Performed by: INTERNAL MEDICINE

## 2021-11-11 PROCEDURE — 86038 ANTINUCLEAR ANTIBODIES: CPT | Mod: HCNC | Performed by: INTERNAL MEDICINE

## 2021-11-11 PROCEDURE — 3072F LOW RISK FOR RETINOPATHY: CPT | Mod: HCNC,CPTII,S$GLB, | Performed by: INTERNAL MEDICINE

## 2021-11-11 PROCEDURE — 99999 PR PBB SHADOW E&M-EST. PATIENT-LVL V: ICD-10-PCS | Mod: PBBFAC,HCNC,, | Performed by: INTERNAL MEDICINE

## 2021-11-11 PROCEDURE — 3060F PR POS MICROALBUMINURIA RESULT DOCUMENTED/REVIEW: ICD-10-PCS | Mod: HCNC,CPTII,S$GLB, | Performed by: INTERNAL MEDICINE

## 2021-11-11 PROCEDURE — 86200 CCP ANTIBODY: CPT | Mod: HCNC | Performed by: INTERNAL MEDICINE

## 2021-11-11 PROCEDURE — 3060F POS MICROALBUMINURIA REV: CPT | Mod: HCNC,CPTII,S$GLB, | Performed by: INTERNAL MEDICINE

## 2021-11-11 PROCEDURE — 99499 UNLISTED E&M SERVICE: CPT | Mod: S$GLB,,, | Performed by: INTERNAL MEDICINE

## 2021-11-11 PROCEDURE — 3072F PR LOW RISK FOR RETINOPATHY: ICD-10-PCS | Mod: HCNC,CPTII,S$GLB, | Performed by: INTERNAL MEDICINE

## 2021-11-11 PROCEDURE — 84165 PATHOLOGIST INTERPRETATION SPE: ICD-10-PCS | Mod: 26,HCNC,, | Performed by: PATHOLOGY

## 2021-11-11 PROCEDURE — 84466 ASSAY OF TRANSFERRIN: CPT | Mod: HCNC | Performed by: INTERNAL MEDICINE

## 2021-11-11 PROCEDURE — 83036 HEMOGLOBIN GLYCOSYLATED A1C: CPT | Mod: HCNC | Performed by: INTERNAL MEDICINE

## 2021-11-11 PROCEDURE — 99214 PR OFFICE/OUTPT VISIT, EST, LEVL IV, 30-39 MIN: ICD-10-PCS | Mod: HCNC,S$GLB,, | Performed by: INTERNAL MEDICINE

## 2021-11-11 RX ORDER — OXYCODONE AND ACETAMINOPHEN 5; 325 MG/1; MG/1
1 TABLET ORAL
Qty: 30 TABLET | Refills: 0 | Status: SHIPPED | OUTPATIENT
Start: 2021-11-11 | End: 2022-01-04 | Stop reason: SDUPTHER

## 2021-11-11 RX ORDER — ATORVASTATIN CALCIUM 40 MG/1
40 TABLET, FILM COATED ORAL DAILY
Qty: 90 TABLET | Refills: 3 | Status: SHIPPED | OUTPATIENT
Start: 2021-11-11 | End: 2022-07-14

## 2021-11-11 RX ORDER — LOSARTAN POTASSIUM 100 MG/1
100 TABLET ORAL DAILY
Qty: 90 TABLET | Refills: 3 | Status: SHIPPED | OUTPATIENT
Start: 2021-11-11 | End: 2022-03-26 | Stop reason: SDUPTHER

## 2021-11-12 LAB
ALBUMIN SERPL ELPH-MCNC: 4.39 G/DL (ref 3.35–5.55)
ALPHA1 GLOB SERPL ELPH-MCNC: 0.48 G/DL (ref 0.17–0.41)
ALPHA2 GLOB SERPL ELPH-MCNC: 1.04 G/DL (ref 0.43–0.99)
ANA SER QL IF: NORMAL
B-GLOBULIN SERPL ELPH-MCNC: 1.09 G/DL (ref 0.5–1.1)
GAMMA GLOB SERPL ELPH-MCNC: 1.7 G/DL (ref 0.67–1.58)
PROT SERPL-MCNC: 8.7 G/DL (ref 6–8.4)

## 2021-11-13 LAB — PATHOLOGIST INTERPRETATION SPE: NORMAL

## 2021-11-15 ENCOUNTER — OFFICE VISIT (OUTPATIENT)
Dept: ENDOCRINOLOGY | Facility: CLINIC | Age: 76
End: 2021-11-15
Payer: MEDICARE

## 2021-11-15 VITALS
BODY MASS INDEX: 31.99 KG/M2 | DIASTOLIC BLOOD PRESSURE: 66 MMHG | HEART RATE: 100 BPM | WEIGHT: 203.81 LBS | HEIGHT: 67 IN | OXYGEN SATURATION: 97 % | SYSTOLIC BLOOD PRESSURE: 144 MMHG | RESPIRATION RATE: 18 BRPM

## 2021-11-15 DIAGNOSIS — N18.31 STAGE 3A CHRONIC KIDNEY DISEASE: ICD-10-CM

## 2021-11-15 DIAGNOSIS — R80.9 PROTEINURIA, UNSPECIFIED TYPE: ICD-10-CM

## 2021-11-15 DIAGNOSIS — E66.9 OBESITY (BMI 30.0-34.9): ICD-10-CM

## 2021-11-15 DIAGNOSIS — E11.21 TYPE 2 DIABETES MELLITUS WITH DIABETIC NEPHROPATHY, WITH LONG-TERM CURRENT USE OF INSULIN: Primary | ICD-10-CM

## 2021-11-15 DIAGNOSIS — Z79.4 TYPE 2 DIABETES MELLITUS WITH DIABETIC NEPHROPATHY, WITH LONG-TERM CURRENT USE OF INSULIN: Primary | ICD-10-CM

## 2021-11-15 DIAGNOSIS — D64.9 ANEMIA, UNSPECIFIED TYPE: ICD-10-CM

## 2021-11-15 PROCEDURE — 3072F LOW RISK FOR RETINOPATHY: CPT | Mod: HCNC,CPTII,S$GLB, | Performed by: INTERNAL MEDICINE

## 2021-11-15 PROCEDURE — 99499 UNLISTED E&M SERVICE: CPT | Mod: S$GLB,,, | Performed by: INTERNAL MEDICINE

## 2021-11-15 PROCEDURE — 3288F FALL RISK ASSESSMENT DOCD: CPT | Mod: HCNC,CPTII,S$GLB, | Performed by: INTERNAL MEDICINE

## 2021-11-15 PROCEDURE — 4010F ACE/ARB THERAPY RXD/TAKEN: CPT | Mod: HCNC,CPTII,S$GLB, | Performed by: INTERNAL MEDICINE

## 2021-11-15 PROCEDURE — 99999 PR PBB SHADOW E&M-EST. PATIENT-LVL V: CPT | Mod: PBBFAC,HCNC,, | Performed by: INTERNAL MEDICINE

## 2021-11-15 PROCEDURE — 99999 PR PBB SHADOW E&M-EST. PATIENT-LVL V: ICD-10-PCS | Mod: PBBFAC,HCNC,, | Performed by: INTERNAL MEDICINE

## 2021-11-15 PROCEDURE — 3066F NEPHROPATHY DOC TX: CPT | Mod: HCNC,CPTII,S$GLB, | Performed by: INTERNAL MEDICINE

## 2021-11-15 PROCEDURE — 3044F HG A1C LEVEL LT 7.0%: CPT | Mod: HCNC,CPTII,S$GLB, | Performed by: INTERNAL MEDICINE

## 2021-11-15 PROCEDURE — 3066F PR DOCUMENTATION OF TREATMENT FOR NEPHROPATHY: ICD-10-PCS | Mod: HCNC,CPTII,S$GLB, | Performed by: INTERNAL MEDICINE

## 2021-11-15 PROCEDURE — 1125F AMNT PAIN NOTED PAIN PRSNT: CPT | Mod: HCNC,CPTII,S$GLB, | Performed by: INTERNAL MEDICINE

## 2021-11-15 PROCEDURE — 3072F PR LOW RISK FOR RETINOPATHY: ICD-10-PCS | Mod: HCNC,CPTII,S$GLB, | Performed by: INTERNAL MEDICINE

## 2021-11-15 PROCEDURE — 99499 RISK ADDL DX/OHS AUDIT: ICD-10-PCS | Mod: S$GLB,,, | Performed by: INTERNAL MEDICINE

## 2021-11-15 PROCEDURE — 99214 OFFICE O/P EST MOD 30 MIN: CPT | Mod: HCNC,S$GLB,, | Performed by: INTERNAL MEDICINE

## 2021-11-15 PROCEDURE — 3060F POS MICROALBUMINURIA REV: CPT | Mod: HCNC,CPTII,S$GLB, | Performed by: INTERNAL MEDICINE

## 2021-11-15 PROCEDURE — 1159F PR MEDICATION LIST DOCUMENTED IN MEDICAL RECORD: ICD-10-PCS | Mod: HCNC,CPTII,S$GLB, | Performed by: INTERNAL MEDICINE

## 2021-11-15 PROCEDURE — 3078F DIAST BP <80 MM HG: CPT | Mod: HCNC,CPTII,S$GLB, | Performed by: INTERNAL MEDICINE

## 2021-11-15 PROCEDURE — 3044F PR MOST RECENT HEMOGLOBIN A1C LEVEL <7.0%: ICD-10-PCS | Mod: HCNC,CPTII,S$GLB, | Performed by: INTERNAL MEDICINE

## 2021-11-15 PROCEDURE — 1159F MED LIST DOCD IN RCRD: CPT | Mod: HCNC,CPTII,S$GLB, | Performed by: INTERNAL MEDICINE

## 2021-11-15 PROCEDURE — 4010F PR ACE/ARB THEARPY RXD/TAKEN: ICD-10-PCS | Mod: HCNC,CPTII,S$GLB, | Performed by: INTERNAL MEDICINE

## 2021-11-15 PROCEDURE — 99214 PR OFFICE/OUTPT VISIT, EST, LEVL IV, 30-39 MIN: ICD-10-PCS | Mod: HCNC,S$GLB,, | Performed by: INTERNAL MEDICINE

## 2021-11-15 PROCEDURE — 3077F PR MOST RECENT SYSTOLIC BLOOD PRESSURE >= 140 MM HG: ICD-10-PCS | Mod: HCNC,CPTII,S$GLB, | Performed by: INTERNAL MEDICINE

## 2021-11-15 PROCEDURE — 3060F PR POS MICROALBUMINURIA RESULT DOCUMENTED/REVIEW: ICD-10-PCS | Mod: HCNC,CPTII,S$GLB, | Performed by: INTERNAL MEDICINE

## 2021-11-15 PROCEDURE — 3288F PR FALLS RISK ASSESSMENT DOCUMENTED: ICD-10-PCS | Mod: HCNC,CPTII,S$GLB, | Performed by: INTERNAL MEDICINE

## 2021-11-15 PROCEDURE — 1125F PR PAIN SEVERITY QUANTIFIED, PAIN PRESENT: ICD-10-PCS | Mod: HCNC,CPTII,S$GLB, | Performed by: INTERNAL MEDICINE

## 2021-11-15 PROCEDURE — 1101F PT FALLS ASSESS-DOCD LE1/YR: CPT | Mod: HCNC,CPTII,S$GLB, | Performed by: INTERNAL MEDICINE

## 2021-11-15 PROCEDURE — 3077F SYST BP >= 140 MM HG: CPT | Mod: HCNC,CPTII,S$GLB, | Performed by: INTERNAL MEDICINE

## 2021-11-15 PROCEDURE — 3008F PR BODY MASS INDEX (BMI) DOCUMENTED: ICD-10-PCS | Mod: HCNC,CPTII,S$GLB, | Performed by: INTERNAL MEDICINE

## 2021-11-15 PROCEDURE — 3008F BODY MASS INDEX DOCD: CPT | Mod: HCNC,CPTII,S$GLB, | Performed by: INTERNAL MEDICINE

## 2021-11-15 PROCEDURE — 3078F PR MOST RECENT DIASTOLIC BLOOD PRESSURE < 80 MM HG: ICD-10-PCS | Mod: HCNC,CPTII,S$GLB, | Performed by: INTERNAL MEDICINE

## 2021-11-15 PROCEDURE — 1101F PR PT FALLS ASSESS DOC 0-1 FALLS W/OUT INJ PAST YR: ICD-10-PCS | Mod: HCNC,CPTII,S$GLB, | Performed by: INTERNAL MEDICINE

## 2021-11-16 DIAGNOSIS — I65.22 STENOSIS OF LEFT CAROTID ARTERY: Primary | ICD-10-CM

## 2021-11-17 ENCOUNTER — TELEPHONE (OUTPATIENT)
Dept: ENDOCRINOLOGY | Facility: CLINIC | Age: 76
End: 2021-11-17
Payer: MEDICARE

## 2021-11-18 ENCOUNTER — TELEPHONE (OUTPATIENT)
Dept: ENDOCRINOLOGY | Facility: CLINIC | Age: 76
End: 2021-11-18
Payer: MEDICARE

## 2021-11-23 ENCOUNTER — TELEPHONE (OUTPATIENT)
Dept: PHARMACY | Facility: CLINIC | Age: 76
End: 2021-11-23
Payer: MEDICARE

## 2021-11-23 DIAGNOSIS — E11.21 TYPE 2 DIABETES MELLITUS WITH DIABETIC NEPHROPATHY, WITH LONG-TERM CURRENT USE OF INSULIN: ICD-10-CM

## 2021-11-23 DIAGNOSIS — Z79.4 TYPE 2 DIABETES MELLITUS WITH DIABETIC NEPHROPATHY, WITH LONG-TERM CURRENT USE OF INSULIN: ICD-10-CM

## 2021-11-23 RX ORDER — INSULIN ASPART 100 [IU]/ML
10 INJECTION, SOLUTION INTRAVENOUS; SUBCUTANEOUS
Qty: 30 ML | Refills: 0 | Status: SHIPPED | OUTPATIENT
Start: 2021-11-23 | End: 2021-12-21 | Stop reason: SDUPTHER

## 2021-11-26 ENCOUNTER — DOCUMENTATION ONLY (OUTPATIENT)
Dept: ORTHOPEDICS | Facility: CLINIC | Age: 76
End: 2021-11-26
Payer: MEDICARE

## 2021-11-30 ENCOUNTER — TELEPHONE (OUTPATIENT)
Dept: INTERNAL MEDICINE | Facility: CLINIC | Age: 76
End: 2021-11-30
Payer: MEDICARE

## 2021-11-30 DIAGNOSIS — R76.8 ELEVATED RHEUMATOID FACTOR: Primary | ICD-10-CM

## 2021-11-30 NOTE — TELEPHONE ENCOUNTER
Please schedule in Rheumatology.    WIll need to address hyperparathyroidism with Endocrine at next visit.

## 2021-12-02 ENCOUNTER — PATIENT OUTREACH (OUTPATIENT)
Dept: ADMINISTRATIVE | Facility: OTHER | Age: 76
End: 2021-12-02
Payer: MEDICARE

## 2021-12-08 ENCOUNTER — TELEPHONE (OUTPATIENT)
Dept: ADMINISTRATIVE | Facility: HOSPITAL | Age: 76
End: 2021-12-08
Payer: MEDICARE

## 2021-12-08 ENCOUNTER — INITIAL CONSULT (OUTPATIENT)
Dept: VASCULAR SURGERY | Facility: CLINIC | Age: 76
End: 2021-12-08
Payer: MEDICARE

## 2021-12-08 ENCOUNTER — HOSPITAL ENCOUNTER (OUTPATIENT)
Dept: VASCULAR SURGERY | Facility: CLINIC | Age: 76
Discharge: HOME | End: 2021-12-08
Attending: SURGERY
Payer: MEDICARE

## 2021-12-08 VITALS
WEIGHT: 198.44 LBS | TEMPERATURE: 98 F | DIASTOLIC BLOOD PRESSURE: 71 MMHG | BODY MASS INDEX: 31.15 KG/M2 | SYSTOLIC BLOOD PRESSURE: 135 MMHG | HEIGHT: 67 IN | HEART RATE: 94 BPM

## 2021-12-08 DIAGNOSIS — I25.10 CVD (CARDIOVASCULAR DISEASE): ICD-10-CM

## 2021-12-08 DIAGNOSIS — I65.23 BILATERAL CAROTID ARTERY STENOSIS: ICD-10-CM

## 2021-12-08 DIAGNOSIS — I65.22 STENOSIS OF LEFT CAROTID ARTERY: ICD-10-CM

## 2021-12-08 PROCEDURE — 93880 PR DUPLEX SCAN EXTRACRANIAL,BILAT: ICD-10-PCS | Mod: HCNC,S$GLB,, | Performed by: SURGERY

## 2021-12-08 PROCEDURE — 99214 PR OFFICE/OUTPT VISIT, EST, LEVL IV, 30-39 MIN: ICD-10-PCS | Mod: HCNC,S$GLB,, | Performed by: SURGERY

## 2021-12-08 PROCEDURE — 99999 PR PBB SHADOW E&M-EST. PATIENT-LVL IV: ICD-10-PCS | Mod: PBBFAC,HCNC,, | Performed by: SURGERY

## 2021-12-08 PROCEDURE — 99999 PR PBB SHADOW E&M-EST. PATIENT-LVL IV: CPT | Mod: PBBFAC,HCNC,, | Performed by: SURGERY

## 2021-12-08 PROCEDURE — 93880 EXTRACRANIAL BILAT STUDY: CPT | Mod: HCNC,S$GLB,, | Performed by: SURGERY

## 2021-12-08 PROCEDURE — 99214 OFFICE O/P EST MOD 30 MIN: CPT | Mod: HCNC,S$GLB,, | Performed by: SURGERY

## 2021-12-17 ENCOUNTER — TELEPHONE (OUTPATIENT)
Dept: ORTHOPEDICS | Facility: CLINIC | Age: 76
End: 2021-12-17
Payer: MEDICARE

## 2021-12-17 DIAGNOSIS — M79.642 LEFT HAND PAIN: Primary | ICD-10-CM

## 2021-12-20 ENCOUNTER — HOSPITAL ENCOUNTER (OUTPATIENT)
Dept: RADIOLOGY | Facility: OTHER | Age: 76
Discharge: HOME | End: 2021-12-20
Attending: ORTHOPAEDIC SURGERY
Payer: MEDICARE

## 2021-12-20 ENCOUNTER — OFFICE VISIT (OUTPATIENT)
Dept: ORTHOPEDICS | Facility: CLINIC | Age: 76
End: 2021-12-20
Payer: MEDICARE

## 2021-12-20 VITALS — BODY MASS INDEX: 31.08 KG/M2 | WEIGHT: 198 LBS | HEIGHT: 67 IN

## 2021-12-20 DIAGNOSIS — M79.642 LEFT HAND PAIN: ICD-10-CM

## 2021-12-20 PROCEDURE — 99204 PR OFFICE/OUTPT VISIT, NEW, LEVL IV, 45-59 MIN: ICD-10-PCS | Mod: HCNC,S$GLB,, | Performed by: ORTHOPAEDIC SURGERY

## 2021-12-20 PROCEDURE — 73130 XR HAND COMPLETE 3 VIEW LEFT: ICD-10-PCS | Mod: 26,HCNC,LT, | Performed by: RADIOLOGY

## 2021-12-20 PROCEDURE — 3066F NEPHROPATHY DOC TX: CPT | Mod: HCNC,CPTII,S$GLB, | Performed by: ORTHOPAEDIC SURGERY

## 2021-12-20 PROCEDURE — 4010F PR ACE/ARB THEARPY RXD/TAKEN: ICD-10-PCS | Mod: HCNC,CPTII,S$GLB, | Performed by: ORTHOPAEDIC SURGERY

## 2021-12-20 PROCEDURE — 99204 OFFICE O/P NEW MOD 45 MIN: CPT | Mod: HCNC,S$GLB,, | Performed by: ORTHOPAEDIC SURGERY

## 2021-12-20 PROCEDURE — 3060F POS MICROALBUMINURIA REV: CPT | Mod: HCNC,CPTII,S$GLB, | Performed by: ORTHOPAEDIC SURGERY

## 2021-12-20 PROCEDURE — 3060F PR POS MICROALBUMINURIA RESULT DOCUMENTED/REVIEW: ICD-10-PCS | Mod: HCNC,CPTII,S$GLB, | Performed by: ORTHOPAEDIC SURGERY

## 2021-12-20 PROCEDURE — 73130 X-RAY EXAM OF HAND: CPT | Mod: 26,HCNC,LT, | Performed by: RADIOLOGY

## 2021-12-20 PROCEDURE — 99999 PR PBB SHADOW E&M-EST. PATIENT-LVL III: ICD-10-PCS | Mod: PBBFAC,HCNC,, | Performed by: ORTHOPAEDIC SURGERY

## 2021-12-20 PROCEDURE — 73130 X-RAY EXAM OF HAND: CPT | Mod: TC,HCNC,FY,LT

## 2021-12-20 PROCEDURE — 3066F PR DOCUMENTATION OF TREATMENT FOR NEPHROPATHY: ICD-10-PCS | Mod: HCNC,CPTII,S$GLB, | Performed by: ORTHOPAEDIC SURGERY

## 2021-12-20 PROCEDURE — 4010F ACE/ARB THERAPY RXD/TAKEN: CPT | Mod: HCNC,CPTII,S$GLB, | Performed by: ORTHOPAEDIC SURGERY

## 2021-12-20 PROCEDURE — 3072F LOW RISK FOR RETINOPATHY: CPT | Mod: HCNC,CPTII,S$GLB, | Performed by: ORTHOPAEDIC SURGERY

## 2021-12-20 PROCEDURE — 3072F PR LOW RISK FOR RETINOPATHY: ICD-10-PCS | Mod: HCNC,CPTII,S$GLB, | Performed by: ORTHOPAEDIC SURGERY

## 2021-12-20 PROCEDURE — 99999 PR PBB SHADOW E&M-EST. PATIENT-LVL III: CPT | Mod: PBBFAC,HCNC,, | Performed by: ORTHOPAEDIC SURGERY

## 2021-12-21 DIAGNOSIS — E11.21 TYPE 2 DIABETES MELLITUS WITH DIABETIC NEPHROPATHY, WITH LONG-TERM CURRENT USE OF INSULIN: ICD-10-CM

## 2021-12-21 DIAGNOSIS — Z79.4 TYPE 2 DIABETES MELLITUS WITH DIABETIC NEPHROPATHY, WITH LONG-TERM CURRENT USE OF INSULIN: ICD-10-CM

## 2021-12-21 RX ORDER — INSULIN ASPART 100 [IU]/ML
10 INJECTION, SOLUTION INTRAVENOUS; SUBCUTANEOUS
Qty: 30 ML | Refills: 3 | Status: SHIPPED | OUTPATIENT
Start: 2021-12-21 | End: 2022-01-13

## 2021-12-21 RX ORDER — INSULIN ASPART 100 [IU]/ML
INJECTION, SOLUTION INTRAVENOUS; SUBCUTANEOUS
Qty: 450 ML | Refills: 0 | OUTPATIENT
Start: 2021-12-21

## 2021-12-22 ENCOUNTER — HOSPITAL ENCOUNTER (OUTPATIENT)
Dept: RADIOLOGY | Facility: HOSPITAL | Age: 76
Discharge: HOME | End: 2021-12-22
Attending: INTERNAL MEDICINE
Payer: MEDICARE

## 2021-12-22 VITALS — HEIGHT: 67 IN | WEIGHT: 198 LBS | BODY MASS INDEX: 31.08 KG/M2

## 2021-12-22 DIAGNOSIS — Z12.31 SCREENING MAMMOGRAM, ENCOUNTER FOR: ICD-10-CM

## 2021-12-22 PROCEDURE — 77063 MAMMO DIGITAL SCREENING BILAT WITH TOMO: ICD-10-PCS | Mod: 26,HCNC,, | Performed by: RADIOLOGY

## 2021-12-22 PROCEDURE — 77063 BREAST TOMOSYNTHESIS BI: CPT | Mod: 26,HCNC,, | Performed by: RADIOLOGY

## 2021-12-22 PROCEDURE — 77067 SCR MAMMO BI INCL CAD: CPT | Mod: TC,HCNC

## 2021-12-22 PROCEDURE — 77067 MAMMO DIGITAL SCREENING BILAT WITH TOMO: ICD-10-PCS | Mod: 26,HCNC,, | Performed by: RADIOLOGY

## 2021-12-22 PROCEDURE — 77067 SCR MAMMO BI INCL CAD: CPT | Mod: 26,HCNC,, | Performed by: RADIOLOGY

## 2021-12-29 ENCOUNTER — LAB VISIT (OUTPATIENT)
Dept: PRIMARY CARE CLINIC | Facility: OTHER | Age: 76
End: 2021-12-29
Attending: INTERNAL MEDICINE
Payer: MEDICARE

## 2021-12-29 DIAGNOSIS — Z20.822 ENCOUNTER FOR LABORATORY TESTING FOR COVID-19 VIRUS: ICD-10-CM

## 2021-12-29 PROCEDURE — U0003 INFECTIOUS AGENT DETECTION BY NUCLEIC ACID (DNA OR RNA); SEVERE ACUTE RESPIRATORY SYNDROME CORONAVIRUS 2 (SARS-COV-2) (CORONAVIRUS DISEASE [COVID-19]), AMPLIFIED PROBE TECHNIQUE, MAKING USE OF HIGH THROUGHPUT TECHNOLOGIES AS DESCRIBED BY CMS-2020-01-R: HCPCS | Mod: HCNC | Performed by: INTERNAL MEDICINE

## 2021-12-31 LAB
SARS-COV-2 RNA RESP QL NAA+PROBE: NOT DETECTED
SARS-COV-2- CYCLE NUMBER: NORMAL

## 2022-01-05 RX ORDER — INSULIN PUMP SYRINGE, 3 ML
EACH MISCELLANEOUS
Refills: 0 | OUTPATIENT
Start: 2022-01-05

## 2022-01-05 NOTE — TELEPHONE ENCOUNTER
No new care gaps identified.  Powered by [employer] by [employer]. Reference number: [id number].   1/05/2022 4:04:58 PM CST

## 2022-01-05 NOTE — TELEPHONE ENCOUNTER
No new care gaps identified.  Powered by [facility] by [employer]. Reference number: [id number].   1/05/2022 11:38:45 AM CST

## 2022-01-05 NOTE — TELEPHONE ENCOUNTER
[facility] Note  Quick DC. Inappropriate Request   Refill request requires further review by MD: NO   Medication Therapy Plan: Pharmacy is requesting new script(s) for the following medications without required information, (sig/ frequency/qty/etc)     [facility] action(s):  Quick Discontinue      Duplicate Pended Encounter(s)/ Last Prescribed Details:    Pharmacies have been requesting medications for patients without required information, (sig, frequency, qty, etc.). In addition, requests are sent for medication(s) pt. are currently not taking, and medications patients have never taken.    We have spoken to the pharmacies about these request types and advised their teams previously that we are unable to assess these New Script requests and require all details for these requests. This is a known issue and has been reported.        Medication related problems are not assessed for [facility].   Medication Reconciliation Completed? NO Were there pending details that required adjustment? NO     Automatic Epic Generated Protocol Data Below:   Requested Prescriptions   Pending Prescriptions Disp Refills    blood-glucose meter (TRUE METRIX GLUCOSE METER) kit [Pharmacy Med Name: TRUE METRIX METER KIT]  0              Appointments      Date Provider   Last Visit   11/11/2021 Dora Freedman MD   Next Visit   Visit date not found Dora Freedman MD        Note composed:4:46 PM 01/05/2022

## 2022-01-06 RX ORDER — INSULIN PUMP SYRINGE, 3 ML
EACH MISCELLANEOUS
Qty: 1 EACH | Refills: 0 | Status: SHIPPED | OUTPATIENT
Start: 2022-01-06

## 2022-01-12 NOTE — TELEPHONE ENCOUNTER
No new care gaps identified.  Powered by [employer] by [employer]. Reference number: [id number].   1/12/2022 2:51:45 PM CST

## 2022-01-13 ENCOUNTER — TELEPHONE (OUTPATIENT)
Dept: ENDOCRINOLOGY | Facility: CLINIC | Age: 77
End: 2022-01-13
Payer: MEDICARE

## 2022-01-13 DIAGNOSIS — E11.21 TYPE 2 DIABETES MELLITUS WITH DIABETIC NEPHROPATHY, WITH LONG-TERM CURRENT USE OF INSULIN: ICD-10-CM

## 2022-01-13 DIAGNOSIS — Z79.4 TYPE 2 DIABETES MELLITUS WITH DIABETIC NEPHROPATHY, WITH LONG-TERM CURRENT USE OF INSULIN: ICD-10-CM

## 2022-01-13 RX ORDER — METFORMIN HYDROCHLORIDE 500 MG/1
500 TABLET, EXTENDED RELEASE ORAL 2 TIMES DAILY WITH MEALS
Qty: 180 TABLET | Refills: 3 | Status: SHIPPED | OUTPATIENT
Start: 2022-01-13 | End: 2022-11-03

## 2022-01-13 RX ORDER — INSULIN ASPART 100 [IU]/ML
5 INJECTION, SOLUTION INTRAVENOUS; SUBCUTANEOUS
Qty: 30 ML | Refills: 3
Start: 2022-01-13 | End: 2022-03-17

## 2022-01-13 NOTE — TELEPHONE ENCOUNTER
I spoke with her. BG has been running in low 100s mostly (lowest was 77) and she just received her first shipment of Jardiance. She was concerned about adding the Jardiance and further lowering her BG. She is currently taking:    · Levemir 20 units daily  · Novolog 8 units TID with meals  · Actos 30 mg daily  · Metformin 500 mg XR BID       Advised that she can start Jardiance and reduce her insulin doses as follows:  · Levemir 20 > 15 units daily  · Novolog 8 > 5 units TID with meals  · Continue metformin and Actos at the current doses.    She read back the directions.  She will call me back if she has any hypoglycemia on the regimen.

## 2022-01-13 NOTE — TELEPHONE ENCOUNTER
Spoke with patient states she has been taking her medications as directed but her sugars are running 120, 125. Sometimes she goes as low as 88 or 77 before meals. States she received her Jardiance yesterday should she still be taking the Jardiance? She asking because of the lows that she has sometimes. Please advise

## 2022-01-21 NOTE — TELEPHONE ENCOUNTER
Refill Authorization Note   Lina Davis  is requesting a refill authorization.  Brief Assessment and Rationale for Refill:  Approve     Medication Therapy Plan:       Medication Reconciliation Completed: No   Comments:   --->Care Gap information included below if applicable.       Requested Prescriptions   Pending Prescriptions Disp Refills    pioglitazone (ACTOS) 30 MG tablet [Pharmacy Med Name: PIOGLITAZONE HYDROCHLORIDE 30 MG Tablet] 90 tablet 0     Sig: Take 1 tablet (30 mg total) by mouth once daily.       Endocrinology:  Diabetes - Glitazones - pioglitazone Passed - 1/12/2022  2:51 PM        Passed - Patient is at least 18 years old        Passed - Patient does not have a history of Heart Failure        Passed - Valid encounter within last 15 months     Recent Visits  Date Type Provider Dept   11/11/21 Office Visit Dora Freedman MD [facility]   08/09/21 Office Visit Dora Freedman MD [facility]   01/07/21 Office Visit Dora Freedman MD [facility]   12/04/20 Office Visit Dora Freedman MD [facility]   08/28/20 Office Visit Dora Freedman MD [facility]   07/14/20 Office Visit Dora Freedman MD [facility]   Showing recent visits within past 720 days and meeting all other requirements  Future Appointments  No visits were found meeting these conditions.  Showing future appointments within next 150 days and meeting all other requirements      Future Appointments              In 3 weeks Castro Whitaker MD [place] - Rheumatology, [facility]    In 3 weeks Enrique Prado DO [facility] B- Physical Med 1stfl, [place]    In 3 weeks MD Awais Lucero - Orthopedics 5th Fl, [place]    In 1 month ABDIRAHMAN Robert - Endo Diabetes 6th Fl, [place] [place]                Passed - HBA1C within 180 days     Lab Results   Component Value Date    HGBA1C 5.5 11/11/2021    HGBA1C 5.9 (H) 08/09/2021    HGBA1C 7.4 (H)  05/31/2021              Passed - ALT is 131 or below and within 360 days     ALT   Date Value Ref Range Status   11/11/2021 12 10 - 44 U/L Final   08/09/2021 11 10 - 44 U/L Final   05/31/2021 12 10 - 44 U/L Final              Passed - AST is 119 or below and within 360 days     AST   Date Value Ref Range Status   11/11/2021 15 10 - 40 U/L Final   08/09/2021 16 10 - 40 U/L Final   05/31/2021 16 10 - 40 U/L Final                  Appointments  past 12m or future 3m with PCP    Date Provider   Last Visit   11/11/2021 Dora Freedman MD   Next Visit   Visit date not found Dora Freedman MD   ED visits in past 90 days: 0     Note composed:10:49 AM 01/21/2022

## 2022-01-23 RX ORDER — PIOGLITAZONEHYDROCHLORIDE 30 MG/1
30 TABLET ORAL DAILY
Qty: 90 TABLET | Refills: 1 | Status: SHIPPED | OUTPATIENT
Start: 2022-01-23 | End: 2022-07-06

## 2022-01-24 NOTE — TELEPHONE ENCOUNTER
Refill Authorization Note   Lina Davis  is requesting a refill authorization.  Brief Assessment and Rationale for Refill:  Approve     Medication Therapy Plan:       Medication Reconciliation Completed: No   Comments:   --->Care Gap information included below if applicable.   Orders Placed This Encounter    pioglitazone (ACTOS) 30 MG tablet      Requested Prescriptions   Signed Prescriptions Disp Refills    pioglitazone (ACTOS) 30 MG tablet 90 tablet 1     Sig: Take 1 tablet (30 mg total) by mouth once daily.       Endocrinology:  Diabetes - Glitazones - pioglitazone Passed - 1/23/2022 10:32 PM        Passed - Patient is at least 18 years old        Passed - Patient does not have a history of Heart Failure        Passed - Valid encounter within last 15 months     Recent Visits  Date Type Provider Dept   11/11/21 Office Visit Dora Freedman MD [facility]   08/09/21 Office Visit Dora Freedman MD [facility]   01/07/21 Office Visit Dora Freedman MD [facility]   12/04/20 Office Visit Dora Freedman MD [facility]   08/28/20 Office Visit Dora Freedman MD [facility]   07/14/20 Office Visit Dora Freedman MD [facility]   Showing recent visits within past 720 days and meeting all other requirements  Future Appointments  No visits were found meeting these conditions.  Showing future appointments within next 150 days and meeting all other requirements      Future Appointments              In 3 weeks Castro Whitaker MD [place] - Rheumatology, [facility]    In 3 weeks Enrique Prado DO [facility] B- Physical Med 1stfl, [place]    In 3 weeks MD Awais Lucero - Orthopedics 5th Fl, [facility] [place]    In 1 month ABDIRAHMAN Robert - Endo Diabetes 6th Fl, [place] [place]                Passed - HBA1C within 180 days     Lab Results   Component Value Date    HGBA1C 5.5 11/11/2021    HGBA1C 5.9 (H) 08/09/2021    HGBA1C 7.4 (H)  05/31/2021              Passed - ALT is 131 or below and within 360 days     ALT   Date Value Ref Range Status   11/11/2021 12 10 - 44 U/L Final   08/09/2021 11 10 - 44 U/L Final   05/31/2021 12 10 - 44 U/L Final              Passed - AST is 119 or below and within 360 days     AST   Date Value Ref Range Status   11/11/2021 15 10 - 40 U/L Final   08/09/2021 16 10 - 40 U/L Final   05/31/2021 16 10 - 40 U/L Final                  Appointments  past 12m or future 3m with PCP    Date Provider   Last Visit   11/11/2021 Dora Freedman MD   Next Visit   Visit date not found Dora Freedman MD   ED visits in past 90 days: 0     Note composed:10:32 PM 01/23/2022

## 2022-01-26 NOTE — TELEPHONE ENCOUNTER
No new care gaps identified.  Powered by [employer]. Reference number: [id number].   1/26/2022 4:46:45 PM CST

## 2022-01-31 ENCOUNTER — TELEPHONE (OUTPATIENT)
Dept: ENDOCRINOLOGY | Facility: CLINIC | Age: 77
End: 2022-01-31
Payer: MEDICARE

## 2022-02-02 ENCOUNTER — TELEPHONE (OUTPATIENT)
Dept: PHARMACY | Facility: CLINIC | Age: 77
End: 2022-02-02
Payer: MEDICARE

## 2022-02-10 ENCOUNTER — TELEPHONE (OUTPATIENT)
Dept: ORTHOPEDICS | Facility: CLINIC | Age: 77
End: 2022-02-10
Payer: MEDICARE

## 2022-02-10 RX ORDER — AMLODIPINE BESYLATE 10 MG/1
TABLET ORAL
Qty: 90 TABLET | Refills: 3 | Status: SHIPPED | OUTPATIENT
Start: 2022-02-10 | End: 2023-02-12

## 2022-02-10 NOTE — TELEPHONE ENCOUNTER
Refill Authorization Note   Lina Davis  is requesting a refill authorization.  Brief Assessment and Rationale for Refill:  Approve     Medication Therapy Plan:       Medication Reconciliation Completed: No   Comments:   --->Care Gap information included below if applicable.       Requested Prescriptions   Pending Prescriptions Disp Refills    amLODIPine (NORVASC) 10 MG tablet [Pharmacy Med Name: AMLODIPINE BESYLATE 10 MG Tablet] 90 tablet 3     Sig: TAKE 1 TABLET EVERY DAY       Cardiovascular:  Calcium Channel Blockers Passed - 1/26/2022  4:46 PM        Passed - Patient is at least 18 years old        Passed - Last BP in normal range within 360 days     BP Readings from Last 1 Encounters:   12/08/21 135/71               Passed - Valid encounter within last 15 months     Recent Visits  Date Type Provider Dept   11/11/21 Office Visit Dora Freedman MD [facility]   08/09/21 Office Visit Dora Freedman MD [facility]   01/07/21 Office Visit Dora rFeedman MD [facility]   12/04/20 Office Visit Dora Freedman MD [facility]   08/28/20 Office Visit Dora Freedman MD [facility]   07/14/20 Office Visit Dora Freedman MD [facility]   Showing recent visits within past 720 days and meeting all other requirements  Future Appointments  No visits were found meeting these conditions.  Showing future appointments within next 150 days and meeting all other requirements      Future Appointments              In 4 days Castro Whitaker MD [place] - Rheumatology, [facility]    In 4 days Enrique Prado DO [facility] B- Physical Med 1stfl, [place]    In 5 days MD Awais Lucero - Orthopedics 5th Fl, [facility] [place]    In 1 month ABDIRAHMAN Robert - Endo Diabetes 6th Fl, [place]                    Appointments  past 12m or future 3m with PCP    Date Provider   Last Visit   11/11/2021 Dora Freedman MD   Next Visit   2/6/2022  Dora Freedman MD   ED visits in past 90 days: 0     Note composed:7:48 AM 02/10/2022

## 2022-02-14 ENCOUNTER — OFFICE VISIT (OUTPATIENT)
Dept: RHEUMATOLOGY | Facility: CLINIC | Age: 77
End: 2022-02-14
Payer: MEDICARE

## 2022-02-14 ENCOUNTER — PROCEDURE VISIT (OUTPATIENT)
Dept: PHYSICAL MEDICINE AND REHAB | Facility: CLINIC | Age: 77
End: 2022-02-14
Payer: MEDICARE

## 2022-02-14 VITALS
SYSTOLIC BLOOD PRESSURE: 166 MMHG | WEIGHT: 201.5 LBS | RESPIRATION RATE: 18 BRPM | HEART RATE: 99 BPM | BODY MASS INDEX: 31.63 KG/M2 | DIASTOLIC BLOOD PRESSURE: 72 MMHG | HEIGHT: 67 IN | OXYGEN SATURATION: 98 %

## 2022-02-14 DIAGNOSIS — E66.9 CLASS 1 OBESITY WITH BODY MASS INDEX (BMI) OF 31.0 TO 31.9 IN ADULT, UNSPECIFIED OBESITY TYPE, UNSPECIFIED WHETHER SERIOUS COMORBIDITY PRESENT: ICD-10-CM

## 2022-02-14 DIAGNOSIS — G56.02 CARPAL TUNNEL SYNDROME OF LEFT WRIST: ICD-10-CM

## 2022-02-14 DIAGNOSIS — M15.9 PRIMARY OSTEOARTHRITIS INVOLVING MULTIPLE JOINTS: ICD-10-CM

## 2022-02-14 DIAGNOSIS — R76.8 ELEVATED RHEUMATOID FACTOR: Primary | ICD-10-CM

## 2022-02-14 DIAGNOSIS — Z71.89 COUNSELING AND COORDINATION OF CARE: ICD-10-CM

## 2022-02-14 DIAGNOSIS — M79.642 LEFT HAND PAIN: ICD-10-CM

## 2022-02-14 PROCEDURE — 95886 MUSC TEST DONE W/N TEST COMP: CPT | Mod: HCNC,S$GLB,, | Performed by: PHYSICAL MEDICINE & REHABILITATION

## 2022-02-14 PROCEDURE — 99204 OFFICE O/P NEW MOD 45 MIN: CPT | Mod: HCNC,25,S$GLB, | Performed by: INTERNAL MEDICINE

## 2022-02-14 PROCEDURE — 4010F PR ACE/ARB THEARPY RXD/TAKEN: ICD-10-PCS | Mod: HCNC,CPTII,S$GLB, | Performed by: INTERNAL MEDICINE

## 2022-02-14 PROCEDURE — 1125F PR PAIN SEVERITY QUANTIFIED, PAIN PRESENT: ICD-10-PCS | Mod: HCNC,CPTII,S$GLB, | Performed by: INTERNAL MEDICINE

## 2022-02-14 PROCEDURE — 1101F PT FALLS ASSESS-DOCD LE1/YR: CPT | Mod: HCNC,CPTII,S$GLB, | Performed by: INTERNAL MEDICINE

## 2022-02-14 PROCEDURE — 95885 PR MUSC TST DONE W/NERV TST LIM: ICD-10-PCS | Mod: 59,HCNC,S$GLB, | Performed by: PHYSICAL MEDICINE & REHABILITATION

## 2022-02-14 PROCEDURE — 95885 MUSC TST DONE W/NERV TST LIM: CPT | Mod: 59,HCNC,S$GLB, | Performed by: PHYSICAL MEDICINE & REHABILITATION

## 2022-02-14 PROCEDURE — 3078F DIAST BP <80 MM HG: CPT | Mod: HCNC,CPTII,S$GLB, | Performed by: INTERNAL MEDICINE

## 2022-02-14 PROCEDURE — 99204 PR OFFICE/OUTPT VISIT, NEW, LEVL IV, 45-59 MIN: ICD-10-PCS | Mod: HCNC,25,S$GLB, | Performed by: INTERNAL MEDICINE

## 2022-02-14 PROCEDURE — 20526 CARPAL TUNNEL: ICD-10-PCS | Mod: HCNC,LT,S$GLB, | Performed by: INTERNAL MEDICINE

## 2022-02-14 PROCEDURE — 3072F PR LOW RISK FOR RETINOPATHY: ICD-10-PCS | Mod: HCNC,CPTII,S$GLB, | Performed by: INTERNAL MEDICINE

## 2022-02-14 PROCEDURE — 1101F PR PT FALLS ASSESS DOC 0-1 FALLS W/OUT INJ PAST YR: ICD-10-PCS | Mod: HCNC,CPTII,S$GLB, | Performed by: INTERNAL MEDICINE

## 2022-02-14 PROCEDURE — 3077F SYST BP >= 140 MM HG: CPT | Mod: HCNC,CPTII,S$GLB, | Performed by: INTERNAL MEDICINE

## 2022-02-14 PROCEDURE — 1159F MED LIST DOCD IN RCRD: CPT | Mod: HCNC,CPTII,S$GLB, | Performed by: INTERNAL MEDICINE

## 2022-02-14 PROCEDURE — 1159F PR MEDICATION LIST DOCUMENTED IN MEDICAL RECORD: ICD-10-PCS | Mod: HCNC,CPTII,S$GLB, | Performed by: INTERNAL MEDICINE

## 2022-02-14 PROCEDURE — 1125F AMNT PAIN NOTED PAIN PRSNT: CPT | Mod: HCNC,CPTII,S$GLB, | Performed by: INTERNAL MEDICINE

## 2022-02-14 PROCEDURE — 3077F PR MOST RECENT SYSTOLIC BLOOD PRESSURE >= 140 MM HG: ICD-10-PCS | Mod: HCNC,CPTII,S$GLB, | Performed by: INTERNAL MEDICINE

## 2022-02-14 PROCEDURE — 3008F PR BODY MASS INDEX (BMI) DOCUMENTED: ICD-10-PCS | Mod: HCNC,CPTII,S$GLB, | Performed by: INTERNAL MEDICINE

## 2022-02-14 PROCEDURE — 3078F PR MOST RECENT DIASTOLIC BLOOD PRESSURE < 80 MM HG: ICD-10-PCS | Mod: HCNC,CPTII,S$GLB, | Performed by: INTERNAL MEDICINE

## 2022-02-14 PROCEDURE — 95911 PR NERVE CONDUCTION STUDY; 9-10 STUDIES: ICD-10-PCS | Mod: HCNC,S$GLB,, | Performed by: PHYSICAL MEDICINE & REHABILITATION

## 2022-02-14 PROCEDURE — 4010F ACE/ARB THERAPY RXD/TAKEN: CPT | Mod: HCNC,CPTII,S$GLB, | Performed by: INTERNAL MEDICINE

## 2022-02-14 PROCEDURE — 95911 NRV CNDJ TEST 9-10 STUDIES: CPT | Mod: HCNC,S$GLB,, | Performed by: PHYSICAL MEDICINE & REHABILITATION

## 2022-02-14 PROCEDURE — 3072F LOW RISK FOR RETINOPATHY: CPT | Mod: HCNC,CPTII,S$GLB, | Performed by: INTERNAL MEDICINE

## 2022-02-14 PROCEDURE — 95886 PR EMG COMPLETE, W/ NERVE CONDUCTION STUDIES, 5+ MUSCLES: ICD-10-PCS | Mod: HCNC,S$GLB,, | Performed by: PHYSICAL MEDICINE & REHABILITATION

## 2022-02-14 PROCEDURE — 20526 THER INJECTION CARP TUNNEL: CPT | Mod: HCNC,LT,S$GLB, | Performed by: INTERNAL MEDICINE

## 2022-02-14 PROCEDURE — 3288F PR FALLS RISK ASSESSMENT DOCUMENTED: ICD-10-PCS | Mod: HCNC,CPTII,S$GLB, | Performed by: INTERNAL MEDICINE

## 2022-02-14 PROCEDURE — 3008F BODY MASS INDEX DOCD: CPT | Mod: HCNC,CPTII,S$GLB, | Performed by: INTERNAL MEDICINE

## 2022-02-14 PROCEDURE — 3288F FALL RISK ASSESSMENT DOCD: CPT | Mod: HCNC,CPTII,S$GLB, | Performed by: INTERNAL MEDICINE

## 2022-02-14 PROCEDURE — 99999 PR PBB SHADOW E&M-EST. PATIENT-LVL IV: CPT | Mod: PBBFAC,HCNC,, | Performed by: INTERNAL MEDICINE

## 2022-02-14 PROCEDURE — 99999 PR PBB SHADOW E&M-EST. PATIENT-LVL IV: ICD-10-PCS | Mod: PBBFAC,HCNC,, | Performed by: INTERNAL MEDICINE

## 2022-02-14 RX ORDER — LIDOCAINE HYDROCHLORIDE 10 MG/ML
1 INJECTION INFILTRATION; PERINEURAL
Status: DISCONTINUED | OUTPATIENT
Start: 2022-02-14 | End: 2022-02-14 | Stop reason: HOSPADM

## 2022-02-14 RX ORDER — TRIAMCINOLONE ACETONIDE 40 MG/ML
40 INJECTION, SUSPENSION INTRA-ARTICULAR; INTRAMUSCULAR
Status: DISCONTINUED | OUTPATIENT
Start: 2022-02-14 | End: 2022-02-14 | Stop reason: HOSPADM

## 2022-02-14 RX ADMIN — TRIAMCINOLONE ACETONIDE 40 MG: 40 INJECTION, SUSPENSION INTRA-ARTICULAR; INTRAMUSCULAR at 10:02

## 2022-02-14 RX ADMIN — LIDOCAINE HYDROCHLORIDE 1 ML: 10 INJECTION INFILTRATION; PERINEURAL at 10:02

## 2022-02-14 NOTE — PROGRESS NOTES
RHEUMATOLOGY OUTPATIENT CLINIC NOTE    2/14/2022    Attending Rheumatologist: Castro Whitaker  Primary Care Provider: Dora Freedman MD   Physician Requesting Consultation: Dora Freedman MD  [id number] [place] [address]  Chief Complaint/Reason For Consultation:  No chief complaint on file.      Subjective:       HPI  Lina Davis is a 76 y.o. Black or  female with medical history noted below who presents for evaluation of elevated RF.     RF checked in the setting of arthralgias. Patient notes bilateral leg/hip pain that radiates to her feet. She will switch sides to lay on the side that's not hurting but eventually it follows. Notes CSI by PCP did not provide relief Patient reports occasional back pain, denies any trauma. Back pain is typically worsened with activity and leg pain is typically when she is sleeping. Tylenol is not providing any relief. No paraesthesias. Patient also endorses left MCP pain. +swelling. Minimal morning stiffness, +Paraesthesias. No FHx of RA. Has PMR appointment today.     Review of Systems   Constitutional: Negative for chills, fatigue, fever and unexpected weight change.   HENT: Negative for mouth sores.    Eyes: Negative for redness and eye dryness.   Respiratory: Negative for cough and shortness of breath.    Cardiovascular: Negative for chest pain.   Gastrointestinal: Negative for abdominal distention, constipation, diarrhea, nausea and vomiting.   Genitourinary: Negative for vaginal dryness.   Musculoskeletal: Positive for arthralgias, back pain and leg pain. Negative for gait problem, joint swelling, myalgias, neck pain, neck stiffness and joint deformity.   Integumentary:  Negative for rash.   Neurological: Positive for numbness. Negative for weakness and headaches.   Hematological: Negative for adenopathy. Does not bruise/bleed easily.   Psychiatric/Behavioral: Negative for confusion, decreased concentration and sleep  disturbance. The patient is not nervous/anxious.    All other systems reviewed and are negative.       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    Vitamin D deficiency disease      Past Surgical History:   Procedure Laterality Date    ANTERIOR CERVICAL DISCECTOMY W/ FUSION N/A 3/19/2020    Procedure: DISCECTOMY, SPINE, CERVICAL, ANTERIOR APPROACH, WITH FUSION, C3-C4, C4-C5, C5-C6;  Surgeon: Joseph Walton MD;  [facility];  Service: Neurosurgery;  Laterality: N/A;    CARPAL TUNNEL RELEASE      bilateral    COLONOSCOPY N/A 3/7/2018    Procedure: COLONOSCOPY;  Surgeon: Luís Soin MD;  [facility]);  Service: Endoscopy;  Laterality: N/A;  ok to schedule per Elizabeth    COLONOSCOPY W/ BIOPSIES AND POLYPECTOMY      HEMORRHOID SURGERY      HYSTERECTOMY      AUB    ROTATOR CUFF REPAIR      bilateral     Family History   Problem Relation Age of Onset    Heart disease Mother     Diabetes Mother     Heart disease Father     Cancer Father         liver    Diabetes Sister     Cataracts Sister     Kidney disease Brother     Heart disease Brother     Diabetes Sister     COPD Brother     COPD Brother     Gout Brother     Benign prostatic hyperplasia Brother     Heart disease Brother     Kidney disease Brother     Heart attack Brother         heart attack    Kidney failure Brother     Lupus Sister     Heart attack Brother     Drug abuse Brother     No Known Problems Daughter     No Known Problems Son     Amblyopia Neg Hx     Blindness Neg Hx     Breast cancer Neg Hx     Colon cancer Neg Hx     Ovarian cancer Neg Hx      Social History     Substance and Sexual Activity   Alcohol Use No     Social History     Tobacco Use  "  Smoking Status Never Smoker   Smokeless Tobacco Never Used     Social History     Substance and Sexual Activity   Drug Use No       Current Outpatient Medications:     alcohol swabs (BD ALCOHOL SWABS) PadM, USE TWICE DAILY AS DIRECTED, E 11.9, Disp: 200 each, Rfl: 3    allopurinoL (ZYLOPRIM) 300 MG tablet, TAKE 1 TABLET EVERY DAY, Disp: 90 tablet, Rfl: 3    amLODIPine (NORVASC) 10 MG tablet, TAKE 1 TABLET EVERY DAY, Disp: 90 tablet, Rfl: 3    atorvastatin (LIPITOR) 40 MG tablet, Take 1 tablet (40 mg total) by mouth once daily., Disp: 90 tablet, Rfl: 3    BD VENECIA 2ND GEN PEN NEEDLE 32 gauge x 5/32" Ndle, USE ONCE DAILY WITH INSULIN, Disp: 100 each, Rfl: 3    blood glucose control, normal Soln, True Metrix control solution E11.9 - pt tests twice daily, Disp: 1 each, Rfl: 1    blood-glucose meter kit, Check glucose daily E11.9 meter covered by insurance Don Metrix, Disp: 1 each, Rfl: 0    empagliflozin (JARDIANCE) 10 mg tablet, Take 1 tablet (10 mg total) by mouth once daily., Disp: 90 tablet, Rfl: 4    insulin aspart U-100 (NOVOLOG FLEXPEN U-100 INSULIN) 100 unit/mL (3 mL) InPn pen, Inject 5 Units into the skin 3 (three) times daily with meals. Plus sliding scale - Max TDD 45 units, Disp: 30 mL, Rfl: 3    insulin detemir U-100 (LEVEMIR FLEXTOUCH) 100 unit/mL (3 mL) SubQ InPn pen, Inject 15 Units into the skin once daily., Disp: 2 each, Rfl: 4    lancets (TRUEPLUS LANCETS) 28 gauge Misc, 1 lancet by Misc.(Non-Drug; Combo Route) route 2 (two) times daily., Disp: 200 each, Rfl: 2    losartan (COZAAR) 100 MG tablet, Take 1 tablet (100 mg total) by mouth once daily., Disp: 90 tablet, Rfl: 3    meloxicam (MOBIC) 7.5 MG tablet, TAKE 1 TABLET(7.5 MG) BY MOUTH EVERY DAY, Disp: 15 tablet, Rfl: 0    metFORMIN (GLUCOPHAGE-XR) 500 MG ER 24hr tablet, Take 1 tablet (500 mg total) by mouth 2 (two) times daily with meals., Disp: 180 tablet, Rfl: 3    omeprazole (PRILOSEC) 20 MG capsule, One capsule twice daily, Disp: " "180 capsule, Rfl: 3    oxyCODONE-acetaminophen (PERCOCET) 5-325 mg per tablet, Take 1 tablet by mouth every 24 hours as needed for Pain., Disp: 30 tablet, Rfl: 0    pen needle, diabetic 33 gauge x 5/32" Ndle, Use daily with insulin, Disp: 100 each, Rfl: 3    pioglitazone (ACTOS) 30 MG tablet, Take 1 tablet (30 mg total) by mouth once daily., Disp: 90 tablet, Rfl: 1    triamcinolone acetonide 0.1% (KENALOG) 0.1 % cream, Apply topically 2 (two) times daily., Disp: 80 g, Rfl: 0    triamcinolone acetonide 0.1% (KENALOG) 0.1 % cream, AAA lower legs bid prn itchy or red, Disp: 454 g, Rfl: 3    TRUE METRIX GLUCOSE TEST STRIP Strp, TEST BLOOD SUGAR TWICE DAILY, Disp: 200 strip, Rfl: 2     Objective:         Vitals:    02/14/22 1032   BP: (!) 166/72   Pulse: 99   Resp: 18     Physical Exam   Musculoskeletal:      Comments: Can make fist, no synovitis   Has some TTP of left 3rd MCP   Left 3rd finger A1 Pulley nodule   Trochanter bursa TTP bilaterally        Reviewed old and all outside pertinent medical records available.    All lab results personally reviewed and interpreted by me.  Lab Results   Component Value Date    WBC 9.88 11/11/2021    HGB 10.3 (L) 11/11/2021    HCT 32.5 (L) 11/11/2021    MCV 89 11/11/2021    MCH 28.2 11/11/2021    MCHC 31.7 (L) 11/11/2021    RDW 15.6 (H) 11/11/2021     11/11/2021    MPV 10.4 11/11/2021    PLTEST Clumped (A) 08/09/2021       Lab Results   Component Value Date     11/11/2021    K 4.3 11/11/2021     11/11/2021    CO2 18 (L) 11/11/2021    GLU 64 (L) 11/11/2021    BUN 26 (H) 11/11/2021    CALCIUM 10.6 (H) 11/11/2021    PROT 8.7 (H) 11/11/2021    ALBUMIN 4.0 11/11/2021    BILITOT 0.4 11/11/2021    AST 15 11/11/2021    ALKPHOS 100 11/11/2021    ALT 12 11/11/2021       Lab Results   Component Value Date    COLORU Yellow 08/09/2021    APPEARANCEUA Clear 08/09/2021    SPECGRAV 1.015 08/09/2021    PHUR 5.0 08/09/2021    PROTEINUA 1+ (A) 08/09/2021    KETONESU Negative " 08/09/2021    LEUKOCYTESUR Trace (A) 08/09/2021    NITRITE Negative 08/09/2021    UROBILINOGEN normal 07/14/2020       Lab Results   Component Value Date    CRP 17.7 (H) 11/11/2021       Lab Results   Component Value Date    SEDRATE 90 (H) 11/11/2021       Lab Results   Component Value Date    RF 60.0 (H) 11/11/2021    SEDRATE 90 (H) 11/11/2021       No components found for: 25OHVITDTOT, 69RMIGYQ8, 22AHBQHF4, METHODNOTE    Lab Results   Component Value Date    URICACID 3.9 08/09/2021       No components found for: TSPOTTB        Imaging:  All imaging reviewed and independently interpreted by me.         ASSESSMENT / PLAN:     Lina Davis is a 76 y.o. Black or  female with:      1. Elevated rheumatoid factor  - RF 60 (normal <15)  - negative CCP, chronically elevated ESR, CRP   - discussed results  - I have low suspicion for RA   - her symptoms are more compatible with trigger finger and CTS  - monitor     2. Carpal tunnel syndrome of left wrist  - CSI today, see procedure note  - discussed diagnosis and management  - Ice area  - reassurance   - Carpal Tunnel    3. Primary osteoarthritis involving multiple joints  - patients pain can be coming from her back, her trochanter bursa are slightly tender and she had a CSI with minimal relief   - will get Xrays  - has PMR appointment today, can discuss further  - wt loss  - reassurance and exercise   - X-Ray Lumbar Spine Ap And Lateral; Future  - X-Ray Hips Bilateral 2 View Inc AP Pelvis; Future    4. Other specified counseling  - over 10 minutes spent regarding below topics:  - Immunization counseling done.  - Weight loss counseling done.  - Nutrition and exercise counseling.  - Limitation of alcohol consumption.  - Regular exercise:  Aerobic and resistance.  - Medication counseling provided.    5. Obesity  - would benefit from decreasing at least 10% of body weight.  - recommended goal of losing 1 lb per week.  - consider nutritionist evaluation.  -  would consider screening for HANY per PMD.    Follow up in about 3 months (around 5/14/2022).    Method of contact with patient concerns: [facility]    Disclaimer:  This note is prepared using voice recognition software and as such is likely to have errors and has not been proof read. Please contact me for questions.     Time spent: 45 minutes in face to face discussion concerning diagnosis, prognosis, review of lab and test results, benefits of treatment as well as management of disease, counseling of patient and coordination of care between various health care providers.  Greater than half the time spent was used for coordination of care and counseling of patient.    Castro Whitaker M.D.  Rheumatology Department   [facility]

## 2022-02-14 NOTE — PROCEDURES
Test Date:  2022    Patient: Lina Davis : 1945 Physician: Enrique Prado D.O.   ID#:  SEX: Female Ref. Phys: Inez Peña MD     HPI: Lina Davis is a 76 y.o.female who presents for NCS/EMG to evaluate CTS bilaterally.  Hx of CT release 40+ years ago.      NCV & EMG Findings:   Evaluation of the left median motor and the left Ulnar (ADM) motor nerves showed prolonged distal onset latency.   The right median motor nerve showed prolonged distal onset latency and reduced amplitude.   The left median sensory and the right median sensory nerves showed prolonged distal peak latency and decreased conduction velocity.   The left ulnar sensory nerve showed decreased conduction velocity.   All remaining nerves (as indicated in the following tables) were within normal limits.   Needle evaluation of the left First Dorsal Interosseous muscle showed increased motor unit amplitude and diminished recruitment.   All remaining muscles (as indicated in the following table) showed no evidence of electrical instability.    Impression:  1. 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 Carpal tunnel syndrome).  There is motor axonal loss on the right.  There is no active denervation.  This is graded as Moderate-Severe in severity on the right, and Moderate on the left.      2. 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      ___________________________  Enrique Prado D.O.        NCS+  Motor Nerve Results      Latency Amplitude F-Lat Segment Distance CV Comment   Site (ms) Norm (mV) Norm (ms)  (cm) (m/s) Norm    Left Median (APB)   Wrist *5.0  < 4.4 4.2  > 3.8  Wrist-Palm - - -    Elbow 9.4 - 4.7 -  Elbow-Wrist - -  > 51    Right Median (APB)   Palm 1.88 - 3.0 -         Wrist *4.5  < 4.4 *2.4  > 3.8  Wrist-Palm 7 27 -    Elbow 8.9 - 1.69 -  Elbow-Wrist 23 52  > 51    Left Ulnar (ADM)   Wrist *4.1  <  3.7 3.2  > 3.0         Bel Elbow 8.3 - 2.4 -  Bel Elbow-Wrist 22 52  > 52    Abv Elbow 10.3 - 2.3 -  Abv Elbow-Bel Elbow 12 60  > 43    Right Ulnar (ADM)   Wrist 3.7  < 3.7 6.2  > 3.0         Bel Elbow 7.7 - 6.0 -  Bel Elbow-Wrist 22 55  > 52    Abv Elbow 8.8 - 6.0 -  Abv Elbow-Bel Elbow 9 82  > 43      Sensory Nerve Results      Latency (Peak) Amplitude (P-P) Segment Distance CV Comment   Site (ms) Norm (µV) Norm  (cm) (m/s) Norm    Left Median   Wrist-Dig II *4.5  < 4.0 12  > 8 Wrist-Dig II 15 *33  > 39    Right Median   Wrist-Dig II *4.6  < 4.0 10  > 8 Wrist-Dig II 15 *33  > 39    Left Ulnar   Wrist-Dig V 3.6  < 4.0 40  > 4 Wrist-Dig V 13 *36  > 38    Right Ulnar   Wrist-Dig V 3.6  < 4.0 14  > 4 Wrist-Dig V 14 39  > 38    Left Radial   Forearm-Wrist 1.88  < 2.8 44  > 11 Forearm-Wrist 10 53 -      EMG+     Side Muscle Nerve Root Ins Act Fibs Psw Amp Dur Poly Recrt Int Pat Comment   Left Deltoid Axillary C5-C6 Nml Nml Nml Nml Nml 0 Nml Nml    Left FCR Median C6-C7 Nml Nml Nml Nml Nml 0 Nml Nml    Left Triceps Radial C6-C8 Nml Nml Nml Nml Nml 0 Nml Nml    Left FDI Ulnar C8-T1 Nml Nml Nml *Incr Nml 0 *Reduced Nml    Left APB Median C8-T1 Nml Nml Nml Nml Nml 0 Nml Nml    Right APB Median C8-T1 Nml Nml Nml Nml Nml 0 Nml Nml            Waveforms:    Motor              Sensory

## 2022-02-14 NOTE — PROCEDURES
Carpal Tunnel    Date/Time: 2/14/2022 10:30 AM  Performed by: Castro Whitaker MD  Authorized by: Castro Whitaker MD     Consent Done?:  Yes (Verbal)  Indications:  Pain  Site marked: the procedure site was marked    Timeout: prior to procedure the correct patient, procedure, and site was verified    Prep: patient was prepped and draped in usual sterile fashion      Local anesthesia used?: No    Location:  Wrist  Site:  L carpal tunnel  Ultrasonic Guidance for Needle Placement?: No    Needle size:  25 G  Medications:  1 mL LIDOcaine HCL 10 mg/ml (1%) 10 mg/mL (1 %); 40 mg triamcinolone acetonide 40 mg/mL  Patient tolerance:  Patient tolerated the procedure well with no immediate complications

## 2022-03-15 NOTE — PROGRESS NOTES
Subjective:      Patient ID: Lina Davis is a 76 y.o. female.    Chief Complaint:  No chief complaint on file.    History of Present Illness  Lina Davis is here for follow up of T2DM.  Previously seen by Dr. Bianchi.  Last seen 11/15/2021.  This is their first visit with me.      With regards to diabetes:    Diagnosed: ~2002/2003  DE: 7/2021  FH of DM: sister, brother   Denies any of her children have DM     Known complications:  DKA Denies  RN Denies  Eye Exam: 2/2021  PN : Yes   Podiatry: 12/2020  Nephropathy : Yes   CAD : Denies  Episode of pancreatitis in 2018 - attributed to Januvia    Current regimen:  Metformin 500mg twice daily  Actos 30mg daily  Jardiance (started LOV 1/2022) 10mg daily   Levemir 10 units daily  Novolog 4 units with meals    Reports compliance.  Occassionally taking Novolog without food.     Other medications tried:  Januvia- Discontinued in 2018 due to pancreatitis    Glucose Monitor:   3-4 times a day testing  Log reviewed: log provided and reviewed, scanned in media tab     Hypoglycemia:  Denies  Knows how to correct with 15 grams of carbs- juice, coke, or a peppermint.     Diet/Exercise:  Eats 2-3 meals a day. Trying to cut back on carbohydrates - portion rice and pasta   Snacks : occasionally - cinnamon roll, apple   Drinks : water, 1 or 2 8oz soft drinks a week, lemonade but will cut it with water.   Exercise - tries to stay active.      Diabetes Management Status    Hemoglobin A1C   Date Value Ref Range Status   11/11/2021 5.5 4.0 - 5.6 % Final     Comment:     ADA Screening Guidelines:  5.7-6.4%  Consistent with prediabetes  >or=6.5%  Consistent with diabetes    High levels of fetal hemoglobin interfere with the HbA1C  assay. Heterozygous hemoglobin variants (HbS, HgC, etc)do  not significantly interfere with this assay.   However, presence of multiple variants may affect accuracy.     08/09/2021 5.9 (H) 4.0 - 5.6 % Final     Comment:     ADA Screening Guidelines:  5.7-6.4%   Consistent with prediabetes  >or=6.5%  Consistent with diabetes    High levels of fetal hemoglobin interfere with the HbA1C  assay. Heterozygous hemoglobin variants (HbS, HgC, etc)do  not significantly interfere with this assay.   However, presence of multiple variants may affect accuracy.     05/31/2021 7.4 (H) 4.0 - 5.6 % Final     Comment:     ADA Screening Guidelines:  5.7-6.4%  Consistent with prediabetes  >or=6.5%  Consistent with diabetes    High levels of fetal hemoglobin interfere with the HbA1C  assay. Heterozygous hemoglobin variants (HbS, HgC, etc)do  not significantly interfere with this assay.   However, presence of multiple variants may affect accuracy.         Statin: Taking  ACE/ARB: Taking  Screening or Prevention Patient's value Goal Complete/Controlled?   HgA1C Testing and Control   Lab Results   Component Value Date    HGBA1C 5.5 11/11/2021      Annually/Less than 8% Yes   Lipid profile : 05/31/2021 Annually Yes   LDL control Lab Results   Component Value Date    LDLCALC 74.4 05/31/2021    Annually/Less than 100 mg/dl  Yes   Nephropathy screening Lab Results   Component Value Date    LABMICR 164.0 08/09/2021     Lab Results   Component Value Date    PROTEINUA 1+ (A) 08/09/2021    Annually Yes   Blood pressure BP Readings from Last 1 Encounters:   03/17/22 138/82    Less than 140/90 No   Dilated retinal exam : 02/22/2021 Annually Yes   Foot exam   : 12/11/2020 Annually No       Review of Systems   Constitutional: Negative for fatigue.   Eyes: Negative for visual disturbance.   Respiratory: Negative for shortness of breath.    Cardiovascular: Negative for chest pain.   Gastrointestinal: Negative for abdominal pain.   Musculoskeletal: Negative for arthralgias.   Skin: Negative for wound.   Neurological: Negative for headaches.       Objective:   Physical Exam  Vitals reviewed.   Neck:      Thyroid: No thyromegaly.   Cardiovascular:      Rate and Rhythm: Normal rate.      Comments: No edema  present  Pulmonary:      Effort: Pulmonary effort is normal.   Abdominal:      Palpations: Abdomen is soft.       Injection sites are without edema or erythema. No lipo hypertropthy or atrophy.    Visit Vitals  /82   Pulse 99   Wt 89 kg (196 lb 3.4 oz)   SpO2 99%   BMI 30.73 kg/m²       Body mass index is 30.73 kg/m².    Lab Review:   Lab Results   Component Value Date    HGBA1C 5.5 11/11/2021    HGBA1C 5.9 (H) 08/09/2021    HGBA1C 7.4 (H) 05/31/2021       Lab Results   Component Value Date    CHOL 142 05/31/2021    HDL 50 05/31/2021    LDLCALC 74.4 05/31/2021    TRIG 88 05/31/2021    CHOLHDL 35.2 05/31/2021     Lab Results   Component Value Date     11/11/2021    K 4.3 11/11/2021     11/11/2021    CO2 18 (L) 11/11/2021    GLU 64 (L) 11/11/2021    BUN 26 (H) 11/11/2021    CREATININE 1.2 11/11/2021    CALCIUM 10.6 (H) 11/11/2021    PROT 8.7 (H) 11/11/2021    ALBUMIN 4.0 11/11/2021    BILITOT 0.4 11/11/2021    ALKPHOS 100 11/11/2021    AST 15 11/11/2021    ALT 12 11/11/2021    ANIONGAP 14 11/11/2021    ESTGFRAFRICA 51.4 (A) 11/11/2021    EGFRNONAA 44.6 (A) 11/11/2021    TSH 2.406 05/31/2021     Vit D, 25-Hydroxy   Date Value Ref Range Status   05/31/2021 29 (L) 30 - 96 ng/mL Final     Comment:     Vitamin D deficiency.........<10 ng/mL                              Vitamin D insufficiency......10-29 ng/mL       Vitamin D sufficiency........> or equal to 30 ng/mL  Vitamin D toxicity............>100 ng/mL       Assessment and Plan     1. Type 2 diabetes mellitus with diabetic nephropathy, with long-term current use of insulin  insulin detemir U-100 (LEVEMIR FLEXTOUCH) 100 unit/mL (3 mL) SubQ InPn pen    insulin aspart U-100 (NOVOLOG FLEXPEN U-100 INSULIN) 100 unit/mL (3 mL) InPn pen    Hemoglobin A1C    Fructosamine    Comprehensive Metabolic Panel   2. Stage 3a chronic kidney disease     3. HTN (hypertension), benign     4. Hyperlipidemia, mixed         Type 2 diabetes mellitus with diabetic  nephropathy  -- Labs today - check fructosamine as A1c may be falsely low secondary to anemia.  -- A1c goal <7.5%.  -- Medications discussed:  MFM   GLP1-DPP4 - avoid given drug induced pancreatitis (Januvia)  SUNG   SGLT2   Reviewed potential adverse effects of SGLT-2 inhibitors, including genital mycotic infections, slightly increased risk of UTI, hypersensitivity, hypotension, and hyperkalemia. Advised to maintain water intake of 8-10 cups per day. Advised we need to check chemistry panel at baseline and 2 weeks after starting. Discussed FDA warning reports of ketoacidosis associated with SGLT-2 inhibitors. Advised to seek immediate medical attention and stop the medication if symptoms such as difficulty breathing, nausea, vomiting, abdominal pain, confusion, and unusual fatigue/sleepiness. Discussed possible precipitating factors including major illness/reduced food and fluid intake (advised to stop under these circumstances), and reduced insulin dose. Discussed possible effects of increased fracture risk/decreased bone density. Discussed reports of increased risk of leg and foot amputations with canagliflozin and need to seek urgent care if developed new pain or tenderness, sores or ulcers, or infections in legs/feet.   Insulin   -- Reviewed logs/CGM:  Glucose variable.   Fasting hyperglycemia.  Instructed to send glucose logs in 14 days.  Reach out to me sooner for any glucose <70 or consistently >180.  -- Medication Changes:   CHANGE  Metformin 500mg twice daily  Actos 30mg daily  Jardiance (started LOV 1/2022) 10mg daily   Levemir 12 units daily  Novolog 4 units with meals - discussed ONLY taking with food    Received SGLT2i through [facility]. Consider increasing in the future but noting recent yeast or UTI.     -- Reviewed goals of therapy are to get the best control we can without hypoglycemia.  -- Reviewed patient's current insulin regimen. Clarified proper insulin dose and timing in relation to meals, etc.  Insulin injection sites and proper rotation instructed.    -- Advised frequent self blood glucose monitoring.  Patient encouraged to document glucose results and bring them to every clinic visit.  -- Hypoglycemia precautions discussed. Instructed on precautions before driving.    -- Call for Bg repeatedly < 90 or > 180.   -- Close adherence to lifestyle changes recommended.   -- Periodic follow ups for eye evaluations, foot care and dental care suggested.    CKD (chronic kidney disease) stage 3, GFR 30-59 ml/min  -- Optimize glucose control.   -- On SGLT-2i to help preserve kidney function.    HTN (hypertension), benign  -- On ARB.  -- Controlled.  -- Blood pressure goals discussed with patient.    Hyperlipidemia, mixed  -- Controlled.  -- On statin per ADA recommendations.      Follow up in about 4 months (around 7/17/2022).

## 2022-03-16 ENCOUNTER — PATIENT OUTREACH (OUTPATIENT)
Dept: ADMINISTRATIVE | Facility: OTHER | Age: 77
End: 2022-03-16
Payer: MEDICARE

## 2022-03-16 ENCOUNTER — TELEPHONE (OUTPATIENT)
Dept: ENDOCRINOLOGY | Facility: CLINIC | Age: 77
End: 2022-03-16
Payer: MEDICARE

## 2022-03-17 ENCOUNTER — LAB VISIT (OUTPATIENT)
Dept: LAB | Facility: HOSPITAL | Age: 77
End: 2022-03-17
Payer: MEDICARE

## 2022-03-17 ENCOUNTER — OFFICE VISIT (OUTPATIENT)
Dept: ENDOCRINOLOGY | Facility: CLINIC | Age: 77
End: 2022-03-17
Payer: MEDICARE

## 2022-03-17 VITALS
HEART RATE: 99 BPM | WEIGHT: 196.19 LBS | DIASTOLIC BLOOD PRESSURE: 82 MMHG | OXYGEN SATURATION: 99 % | SYSTOLIC BLOOD PRESSURE: 138 MMHG | BODY MASS INDEX: 30.73 KG/M2

## 2022-03-17 DIAGNOSIS — Z79.4 TYPE 2 DIABETES MELLITUS WITH DIABETIC NEPHROPATHY, WITH LONG-TERM CURRENT USE OF INSULIN: Chronic | ICD-10-CM

## 2022-03-17 DIAGNOSIS — E11.21 TYPE 2 DIABETES MELLITUS WITH DIABETIC NEPHROPATHY, WITH LONG-TERM CURRENT USE OF INSULIN: Chronic | ICD-10-CM

## 2022-03-17 DIAGNOSIS — E78.2 HYPERLIPIDEMIA, MIXED: ICD-10-CM

## 2022-03-17 DIAGNOSIS — Z79.4 TYPE 2 DIABETES MELLITUS WITH DIABETIC NEPHROPATHY, WITH LONG-TERM CURRENT USE OF INSULIN: Primary | Chronic | ICD-10-CM

## 2022-03-17 DIAGNOSIS — E11.21 TYPE 2 DIABETES MELLITUS WITH DIABETIC NEPHROPATHY, WITH LONG-TERM CURRENT USE OF INSULIN: Primary | Chronic | ICD-10-CM

## 2022-03-17 DIAGNOSIS — N18.31 STAGE 3A CHRONIC KIDNEY DISEASE: ICD-10-CM

## 2022-03-17 DIAGNOSIS — I10 HTN (HYPERTENSION), BENIGN: ICD-10-CM

## 2022-03-17 LAB
ALBUMIN SERPL BCP-MCNC: 4 G/DL (ref 3.5–5.2)
ALP SERPL-CCNC: 84 U/L (ref 55–135)
ALT SERPL W/O P-5'-P-CCNC: 13 U/L (ref 10–44)
ANION GAP SERPL CALC-SCNC: 11 MMOL/L (ref 8–16)
AST SERPL-CCNC: 15 U/L (ref 10–40)
BILIRUB SERPL-MCNC: 0.3 MG/DL (ref 0.1–1)
BUN SERPL-MCNC: 44 MG/DL (ref 8–23)
CALCIUM SERPL-MCNC: 10.9 MG/DL (ref 8.7–10.5)
CHLORIDE SERPL-SCNC: 106 MMOL/L (ref 95–110)
CO2 SERPL-SCNC: 21 MMOL/L (ref 23–29)
CREAT SERPL-MCNC: 1.5 MG/DL (ref 0.5–1.4)
EST. GFR  (AFRICAN AMERICAN): 39.3 ML/MIN/1.73 M^2
EST. GFR  (NON AFRICAN AMERICAN): 34.1 ML/MIN/1.73 M^2
ESTIMATED AVG GLUCOSE: 137 MG/DL (ref 68–131)
GLUCOSE SERPL-MCNC: 200 MG/DL (ref 70–110)
HBA1C MFR BLD: 6.4 % (ref 4–5.6)
POTASSIUM SERPL-SCNC: 4.8 MMOL/L (ref 3.5–5.1)
PROT SERPL-MCNC: 8.4 G/DL (ref 6–8.4)
SODIUM SERPL-SCNC: 138 MMOL/L (ref 136–145)

## 2022-03-17 PROCEDURE — 83036 HEMOGLOBIN GLYCOSYLATED A1C: CPT | Performed by: NURSE PRACTITIONER

## 2022-03-17 PROCEDURE — 99214 OFFICE O/P EST MOD 30 MIN: CPT | Mod: S$GLB,,, | Performed by: NURSE PRACTITIONER

## 2022-03-17 PROCEDURE — 3288F FALL RISK ASSESSMENT DOCD: CPT | Mod: CPTII,S$GLB,, | Performed by: NURSE PRACTITIONER

## 2022-03-17 PROCEDURE — 4010F ACE/ARB THERAPY RXD/TAKEN: CPT | Mod: CPTII,S$GLB,, | Performed by: NURSE PRACTITIONER

## 2022-03-17 PROCEDURE — 3288F PR FALLS RISK ASSESSMENT DOCUMENTED: ICD-10-PCS | Mod: CPTII,S$GLB,, | Performed by: NURSE PRACTITIONER

## 2022-03-17 PROCEDURE — 1159F PR MEDICATION LIST DOCUMENTED IN MEDICAL RECORD: ICD-10-PCS | Mod: CPTII,S$GLB,, | Performed by: NURSE PRACTITIONER

## 2022-03-17 PROCEDURE — 3075F PR MOST RECENT SYSTOLIC BLOOD PRESS GE 130-139MM HG: ICD-10-PCS | Mod: CPTII,S$GLB,, | Performed by: NURSE PRACTITIONER

## 2022-03-17 PROCEDURE — 3079F DIAST BP 80-89 MM HG: CPT | Mod: CPTII,S$GLB,, | Performed by: NURSE PRACTITIONER

## 2022-03-17 PROCEDURE — 3072F PR LOW RISK FOR RETINOPATHY: ICD-10-PCS | Mod: CPTII,S$GLB,, | Performed by: NURSE PRACTITIONER

## 2022-03-17 PROCEDURE — 36415 COLL VENOUS BLD VENIPUNCTURE: CPT | Performed by: NURSE PRACTITIONER

## 2022-03-17 PROCEDURE — 1159F MED LIST DOCD IN RCRD: CPT | Mod: CPTII,S$GLB,, | Performed by: NURSE PRACTITIONER

## 2022-03-17 PROCEDURE — 1126F AMNT PAIN NOTED NONE PRSNT: CPT | Mod: CPTII,S$GLB,, | Performed by: NURSE PRACTITIONER

## 2022-03-17 PROCEDURE — 1101F PT FALLS ASSESS-DOCD LE1/YR: CPT | Mod: CPTII,S$GLB,, | Performed by: NURSE PRACTITIONER

## 2022-03-17 PROCEDURE — 3072F LOW RISK FOR RETINOPATHY: CPT | Mod: CPTII,S$GLB,, | Performed by: NURSE PRACTITIONER

## 2022-03-17 PROCEDURE — 1160F PR REVIEW ALL MEDS BY PRESCRIBER/CLIN PHARMACIST DOCUMENTED: ICD-10-PCS | Mod: CPTII,S$GLB,, | Performed by: NURSE PRACTITIONER

## 2022-03-17 PROCEDURE — 99999 PR PBB SHADOW E&M-EST. PATIENT-LVL IV: CPT | Mod: PBBFAC,,, | Performed by: NURSE PRACTITIONER

## 2022-03-17 PROCEDURE — 3075F SYST BP GE 130 - 139MM HG: CPT | Mod: CPTII,S$GLB,, | Performed by: NURSE PRACTITIONER

## 2022-03-17 PROCEDURE — 3008F BODY MASS INDEX DOCD: CPT | Mod: CPTII,S$GLB,, | Performed by: NURSE PRACTITIONER

## 2022-03-17 PROCEDURE — 3079F PR MOST RECENT DIASTOLIC BLOOD PRESSURE 80-89 MM HG: ICD-10-PCS | Mod: CPTII,S$GLB,, | Performed by: NURSE PRACTITIONER

## 2022-03-17 PROCEDURE — 1126F PR PAIN SEVERITY QUANTIFIED, NO PAIN PRESENT: ICD-10-PCS | Mod: CPTII,S$GLB,, | Performed by: NURSE PRACTITIONER

## 2022-03-17 PROCEDURE — 3008F PR BODY MASS INDEX (BMI) DOCUMENTED: ICD-10-PCS | Mod: CPTII,S$GLB,, | Performed by: NURSE PRACTITIONER

## 2022-03-17 PROCEDURE — 82985 ASSAY OF GLYCATED PROTEIN: CPT | Performed by: NURSE PRACTITIONER

## 2022-03-17 PROCEDURE — 99999 PR PBB SHADOW E&M-EST. PATIENT-LVL IV: ICD-10-PCS | Mod: PBBFAC,,, | Performed by: NURSE PRACTITIONER

## 2022-03-17 PROCEDURE — 1160F RVW MEDS BY RX/DR IN RCRD: CPT | Mod: CPTII,S$GLB,, | Performed by: NURSE PRACTITIONER

## 2022-03-17 PROCEDURE — 80053 COMPREHEN METABOLIC PANEL: CPT | Performed by: NURSE PRACTITIONER

## 2022-03-17 PROCEDURE — 1101F PR PT FALLS ASSESS DOC 0-1 FALLS W/OUT INJ PAST YR: ICD-10-PCS | Mod: CPTII,S$GLB,, | Performed by: NURSE PRACTITIONER

## 2022-03-17 PROCEDURE — 4010F PR ACE/ARB THEARPY RXD/TAKEN: ICD-10-PCS | Mod: CPTII,S$GLB,, | Performed by: NURSE PRACTITIONER

## 2022-03-17 PROCEDURE — 99214 PR OFFICE/OUTPT VISIT, EST, LEVL IV, 30-39 MIN: ICD-10-PCS | Mod: S$GLB,,, | Performed by: NURSE PRACTITIONER

## 2022-03-17 RX ORDER — INSULIN ASPART 100 [IU]/ML
4 INJECTION, SOLUTION INTRAVENOUS; SUBCUTANEOUS
Qty: 30 ML | Refills: 3
Start: 2022-03-17 | End: 2022-11-23

## 2022-03-17 NOTE — ASSESSMENT & PLAN NOTE
-- Labs today - check fructosamine as A1c may be falsely low secondary to anemia.  -- A1c goal <7.5%.  -- Medications discussed:  MFM   GLP1-DPP4 - avoid given drug induced pancreatitis (Januvia)  SUNG   SGLT2   Reviewed potential adverse effects of SGLT-2 inhibitors, including genital mycotic infections, slightly increased risk of UTI, hypersensitivity, hypotension, and hyperkalemia. Advised to maintain water intake of 8-10 cups per day. Advised we need to check chemistry panel at baseline and 2 weeks after starting. Discussed FDA warning reports of ketoacidosis associated with SGLT-2 inhibitors. Advised to seek immediate medical attention and stop the medication if symptoms such as difficulty breathing, nausea, vomiting, abdominal pain, confusion, and unusual fatigue/sleepiness. Discussed possible precipitating factors including major illness/reduced food and fluid intake (advised to stop under these circumstances), and reduced insulin dose. Discussed possible effects of increased fracture risk/decreased bone density. Discussed reports of increased risk of leg and foot amputations with canagliflozin and need to seek urgent care if developed new pain or tenderness, sores or ulcers, or infections in legs/feet.   Insulin   -- Reviewed logs/CGM:  Glucose variable.   Fasting hyperglycemia.  Instructed to send glucose logs in 14 days.  Reach out to me sooner for any glucose <70 or consistently >180.  -- Medication Changes:   CHANGE  Metformin 500mg twice daily  Actos 30mg daily  Jardiance (started LOV 1/2022) 10mg daily   Levemir 12 units daily  Novolog 4 units with meals - discussed ONLY taking with food    Received SGLT2i through [facility]. Consider increasing in the future but noting recent yeast or UTI.     -- Reviewed goals of therapy are to get the best control we can without hypoglycemia.  -- Reviewed patient's current insulin regimen. Clarified proper insulin dose and timing in relation to meals, etc. Insulin injection  sites and proper rotation instructed.    -- Advised frequent self blood glucose monitoring.  Patient encouraged to document glucose results and bring them to every clinic visit.  -- Hypoglycemia precautions discussed. Instructed on precautions before driving.    -- Call for Bg repeatedly < 90 or > 180.   -- Close adherence to lifestyle changes recommended.   -- Periodic follow ups for eye evaluations, foot care and dental care suggested.

## 2022-03-17 NOTE — PROGRESS NOTES
Health Maintenance Due   Topic Date Due    COVID-19 Vaccine (1) Never done    Aspirin/Antiplatelet Therapy  Never done    Foot Exam  12/11/2021    Eye Exam  02/22/2022     Updates were requested from care everywhere.  Chart was reviewed for overdue Proactive [facility] Encounters (VANI) topics (CRS, Breast Cancer Screening, Eye exam)  Health Maintenance has been updated.  LINKS immunization registry triggered.  Immunizations were reconciled.

## 2022-03-17 NOTE — PATIENT INSTRUCTIONS
Metformin 500mg twice daily  Actos 30mg daily  Jardiance 10mg daily   Levemir 12 units daily  Novolog 4 units with meals    Insulin types  You are taking two types of insulin and each of them has unique properties.     Long acting insulin:  Levemir is the long-acting insulin. It lasts about 24 hours after injection. You need to take it once a day at the same time. Skipping a meal does not usually affect the dose of lantus.         Short-acting insulin  Humalog/Novolog/Apidra is the short acting insulin. It is used to correct your high blood sugars after EACH MEAL.   It should be given within 15 minutes before EACH MEAL, so the frequency of the injection of the short acting insulin is the same as how many meals you eat a day. For example, if you eat only lunch and dinner, then you just need to use the short acting insulin before lunch and before dinner.   If you happen to skip a meal, please also skip the dose of the short acting insulin for that meal. Otherwise, you may have low blood sugars.   If you miss a dose of short acting insulin, please do NOT try to make up for that dose.     Instructed to send glucose logs in 14 days.  Reach out to me sooner for any glucose <70 or consistently >180.

## 2022-03-18 ENCOUNTER — TELEPHONE (OUTPATIENT)
Dept: INTERNAL MEDICINE | Facility: CLINIC | Age: 77
End: 2022-03-18
Payer: MEDICARE

## 2022-03-18 NOTE — TELEPHONE ENCOUNTER
Telephoned patient, left message to return call----- Message from Hood Fishman sent at 3/18/2022  4:34 PM CDT -----  Contact: [phone]  Lina is requesting a callback as soon as possible; she states that she needs to be referred to see the pain management doctor.    Callback number: [phone]

## 2022-03-21 ENCOUNTER — PATIENT MESSAGE (OUTPATIENT)
Dept: ENDOCRINOLOGY | Facility: CLINIC | Age: 77
End: 2022-03-21
Payer: MEDICARE

## 2022-03-21 LAB — FRUCTOSAMINE SERPL-SCNC: 406 UMOL /L

## 2022-03-21 NOTE — TELEPHONE ENCOUNTER
Component      Latest Ref Rng & Units 3/17/2022   Sodium      136 - 145 mmol/L 138   Potassium      3.5 - 5.1 mmol/L 4.8   Chloride      95 - 110 mmol/L 106   CO2      23 - 29 mmol/L 21 (L)   Glucose      70 - 110 mg/dL 200 (H)   BUN      8 - 23 mg/dL 44 (H)   Creatinine      0.5 - 1.4 mg/dL 1.5 (H)   Calcium      8.7 - 10.5 mg/dL 10.9 (H)   PROTEIN TOTAL      6.0 - 8.4 g/dL 8.4   Albumin      3.5 - 5.2 g/dL 4.0   BILIRUBIN TOTAL      0.1 - 1.0 mg/dL 0.3   Alkaline Phosphatase      55 - 135 U/L 84   AST      10 - 40 U/L 15   ALT      10 - 44 U/L 13   Anion Gap      8 - 16 mmol/L 11   eGFR if African American      >60 mL/min/1.73 m:2 39.3 (A)   eGFR if non African American      >60 mL/min/1.73 m:2 34.1 (A)   Hemoglobin A1C External      4.0 - 5.6 % 6.4 (H)   Estimated Avg Glucose      68 - 131 mg/dL 137 (H)   FRUCTOSAMINE      SeeBelow umol /L 406

## 2022-03-25 ENCOUNTER — TELEPHONE (OUTPATIENT)
Dept: INTERNAL MEDICINE | Facility: CLINIC | Age: 77
End: 2022-03-25
Payer: MEDICARE

## 2022-03-25 DIAGNOSIS — M48.00 SPINAL STENOSIS, UNSPECIFIED SPINAL REGION: Primary | ICD-10-CM

## 2022-03-25 DIAGNOSIS — G56.00 CARPAL TUNNEL SYNDROME, UNSPECIFIED LATERALITY: ICD-10-CM

## 2022-03-28 ENCOUNTER — TELEPHONE (OUTPATIENT)
Dept: PAIN MEDICINE | Facility: CLINIC | Age: 77
End: 2022-03-28

## 2022-03-28 ENCOUNTER — OFFICE VISIT (OUTPATIENT)
Dept: PAIN MEDICINE | Facility: CLINIC | Age: 77
End: 2022-03-28
Payer: MEDICARE

## 2022-03-28 VITALS
BODY MASS INDEX: 30.76 KG/M2 | RESPIRATION RATE: 18 BRPM | HEART RATE: 102 BPM | TEMPERATURE: 98 F | HEIGHT: 67 IN | DIASTOLIC BLOOD PRESSURE: 64 MMHG | WEIGHT: 196 LBS | SYSTOLIC BLOOD PRESSURE: 107 MMHG

## 2022-03-28 DIAGNOSIS — M62.838 MUSCLE SPASM: ICD-10-CM

## 2022-03-28 DIAGNOSIS — M48.00 SPINAL STENOSIS, UNSPECIFIED SPINAL REGION: ICD-10-CM

## 2022-03-28 DIAGNOSIS — M46.1 BILATERAL SACROILIITIS: Primary | ICD-10-CM

## 2022-03-28 DIAGNOSIS — M54.16 LUMBAR RADICULOPATHY: ICD-10-CM

## 2022-03-28 DIAGNOSIS — Z79.4 TYPE 2 DIABETES MELLITUS WITH DIABETIC NEPHROPATHY, WITH LONG-TERM CURRENT USE OF INSULIN: ICD-10-CM

## 2022-03-28 DIAGNOSIS — E11.21 TYPE 2 DIABETES MELLITUS WITH DIABETIC NEPHROPATHY, WITH LONG-TERM CURRENT USE OF INSULIN: ICD-10-CM

## 2022-03-28 DIAGNOSIS — M47.816 LUMBAR SPONDYLOSIS: ICD-10-CM

## 2022-03-28 DIAGNOSIS — M51.36 DDD (DEGENERATIVE DISC DISEASE), LUMBAR: ICD-10-CM

## 2022-03-28 PROCEDURE — 3074F SYST BP LT 130 MM HG: CPT | Mod: CPTII,S$GLB,, | Performed by: STUDENT IN AN ORGANIZED HEALTH CARE EDUCATION/TRAINING PROGRAM

## 2022-03-28 PROCEDURE — 99205 PR OFFICE/OUTPT VISIT, NEW, LEVL V, 60-74 MIN: ICD-10-PCS | Mod: S$GLB,,, | Performed by: STUDENT IN AN ORGANIZED HEALTH CARE EDUCATION/TRAINING PROGRAM

## 2022-03-28 PROCEDURE — 99205 OFFICE O/P NEW HI 60 MIN: CPT | Mod: S$GLB,,, | Performed by: STUDENT IN AN ORGANIZED HEALTH CARE EDUCATION/TRAINING PROGRAM

## 2022-03-28 PROCEDURE — 1125F AMNT PAIN NOTED PAIN PRSNT: CPT | Mod: CPTII,S$GLB,, | Performed by: STUDENT IN AN ORGANIZED HEALTH CARE EDUCATION/TRAINING PROGRAM

## 2022-03-28 PROCEDURE — 99499 RISK ADDL DX/OHS AUDIT: ICD-10-PCS | Mod: S$GLB,,, | Performed by: STUDENT IN AN ORGANIZED HEALTH CARE EDUCATION/TRAINING PROGRAM

## 2022-03-28 PROCEDURE — 3072F LOW RISK FOR RETINOPATHY: CPT | Mod: CPTII,S$GLB,, | Performed by: STUDENT IN AN ORGANIZED HEALTH CARE EDUCATION/TRAINING PROGRAM

## 2022-03-28 PROCEDURE — 99999 PR PBB SHADOW E&M-EST. PATIENT-LVL V: ICD-10-PCS | Mod: PBBFAC,,, | Performed by: STUDENT IN AN ORGANIZED HEALTH CARE EDUCATION/TRAINING PROGRAM

## 2022-03-28 PROCEDURE — 3008F PR BODY MASS INDEX (BMI) DOCUMENTED: ICD-10-PCS | Mod: CPTII,S$GLB,, | Performed by: STUDENT IN AN ORGANIZED HEALTH CARE EDUCATION/TRAINING PROGRAM

## 2022-03-28 PROCEDURE — 1160F PR REVIEW ALL MEDS BY PRESCRIBER/CLIN PHARMACIST DOCUMENTED: ICD-10-PCS | Mod: CPTII,S$GLB,, | Performed by: STUDENT IN AN ORGANIZED HEALTH CARE EDUCATION/TRAINING PROGRAM

## 2022-03-28 PROCEDURE — 3044F PR MOST RECENT HEMOGLOBIN A1C LEVEL <7.0%: ICD-10-PCS | Mod: CPTII,S$GLB,, | Performed by: STUDENT IN AN ORGANIZED HEALTH CARE EDUCATION/TRAINING PROGRAM

## 2022-03-28 PROCEDURE — 1159F MED LIST DOCD IN RCRD: CPT | Mod: CPTII,S$GLB,, | Performed by: STUDENT IN AN ORGANIZED HEALTH CARE EDUCATION/TRAINING PROGRAM

## 2022-03-28 PROCEDURE — 1159F PR MEDICATION LIST DOCUMENTED IN MEDICAL RECORD: ICD-10-PCS | Mod: CPTII,S$GLB,, | Performed by: STUDENT IN AN ORGANIZED HEALTH CARE EDUCATION/TRAINING PROGRAM

## 2022-03-28 PROCEDURE — 99999 PR PBB SHADOW E&M-EST. PATIENT-LVL V: CPT | Mod: PBBFAC,,, | Performed by: STUDENT IN AN ORGANIZED HEALTH CARE EDUCATION/TRAINING PROGRAM

## 2022-03-28 PROCEDURE — 3074F PR MOST RECENT SYSTOLIC BLOOD PRESSURE < 130 MM HG: ICD-10-PCS | Mod: CPTII,S$GLB,, | Performed by: STUDENT IN AN ORGANIZED HEALTH CARE EDUCATION/TRAINING PROGRAM

## 2022-03-28 PROCEDURE — 3288F PR FALLS RISK ASSESSMENT DOCUMENTED: ICD-10-PCS | Mod: CPTII,S$GLB,, | Performed by: STUDENT IN AN ORGANIZED HEALTH CARE EDUCATION/TRAINING PROGRAM

## 2022-03-28 PROCEDURE — 1160F RVW MEDS BY RX/DR IN RCRD: CPT | Mod: CPTII,S$GLB,, | Performed by: STUDENT IN AN ORGANIZED HEALTH CARE EDUCATION/TRAINING PROGRAM

## 2022-03-28 PROCEDURE — 1101F PR PT FALLS ASSESS DOC 0-1 FALLS W/OUT INJ PAST YR: ICD-10-PCS | Mod: CPTII,S$GLB,, | Performed by: STUDENT IN AN ORGANIZED HEALTH CARE EDUCATION/TRAINING PROGRAM

## 2022-03-28 PROCEDURE — 1125F PR PAIN SEVERITY QUANTIFIED, PAIN PRESENT: ICD-10-PCS | Mod: CPTII,S$GLB,, | Performed by: STUDENT IN AN ORGANIZED HEALTH CARE EDUCATION/TRAINING PROGRAM

## 2022-03-28 PROCEDURE — 3078F DIAST BP <80 MM HG: CPT | Mod: CPTII,S$GLB,, | Performed by: STUDENT IN AN ORGANIZED HEALTH CARE EDUCATION/TRAINING PROGRAM

## 2022-03-28 PROCEDURE — 3072F PR LOW RISK FOR RETINOPATHY: ICD-10-PCS | Mod: CPTII,S$GLB,, | Performed by: STUDENT IN AN ORGANIZED HEALTH CARE EDUCATION/TRAINING PROGRAM

## 2022-03-28 PROCEDURE — 4010F ACE/ARB THERAPY RXD/TAKEN: CPT | Mod: CPTII,S$GLB,, | Performed by: STUDENT IN AN ORGANIZED HEALTH CARE EDUCATION/TRAINING PROGRAM

## 2022-03-28 PROCEDURE — 1101F PT FALLS ASSESS-DOCD LE1/YR: CPT | Mod: CPTII,S$GLB,, | Performed by: STUDENT IN AN ORGANIZED HEALTH CARE EDUCATION/TRAINING PROGRAM

## 2022-03-28 PROCEDURE — 3288F FALL RISK ASSESSMENT DOCD: CPT | Mod: CPTII,S$GLB,, | Performed by: STUDENT IN AN ORGANIZED HEALTH CARE EDUCATION/TRAINING PROGRAM

## 2022-03-28 PROCEDURE — 3008F BODY MASS INDEX DOCD: CPT | Mod: CPTII,S$GLB,, | Performed by: STUDENT IN AN ORGANIZED HEALTH CARE EDUCATION/TRAINING PROGRAM

## 2022-03-28 PROCEDURE — 99499 UNLISTED E&M SERVICE: CPT | Mod: S$GLB,,, | Performed by: STUDENT IN AN ORGANIZED HEALTH CARE EDUCATION/TRAINING PROGRAM

## 2022-03-28 PROCEDURE — 4010F PR ACE/ARB THEARPY RXD/TAKEN: ICD-10-PCS | Mod: CPTII,S$GLB,, | Performed by: STUDENT IN AN ORGANIZED HEALTH CARE EDUCATION/TRAINING PROGRAM

## 2022-03-28 PROCEDURE — 3078F PR MOST RECENT DIASTOLIC BLOOD PRESSURE < 80 MM HG: ICD-10-PCS | Mod: CPTII,S$GLB,, | Performed by: STUDENT IN AN ORGANIZED HEALTH CARE EDUCATION/TRAINING PROGRAM

## 2022-03-28 PROCEDURE — 3044F HG A1C LEVEL LT 7.0%: CPT | Mod: CPTII,S$GLB,, | Performed by: STUDENT IN AN ORGANIZED HEALTH CARE EDUCATION/TRAINING PROGRAM

## 2022-03-28 RX ORDER — TIZANIDINE 4 MG/1
4 TABLET ORAL NIGHTLY PRN
Qty: 30 TABLET | Refills: 1 | Status: SHIPPED | OUTPATIENT
Start: 2022-03-28 | End: 2022-04-28

## 2022-03-28 NOTE — H&P (VIEW-ONLY)
Chronic Pain - New Consult    Referring Physician: Dora Freedman MD    Date: 03/28/2022     Re: Lina Davis  MR#: [id number]  YOB: 1945  Age: 76 y.o.    Chief Complaint:   Chief Complaint   Patient presents with    Low-back Pain    Leg Pain         **This note is dictated using the M*Modal Fluency Direct word recognition program. There are word recognition mistakes that are occasionally missed on review.**    ASSESSMENT: 76 y.o. year old female with back and leg pain, consistent with     1. Bilateral sacroiliitis  Ambulatory referral/consult to Physical/Occupational Therapy    tiZANidine (ZANAFLEX) 4 MG tablet   2. Spinal stenosis, unspecified spinal region  Ambulatory referral/consult to Pain Clinic   3. Lumbar radiculopathy  Ambulatory referral/consult to Physical/Occupational Therapy    tiZANidine (ZANAFLEX) 4 MG tablet   4. Lumbar spondylosis  Ambulatory referral/consult to Physical/Occupational Therapy   5. DDD (degenerative disc disease), lumbar     6. Muscle spasm  tiZANidine (ZANAFLEX) 4 MG tablet         PLAN:     Chronic low back pain - multifactorial  -etiology not totally clear.  Likely multifactorial with elements of SI joint, lumbar spondylosis, lumbar radiculopathy, spinal stenosis, degenerative disc disease all playing some part.  -trial tizanidine + tylenol at night to help sleep and for pain  -PT referral  -new MRI lumbar spine    Sacroiliitis  -We will schedule the patient for b/l SIJ.  Risks,benefits, and alternatives of the procedure were discussed with the patient and She would like to proceed with the procedure.  At this juncture, we believe that the patient's medical comorbidity status adds medium risk and complexity to our proposed evaluation and treatment.    Lumbar spondylosis   -patient has notable facet arthritis of the lumbar spine and may be candidate for MBB/RFA depending on response to SI joint injection    Lumbar radiculopathy  -patient's radicular pattern is  consistent with b/l L5/S1 radiculopathy.  However, most of her physical exam findings were positive for sacroiliitis so we will treat that first.    DM2  -on insulin  -discussed risks of steroid injection while diabetic.    - RTC 1 month  - Counseled patient regarding the importance of weight loss and activity modification and physical therapy.    The above plan and management options were discussed at length with patient. Patient is in agreement with the above and verbalized understanding. It will be communicated with the referring physician via electronic record, fax, or mail.  Lab/study reports reviewed were important and necessary because subsequent medical and treatment recommendations required review of the above lab/study reports. Images viewed/reviewed above were important and necessary because subsequent medical and treatment recommendations required review of the reviewed image(s).     Electronically signed by:  Leland Pena DO  03/28/2022    =========================================================================================================    SUBJECTIVE:    Lina Davis is a 76 y.o. female presents to the clinic for the evaluation of lower back pain. The pain started 6 year ago following no inciting event and symptoms have been worsening.  The patient states that she has low back pain that radiates down both legs to the top of the bilateral feet.   Sometimes the pain with radiate in to the inside of the leg. The pain waker her up in the middle of the night.  The pain usually starts when she lays down. The patient cannot sleep on her back, so she has to rotate from side to side.  The patient has tried different topicals ointments, tylenol, gabapentin.  Leg pain > back pain.     Dr. Freedman tried a cortisone shot which did not help.  She has a small script of oxycodone which she takes rarely.  She takes 2 tylenol extra strength every night.  She tried gabapentin 600mg TID and it was not  helpful. Meloxicam not helpful.    Pain Description:    The pain is located in the lower back area and radiates to the legs down to the feet.    At BEST  6/10   At WORST  10/10 on the WORST day.    On average pain is rated as 7/10.   Today the pain is rated as 8/10  The pain is continuous.  The pain is described as aching and sharp    Symptoms interfere with daily activity and sleeping.   Exacerbating factors: Standing, Laying, Walking and Night Time.    Mitigating factors rubbing gel and nothing .   She reports 3 hours of sleep per night.    Physical Therapy/Home Exercise: No, not currently in physical therapy or home exercise program    Current Pain Medications:    - Opioids: Percocet 5mg (2-3x/week)  - Topicals: multiple    Failed Pain Medications:    - gabapentin, meloxicam, oxycodone, steroids, tylenol, tramadol    Pain Treatment Therapies:    Pain procedures: none  Physical Therapy: none  Chiropractor: none  Acupuncture: none  TENS unit: none  Spinal decompression: none  Joint replacement: none    Patient denies urinary incontinence, bowel incontinence and loss of sensations.  Patient denies any suicidal or homicidal ideations     report:  Reviewed and consistent with medication use as prescribed.    Imaging:   XR lumbar 2/14/22:  Minimal mild levoscoliosis thoracic lumbar junction, elevation right hemipelvis, prominent bridging osteophytes lower dorsal and lumbar spine particularly L2-L3 and right L4-L5.  DJD SI joints.  Primary anterior subluxation L4 on L5, vacuum disc deformity L5-S1.  Prompt facet joint arthropathy particularly L3 through L5 levels.    XR HIPs 2/14/22:  Prominent degenerative disc spondylosis facet joint arthropathy lumbosacral junction, elevation right hemipelvis, injection site calcification left lateral buttocks.  DJD SI joints.  Mild DJD hip joints and hypertrophic change cortex greater tuberosities.      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    Vitamin D deficiency disease      Past Surgical History:   Procedure Laterality Date    ANTERIOR CERVICAL DISCECTOMY W/ FUSION N/A 3/19/2020    Procedure: DISCECTOMY, SPINE, CERVICAL, ANTERIOR APPROACH, WITH FUSION, C3-C4, C4-C5, C5-C6;  Surgeon: Joseph Walton MD;  [facility];  Service: Neurosurgery;  Laterality: N/A;    CARPAL TUNNEL RELEASE      bilateral    COLONOSCOPY N/A 3/7/2018    Procedure: COLONOSCOPY;  Surgeon: Luís Soni MD;  Location: [facility] ENDO (2ND FLR);  Service: Endoscopy;  Laterality: N/A;  ok to schedule per Elizabeth    COLONOSCOPY W/ BIOPSIES AND POLYPECTOMY      HEMORRHOID SURGERY      HYSTERECTOMY      AUB    ROTATOR CUFF REPAIR      bilateral     Social History     Socioeconomic History    Marital status:    Tobacco Use    Smoking status: Never Smoker    Smokeless tobacco: Never Used   Substance and Sexual Activity    Alcohol use: No    Drug use: No    Sexual activity: Yes     Partners: Male     Birth control/protection: Surgical     Family History   Problem Relation Age of Onset    Heart disease Mother     Diabetes Mother     Heart disease Father     Cancer Father         liver    Diabetes Sister     Cataracts Sister     Kidney disease Brother     Heart disease Brother     Diabetes Sister     COPD Brother     COPD Brother     Gout Brother     Benign prostatic hyperplasia Brother     Heart disease Brother     Kidney disease Brother     Heart attack Brother         heart attack    Kidney failure Brother     Lupus Sister     Heart attack Brother     Drug abuse Brother     No Known Problems Daughter     No Known Problems Son     Amblyopia Neg Hx     Blindness Neg Hx     Breast cancer Neg Hx     Colon cancer Neg Hx     Ovarian cancer Neg Hx        Review of patient's allergies  "indicates:   Allergen Reactions    Januvia [sitagliptin] Other (See Comments)     Pancreatitis      Metformin Other (See Comments)     Nausea and vomiting with immediate release - tolerates extended release       Current Outpatient Medications   Medication Sig    alcohol swabs (BD ALCOHOL SWABS) PadM USE TWICE DAILY AS DIRECTED, E 11.9    allopurinoL (ZYLOPRIM) 300 MG tablet TAKE 1 TABLET EVERY DAY    amLODIPine (NORVASC) 10 MG tablet TAKE 1 TABLET EVERY DAY    atorvastatin (LIPITOR) 40 MG tablet Take 1 tablet (40 mg total) by mouth once daily.    blood glucose control, normal Soln True Metrix control solution E11.9 - pt tests twice daily    blood-glucose meter kit Check glucose daily E11.9 meter covered by insurance Don Met[employer]    DROPLET PEN NEEDLE 32 gauge x 5/32" Ndle USE ONE TIME DAILY TO INJECT INSULIN    empagliflozin (JARDIANCE) 10 mg tablet Take 1 tablet (10 mg total) by mouth once daily.    insulin aspart U-100 (NOVOLOG FLEXPEN U-100 INSULIN) 100 unit/mL (3 mL) InPn pen Inject 4 Units into the skin 3 (three) times daily with meals. Plus sliding scale - Max TDD 45 units    insulin detemir U-100 (LEVEMIR FLEXTOUCH) 100 unit/mL (3 mL) SubQ InPn pen Inject 12 Units into the skin once daily.    lancets (TRUEPLUS LANCETS) 28 gauge Misc 1 lancet by Misc.(Non-Drug; Combo Route) route 2 (two) times daily.    losartan (COZAAR) 100 MG tablet Take 1 tablet (100 mg total) by mouth once daily.    meloxicam (MOBIC) 7.5 MG tablet TAKE 1 TABLET(7.5 MG) BY MOUTH EVERY DAY    metFORMIN (GLUCOPHAGE-XR) 500 MG ER 24hr tablet Take 1 tablet (500 mg total) by mouth 2 (two) times daily with meals.    omeprazole (PRILOSEC) 20 MG capsule One capsule twice daily    oxyCODONE-acetaminophen (PERCOCET) 5-325 mg per tablet Take 1 tablet by mouth every 24 hours as needed for Pain.    pen needle, diabetic 33 gauge x 5/32" Ndle Use daily with insulin    pioglitazone (ACTOS) 30 MG tablet Take 1 tablet (30 mg total) by " "mouth once daily.    triamcinolone acetonide 0.1% (KENALOG) 0.1 % cream Apply topically 2 (two) times daily.    triamcinolone acetonide 0.1% (KENALOG) 0.1 % cream AAA lower legs bid prn itchy or red    TRUE METRIX GLUCOSE TEST STRIP Strp TEST BLOOD SUGAR TWICE DAILY    tiZANidine (ZANAFLEX) 4 MG tablet Take 1 tablet (4 mg total) by mouth nightly as needed (back pain).     No current facility-administered medications for this visit.       REVIEW OF SYSTEMS:    GENERAL:  No weight loss, malaise or fevers.   HEENT:   No recent changes in vision or hearing   NECK:  Negative for lumps, no difficulty with swallowing.  RESPIRATORY:  Negative for cough, wheezing or shortness of breath, patient denies any recent URI.  CARDIOVASCULAR:  Negative for chest pain, leg swelling or palpitations.  GI:  Negative for abdominal discomfort, blood in stools or black stools or change in bowel habits.  MUSCULOSKELETAL:  See HPI.  SKIN:  Negative for lesions, rash, and itching.  PSYCH:  No mood disorder or recent psychosocial stressors.  Patients sleep is not disturbed secondary to pain.  HEMATOLOGY/LYMPHOLOGY:  Negative for prolonged bleeding, bruising easily or swollen nodes.  Patient is not currently taking any anti-coagulants  NEURO:   No history of headaches, syncope, paralysis, seizures or tremors.  All other reviewed and negative other than HPI.    OBJECTIVE:    /64   Pulse 102   Temp 97.5 °F (36.4 °C)   Resp 18   Ht 5' 7" (1.702 m)   Wt 88.9 kg (196 lb)   BMI 30.70 kg/m²     PHYSICAL EXAMINATION:    GENERAL: Well appearing, in no acute distress, alert and oriented x3.  PSYCH:  Mood and affect appropriate.  SKIN: Skin color, texture, turgor normal, no rashes or lesions.  HEAD/FACE:  Normocephalic, atraumatic. Cranial nerves grossly intact.  CV: RRR with palpation of the radial artery.  PULM: CTAB. No evidence of respiratory difficulty, symmetric chest rise.  GI:  Soft and non-tender.    BACK:   - No obvious deformity " or signs of trauma, Normal lumbar lordotic curve  - Negative spinous process tenderness  - Positive paravertebral tenderness  - Positive pain to palpation over the facet joints of the lumbar spine.   - Positive QL / Iliac crest / Glut tenderness  - Slump test is Negative for radicular pain  - Slump test is Negative for back pain  - Supine Straight leg raising is Negative for radicular pain  - Supine Straight leg raising is Negative for back pain  - Lumbar ROM is diminished in Flexion with pain  - Lumbar ROM is diminished in Extension with pain  - Lumbar ROM is diminished in Lateral Flexion with pain  - Lumbar ROM is diminished in Rotation with pain  - Positive Sustained Hip Flexion test (for discogenic pain)  - Positive Altered Gait, Posture  - Axial facet loading test Positive on the bilateral side(s)    SI Joint exam:  - Positive SI joint tenderness to palpation  - William's sign Negative  - Yeoman's Test: Did not perform for SI joint pain indicating anterior SI ligament involvement. Did not perform for anterior thigh pain/paresthesia which indicates femoral nerve stretch.  - Gaenslen's Test:Positive  - Finger Yovani's Sign:Positive  - SI compression test:Positive  - SI distraction test:Positive  - Thigh Thrust: Positive  - SI Thrust: Did not perform    MUSKULOSKELETAL:    EXTREMITIES:   Hip Exam:  - Log Roll Negative  - FADIR Negative  - Stinchfield Positive  - Hip Scour Negative  - GTB Tenderness Positive      MUSCULOSKELETAL:  No atrophy or tone abnormalities are noted in the UE or LE.  No deformities, edema, or skin discoloration are noted on visible skin. Good capillary refill.    NEURO: Bilateral upper and lower extremity coordination and muscle stretch reflexes are physiologic and symmetric.    No loss of sensation is noted.    NEUROLOGICAL EXAM:  MENTAL STATUS: A x O x 3, good concentration, speech is fluent and goal directed  MEMORY: recent and remote are intact  CN: CN2-12 grossly intact  MOTOR: 4+/5 in  all muscle groups of b/l LE  DTRs: 0 symmetric LE patella and achilles  Sensation:    -no Loss of sensation in a left lower and right lower L-1, L-2, L-3, L-4 and L-5 bilaterally distribution.  Babinski: absent on the bilateral side(s)    GAIT: antalgic. Favors right side    =====================================================================  On physical exam, 5 out of 7 different sacroiliac joint provocation examinations were positive for sacroiliac joint pain.    For Ms. Davis, sacroiliac joint injections with anesthetic and/or corticosteroid are medically necessary because all of the following are met   Low back pain maximal below level of L5 which may radiate to the groin or lower extremity persisting at least 3 months; AND    Positive exam findings indicate a diagnosis including the pelvic distraction test, pelvic compression test, thigh thrust test, Yovani Finger Test, YOCASTA (Williams test) or Gaenslens test; AND    Active conservative treatment for a minimum of 6 weeks in the last 6 months (including physical therapy, home exercise, patient education, psychosocial support, and/or medication) has failed and/or significant pain is the medical reason which has prevented the patient from carrying this conservative treatment and is clearly documented within this note and his files.    If Ms. Davis does derive benefit, then the frequency of repeat therapeutic injections will be considered:   if symptoms recur and the patient has had at least a 50% improvement after each injection; AND    such injections are performed as one part of a comprehensive treatment program, which will nearly always include an exercise program to improve or maintain spinal mobility; AND    Unless there is medical justification, repeat injections will not be done more frequently than every six weeks for a total of 4 injections in a 12 month period. (Aron and Nish, 2002).

## 2022-03-28 NOTE — PROGRESS NOTES
Chronic Pain - New Consult    Referring Physician: Dora Freedman MD    Date: 03/28/2022     Re: Lina Davis  MR#: [id number]  YOB: 1945  Age: 76 y.o.    Chief Complaint:   Chief Complaint   Patient presents with    Low-back Pain    Leg Pain         **This note is dictated using the M*Modal Fluency Direct word recognition program. There are word recognition mistakes that are occasionally missed on review.**    ASSESSMENT: 76 y.o. year old female with back and leg pain, consistent with     1. Bilateral sacroiliitis  Ambulatory referral/consult to Physical/Occupational Therapy    tiZANidine (ZANAFLEX) 4 MG tablet   2. Spinal stenosis, unspecified spinal region  Ambulatory referral/consult to Pain Clinic   3. Lumbar radiculopathy  Ambulatory referral/consult to Physical/Occupational Therapy    tiZANidine (ZANAFLEX) 4 MG tablet   4. Lumbar spondylosis  Ambulatory referral/consult to Physical/Occupational Therapy   5. DDD (degenerative disc disease), lumbar     6. Muscle spasm  tiZANidine (ZANAFLEX) 4 MG tablet         PLAN:     Chronic low back pain - multifactorial  -etiology not totally clear.  Likely multifactorial with elements of SI joint, lumbar spondylosis, lumbar radiculopathy, spinal stenosis, degenerative disc disease all playing some part.  -trial tizanidine + tylenol at night to help sleep and for pain  -PT referral  -new MRI lumbar spine    Sacroiliitis  -We will schedule the patient for b/l SIJ.  Risks,benefits, and alternatives of the procedure were discussed with the patient and She would like to proceed with the procedure.  At this juncture, we believe that the patient's medical comorbidity status adds medium risk and complexity to our proposed evaluation and treatment.    Lumbar spondylosis   -patient has notable facet arthritis of the lumbar spine and may be candidate for MBB/RFA depending on response to SI joint injection    Lumbar radiculopathy  -patient's radicular pattern is  consistent with b/l L5/S1 radiculopathy.  However, most of her physical exam findings were positive for sacroiliitis so we will treat that first.    DM2  -on insulin  -discussed risks of steroid injection while diabetic.    - RTC 1 month  - Counseled patient regarding the importance of weight loss and activity modification and physical therapy.    The above plan and management options were discussed at length with patient. Patient is in agreement with the above and verbalized understanding. It will be communicated with the referring physician via electronic record, fax, or mail.  Lab/study reports reviewed were important and necessary because subsequent medical and treatment recommendations required review of the above lab/study reports. Images viewed/reviewed above were important and necessary because subsequent medical and treatment recommendations required review of the reviewed image(s).     Electronically signed by:  Leland Pena DO  03/28/2022    =========================================================================================================    SUBJECTIVE:    Lina Davis is a 76 y.o. female presents to the clinic for the evaluation of lower back pain. The pain started 6 year ago following no inciting event and symptoms have been worsening.  The patient states that she has low back pain that radiates down both legs to the top of the bilateral feet.   Sometimes the pain with radiate in to the inside of the leg. The pain waker her up in the middle of the night.  The pain usually starts when she lays down. The patient cannot sleep on her back, so she has to rotate from side to side.  The patient has tried different topicals ointments, tylenol, gabapentin.  Leg pain > back pain.     Dr. Freedman tried a cortisone shot which did not help.  She has a small script of oxycodone which she takes rarely.  She takes 2 tylenol extra strength every night.  She tried gabapentin 600mg TID and it was not  helpful. Meloxicam not helpful.    Pain Description:    The pain is located in the lower back area and radiates to the legs down to the feet.    At BEST  6/10   At WORST  10/10 on the WORST day.    On average pain is rated as 7/10.   Today the pain is rated as 8/10  The pain is continuous.  The pain is described as aching and sharp    Symptoms interfere with daily activity and sleeping.   Exacerbating factors: Standing, Laying, Walking and Night Time.    Mitigating factors rubbing gel and nothing .   She reports 3 hours of sleep per night.    Physical Therapy/Home Exercise: No, not currently in physical therapy or home exercise program    Current Pain Medications:    - Opioids: Percocet 5mg (2-3x/week)  - Topicals: multiple    Failed Pain Medications:    - gabapentin, meloxicam, oxycodone, steroids, tylenol, tramadol    Pain Treatment Therapies:    Pain procedures: none  Physical Therapy: none  Chiropractor: none  Acupuncture: none  TENS unit: none  Spinal decompression: none  Joint replacement: none    Patient denies urinary incontinence, bowel incontinence and loss of sensations.  Patient denies any suicidal or homicidal ideations     report:  Reviewed and consistent with medication use as prescribed.    Imaging:   XR lumbar 2/14/22:  Minimal mild levoscoliosis thoracic lumbar junction, elevation right hemipelvis, prominent bridging osteophytes lower dorsal and lumbar spine particularly L2-L3 and right L4-L5.  DJD SI joints.  Primary anterior subluxation L4 on L5, vacuum disc deformity L5-S1.  Prompt facet joint arthropathy particularly L3 through L5 levels.    XR HIPs 2/14/22:  Prominent degenerative disc spondylosis facet joint arthropathy lumbosacral junction, elevation right hemipelvis, injection site calcification left lateral buttocks.  DJD SI joints.  Mild DJD hip joints and hypertrophic change cortex greater tuberosities.      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    Vitamin D deficiency disease      Past Surgical History:   Procedure Laterality Date    ANTERIOR CERVICAL DISCECTOMY W/ FUSION N/A 3/19/2020    Procedure: DISCECTOMY, SPINE, CERVICAL, ANTERIOR APPROACH, WITH FUSION, C3-C4, C4-C5, C5-C6;  Surgeon: Joseph Walton MD;  [facility];  Service: Neurosurgery;  Laterality: N/A;    CARPAL TUNNEL RELEASE      bilateral    COLONOSCOPY N/A 3/7/2018    Procedure: COLONOSCOPY;  Surgeon: Luís Soni MD;  Location: [facility] ENDO (2ND FLR);  Service: Endoscopy;  Laterality: N/A;  ok to schedule per Elizabeth    COLONOSCOPY W/ BIOPSIES AND POLYPECTOMY      HEMORRHOID SURGERY      HYSTERECTOMY      AUB    ROTATOR CUFF REPAIR      bilateral     Social History     Socioeconomic History    Marital status:    Tobacco Use    Smoking status: Never Smoker    Smokeless tobacco: Never Used   Substance and Sexual Activity    Alcohol use: No    Drug use: No    Sexual activity: Yes     Partners: Male     Birth control/protection: Surgical     Family History   Problem Relation Age of Onset    Heart disease Mother     Diabetes Mother     Heart disease Father     Cancer Father         liver    Diabetes Sister     Cataracts Sister     Kidney disease Brother     Heart disease Brother     Diabetes Sister     COPD Brother     COPD Brother     Gout Brother     Benign prostatic hyperplasia Brother     Heart disease Brother     Kidney disease Brother     Heart attack Brother         heart attack    Kidney failure Brother     Lupus Sister     Heart attack Brother     Drug abuse Brother     No Known Problems Daughter     No Known Problems Son     Amblyopia Neg Hx     Blindness Neg Hx     Breast cancer Neg Hx     Colon cancer Neg Hx     Ovarian cancer Neg Hx        Review of patient's allergies  "indicates:   Allergen Reactions    Januvia [sitagliptin] Other (See Comments)     Pancreatitis      Metformin Other (See Comments)     Nausea and vomiting with immediate release - tolerates extended release       Current Outpatient Medications   Medication Sig    alcohol swabs (BD ALCOHOL SWABS) PadM USE TWICE DAILY AS DIRECTED, E 11.9    allopurinoL (ZYLOPRIM) 300 MG tablet TAKE 1 TABLET EVERY DAY    amLODIPine (NORVASC) 10 MG tablet TAKE 1 TABLET EVERY DAY    atorvastatin (LIPITOR) 40 MG tablet Take 1 tablet (40 mg total) by mouth once daily.    blood glucose control, normal Soln True Metrix control solution E11.9 - pt tests twice daily    blood-glucose meter kit Check glucose daily E11.9 meter covered by insurance Don Metrix    DROPLET PEN NEEDLE 32 gauge x 5/32" Ndle USE ONE TIME DAILY TO INJECT INSULIN    empagliflozin (JARDIANCE) 10 mg tablet Take 1 tablet (10 mg total) by mouth once daily.    insulin aspart U-100 (NOVOLOG FLEXPEN U-100 INSULIN) 100 unit/mL (3 mL) InPn pen Inject 4 Units into the skin 3 (three) times daily with meals. Plus sliding scale - Max TDD 45 units    insulin detemir U-100 (LEVEMIR FLEXTOUCH) 100 unit/mL (3 mL) SubQ InPn pen Inject 12 Units into the skin once daily.    lancets (TRUEPLUS LANCETS) 28 gauge Misc 1 lancet by Misc.(Non-Drug; Combo Route) route 2 (two) times daily.    losartan (COZAAR) 100 MG tablet Take 1 tablet (100 mg total) by mouth once daily.    meloxicam (MOBIC) 7.5 MG tablet TAKE 1 TABLET(7.5 MG) BY MOUTH EVERY DAY    metFORMIN (GLUCOPHAGE-XR) 500 MG ER 24hr tablet Take 1 tablet (500 mg total) by mouth 2 (two) times daily with meals.    omeprazole (PRILOSEC) 20 MG capsule One capsule twice daily    oxyCODONE-acetaminophen (PERCOCET) 5-325 mg per tablet Take 1 tablet by mouth every 24 hours as needed for Pain.    pen needle, diabetic 33 gauge x 5/32" Ndle Use daily with insulin    pioglitazone (ACTOS) 30 MG tablet Take 1 tablet (30 mg total) by " "mouth once daily.    triamcinolone acetonide 0.1% (KENALOG) 0.1 % cream Apply topically 2 (two) times daily.    triamcinolone acetonide 0.1% (KENALOG) 0.1 % cream AAA lower legs bid prn itchy or red    TRUE METRIX GLUCOSE TEST STRIP Strp TEST BLOOD SUGAR TWICE DAILY    tiZANidine (ZANAFLEX) 4 MG tablet Take 1 tablet (4 mg total) by mouth nightly as needed (back pain).     No current facility-administered medications for this visit.       REVIEW OF SYSTEMS:    GENERAL:  No weight loss, malaise or fevers.   HEENT:   No recent changes in vision or hearing   NECK:  Negative for lumps, no difficulty with swallowing.  RESPIRATORY:  Negative for cough, wheezing or shortness of breath, patient denies any recent URI.  CARDIOVASCULAR:  Negative for chest pain, leg swelling or palpitations.  GI:  Negative for abdominal discomfort, blood in stools or black stools or change in bowel habits.  MUSCULOSKELETAL:  See HPI.  SKIN:  Negative for lesions, rash, and itching.  PSYCH:  No mood disorder or recent psychosocial stressors.  Patients sleep is not disturbed secondary to pain.  HEMATOLOGY/LYMPHOLOGY:  Negative for prolonged bleeding, bruising easily or swollen nodes.  Patient is not currently taking any anti-coagulants  NEURO:   No history of headaches, syncope, paralysis, seizures or tremors.  All other reviewed and negative other than HPI.    OBJECTIVE:    /64   Pulse 102   Temp 97.5 °F (36.4 °C)   Resp 18   Ht 5' 7" (1.702 m)   Wt 88.9 kg (196 lb)   BMI 30.70 kg/m²     PHYSICAL EXAMINATION:    GENERAL: Well appearing, in no acute distress, alert and oriented x3.  PSYCH:  Mood and affect appropriate.  SKIN: Skin color, texture, turgor normal, no rashes or lesions.  HEAD/FACE:  Normocephalic, atraumatic. Cranial nerves grossly intact.  CV: RRR with palpation of the radial artery.  PULM: CTAB. No evidence of respiratory difficulty, symmetric chest rise.  GI:  Soft and non-tender.    BACK:   - No obvious deformity " or signs of trauma, Normal lumbar lordotic curve  - Negative spinous process tenderness  - Positive paravertebral tenderness  - Positive pain to palpation over the facet joints of the lumbar spine.   - Positive QL / Iliac crest / Glut tenderness  - Slump test is Negative for radicular pain  - Slump test is Negative for back pain  - Supine Straight leg raising is Negative for radicular pain  - Supine Straight leg raising is Negative for back pain  - Lumbar ROM is diminished in Flexion with pain  - Lumbar ROM is diminished in Extension with pain  - Lumbar ROM is diminished in Lateral Flexion with pain  - Lumbar ROM is diminished in Rotation with pain  - Positive Sustained Hip Flexion test (for discogenic pain)  - Positive Altered Gait, Posture  - Axial facet loading test Positive on the bilateral side(s)    SI Joint exam:  - Positive SI joint tenderness to palpation  - William's sign Negative  - Yeoman's Test: Did not perform for SI joint pain indicating anterior SI ligament involvement. Did not perform for anterior thigh pain/paresthesia which indicates femoral nerve stretch.  - Gaenslen's Test:Positive  - Finger Yovani's Sign:Positive  - SI compression test:Positive  - SI distraction test:Positive  - Thigh Thrust: Positive  - SI Thrust: Did not perform    MUSKULOSKELETAL:    EXTREMITIES:   Hip Exam:  - Log Roll Negative  - FADIR Negative  - Stinchfield Positive  - Hip Scour Negative  - GTB Tenderness Positive      MUSCULOSKELETAL:  No atrophy or tone abnormalities are noted in the UE or LE.  No deformities, edema, or skin discoloration are noted on visible skin. Good capillary refill.    NEURO: Bilateral upper and lower extremity coordination and muscle stretch reflexes are physiologic and symmetric.    No loss of sensation is noted.    NEUROLOGICAL EXAM:  MENTAL STATUS: A x O x 3, good concentration, speech is fluent and goal directed  MEMORY: recent and remote are intact  CN: CN2-12 grossly intact  MOTOR: 4+/5 in  all muscle groups of b/l LE  DTRs: 0 symmetric LE patella and achilles  Sensation:    -no Loss of sensation in a left lower and right lower L-1, L-2, L-3, L-4 and L-5 bilaterally distribution.  Babinski: absent on the bilateral side(s)    GAIT: antalgic. Favors right side    =====================================================================  On physical exam, 5 out of 7 different sacroiliac joint provocation examinations were positive for sacroiliac joint pain.    For Ms. Davis, sacroiliac joint injections with anesthetic and/or corticosteroid are medically necessary because all of the following are met   Low back pain maximal below level of L5 which may radiate to the groin or lower extremity persisting at least 3 months; AND    Positive exam findings indicate a diagnosis including the pelvic distraction test, pelvic compression test, thigh thrust test, Yovani Finger Test, YOCASTA (Williams test) or Gaenslens test; AND    Active conservative treatment for a minimum of 6 weeks in the last 6 months (including physical therapy, home exercise, patient education, psychosocial support, and/or medication) has failed and/or significant pain is the medical reason which has prevented the patient from carrying this conservative treatment and is clearly documented within this note and his files.    If Ms. Davis does derive benefit, then the frequency of repeat therapeutic injections will be considered:   if symptoms recur and the patient has had at least a 50% improvement after each injection; AND    such injections are performed as one part of a comprehensive treatment program, which will nearly always include an exercise program to improve or maintain spinal mobility; AND    Unless there is medical justification, repeat injections will not be done more frequently than every six weeks for a total of 4 injections in a 12 month period. (Aron and Nish, 2002).

## 2022-03-29 ENCOUNTER — TELEPHONE (OUTPATIENT)
Dept: ENDOCRINOLOGY | Facility: CLINIC | Age: 77
End: 2022-03-29
Payer: MEDICARE

## 2022-04-04 ENCOUNTER — TELEPHONE (OUTPATIENT)
Dept: ORTHOPEDICS | Facility: CLINIC | Age: 77
End: 2022-04-04
Payer: MEDICARE

## 2022-04-04 NOTE — TELEPHONE ENCOUNTER
Spoke pt she would like to see  due to being discharged from  and still having hand pain r/s pt to 4/5/22 Pt voiced understanding and call ended.

## 2022-04-05 ENCOUNTER — OFFICE VISIT (OUTPATIENT)
Dept: ORTHOPEDICS | Facility: CLINIC | Age: 77
End: 2022-04-05
Payer: MEDICARE

## 2022-04-05 VITALS — HEIGHT: 67 IN | BODY MASS INDEX: 30.76 KG/M2 | WEIGHT: 196 LBS

## 2022-04-05 DIAGNOSIS — G56.00 CARPAL TUNNEL SYNDROME, UNSPECIFIED LATERALITY: ICD-10-CM

## 2022-04-05 PROCEDURE — 99214 OFFICE O/P EST MOD 30 MIN: CPT | Mod: PBBFAC,25 | Performed by: ORTHOPAEDIC SURGERY

## 2022-04-05 PROCEDURE — 99214 PR OFFICE/OUTPT VISIT, EST, LEVL IV, 30-39 MIN: ICD-10-PCS | Mod: 25,S$GLB,ICN, | Performed by: ORTHOPAEDIC SURGERY

## 2022-04-05 PROCEDURE — 99214 OFFICE O/P EST MOD 30 MIN: CPT | Mod: 25,S$GLB,ICN, | Performed by: ORTHOPAEDIC SURGERY

## 2022-04-05 PROCEDURE — 99999 PR PBB SHADOW E&M-EST. PATIENT-LVL IV: CPT | Mod: PBBFAC,,, | Performed by: ORTHOPAEDIC SURGERY

## 2022-04-05 PROCEDURE — 20526 THER INJECTION CARP TUNNEL: CPT | Mod: PBBFAC,LT | Performed by: ORTHOPAEDIC SURGERY

## 2022-04-05 PROCEDURE — 20526 CARPAL TUNNEL: ICD-10-PCS | Mod: LT,S$GLB,ICN, | Performed by: ORTHOPAEDIC SURGERY

## 2022-04-05 PROCEDURE — 99999 PR PBB SHADOW E&M-EST. PATIENT-LVL IV: ICD-10-PCS | Mod: PBBFAC,,, | Performed by: ORTHOPAEDIC SURGERY

## 2022-04-05 RX ADMIN — DEXAMETHASONE SODIUM PHOSPHATE 4 MG: 4 INJECTION INTRA-ARTICULAR; INTRALESIONAL; INTRAMUSCULAR; INTRAVENOUS; SOFT TISSUE at 01:04

## 2022-04-05 NOTE — PROGRESS NOTES
I have personally taken the history and examined the patient. I agree with the Hand Surgery PA's note. The plan will be :  pT HAS cts  Pt has a lot of pain at night, + Ct on provocative exam, EMG/NCS- moderate CTS offered revision surgery vs injection  After much discussion with the pt she is electing for injection

## 2022-04-05 NOTE — PROCEDURES
Carpal Tunnel    Date/Time: 4/5/2022 1:00 PM  Performed by: Lexi Steiner MD  Authorized by: Lexi Steiner MD     Consent Done?:  Yes (Verbal)  Indications:  Pain  Timeout: prior to procedure the correct patient, procedure, and site was verified    Prep: patient was prepped and draped in usual sterile fashion      Local anesthesia used?: Yes    Anesthesia:  Local infiltration  Local anesthetic:  Lidocaine 1% without epinephrine  Location:  Wrist  Site:  L carpal tunnel  Ultrasonic Guidance for Needle Placement?: Yes    Needle size:  25 G  Approach:  Volar  Medications:  4 mg dexamethasone 4 mg/mL

## 2022-04-05 NOTE — PROGRESS NOTES
"Subjective:      Patient ID: Lina Davis is a 76 y.o. female.    Chief Complaint: Numbness, Pain, and Swelling of the Left Hand      HPI  Lina Davis is a 76 y.o. female presenting today for evaluation of the left hand. She reports a hx of left carpal tunnel release when she was in her 30s. She reports intermittent numbness, tingling, and pain in the left hand. Pt notes onset several months ago. She has night time symptoms. She has not had injections. Recent EMG with moderate left carpal tunnel. She has very mild right sided symptoms at this time.      Review of patient's allergies indicates:   Allergen Reactions    Januvia [sitagliptin] Other (See Comments)     Pancreatitis      Metformin Other (See Comments)     Nausea and vomiting with immediate release - tolerates extended release         Current Outpatient Medications   Medication Sig Dispense Refill    alcohol swabs (BD ALCOHOL SWABS) PadM USE TWICE DAILY AS DIRECTED, E 11.9 200 each 3    allopurinoL (ZYLOPRIM) 300 MG tablet TAKE 1 TABLET EVERY DAY 90 tablet 3    amLODIPine (NORVASC) 10 MG tablet TAKE 1 TABLET EVERY DAY 90 tablet 3    atorvastatin (LIPITOR) 40 MG tablet Take 1 tablet (40 mg total) by mouth once daily. 90 tablet 3    blood glucose control, normal Soln True Metrix control solution E11.9 - pt tests twice daily 1 each 1    blood-glucose meter kit Check glucose daily E11.9 meter covered by insurance Don Metrix 1 each 0    DROPLET PEN NEEDLE 32 gauge x 5/32" Ndle USE ONE TIME DAILY TO INJECT INSULIN 100 each 3    empagliflozin (JARDIANCE) 10 mg tablet Take 1 tablet (10 mg total) by mouth once daily. 90 tablet 4    insulin aspart U-100 (NOVOLOG FLEXPEN U-100 INSULIN) 100 unit/mL (3 mL) InPn pen Inject 4 Units into the skin 3 (three) times daily with meals. Plus sliding scale - Max TDD 45 units 30 mL 3    insulin detemir U-100 (LEVEMIR FLEXTOUCH) 100 unit/mL (3 mL) SubQ InPn pen Inject 12 Units into the skin once daily. 2 each 4    " "lancets (TRUEPLUS LANCETS) 28 gauge Misc 1 lancet by Misc.(Non-Drug; Combo Route) route 2 (two) times daily. 200 each 2    losartan (COZAAR) 100 MG tablet Take 1 tablet (100 mg total) by mouth once daily. 90 tablet 3    metFORMIN (GLUCOPHAGE-XR) 500 MG ER 24hr tablet Take 1 tablet (500 mg total) by mouth 2 (two) times daily with meals. 180 tablet 3    omeprazole (PRILOSEC) 20 MG capsule One capsule twice daily 180 capsule 3    oxyCODONE-acetaminophen (PERCOCET) 5-325 mg per tablet Take 1 tablet by mouth every 24 hours as needed for Pain. 30 tablet 0    pen needle, diabetic 33 gauge x 5/32" Ndle Use daily with insulin 100 each 3    pioglitazone (ACTOS) 30 MG tablet Take 1 tablet (30 mg total) by mouth once daily. 90 tablet 1    tiZANidine (ZANAFLEX) 4 MG tablet Take 1 tablet (4 mg total) by mouth nightly as needed (back pain). 30 tablet 1    triamcinolone acetonide 0.1% (KENALOG) 0.1 % cream Apply topically 2 (two) times daily. 80 g 0    triamcinolone acetonide 0.1% (KENALOG) 0.1 % cream AAA lower legs bid prn itchy or red 454 g 3    TRUE METRIX GLUCOSE TEST STRIP Strp TEST BLOOD SUGAR TWICE DAILY 200 strip 2    meloxicam (MOBIC) 7.5 MG tablet TAKE 1 TABLET(7.5 MG) BY MOUTH EVERY DAY (Patient not taking: Reported on 4/5/2022) 15 tablet 0     No current facility-administered medications for this visit.       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 OR [address] [place] [address];  Service: Neurosurgery;  Laterality: N/A;    CARPAL TUNNEL RELEASE      bilateral    " "COLONOSCOPY N/A 3/7/2018    Procedure: COLONOSCOPY;  Surgeon: Luís Soni MD;  Location: [facility] ([address] [place] [address]);  Service: Endoscopy;  Laterality: N/A;  ok to schedule per Elizabeth    COLONOSCOPY W/ BIOPSIES AND POLYPECTOMY      HEMORRHOID SURGERY      HYSTERECTOMY      AUB    ROTATOR CUFF REPAIR      bilateral       Review of Systems:  Constitutional: Negative for chills and fever.   Respiratory: Negative for cough and shortness of breath.    Gastrointestinal: Negative for nausea and vomiting.   Skin: Negative for rash.   Neurological: Negative for dizziness and headaches.   Psychiatric/Behavioral: Negative for depression.   MSK as in HPI       OBJECTIVE:     PHYSICAL EXAM:  Ht 5' 7.01" (1.702 m)   Wt 88.9 kg (196 lb)   BMI 30.69 kg/m²     GEN:  NAD, well-developed, well-groomed.  NEURO: Awake, alert, and oriented. Normal attention and concentration.    PSYCH: Normal mood and affect. Behavior is normal.  HEENT: No cervical lymphadenopathy noted.  CARDIOVASCULAR: Radial pulses 2+ bilaterally. No LE edema noted.  PULMONARY: Breath sounds normal. No respiratory distress.  SKIN: Intact, no rashes.      MSK:   LUE:  Good active ROM of the wrist and fingers. Well healed prior carpal incision. Positive tinels at the carpal tunnel. AIN/PIN/Radial/Median/Ulnar Nerves assessed in isolation without deficit. Radial & Ulnar arteries palpated 2+. Capillary Refill <3s.      RADIOGRAPHS:  Xray left hand 12/20/21  FINDINGS:  No acute fractures.  Joint spaces appear relatively well maintained mild degenerative changes of the interphalangeal joints as seen on prior radiograph are similar.  No radiopaque foreign bodies.     Impression:   Mild degenerative changes, similar to prior.     Comments: I have personally reviewed the imaging and I agree with the above radiologist's report.    EMG 2/14/22   Impression:  1. 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 Carpal " tunnel syndrome).  There is motor axonal loss on the right.  There is no active denervation.  This is graded as Moderate-Severe in severity on the right, and Moderate on the left.      2. 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    ASSESSMENT/PLAN:       ICD-10-CM ICD-9-CM   1. Carpal tunnel syndrome, unspecified laterality  G56.00 354.0       Orders Placed This Encounter    Carpal Tunnel     Plan:   EMG reviewed treatment options discussed plan for left carpal tunnel injection   RTC as needed if symptoms persist       PROCEDURE:  I have explained the risks, benefits, and alternatives of the procedure in detail.  The patient voices understanding and all questions have been answered. US used. The patient agrees to proceed as planned. US used. So after a sterile prep of the skin in the normal fashion the left carpal tunnel is injected from the volar approach using a 25 gauge needle with a combination of 1cc 1% plain lidocaine and 4 mg of dexamethaosne.  The patient is cautioned and immediate relief of pain is secondary to the local anesthetic and will be temporary.  After the anesthetic wears off there may be a increase in pain that may last for a few hours or a few days and they should use ice to help alleviate this flair up of pain. Patient tolerated the procedure well.       The patient indicates understanding of these issues and agrees to the plan.    Carolyn Salcedo PA-C  [facility]   [facility]  [place], [place]

## 2022-04-08 RX ORDER — DEXAMETHASONE SODIUM PHOSPHATE 4 MG/ML
4 INJECTION, SOLUTION INTRA-ARTICULAR; INTRALESIONAL; INTRAMUSCULAR; INTRAVENOUS; SOFT TISSUE
Status: DISCONTINUED | OUTPATIENT
Start: 2022-04-05 | End: 2022-04-08 | Stop reason: HOSPADM

## 2022-04-11 ENCOUNTER — TELEPHONE (OUTPATIENT)
Dept: PAIN MEDICINE | Facility: CLINIC | Age: 77
End: 2022-04-11
Payer: MEDICARE

## 2022-04-11 NOTE — TELEPHONE ENCOUNTER
Staff spoke with the patient regarding their procedure with Dr. Pena scheduled on 04/12/2022; the patient was provided the Arrival Information and scheduled time 07:45AM     The patient verbalized understanding.    Thank you,

## 2022-04-11 NOTE — TELEPHONE ENCOUNTER
----- Message from Kelsi Rizzo sent at 4/11/2022  2:41 PM CDT -----  Type:  Patient Returning Call    Who Called: Ran chato's     Who Left Message for Patient: unknown    Does the patient know what this is regarding?: surgery    Would the patient rather a call back or a response via [facility]? Call back    Best Call Back Number: [phone]

## 2022-04-12 ENCOUNTER — HOSPITAL ENCOUNTER (OUTPATIENT)
Facility: HOSPITAL | Age: 77
Discharge: HOME | End: 2022-04-12
Attending: STUDENT IN AN ORGANIZED HEALTH CARE EDUCATION/TRAINING PROGRAM | Admitting: STUDENT IN AN ORGANIZED HEALTH CARE EDUCATION/TRAINING PROGRAM
Payer: MEDICARE

## 2022-04-12 VITALS
DIASTOLIC BLOOD PRESSURE: 63 MMHG | OXYGEN SATURATION: 98 % | RESPIRATION RATE: 15 BRPM | TEMPERATURE: 98 F | SYSTOLIC BLOOD PRESSURE: 135 MMHG | HEART RATE: 91 BPM | BODY MASS INDEX: 30.76 KG/M2 | WEIGHT: 196 LBS | HEIGHT: 67 IN

## 2022-04-12 DIAGNOSIS — M46.1 SACROILIITIS: Primary | ICD-10-CM

## 2022-04-12 LAB
CTP QC/QA: YES
POCT GLUCOSE: 167 MG/DL (ref 70–110)
SARS-COV-2 AG RESP QL IA.RAPID: NEGATIVE

## 2022-04-12 PROCEDURE — 99152 MOD SED SAME PHYS/QHP 5/>YRS: CPT | Performed by: STUDENT IN AN ORGANIZED HEALTH CARE EDUCATION/TRAINING PROGRAM

## 2022-04-12 PROCEDURE — 27096 INJECT SACROILIAC JOINT: CPT | Mod: RT

## 2022-04-12 PROCEDURE — 63600175 PHARM REV CODE 636 W HCPCS: Performed by: STUDENT IN AN ORGANIZED HEALTH CARE EDUCATION/TRAINING PROGRAM

## 2022-04-12 PROCEDURE — 99900035 HC TECH TIME PER 15 MIN (STAT)

## 2022-04-12 PROCEDURE — 94761 N-INVAS EAR/PLS OXIMETRY MLT: CPT

## 2022-04-12 PROCEDURE — 27096 INJECT SACROILIAC JOINT: CPT | Mod: 50,,, | Performed by: STUDENT IN AN ORGANIZED HEALTH CARE EDUCATION/TRAINING PROGRAM

## 2022-04-12 PROCEDURE — 27096 PR INJECTION,SACROILIAC JOINT: ICD-10-PCS | Mod: 50,,, | Performed by: STUDENT IN AN ORGANIZED HEALTH CARE EDUCATION/TRAINING PROGRAM

## 2022-04-12 PROCEDURE — 25500020 PHARM REV CODE 255: Performed by: STUDENT IN AN ORGANIZED HEALTH CARE EDUCATION/TRAINING PROGRAM

## 2022-04-12 PROCEDURE — 25000003 PHARM REV CODE 250: Performed by: STUDENT IN AN ORGANIZED HEALTH CARE EDUCATION/TRAINING PROGRAM

## 2022-04-12 PROCEDURE — 27096 INJECT SACROILIAC JOINT: CPT | Mod: 50 | Performed by: STUDENT IN AN ORGANIZED HEALTH CARE EDUCATION/TRAINING PROGRAM

## 2022-04-12 RX ORDER — FENTANYL CITRATE 50 UG/ML
25 INJECTION, SOLUTION INTRAMUSCULAR; INTRAVENOUS ONCE AS NEEDED
Status: COMPLETED | OUTPATIENT
Start: 2022-04-12 | End: 2022-04-12

## 2022-04-12 RX ORDER — TRIAMCINOLONE ACETONIDE 40 MG/ML
INJECTION, SUSPENSION INTRA-ARTICULAR; INTRAMUSCULAR
Status: DISCONTINUED | OUTPATIENT
Start: 2022-04-12 | End: 2022-04-12 | Stop reason: HOSPADM

## 2022-04-12 RX ORDER — BUPIVACAINE HYDROCHLORIDE 2.5 MG/ML
INJECTION, SOLUTION EPIDURAL; INFILTRATION; INTRACAUDAL
Status: DISCONTINUED | OUTPATIENT
Start: 2022-04-12 | End: 2022-04-12 | Stop reason: HOSPADM

## 2022-04-12 RX ORDER — MIDAZOLAM HYDROCHLORIDE 1 MG/ML
2 INJECTION INTRAMUSCULAR; INTRAVENOUS ONCE AS NEEDED
Status: COMPLETED | OUTPATIENT
Start: 2022-04-12 | End: 2022-04-12

## 2022-04-12 RX ORDER — ASPIRIN 81 MG/1
81 TABLET ORAL DAILY
COMMUNITY

## 2022-04-12 RX ORDER — LIDOCAINE HYDROCHLORIDE 10 MG/ML
INJECTION INFILTRATION; PERINEURAL
Status: DISCONTINUED | OUTPATIENT
Start: 2022-04-12 | End: 2022-04-12 | Stop reason: HOSPADM

## 2022-04-12 RX ORDER — SODIUM CHLORIDE 9 MG/ML
500 INJECTION, SOLUTION INTRAVENOUS CONTINUOUS
Status: DISCONTINUED | OUTPATIENT
Start: 2022-04-12 | End: 2022-04-12 | Stop reason: HOSPADM

## 2022-04-12 NOTE — OP NOTE
"PROCEDURE: bilateral Sacroiliac Joint Injection    Patient Name: Lina Davis  MRN: [id number]      PROCEDURE DATE: 4/12/2022    Injection # 1 this year    DIAGNOSIS: Sacroiliitis (M46.1)  CPT CODE: 36141    POSTPROCEDURE DIAGNOSIS Same    PHYSICIAN: Leland Pena DO  NEEDLE TYPE: - 22G 3.5" Spinal Needle  MEDICATIONS INJECTED: 5ml 0.25% Bupivacaine + 20mg Kenalog (40mg/ml) at each site  LOCAL ANESTHETIC USED: Lidocaine 1%, 3-4ml at each level  CONTRAST: 1-2ml Omni 300    Sedation Medications - Mild Sedation with 1md Versed and 25mcg Fentanyl    Estimated Blood Loss - <2ml  Drains: None  Specimens Removed: None  Urine Output - Not Measured  Complications: None  Outcome: good    Informed Consent:  The patient's condition and proposed procedures, risks, and alternatives were discussed with the patient or responsible party.  The patient's / responsible party's questions were answered.   The patient / responsible party appeared to understand and chose to proceed.  Informed consent was obtained.    Procedure in Detail:  The patient was taken back to the OR fluoroscopy suite and placed in a prone position. The skin overlying the injection site was prepped and draped in an aseptic fashion. The target injection site (see above) was identified with fluoroscopy.     Procedural Pause:  A procedural pause verifying correct patient, medical record number, allergies, medications to be administered, current vital signs, and surgical site was performed immediately prior to beginning the procedure.    The skin and subcutaneous tissue overlying the target site of injection was anesthetized using 2-3 ml of 1% lidocaine MPF with a 25-gauge, 1½ -inch needle.  The above noted spinal needle was directed into the inferior aspect of the above noted sacroiliac joint using a posterior approach.  A "giving way" at the needle hub was noted once the dorsal sacroiliac and interosseous ligaments were engaged.  After negative aspiration for " heme, the above noted contrast was injected, outlining the coin-shaped inferior recess of the joint.  Provocation response consisting of intense buttock pain was not positive.  After negative aspiration for heme, the above noted solution was slowly injected.  The needle was then retracted approximately longterm and the needle track was flushed with 0.5 mL of lidocaine 1%.  The needle(s) was then removed. A sterile bandage was placed over the injection site.     The same procedural technique outlined above was repeated on the OPPOSITE side.    The heart rate, pulse oximetry, and blood pressure were continuously monitored throughout the procedure.  The procedure was well tolerated. She was carefully escorted to the recovery room in stable condition. Patient was monitored by RN for recovery period.  The patient will be contacted in the next few days to determine extent of relief.  Patient was given post procedure and discharge instructions to follow at home.  The patient was discharged in a stable condition.    Note Electronically Signed By:  Leland Spencer  04/12/2022

## 2022-04-12 NOTE — PATIENT INSTRUCTIONS
[facility]  [facility]  "[employer] service # [phone]    POST-PROCEDURE INSTRUCTIONS:    Today you had an injection that included a steroid medications.  The steroid may or may not have been mixed with a local anesthetic when it was injected.   If the injection was in the neck, you may feel some pressure, numbness, or slight weakness in the arm after the procedure for a short period of time (this is a normal response), if this persists for longer than 1 day please contact our office or go to the emergency room.  If the injection was in the low back, you may feel some pressure, numbness, or slight weakness in the leg after the procedure for a short period of time (this is a normal response), if this persists for longer than 1 day please contact our office or go to the emergency room.  You may get side effects from the steroid.  This is not uncommon.  Symptoms include: elevated blood sugar, elevated blood pressure, headache, flushing, nausea, insomnia.  These symptoms are transient and will resolve within 1-3 days.  If symptoms last longer than this please contact our office or head to the emergency room.  Steroid medications can take anywhere from 3-14 days to take effect (rarely longer).  You may notice that your pain worsens for a short period of time after the injection, this would not be unusual due to the pressure and trauma from the needle.    If you do not have a follow up appointment scheduled, please contact our office to get a post-procedure follow up scheduled 2-3 weeks after the procedure.  This can be done as a virtual visit if that is more convenient for you.    What you need to do:    Keep a record of your response to the injection you had today.    How much relief did you get?   When did the relief start and how long did it last?  Were you able to decrease the use of any of your pain medications?  Were you able to increase your level of activity?  How long did  the relief last?    What to watch out for:    If you experience any of the following symptoms after your procedure, please notify the messaging service immediately (see above for contact information):   fever (increased oral temperature)   bleeding or swelling at the injection site,    drainage, rash or redness at the injection site    possible signs of infection    increased pain at the injection site   worsening of your usual pain   severe headache   new or worsening numbness    new arm and/or leg weakness, or    changes in bowel and/or bladder function: urinating or defecating on yourself and not knowing that you did it.    PLEASE FOLLOW ALL INSTRUCTIONS CAREFULLY     Do not engage in strenuous activity (e.g., lifting or pushing heavy objects or repeated bending) for 24 hours.     Do not take a bath, swim or use Jacuzzi for 24 hours after procedure. (A shower is fine).   Remove any Band-Aids when you get home.    Use cold/ice, as needed for comfort.  We recommend the use of cold therapy alternating on for 20 minutes, off for 20 minutes.    Do not apply direct heat (heating pad or heat packs) to the injection site for 24 hours.     Resume your usual medications, unless instructed otherwise by your Pain Physician.     If you are on warfarin (Coumadin) or other blood thinner, resume this medication as instructed by your prescribing Physician.    IF AT ANY POINT YOU ARE VERY CONCERNED ABOUT YOUR SYMPTOMS, PLEASE GO TO THE EMERGENCY ROOM.    If you develop worsening pain, weakness, numbness, lose bowel or bladder control (i.e., having an accident where you did not even know you had to go to the bathroom and suddenly noticed you soiled yourself), saddle anesthesia (a loss of sensation restricted to the area of the buttocks, anus and between the legs -- i.e., those parts of your body that would touch a saddle if you were sitting on one) you need to go immediately to the emergency department for evaluation and  treatment.    ----------------------------------------------------------------------------------------------------------------------------------------------------------------  If you received Sedation please read the following instructions:  POST SEDATION INSTRUCTIONS    Today you received intravenous medication (also known as sedation) that was used to help you relax and/or decrease discomfort during your procedure. This medication will be acting in your body for the next 24 hours, so you might feel a little tired or sleepy. This feeling will slowly wear off.   Common side effects associated with these medications include: drowsiness, dizziness, sleepiness, confusion, feeling excited, difficulty remembering things, lack of steadiness with walking or balance, loss of fine muscle control, slowed reflexes, difficulty focusing, and blurred vision.  Some over-the-counter and prescription medications (e.g., muscle relaxants, opioids, mood-altering medications, sedatives/hypnotics, antihistamines) can interact with the intravenous medication you received and cause an increased risk of the side effects listed above in addition to other potentially life threatening side effects. Use extreme caution if you are taking such medications, and consult with your Pain Physician or prescribing physician if you have any questions.  For the next 12-24 hours:    DO NOT--Drive a car, operate machinery or power tools   DO NOT--Drink any alcoholic beverages (not even beer), they may dangerously increase the risk of side effects.    DO NOT--Make any important legal or business decisions or sign important documents.  We advise you to have someone to assist you at home. Move slowly and carefully. Do not make sudden changes in position. Be aware of dizziness or light-headedness and move accordingly.   If you seek medical treatment within 24 hours, let the nurse or doctor caring for you know that you have received the above medications. If you  have any questions or concerns related to your sedation or treatment today please contact us.

## 2022-04-12 NOTE — PLAN OF CARE
VSS. Pt denies c/o pain. Bandaids intact.  No distress noted. Pt states she is ready for D/C. D/C instructions reviewed with pt and spouse, verbalized understanding.

## 2022-04-12 NOTE — INTERVAL H&P NOTE
The patient has been examined and the H&P has been reviewed:    I concur with the findings and no changes have occurred since H&P was written.    Anesthesia/Surgery risks, benefits and alternative options discussed and understood by patient/family.          There are no hospital problems to display for this patient.    
PAIN/HEADACHE

## 2022-04-27 ENCOUNTER — TELEPHONE (OUTPATIENT)
Dept: PAIN MEDICINE | Facility: CLINIC | Age: 77
End: 2022-04-27
Payer: MEDICARE

## 2022-04-27 NOTE — TELEPHONE ENCOUNTER
This message is for Ms. Davis in regards to his/her appointment 04/28/2022 at 2:30pm    [facility] Policy: For the safety of all patients and staff members. Please review [facility] Patient Visitor policy below.    Patient Visitor Policy:     Due to social distancing and limited seating, the clinic staff asks that all patients arrive on time for their scheduled appointment.  During this visit, we ask all patients to only have one visitor over the age of 18yrs old to accompany them to be seen by Dr. Leland Pena.  If the patient does not require assistance with their visit, all visitors remain in the waiting area.  Upon arriving, patients and visitors are required to wear a face mask.  If the patient or visitor does not have a face mask, one will be provided to you when entering the facility.      Address: [facility] [facility], [address] [place] [address]    Thank you,  Interventional Pain Staff   [phone]      We are always striving for excellence. Should you receive a patient experience survey via text message, electronically, or by mail, we would appreciate if you would take a few moments to give us your feedback. These surveys let us know our strengths as well as areas of opportunity for improvement to better serve you.    Patient verbalized understanding.

## 2022-04-28 ENCOUNTER — OFFICE VISIT (OUTPATIENT)
Dept: PAIN MEDICINE | Facility: CLINIC | Age: 77
End: 2022-04-28
Payer: MEDICARE

## 2022-04-28 ENCOUNTER — TELEPHONE (OUTPATIENT)
Dept: INTERNAL MEDICINE | Facility: CLINIC | Age: 77
End: 2022-04-28
Payer: MEDICARE

## 2022-04-28 VITALS
HEART RATE: 92 BPM | HEIGHT: 67 IN | SYSTOLIC BLOOD PRESSURE: 109 MMHG | DIASTOLIC BLOOD PRESSURE: 69 MMHG | BODY MASS INDEX: 30.76 KG/M2 | WEIGHT: 196 LBS | RESPIRATION RATE: 18 BRPM

## 2022-04-28 DIAGNOSIS — E11.21 TYPE 2 DIABETES MELLITUS WITH DIABETIC NEPHROPATHY, WITH LONG-TERM CURRENT USE OF INSULIN: ICD-10-CM

## 2022-04-28 DIAGNOSIS — M48.00 SPINAL STENOSIS, UNSPECIFIED SPINAL REGION: ICD-10-CM

## 2022-04-28 DIAGNOSIS — M51.36 DDD (DEGENERATIVE DISC DISEASE), LUMBAR: ICD-10-CM

## 2022-04-28 DIAGNOSIS — M54.16 LUMBAR RADICULOPATHY: Primary | ICD-10-CM

## 2022-04-28 DIAGNOSIS — Z79.4 TYPE 2 DIABETES MELLITUS WITH DIABETIC NEPHROPATHY, WITH LONG-TERM CURRENT USE OF INSULIN: ICD-10-CM

## 2022-04-28 DIAGNOSIS — M47.816 LUMBAR SPONDYLOSIS: ICD-10-CM

## 2022-04-28 DIAGNOSIS — N18.30 STAGE 3 CHRONIC KIDNEY DISEASE, UNSPECIFIED WHETHER STAGE 3A OR 3B CKD: ICD-10-CM

## 2022-04-28 PROCEDURE — 3072F PR LOW RISK FOR RETINOPATHY: ICD-10-PCS | Mod: CPTII,S$GLB,, | Performed by: STUDENT IN AN ORGANIZED HEALTH CARE EDUCATION/TRAINING PROGRAM

## 2022-04-28 PROCEDURE — 1159F MED LIST DOCD IN RCRD: CPT | Mod: CPTII,S$GLB,, | Performed by: STUDENT IN AN ORGANIZED HEALTH CARE EDUCATION/TRAINING PROGRAM

## 2022-04-28 PROCEDURE — 1160F RVW MEDS BY RX/DR IN RCRD: CPT | Mod: CPTII,S$GLB,, | Performed by: STUDENT IN AN ORGANIZED HEALTH CARE EDUCATION/TRAINING PROGRAM

## 2022-04-28 PROCEDURE — 1101F PT FALLS ASSESS-DOCD LE1/YR: CPT | Mod: CPTII,S$GLB,, | Performed by: STUDENT IN AN ORGANIZED HEALTH CARE EDUCATION/TRAINING PROGRAM

## 2022-04-28 PROCEDURE — 1160F PR REVIEW ALL MEDS BY PRESCRIBER/CLIN PHARMACIST DOCUMENTED: ICD-10-PCS | Mod: CPTII,S$GLB,, | Performed by: STUDENT IN AN ORGANIZED HEALTH CARE EDUCATION/TRAINING PROGRAM

## 2022-04-28 PROCEDURE — 3078F DIAST BP <80 MM HG: CPT | Mod: CPTII,S$GLB,, | Performed by: STUDENT IN AN ORGANIZED HEALTH CARE EDUCATION/TRAINING PROGRAM

## 2022-04-28 PROCEDURE — 3044F HG A1C LEVEL LT 7.0%: CPT | Mod: CPTII,S$GLB,, | Performed by: STUDENT IN AN ORGANIZED HEALTH CARE EDUCATION/TRAINING PROGRAM

## 2022-04-28 PROCEDURE — 3288F FALL RISK ASSESSMENT DOCD: CPT | Mod: CPTII,S$GLB,, | Performed by: STUDENT IN AN ORGANIZED HEALTH CARE EDUCATION/TRAINING PROGRAM

## 2022-04-28 PROCEDURE — 3074F PR MOST RECENT SYSTOLIC BLOOD PRESSURE < 130 MM HG: ICD-10-PCS | Mod: CPTII,S$GLB,, | Performed by: STUDENT IN AN ORGANIZED HEALTH CARE EDUCATION/TRAINING PROGRAM

## 2022-04-28 PROCEDURE — 3074F SYST BP LT 130 MM HG: CPT | Mod: CPTII,S$GLB,, | Performed by: STUDENT IN AN ORGANIZED HEALTH CARE EDUCATION/TRAINING PROGRAM

## 2022-04-28 PROCEDURE — 4010F ACE/ARB THERAPY RXD/TAKEN: CPT | Mod: CPTII,S$GLB,, | Performed by: STUDENT IN AN ORGANIZED HEALTH CARE EDUCATION/TRAINING PROGRAM

## 2022-04-28 PROCEDURE — 3288F PR FALLS RISK ASSESSMENT DOCUMENTED: ICD-10-PCS | Mod: CPTII,S$GLB,, | Performed by: STUDENT IN AN ORGANIZED HEALTH CARE EDUCATION/TRAINING PROGRAM

## 2022-04-28 PROCEDURE — 99499 RISK ADDL DX/OHS AUDIT: ICD-10-PCS | Mod: S$GLB,,, | Performed by: STUDENT IN AN ORGANIZED HEALTH CARE EDUCATION/TRAINING PROGRAM

## 2022-04-28 PROCEDURE — 4010F PR ACE/ARB THEARPY RXD/TAKEN: ICD-10-PCS | Mod: CPTII,S$GLB,, | Performed by: STUDENT IN AN ORGANIZED HEALTH CARE EDUCATION/TRAINING PROGRAM

## 2022-04-28 PROCEDURE — 99215 PR OFFICE/OUTPT VISIT, EST, LEVL V, 40-54 MIN: ICD-10-PCS | Mod: S$GLB,,, | Performed by: STUDENT IN AN ORGANIZED HEALTH CARE EDUCATION/TRAINING PROGRAM

## 2022-04-28 PROCEDURE — 99499 UNLISTED E&M SERVICE: CPT | Mod: S$GLB,,, | Performed by: STUDENT IN AN ORGANIZED HEALTH CARE EDUCATION/TRAINING PROGRAM

## 2022-04-28 PROCEDURE — 1125F PR PAIN SEVERITY QUANTIFIED, PAIN PRESENT: ICD-10-PCS | Mod: CPTII,S$GLB,, | Performed by: STUDENT IN AN ORGANIZED HEALTH CARE EDUCATION/TRAINING PROGRAM

## 2022-04-28 PROCEDURE — 3078F PR MOST RECENT DIASTOLIC BLOOD PRESSURE < 80 MM HG: ICD-10-PCS | Mod: CPTII,S$GLB,, | Performed by: STUDENT IN AN ORGANIZED HEALTH CARE EDUCATION/TRAINING PROGRAM

## 2022-04-28 PROCEDURE — 3072F LOW RISK FOR RETINOPATHY: CPT | Mod: CPTII,S$GLB,, | Performed by: STUDENT IN AN ORGANIZED HEALTH CARE EDUCATION/TRAINING PROGRAM

## 2022-04-28 PROCEDURE — 99999 PR PBB SHADOW E&M-EST. PATIENT-LVL V: ICD-10-PCS | Mod: PBBFAC,,, | Performed by: STUDENT IN AN ORGANIZED HEALTH CARE EDUCATION/TRAINING PROGRAM

## 2022-04-28 PROCEDURE — 1125F AMNT PAIN NOTED PAIN PRSNT: CPT | Mod: CPTII,S$GLB,, | Performed by: STUDENT IN AN ORGANIZED HEALTH CARE EDUCATION/TRAINING PROGRAM

## 2022-04-28 PROCEDURE — 99999 PR PBB SHADOW E&M-EST. PATIENT-LVL V: CPT | Mod: PBBFAC,,, | Performed by: STUDENT IN AN ORGANIZED HEALTH CARE EDUCATION/TRAINING PROGRAM

## 2022-04-28 PROCEDURE — 1101F PR PT FALLS ASSESS DOC 0-1 FALLS W/OUT INJ PAST YR: ICD-10-PCS | Mod: CPTII,S$GLB,, | Performed by: STUDENT IN AN ORGANIZED HEALTH CARE EDUCATION/TRAINING PROGRAM

## 2022-04-28 PROCEDURE — 3044F PR MOST RECENT HEMOGLOBIN A1C LEVEL <7.0%: ICD-10-PCS | Mod: CPTII,S$GLB,, | Performed by: STUDENT IN AN ORGANIZED HEALTH CARE EDUCATION/TRAINING PROGRAM

## 2022-04-28 PROCEDURE — 99215 OFFICE O/P EST HI 40 MIN: CPT | Mod: S$GLB,,, | Performed by: STUDENT IN AN ORGANIZED HEALTH CARE EDUCATION/TRAINING PROGRAM

## 2022-04-28 PROCEDURE — 1159F PR MEDICATION LIST DOCUMENTED IN MEDICAL RECORD: ICD-10-PCS | Mod: CPTII,S$GLB,, | Performed by: STUDENT IN AN ORGANIZED HEALTH CARE EDUCATION/TRAINING PROGRAM

## 2022-04-28 RX ORDER — PREGABALIN 50 MG/1
50 CAPSULE ORAL 2 TIMES DAILY
Qty: 180 CAPSULE | Refills: 0 | Status: SHIPPED | OUTPATIENT
Start: 2022-04-28 | End: 2022-06-28

## 2022-04-28 RX ORDER — OXYCODONE AND ACETAMINOPHEN 5; 325 MG/1; MG/1
1 TABLET ORAL
Qty: 30 TABLET | Refills: 0 | Status: SHIPPED | OUTPATIENT
Start: 2022-04-28 | End: 2022-06-28 | Stop reason: SDUPTHER

## 2022-04-28 NOTE — PROGRESS NOTES
Chronic Pain - New Consult    Referring Physician: No ref. provider found    Date: 04/28/2022     Re: Lina Davis  MR#: [id number]  YOB: 1945  Age: 77 y.o.    Chief Complaint:   Chief Complaint   Patient presents with    Low-back Pain    Leg Pain     bilateral     **This note is dictated using the M*Modal Fluency Direct word recognition program. There are word recognition mistakes that are occasionally missed on review.**    ASSESSMENT: 77 y.o. year old female with back and leg pain, consistent with     1. Lumbar radiculopathy  pregabalin (LYRICA) 50 MG capsule   2. Stage 3 chronic kidney disease, unspecified whether stage 3a or 3b CKD  Basic Metabolic Panel   3. Spinal stenosis, unspecified spinal region  pregabalin (LYRICA) 50 MG capsule   4. Lumbar spondylosis     5. DDD (degenerative disc disease), lumbar     6. Type 2 diabetes mellitus with diabetic nephropathy, with long-term current use of insulin         PLAN:     Lumbar radiculopathy  -patient's radicular pattern is consistent with b/l L5/S1 radiculopathy.  Since SIJ was not successful in treating her symptoms we will try L5-S1 LESI. Risks, benefits, and alternatives were discussed with the patient, and She would like to proceed.  -NEEDS NEW BMP before injection to evaluate kidney function.  Will schedule for the end of may to assess GFR/Cr.  If worsening function then will postpone and have patient see nephrology.  -Possible SCS candidate if epidural fails.  -Trial Lyrica 50mg BID. Dose adjustment to 50% due to CKD3    Chronic low back pain - multifactorial  -etiology not totally clear.  Likely multifactorial with elements of SI joint, lumbar spondylosis, lumbar radiculopathy, spinal stenosis, degenerative disc disease all playing some part.  -failed tizanidine  -failed PT    Sacroiliitis  -unlikely source of pain given poor response to the injection, despite the (+) PE findings.  -s/p bilateral SIJ on 4/12/22 - 10% relief at 2  weeks    Lumbar spondylosis   -patient has notable facet arthritis of the lumbar spine and may be candidate for MBB/RFA. Because the radicular pain is worse, we will try an epidural first.    DM2  -on insulin  -discussed risks of steroid injection while diabetic.    CKD3  -GFR 39.3 on 3/17/22  -New BMP ordered for the end of May to recheck kidney function.  If Creatine/GFR is elevated above normal then we will postpone the procedure and have patient see nephrology.    - RTC 2 month  - Counseled patient regarding the importance of weight loss and activity modification and physical therapy.    The above plan and management options were discussed at length with patient. Patient is in agreement with the above and verbalized understanding. It will be communicated with the referring physician via electronic record, fax, or mail.  Lab/study reports reviewed were important and necessary because subsequent medical and treatment recommendations required review of the above lab/study reports. Images viewed/reviewed above were important and necessary because subsequent medical and treatment recommendations required review of the reviewed image(s).     Electronically signed by:  Leland Pena DO  04/28/2022    =========================================================================================================    SUBJECTIVE:    Interval History 4/28/2022:     Lina Davis is a 77 y.o. female presents to the clinic for follow up.  Since last visit the pain has is unchanged. She did not get any signfiicant improvement from the SI joint injection. Her leg pain > back pain.  It goes down both lateral legs from the buttocks to the top of the feet on both sides.    The pain is located in the lower back area and radiates to the legs.  The pain is described as aching    At BEST  7/10   At WORST  10/10 on the WORST day.    On average pain is rated as 8/10.   Today the pain is rated as 7/10  Symptoms interfere with daily  activity and sleeping.   Exacerbating factors: Sitting and night time.    Mitigating factors medications.     Current pain medications: Percocet 5mg (2-3x/week), topicals  Failed Pain Medications: gabapentin, meloxicam, oxycodone, steroids, tylenol, tramadol, tizanidine, tylenol    Initial hx:  Lina Davis is a 77 y.o. female presents to the clinic for the evaluation of lower back pain. The pain started 6 year ago following no inciting event and symptoms have been worsening.  The patient states that she has low back pain that radiates down both legs to the top of the bilateral feet.   Sometimes the pain with radiate in to the inside of the leg. The pain waker her up in the middle of the night.  The pain usually starts when she lays down. The patient cannot sleep on her back, so she has to rotate from side to side.  The patient has tried different topicals ointments, tylenol, gabapentin.  Leg pain > back pain.     Dr. Freedman tried a cortisone shot which did not help.  She has a small script of oxycodone which she takes rarely.  She takes 2 tylenol extra strength every night.  She tried gabapentin 600mg TID and it was not helpful. Meloxicam not helpful.    Pain Description:    The pain is located in the lower back area and radiates to the legs down to the feet.    At BEST  6/10   At WORST  10/10 on the WORST day.    On average pain is rated as 7/10.   Today the pain is rated as 8/10  The pain is continuous.  The pain is described as aching and sharp    Symptoms interfere with daily activity and sleeping.   Exacerbating factors: Standing, Laying, Walking and Night Time.    Mitigating factors rubbing gel and nothing .   She reports 3 hours of sleep per night.    Physical Therapy/Home Exercise: No, not currently in physical therapy or home exercise program    Current Pain Medications:    - Opioids: Percocet 5mg (2-3x/week)  - Topicals: multiple    Failed Pain Medications:    - gabapentin, meloxicam, oxycodone,  steroids, tylenol, tramadol    Pain Treatment Therapies:    Pain procedures: none  Physical Therapy: none  Chiropractor: none  Acupuncture: none  TENS unit: none  Spinal decompression: none  Joint replacement: none    Patient denies urinary incontinence, bowel incontinence and loss of sensations.  Patient denies any suicidal or homicidal ideations     report:  Reviewed and consistent with medication use as prescribed.    Imaging:   XR lumbar 2/14/22:  Minimal mild levoscoliosis thoracic lumbar junction, elevation right hemipelvis, prominent bridging osteophytes lower dorsal and lumbar spine particularly L2-L3 and right L4-L5.  DJD SI joints.  Primary anterior subluxation L4 on L5, vacuum disc deformity L5-S1.  Prompt facet joint arthropathy particularly L3 through L5 levels.    XR HIPs 2/14/22:  Prominent degenerative disc spondylosis facet joint arthropathy lumbosacral junction, elevation right hemipelvis, injection site calcification left lateral buttocks.  DJD SI joints.  Mild DJD hip joints and hypertrophic change cortex greater tuberosities.      Past Medical History:   Diagnosis Date    Anemia     Arthritis     Cataract     Gout, arthritis     HTN (hypertension), benign     Polyneuropathy     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    Vitamin D deficiency disease      Past Surgical History:   Procedure Laterality Date    ANTERIOR CERVICAL DISCECTOMY W/ FUSION N/A 3/19/2020    Procedure: DISCECTOMY, SPINE, CERVICAL, ANTERIOR APPROACH, WITH FUSION, C3-C4, C4-C5, C5-C6;  Surgeon: Joseph Walton MD;  Location: [facility] OR [address] [place] [address];  Service: Neurosurgery;  Laterality: N/A;    CARPAL TUNNEL RELEASE      bilateral    COLONOSCOPY N/A 3/7/2018    Procedure: COLONOSCOPY;  Surgeon: Luís Soni MD;  Location: [facility] ([address] [place] [address]);  Service: Endoscopy;  Laterality: N/A;  ok to schedule per Elizabeth    COLONOSCOPY W/ BIOPSIES AND POLYPECTOMY      HEMORRHOID SURGERY      HYSTERECTOMY       AUB    INJECTION OF JOINT Bilateral 4/12/2022    Procedure: SACROILIAC JOINT Steroid Injection-Bilateral;  Surgeon: Leland Pena DO;  Location: [facility] OR;  Service: Pain Management;  Laterality: Bilateral;    ROTATOR CUFF REPAIR      bilateral     Social History     Socioeconomic History    Marital status:    Tobacco Use    Smoking status: Never Smoker    Smokeless tobacco: Never Used   Substance and Sexual Activity    Alcohol use: No    Drug use: No    Sexual activity: Yes     Partners: Male     Birth control/protection: Surgical     Family History   Problem Relation Age of Onset    Heart disease Mother     Diabetes Mother     Heart disease Father     Cancer Father         liver    Diabetes Sister     Cataracts Sister     Kidney disease Brother     Heart disease Brother     Diabetes Sister     COPD Brother     COPD Brother     Gout Brother     Benign prostatic hyperplasia Brother     Heart disease Brother     Kidney disease Brother     Heart attack Brother         heart attack    Kidney failure Brother     Lupus Sister     Heart attack Brother     Drug abuse Brother     No Known Problems Daughter     No Known Problems Son     Amblyopia Neg Hx     Blindness Neg Hx     Breast cancer Neg Hx     Colon cancer Neg Hx     Ovarian cancer Neg Hx        Review of patient's allergies indicates:   Allergen Reactions    Januvia [sitagliptin] Other (See Comments)     Pancreatitis      Metformin Other (See Comments)     Nausea and vomiting with immediate release - tolerates extended release       Current Outpatient Medications   Medication Sig    alcohol swabs (BD ALCOHOL SWABS) PadM USE TWICE DAILY AS DIRECTED, E 11.9    allopurinoL (ZYLOPRIM) 300 MG tablet TAKE 1 TABLET EVERY DAY    amLODIPine (NORVASC) 10 MG tablet TAKE 1 TABLET EVERY DAY    aspirin (ECOTRIN) 81 MG EC tablet Take 81 mg by mouth once daily.    atorvastatin (LIPITOR) 40 MG tablet Take 1 tablet (40 mg  "total) by mouth once daily.    blood glucose control, normal Soln True Metrix control solution E11.9 - pt tests twice daily    blood-glucose meter kit Check glucose daily E11.9 meter covered by insurance Don Metrix    DROPLET PEN NEEDLE 32 gauge x 5/32" Ndle USE ONE TIME DAILY TO INJECT INSULIN    empagliflozin (JARDIANCE) 10 mg tablet Take 1 tablet (10 mg total) by mouth once daily.    insulin aspart U-100 (NOVOLOG FLEXPEN U-100 INSULIN) 100 unit/mL (3 mL) InPn pen Inject 4 Units into the skin 3 (three) times daily with meals. Plus sliding scale - Max TDD 45 units    insulin detemir U-100 (LEVEMIR FLEXTOUCH) 100 unit/mL (3 mL) SubQ InPn pen Inject 12 Units into the skin once daily.    lancets (TRUEPLUS LANCETS) 28 gauge Misc 1 lancet by Misc.(Non-Drug; Combo Route) route 2 (two) times daily.    losartan (COZAAR) 100 MG tablet Take 1 tablet (100 mg total) by mouth once daily.    meloxicam (MOBIC) 7.5 MG tablet TAKE 1 TABLET(7.5 MG) BY MOUTH EVERY DAY    metFORMIN (GLUCOPHAGE-XR) 500 MG ER 24hr tablet Take 1 tablet (500 mg total) by mouth 2 (two) times daily with meals.    omeprazole (PRILOSEC) 20 MG capsule One capsule twice daily    oxyCODONE-acetaminophen (PERCOCET) 5-325 mg per tablet Take 1 tablet by mouth every 24 hours as needed for Pain.    pen needle, diabetic 33 gauge x 5/32" Ndle Use daily with insulin    pioglitazone (ACTOS) 30 MG tablet Take 1 tablet (30 mg total) by mouth once daily.    triamcinolone acetonide 0.1% (KENALOG) 0.1 % cream APPLY TO THE AFFECTED AREA(S) ON LOWER LEGS TWICE DAILY AS NEEDED FOR ITCHY OR RED    TRUE METRIX GLUCOSE TEST STRIP Strp TEST BLOOD SUGAR TWICE DAILY    pregabalin (LYRICA) 50 MG capsule Take 1 capsule (50 mg total) by mouth 2 (two) times daily.     No current facility-administered medications for this visit.       REVIEW OF SYSTEMS:    GENERAL:  No weight loss, malaise or fevers.   HEENT:   No recent changes in vision or hearing   NECK:  Negative for " "lumps, no difficulty with swallowing.  RESPIRATORY:  Negative for cough, wheezing or shortness of breath, patient denies any recent URI.  CARDIOVASCULAR:  Negative for chest pain, leg swelling or palpitations.  GI:  Negative for abdominal discomfort, blood in stools or black stools or change in bowel habits.  MUSCULOSKELETAL:  See HPI.  SKIN:  Negative for lesions, rash, and itching.  PSYCH:  No mood disorder or recent psychosocial stressors.  Patients sleep is not disturbed secondary to pain.  HEMATOLOGY/LYMPHOLOGY:  Negative for prolonged bleeding, bruising easily or swollen nodes.  Patient is not currently taking any anti-coagulants  NEURO:   No history of headaches, syncope, paralysis, seizures or tremors.  All other reviewed and negative other than HPI.    OBJECTIVE:    /69   Pulse 92   Resp 18   Ht 5' 7" (1.702 m)   Wt 88.9 kg (196 lb)   BMI 30.70 kg/m²     PHYSICAL EXAMINATION:    GENERAL: Well appearing, in no acute distress, alert and oriented x3.  PSYCH:  Mood and affect appropriate.  SKIN: Skin color, texture, turgor normal, no rashes or lesions.  HEAD/FACE:  Normocephalic, atraumatic. Cranial nerves grossly intact.  CV: RRR with palpation of the radial artery.  PULM: CTAB. No evidence of respiratory difficulty, symmetric chest rise.  GI:  Soft and non-tender.    BACK:   - No obvious deformity or signs of trauma, Normal lumbar lordotic curve  - Negative spinous process tenderness  - Positive paravertebral tenderness  - Positive pain to palpation over the facet joints of the lumbar spine.   - Positive QL / Iliac crest / Glut tenderness  - Slump test is Negative for radicular pain  - Slump test is Negative for back pain  - Supine Straight leg raising is Negative for radicular pain  - Supine Straight leg raising is Negative for back pain  - Lumbar ROM is diminished in Flexion with pain  - Lumbar ROM is diminished in Extension with pain  - Lumbar ROM is diminished in Lateral Flexion with pain  - " Lumbar ROM is diminished in Rotation with pain  - Positive Sustained Hip Flexion test (for discogenic pain)  - Positive Altered Gait, Posture  - Axial facet loading test Positive on the bilateral side(s)    SI Joint exam:  - Positive SI joint tenderness to palpation  - William's sign Positive  - Yeoman's Test: Did not perform for SI joint pain indicating anterior SI ligament involvement. Did not perform for anterior thigh pain/paresthesia which indicates femoral nerve stretch.  - Gaenslen's Test:Positive  - Finger Yovani's Sign:Positive  - SI compression test:Did not perform  - SI distraction test:Did not perform  - Thigh Thrust: Did not perform  - SI Thrust: Did not perform    MUSKULOSKELETAL:    EXTREMITIES:   Hip Exam:  - Log Roll Negative  - FADIR Negative  - Stinchfield Positive  - Hip Scour Negative  - GTB Tenderness Positive      MUSCULOSKELETAL:  No atrophy or tone abnormalities are noted in the UE or LE.  No deformities, edema, or skin discoloration are noted on visible skin. Good capillary refill.    NEURO: Bilateral upper and lower extremity coordination and muscle stretch reflexes are physiologic and symmetric.    No loss of sensation is noted.    NEUROLOGICAL EXAM:  MENTAL STATUS: A x O x 3, good concentration, speech is fluent and goal directed  MEMORY: recent and remote are intact  CN: CN2-12 grossly intact  MOTOR: 4+/5 in all muscle groups of b/l LE  DTRs: 0 symmetric LE patella and achilles  Sensation:    -no Loss of sensation in a left lower and right lower L-1, L-2, L-3, L-4 and L-5 bilaterally distribution.  Babinski: absent on the bilateral side(s)    GAIT: antalgic. Favors right side    =====================================================================  Despite conservative treatments, Ms. Davis has received minimal relief from symptoms and is currently in significant pain which affects activities of daily life.    On history and physical examination, there is evidence of Lumbar  Radiculitis/Radiculopathy.  This patient will benefit from a Interlaminar epidural steroid injection.  This is medically indicated for the following reasons:    Ms. Davis has met the following criteria:    1.   Lumbar, cervical or thoracic radiculopathy, radicular pain and/or neurogenic claudication due to disc herniation, osteophyte or osteophyte complexes, severe degenerative disc disease, producing foraminal or central spinal stenosis OR   Post-laminectomy syndrome OR   Acute herpes zoster associated pain.     AND     Radiculopathy, radicular pain and/or neurogenic claudication is severe enough to greatly impact quality of life or function. An objective pain scale or functional assessment was performed at baseline (prior to interventions). The same scale will be used at each follow-up for assessment of response.    AND     Pain duration of at least 4 weeks, and the inability to tolerate noninvasive conservative care or medical documentation of failure to respond to 4 weeks of noninvasive conservative care or acute herpes zoster refractory to conservative management where a 4 week wait is not required.     In terms of the procedure:  2. The procedure will be performed under fluoroscopic guidance with contrast (unless contrast allergy is noted) or Ultrasound (only when fluoroscopic of CT guidance is medically contraindicated).  3. Transforaminal ESIs (TFESIs) involving a maximum of two (2) levels and/or bilaterally in one spinal region will be considered only if medically reasonable and necessary.  4. Caudal ESIs and interlaminar ESIs (ILESIs) involving a maximum of one level are considered medically reasonable and necessary.  5. An initial injection of contrast is required to confirm epidural placement, unless the patient has a contraindication to contrast. The subsequent JIGNESH should include corticosteroids and may be combined with anesthetics or saline.  6. The ESIs should be performed in conjunction with  conservative treatments.  Patients should be part of an active rehabilitation program, home exercise program or functional restoration program.  7. It is not considered medically reasonable and necessary to perform TFESIs at more than two (2) nerve root levels during the same session.  8. It is not medically reasonable and necessary to prescribe a predetermined series of ESIs    In terms of repeat JIGNESH:  1. Repeat JIGNESH when the first injection directly and significantly provided improvement of the condition being treated may be considered medically reasonable and necessary when the medical record documents at least 50% of sustained improvement in pain relief and/or improvement in function measured from baseline using the SAME scale for at least three months.  If a patient fails to respond well to the initial JIGNESH, a repeat JIGNESH after 14 days can be performed using a different approach, level and/or medication, if appropriate, with the rationale and medical necessity for the second JIGNESH documented in the medical record.  2. ESIs are limited to a maximum of four (4) sessions per spinal region in a rolling twelve (12) month period.  3. Use beyond twelve months requires the following:   Pain is severe enough to cause a significant degree of functional disability or vocational disability.   The JIGNESH provides at least 50% sustained improvement of pain and/or 50% objective improvement in function (using same scale as baseline).   Rationale for the continuation of ESIs including, but not limited to, patient is high-risk surgical candidate, the patient does not desire surgery, recurrence of pain in the same location relieved with ESIs for at least three months.   The primary care provider must be notified regarding continuation of procedures and prolonged repeat steroid use.

## 2022-04-29 ENCOUNTER — TELEPHONE (OUTPATIENT)
Dept: PAIN MEDICINE | Facility: CLINIC | Age: 77
End: 2022-04-29
Payer: MEDICARE

## 2022-04-29 DIAGNOSIS — M54.16 LUMBAR RADICULOPATHY: Primary | ICD-10-CM

## 2022-05-24 ENCOUNTER — LAB VISIT (OUTPATIENT)
Dept: LAB | Facility: HOSPITAL | Age: 77
End: 2022-05-24
Attending: STUDENT IN AN ORGANIZED HEALTH CARE EDUCATION/TRAINING PROGRAM
Payer: MEDICARE

## 2022-05-24 DIAGNOSIS — N18.30 STAGE 3 CHRONIC KIDNEY DISEASE, UNSPECIFIED WHETHER STAGE 3A OR 3B CKD: ICD-10-CM

## 2022-05-24 LAB
ANION GAP SERPL CALC-SCNC: 9 MMOL/L (ref 8–16)
BUN SERPL-MCNC: 31 MG/DL (ref 8–23)
CALCIUM SERPL-MCNC: 9.7 MG/DL (ref 8.7–10.5)
CHLORIDE SERPL-SCNC: 112 MMOL/L (ref 95–110)
CO2 SERPL-SCNC: 18 MMOL/L (ref 23–29)
CREAT SERPL-MCNC: 1.3 MG/DL (ref 0.5–1.4)
EST. GFR  (AFRICAN AMERICAN): 46.4 ML/MIN/1.73 M^2
EST. GFR  (NON AFRICAN AMERICAN): 40.2 ML/MIN/1.73 M^2
GLUCOSE SERPL-MCNC: 220 MG/DL (ref 70–110)
POTASSIUM SERPL-SCNC: 4.3 MMOL/L (ref 3.5–5.1)
SODIUM SERPL-SCNC: 139 MMOL/L (ref 136–145)

## 2022-05-24 PROCEDURE — 36415 COLL VENOUS BLD VENIPUNCTURE: CPT | Performed by: STUDENT IN AN ORGANIZED HEALTH CARE EDUCATION/TRAINING PROGRAM

## 2022-05-24 PROCEDURE — 80048 BASIC METABOLIC PNL TOTAL CA: CPT | Performed by: STUDENT IN AN ORGANIZED HEALTH CARE EDUCATION/TRAINING PROGRAM

## 2022-05-30 ENCOUNTER — PATIENT MESSAGE (OUTPATIENT)
Dept: ADMINISTRATIVE | Facility: HOSPITAL | Age: 77
End: 2022-05-30
Payer: MEDICARE

## 2022-06-02 ENCOUNTER — TELEPHONE (OUTPATIENT)
Dept: PAIN MEDICINE | Facility: CLINIC | Age: 77
End: 2022-06-02
Payer: MEDICARE

## 2022-06-02 NOTE — TELEPHONE ENCOUNTER
2Staff spoke with the patient regarding their procedure with Dr. Pena scheduled on 06/03/2022; the patient was provided the Arrival Information and scheduled time 08:00AM.     The patient verbalized understanding.    Thank you,

## 2022-06-03 ENCOUNTER — HOSPITAL ENCOUNTER (OUTPATIENT)
Facility: HOSPITAL | Age: 77
Discharge: HOME | End: 2022-06-03
Attending: STUDENT IN AN ORGANIZED HEALTH CARE EDUCATION/TRAINING PROGRAM | Admitting: STUDENT IN AN ORGANIZED HEALTH CARE EDUCATION/TRAINING PROGRAM
Payer: MEDICARE

## 2022-06-03 VITALS
TEMPERATURE: 98 F | BODY MASS INDEX: 30.76 KG/M2 | OXYGEN SATURATION: 97 % | WEIGHT: 196 LBS | RESPIRATION RATE: 15 BRPM | HEIGHT: 67 IN | SYSTOLIC BLOOD PRESSURE: 146 MMHG | HEART RATE: 89 BPM | DIASTOLIC BLOOD PRESSURE: 75 MMHG

## 2022-06-03 DIAGNOSIS — M54.16 LUMBAR RADICULOPATHY: Primary | ICD-10-CM

## 2022-06-03 LAB — POCT GLUCOSE: 157 MG/DL (ref 70–110)

## 2022-06-03 PROCEDURE — 25500020 PHARM REV CODE 255: Performed by: STUDENT IN AN ORGANIZED HEALTH CARE EDUCATION/TRAINING PROGRAM

## 2022-06-03 PROCEDURE — 25000003 PHARM REV CODE 250: Performed by: STUDENT IN AN ORGANIZED HEALTH CARE EDUCATION/TRAINING PROGRAM

## 2022-06-03 PROCEDURE — 64483 NJX AA&/STRD TFRM EPI L/S 1: CPT | Performed by: STUDENT IN AN ORGANIZED HEALTH CARE EDUCATION/TRAINING PROGRAM

## 2022-06-03 PROCEDURE — 62323 NJX INTERLAMINAR LMBR/SAC: CPT | Performed by: STUDENT IN AN ORGANIZED HEALTH CARE EDUCATION/TRAINING PROGRAM

## 2022-06-03 PROCEDURE — 62323 PR INJ LUMBAR/SACRAL, W/IMAGING GUIDANCE: ICD-10-PCS | Mod: ,,, | Performed by: STUDENT IN AN ORGANIZED HEALTH CARE EDUCATION/TRAINING PROGRAM

## 2022-06-03 PROCEDURE — 82962 GLUCOSE BLOOD TEST: CPT | Performed by: STUDENT IN AN ORGANIZED HEALTH CARE EDUCATION/TRAINING PROGRAM

## 2022-06-03 PROCEDURE — 63600175 PHARM REV CODE 636 W HCPCS: Performed by: STUDENT IN AN ORGANIZED HEALTH CARE EDUCATION/TRAINING PROGRAM

## 2022-06-03 PROCEDURE — 62323 NJX INTERLAMINAR LMBR/SAC: CPT | Mod: ,,, | Performed by: STUDENT IN AN ORGANIZED HEALTH CARE EDUCATION/TRAINING PROGRAM

## 2022-06-03 PROCEDURE — 99152 MOD SED SAME PHYS/QHP 5/>YRS: CPT | Performed by: STUDENT IN AN ORGANIZED HEALTH CARE EDUCATION/TRAINING PROGRAM

## 2022-06-03 RX ORDER — LIDOCAINE HYDROCHLORIDE 10 MG/ML
INJECTION INFILTRATION; PERINEURAL
Status: DISCONTINUED | OUTPATIENT
Start: 2022-06-03 | End: 2022-06-03 | Stop reason: HOSPADM

## 2022-06-03 RX ORDER — SODIUM CHLORIDE 9 MG/ML
500 INJECTION, SOLUTION INTRAVENOUS CONTINUOUS
Status: DISCONTINUED | OUTPATIENT
Start: 2022-06-03 | End: 2022-06-03 | Stop reason: HOSPADM

## 2022-06-03 RX ORDER — MIDAZOLAM HYDROCHLORIDE 1 MG/ML
2 INJECTION INTRAMUSCULAR; INTRAVENOUS ONCE AS NEEDED
Status: DISCONTINUED | OUTPATIENT
Start: 2022-06-04 | End: 2022-06-03

## 2022-06-03 RX ORDER — MIDAZOLAM HYDROCHLORIDE 1 MG/ML
2 INJECTION INTRAMUSCULAR; INTRAVENOUS ONCE AS NEEDED
Status: COMPLETED | OUTPATIENT
Start: 2022-06-03 | End: 2022-06-03

## 2022-06-03 RX ORDER — FENTANYL CITRATE 50 UG/ML
25 INJECTION, SOLUTION INTRAMUSCULAR; INTRAVENOUS ONCE AS NEEDED
Status: COMPLETED | OUTPATIENT
Start: 2022-06-03 | End: 2022-06-03

## 2022-06-03 RX ORDER — DEXAMETHASONE SODIUM PHOSPHATE 4 MG/ML
INJECTION, SOLUTION INTRA-ARTICULAR; INTRALESIONAL; INTRAMUSCULAR; INTRAVENOUS; SOFT TISSUE
Status: DISCONTINUED | OUTPATIENT
Start: 2022-06-03 | End: 2022-06-03 | Stop reason: HOSPADM

## 2022-06-03 NOTE — PLAN OF CARE
Discharge instructions reviewed with pt & family. Pt denies any pain or nausea @ present. AVS given & prescriptions given @ bedside. Consents in chart.

## 2022-06-03 NOTE — OP NOTE
"PROCEDURE:  LUMBAR L5-S1 INTERLAMINAR EPIDURAL STEROID INJECTION    Patient Name: Lina Davis  MRN: [id number]  DATE OF PROCEDURE: 06/03/2022    INJECTION # 2    DIAGNOSIS: Lumbar Radiculopathy  CPT CODE: 65005      POSTPROCEDURE DIAGNOSIS: Same    PHYSICIAN: Leland Pena DO  NEEDLE TYPE: - 20G 3.5" Touhy Needle  MEDICATIONS INJECTED: 8cc mixture of 7cc Normal Saline + 10mg Dexamethasone (10mg/ml)  CONTRAST: Omni 300  LOSS OF RESISTANCE DEPTH: 6 cm    Sedation Medications - Mild Sedation with 2mg Versed and 25mcg Fentanyl    Estimated Blood Loss - <2ml  Drains: None  Specimens Removed: None  Urine Output - Not Measured  Complications: None  Outcome: Good    Informed Consent:  The patient's condition and proposed procedures, risks, and alternatives were discussed with the patient or responsible party.  The patient's / responsible party's questions were answered.   The patient / responsible party appeared to understand and chose to proceed.  Informed consent was obtained.  After obtaining written consent, an IV hep lock was placed. (See nurses notes for details).     Procedure in Detail:  The patient was taken back to the OR suite and placed in a prone position. The skin overlying the injection site was prepped and draped in an aseptic fashion. The target injection site (see above) was identified with fluoroscopy and marked.     Procedural Pause:  A procedural pause verifying correct patient, medical record number, allergies, medications to be administered, current vital signs, and surgical site was performed immediately prior to beginning the procedure.    The skin and subcutaneous tissue overlying the target site of injection for the L5-S1 epidural steroid injection was/were anesthetized using 4 mL of 1% lidocaine with a 25-gauge, 1½-inch needle.  The above noted Tuohy needle was advanced under fluoroscopic guidance towards the epidural space. Lateral fluoroscopic imaging was used to confirm depth. The " epidural space was identified using a loss of resistance to saline technique. (See above for loss of resistance depth). A microbore extension tubing was attached to the needle to minimize any movement of the needle during injection or syringe change.  After negative aspiration for heme or CSF, 1ml of contrast was injected to confirm placement and no intrathecal or vascular spread.  After repeat negative aspiration for heme or CSF, the above noted steroid solution was slowly injected in increments. The needle was then retracted approximately snf and the needle track was flushed with 0.5 ml of Lidocaine 1% to clear the needle prior to removal. The Tuohy needle was then removed.     The heart rate, pulse oximetry, and blood pressure were continuously monitored throughout the procedure.  The prpocedure was well tolerated. She was carefully escorted to the recovery room in stable condition. Patient was monitored by RN for recovery period.  The patient will be contacted in the next few days to determine extent of relief.  Patient was given post procedure and discharge instructions to follow at home.  The patient was discharged in a stable condition.    Note Electronically Signed By:  Leland Pena

## 2022-06-03 NOTE — H&P
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    Vitamin D deficiency disease      Past Surgical History:   Procedure Laterality Date    ANTERIOR CERVICAL DISCECTOMY W/ FUSION N/A 3/19/2020    Procedure: DISCECTOMY, SPINE, CERVICAL, ANTERIOR APPROACH, WITH FUSION, C3-C4, C4-C5, C5-C6;  Surgeon: Joseph Walton MD;  [facility];  Service: Neurosurgery;  Laterality: N/A;    CARPAL TUNNEL RELEASE      bilateral    COLONOSCOPY N/A 3/7/2018    Procedure: COLONOSCOPY;  Surgeon: Luís Soni MD;  Location[facility]O ([facility]);  Service: Endoscopy;  Laterality: N/A;  ok to schedule per Elizabeth    COLONOSCOPY W/ BIOPSIES AND POLYPECTOMY      HEMORRHOID SURGERY      HYSTERECTOMY      AUB    INJECTION OF JOINT Bilateral 4/12/2022    Procedure: SACROILIAC JOINT Steroid Injection-Bilateral;  Surgeon: Leland Pena DO;  Location: [facility];  Service: Pain Management;  Laterality: Bilateral;    ROTATOR CUFF REPAIR      bilateral     Review of patient's allergies indicates:   Allergen Reactions    Januvia [sitagliptin] Other (See Comments)     Pancreatitis      Metformin Other (See Comments)     Nausea and vomiting with immediate release - tolerates extended release      Current Facility-Administered Medications   Medication    0.9%  NaCl infusion    fentaNYL 50 mcg/mL injection 25 mcg    [START ON 6/4/2022] midazolam (VERSED) 1 mg/mL injection 2 mg       PMHx, PSHx, Allergies, Medications reviewed in epic    ROS negative except pain complaints in HPI    OBJECTIVE:    BP (!) 184/79 (BP Location: Right arm, Patient Position: Lying)   Pulse 102   Temp 97.1 °F (36.2 °C) (Temporal)   " Resp 18   Ht 5' 7" (1.702 m)   Wt 88.9 kg (196 lb)   SpO2 100%   Breastfeeding No   BMI 30.70 kg/m²     PHYSICAL EXAMINATION:    GENERAL: Well appearing, in no acute distress, alert and oriented x3.  PSYCH:  Mood and affect appropriate.  SKIN: Skin color, texture, turgor normal, no rashes or lesions which will impact the procedure.  CV: RRR with palpation of the radial artery.  PULM: No evidence of respiratory difficulty, symmetric chest rise. Clear to auscultation.  NEURO: Cranial nerves grossly intact.    Plan:    Proceed with procedure as planned Procedure(s) (LRB):  LESI L5-S1 (N/A)    Leland Pena  06/03/2022          "

## 2022-06-03 NOTE — DISCHARGE SUMMARY
[place] - [facility] (Hospital)  Discharge Note  Short Stay    Procedure(s) (LRB):  LESI L5-S1 (N/A)    OUTCOME: Patient tolerated treatment/procedure well without complication and is now ready for discharge.    DISPOSITION: Home or Self Care    FINAL DIAGNOSIS:  <principal problem not specified>    FOLLOWUP: In clinic    DISCHARGE INSTRUCTIONS:  No discharge procedures on file.     TIME SPENT ON DISCHARGE: 10 minutes

## 2022-06-03 NOTE — PATIENT INSTRUCTIONS
[facility]  [facility]  [employer] service # [phone]    POST-PROCEDURE INSTRUCTIONS:    Today you had an injection that included a steroid medications.  The steroid may or may not have been mixed with a local anesthetic when it was injected.   If the injection was in the neck, you may feel some pressure, numbness, or slight weakness in the arm after the procedure for a short period of time (this is a normal response), if this persists for longer than 1 day please contact our office or go to the emergency room.  If the injection was in the low back, you may feel some pressure, numbness, or slight weakness in the leg after the procedure for a short period of time (this is a normal response), if this persists for longer than 1 day please contact our office or go to the emergency room.  You may get side effects from the steroid.  This is not uncommon.  Symptoms include: elevated blood sugar, elevated blood pressure, headache, flushing, nausea, insomnia.  These symptoms are transient and will resolve within 1-3 days.  If symptoms last longer than this please contact our office or head to the emergency room.  Steroid medications can take anywhere from 3-14 days to take effect (rarely longer).  You may notice that your pain worsens for a short period of time after the injection, this would not be unusual due to the pressure and trauma from the needle.    If you do not have a follow up appointment scheduled, please contact our office to get a post-procedure follow up scheduled 2-3 weeks after the procedure.  This can be done as a virtual visit if that is more convenient for you.    What you need to do:    Keep a record of your response to the injection you had today.    How much relief did you get?   When did the relief start and how long did it last?  Were you able to decrease the use of any of your pain medications?  Were you able to increase your level of activity?  How long did  the relief last?    What to watch out for:    If you experience any of the following symptoms after your procedure, please notify the messaging service immediately (see above for contact information):   fever (increased oral temperature)   bleeding or swelling at the injection site,    drainage, rash or redness at the injection site    possible signs of infection    increased pain at the injection site   worsening of your usual pain   severe headache   new or worsening numbness    new arm and/or leg weakness, or    changes in bowel and/or bladder function: urinating or defecating on yourself and not knowing that you did it.    PLEASE FOLLOW ALL INSTRUCTIONS CAREFULLY     Do not engage in strenuous activity (e.g., lifting or pushing heavy objects or repeated bending) for 24 hours.     Do not take a bath, swim or use Jacuzzi for 24 hours after procedure. (A shower is fine).   Remove any Band-Aids when you get home.    Use cold/ice, as needed for comfort.  We recommend the use of cold therapy alternating on for 20 minutes, off for 20 minutes.    Do not apply direct heat (heating pad or heat packs) to the injection site for 24 hours.     Resume your usual medications, unless instructed otherwise by your Pain Physician.     If you are on warfarin (Coumadin) or other blood thinner, resume this medication as instructed by your prescribing Physician.    IF AT ANY POINT YOU ARE VERY CONCERNED ABOUT YOUR SYMPTOMS, PLEASE GO TO THE EMERGENCY ROOM.    If you develop worsening pain, weakness, numbness, lose bowel or bladder control (i.e., having an accident where you did not even know you had to go to the bathroom and suddenly noticed you soiled yourself), saddle anesthesia (a loss of sensation restricted to the area of the buttocks, anus and between the legs -- i.e., those parts of your body that would touch a saddle if you were sitting on one) you need to go immediately to the emergency department for evaluation and  treatment.    ----------------------------------------------------------------------------------------------------------------------------------------------------------------  If you received Sedation please read the following instructions:  POST SEDATION INSTRUCTIONS    Today you received intravenous medication (also known as sedation) that was used to help you relax and/or decrease discomfort during your procedure. This medication will be acting in your body for the next 24 hours, so you might feel a little tired or sleepy. This feeling will slowly wear off.   Common side effects associated with these medications include: drowsiness, dizziness, sleepiness, confusion, feeling excited, difficulty remembering things, lack of steadiness with walking or balance, loss of fine muscle control, slowed reflexes, difficulty focusing, and blurred vision.  Some over-the-counter and prescription medications (e.g., muscle relaxants, opioids, mood-altering medications, sedatives/hypnotics, antihistamines) can interact with the intravenous medication you received and cause an increased risk of the side effects listed above in addition to other potentially life threatening side effects. Use extreme caution if you are taking such medications, and consult with your Pain Physician or prescribing physician if you have any questions.  For the next 12-24 hours:    DO NOT--Drive a car, operate machinery or power tools   DO NOT--Drink any alcoholic beverages (not even beer), they may dangerously increase the risk of side effects.    DO NOT--Make any important legal or business decisions or sign important documents.  We advise you to have someone to assist you at home. Move slowly and carefully. Do not make sudden changes in position. Be aware of dizziness or light-headedness and move accordingly.   If you seek medical treatment within 24 hours, let the nurse or doctor caring for you know that you have received the above medications. If you  have any questions or concerns related to your sedation or treatment today please contact us.

## 2022-06-08 DIAGNOSIS — E11.9 TYPE 2 DIABETES MELLITUS WITHOUT COMPLICATION: ICD-10-CM

## 2022-06-28 ENCOUNTER — TELEPHONE (OUTPATIENT)
Dept: INTERNAL MEDICINE | Facility: CLINIC | Age: 77
End: 2022-06-28
Payer: MEDICARE

## 2022-06-28 ENCOUNTER — OFFICE VISIT (OUTPATIENT)
Dept: PAIN MEDICINE | Facility: CLINIC | Age: 77
End: 2022-06-28
Payer: MEDICARE

## 2022-06-28 VITALS
WEIGHT: 196 LBS | DIASTOLIC BLOOD PRESSURE: 63 MMHG | HEIGHT: 67 IN | SYSTOLIC BLOOD PRESSURE: 126 MMHG | RESPIRATION RATE: 18 BRPM | BODY MASS INDEX: 30.76 KG/M2 | HEART RATE: 98 BPM

## 2022-06-28 DIAGNOSIS — N18.30 STAGE 3 CHRONIC KIDNEY DISEASE, UNSPECIFIED WHETHER STAGE 3A OR 3B CKD: ICD-10-CM

## 2022-06-28 DIAGNOSIS — E11.21 TYPE 2 DIABETES MELLITUS WITH DIABETIC NEPHROPATHY, WITH LONG-TERM CURRENT USE OF INSULIN: ICD-10-CM

## 2022-06-28 DIAGNOSIS — M47.816 LUMBAR SPONDYLOSIS: ICD-10-CM

## 2022-06-28 DIAGNOSIS — E11.21 TYPE 2 DIABETES MELLITUS WITH DIABETIC NEPHROPATHY, WITH LONG-TERM CURRENT USE OF INSULIN: Primary | ICD-10-CM

## 2022-06-28 DIAGNOSIS — Z79.4 TYPE 2 DIABETES MELLITUS WITH DIABETIC NEPHROPATHY, WITH LONG-TERM CURRENT USE OF INSULIN: ICD-10-CM

## 2022-06-28 DIAGNOSIS — I10 HTN (HYPERTENSION), BENIGN: ICD-10-CM

## 2022-06-28 DIAGNOSIS — M46.1 BILATERAL SACROILIITIS: ICD-10-CM

## 2022-06-28 DIAGNOSIS — M53.3 SACROILIAC DYSFUNCTION: Primary | ICD-10-CM

## 2022-06-28 DIAGNOSIS — M54.16 LUMBAR RADICULOPATHY: ICD-10-CM

## 2022-06-28 DIAGNOSIS — E55.9 MILD VITAMIN D DEFICIENCY: ICD-10-CM

## 2022-06-28 DIAGNOSIS — E78.5 HYPERLIPIDEMIA, UNSPECIFIED HYPERLIPIDEMIA TYPE: ICD-10-CM

## 2022-06-28 DIAGNOSIS — Z79.4 TYPE 2 DIABETES MELLITUS WITH DIABETIC NEPHROPATHY, WITH LONG-TERM CURRENT USE OF INSULIN: Primary | ICD-10-CM

## 2022-06-28 PROCEDURE — 99499 UNLISTED E&M SERVICE: CPT | Mod: S$GLB,,, | Performed by: STUDENT IN AN ORGANIZED HEALTH CARE EDUCATION/TRAINING PROGRAM

## 2022-06-28 PROCEDURE — 99214 OFFICE O/P EST MOD 30 MIN: CPT | Mod: S$GLB,,, | Performed by: STUDENT IN AN ORGANIZED HEALTH CARE EDUCATION/TRAINING PROGRAM

## 2022-06-28 PROCEDURE — 99499 RISK ADDL DX/OHS AUDIT: ICD-10-PCS | Mod: S$GLB,,, | Performed by: STUDENT IN AN ORGANIZED HEALTH CARE EDUCATION/TRAINING PROGRAM

## 2022-06-28 PROCEDURE — 99999 PR PBB SHADOW E&M-EST. PATIENT-LVL III: ICD-10-PCS | Mod: PBBFAC,,, | Performed by: STUDENT IN AN ORGANIZED HEALTH CARE EDUCATION/TRAINING PROGRAM

## 2022-06-28 PROCEDURE — 4010F ACE/ARB THERAPY RXD/TAKEN: CPT | Mod: ICN,,, | Performed by: STUDENT IN AN ORGANIZED HEALTH CARE EDUCATION/TRAINING PROGRAM

## 2022-06-28 PROCEDURE — 4010F PR ACE/ARB THEARPY RXD/TAKEN: ICD-10-PCS | Mod: ICN,,, | Performed by: STUDENT IN AN ORGANIZED HEALTH CARE EDUCATION/TRAINING PROGRAM

## 2022-06-28 PROCEDURE — 99214 PR OFFICE/OUTPT VISIT, EST, LEVL IV, 30-39 MIN: ICD-10-PCS | Mod: S$GLB,,, | Performed by: STUDENT IN AN ORGANIZED HEALTH CARE EDUCATION/TRAINING PROGRAM

## 2022-06-28 PROCEDURE — 99213 OFFICE O/P EST LOW 20 MIN: CPT | Mod: PBBFAC | Performed by: STUDENT IN AN ORGANIZED HEALTH CARE EDUCATION/TRAINING PROGRAM

## 2022-06-28 PROCEDURE — 99999 PR PBB SHADOW E&M-EST. PATIENT-LVL III: CPT | Mod: PBBFAC,,, | Performed by: STUDENT IN AN ORGANIZED HEALTH CARE EDUCATION/TRAINING PROGRAM

## 2022-06-28 PROCEDURE — 3072F PR LOW RISK FOR RETINOPATHY: ICD-10-PCS | Mod: ICN,,, | Performed by: STUDENT IN AN ORGANIZED HEALTH CARE EDUCATION/TRAINING PROGRAM

## 2022-06-28 PROCEDURE — 3072F LOW RISK FOR RETINOPATHY: CPT | Mod: ICN,,, | Performed by: STUDENT IN AN ORGANIZED HEALTH CARE EDUCATION/TRAINING PROGRAM

## 2022-06-28 RX ORDER — MIRTAZAPINE 7.5 MG/1
7.5 TABLET, FILM COATED ORAL NIGHTLY
Qty: 30 TABLET | Refills: 1 | Status: SHIPPED | OUTPATIENT
Start: 2022-06-28 | End: 2022-07-26

## 2022-06-28 RX ORDER — OXYCODONE AND ACETAMINOPHEN 5; 325 MG/1; MG/1
1 TABLET ORAL
Qty: 30 TABLET | Refills: 0 | Status: SHIPPED | OUTPATIENT
Start: 2022-06-28 | End: 2022-07-26 | Stop reason: SDUPTHER

## 2022-06-28 NOTE — PROGRESS NOTES
Chronic Pain - f/u    Referring Physician: No ref. provider found    Date: 06/28/2022     Re: Lina Davis  MR#: [id number]  YOB: 1945  Age: 77 y.o.    Chief Complaint:    Chief Complaint   Patient presents with    Low-back Pain    Knee Pain     **This note is dictated using the M*Modal Fluency Direct word recognition program. There are word recognition mistakes that are occasionally missed on review.**    ASSESSMENT: 77 y.o. year old female with back and leg pain, consistent with     1. Sacroiliac dysfunction  Ambulatory referral/consult to Physical/Occupational Therapy   2. Bilateral sacroiliitis  Ambulatory referral/consult to Physical/Occupational Therapy   3. Lumbar spondylosis     4. Stage 3 chronic kidney disease, unspecified whether stage 3a or 3b CKD     5. Lumbar radiculopathy     6. Type 2 diabetes mellitus with diabetic nephropathy, with long-term current use of insulin         PLAN:     Sacroiliac joint dysfunction / Sacroiliitis  -s/p bilateral SIJ on 4/12/22 - 10% relief at 2 weeks.  -description and physical exam still suggest that she has SI joint involvement.  Might be more ligamentous then joint itself  -PT referral for SI stabilization, pelvic stabilization.   -will consider TPI for ligamentous injections and multifidus.     Lumbar radiculopathy  -s/p L5-S1 LESI without any improvement  -monitor kidney function  -Possible SCS candidate if epidural fails.  -Trial Lyrica 50mg BID. Dose adjustment to 50% due to CKD3    Chronic low back pain - multifactorial  -etiology not totally clear.  Likely multifactorial with elements of SI joint, lumbar spondylosis, lumbar radiculopathy, spinal stenosis, degenerative disc disease all playing some part.  -failed tizanidine  -failed PT for lumbar spine    Lumbar spondylosis   -patient has notable facet arthritis of the lumbar spine and may be candidate for MBB/RFA, but does not have PE findings of lumbar spondylosis.  We will defer treatment for  now.    DM2  -on insulin  -discussed risks of steroid injection while diabetic.    CKD3  -GFR 39.3 on 3/17/22  -New BMP ordered for the end of May to recheck kidney function.  If Creatine/GFR is elevated above normal then we will postpone the procedure and have patient see nephrology.    - RTC 6 weeks  - Counseled patient regarding the importance of weight loss and activity modification and physical therapy.    The above plan and management options were discussed at length with patient. Patient is in agreement with the above and verbalized understanding. It will be communicated with the referring physician via electronic record, fax, or mail.  Lab/study reports reviewed were important and necessary because subsequent medical and treatment recommendations required review of the above lab/study reports. Images viewed/reviewed above were important and necessary because subsequent medical and treatment recommendations required review of the reviewed image(s).     Electronically signed by:  Leland Pena DO  06/28/2022    =========================================================================================================    SUBJECTIVE:    Interval History 6/28/2022:     Lina Davis is a 77 y.o. female presents to the clinic for follow up.  Since last visit the pain has has worsened slightly.  Did not get relief from the epidural injection.  Pain is still primarily left low back/buttocks with radiation in to the left leg.     The pain is located in the lower back area and radiates to the leg(knee).  The pain is described as aching    At BEST  7/10   At WORST  10/10 on the WORST day.    On average pain is rated as 8/10.   Today the pain is rated as 5/10  Symptoms interfere with daily activity and sleeping.   Exacerbating factors: Standing, Getting out of bed/chair and in and out the car.    Mitigating factors medications and sitting.     Current pain medications: Percocet 5mg (2-3x/week), topicals  Failed  Pain Medications: gabapentin, meloxicam, oxycodone, steroids, tylenol, tramadol, tizanidine, tylenol      Interval History 4/28/2022:     Lnia Davis is a 77 y.o. female presents to the clinic for follow up.  Since last visit the pain has is unchanged. She did not get any signfiicant improvement from the SI joint injection. Her leg pain > back pain.  It goes down both lateral legs from the buttocks to the top of the feet on both sides.    The pain is located in the lower back area and radiates to the legs.  The pain is described as aching    At BEST  7/10   At WORST  10/10 on the WORST day.    On average pain is rated as 8/10.   Today the pain is rated as 7/10  Symptoms interfere with daily activity and sleeping.   Exacerbating factors: Sitting and night time.    Mitigating factors medications.     Current pain medications: Percocet 5mg (2-3x/week), topicals  Failed Pain Medications: gabapentin, meloxicam, oxycodone, steroids, tylenol, tramadol, tizanidine, tylenol    Initial hx:  Lina Davsi is a 77 y.o. female presents to the clinic for the evaluation of lower back pain. The pain started 6 year ago following no inciting event and symptoms have been worsening.  The patient states that she has low back pain that radiates down both legs to the top of the bilateral feet.   Sometimes the pain with radiate in to the inside of the leg. The pain waker her up in the middle of the night.  The pain usually starts when she lays down. The patient cannot sleep on her back, so she has to rotate from side to side.  The patient has tried different topicals ointments, tylenol, gabapentin.  Leg pain > back pain.     Dr. Freedman tried a cortisone shot which did not help.  She has a small script of oxycodone which she takes rarely.  She takes 2 tylenol extra strength every night.  She tried gabapentin 600mg TID and it was not helpful. Meloxicam not helpful.    Pain Description:    The pain is located in the lower back area and  radiates to the legs down to the feet.    At BEST  6/10   At WORST  10/10 on the WORST day.    On average pain is rated as 7/10.   Today the pain is rated as 8/10  The pain is continuous.  The pain is described as aching and sharp    Symptoms interfere with daily activity and sleeping.   Exacerbating factors: Standing, Laying, Walking and Night Time.    Mitigating factors rubbing gel and nothing .   She reports 3 hours of sleep per night.    Physical Therapy/Home Exercise: No, not currently in physical therapy or home exercise program    Current Pain Medications:    - Opioids: Percocet 5mg (2-3x/week)  - Topicals: multiple    Failed Pain Medications:    - gabapentin, meloxicam, oxycodone, steroids, tylenol, tramadol    Pain Treatment Therapies:    Pain procedures: none  Physical Therapy: none  Chiropractor: none  Acupuncture: none  TENS unit: none  Spinal decompression: none  Joint replacement: none    Patient denies urinary incontinence, bowel incontinence and loss of sensations.  Patient denies any suicidal or homicidal ideations     report:  Reviewed and consistent with medication use as prescribed.    Imaging:   XR lumbar 2/14/22:  Minimal mild levoscoliosis thoracic lumbar junction, elevation right hemipelvis, prominent bridging osteophytes lower dorsal and lumbar spine particularly L2-L3 and right L4-L5.  DJD SI joints.  Primary anterior subluxation L4 on L5, vacuum disc deformity L5-S1.  Prompt facet joint arthropathy particularly L3 through L5 levels.    XR HIPs 2/14/22:  Prominent degenerative disc spondylosis facet joint arthropathy lumbosacral junction, elevation right hemipelvis, injection site calcification left lateral buttocks.  DJD SI joints.  Mild DJD hip joints and hypertrophic change cortex greater tuberosities.      Past Medical History:   Diagnosis Date    Anemia     Arthritis     Cataract     Gout, arthritis     HTN (hypertension), benign     Polyneuropathy     Type 2 diabetes  mellitus with diabetic nephropathy 4/12/2016    Vitamin D deficiency disease      Past Surgical History:   Procedure Laterality Date    ANTERIOR CERVICAL DISCECTOMY W/ FUSION N/A 3/19/2020    Procedure: DISCECTOMY, SPINE, CERVICAL, ANTERIOR APPROACH, WITH FUSION, C3-C4, C4-C5, C5-C6;  Surgeon: Joseph Walton MD;  [facility];  Service: Neurosurgery;  Laterality: N/A;    CARPAL TUNNEL RELEASE      bilateral    COLONOSCOPY N/A 3/7/2018    Procedure: COLONOSCOPY;  Surgeon: Luís Soni MD;  Location: [facility] (2ND FLR);  Service: Endoscopy;  Laterality: N/A;  ok to schedule per Elizabeth    COLONOSCOPY W/ BIOPSIES AND POLYPECTOMY      EPIDURAL STEROID INJECTION N/A 6/3/2022    Procedure: LESI L5-S1;  Surgeon: Leland Pena DO;  Location: [facility];  Service: Pain Management;  Laterality: N/A;    HEMORRHOID SURGERY      HYSTERECTOMY      AUB    INJECTION OF JOINT Bilateral 4/12/2022    Procedure: SACROILIAC JOINT Steroid Injection-Bilateral;  Surgeon: Leland Pena DO;  Location: [facility] OR;  Service: Pain Management;  Laterality: Bilateral;    ROTATOR CUFF REPAIR      bilateral     Social History     Socioeconomic History    Marital status:    Tobacco Use    Smoking status: Never Smoker    Smokeless tobacco: Never Used   Substance and Sexual Activity    Alcohol use: No    Drug use: No    Sexual activity: Yes     Partners: Male     Birth control/protection: Surgical     Family History   Problem Relation Age of Onset    Heart disease Mother     Diabetes Mother     Heart disease Father     Cancer Father         liver    Diabetes Sister     Cataracts Sister     Kidney disease Brother     Heart disease Brother     Diabetes Sister     COPD Brother     COPD Brother     Gout Brother     Benign prostatic hyperplasia Brother     Heart disease Brother     Kidney disease Brother     Heart attack Brother         heart attack    Kidney failure Brother     Lupus  "Sister     Heart attack Brother     Drug abuse Brother     No Known Problems Daughter     No Known Problems Son     Amblyopia Neg Hx     Blindness Neg Hx     Breast cancer Neg Hx     Colon cancer Neg Hx     Ovarian cancer Neg Hx        Review of patient's allergies indicates:   Allergen Reactions    Januvia [sitagliptin] Other (See Comments)     Pancreatitis      Metformin Other (See Comments)     Nausea and vomiting with immediate release - tolerates extended release       Current Outpatient Medications   Medication Sig    alcohol swabs (BD ALCOHOL SWABS) PadM USE TWICE DAILY AS DIRECTED, E 11.9    allopurinoL (ZYLOPRIM) 300 MG tablet TAKE 1 TABLET EVERY DAY    amLODIPine (NORVASC) 10 MG tablet TAKE 1 TABLET EVERY DAY    aspirin (ECOTRIN) 81 MG EC tablet Take 81 mg by mouth once daily.    atorvastatin (LIPITOR) 40 MG tablet Take 1 tablet (40 mg total) by mouth once daily.    blood glucose control, normal Soln True Metrix control solution E11.9 - pt tests twice daily    blood-glucose meter kit Check glucose daily E11.9 meter covered by insurance Don [employer]    DROPLET PEN NEEDLE 32 gauge x 5/32" Ndle USE ONE TIME DAILY TO INJECT INSULIN    empagliflozin (JARDIANCE) 10 mg tablet Take 1 tablet (10 mg total) by mouth once daily.    insulin aspart U-100 (NOVOLOG FLEXPEN U-100 INSULIN) 100 unit/mL (3 mL) InPn pen Inject 4 Units into the skin 3 (three) times daily with meals. Plus sliding scale - Max TDD 45 units    insulin detemir U-100 (LEVEMIR FLEXTOUCH) 100 unit/mL (3 mL) SubQ InPn pen Inject 12 Units into the skin once daily.    lancets (TRUEPLUS LANCETS) 28 gauge Misc 1 lancet by Misc.(Non-Drug; Combo Route) route 2 (two) times daily.    losartan (COZAAR) 100 MG tablet Take 1 tablet (100 mg total) by mouth once daily.    meloxicam (MOBIC) 7.5 MG tablet TAKE 1 TABLET(7.5 MG) BY MOUTH EVERY DAY    metFORMIN (GLUCOPHAGE-XR) 500 MG ER 24hr tablet Take 1 tablet (500 mg total) by mouth 2 (two) " "times daily with meals.    omeprazole (PRILOSEC) 20 MG capsule One capsule twice daily    oxyCODONE-acetaminophen (PERCOCET) 5-325 mg per tablet Take 1 tablet by mouth every 24 hours as needed for Pain.    pen needle, diabetic 33 gauge x 5/32" Ndle Use daily with insulin    pioglitazone (ACTOS) 30 MG tablet Take 1 tablet (30 mg total) by mouth once daily.    pregabalin (LYRICA) 50 MG capsule Take 1 capsule (50 mg total) by mouth 2 (two) times daily.    triamcinolone acetonide 0.1% (KENALOG) 0.1 % cream APPLY TO THE AFFECTED AREA(S) ON LOWER LEGS TWICE DAILY AS NEEDED FOR ITCHY OR RED    TRUE METRIX GLUCOSE TEST STRIP Strp TEST BLOOD SUGAR TWICE DAILY     No current facility-administered medications for this visit.       REVIEW OF SYSTEMS:    GENERAL:  No weight loss, malaise or fevers.   HEENT:   No recent changes in vision or hearing   NECK:  Negative for lumps, no difficulty with swallowing.  RESPIRATORY:  Negative for cough, wheezing or shortness of breath, patient denies any recent URI.  CARDIOVASCULAR:  Negative for chest pain, leg swelling or palpitations.  GI:  Negative for abdominal discomfort, blood in stools or black stools or change in bowel habits.  MUSCULOSKELETAL:  See HPI.  SKIN:  Negative for lesions, rash, and itching.  PSYCH:  No mood disorder or recent psychosocial stressors.  Patients sleep is not disturbed secondary to pain.  HEMATOLOGY/LYMPHOLOGY:  Negative for prolonged bleeding, bruising easily or swollen nodes.  Patient is not currently taking any anti-coagulants  NEURO:   No history of headaches, syncope, paralysis, seizures or tremors.  All other reviewed and negative other than HPI.    OBJECTIVE:    /63   Pulse 98   Resp 18   Ht 5' 7" (1.702 m)   Wt 88.9 kg (196 lb)   BMI 30.70 kg/m²     PHYSICAL EXAMINATION:    GENERAL: Well appearing, in no acute distress, alert and oriented x3.  PSYCH:  Mood and affect appropriate.  SKIN: Skin color, texture, turgor normal, no rashes " or lesions.  HEAD/FACE:  Normocephalic, atraumatic. Cranial nerves grossly intact.  CV: RRR with palpation of the radial artery.  PULM: CTAB. No evidence of respiratory difficulty, symmetric chest rise.  GI:  Soft and non-tender.    BACK:   - No obvious deformity or signs of trauma, Normal lumbar lordotic curve  - Negative spinous process tenderness  - Negative paravertebral tenderness  - Negative pain to palpation over the facet joints of the lumbar spine.   - Positive QL / Iliac crest / Glut tenderness  - Slump test is Negative for radicular pain  - Slump test is Negative for back pain  - Supine Straight leg raising is Negative for radicular pain  - Supine Straight leg raising is Negative for back pain  - Lumbar ROM is diminished in Flexion with pain  - Lumbar ROM is diminished in Extension with pain  - Lumbar ROM is diminished in Lateral Flexion with pain  - Lumbar ROM is diminished in Rotation with pain  - Positive Sustained Hip Flexion test (for discogenic pain)  - Positive Altered Gait, Posture  - Axial facet loading test Negative on the bilateral side(s)    SI Joint exam:  - Positive SI joint tenderness to palpation  - William's sign weak Positive  - Yeoman's Test: Did not perform for SI joint pain indicating anterior SI ligament involvement. Did not perform for anterior thigh pain/paresthesia which indicates femoral nerve stretch.  - Gaenslen's Test:Positive for thigh pain  - Finger Yovani's Sign:Positive  - SI compression test:Positive  - SI distraction test:Negative  - Thigh Thrust: Negative  - SI Thrust: Did not perform    MUSKULOSKELETAL:    EXTREMITIES:   Hip Exam:  - Log Roll Negative  - FADIR Negative  - Stinchfield Negative  - Hip Scour Negative  - GTB Tenderness Negative    MUSCULOSKELETAL:  No atrophy or tone abnormalities are noted in the UE or LE.  No deformities, edema, or skin discoloration are noted on visible skin. Good capillary refill.    NEURO: Bilateral upper and lower extremity  coordination and muscle stretch reflexes are physiologic and symmetric.    No loss of sensation is noted.    NEUROLOGICAL EXAM:  MENTAL STATUS: A x O x 3, good concentration, speech is fluent and goal directed  MEMORY: recent and remote are intact  CN: CN2-12 grossly intact  MOTOR: 4+/5 in all muscle groups of b/l LE  DTRs: 0 symmetric LE patella and achilles  Sensation:    -no Loss of sensation in a left lower and right lower L-1, L-2, L-3, L-4 and L-5 bilaterally distribution.  Babinski: absent on the bilateral side(s)    GAIT: antalgic. Favors right side

## 2022-07-12 ENCOUNTER — LAB VISIT (OUTPATIENT)
Dept: LAB | Facility: HOSPITAL | Age: 77
End: 2022-07-12
Attending: INTERNAL MEDICINE
Payer: MEDICARE

## 2022-07-12 DIAGNOSIS — E11.21 TYPE 2 DIABETES MELLITUS WITH DIABETIC NEPHROPATHY, WITH LONG-TERM CURRENT USE OF INSULIN: ICD-10-CM

## 2022-07-12 DIAGNOSIS — E78.5 HYPERLIPIDEMIA, UNSPECIFIED HYPERLIPIDEMIA TYPE: ICD-10-CM

## 2022-07-12 DIAGNOSIS — I10 HTN (HYPERTENSION), BENIGN: ICD-10-CM

## 2022-07-12 DIAGNOSIS — E55.9 MILD VITAMIN D DEFICIENCY: ICD-10-CM

## 2022-07-12 DIAGNOSIS — Z79.4 TYPE 2 DIABETES MELLITUS WITH DIABETIC NEPHROPATHY, WITH LONG-TERM CURRENT USE OF INSULIN: ICD-10-CM

## 2022-07-12 LAB
25(OH)D3+25(OH)D2 SERPL-MCNC: 28 NG/ML (ref 30–96)
ALBUMIN SERPL BCP-MCNC: 3.9 G/DL (ref 3.5–5.2)
ALP SERPL-CCNC: 73 U/L (ref 55–135)
ALT SERPL W/O P-5'-P-CCNC: 10 U/L (ref 10–44)
ANION GAP SERPL CALC-SCNC: 12 MMOL/L (ref 8–16)
AST SERPL-CCNC: 12 U/L (ref 10–40)
BASOPHILS # BLD AUTO: 0.05 K/UL (ref 0–0.2)
BASOPHILS NFR BLD: 0.5 % (ref 0–1.9)
BILIRUB SERPL-MCNC: 0.3 MG/DL (ref 0.1–1)
BUN SERPL-MCNC: 31 MG/DL (ref 8–23)
CALCIUM SERPL-MCNC: 9.9 MG/DL (ref 8.7–10.5)
CHLORIDE SERPL-SCNC: 107 MMOL/L (ref 95–110)
CHOLEST SERPL-MCNC: 149 MG/DL (ref 120–199)
CHOLEST/HDLC SERPL: 2.7 {RATIO} (ref 2–5)
CO2 SERPL-SCNC: 20 MMOL/L (ref 23–29)
CREAT SERPL-MCNC: 1.5 MG/DL (ref 0.5–1.4)
DIFFERENTIAL METHOD: ABNORMAL
EOSINOPHIL # BLD AUTO: 0.1 K/UL (ref 0–0.5)
EOSINOPHIL NFR BLD: 1.1 % (ref 0–8)
ERYTHROCYTE [DISTWIDTH] IN BLOOD BY AUTOMATED COUNT: 14.7 % (ref 11.5–14.5)
EST. GFR  (AFRICAN AMERICAN): 39 ML/MIN/1.73 M^2
EST. GFR  (NON AFRICAN AMERICAN): 33.8 ML/MIN/1.73 M^2
ESTIMATED AVG GLUCOSE: 123 MG/DL (ref 68–131)
GLUCOSE SERPL-MCNC: 141 MG/DL (ref 70–110)
HBA1C MFR BLD: 5.9 % (ref 4–5.6)
HCT VFR BLD AUTO: 32.2 % (ref 37–48.5)
HDLC SERPL-MCNC: 55 MG/DL (ref 40–75)
HDLC SERPL: 36.9 % (ref 20–50)
HGB BLD-MCNC: 10.2 G/DL (ref 12–16)
IMM GRANULOCYTES # BLD AUTO: 0.06 K/UL (ref 0–0.04)
IMM GRANULOCYTES NFR BLD AUTO: 0.6 % (ref 0–0.5)
LDLC SERPL CALC-MCNC: 73.2 MG/DL (ref 63–159)
LYMPHOCYTES # BLD AUTO: 2.6 K/UL (ref 1–4.8)
LYMPHOCYTES NFR BLD: 28 % (ref 18–48)
MCH RBC QN AUTO: 28.7 PG (ref 27–31)
MCHC RBC AUTO-ENTMCNC: 31.7 G/DL (ref 32–36)
MCV RBC AUTO: 90 FL (ref 82–98)
MONOCYTES # BLD AUTO: 0.6 K/UL (ref 0.3–1)
MONOCYTES NFR BLD: 6.8 % (ref 4–15)
NEUTROPHILS # BLD AUTO: 5.9 K/UL (ref 1.8–7.7)
NEUTROPHILS NFR BLD: 63 % (ref 38–73)
NONHDLC SERPL-MCNC: 94 MG/DL
NRBC BLD-RTO: 0 /100 WBC
PLATELET # BLD AUTO: 284 K/UL (ref 150–450)
PMV BLD AUTO: 10.7 FL (ref 9.2–12.9)
POTASSIUM SERPL-SCNC: 4.1 MMOL/L (ref 3.5–5.1)
PROT SERPL-MCNC: 7.4 G/DL (ref 6–8.4)
RBC # BLD AUTO: 3.56 M/UL (ref 4–5.4)
SODIUM SERPL-SCNC: 139 MMOL/L (ref 136–145)
TRIGL SERPL-MCNC: 104 MG/DL (ref 30–150)
TSH SERPL DL<=0.005 MIU/L-ACNC: 2.58 UIU/ML (ref 0.4–4)
WBC # BLD AUTO: 9.42 K/UL (ref 3.9–12.7)

## 2022-07-12 PROCEDURE — 83036 HEMOGLOBIN GLYCOSYLATED A1C: CPT | Performed by: INTERNAL MEDICINE

## 2022-07-12 PROCEDURE — 82306 VITAMIN D 25 HYDROXY: CPT | Performed by: INTERNAL MEDICINE

## 2022-07-12 PROCEDURE — 80061 LIPID PANEL: CPT | Performed by: INTERNAL MEDICINE

## 2022-07-12 PROCEDURE — 84443 ASSAY THYROID STIM HORMONE: CPT | Performed by: INTERNAL MEDICINE

## 2022-07-12 PROCEDURE — 36415 COLL VENOUS BLD VENIPUNCTURE: CPT | Performed by: INTERNAL MEDICINE

## 2022-07-12 PROCEDURE — 85025 COMPLETE CBC W/AUTO DIFF WBC: CPT | Performed by: INTERNAL MEDICINE

## 2022-07-12 PROCEDURE — 80053 COMPREHEN METABOLIC PANEL: CPT | Performed by: INTERNAL MEDICINE

## 2022-07-15 NOTE — PROGRESS NOTES
Subjective:      Patient ID: Lina Davis is a 77 y.o. female.    Chief Complaint:  Diabetes    History of Present Illness  Lina Davis is here for follow up of T2DM.  Previously seen by me 3/2022.     With regards to diabetes:    Diagnosed: ~2002/2003  DE: 7/2021  FH of DM: sister, brother   Denies any of her children have DM     Known complications:  DKA Denies  RN Denies  Eye Exam: 2/2021  PN : Yes   Podiatry: 12/2020  Nephropathy : Yes   CAD : Denies  Episode of pancreatitis in 2018 - attributed to Januvia    Current regimen:  Metformin 500mg twice daily  Actos 30mg daily  Jardiance 10mg daily   Levemir 12 units daily  Novolog 4 units with meals    Reports compliance.    Other medications tried:  Januvia- Discontinued in 2018 due to pancreatitis    Glucose Monitor:   3-4 times a day testing  Log reviewed: log provided and reviewed, scanned in media tab     Hypoglycemia:  Denies  Knows how to correct with 15 grams of carbs- juice, coke, or a peppermint.     Diet/Exercise:  Eats 2-3 meals a day. Trying to cut back on carbohydrates - portion rice and pasta   Snacks : occasionally - cinnamon roll, apple   Drinks : water, 1 or 2 8oz soft drinks a week, lemonade but will cut it with water.   Exercise - tries to stay active.      Diabetes Management Status    Hemoglobin A1C   Date Value Ref Range Status   07/12/2022 5.9 (H) 4.0 - 5.6 % Final     Comment:     ADA Screening Guidelines:  5.7-6.4%  Consistent with prediabetes  >or=6.5%  Consistent with diabetes    High levels of fetal hemoglobin interfere with the HbA1C  assay. Heterozygous hemoglobin variants (HbS, HgC, etc)do  not significantly interfere with this assay.   However, presence of multiple variants may affect accuracy.     03/17/2022 6.4 (H) 4.0 - 5.6 % Final     Comment:     ADA Screening Guidelines:  5.7-6.4%  Consistent with prediabetes  >or=6.5%  Consistent with diabetes    High levels of fetal hemoglobin interfere with the HbA1C  assay. Heterozygous  hemoglobin variants (HbS, HgC, etc)do  not significantly interfere with this assay.   However, presence of multiple variants may affect accuracy.     11/11/2021 5.5 4.0 - 5.6 % Final     Comment:     ADA Screening Guidelines:  5.7-6.4%  Consistent with prediabetes  >or=6.5%  Consistent with diabetes    High levels of fetal hemoglobin interfere with the HbA1C  assay. Heterozygous hemoglobin variants (HbS, HgC, etc)do  not significantly interfere with this assay.   However, presence of multiple variants may affect accuracy.         Statin: Taking  ACE/ARB: Taking  Screening or Prevention Patient's value Goal Complete/Controlled?   HgA1C Testing and Control   Lab Results   Component Value Date    HGBA1C 5.9 (H) 07/12/2022      Annually/Less than 8% Yes   Lipid profile : 07/12/2022 Annually Yes   LDL control Lab Results   Component Value Date    LDLCALC 73.2 07/12/2022    Annually/Less than 100 mg/dl  Yes   Nephropathy screening Lab Results   Component Value Date    LABMICR 164.0 08/09/2021     Lab Results   Component Value Date    PROTEINUA 1+ (A) 08/09/2021    Annually Yes   Blood pressure BP Readings from Last 1 Encounters:   07/19/22 (!) 122/57    Less than 140/90 No   Dilated retinal exam : 02/22/2021 Annually Yes   Foot exam   : 12/11/2020 Annually No     With regards to the hypercalcemia:    Lab Results   Component Value Date    .8 (H) 11/11/2021    CALCIUM 9.9 07/12/2022    PHOS 3.2 01/31/2018     Lab Results   Component Value Date    ALBUMIN 3.9 07/12/2022                     Daily intake of calcium is: Denies  Daily intake of vitamin D:  OTC Vit D3 2000iu daily     BMD:    12/2018  Normal BMD.  Compared to prior study in 9/2014, BMD has declined 5.0% at the spine and 4.3% at the hip.  RECOMMENDATIONS of [facility]:  1.  Calcium 8195-5607 mg daily and vitamin D 800-1000 units daily, adequate exercise.  2.  No need for repeat study in near future unless clinical  "change    Denies constipation, depression, muscle aches or pains.  Reports polyuria   Denies fractures, height loss or kidney stones.  Reports history of renal disease.  Denies family history of hyperparathyroidism or calcium problems.  Denies HCTZ., lithium, or chlorthiadone use.      Review of Systems   as above    Objective:   Physical Exam  Vitals reviewed.   Neck:      Thyroid: No thyromegaly.   Cardiovascular:      Rate and Rhythm: Normal rate.      Comments: No edema present  Pulmonary:      Effort: Pulmonary effort is normal.   Abdominal:      Palpations: Abdomen is soft.       Injection sites are without edema or erythema. No lipo hypertropthy or atrophy.    Visit Vitals  BP (!) 122/57   Pulse 106   Ht 5' 6" (1.676 m)   Wt 88.5 kg (195 lb)   SpO2 98%   BMI 31.47 kg/m²       Body mass index is 31.47 kg/m².    Lab Review:   Lab Results   Component Value Date    HGBA1C 5.9 (H) 07/12/2022    HGBA1C 6.4 (H) 03/17/2022    HGBA1C 5.5 11/11/2021       Lab Results   Component Value Date    CHOL 149 07/12/2022    HDL 55 07/12/2022    LDLCALC 73.2 07/12/2022    TRIG 104 07/12/2022    CHOLHDL 36.9 07/12/2022     Lab Results   Component Value Date     07/12/2022    K 4.1 07/12/2022     07/12/2022    CO2 20 (L) 07/12/2022     (H) 07/12/2022    BUN 31 (H) 07/12/2022    CREATININE 1.5 (H) 07/12/2022    CALCIUM 9.9 07/12/2022    PROT 7.4 07/12/2022    ALBUMIN 3.9 07/12/2022    BILITOT 0.3 07/12/2022    ALKPHOS 73 07/12/2022    AST 12 07/12/2022    ALT 10 07/12/2022    ANIONGAP 12 07/12/2022    ESTGFRAFRICA 39.0 (A) 07/12/2022    EGFRNONAA 33.8 (A) 07/12/2022    TSH 2.578 07/12/2022     Vit D, 25-Hydroxy   Date Value Ref Range Status   07/12/2022 28 (L) 30 - 96 ng/mL Final     Comment:     Vitamin D deficiency.........<10 ng/mL                              Vitamin D insufficiency......10-29 ng/mL       Vitamin D sufficiency........> or equal to 30 ng/mL  Vitamin D toxicity............>100 ng/mL   "     Assessment and Plan     1. Type 2 diabetes mellitus with diabetic nephropathy, with long-term current use of insulin  Ambulatory referral/consult to Nephrology    GLUCOSE MONITORING CONTINUOUS MIN 72 HOURS    Hemoglobin A1C    Fructosamine   2. Hypercalcemia  Renal Function Panel    PTH, Intact    Vitamin D   3. HTN (hypertension), benign     4. Hyperlipidemia, mixed     5. Stage 3a chronic kidney disease  Ambulatory referral/consult to Nephrology   6. Diabetic polyneuropathy associated with type 2 diabetes mellitus         Type 2 diabetes mellitus with diabetic nephropathy  -- Labs prior to follow up- check fructosamine.  -- A1c goal <7.5%.  -- Medications discussed:  MFM   GLP1-DPP4 - avoid given drug induced pancreatitis (Januvia)  SUNG   SGLT2   Reviewed potential adverse effects of SGLT-2 inhibitors, including genital mycotic infections, slightly increased risk of UTI, hypersensitivity, hypotension, and hyperkalemia. Advised to maintain water intake of 8-10 cups per day. Advised we need to check chemistry panel at baseline and 2 weeks after starting. Discussed FDA warning reports of ketoacidosis associated with SGLT-2 inhibitors. Advised to seek immediate medical attention and stop the medication if symptoms such as difficulty breathing, nausea, vomiting, abdominal pain, confusion, and unusual fatigue/sleepiness. Discussed possible precipitating factors including major illness/reduced food and fluid intake (advised to stop under these circumstances), and reduced insulin dose. Discussed possible effects of increased fracture risk/decreased bone density. Discussed reports of increased risk of leg and foot amputations with canagliflozin and need to seek urgent care if developed new pain or tenderness, sores or ulcers, or infections in legs/feet.   Insulin   -- Reviewed logs/CGM:  Glucose variable but relatively controlled.  Instructed to send glucose logs in 14 days.  Reach out to me sooner for any glucose <70  or consistently >180.  -- Schedule professional CGM.  -- Medication Changes:   CONTINUE  Metformin 500mg twice daily  Actos 30mg daily  Jardiance (started LOV 1/2022) 10mg daily   Levemir 12 units daily  Novolog 4 units with meals     Received SGLT2i through [facility].     -- Reviewed goals of therapy are to get the best control we can without hypoglycemia.  -- Reviewed patient's current insulin regimen. Clarified proper insulin dose and timing in relation to meals, etc. Insulin injection sites and proper rotation instructed.    -- Advised frequent self blood glucose monitoring.  Patient encouraged to document glucose results and bring them to every clinic visit.  -- Hypoglycemia precautions discussed. Instructed on precautions before driving.    -- Call for Bg repeatedly < 90 or > 180.   -- Close adherence to lifestyle changes recommended.   -- Periodic follow ups for eye evaluations, foot care and dental care suggested.    Hypercalcemia  -- Intermittent hypercalcemia.   -- Discussed indications for surgery if primary hyperparathyroidism proven.  -- Avoid dehydration, excessive calcium supplementation and meds (HCTZ) that can worsen hypercalcemia.  -- Repeat labs prior to follow up.    HTN (hypertension), benign  -- On ARB.  -- Controlled.  -- Blood pressure goals discussed with patient.    Hyperlipidemia, mixed  -- Controlled.  -- On statin per ADA recommendations.    CKD (chronic kidney disease) stage 3, GFR 30-59 ml/min  -- Optimize glucose control.   -- Nephrology referral.    Diabetic polyneuropathy  -- Optimize glucose control.       Follow up in about 4 months (around 11/19/2022).

## 2022-07-19 ENCOUNTER — OFFICE VISIT (OUTPATIENT)
Dept: ENDOCRINOLOGY | Facility: CLINIC | Age: 77
End: 2022-07-19
Payer: MEDICARE

## 2022-07-19 ENCOUNTER — PATIENT MESSAGE (OUTPATIENT)
Dept: RESEARCH | Facility: CLINIC | Age: 77
End: 2022-07-19
Payer: MEDICARE

## 2022-07-19 VITALS
DIASTOLIC BLOOD PRESSURE: 57 MMHG | HEART RATE: 106 BPM | WEIGHT: 195 LBS | SYSTOLIC BLOOD PRESSURE: 122 MMHG | OXYGEN SATURATION: 98 % | HEIGHT: 66 IN | BODY MASS INDEX: 31.34 KG/M2

## 2022-07-19 DIAGNOSIS — E11.42 DIABETIC POLYNEUROPATHY ASSOCIATED WITH TYPE 2 DIABETES MELLITUS: ICD-10-CM

## 2022-07-19 DIAGNOSIS — E83.52 HYPERCALCEMIA: ICD-10-CM

## 2022-07-19 DIAGNOSIS — E11.21 TYPE 2 DIABETES MELLITUS WITH DIABETIC NEPHROPATHY, WITH LONG-TERM CURRENT USE OF INSULIN: Primary | Chronic | ICD-10-CM

## 2022-07-19 DIAGNOSIS — I10 HTN (HYPERTENSION), BENIGN: ICD-10-CM

## 2022-07-19 DIAGNOSIS — E78.2 HYPERLIPIDEMIA, MIXED: ICD-10-CM

## 2022-07-19 DIAGNOSIS — Z79.4 TYPE 2 DIABETES MELLITUS WITH DIABETIC NEPHROPATHY, WITH LONG-TERM CURRENT USE OF INSULIN: Primary | Chronic | ICD-10-CM

## 2022-07-19 DIAGNOSIS — N18.31 STAGE 3A CHRONIC KIDNEY DISEASE: ICD-10-CM

## 2022-07-19 PROCEDURE — 99499 RISK ADDL DX/OHS AUDIT: ICD-10-PCS | Mod: S$GLB,,, | Performed by: NURSE PRACTITIONER

## 2022-07-19 PROCEDURE — 3288F PR FALLS RISK ASSESSMENT DOCUMENTED: ICD-10-PCS | Mod: CPTII,S$GLB,, | Performed by: NURSE PRACTITIONER

## 2022-07-19 PROCEDURE — 1101F PT FALLS ASSESS-DOCD LE1/YR: CPT | Mod: CPTII,S$GLB,, | Performed by: NURSE PRACTITIONER

## 2022-07-19 PROCEDURE — 3072F PR LOW RISK FOR RETINOPATHY: ICD-10-PCS | Mod: CPTII,S$GLB,, | Performed by: NURSE PRACTITIONER

## 2022-07-19 PROCEDURE — 3044F HG A1C LEVEL LT 7.0%: CPT | Mod: CPTII,S$GLB,, | Performed by: NURSE PRACTITIONER

## 2022-07-19 PROCEDURE — 3074F SYST BP LT 130 MM HG: CPT | Mod: CPTII,S$GLB,, | Performed by: NURSE PRACTITIONER

## 2022-07-19 PROCEDURE — 4010F ACE/ARB THERAPY RXD/TAKEN: CPT | Mod: CPTII,S$GLB,, | Performed by: NURSE PRACTITIONER

## 2022-07-19 PROCEDURE — 3078F DIAST BP <80 MM HG: CPT | Mod: CPTII,S$GLB,, | Performed by: NURSE PRACTITIONER

## 2022-07-19 PROCEDURE — 1101F PR PT FALLS ASSESS DOC 0-1 FALLS W/OUT INJ PAST YR: ICD-10-PCS | Mod: CPTII,S$GLB,, | Performed by: NURSE PRACTITIONER

## 2022-07-19 PROCEDURE — 3288F FALL RISK ASSESSMENT DOCD: CPT | Mod: CPTII,S$GLB,, | Performed by: NURSE PRACTITIONER

## 2022-07-19 PROCEDURE — 1126F AMNT PAIN NOTED NONE PRSNT: CPT | Mod: CPTII,S$GLB,, | Performed by: NURSE PRACTITIONER

## 2022-07-19 PROCEDURE — 1159F PR MEDICATION LIST DOCUMENTED IN MEDICAL RECORD: ICD-10-PCS | Mod: CPTII,S$GLB,, | Performed by: NURSE PRACTITIONER

## 2022-07-19 PROCEDURE — 1159F MED LIST DOCD IN RCRD: CPT | Mod: CPTII,S$GLB,, | Performed by: NURSE PRACTITIONER

## 2022-07-19 PROCEDURE — 99214 PR OFFICE/OUTPT VISIT, EST, LEVL IV, 30-39 MIN: ICD-10-PCS | Mod: S$GLB,,, | Performed by: NURSE PRACTITIONER

## 2022-07-19 PROCEDURE — 99499 UNLISTED E&M SERVICE: CPT | Mod: S$GLB,,, | Performed by: NURSE PRACTITIONER

## 2022-07-19 PROCEDURE — 99999 PR PBB SHADOW E&M-EST. PATIENT-LVL V: ICD-10-PCS | Mod: PBBFAC,,, | Performed by: NURSE PRACTITIONER

## 2022-07-19 PROCEDURE — 1160F RVW MEDS BY RX/DR IN RCRD: CPT | Mod: CPTII,S$GLB,, | Performed by: NURSE PRACTITIONER

## 2022-07-19 PROCEDURE — 3074F PR MOST RECENT SYSTOLIC BLOOD PRESSURE < 130 MM HG: ICD-10-PCS | Mod: CPTII,S$GLB,, | Performed by: NURSE PRACTITIONER

## 2022-07-19 PROCEDURE — 3078F PR MOST RECENT DIASTOLIC BLOOD PRESSURE < 80 MM HG: ICD-10-PCS | Mod: CPTII,S$GLB,, | Performed by: NURSE PRACTITIONER

## 2022-07-19 PROCEDURE — 3072F LOW RISK FOR RETINOPATHY: CPT | Mod: CPTII,S$GLB,, | Performed by: NURSE PRACTITIONER

## 2022-07-19 PROCEDURE — 4010F PR ACE/ARB THEARPY RXD/TAKEN: ICD-10-PCS | Mod: CPTII,S$GLB,, | Performed by: NURSE PRACTITIONER

## 2022-07-19 PROCEDURE — 3044F PR MOST RECENT HEMOGLOBIN A1C LEVEL <7.0%: ICD-10-PCS | Mod: CPTII,S$GLB,, | Performed by: NURSE PRACTITIONER

## 2022-07-19 PROCEDURE — 99999 PR PBB SHADOW E&M-EST. PATIENT-LVL V: CPT | Mod: PBBFAC,,, | Performed by: NURSE PRACTITIONER

## 2022-07-19 PROCEDURE — 1126F PR PAIN SEVERITY QUANTIFIED, NO PAIN PRESENT: ICD-10-PCS | Mod: CPTII,S$GLB,, | Performed by: NURSE PRACTITIONER

## 2022-07-19 PROCEDURE — 99214 OFFICE O/P EST MOD 30 MIN: CPT | Mod: S$GLB,,, | Performed by: NURSE PRACTITIONER

## 2022-07-19 PROCEDURE — 1160F PR REVIEW ALL MEDS BY PRESCRIBER/CLIN PHARMACIST DOCUMENTED: ICD-10-PCS | Mod: CPTII,S$GLB,, | Performed by: NURSE PRACTITIONER

## 2022-07-19 RX ORDER — PIOGLITAZONEHYDROCHLORIDE 15 MG/1
TABLET ORAL
COMMUNITY
Start: 2022-06-29 | End: 2022-07-19

## 2022-07-19 NOTE — ASSESSMENT & PLAN NOTE
-- Intermittent hypercalcemia.   -- Discussed indications for surgery if primary hyperparathyroidism proven.  -- Avoid dehydration, excessive calcium supplementation and meds (HCTZ) that can worsen hypercalcemia.  -- Repeat labs prior to follow up.

## 2022-07-19 NOTE — ASSESSMENT & PLAN NOTE
-- Labs prior to follow up- check fructosamine.  -- A1c goal <7.5%.  -- Medications discussed:  MFM   GLP1-DPP4 - avoid given drug induced pancreatitis (Januvia)  SUNG   SGLT2   Reviewed potential adverse effects of SGLT-2 inhibitors, including genital mycotic infections, slightly increased risk of UTI, hypersensitivity, hypotension, and hyperkalemia. Advised to maintain water intake of 8-10 cups per day. Advised we need to check chemistry panel at baseline and 2 weeks after starting. Discussed FDA warning reports of ketoacidosis associated with SGLT-2 inhibitors. Advised to seek immediate medical attention and stop the medication if symptoms such as difficulty breathing, nausea, vomiting, abdominal pain, confusion, and unusual fatigue/sleepiness. Discussed possible precipitating factors including major illness/reduced food and fluid intake (advised to stop under these circumstances), and reduced insulin dose. Discussed possible effects of increased fracture risk/decreased bone density. Discussed reports of increased risk of leg and foot amputations with canagliflozin and need to seek urgent care if developed new pain or tenderness, sores or ulcers, or infections in legs/feet.   Insulin   -- Reviewed logs/CGM:  Glucose variable but relatively controlled.  Instructed to send glucose logs in 14 days.  Reach out to me sooner for any glucose <70 or consistently >180.  -- Schedule professional CGM.  -- Medication Changes:   CONTINUE  Metformin 500mg twice daily  Actos 30mg daily  Jardiance (started LOV 1/2022) 10mg daily   Levemir 12 units daily  Novolog 4 units with meals     Received SGLT2i through [facility].     -- Reviewed goals of therapy are to get the best control we can without hypoglycemia.  -- Reviewed patient's current insulin regimen. Clarified proper insulin dose and timing in relation to meals, etc. Insulin injection sites and proper rotation instructed.    -- Advised frequent self blood glucose monitoring.   Patient encouraged to document glucose results and bring them to every clinic visit.  -- Hypoglycemia precautions discussed. Instructed on precautions before driving.    -- Call for Bg repeatedly < 90 or > 180.   -- Close adherence to lifestyle changes recommended.   -- Periodic follow ups for eye evaluations, foot care and dental care suggested.

## 2022-07-26 ENCOUNTER — CLINICAL SUPPORT (OUTPATIENT)
Dept: ENDOCRINOLOGY | Facility: CLINIC | Age: 77
End: 2022-07-26
Payer: MEDICARE

## 2022-07-26 ENCOUNTER — OFFICE VISIT (OUTPATIENT)
Dept: INTERNAL MEDICINE | Facility: CLINIC | Age: 77
End: 2022-07-26
Payer: MEDICARE

## 2022-07-26 VITALS
HEART RATE: 98 BPM | BODY MASS INDEX: 31.43 KG/M2 | OXYGEN SATURATION: 99 % | DIASTOLIC BLOOD PRESSURE: 70 MMHG | WEIGHT: 195.56 LBS | HEIGHT: 66 IN | SYSTOLIC BLOOD PRESSURE: 134 MMHG

## 2022-07-26 DIAGNOSIS — E01.0 THYROMEGALY: ICD-10-CM

## 2022-07-26 DIAGNOSIS — I77.9 BILATERAL CAROTID ARTERY DISEASE, UNSPECIFIED TYPE: Primary | ICD-10-CM

## 2022-07-26 DIAGNOSIS — E11.21 TYPE 2 DIABETES MELLITUS WITH DIABETIC NEPHROPATHY, WITH LONG-TERM CURRENT USE OF INSULIN: Chronic | ICD-10-CM

## 2022-07-26 DIAGNOSIS — Z79.4 TYPE 2 DIABETES MELLITUS WITH DIABETIC NEPHROPATHY, WITH LONG-TERM CURRENT USE OF INSULIN: Chronic | ICD-10-CM

## 2022-07-26 DIAGNOSIS — E78.2 HYPERLIPIDEMIA, MIXED: ICD-10-CM

## 2022-07-26 DIAGNOSIS — I10 HTN (HYPERTENSION), BENIGN: ICD-10-CM

## 2022-07-26 DIAGNOSIS — N28.9 RENAL INSUFFICIENCY: ICD-10-CM

## 2022-07-26 LAB
ALBUMIN/CREAT UR: 71 UG/MG (ref 0–30)
BACTERIA #/AREA URNS AUTO: ABNORMAL /HPF
BILIRUB UR QL STRIP: NEGATIVE
CLARITY UR REFRACT.AUTO: ABNORMAL
COLOR UR AUTO: YELLOW
CREAT UR-MCNC: 69 MG/DL (ref 15–325)
GLUCOSE UR QL STRIP: ABNORMAL
HGB UR QL STRIP: NEGATIVE
KETONES UR QL STRIP: NEGATIVE
LEUKOCYTE ESTERASE UR QL STRIP: ABNORMAL
MICROALBUMIN UR DL<=1MG/L-MCNC: 49 UG/ML
MICROSCOPIC COMMENT: ABNORMAL
NITRITE UR QL STRIP: NEGATIVE
PH UR STRIP: 5 [PH] (ref 5–8)
PROT UR QL STRIP: NEGATIVE
RBC #/AREA URNS AUTO: 1 /HPF (ref 0–4)
SP GR UR STRIP: 1.01 (ref 1–1.03)
SQUAMOUS #/AREA URNS AUTO: 1 /HPF
URN SPEC COLLECT METH UR: ABNORMAL
WBC #/AREA URNS AUTO: 27 /HPF (ref 0–5)
YEAST UR QL AUTO: ABNORMAL

## 2022-07-26 PROCEDURE — 3288F FALL RISK ASSESSMENT DOCD: CPT | Mod: CPTII,S$GLB,, | Performed by: INTERNAL MEDICINE

## 2022-07-26 PROCEDURE — 3060F POS MICROALBUMINURIA REV: CPT | Mod: CPTII,S$GLB,, | Performed by: INTERNAL MEDICINE

## 2022-07-26 PROCEDURE — 3078F PR MOST RECENT DIASTOLIC BLOOD PRESSURE < 80 MM HG: ICD-10-PCS | Mod: CPTII,S$GLB,, | Performed by: INTERNAL MEDICINE

## 2022-07-26 PROCEDURE — 4010F PR ACE/ARB THEARPY RXD/TAKEN: ICD-10-PCS | Mod: CPTII,S$GLB,, | Performed by: INTERNAL MEDICINE

## 2022-07-26 PROCEDURE — 99999 PR PBB SHADOW E&M-EST. PATIENT-LVL V: ICD-10-PCS | Mod: PBBFAC,,, | Performed by: INTERNAL MEDICINE

## 2022-07-26 PROCEDURE — 1159F PR MEDICATION LIST DOCUMENTED IN MEDICAL RECORD: ICD-10-PCS | Mod: CPTII,S$GLB,, | Performed by: INTERNAL MEDICINE

## 2022-07-26 PROCEDURE — 3044F PR MOST RECENT HEMOGLOBIN A1C LEVEL <7.0%: ICD-10-PCS | Mod: CPTII,S$GLB,, | Performed by: INTERNAL MEDICINE

## 2022-07-26 PROCEDURE — 82570 ASSAY OF URINE CREATININE: CPT | Performed by: INTERNAL MEDICINE

## 2022-07-26 PROCEDURE — 3078F DIAST BP <80 MM HG: CPT | Mod: CPTII,S$GLB,, | Performed by: INTERNAL MEDICINE

## 2022-07-26 PROCEDURE — 99999 PR PBB SHADOW E&M-EST. PATIENT-LVL V: CPT | Mod: PBBFAC,,, | Performed by: INTERNAL MEDICINE

## 2022-07-26 PROCEDURE — 3075F PR MOST RECENT SYSTOLIC BLOOD PRESS GE 130-139MM HG: ICD-10-PCS | Mod: CPTII,S$GLB,, | Performed by: INTERNAL MEDICINE

## 2022-07-26 PROCEDURE — 4010F ACE/ARB THERAPY RXD/TAKEN: CPT | Mod: CPTII,S$GLB,, | Performed by: INTERNAL MEDICINE

## 2022-07-26 PROCEDURE — 3060F PR POS MICROALBUMINURIA RESULT DOCUMENTED/REVIEW: ICD-10-PCS | Mod: CPTII,S$GLB,, | Performed by: INTERNAL MEDICINE

## 2022-07-26 PROCEDURE — 3044F HG A1C LEVEL LT 7.0%: CPT | Mod: CPTII,S$GLB,, | Performed by: INTERNAL MEDICINE

## 2022-07-26 PROCEDURE — 1159F MED LIST DOCD IN RCRD: CPT | Mod: CPTII,S$GLB,, | Performed by: INTERNAL MEDICINE

## 2022-07-26 PROCEDURE — 99214 PR OFFICE/OUTPT VISIT, EST, LEVL IV, 30-39 MIN: ICD-10-PCS | Mod: S$GLB,,, | Performed by: INTERNAL MEDICINE

## 2022-07-26 PROCEDURE — 1101F PT FALLS ASSESS-DOCD LE1/YR: CPT | Mod: CPTII,S$GLB,, | Performed by: INTERNAL MEDICINE

## 2022-07-26 PROCEDURE — 3066F NEPHROPATHY DOC TX: CPT | Mod: CPTII,S$GLB,, | Performed by: INTERNAL MEDICINE

## 2022-07-26 PROCEDURE — 3066F PR DOCUMENTATION OF TREATMENT FOR NEPHROPATHY: ICD-10-PCS | Mod: CPTII,S$GLB,, | Performed by: INTERNAL MEDICINE

## 2022-07-26 PROCEDURE — 1101F PR PT FALLS ASSESS DOC 0-1 FALLS W/OUT INJ PAST YR: ICD-10-PCS | Mod: CPTII,S$GLB,, | Performed by: INTERNAL MEDICINE

## 2022-07-26 PROCEDURE — 3072F PR LOW RISK FOR RETINOPATHY: ICD-10-PCS | Mod: CPTII,S$GLB,, | Performed by: INTERNAL MEDICINE

## 2022-07-26 PROCEDURE — 99214 OFFICE O/P EST MOD 30 MIN: CPT | Mod: S$GLB,,, | Performed by: INTERNAL MEDICINE

## 2022-07-26 PROCEDURE — 82043 UR ALBUMIN QUANTITATIVE: CPT | Performed by: INTERNAL MEDICINE

## 2022-07-26 PROCEDURE — 3075F SYST BP GE 130 - 139MM HG: CPT | Mod: CPTII,S$GLB,, | Performed by: INTERNAL MEDICINE

## 2022-07-26 PROCEDURE — 3288F PR FALLS RISK ASSESSMENT DOCUMENTED: ICD-10-PCS | Mod: CPTII,S$GLB,, | Performed by: INTERNAL MEDICINE

## 2022-07-26 PROCEDURE — 3072F LOW RISK FOR RETINOPATHY: CPT | Mod: CPTII,S$GLB,, | Performed by: INTERNAL MEDICINE

## 2022-07-26 PROCEDURE — 81001 URINALYSIS AUTO W/SCOPE: CPT | Performed by: INTERNAL MEDICINE

## 2022-07-26 RX ORDER — MIRTAZAPINE 15 MG/1
15 TABLET, FILM COATED ORAL NIGHTLY
Qty: 30 TABLET | Refills: 1 | Status: SHIPPED | OUTPATIENT
Start: 2022-07-26 | End: 2023-05-02

## 2022-07-26 RX ORDER — OXYCODONE AND ACETAMINOPHEN 5; 325 MG/1; MG/1
1 TABLET ORAL
Qty: 30 TABLET | Refills: 0 | Status: SHIPPED | OUTPATIENT
Start: 2022-07-26 | End: 2022-10-11 | Stop reason: SDUPTHER

## 2022-07-26 NOTE — PROGRESS NOTES
"DIABETES EDUCATOR NOTE   PLACEMENT OF DEXCOM G6 PRO SENSOR  CONTINOUS GLUCOSE MONITORING SYSTEM (CGMS)     Patient is here in clinic today for placement of continuous glucose monitoring sensor.                Patient verified that they were here for CGMS procedure ordered by their provider and that they have a working glucose meter and supplies at home.   Patient will be provided with a Dexcom G6 Pro sensor, transmitter, and a copy of the Continuous Glucose Monitoring Patient Log to fill out during the study.              A detailed explanation of Continuous Glucose Monitoring was  provided. Patient informed that this is a blind procedure and that they will not actually see the blood sugar tracing in real time.    Instructed patient to check blood sugar using home glucometer and to record the following on provided patient log sheets: Blood sugar taken at home, Meals and snacks, Activity, and Diabetes medications taken and dosage.               Patient was brought to a private location. Site selected, prepared and allowed to dry. Glucose Transmitter Serial Number [id number] was inserted to patient's rear upper arm.               The following forms were given and reviewed in detail with patient and all questions answered.   · Continuous Glucose Monitoring Patient Log   · Dexcom G6 PRO Patient Handout "Blinded CGM Patient Handout"                 Instructions: Time: 15 min   Insertion of sensor:  Time: 5 minutes       "

## 2022-07-26 NOTE — PROGRESS NOTES
Subjective:       Patient ID: Lina Davis is a 77 y.o. female.    Chief Complaint: Follow-up    HPI Pt is feeling ok except for some left hand pain and chronic back pain.  No CP or SOB. No stroke symptoms.   Review of Systems   Respiratory: Negative for shortness of breath (PND or orthopnea).    Cardiovascular: Negative for chest pain (arm pain or jaw pain).   Gastrointestinal: Negative for abdominal pain, diarrhea, nausea and vomiting.   Genitourinary: Negative for dysuria.   Neurological: Negative for seizures, syncope and headaches.       Objective:      Physical Exam  Constitutional:       General: She is not in acute distress.     Appearance: She is well-developed.   HENT:      Head: Normocephalic.   Eyes:      Pupils: Pupils are equal, round, and reactive to light.   Neck:      Thyroid: No thyromegaly.      Vascular: No JVD.   Cardiovascular:      Rate and Rhythm: Normal rate and regular rhythm.      Heart sounds: Normal heart sounds. No murmur heard.    No friction rub. No gallop.   Pulmonary:      Effort: Pulmonary effort is normal.      Breath sounds: Normal breath sounds. No wheezing or rales.   Abdominal:      General: Bowel sounds are normal. There is no distension.      Palpations: Abdomen is soft. There is no mass.      Tenderness: There is no abdominal tenderness. There is no guarding or rebound.   Musculoskeletal:      Cervical back: Neck supple.   Lymphadenopathy:      Cervical: No cervical adenopathy.   Skin:     General: Skin is warm and dry.   Neurological:      Mental Status: She is alert and oriented to person, place, and time.      Deep Tendon Reflexes: Reflexes are normal and symmetric.   Psychiatric:         Behavior: Behavior normal.         Thought Content: Thought content normal.         Judgment: Judgment normal.         Assessment:       1. Bilateral carotid artery disease, unspecified type    2. HTN (hypertension), benign    3. Hyperlipidemia, mixed    4. Type 2 diabetes mellitus  with diabetic nephropathy, with long-term current use of insulin    5. Renal insufficiency    6. Thyromegaly        Plan:   Bilateral carotid artery disease, unspecified type  Sees [facility] surgery - recheck in Dec 2023  HTN (hypertension), benign  Controlled - continue current meds    Hyperlipidemia, mixed  Controlled - continue current meds    Type 2 diabetes mellitus with diabetic nephropathy, with long-term current use of insulin  -     Urinalysis  -     Microalbumin/Creatinine Ratio, Urine  -     Ambulatory referral/consult to Optometry; Future; Expected date: 08/02/2022    Renal insufficiency  -     Ambulatory referral/consult to Nephrology; Future; Expected date: 08/02/2022    Thyromegaly  -     US Soft Tissue Head Neck Thyroid; Future; Expected date: 07/26/2022    Other orders  -     mirtazapine (REMERON) 15 MG tablet; Take 1 tablet (15 mg total) by mouth every evening.  Dispense: 30 tablet; Refill: 1 - for sleep and depression  -     oxyCODONE-acetaminophen (PERCOCET) 5-325 mg per tablet; Take 1 tablet by mouth every 24 hours as needed for Pain.  Dispense: 30 tablet; Refill: 0  -     Urinalysis Microscopic

## 2022-07-27 ENCOUNTER — TELEPHONE (OUTPATIENT)
Dept: ENDOCRINOLOGY | Facility: CLINIC | Age: 77
End: 2022-07-27
Payer: MEDICARE

## 2022-07-27 NOTE — TELEPHONE ENCOUNTER
Attempted call, first number is no longer in service  Spoke with patient, explained that she does need to continue her daily regimen as she normally would as if she was not wearing the dexcom device. Explained the log to her and told her to make sure to write everything down - fingerstick glucose values, diabetes meds taken and food she eats and to make sure she puts down the times as well. Pt verbalized understanding

## 2022-07-27 NOTE — TELEPHONE ENCOUNTER
----- Message from Jonelle Mills MA sent at 7/27/2022 11:34 AM CDT -----  Regarding: FW: Device in Arm Pt cannot read and have Insulin Questions.  Contact: @[phone]    ----- Message -----  From: Pati Paredes NP  Sent: 7/27/2022  11:22 AM CDT  To: Jonelle Mills MA  Subject: FW: Device in Arm Pt cannot read and have In#    Im assuming she is referring to the professional CGM. It just monitors her glucose so she needs to document on the provided log - glucose, medication, food, etc  ----- Message -----  From: Domingo Stearns  Sent: 7/27/2022  10:59 AM CDT  To: Reggie Krueger Staff  Subject: Device in Arm Pt cannot read and have Insuli#    Pt requesting a call back from Madiha pt states she cannot read the instructions for the device in arm. Pt also needs to know if she has continue taking the insulin or just go with the patch.  Please call to discuss further.

## 2022-07-29 ENCOUNTER — CLINICAL SUPPORT (OUTPATIENT)
Dept: REHABILITATION | Facility: HOSPITAL | Age: 77
End: 2022-07-29
Attending: STUDENT IN AN ORGANIZED HEALTH CARE EDUCATION/TRAINING PROGRAM
Payer: MEDICARE

## 2022-07-29 DIAGNOSIS — M46.1 BILATERAL SACROILIITIS: ICD-10-CM

## 2022-07-29 DIAGNOSIS — Z74.09 IMPAIRED FUNCTIONAL MOBILITY AND ACTIVITY TOLERANCE: ICD-10-CM

## 2022-07-29 DIAGNOSIS — M53.3 SACROILIAC DYSFUNCTION: ICD-10-CM

## 2022-07-29 PROCEDURE — 97161 PT EVAL LOW COMPLEX 20 MIN: CPT

## 2022-07-29 NOTE — PLAN OF CARE
"[facility]   Physical Therapy Initial Evaluation     Date: 7/29/2022   Name: Lina Davis  Clinic Number: [id number]    Therapy Diagnosis:   Encounter Diagnoses   Name Primary?    Sacroiliac dysfunction     Bilateral sacroiliitis     Impaired functional mobility and activity tolerance        Physician: Leland Pena,*    Physician Orders: PT Eval and Treat   Medical Diagnosis from Referral:   M53.3 (ICD-10-CM) - Sacroiliac dysfunction   M46.1 (ICD-10-CM) - Bilateral sacroiliitis     Evaluation Date: 7/29/2022  Authorization Period Expiration: 6/28/2022  Plan of Care Expiration: 10/7/2022  Progress Note Due: 10th visit  Visit # / Visits authorized: 1/1   FOTO: 44%/36%    Precautions: Standard     Time In: 12:15 pm  Time Out: 1:00 pm  Total Appointment Time (timed & untimed codes): 45 minutes      SUBJECTIVE     Date of onset: chronic    History of current condition - Independence reports: chronic history back pain with associated leg pain. Patient reports she feels "stiff and tired" when she first wakes up. Patient notes her pain is located from her low back down to posterior thigh and sometimes down towards her feet; and sometimes will experience bilateral leg pain. Patient notes her pain today is just in her right leg which she describes as a dull or tooth ache pain. Patient reports she is unable to tolerate standing in one place and prefers to walk, however unable to walk too far. Patient reports leaning over makes her feel better and routinely relies on the grocery buggy for support.  Patient reports she had a steroid injection about 3 months ago. Pain wakes her up at night, but does not prevent her from falling asleep. No history of cancer and denies any saddle anesthesia.    Falls: denies any recent falls    Imaging, please see imaging    Prior Therapy: B shoulders  Social History: lives with their spouse, 3 steps to enter home, 0 steps inside home  Occupation: retired aud[employer]  Prior " Level of Function: independent with all household and personal ADL's  Current Level of Function:  relies on her for all household activities, frequent rest breaks; Pain after sitting for a long time, such as in her car, when she goes to stand up; difficulty with ascending stairs      Pain:  Current 7/10, worst 10/10, best 0/10   Location: bilateral low, buttock, posterior thigh and occasionally down to her foot    Description: Aching and Dull  Aggravating Factors: Standing and Getting out of bed/chair  Easing Factors: sitting, ocycontin and tylenol     Patients goals: stand for longer periods of time so she can  [employer] and cook at home and clean her home without breaks     Medical History:   Past Medical History:   Diagnosis Date    Anemia     Arthritis     Cataract     Gout, arthritis     HTN (hypertension), benign     Polyneuropathy     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    Medications:   Lina has a current medication list which includes the following prescription(s): alcohol swabs, allopurinol, amlodipine, aspirin, atorvastatin, blood glucose control, normal, blood-glucose meter, droplet pen needle, empagliflozin, insulin aspart u-100, insulin detemir u-100, lancets, losartan, metformin, mirtazapine, omeprazole, oxycodone-acetaminophen, pen needle, diabetic, pioglitazone, triamcinolone acetonide 0.1%, and true metrix glucose test strip.    Allergies:   Review of patient's allergies indicates:   Allergen Reactions    Januvia [sitagliptin] Other (See Comments)      Pancreatitis      Metformin Other (See Comments)     Nausea and vomiting with immediate release - tolerates extended release          OBJECTIVE     Observation: Pt ambulates with trendelenburg gait pattern, shortened step length     Posture: forward head and rounded shoulders    Lumbar Range of Motion:     Limitation Pain   Flexion 75%  pain      Extension 50%       Left Side Bending 75%    Right Side Bending 75%  pain      Balance Assessment:       Evaluation   Single Limb Stance R LE  unable to perform   (<10 sec = HIGH FALL RISK)   Single Limb Stance L LE Unable to perform  (<10 sec = HIGH FALL RISK)        Lower Extremity Strength  Right LE   Left LE     Hip Ext 3/5 Hip Ext 3/5   Hip ABD 3/5 Hip ABD  3/5   Hip IR 3-/5 Hip IR 3-/5   Hip ER 3-/5 Hip ER 3-/5   Hip FLEX 3+/5 Hip FLEX 3+/5   Knee FLEX 4-/5 Knee FLEX 4-/5   Knee EXT 4/5 Knee EXT 4/5   Plantar flexion (seated) 4/5 Plantar flexion (seated) 4/5   Dorsiflexion 4+/5 Dorsiflexion 4+/5      Neural Tension Testing:  SLR: L (); R () at first follow up  Slump Test: L (+); R (+)     Sensation: bilateral lower extremity dermatomes intact     Reflexes:  L3/4: R 2+ ;L 2+  S1/2: R unable to elicict; L unable to elicit     Red flag screen:               B/B changes: denies changes              Clonus: 0 beats              Babinski: normal reflexive response    Range of Motion: at first folllow up  R Hip Active(Passive) L Hip Active (Passive)   Flexion NT flexion NT   Extension NT extension NT   IR NT IR NT   ER NT ER NT         Endurance Assessment:       Evaluation   Timed Up and Go 12 seconds   30 sec STS  5 x          Table: Population Norms for TUG    Age  Average TUG    60 - 69 years  8.1 seconds    70 - 79 years  9.2 seconds    80 - 99 years  11.3 seconds       Special Tests: next visit  Chad Test:   L knee flexion:   R knee flexion   FADIR: R ()               L: ()  YOCASTA: R ()               L: ()  Rukhsana: R: ()               L ()      Limitation/Restriction for FOTO Lumbar Survey    Therapist reviewed FOTO scores for Lina Davis on 7/29/2022.   FOTO documents entered into [employer] - see Media section.    Limitation Score: pleas see media section         TREATMENT     Total Treatment time (time-based codes) separate from Evaluation: 5 minutes      Lina received the treatments listed below:      therapeutic exercises to develop strength, endurance, ROM, flexibility and core stabilization for 5 minutes including:  Please see patient instructions for HEP: performed and demonstrated      PATIENT EDUCATION AND HOME EXERCISES     Education provided:   - HEP    Written Home Exercises Provided: yes. Exercises were reviewed and Lina was able to demonstrate them prior to the end of the session.  Lina demonstrated good  understanding of the education provided. See EMR under Patient Instructions for exercises provided during therapy sessions.    ASSESSMENT     Lina is a 77 y.o. female referred to outpatient Physical Therapy with a medical diagnosis of Sacroiliac dysfunction andBilateral sacroiliitis. Patient presents with s/s consistent with lumbar stenosis with pain distribution present in bilateral posterior thigh and occasionally past bilateral knees. Pt presents with poor balance, decreased lower extremity strength, and difficulty with transfers, and poor activity tolerance. Pt demonstrates reduced lumbar ROM secondary to pain and poor lumbar functional mobility. Pt responded well to HEP exercises. Pt would benefit from flexion category of treatment based classification.    Patient prognosis is Good.   Patient will benefit from skilled outpatient Physical Therapy to address the deficits stated above and in the chart below, provide patient /family education, and to maximize patientt's level of independence.     Plan of care discussed with patient: Yes  Patient's spiritual, cultural and educational needs considered and patient is agreeable to the plan of  care and goals as stated below:     Anticipated Barriers for therapy: none    Medical Necessity is demonstrated by the following  History  Co-morbidities and personal factors that may impact the plan of care Co-morbidities:   CAD, diabetes, high BMI, HTN and B RTC repair    Personal Factors:   age     high   Examination  Body Structures and Functions, activity limitations and participation restrictions that may impact the plan of care Body Regions:   back  lower extremities  trunk    Body Systems:    gross symmetry  ROM  strength  balance  gait  transfers  motor control    Participation Restrictions:   none    Activity limitations:   Learning and applying knowledge  no deficits    General Tasks and Commands  no deficits    Communication  no deficits    Mobility  lifting and carrying objects  walking  transfers    Self care  dressing    Domestic Life  shopping  cooking  doing house work (cleaning house, washing dishes, laundry)    Interactions/Relationships  no deficits    Life Areas  no deficits    Community and Social Life  no deficits         moderate   Clinical Presentation stable and uncomplicated low   Decision Making/ Complexity Score: low     Goals:  1. Pt will initiate home exercise program to improve strength, flexibility, and functional mobility to return to PLOF.   2. Pt will report decreased LE pain  < / =  3/10  to demonstrate improved activity tolerance.     Long Term Goals: 6 weeks   1. Pt will perform 10 consecutive sit to stands without UE support to demonstrate improved tolerance for seated transfers.  2. Pt will perform SLS for 10 seconds on R and L lower extremities to demonstrate improved SL balance and reduce risk for falls.  3. Pt will improve impaired lower extremity manual muscle tests to >/= 4/5 to improve dynamic lower extremity support for closed chain tasks    PLAN   Plan of care Certification: 7/29/2022 to 10/7/2022.    Outpatient Physical Therapy 1-2 times weekly for 10 weeks to  include the following interventions: Aquatic Therapy, Electrical Stimulation TENS, functional dry needling, Gait Training, Manual Therapy, Moist Heat/ Ice, Neuromuscular Re-ed, Orthotic Management and Training, Patient Education, Self Care, Therapeutic Activities and Therapeutic Exercise.     Madeline Jj, PT      I CERTIFY THE NEED FOR THESE SERVICES FURNISHED UNDER THIS PLAN OF TREATMENT AND WHILE UNDER MY CARE   Physician's comments:     Physician's Signature: ___________________________________________________

## 2022-08-01 ENCOUNTER — CLINICAL SUPPORT (OUTPATIENT)
Dept: ENDOCRINOLOGY | Facility: CLINIC | Age: 77
End: 2022-08-01
Payer: MEDICARE

## 2022-08-01 ENCOUNTER — CLINICAL SUPPORT (OUTPATIENT)
Dept: REHABILITATION | Facility: HOSPITAL | Age: 77
End: 2022-08-01
Payer: MEDICARE

## 2022-08-01 DIAGNOSIS — Z79.4 TYPE 2 DIABETES MELLITUS WITH DIABETIC NEPHROPATHY, WITH LONG-TERM CURRENT USE OF INSULIN: Chronic | ICD-10-CM

## 2022-08-01 DIAGNOSIS — E11.21 TYPE 2 DIABETES MELLITUS WITH DIABETIC NEPHROPATHY, WITH LONG-TERM CURRENT USE OF INSULIN: Chronic | ICD-10-CM

## 2022-08-01 DIAGNOSIS — Z74.09 IMPAIRED FUNCTIONAL MOBILITY AND ACTIVITY TOLERANCE: Primary | ICD-10-CM

## 2022-08-01 PROCEDURE — 95251 CONT GLUC MNTR ANALYSIS I&R: CPT | Mod: S$GLB,,, | Performed by: NURSE PRACTITIONER

## 2022-08-01 PROCEDURE — 97110 THERAPEUTIC EXERCISES: CPT | Mod: CQ

## 2022-08-01 PROCEDURE — 95250 CONT GLUC MNTR PHYS/QHP EQP: CPT | Mod: S$GLB,,, | Performed by: NURSE PRACTITIONER

## 2022-08-01 PROCEDURE — 95250 PR GLUCOSE MONITORING,72 HRS,SUB-Q SENSOR: ICD-10-PCS | Mod: S$GLB,,, | Performed by: NURSE PRACTITIONER

## 2022-08-01 PROCEDURE — 95251 PR GLUCOSE MONITOR, 72 HOUR, PHYS INTERP: ICD-10-PCS | Mod: S$GLB,,, | Performed by: NURSE PRACTITIONER

## 2022-08-01 NOTE — PROGRESS NOTES
[facility]   Physical Therapy Treatment Note     Name: Lina Davis  Clinic Number: [id number]    Therapy Diagnosis:   Encounter Diagnosis   Name Primary?    Impaired functional mobility and activity tolerance Yes     Physician: Leland Pena,*    Visit Date: 8/1/2022    Evaluation Date: 7/29/2022  Authorization Period Expiration: 07/29/2023  Plan of Care Expiration: 10/7/2022  Progress Note Due: 10th visit  Visit # / Visits authorized: 1 / 20 + eval     PTA Visit #: 1 / 5     Time In: 1655  Time Out: 1740  Total Treatment Time: 45 minutes  Total Billable Time: 45 minutes (3 TE)    Precautions: Standard     SUBJECTIVE     Patient reports: feeling stiff today.  She was compliant with home exercise program.  Response to previous treatment: good; appropriate muscle response  Functional change: none to note today    Pain: 0/10, currently  Location: bilateral lumbar spine    Patient goals: stand for longer periods of time so she can  [employer] and cook at home and clean her home without breaks    OBJECTIVE     Objective Measures updated at progress report unless specified.     Treatment     Lina received the treatments listed below:      THERAPEUTIC EXERCISES to develop strength, endurance, ROM, flexibility, posture and core stabilization for 45 minutes including;     Aerobic Activity to help improve mobility, LE strength and cardiovascular endurance; Nustep for 7 minutes, Level 1    LTR; 4 minutes  SKTC with towel assist; 10 second hold, 10 reps  DKTC on SB; 10 second hold, 10 reps  TrA activation; 3 second hold, 4 minutes   TrA activation + glute squeeze; 3 second hold, 4 minutes  TrA activation + hip adduction; 3 second hold, 4 minutes  TrA activation + hip abduction; Yellow TB, 3 second hold, 4 minutes    Patient Education and Home Exercises     Home Exercises Provided and Patient Education Provided     Education provided:   - Continued compliance with HEP    Written Home  Exercises Provided: Patient instructed to cont prior HEP. Exercises were reviewed and Lina was able to demonstrate them prior to the end of the session.  Lina demonstrated good  understanding of the education provided. See EMR under Patient Instructions for exercises provided during therapy sessions    ASSESSMENT     Ms. Mills presents to therapy for her first follow up after initial evaluation. She reports appropriate muscle response post session. Progress patient next visit pending response to treatment session.   Lina Is progressing well towards her goals.   Pt prognosis is Good.     Pt will continue to benefit from skilled outpatient physical therapy to address the deficits listed in the problem list box on initial evaluation, provide pt/family education and to maximize pt's level of independence in the home and community environment.     Pt's spiritual, cultural and educational needs considered and pt agreeable to plan of care and goals.     Anticipated barriers to physical therapy: none    Goals:   Short Term Goals: 3 weeks  1. Pt will initiate home exercise program to improve strength, flexibility, and functional mobility to return to PLOF.   2. Pt will report decreased LE pain  < / =  3/10  to demonstrate improved activity tolerance.     Long Term Goals: 6 weeks   1. Pt will perform 10 consecutive sit to stands without UE support to demonstrate improved tolerance for seated transfers.  2. Pt will perform SLS for 10 seconds on R and L lower extremities to demonstrate improved SL balance and reduce risk for falls.  3. Pt will improve impaired lower extremity manual muscle tests to >/= 4/5 to improve dynamic lower extremity support for closed chain tasks    PLAN     Emphasize core strength    Cassandra Herman, PTA

## 2022-08-02 ENCOUNTER — OFFICE VISIT (OUTPATIENT)
Dept: PAIN MEDICINE | Facility: CLINIC | Age: 77
End: 2022-08-02
Payer: MEDICARE

## 2022-08-02 DIAGNOSIS — M54.16 LUMBAR RADICULOPATHY: ICD-10-CM

## 2022-08-02 DIAGNOSIS — M51.36 DDD (DEGENERATIVE DISC DISEASE), LUMBAR: ICD-10-CM

## 2022-08-02 DIAGNOSIS — M47.816 LUMBAR SPONDYLOSIS: ICD-10-CM

## 2022-08-02 DIAGNOSIS — M53.3 SACROILIAC DYSFUNCTION: Primary | ICD-10-CM

## 2022-08-02 DIAGNOSIS — M46.1 BILATERAL SACROILIITIS: ICD-10-CM

## 2022-08-02 DIAGNOSIS — Z79.4 TYPE 2 DIABETES MELLITUS WITH DIABETIC NEPHROPATHY, WITH LONG-TERM CURRENT USE OF INSULIN: ICD-10-CM

## 2022-08-02 DIAGNOSIS — N18.30 STAGE 3 CHRONIC KIDNEY DISEASE, UNSPECIFIED WHETHER STAGE 3A OR 3B CKD: ICD-10-CM

## 2022-08-02 DIAGNOSIS — E11.21 TYPE 2 DIABETES MELLITUS WITH DIABETIC NEPHROPATHY, WITH LONG-TERM CURRENT USE OF INSULIN: ICD-10-CM

## 2022-08-02 PROCEDURE — 3072F LOW RISK FOR RETINOPATHY: CPT | Mod: CPTII,95,, | Performed by: STUDENT IN AN ORGANIZED HEALTH CARE EDUCATION/TRAINING PROGRAM

## 2022-08-02 PROCEDURE — 4010F PR ACE/ARB THEARPY RXD/TAKEN: ICD-10-PCS | Mod: CPTII,95,, | Performed by: STUDENT IN AN ORGANIZED HEALTH CARE EDUCATION/TRAINING PROGRAM

## 2022-08-02 PROCEDURE — 99499 UNLISTED E&M SERVICE: CPT | Mod: HCNC,95,, | Performed by: STUDENT IN AN ORGANIZED HEALTH CARE EDUCATION/TRAINING PROGRAM

## 2022-08-02 PROCEDURE — 3060F POS MICROALBUMINURIA REV: CPT | Mod: CPTII,95,, | Performed by: STUDENT IN AN ORGANIZED HEALTH CARE EDUCATION/TRAINING PROGRAM

## 2022-08-02 PROCEDURE — 99499 RISK ADDL DX/OHS AUDIT: ICD-10-PCS | Mod: HCNC,95,, | Performed by: STUDENT IN AN ORGANIZED HEALTH CARE EDUCATION/TRAINING PROGRAM

## 2022-08-02 PROCEDURE — 3066F NEPHROPATHY DOC TX: CPT | Mod: CPTII,95,, | Performed by: STUDENT IN AN ORGANIZED HEALTH CARE EDUCATION/TRAINING PROGRAM

## 2022-08-02 PROCEDURE — 3044F PR MOST RECENT HEMOGLOBIN A1C LEVEL <7.0%: ICD-10-PCS | Mod: CPTII,95,, | Performed by: STUDENT IN AN ORGANIZED HEALTH CARE EDUCATION/TRAINING PROGRAM

## 2022-08-02 PROCEDURE — 3066F PR DOCUMENTATION OF TREATMENT FOR NEPHROPATHY: ICD-10-PCS | Mod: CPTII,95,, | Performed by: STUDENT IN AN ORGANIZED HEALTH CARE EDUCATION/TRAINING PROGRAM

## 2022-08-02 PROCEDURE — 3060F PR POS MICROALBUMINURIA RESULT DOCUMENTED/REVIEW: ICD-10-PCS | Mod: CPTII,95,, | Performed by: STUDENT IN AN ORGANIZED HEALTH CARE EDUCATION/TRAINING PROGRAM

## 2022-08-02 PROCEDURE — 4010F ACE/ARB THERAPY RXD/TAKEN: CPT | Mod: CPTII,95,, | Performed by: STUDENT IN AN ORGANIZED HEALTH CARE EDUCATION/TRAINING PROGRAM

## 2022-08-02 PROCEDURE — 1159F PR MEDICATION LIST DOCUMENTED IN MEDICAL RECORD: ICD-10-PCS | Mod: CPTII,95,, | Performed by: STUDENT IN AN ORGANIZED HEALTH CARE EDUCATION/TRAINING PROGRAM

## 2022-08-02 PROCEDURE — 1160F PR REVIEW ALL MEDS BY PRESCRIBER/CLIN PHARMACIST DOCUMENTED: ICD-10-PCS | Mod: CPTII,95,, | Performed by: STUDENT IN AN ORGANIZED HEALTH CARE EDUCATION/TRAINING PROGRAM

## 2022-08-02 PROCEDURE — 1159F MED LIST DOCD IN RCRD: CPT | Mod: CPTII,95,, | Performed by: STUDENT IN AN ORGANIZED HEALTH CARE EDUCATION/TRAINING PROGRAM

## 2022-08-02 PROCEDURE — 99213 PR OFFICE/OUTPT VISIT, EST, LEVL III, 20-29 MIN: ICD-10-PCS | Mod: 95,,, | Performed by: STUDENT IN AN ORGANIZED HEALTH CARE EDUCATION/TRAINING PROGRAM

## 2022-08-02 PROCEDURE — 3072F PR LOW RISK FOR RETINOPATHY: ICD-10-PCS | Mod: CPTII,95,, | Performed by: STUDENT IN AN ORGANIZED HEALTH CARE EDUCATION/TRAINING PROGRAM

## 2022-08-02 PROCEDURE — 99213 OFFICE O/P EST LOW 20 MIN: CPT | Mod: 95,,, | Performed by: STUDENT IN AN ORGANIZED HEALTH CARE EDUCATION/TRAINING PROGRAM

## 2022-08-02 PROCEDURE — 3044F HG A1C LEVEL LT 7.0%: CPT | Mod: CPTII,95,, | Performed by: STUDENT IN AN ORGANIZED HEALTH CARE EDUCATION/TRAINING PROGRAM

## 2022-08-02 PROCEDURE — 1160F RVW MEDS BY RX/DR IN RCRD: CPT | Mod: CPTII,95,, | Performed by: STUDENT IN AN ORGANIZED HEALTH CARE EDUCATION/TRAINING PROGRAM

## 2022-08-02 NOTE — PROGRESS NOTES
Chronic Pain - f/u    Referring Physician: No ref. provider found    Date: 08/02/2022     Re: Lina Davis  MR#: [id number]  YOB: 1945  Age: 77 y.o.    Chief Complaint:    No chief complaint on file.    **This note is dictated using the M*Modal Fluency Direct word recognition program. There are word recognition mistakes that are occasionally missed on review.**    ASSESSMENT: 77 y.o. year old female with back and leg pain, consistent with     1. Sacroiliac dysfunction     2. Bilateral sacroiliitis     3. Lumbar spondylosis     4. Stage 3 chronic kidney disease, unspecified whether stage 3a or 3b CKD     5. Lumbar radiculopathy     6. Type 2 diabetes mellitus with diabetic nephropathy, with long-term current use of insulin     7. DDD (degenerative disc disease), lumbar         PLAN:     Sacroiliac joint dysfunction / Sacroiliitis  -s/p bilateral SIJ on 4/12/22 - 10% relief at 2 weeks.  -description and physical exam still suggest that she has SI joint involvement.  Might be more ligamentous then joint itself  -continue PT for SI stabilization, pelvic stabilization.   -will consider TPI for ligamentous injections and multifidus. Defer for now      Lumbar radiculopathy  -s/p L5-S1 LESI without any improvement  -monitor kidney function  -Possible SCS candidate if epidural fails.  -STOPPED Lyrica 50mg BID due to ineffective. Dose adjustment to 50% due to CKD3. Not helpful    Chronic low back pain - multifactorial  -etiology not totally clear.  Likely multifactorial with elements of SI joint, lumbar spondylosis, lumbar radiculopathy, spinal stenosis, degenerative disc disease all playing some part.  -failed tizanidine  -failed PT for lumbar spine    Lumbar spondylosis   -patient has notable facet arthritis of the lumbar spine and may be candidate for MBB/RFA, but does not have PE findings of lumbar spondylosis.  We will defer treatment for now.    DM2  -on insulin  -discussed risks of steroid injection while  diabetic.    CKD3  -GFR 39.3 on 3/17/22  -New BMP ordered for the end of May to recheck kidney function.  If Creatine/GFR is elevated above normal then we will postpone the procedure and have patient see nephrology.    - RTC 4 weeks  - Counseled patient regarding the importance of weight loss and activity modification and physical therapy.    The above plan and management options were discussed at length with patient. Patient is in agreement with the above and verbalized understanding. It will be communicated with the referring physician via electronic record, fax, or mail.  Lab/study reports reviewed were important and necessary because subsequent medical and treatment recommendations required review of the above lab/study reports. Images viewed/reviewed above were important and necessary because subsequent medical and treatment recommendations required review of the reviewed image(s).     Electronically signed by:  Leland Pena DO  08/02/2022    =========================================================================================================     SUBJECTIVE:    Chronic Pain-Tele-Medicine     The patient location is: Home  The chief complaint leading to consultation is: Pain  Visit type: Virtual visit with synchronous audio and video  Total time spent with patient: 15 min  Each patient to whom he or she provides medical services by telemedicine is:  (1) informed of the relationship between the physician and patient and the respective role of any other health care provider with respect to management of the patient; and (2) notified that he or she may decline to receive medical services by telemedicine and may withdraw from such care at any time.    Interval History 8/2/2022:     Lina Davis is a 77 y.o. female presents to the clinic for follow up.  Since last visit the pain has is unchanged.  The patient finished her 2nd therapy session and is very sore.     The pain is located in the buttocks/low  back area and does not radiate.  The pain is described as aching, dull and throbbing    At BEST  6/10   At WORST  9/10 on the WORST day.    On average pain is rated as 7/10.   Today the pain is rated as 6/10  Symptoms interfere with daily activity and sleeping.   Exacerbating factors: Standing, Getting out of bed/chair and in and out the car.    Mitigating factors medications and sitting.     Current pain medications: Percocet 5mg (2-3x/week), topicals  Failed Pain Medications: gabapentin, meloxicam, oxycodone, steroids, tylenol, tramadol, tizanidine, tylenol, Lyrica    Interval History 6/28/2022:     Lina Davis is a 77 y.o. female presents to the clinic for follow up.  Since last visit the pain has has worsened slightly.  Did not get relief from the epidural injection.  Pain is still primarily left low back/buttocks with radiation in to the left leg.     The pain is located in the lower back area and radiates to the leg(knee).  The pain is described as aching    At BEST  7/10   At WORST  10/10 on the WORST day.    On average pain is rated as 8/10.   Today the pain is rated as 5/10  Symptoms interfere with daily activity and sleeping.   Exacerbating factors: Standing, Getting out of bed/chair and in and out the car.    Mitigating factors medications and sitting.     Current pain medications: Percocet 5mg (2-3x/week), topicals  Failed Pain Medications: gabapentin, meloxicam, oxycodone, steroids, tylenol, tramadol, tizanidine, tylenol      Interval History 4/28/2022:     Lina Davis is a 77 y.o. female presents to the clinic for follow up.  Since last visit the pain has is unchanged. She did not get any signfiicant improvement from the SI joint injection. Her leg pain > back pain.  It goes down both lateral legs from the buttocks to the top of the feet on both sides.    The pain is located in the lower back area and radiates to the legs.  The pain is described as aching    At BEST  7/10   At WORST  10/10 on the  WORST day.    On average pain is rated as 8/10.   Today the pain is rated as 7/10  Symptoms interfere with daily activity and sleeping.   Exacerbating factors: Sitting and night time.    Mitigating factors medications.     Current pain medications: Percocet 5mg (2-3x/week), topicals  Failed Pain Medications: gabapentin, meloxicam, oxycodone, steroids, tylenol, tramadol, tizanidine, tylenol    Initial hx:  Lina Davis is a 77 y.o. female presents to the clinic for the evaluation of lower back pain. The pain started 6 year ago following no inciting event and symptoms have been worsening.  The patient states that she has low back pain that radiates down both legs to the top of the bilateral feet.   Sometimes the pain with radiate in to the inside of the leg. The pain waker her up in the middle of the night.  The pain usually starts when she lays down. The patient cannot sleep on her back, so she has to rotate from side to side.  The patient has tried different topicals ointments, tylenol, gabapentin.  Leg pain > back pain.     Dr. Freedman tried a cortisone shot which did not help.  She has a small script of oxycodone which she takes rarely.  She takes 2 tylenol extra strength every night.  She tried gabapentin 600mg TID and it was not helpful. Meloxicam not helpful.    Pain Description:    The pain is located in the lower back area and radiates to the legs down to the feet.    At BEST  6/10   At WORST  10/10 on the WORST day.    On average pain is rated as 7/10.   Today the pain is rated as 8/10  The pain is continuous.  The pain is described as aching and sharp    Symptoms interfere with daily activity and sleeping.   Exacerbating factors: Standing, Laying, Walking and Night Time.    Mitigating factors rubbing gel and nothing .   She reports 3 hours of sleep per night.    Physical Therapy/Home Exercise: No, not currently in physical therapy or home exercise program    Current Pain Medications:    - Opioids:  Percocet 5mg (2-3x/week)  - Topicals: multiple    Failed Pain Medications:    - gabapentin, meloxicam, oxycodone, steroids, tylenol, tramadol    Pain Treatment Therapies:    Pain procedures: none  Physical Therapy: none  Chiropractor: none  Acupuncture: none  TENS unit: none  Spinal decompression: none  Joint replacement: none    Patient denies urinary incontinence, bowel incontinence and loss of sensations.  Patient denies any suicidal or homicidal ideations     report:  Reviewed and consistent with medication use as prescribed.    Imaging:   XR lumbar 2/14/22:  Minimal mild levoscoliosis thoracic lumbar junction, elevation right hemipelvis, prominent bridging osteophytes lower dorsal and lumbar spine particularly L2-L3 and right L4-L5.  DJD SI joints.  Primary anterior subluxation L4 on L5, vacuum disc deformity L5-S1.  Prompt facet joint arthropathy particularly L3 through L5 levels.    XR HIPs 2/14/22:  Prominent degenerative disc spondylosis facet joint arthropathy lumbosacral junction, elevation right hemipelvis, injection site calcification left lateral buttocks.  DJD SI joints.  Mild DJD hip joints and hypertrophic change cortex greater tuberosities.      Past Medical History:   Diagnosis Date    Anemia     Arthritis     Cataract     Gout, arthritis     HTN (hypertension), benign     Polyneuropathy     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    Vitamin D deficiency disease      Past Surgical History:   Procedure Laterality Date    ANTERIOR CERVICAL DISCECTOMY W/ FUSION N/A 3/19/2020    Procedure: DISCECTOMY, SPINE, CERVICAL, ANTERIOR APPROACH, WITH FUSION, C3-C4, C4-C5, C5-C6;  Surgeon: Joseph Walton MD;  Location: [facility] OR [address] [place] [address];  Service: Neurosurgery;  Laterality: N/A;    CARPAL TUNNEL RELEASE      bilateral    COLONOSCOPY N/A 3/7/2018    Procedure: COLONOSCOPY;  Surgeon: Luís Soni MD;  Location: [facility] ([address] [place] [address]);  Service: Endoscopy;  Laterality: N/A;  ok to  schedule per Elizabeth    COLONOSCOPY W/ BIOPSIES AND POLYPECTOMY      EPIDURAL STEROID INJECTION N/A 6/3/2022    Procedure: LESI L5-S1;  Surgeon: Leland Pena DO;  Location: [facility] OR;  Service: Pain Management;  Laterality: N/A;    HEMORRHOID SURGERY      HYSTERECTOMY      AUB    INJECTION OF JOINT Bilateral 4/12/2022    Procedure: SACROILIAC JOINT Steroid Injection-Bilateral;  Surgeon: Leland Pena DO;  Location: [facility] OR;  Service: Pain Management;  Laterality: Bilateral;    ROTATOR CUFF REPAIR      bilateral     Social History     Socioeconomic History    Marital status:    Tobacco Use    Smoking status: Never Smoker    Smokeless tobacco: Never Used   Substance and Sexual Activity    Alcohol use: No    Drug use: No    Sexual activity: Yes     Partners: Male     Birth control/protection: Surgical     Family History   Problem Relation Age of Onset    Heart disease Mother     Diabetes Mother     Heart disease Father     Cancer Father         liver    Diabetes Sister     Cataracts Sister     Kidney disease Brother     Heart disease Brother     Diabetes Sister     COPD Brother     COPD Brother     Gout Brother     Benign prostatic hyperplasia Brother     Heart disease Brother     Kidney disease Brother     Heart attack Brother         heart attack    Kidney failure Brother     Lupus Sister     Heart attack Brother     Drug abuse Brother     No Known Problems Daughter     No Known Problems Son     Amblyopia Neg Hx     Blindness Neg Hx     Breast cancer Neg Hx     Colon cancer Neg Hx     Ovarian cancer Neg Hx        Review of patient's allergies indicates:   Allergen Reactions    Januvia [sitagliptin] Other (See Comments)     Pancreatitis      Metformin Other (See Comments)     Nausea and vomiting with immediate release - tolerates extended release       Current Outpatient Medications   Medication Sig    alcohol swabs (BD ALCOHOL SWABS) PadM USE TWICE  "DAILY AS DIRECTED, E 11.9    allopurinoL (ZYLOPRIM) 300 MG tablet TAKE 1 TABLET EVERY DAY    amLODIPine (NORVASC) 10 MG tablet TAKE 1 TABLET EVERY DAY    aspirin (ECOTRIN) 81 MG EC tablet Take 81 mg by mouth once daily.    atorvastatin (LIPITOR) 40 MG tablet TAKE 1 TABLET EVERY DAY    blood glucose control, normal Soln True Metrix control solution E11.9 - pt tests twice daily    blood-glucose meter kit Check glucose daily E11.9 meter covered by insurance [employer]    DROPLET PEN NEEDLE 32 gauge x 5/32" Ndle USE ONE TIME DAILY TO INJECT INSULIN    empagliflozin (JARDIANCE) 10 mg tablet Take 1 tablet (10 mg total) by mouth once daily.    insulin aspart U-100 (NOVOLOG FLEXPEN U-100 INSULIN) 100 unit/mL (3 mL) InPn pen Inject 4 Units into the skin 3 (three) times daily with meals. Plus sliding scale - Max TDD 45 units    insulin detemir U-100 (LEVEMIR FLEXTOUCH) 100 unit/mL (3 mL) SubQ InPn pen Inject 12 Units into the skin once daily.    lancets (TRUEPLUS LANCETS) 28 gauge Misc 1 lancet by Misc.(Non-Drug; Combo Route) route 2 (two) times daily.    losartan (COZAAR) 100 MG tablet Take 1 tablet (100 mg total) by mouth once daily.    metFORMIN (GLUCOPHAGE-XR) 500 MG ER 24hr tablet Take 1 tablet (500 mg total) by mouth 2 (two) times daily with meals.    mirtazapine (REMERON) 15 MG tablet Take 1 tablet (15 mg total) by mouth every evening.    omeprazole (PRILOSEC) 20 MG capsule One capsule twice daily    oxyCODONE-acetaminophen (PERCOCET) 5-325 mg per tablet Take 1 tablet by mouth every 24 hours as needed for Pain.    pen needle, diabetic 33 gauge x 5/32" Ndle Use daily with insulin    pioglitazone (ACTOS) 30 MG tablet TAKE 1 TABLET (30 MG TOTAL) BY MOUTH ONCE DAILY.    triamcinolone acetonide 0.1% (KENALOG) 0.1 % cream APPLY TO THE AFFECTED AREA(S) ON LOWER LEGS TWICE DAILY AS NEEDED FOR ITCHY OR RED    TRUE METRIX GLUCOSE TEST STRIP Strp TEST BLOOD SUGAR TWICE DAILY     No current " facility-administered medications for this visit.       REVIEW OF SYSTEMS:    GENERAL:  No weight loss, malaise or fevers.   HEENT:   No recent changes in vision or hearing   NECK:  Negative for lumps, no difficulty with swallowing.  RESPIRATORY:  Negative for cough, wheezing or shortness of breath, patient denies any recent URI.  CARDIOVASCULAR:  Negative for chest pain, leg swelling or palpitations.  GI:  Negative for abdominal discomfort, blood in stools or black stools or change in bowel habits.  MUSCULOSKELETAL:  See HPI.  SKIN:  Negative for lesions, rash, and itching.  PSYCH:  No mood disorder or recent psychosocial stressors.  Patients sleep is not disturbed secondary to pain.  HEMATOLOGY/LYMPHOLOGY:  Negative for prolonged bleeding, bruising easily or swollen nodes.  Patient is not currently taking any anti-coagulants  NEURO:   No history of headaches, syncope, paralysis, seizures or tremors.  All other reviewed and negative other than HPI.    OBJECTIVE:    There were no vitals taken for this visit.    PHYSICAL EXAMINATION:    GENERAL: Well appearing, in no acute distress, alert and oriented x3.  PSYCH:  Mood and affect appropriate.  SKIN: Skin color, texture, turgor normal, no rashes or lesions.  HEAD/FACE:  Normocephalic, atraumatic. Cranial nerves grossly intact.  CV: RRR with palpation of the radial artery.  PULM: CTAB. No evidence of respiratory difficulty, symmetric chest rise.  GI:  Soft and non-tender.    BACK:   - No obvious deformity or signs of trauma, Normal lumbar lordotic curve  - Negative spinous process tenderness  - Negative paravertebral tenderness  - Negative pain to palpation over the facet joints of the lumbar spine.   - Positive QL / Iliac crest / Glut tenderness  - Slump test is Negative for radicular pain  - Slump test is Negative for back pain  - Supine Straight leg raising is Negative for radicular pain  - Supine Straight leg raising is Negative for back pain  - Lumbar ROM is  diminished in Flexion with pain  - Lumbar ROM is diminished in Extension with pain  - Lumbar ROM is diminished in Lateral Flexion with pain  - Lumbar ROM is diminished in Rotation with pain  - Positive Sustained Hip Flexion test (for discogenic pain)  - Positive Altered Gait, Posture  - Axial facet loading test Negative on the bilateral side(s)    SI Joint exam:  - Positive SI joint tenderness to palpation  - William's sign weak Positive  - Yeoman's Test: Did not perform for SI joint pain indicating anterior SI ligament involvement. Did not perform for anterior thigh pain/paresthesia which indicates femoral nerve stretch.  - Gaenslen's Test:Positive for thigh pain  - Finger Yovani's Sign:Positive  - SI compression test:Positive  - SI distraction test:Negative  - Thigh Thrust: Negative  - SI Thrust: Did not perform    MUSKULOSKELETAL:    EXTREMITIES:   Hip Exam:  - Log Roll Negative  - FADIR Negative  - Stinchfield Negative  - Hip Scour Negative  - GTB Tenderness Negative    MUSCULOSKELETAL:  No atrophy or tone abnormalities are noted in the UE or LE.  No deformities, edema, or skin discoloration are noted on visible skin. Good capillary refill.    NEURO: Bilateral upper and lower extremity coordination and muscle stretch reflexes are physiologic and symmetric.    No loss of sensation is noted.    NEUROLOGICAL EXAM:  MENTAL STATUS: A x O x 3, good concentration, speech is fluent and goal directed  MEMORY: recent and remote are intact  CN: CN2-12 grossly intact  MOTOR: 4+/5 in all muscle groups of b/l LE  DTRs: 0 symmetric LE patella and achilles  Sensation:    -no Loss of sensation in a left lower and right lower L-1, L-2, L-3, L-4 and L-5 bilaterally distribution.  Babinski: absent on the bilateral side(s)    GAIT: antalgic. Favors right side

## 2022-08-03 ENCOUNTER — CLINICAL SUPPORT (OUTPATIENT)
Dept: REHABILITATION | Facility: HOSPITAL | Age: 77
End: 2022-08-03
Payer: MEDICARE

## 2022-08-03 DIAGNOSIS — Z74.09 IMPAIRED FUNCTIONAL MOBILITY AND ACTIVITY TOLERANCE: Primary | ICD-10-CM

## 2022-08-03 PROCEDURE — 97110 THERAPEUTIC EXERCISES: CPT | Mod: CQ

## 2022-08-03 NOTE — PROGRESS NOTES
[facility]   Physical Therapy Treatment Note     Name: Lina Davis  Clinic Number: [id number]    Therapy Diagnosis:   Encounter Diagnosis   Name Primary?    Impaired functional mobility and activity tolerance Yes     Physician: Leland Pena,*    Visit Date: 8/3/2022    Evaluation Date: 7/29/2022  Authorization Period Expiration: 07/29/2023  Plan of Care Expiration: 10/7/2022  Progress Note Due: 10th visit  Visit # / Visits authorized: 2 / 20 + eval     PTA Visit #: 2 / 5     Time In: 1500  Time Out: 1556  Total Treatment Time: 56 minutes  Total Billable Time: 30 minutes (2 TE)    Precautions: Standard     SUBJECTIVE     Patient reports: that she felt sore after previous session. Reports laying in bed prior to today's PT visit and feeling some pain in her Right leg.  She was compliant with home exercise program.  Response to previous treatment: good; appropriate muscle response  Functional change: none to note today    Pain: 5/10, currently  Location: bilateral lumbar spine    Patient goals: stand for longer periods of time so she can  Jewish and cook at home and clean her home without breaks    OBJECTIVE     Objective Measures updated at progress report unless specified.     Treatment     Lina received the treatments listed below:      THERAPEUTIC EXERCISES to develop strength, endurance, ROM, flexibility, posture and core stabilization for 56 minutes including;     Aerobic Activity to help improve mobility, LE strength and cardiovascular endurance; Nustep for 8 minutes, Level 3    LTR; 4 minutes  SKTC with towel assist; 10 second hold, 10 reps  DKTC on SB; 10 second hold, 15 reps  TrA activation; 3 second hold, 4 minutes   TrA activation + glute squeeze; 3 second hold, 4 minutes  TrA activation + hip adduction; 3 second hold, 4 minutes  TrA activation + hip abduction; Yellow TB, 3 second hold, 4 minutes    Patient Education and Home Exercises     Home Exercises Provided  and Patient Education Provided     Education provided:   - Continued compliance with HEP    Written Home Exercises Provided: Patient instructed to cont prior HEP. Exercises were reviewed and Lina was able to demonstrate them prior to the end of the session.  Lina demonstrated good  understanding of the education provided. See EMR under Patient Instructions for exercises provided during therapy sessions    ASSESSMENT     Patient with good tolerance to exercises performed. No exacerbation of pain reported. She would benefit from progression of HEP next visit and closed chain core exercises in clinic.  Lina Is progressing well towards her goals.   Pt prognosis is Good.     Pt will continue to benefit from skilled outpatient physical therapy to address the deficits listed in the problem list box on initial evaluation, provide pt/family education and to maximize pt's level of independence in the home and community environment.     Pt's spiritual, cultural and educational needs considered and pt agreeable to plan of care and goals.     Anticipated barriers to physical therapy: none    Goals:   Short Term Goals: 3 weeks  1. Pt will initiate home exercise program to improve strength, flexibility, and functional mobility to return to PLOF.   2. Pt will report decreased LE pain  < / =  3/10  to demonstrate improved activity tolerance.     Long Term Goals: 6 weeks   1. Pt will perform 10 consecutive sit to stands without UE support to demonstrate improved tolerance for seated transfers.  2. Pt will perform SLS for 10 seconds on R and L lower extremities to demonstrate improved SL balance and reduce risk for falls.  3. Pt will improve impaired lower extremity manual muscle tests to >/= 4/5 to improve dynamic lower extremity support for closed chain tasks    PLAN     Emphasize core strength.    Cassandra Herman, PTA

## 2022-08-05 ENCOUNTER — TELEPHONE (OUTPATIENT)
Dept: INTERNAL MEDICINE | Facility: CLINIC | Age: 77
End: 2022-08-05

## 2022-08-05 ENCOUNTER — HOSPITAL ENCOUNTER (OUTPATIENT)
Dept: RADIOLOGY | Facility: HOSPITAL | Age: 77
Discharge: HOME | End: 2022-08-05
Attending: INTERNAL MEDICINE
Payer: MEDICARE

## 2022-08-05 DIAGNOSIS — E04.2 MULTINODULAR GOITER: Primary | ICD-10-CM

## 2022-08-05 DIAGNOSIS — E01.0 THYROMEGALY: ICD-10-CM

## 2022-08-05 PROCEDURE — 76536 US EXAM OF HEAD AND NECK: CPT | Mod: 26,,, | Performed by: RADIOLOGY

## 2022-08-05 PROCEDURE — 76536 US SOFT TISSUE HEAD NECK THYROID: ICD-10-PCS | Mod: 26,,, | Performed by: RADIOLOGY

## 2022-08-05 PROCEDURE — 76536 US EXAM OF HEAD AND NECK: CPT | Mod: TC

## 2022-08-05 NOTE — PROGRESS NOTES
Thyroid shows two nodules that are greater than 2 cm.  I would like you to see Endocrinology for a consult as they may need to biopsy it.  We will call you to schedule the appointment.

## 2022-08-09 ENCOUNTER — CLINICAL SUPPORT (OUTPATIENT)
Dept: REHABILITATION | Facility: HOSPITAL | Age: 77
End: 2022-08-09
Payer: MEDICARE

## 2022-08-09 DIAGNOSIS — Z74.09 IMPAIRED FUNCTIONAL MOBILITY AND ACTIVITY TOLERANCE: Primary | ICD-10-CM

## 2022-08-09 PROCEDURE — 97110 THERAPEUTIC EXERCISES: CPT

## 2022-08-09 NOTE — PROGRESS NOTES
[facility]   Physical Therapy Treatment Note     Name: Lina Davis  [facility] Number: [id number]    Therapy Diagnosis:   Encounter Diagnosis   Name Primary?    Impaired functional mobility and activity tolerance Yes     Physician: Leland Pena,*    Visit Date: 8/9/2022    Evaluation Date: 7/29/2022  Authorization Period Expiration: 07/29/2023  Plan of Care Expiration: 10/7/2022  Progress Note Due: 10th visit  Visit # / Visits authorized: 2 / 20 + eval     PTA Visit #: 0 / 5     Time In: 4:00 pm  Time Out: 5:00 pm  Total Treatment Time: 60 minutes  Total Billable Time: 30 minutes (2 TE)    Precautions: Standard     SUBJECTIVE     Patient reports: doing okay today and ready to get going  She was compliant with home exercise program.  Response to previous treatment: good; appropriate muscle response  Functional change: none to note today    Pain: 5/10, currently  Location: bilateral lumbar spine    Patient goals: stand for longer periods of time so she can  Jainism and cook at home and clean her home without breaks    OBJECTIVE     Objective Measures updated at progress report unless specified.     Treatment     Lina received the treatments listed below:      THERAPEUTIC EXERCISES to develop strength, endurance, ROM, flexibility, posture and core stabilization for 60 minutes including;     Aerobic Activity to help improve mobility, LE strength and cardiovascular endurance; Nustep for 10 minutes, Level 4    LTR with blue SB ; 4 minutes  SKTC with towel assist; 10 second hold, 10 reps  DKTC on SB; 10 second hold, 15 reps- not today  TrA activation; 3 second hold, 4 minutes   TrA activation + glute squeeze; 3 second hold, 4 minutes  TrA activation + hip adduction; 3 second hold, 4 minutes  TrA activation + hip abduction; Yellow TB, 3 second hold, 4 minutes    Patient Education and Home Exercises     Home Exercises Provided and Patient Education Provided     Education provided:   -  Continued compliance with HEP    Written Home Exercises Provided: Patient instructed to cont prior HEP. Exercises were reviewed and Lina was able to demonstrate them prior to the end of the session.  [place] demonstrated good  understanding of the education provided. See EMR under Patient Instructions for exercises provided during therapy sessions    ASSESSMENT     Requires significant verbal and tactile cues when performing pelvic tilt series of exercises; questionable consistency with each rep as to whether or not she is performing pelvic tilt or only tightening core. Held HEP progression following today's performance; patient encouraged to continue current HEP.     Lina Is progressing well towards her goals.   Pt prognosis is Good.     Pt will continue to benefit from skilled outpatient physical therapy to address the deficits listed in the problem list box on initial evaluation, provide pt/family education and to maximize pt's level of independence in the home and community environment.     Pt's spiritual, cultural and educational needs considered and pt agreeable to plan of care and goals.     Anticipated barriers to physical therapy: none    Goals:   Short Term Goals: 3 weeks  1. Pt will initiate home exercise program to improve strength, flexibility, and functional mobility to return to PLOF.   2. Pt will report decreased LE pain  < / =  3/10  to demonstrate improved activity tolerance.     Long Term Goals: 6 weeks   1. Pt will perform 10 consecutive sit to stands without UE support to demonstrate improved tolerance for seated transfers.  2. Pt will perform SLS for 10 seconds on R and L lower extremities to demonstrate improved SL balance and reduce risk for falls.  3. Pt will improve impaired lower extremity manual muscle tests to >/= 4/5 to improve dynamic lower extremity support for closed chain tasks    PLAN     Emphasize core strength.    Madeline Jj, PT

## 2022-08-10 ENCOUNTER — PATIENT MESSAGE (OUTPATIENT)
Dept: ENDOCRINOLOGY | Facility: CLINIC | Age: 77
End: 2022-08-10
Payer: MEDICARE

## 2022-08-24 ENCOUNTER — PATIENT MESSAGE (OUTPATIENT)
Dept: ADMINISTRATIVE | Facility: HOSPITAL | Age: 77
End: 2022-08-24
Payer: MEDICARE

## 2022-08-30 ENCOUNTER — OFFICE VISIT (OUTPATIENT)
Dept: PAIN MEDICINE | Facility: CLINIC | Age: 77
End: 2022-08-30
Payer: MEDICARE

## 2022-08-30 DIAGNOSIS — M51.36 DDD (DEGENERATIVE DISC DISEASE), LUMBAR: ICD-10-CM

## 2022-08-30 DIAGNOSIS — N18.30 STAGE 3 CHRONIC KIDNEY DISEASE, UNSPECIFIED WHETHER STAGE 3A OR 3B CKD: ICD-10-CM

## 2022-08-30 DIAGNOSIS — M53.3 SACROILIAC DYSFUNCTION: ICD-10-CM

## 2022-08-30 DIAGNOSIS — M48.00 SPINAL STENOSIS, UNSPECIFIED SPINAL REGION: ICD-10-CM

## 2022-08-30 DIAGNOSIS — M47.816 LUMBAR SPONDYLOSIS: Primary | ICD-10-CM

## 2022-08-30 DIAGNOSIS — M46.1 BILATERAL SACROILIITIS: ICD-10-CM

## 2022-08-30 DIAGNOSIS — E11.21 TYPE 2 DIABETES MELLITUS WITH DIABETIC NEPHROPATHY, WITH LONG-TERM CURRENT USE OF INSULIN: ICD-10-CM

## 2022-08-30 DIAGNOSIS — M62.838 MUSCLE SPASM: ICD-10-CM

## 2022-08-30 DIAGNOSIS — Z79.4 TYPE 2 DIABETES MELLITUS WITH DIABETIC NEPHROPATHY, WITH LONG-TERM CURRENT USE OF INSULIN: ICD-10-CM

## 2022-08-30 DIAGNOSIS — M54.16 LUMBAR RADICULOPATHY: ICD-10-CM

## 2022-08-30 PROCEDURE — 4010F ACE/ARB THERAPY RXD/TAKEN: CPT | Mod: CPTII,95,, | Performed by: STUDENT IN AN ORGANIZED HEALTH CARE EDUCATION/TRAINING PROGRAM

## 2022-08-30 PROCEDURE — 99214 PR OFFICE/OUTPT VISIT, EST, LEVL IV, 30-39 MIN: ICD-10-PCS | Mod: 95,,, | Performed by: STUDENT IN AN ORGANIZED HEALTH CARE EDUCATION/TRAINING PROGRAM

## 2022-08-30 PROCEDURE — 3072F PR LOW RISK FOR RETINOPATHY: ICD-10-PCS | Mod: CPTII,95,, | Performed by: STUDENT IN AN ORGANIZED HEALTH CARE EDUCATION/TRAINING PROGRAM

## 2022-08-30 PROCEDURE — 3060F PR POS MICROALBUMINURIA RESULT DOCUMENTED/REVIEW: ICD-10-PCS | Mod: CPTII,95,, | Performed by: STUDENT IN AN ORGANIZED HEALTH CARE EDUCATION/TRAINING PROGRAM

## 2022-08-30 PROCEDURE — 99214 OFFICE O/P EST MOD 30 MIN: CPT | Mod: 95,,, | Performed by: STUDENT IN AN ORGANIZED HEALTH CARE EDUCATION/TRAINING PROGRAM

## 2022-08-30 PROCEDURE — 3066F PR DOCUMENTATION OF TREATMENT FOR NEPHROPATHY: ICD-10-PCS | Mod: CPTII,95,, | Performed by: STUDENT IN AN ORGANIZED HEALTH CARE EDUCATION/TRAINING PROGRAM

## 2022-08-30 PROCEDURE — 3072F LOW RISK FOR RETINOPATHY: CPT | Mod: CPTII,95,, | Performed by: STUDENT IN AN ORGANIZED HEALTH CARE EDUCATION/TRAINING PROGRAM

## 2022-08-30 PROCEDURE — 4010F PR ACE/ARB THEARPY RXD/TAKEN: ICD-10-PCS | Mod: CPTII,95,, | Performed by: STUDENT IN AN ORGANIZED HEALTH CARE EDUCATION/TRAINING PROGRAM

## 2022-08-30 PROCEDURE — 3044F PR MOST RECENT HEMOGLOBIN A1C LEVEL <7.0%: ICD-10-PCS | Mod: CPTII,95,, | Performed by: STUDENT IN AN ORGANIZED HEALTH CARE EDUCATION/TRAINING PROGRAM

## 2022-08-30 PROCEDURE — 1159F MED LIST DOCD IN RCRD: CPT | Mod: CPTII,95,, | Performed by: STUDENT IN AN ORGANIZED HEALTH CARE EDUCATION/TRAINING PROGRAM

## 2022-08-30 PROCEDURE — 1159F PR MEDICATION LIST DOCUMENTED IN MEDICAL RECORD: ICD-10-PCS | Mod: CPTII,95,, | Performed by: STUDENT IN AN ORGANIZED HEALTH CARE EDUCATION/TRAINING PROGRAM

## 2022-08-30 PROCEDURE — 1160F PR REVIEW ALL MEDS BY PRESCRIBER/CLIN PHARMACIST DOCUMENTED: ICD-10-PCS | Mod: CPTII,95,, | Performed by: STUDENT IN AN ORGANIZED HEALTH CARE EDUCATION/TRAINING PROGRAM

## 2022-08-30 PROCEDURE — 99499 UNLISTED E&M SERVICE: CPT | Mod: HCNC,95,, | Performed by: STUDENT IN AN ORGANIZED HEALTH CARE EDUCATION/TRAINING PROGRAM

## 2022-08-30 PROCEDURE — 3060F POS MICROALBUMINURIA REV: CPT | Mod: CPTII,95,, | Performed by: STUDENT IN AN ORGANIZED HEALTH CARE EDUCATION/TRAINING PROGRAM

## 2022-08-30 PROCEDURE — 3044F HG A1C LEVEL LT 7.0%: CPT | Mod: CPTII,95,, | Performed by: STUDENT IN AN ORGANIZED HEALTH CARE EDUCATION/TRAINING PROGRAM

## 2022-08-30 PROCEDURE — 1160F RVW MEDS BY RX/DR IN RCRD: CPT | Mod: CPTII,95,, | Performed by: STUDENT IN AN ORGANIZED HEALTH CARE EDUCATION/TRAINING PROGRAM

## 2022-08-30 PROCEDURE — 99499 RISK ADDL DX/OHS AUDIT: ICD-10-PCS | Mod: HCNC,95,, | Performed by: STUDENT IN AN ORGANIZED HEALTH CARE EDUCATION/TRAINING PROGRAM

## 2022-08-30 PROCEDURE — 3066F NEPHROPATHY DOC TX: CPT | Mod: CPTII,95,, | Performed by: STUDENT IN AN ORGANIZED HEALTH CARE EDUCATION/TRAINING PROGRAM

## 2022-08-30 NOTE — PROGRESS NOTES
Chronic Pain - f/u    Referring Physician: No ref. provider found    Date: 08/30/2022     Re: Lina Davis  MR#: [id number]  YOB: 1945  Age: 77 y.o.    Chief Complaint:    No chief complaint on file.    **This note is dictated using the M*Modal Fluency Direct word recognition program. There are word recognition mistakes that are occasionally missed on review.**    ASSESSMENT: 77 y.o. year old female with back and leg pain, consistent with     1. Lumbar spondylosis        2. Bilateral sacroiliitis        3. Sacroiliac dysfunction        4. Stage 3 chronic kidney disease, unspecified whether stage 3a or 3b CKD        5. Lumbar radiculopathy        6. Type 2 diabetes mellitus with diabetic nephropathy, with long-term current use of insulin        7. DDD (degenerative disc disease), lumbar        8. Spinal stenosis, unspecified spinal region        9. Muscle spasm            PLAN:       Chronic low back pain - multifactorial  -etiology not totally clear.  Likely multifactorial with elements of SI joint, lumbar spondylosis, lumbar radiculopathy, spinal stenosis, degenerative disc disease all playing some part.  -failed tizanidine  -failed PT for lumbar spine  -failed SIJ and L5-S1 LESI.    Sacroiliac joint dysfunction / Sacroiliitis  -s/p bilateral SIJ on 4/12/22 - 10% relief at 2 weeks.  -description and physical exam still suggest that she has SI joint involvement.  Might be more ligamentous then joint itself  -continue PT for SI stabilization, pelvic stabilization.   -will consider TPI for ligamentous injections and multifidus. Defer for now. Patient doing okay, and symptoms are not clear for one thing.    Lumbar radiculopathy  -s/p L5-S1 LESI without any improvement  -monitor kidney function  -Possible SCS candidate.  -STOPPED Lyrica 50mg BID due to ineffective. Dose adjustment to 50% due to CKD3. Not helpful  -Taking tylenol    Lumbar spondylosis   -patient has notable facet arthritis of the lumbar spine  and may be candidate for MBB/RFA, but does not have PE findings of lumbar spondylosis.  We will defer treatment for now.    DM2  -on insulin  -discussed risks of steroid injection while diabetic.    CKD3  -GFR 39.3 on 3/17/22  -New BMP ordered for the end of May to recheck kidney function.  If Creatine/GFR is elevated above normal then we will postpone the procedure and have patient see nephrology.    - RTC 3 months  - Counseled patient regarding the importance of weight loss and activity modification and physical therapy.    The above plan and management options were discussed at length with patient. Patient is in agreement with the above and verbalized understanding. It will be communicated with the referring physician via electronic record, fax, or mail.  Lab/study reports reviewed were important and necessary because subsequent medical and treatment recommendations required review of the above lab/study reports. Images viewed/reviewed above were important and necessary because subsequent medical and treatment recommendations required review of the reviewed image(s).     Electronically signed by:  Leland Pena DO  08/30/2022    =========================================================================================================     SUBJECTIVE:    Chronic Pain-Tele-Medicine     The patient location is: Car  The chief complaint leading to consultation is: Pain  Visit type: Virtual visit with synchronous audio and video  Total time spent with patient: 15 min  Each patient to whom he or she provides medical services by telemedicine is:  (1) informed of the relationship between the physician and patient and the respective role of any other health care provider with respect to management of the patient; and (2) notified that he or she may decline to receive medical services by telemedicine and may withdraw from such care at any time.    Interval History 8/30/2022:     Lina Davis is a 77 y.o. female  presents to the clinic for follow up.  Since last visit the pain has has moderately improved. She is doing her home exercise program and that has been helping somewhat.  The pain has not gone away, but it is improved. She has no back pain at night when she lays down, only some leg pain.    The pain is located in the low back/buttocks area and does not radiate.  The pain is described as aching, dull, and throbbing    At BEST  0/10   At WORST  8/10 on the WORST day.    On average pain is rated as 6/10.   Today the pain is rated as 0/10  Symptoms interfere with daily activity.   Worse with standing.  Better with walking, sitting.    Current pain medications: Percocet 5mg (2-3x/week), topicals  Failed Pain Medications: gabapentin, meloxicam, oxycodone, steroids, tylenol, tramadol, tizanidine, tylenol, Lyrica    Interval History 8/2/2022:     Lina Davis is a 77 y.o. female presents to the clinic for follow up.  Since last visit the pain has is unchanged.  The patient finished her 2nd therapy session and is very sore.     The pain is located in the buttocks/low back area and does not radiate.  The pain is described as aching, dull and throbbing    At BEST  6/10   At WORST  9/10 on the WORST day.    On average pain is rated as 7/10.   Today the pain is rated as 6/10  Symptoms interfere with daily activity and sleeping.   Exacerbating factors: Standing, Getting out of bed/chair and in and out the car.    Mitigating factors medications and sitting.     Interval History 6/28/2022:     Lina Davis is a 77 y.o. female presents to the clinic for follow up.  Since last visit the pain has has worsened slightly.  Did not get relief from the epidural injection.  Pain is still primarily left low back/buttocks with radiation in to the left leg.     The pain is located in the lower back area and radiates to the leg(knee) .  The pain is described as aching    At BEST  7/10   At WORST  10/10 on the WORST day.    On average pain is  rated as 8/10.   Today the pain is rated as 5/10  Symptoms interfere with daily activity and sleeping.   Exacerbating factors: Standing, Getting out of bed/chair and in and out the car.    Mitigating factors medications and sitting.     Current pain medications: Percocet 5mg (2-3x/week), topicals  Failed Pain Medications: gabapentin, meloxicam, oxycodone, steroids, tylenol, tramadol, tizanidine, tylenol      Interval History 4/28/2022:     Lina Davis is a 77 y.o. female presents to the clinic for follow up.  Since last visit the pain has is unchanged. She did not get any signfiicant improvement from the SI joint injection. Her leg pain > back pain.  It goes down both lateral legs from the buttocks to the top of the feet on both sides.    The pain is located in the lower back area and radiates to the legs .  The pain is described as aching    At BEST  7/10   At WORST  10/10 on the WORST day.    On average pain is rated as 8/10.   Today the pain is rated as 7/10  Symptoms interfere with daily activity and sleeping.   Exacerbating factors: Sitting and night time.    Mitigating factors medications.     Current pain medications: Percocet 5mg (2-3x/week), topicals  Failed Pain Medications: gabapentin, meloxicam, oxycodone, steroids, tylenol, tramadol, tizanidine, tylenol    Initial hx:  Lina Davis is a 77 y.o. female presents to the clinic for the evaluation of lower back pain. The pain started 6 year ago following no inciting event and symptoms have been worsening.  The patient states that she has low back pain that radiates down both legs to the top of the bilateral feet.   Sometimes the pain with radiate in to the inside of the leg. The pain waker her up in the middle of the night.  The pain usually starts when she lays down. The patient cannot sleep on her back, so she has to rotate from side to side.  The patient has tried different topicals ointments, tylenol, gabapentin.  Leg pain > back pain.       Jasmina tried a cortisone shot which did not help.  She has a small script of oxycodone which she takes rarely.  She takes 2 tylenol extra strength every night.  She tried gabapentin 600mg TID and it was not helpful. Meloxicam not helpful.    Pain Description:    The pain is located in the lower back area and radiates to the legs down to the feet .    At BEST  6/10   At WORST  10/10 on the WORST day.    On average pain is rated as 7/10.   Today the pain is rated as 8/10  The pain is continuous.  The pain is described as aching and sharp    Symptoms interfere with daily activity and sleeping.   Exacerbating factors: Standing, Laying, Walking and Night Time.    Mitigating factors rubbing gel and nothing .   She reports 3 hours of sleep per night.    Physical Therapy/Home Exercise: No, not currently in physical therapy or home exercise program    Current Pain Medications:    - Opioids: Percocet 5mg (2-3x/week)  - Topicals: multiple    Failed Pain Medications:    - gabapentin, meloxicam, oxycodone, steroids, tylenol, tramadol    Pain Treatment Therapies:    Pain procedures: none  Physical Therapy: none  Chiropractor: none  Acupuncture: none  TENS unit: none  Spinal decompression: none  Joint replacement: none    Patient denies urinary incontinence, bowel incontinence and loss of sensations.  Patient denies any suicidal or homicidal ideations     report:  Reviewed and consistent with medication use as prescribed.    Imaging:   XR lumbar 2/14/22:  Minimal mild levoscoliosis thoracic lumbar junction, elevation right hemipelvis, prominent bridging osteophytes lower dorsal and lumbar spine particularly L2-L3 and right L4-L5.  DJD SI joints.  Primary anterior subluxation L4 on L5, vacuum disc deformity L5-S1.  Prompt facet joint arthropathy particularly L3 through L5 levels.    XR HIPs 2/14/22:  Prominent degenerative disc spondylosis facet joint arthropathy lumbosacral junction, elevation right hemipelvis, injection  site calcification left lateral buttocks.  DJD SI joints.  Mild DJD hip joints and hypertrophic change cortex greater tuberosities.      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    Vitamin D deficiency disease      Past Surgical History:   Procedure Laterality Date    ANTERIOR CERVICAL DISCECTOMY W/ FUSION N/A 3/19/2020    Procedure: DISCECTOMY, SPINE, CERVICAL, ANTERIOR APPROACH, WITH FUSION, C3-C4, C4-C5, C5-C6;  Surgeon: Joseph Walton MD;  Location: [address];  Service: Neurosurgery;  Laterality: N/A;    CARPAL TUNNEL RELEASE      bilateral    COLONOSCOPY N/A 3/7/2018    Procedure: COLONOSCOPY;  Surgeon: Luís Soni MD;  Location: [facility] ([address] [place] [address]);  Service: Endoscopy;  Laterality: N/A;  ok to schedule per [facility]    COLONOSCOPY W/ BIOPSIES AND POLYPECTOMY      EPIDURAL STEROID INJECTION N/A 6/3/2022    Procedure: LESI L5-S1;  Surgeon: Leland Pena DO;  Location: [facility];  Service: Pain Management;  Laterality: N/A;    HEMORRHOID SURGERY      HYSTERECTOMY      AUB    INJECTION OF JOINT Bilateral 4/12/2022    Procedure: SACROILIAC JOINT Steroid Injection-Bilateral;  Surgeon: Leland ePna DO;  Location: [facility] OR;  Service: Pain Management;  Laterality: Bilateral;    ROTATOR CUFF REPAIR      bilateral     Social History     Socioeconomic History    Marital status:    Tobacco Use    Smoking status: Never    Smokeless tobacco: Never   Substance and Sexual Activity    Alcohol use: No    Drug use: No    Sexual activity: Yes     Partners: Male     Birth control/protection: Surgical     Family History   Problem Relation Age of Onset    Heart disease Mother     Diabetes Mother     Heart disease Father     Cancer Father         liver    Diabetes Sister     Cataracts Sister     Kidney disease Brother     Heart disease  "Brother     Diabetes Sister     COPD Brother     COPD Brother     Gout Brother     Benign prostatic hyperplasia Brother     Heart disease Brother     Kidney disease Brother     Heart attack Brother         heart attack    Kidney failure Brother     Lupus Sister     Heart attack Brother     Drug abuse Brother     No Known Problems Daughter     No Known Problems Son     Amblyopia Neg Hx     Blindness Neg Hx     Breast cancer Neg Hx     Colon cancer Neg Hx     Ovarian cancer Neg Hx        Review of patient's allergies indicates:   Allergen Reactions    Januvia [sitagliptin] Other (See Comments)     Pancreatitis      Metformin Other (See Comments)     Nausea and vomiting with immediate release - tolerates extended release       Current Outpatient Medications   Medication Sig    alcohol swabs (BD ALCOHOL SWABS) PadM USE TWICE DAILY AS DIRECTED, E 11.9    allopurinoL (ZYLOPRIM) 300 MG tablet TAKE 1 TABLET EVERY DAY    amLODIPine (NORVASC) 10 MG tablet TAKE 1 TABLET EVERY DAY    aspirin (ECOTRIN) 81 MG EC tablet Take 81 mg by mouth once daily.    atorvastatin (LIPITOR) 40 MG tablet TAKE 1 TABLET EVERY DAY    blood glucose control, normal Soln True Metrix control solution E11.9 - pt tests twice daily    blood-glucose meter kit Check glucose daily E11.9 meter covered by insurance Don [employer]    DROPLET PEN NEEDLE 32 gauge x 5/32" Ndle USE ONE TIME DAILY TO INJECT INSULIN    empagliflozin (JARDIANCE) 10 mg tablet Take 1 tablet (10 mg total) by mouth once daily.    insulin aspart U-100 (NOVOLOG FLEXPEN U-100 INSULIN) 100 unit/mL (3 mL) InPn pen Inject 4 Units into the skin 3 (three) times daily with meals. Plus sliding scale - Max TDD 45 units    insulin detemir U-100 (LEVEMIR FLEXTOUCH) 100 unit/mL (3 mL) SubQ InPn pen Inject 12 Units into the skin once daily.    lancets (TRUEPLUS LANCETS) 28 gauge Misc 1 lancet by Misc.(Non-Drug; Combo Route) route 2 (two) times daily.    losartan (COZAAR) 100 MG tablet Take 1 tablet (100 mg " "total) by mouth once daily.    metFORMIN (GLUCOPHAGE-XR) 500 MG ER 24hr tablet Take 1 tablet (500 mg total) by mouth 2 (two) times daily with meals.    mirtazapine (REMERON) 15 MG tablet Take 1 tablet (15 mg total) by mouth every evening.    omeprazole (PRILOSEC) 20 MG capsule One capsule twice daily    oxyCODONE-acetaminophen (PERCOCET) 5-325 mg per tablet Take 1 tablet by mouth every 24 hours as needed for Pain.    pen needle, diabetic 33 gauge x 5/32" Ndle Use daily with insulin    pioglitazone (ACTOS) 30 MG tablet TAKE 1 TABLET (30 MG TOTAL) BY MOUTH ONCE DAILY.    triamcinolone acetonide 0.1% (KENALOG) 0.1 % cream APPLY TO THE AFFECTED AREA(S) ON LOWER LEGS TWICE DAILY AS NEEDED FOR ITCHY OR RED    TRUE METRIX GLUCOSE TEST STRIP Strp TEST BLOOD SUGAR TWICE DAILY     No current facility-administered medications for this visit.       REVIEW OF SYSTEMS:    GENERAL:  No weight loss, malaise or fevers.   HEENT:   No recent changes in vision or hearing   NECK:  Negative for lumps, no difficulty with swallowing.  RESPIRATORY:  Negative for cough, wheezing or shortness of breath, patient denies any recent URI.  CARDIOVASCULAR:  Negative for chest pain, leg swelling or palpitations.  GI:  Negative for abdominal discomfort, blood in stools or black stools or change in bowel habits.  MUSCULOSKELETAL:  See HPI.  SKIN:  Negative for lesions, rash, and itching.  PSYCH:  No mood disorder or recent psychosocial stressors.  Patients sleep is not disturbed secondary to pain.  HEMATOLOGY/LYMPHOLOGY:  Negative for prolonged bleeding, bruising easily or swollen nodes.  Patient is not currently taking any anti-coagulants  NEURO:   No history of headaches, syncope, paralysis, seizures or tremors.  All other reviewed and negative other than HPI.    OBJECTIVE:    There were no vitals taken for this visit.    PHYSICAL EXAMINATION:  Video visit:  PSYCH:  Mood and affect appropriate. Thought pattern is goal oriented. AOx3    Prior " visit:  GENERAL: Well appearing, in no acute distress, alert and oriented x3.  SKIN: Skin color, texture, turgor normal, no rashes or lesions.  HEAD/FACE:  Normocephalic, atraumatic. Cranial nerves grossly intact.  CV: RRR with palpation of the radial artery.  PULM: CTAB. No evidence of respiratory difficulty, symmetric chest rise.  GI:  Soft and non-tender.    BACK:   - No obvious deformity or signs of trauma, Normal lumbar lordotic curve  - Negative spinous process tenderness  - Negative paravertebral tenderness  - Negative pain to palpation over the facet joints of the lumbar spine.   - Positive QL / Iliac crest / Glut tenderness  - Slump test is Negative for radicular pain  - Slump test is Negative for back pain  - Supine Straight leg raising is Negative for radicular pain  - Supine Straight leg raising is Negative for back pain  - Lumbar ROM is diminished in Flexion with pain  - Lumbar ROM is diminished in Extension with pain  - Lumbar ROM is diminished in Lateral Flexion with pain  - Lumbar ROM is diminished in Rotation with pain  - Positive Sustained Hip Flexion test (for discogenic pain)  - Positive Altered Gait, Posture  - Axial facet loading test Negative on the bilateral side(s)    SI Joint exam:  - Positive SI joint tenderness to palpation  - William's sign weak Positive  - Yeoman's Test: Did not perform for SI joint pain indicating anterior SI ligament involvement. Did not perform for anterior thigh pain/paresthesia which indicates femoral nerve stretch.  - Gaenslen's Test:Positive for thigh pain  - Finger Yovani's Sign:Positive  - SI compression test:Positive  - SI distraction test:Negative  - Thigh Thrust: Negative  - SI Thrust: Did not perform    MUSKULOSKELETAL:    EXTREMITIES:   Hip Exam:  - Log Roll Negative  - FADIR Negative  - Stinchfield Negative  - Hip Scour Negative  - GTB Tenderness Negative    MUSCULOSKELETAL:  No atrophy or tone abnormalities are noted in the UE or LE.  No deformities,  edema, or skin discoloration are noted on visible skin. Good capillary refill.    NEURO: Bilateral upper and lower extremity coordination and muscle stretch reflexes are physiologic and symmetric.    No loss of sensation is noted.    NEUROLOGICAL EXAM:  MENTAL STATUS: A x O x 3, good concentration, speech is fluent and goal directed  MEMORY: recent and remote are intact  CN: CN2-12 grossly intact  MOTOR: 4+/5 in all muscle groups of b/l LE  DTRs: 0 symmetric LE patella and achilles  Sensation:    -no Loss of sensation in a left lower and right lower L-1, L-2, L-3, L-4 and L-5 bilaterally distribution.  Babinski: absent on the bilateral side(s)    GAIT: antalgic. Favors right side

## 2022-09-08 ENCOUNTER — DOCUMENTATION ONLY (OUTPATIENT)
Dept: REHABILITATION | Facility: HOSPITAL | Age: 77
End: 2022-09-08
Payer: MEDICARE

## 2022-09-08 NOTE — PROGRESS NOTES
Physical Therapy: No show/Cancellation of Visit  Date: 09/08/2022    Patient was a no show to today's PT appointment. Patient has canceled/no showed her past few appointments . She reports was waiting on financial aid and planned on returning to therapy although canceled/no showed for remainder of appointments.     Initial Evaluation: 7/29/2022  Plan of Care Explanation: 10/7/2022  Cancel: 7  No show: 3    Therapist: Ivette Hill, PTA

## 2022-10-07 ENCOUNTER — PES CALL (OUTPATIENT)
Dept: ADMINISTRATIVE | Facility: CLINIC | Age: 77
End: 2022-10-07
Payer: MEDICARE

## 2022-10-30 NOTE — TELEPHONE ENCOUNTER
Spoke to home health - ok to resume D mannose - hold asa until post op appt., prilosec prn, metformin 1000 in AM, 500 in PM   no

## 2022-11-01 ENCOUNTER — TELEPHONE (OUTPATIENT)
Dept: PAIN MEDICINE | Facility: CLINIC | Age: 77
End: 2022-11-01
Payer: MEDICARE

## 2022-11-01 ENCOUNTER — OFFICE VISIT (OUTPATIENT)
Dept: PAIN MEDICINE | Facility: CLINIC | Age: 77
End: 2022-11-01
Payer: MEDICARE

## 2022-11-01 VITALS
BODY MASS INDEX: 31.34 KG/M2 | DIASTOLIC BLOOD PRESSURE: 63 MMHG | RESPIRATION RATE: 18 BRPM | HEART RATE: 105 BPM | HEIGHT: 66 IN | SYSTOLIC BLOOD PRESSURE: 110 MMHG | WEIGHT: 195 LBS

## 2022-11-01 DIAGNOSIS — M54.16 LUMBAR RADICULOPATHY: Primary | ICD-10-CM

## 2022-11-01 DIAGNOSIS — Z79.4 TYPE 2 DIABETES MELLITUS WITH DIABETIC NEPHROPATHY, WITH LONG-TERM CURRENT USE OF INSULIN: ICD-10-CM

## 2022-11-01 DIAGNOSIS — M47.816 LUMBAR SPONDYLOSIS: ICD-10-CM

## 2022-11-01 DIAGNOSIS — M53.3 SACROILIAC DYSFUNCTION: ICD-10-CM

## 2022-11-01 DIAGNOSIS — N18.30 STAGE 3 CHRONIC KIDNEY DISEASE, UNSPECIFIED WHETHER STAGE 3A OR 3B CKD: ICD-10-CM

## 2022-11-01 DIAGNOSIS — E11.21 TYPE 2 DIABETES MELLITUS WITH DIABETIC NEPHROPATHY, WITH LONG-TERM CURRENT USE OF INSULIN: ICD-10-CM

## 2022-11-01 DIAGNOSIS — M51.36 DDD (DEGENERATIVE DISC DISEASE), LUMBAR: ICD-10-CM

## 2022-11-01 PROCEDURE — 1101F PR PT FALLS ASSESS DOC 0-1 FALLS W/OUT INJ PAST YR: ICD-10-PCS | Mod: CPTII,S$GLB,, | Performed by: STUDENT IN AN ORGANIZED HEALTH CARE EDUCATION/TRAINING PROGRAM

## 2022-11-01 PROCEDURE — 99499 UNLISTED E&M SERVICE: CPT | Mod: HCNC,S$GLB,, | Performed by: STUDENT IN AN ORGANIZED HEALTH CARE EDUCATION/TRAINING PROGRAM

## 2022-11-01 PROCEDURE — 3044F PR MOST RECENT HEMOGLOBIN A1C LEVEL <7.0%: ICD-10-PCS | Mod: CPTII,S$GLB,, | Performed by: STUDENT IN AN ORGANIZED HEALTH CARE EDUCATION/TRAINING PROGRAM

## 2022-11-01 PROCEDURE — 99999 PR PBB SHADOW E&M-EST. PATIENT-LVL IV: CPT | Mod: PBBFAC,,, | Performed by: STUDENT IN AN ORGANIZED HEALTH CARE EDUCATION/TRAINING PROGRAM

## 2022-11-01 PROCEDURE — 3288F FALL RISK ASSESSMENT DOCD: CPT | Mod: CPTII,S$GLB,, | Performed by: STUDENT IN AN ORGANIZED HEALTH CARE EDUCATION/TRAINING PROGRAM

## 2022-11-01 PROCEDURE — 3060F POS MICROALBUMINURIA REV: CPT | Mod: CPTII,S$GLB,, | Performed by: STUDENT IN AN ORGANIZED HEALTH CARE EDUCATION/TRAINING PROGRAM

## 2022-11-01 PROCEDURE — 1160F RVW MEDS BY RX/DR IN RCRD: CPT | Mod: CPTII,S$GLB,, | Performed by: STUDENT IN AN ORGANIZED HEALTH CARE EDUCATION/TRAINING PROGRAM

## 2022-11-01 PROCEDURE — 1101F PT FALLS ASSESS-DOCD LE1/YR: CPT | Mod: CPTII,S$GLB,, | Performed by: STUDENT IN AN ORGANIZED HEALTH CARE EDUCATION/TRAINING PROGRAM

## 2022-11-01 PROCEDURE — 3044F HG A1C LEVEL LT 7.0%: CPT | Mod: CPTII,S$GLB,, | Performed by: STUDENT IN AN ORGANIZED HEALTH CARE EDUCATION/TRAINING PROGRAM

## 2022-11-01 PROCEDURE — 3060F PR POS MICROALBUMINURIA RESULT DOCUMENTED/REVIEW: ICD-10-PCS | Mod: CPTII,S$GLB,, | Performed by: STUDENT IN AN ORGANIZED HEALTH CARE EDUCATION/TRAINING PROGRAM

## 2022-11-01 PROCEDURE — 3078F DIAST BP <80 MM HG: CPT | Mod: CPTII,S$GLB,, | Performed by: STUDENT IN AN ORGANIZED HEALTH CARE EDUCATION/TRAINING PROGRAM

## 2022-11-01 PROCEDURE — 3074F PR MOST RECENT SYSTOLIC BLOOD PRESSURE < 130 MM HG: ICD-10-PCS | Mod: CPTII,S$GLB,, | Performed by: STUDENT IN AN ORGANIZED HEALTH CARE EDUCATION/TRAINING PROGRAM

## 2022-11-01 PROCEDURE — 99999 PR PBB SHADOW E&M-EST. PATIENT-LVL IV: ICD-10-PCS | Mod: PBBFAC,,, | Performed by: STUDENT IN AN ORGANIZED HEALTH CARE EDUCATION/TRAINING PROGRAM

## 2022-11-01 PROCEDURE — 99214 OFFICE O/P EST MOD 30 MIN: CPT | Mod: S$GLB,,, | Performed by: STUDENT IN AN ORGANIZED HEALTH CARE EDUCATION/TRAINING PROGRAM

## 2022-11-01 PROCEDURE — 1125F PR PAIN SEVERITY QUANTIFIED, PAIN PRESENT: ICD-10-PCS | Mod: CPTII,S$GLB,, | Performed by: STUDENT IN AN ORGANIZED HEALTH CARE EDUCATION/TRAINING PROGRAM

## 2022-11-01 PROCEDURE — 1159F PR MEDICATION LIST DOCUMENTED IN MEDICAL RECORD: ICD-10-PCS | Mod: CPTII,S$GLB,, | Performed by: STUDENT IN AN ORGANIZED HEALTH CARE EDUCATION/TRAINING PROGRAM

## 2022-11-01 PROCEDURE — 3066F NEPHROPATHY DOC TX: CPT | Mod: CPTII,S$GLB,, | Performed by: STUDENT IN AN ORGANIZED HEALTH CARE EDUCATION/TRAINING PROGRAM

## 2022-11-01 PROCEDURE — 99214 PR OFFICE/OUTPT VISIT, EST, LEVL IV, 30-39 MIN: ICD-10-PCS | Mod: S$GLB,,, | Performed by: STUDENT IN AN ORGANIZED HEALTH CARE EDUCATION/TRAINING PROGRAM

## 2022-11-01 PROCEDURE — 1125F AMNT PAIN NOTED PAIN PRSNT: CPT | Mod: CPTII,S$GLB,, | Performed by: STUDENT IN AN ORGANIZED HEALTH CARE EDUCATION/TRAINING PROGRAM

## 2022-11-01 PROCEDURE — 1159F MED LIST DOCD IN RCRD: CPT | Mod: CPTII,S$GLB,, | Performed by: STUDENT IN AN ORGANIZED HEALTH CARE EDUCATION/TRAINING PROGRAM

## 2022-11-01 PROCEDURE — 3288F PR FALLS RISK ASSESSMENT DOCUMENTED: ICD-10-PCS | Mod: CPTII,S$GLB,, | Performed by: STUDENT IN AN ORGANIZED HEALTH CARE EDUCATION/TRAINING PROGRAM

## 2022-11-01 PROCEDURE — 4010F PR ACE/ARB THEARPY RXD/TAKEN: ICD-10-PCS | Mod: CPTII,S$GLB,, | Performed by: STUDENT IN AN ORGANIZED HEALTH CARE EDUCATION/TRAINING PROGRAM

## 2022-11-01 PROCEDURE — 3072F PR LOW RISK FOR RETINOPATHY: ICD-10-PCS | Mod: CPTII,S$GLB,, | Performed by: STUDENT IN AN ORGANIZED HEALTH CARE EDUCATION/TRAINING PROGRAM

## 2022-11-01 PROCEDURE — 3072F LOW RISK FOR RETINOPATHY: CPT | Mod: CPTII,S$GLB,, | Performed by: STUDENT IN AN ORGANIZED HEALTH CARE EDUCATION/TRAINING PROGRAM

## 2022-11-01 PROCEDURE — 3066F PR DOCUMENTATION OF TREATMENT FOR NEPHROPATHY: ICD-10-PCS | Mod: CPTII,S$GLB,, | Performed by: STUDENT IN AN ORGANIZED HEALTH CARE EDUCATION/TRAINING PROGRAM

## 2022-11-01 PROCEDURE — 3078F PR MOST RECENT DIASTOLIC BLOOD PRESSURE < 80 MM HG: ICD-10-PCS | Mod: CPTII,S$GLB,, | Performed by: STUDENT IN AN ORGANIZED HEALTH CARE EDUCATION/TRAINING PROGRAM

## 2022-11-01 PROCEDURE — 3074F SYST BP LT 130 MM HG: CPT | Mod: CPTII,S$GLB,, | Performed by: STUDENT IN AN ORGANIZED HEALTH CARE EDUCATION/TRAINING PROGRAM

## 2022-11-01 PROCEDURE — 4010F ACE/ARB THERAPY RXD/TAKEN: CPT | Mod: CPTII,S$GLB,, | Performed by: STUDENT IN AN ORGANIZED HEALTH CARE EDUCATION/TRAINING PROGRAM

## 2022-11-01 PROCEDURE — 99499 RISK ADDL DX/OHS AUDIT: ICD-10-PCS | Mod: HCNC,S$GLB,, | Performed by: STUDENT IN AN ORGANIZED HEALTH CARE EDUCATION/TRAINING PROGRAM

## 2022-11-01 PROCEDURE — 1160F PR REVIEW ALL MEDS BY PRESCRIBER/CLIN PHARMACIST DOCUMENTED: ICD-10-PCS | Mod: CPTII,S$GLB,, | Performed by: STUDENT IN AN ORGANIZED HEALTH CARE EDUCATION/TRAINING PROGRAM

## 2022-11-01 NOTE — PROGRESS NOTES
Chronic Pain - f/u    Referring Physician: No ref. provider found    Date: 11/01/2022     Re: Lina Davis  MR#: [id number]  YOB: 1945  Age: 77 y.o.    Chief Complaint:  leg and back  No chief complaint on file.    **This note is dictated using the M*Modal Fluency Direct word recognition program. There are word recognition mistakes that are occasionally missed on review.**    ASSESSMENT: 77 y.o. year old female with back and leg pain, consistent with     1. Lumbar radiculopathy        2. Lumbar spondylosis        3. Sacroiliac dysfunction        4. Stage 3 chronic kidney disease, unspecified whether stage 3a or 3b CKD        5. Type 2 diabetes mellitus with diabetic nephropathy, with long-term current use of insulin        6. DDD (degenerative disc disease), lumbar          PLAN:     Chronic low back pain - multifactorial  -etiology not totally clear.  Likely multifactorial with elements of SI joint, lumbar spondylosis, lumbar radiculopathy, spinal stenosis, degenerative disc disease all playing some part.  -failed tizanidine  -failed PT for lumbar spine  -failed SIJ and L5-S1 LESI.    Sacroiliac joint dysfunction / Sacroiliitis  -s/p bilateral SIJ on 4/12/22 - 10% relief at 2 weeks.  -description and physical exam still suggest that she has SI joint involvement.  Might be more ligamentous then joint itself  -continue PT for SI stabilization, pelvic stabilization.     Lumbar radiculopathy  -s/p L5-S1 LESI without any improvement  -discused b/l L4-5 TFESI - schedule on 12/2. Risks, benefits, and alternatives were discussed with the patient, and She would like to proceed.  -monitor kidney function  -Possible SCS candidate. She does not want surgery.  -STOPPED Lyrica 50mg BID due to ineffective. Dose adjustment to 50% due to CKD3. Not helpful  -Taking tylenol    Lumbar spondylosis   -patient has notable facet arthritis of the lumbar spine and may be candidate for MBB/RFA. Try TFESI first  -consider b/l  L3,4,5 MBB    DM2  -on insulin  -discussed risks of steroid injection while diabetic.    CKD3  -GFR 33.8 on 7/12/22    - RTC 3 weeks after procedure.  - Counseled patient regarding the importance of weight loss and activity modification and physical therapy.    The above plan and management options were discussed at length with patient. Patient is in agreement with the above and verbalized understanding. It will be communicated with the referring physician via electronic record, fax, or mail.  Lab/study reports reviewed were important and necessary because subsequent medical and treatment recommendations required review of the above lab/study reports. Images viewed/reviewed above were important and necessary because subsequent medical and treatment recommendations required review of the reviewed image(s).     Electronically signed by:  Leland Pena DO  11/01/2022    =========================================================================================================     SUBJECTIVE:    Interval History 11/1/2022:     Lina Davis is a 77 y.o. female presents to the clinic for follow up.  Since last visit the pain has is unchanged.  Pain is worse in the legs. Especailly down the sides of the legs and into the foot.    The pain is located in the lower back area and radiates to the buttocks down the legs .  The pain is described as aching, dull, and throbbing    At BEST  0/10   At WORST  8/10 on the WORST day.    On average pain is rated as 3/10.   Today the pain is rated as 6/10  Symptoms interfere with daily activity and sleeping.   Exacerbating factors: Standing, Bending, Walking, and Getting out of bed/chair.    Mitigating factors medications.     Current pain medications: Percocet 5mg (2-3x/week), topicals, tylenol  Failed Pain Medications: gabapentin, meloxicam, oxycodone, steroids, tylenol, tramadol, tizanidine, tylenol, Lyrica    Pain Procedures  -s/p b/l SIJ 4/12/22 - 10% relief.   -s/p L5-S1  ROSCOE on 6/3 - RN sedation - no improvement    Interval History 8/30/2022:     Lina Davis is a 77 y.o. female presents to the clinic for follow up.  Since last visit the pain has has moderately improved. She is doing her home exercise program and that has been helping somewhat.  The pain has not gone away, but it is improved. She has no back pain at night when she lays down, only some leg pain.    The pain is located in the low back/buttocks area and does not radiate.  The pain is described as aching, dull, and throbbing    At BEST  0/10   At WORST  8/10 on the WORST day.    On average pain is rated as 6/10.   Today the pain is rated as 0/10  Symptoms interfere with daily activity.   Worse with standing.  Better with walking, sitting.    Interval History 8/2/2022:     Lina Davis is a 77 y.o. female presents to the clinic for follow up.  Since last visit the pain has is unchanged.  The patient finished her 2nd therapy session and is very sore.     The pain is located in the buttocks/low back area and does not radiate.  The pain is described as aching, dull and throbbing    At BEST  6/10   At WORST  9/10 on the WORST day.    On average pain is rated as 7/10.   Today the pain is rated as 6/10  Symptoms interfere with daily activity and sleeping.   Exacerbating factors: Standing, Getting out of bed/chair and in and out the car.    Mitigating factors medications and sitting.     Interval History 6/28/2022:     Lina Davis is a 77 y.o. female presents to the clinic for follow up.  Since last visit the pain has has worsened slightly.  Did not get relief from the epidural injection.  Pain is still primarily left low back/buttocks with radiation in to the left leg.     The pain is located in the lower back area and radiates to the leg(knee) .  The pain is described as aching    At BEST  7/10   At WORST  10/10 on the WORST day.    On average pain is rated as 8/10.   Today the pain is rated as 5/10  Symptoms interfere  with daily activity and sleeping.   Exacerbating factors: Standing, Getting out of bed/chair and in and out the car.    Mitigating factors medications and sitting.     Current pain medications: Percocet 5mg (2-3x/week), topicals  Failed Pain Medications: gabapentin, meloxicam, oxycodone, steroids, tylenol, tramadol, tizanidine, tylenol      Interval History 4/28/2022:     Lina Davis is a 77 y.o. female presents to the clinic for follow up.  Since last visit the pain has is unchanged. She did not get any signfiicant improvement from the SI joint injection. Her leg pain > back pain.  It goes down both lateral legs from the buttocks to the top of the feet on both sides.    The pain is located in the lower back area and radiates to the legs .  The pain is described as aching    At BEST  7/10   At WORST  10/10 on the WORST day.    On average pain is rated as 8/10.   Today the pain is rated as 7/10  Symptoms interfere with daily activity and sleeping.   Exacerbating factors: Sitting and night time.    Mitigating factors medications.     Current pain medications: Percocet 5mg (2-3x/week), topicals  Failed Pain Medications: gabapentin, meloxicam, oxycodone, steroids, tylenol, tramadol, tizanidine, tylenol    Initial hx:  Lina Davis is a 77 y.o. female presents to the clinic for the evaluation of lower back pain. The pain started 6 year ago following no inciting event and symptoms have been worsening.  The patient states that she has low back pain that radiates down both legs to the top of the bilateral feet.   Sometimes the pain with radiate in to the inside of the leg. The pain waker her up in the middle of the night.  The pain usually starts when she lays down. The patient cannot sleep on her back, so she has to rotate from side to side.  The patient has tried different topicals ointments, tylenol, gabapentin.  Leg pain > back pain.     Dr. Freedman tried a cortisone shot which did not help.  She has a small  script of oxycodone which she takes rarely.  She takes 2 tylenol extra strength every night.  She tried gabapentin 600mg TID and it was not helpful. Meloxicam not helpful.    Pain Description:    The pain is located in the lower back area and radiates to the legs down to the feet .    At BEST  6/10   At WORST  10/10 on the WORST day.    On average pain is rated as 7/10.   Today the pain is rated as 8/10  The pain is continuous.  The pain is described as aching and sharp    Symptoms interfere with daily activity and sleeping.   Exacerbating factors: Standing, Laying, Walking and Night Time.    Mitigating factors rubbing gel and nothing .   She reports 3 hours of sleep per night.    Physical Therapy/Home Exercise: No, not currently in physical therapy or home exercise program    Current Pain Medications:    - Opioids: Percocet 5mg (2-3x/week)  - Topicals: multiple    Failed Pain Medications:    - gabapentin, meloxicam, oxycodone, steroids, tylenol, tramadol    Pain Treatment Therapies:    Pain procedures: none  Physical Therapy: none  Chiropractor: none  Acupuncture: none  TENS unit: none  Spinal decompression: none  Joint replacement: none    Patient denies urinary incontinence, bowel incontinence and loss of sensations.  Patient denies any suicidal or homicidal ideations     report:  Reviewed and consistent with medication use as prescribed.    Imaging:   XR lumbar 2/14/22:  Minimal mild levoscoliosis thoracic lumbar junction, elevation right hemipelvis, prominent bridging osteophytes lower dorsal and lumbar spine particularly L2-L3 and right L4-L5.  DJD SI joints.  Primary anterior subluxation L4 on L5, vacuum disc deformity L5-S1.  Prompt facet joint arthropathy particularly L3 through L5 levels.    XR HIPs 2/14/22:  Prominent degenerative disc spondylosis facet joint arthropathy lumbosacral junction, elevation right hemipelvis, injection site calcification left lateral buttocks.  DJD SI joints.  Mild DJD hip  joints and hypertrophic change cortex greater tuberosities.      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    Vitamin D deficiency disease      Past Surgical History:   Procedure Laterality Date    ANTERIOR CERVICAL DISCECTOMY W/ FUSION N/A 3/19/2020    Procedure: DISCECTOMY, SPINE, CERVICAL, ANTERIOR APPROACH, WITH FUSION, C3-C4, C4-C5, C5-C6;  Surgeon: Joseph Walton MD;  [facility];  Service: Neurosurgery;  Laterality: N/A;    CARPAL TUNNEL RELEASE      bilateral    COLONOSCOPY N/A 3/7/2018    Procedure: COLONOSCOPY;  Surgeon: Luís Soni MD;  Location[facility]O ([facility]);  Service: Endoscopy;  Laterality: N/A;  ok to schedule per [facility]    COLONOSCOPY W/ BIOPSIES AND POLYPECTOMY      EPIDURAL STEROID INJECTION N/A 6/3/2022    Procedure: LESI L5-S1;  Surgeon: Leland Pena DO;  Location: [facility] OR;  Service: Pain Management;  Laterality: N/A;    HEMORRHOID SURGERY      HYSTERECTOMY      AUB    INJECTION OF JOINT Bilateral 4/12/2022    Procedure: SACROILIAC JOINT Steroid Injection-Bilateral;  Surgeon: Leland Pena DO;  Location: [facility] OR;  Service: Pain Management;  Laterality: Bilateral;    ROTATOR CUFF REPAIR      bilateral     Social History     Socioeconomic History    Marital status:    Tobacco Use    Smoking status: Never    Smokeless tobacco: Never   Substance and Sexual Activity    Alcohol use: No    Drug use: No    Sexual activity: Yes     Partners: Male     Birth control/protection: Surgical     Family History   Problem Relation Age of Onset    Heart disease Mother     Diabetes Mother     Heart disease Father     Cancer Father         liver    Diabetes Sister     Cataracts Sister     Kidney disease Brother     Heart disease Brother     Diabetes Sister     COPD Brother     COPD Brother     Gout  "Brother     Benign prostatic hyperplasia Brother     Heart disease Brother     Kidney disease Brother     Heart attack Brother         heart attack    Kidney failure Brother     Lupus Sister     Heart attack Brother     Drug abuse Brother     No Known Problems Daughter     No Known Problems Son     Amblyopia Neg Hx     Blindness Neg Hx     Breast cancer Neg Hx     Colon cancer Neg Hx     Ovarian cancer Neg Hx        Review of patient's allergies indicates:   Allergen Reactions    Januvia [sitagliptin] Other (See Comments)     Pancreatitis      Metformin Other (See Comments)     Nausea and vomiting with immediate release - tolerates extended release       Current Outpatient Medications   Medication Sig    alcohol swabs (BD ALCOHOL SWABS) PadM USE TWICE DAILY AS DIRECTED, E 11.9    allopurinoL (ZYLOPRIM) 300 MG tablet TAKE 1 TABLET EVERY DAY    amLODIPine (NORVASC) 10 MG tablet TAKE 1 TABLET EVERY DAY    aspirin (ECOTRIN) 81 MG EC tablet Take 81 mg by mouth once daily.    atorvastatin (LIPITOR) 40 MG tablet TAKE 1 TABLET EVERY DAY    blood glucose control, normal Soln True Metrix control solution E11.9 - pt tests twice daily    blood-glucose meter kit Check glucose daily E11.9 meter covered by insurance Don Met[employer]    DROPLET PEN NEEDLE 32 gauge x 5/32" Ndle USE ONE TIME DAILY TO INJECT INSULIN    empagliflozin (JARDIANCE) 10 mg tablet Take 1 tablet (10 mg total) by mouth once daily.    insulin aspart U-100 (NOVOLOG FLEXPEN U-100 INSULIN) 100 unit/mL (3 mL) InPn pen Inject 4 Units into the skin 3 (three) times daily with meals. Plus sliding scale - Max TDD 45 units    insulin detemir U-100 (LEVEMIR FLEXTOUCH) 100 unit/mL (3 mL) SubQ InPn pen Inject 12 Units into the skin once daily.    lancets (TRUEPLUS LANCETS) 28 gauge Misc 1 lancet by Misc.(Non-Drug; Combo Route) route 2 (two) times daily.    losartan (COZAAR) 100 MG tablet Take 1 tablet (100 mg total) by mouth once daily.    metFORMIN (GLUCOPHAGE-XR) 500 MG ER 24hr " "tablet Take 1 tablet (500 mg total) by mouth 2 (two) times daily with meals.    mirtazapine (REMERON) 15 MG tablet Take 1 tablet (15 mg total) by mouth every evening.    omeprazole (PRILOSEC) 20 MG capsule One capsule twice daily    oxyCODONE-acetaminophen (PERCOCET) 5-325 mg per tablet Take 1 tablet by mouth every 24 hours as needed for Pain.    pen needle, diabetic 33 gauge x 5/32" Ndle Use daily with insulin    pioglitazone (ACTOS) 15 MG tablet TAKE 1 TABLET EVERY DAY    pioglitazone (ACTOS) 30 MG tablet TAKE 1 TABLET (30 MG TOTAL) BY MOUTH ONCE DAILY.    triamcinolone acetonide 0.1% (KENALOG) 0.1 % cream APPLY TO THE AFFECTED AREA(S) ON LOWER LEGS TWICE DAILY AS NEEDED FOR ITCHY OR RED    TRUE METRIX GLUCOSE TEST STRIP Strp TEST BLOOD SUGAR TWICE DAILY     No current facility-administered medications for this visit.       REVIEW OF SYSTEMS:    GENERAL:  No weight loss, malaise or fevers.   HEENT:   No recent changes in vision or hearing   NECK:  Negative for lumps, no difficulty with swallowing.  RESPIRATORY:  Negative for cough, wheezing or shortness of breath, patient denies any recent URI.  CARDIOVASCULAR:  Negative for chest pain, leg swelling or palpitations.  GI:  Negative for abdominal discomfort, blood in stools or black stools or change in bowel habits.  MUSCULOSKELETAL:  See HPI.  SKIN:  Negative for lesions, rash, and itching.  PSYCH:  No mood disorder or recent psychosocial stressors.  Patients sleep is not disturbed secondary to pain.  HEMATOLOGY/LYMPHOLOGY:  Negative for prolonged bleeding, bruising easily or swollen nodes.  Patient is not currently taking any anti-coagulants  NEURO:   No history of headaches, syncope, paralysis, seizures or tremors.  All other reviewed and negative other than HPI.    OBJECTIVE:    /63   Pulse 105   Resp 18   Ht 5' 6" (1.676 m)   Wt 88.5 kg (195 lb)   BMI 31.47 kg/m²     PHYSICAL EXAMINATION:  Video visit:  PSYCH:  Mood and affect appropriate. Thought " pattern is goal oriented. AOx3    Prior visit:  GENERAL: Well appearing, in no acute distress, alert and oriented x3.  SKIN: Skin color, texture, turgor normal, no rashes or lesions.  HEAD/FACE:  Normocephalic, atraumatic. Cranial nerves grossly intact.  CV: RRR with palpation of the radial artery.  PULM: CTAB. No evidence of respiratory difficulty, symmetric chest rise.  GI:  Soft and non-tender.    BACK:   - No obvious deformity or signs of trauma, Normal lumbar lordotic curve  - Negative spinous process tenderness  - Negative paravertebral tenderness  - Positive pain to palpation over the facet joints of the lumbar spine. On the left  - Negative QL / Iliac crest / Glut tenderness  - Slump test is Negative for radicular pain  - Slump test is Negative for back pain  - Supine Straight leg raising is Negative for radicular pain  - Supine Straight leg raising is Negative for back pain  - Lumbar ROM is diminished in Flexion with pain  - Lumbar ROM is diminished in Extension with pain  - Lumbar ROM is diminished in Lateral Flexion with pain  - Lumbar ROM is diminished in Rotation with pain  - Positive Sustained Hip Flexion test (for discogenic pain)  - Positive Altered Gait, Posture  - Axial facet loading test Positive on the Left side(s)    SI Joint exam:  - Positive SI joint tenderness to palpation  - William's sign weak Positive  - Yeoman's Test: Did not perform for SI joint pain indicating anterior SI ligament involvement. Did not perform for anterior thigh pain/paresthesia which indicates femoral nerve stretch.  - Gaenslen's Test:Positive for thigh pain  - Finger Yovani's Sign:Positive  - SI compression test:Positive  - SI distraction test:Negative  - Thigh Thrust: Negative  - SI Thrust: Did not perform    MUSKULOSKELETAL:    EXTREMITIES:   Hip Exam:  - Log Roll Negative  - FADIR Negative  - Stinchfield Negative  - Hip Scour Negative  - GTB Tenderness Negative    MUSCULOSKELETAL:  No atrophy or tone abnormalities  are noted in the UE or LE.  No deformities, edema, or skin discoloration are noted on visible skin. Good capillary refill.    NEURO: Bilateral upper and lower extremity coordination and muscle stretch reflexes are physiologic and symmetric.    No loss of sensation is noted.    NEUROLOGICAL EXAM:  MENTAL STATUS: A x O x 3, good concentration, speech is fluent and goal directed  MEMORY: recent and remote are intact  CN: CN2-12 grossly intact  MOTOR: 4+/5 in all muscle groups of b/l LE  DTRs: 0 symmetric LE patella and achilles  Sensation:    -no Loss of sensation in a left lower and right lower L-1, L-2, L-3, L-4 and L-5 bilaterally distribution.  Babinski: absent on the bilateral side(s)    GAIT: antalgic. Favors right side

## 2022-11-02 ENCOUNTER — PATIENT OUTREACH (OUTPATIENT)
Dept: ADMINISTRATIVE | Facility: HOSPITAL | Age: 77
End: 2022-11-02
Payer: MEDICARE

## 2022-11-11 ENCOUNTER — TELEPHONE (OUTPATIENT)
Dept: INTERNAL MEDICINE | Facility: CLINIC | Age: 77
End: 2022-11-11

## 2022-11-11 ENCOUNTER — LAB VISIT (OUTPATIENT)
Dept: LAB | Facility: HOSPITAL | Age: 77
End: 2022-11-11
Payer: MEDICARE

## 2022-11-11 DIAGNOSIS — E83.52 HYPERCALCEMIA: ICD-10-CM

## 2022-11-11 DIAGNOSIS — E11.21 TYPE 2 DIABETES MELLITUS WITH DIABETIC NEPHROPATHY, WITH LONG-TERM CURRENT USE OF INSULIN: Chronic | ICD-10-CM

## 2022-11-11 DIAGNOSIS — Z79.4 TYPE 2 DIABETES MELLITUS WITH DIABETIC NEPHROPATHY, WITH LONG-TERM CURRENT USE OF INSULIN: Chronic | ICD-10-CM

## 2022-11-11 LAB
25(OH)D3+25(OH)D2 SERPL-MCNC: 37 NG/ML (ref 30–96)
ALBUMIN SERPL BCP-MCNC: 4 G/DL (ref 3.5–5.2)
ANION GAP SERPL CALC-SCNC: 11 MMOL/L (ref 8–16)
BUN SERPL-MCNC: 24 MG/DL (ref 8–23)
CALCIUM SERPL-MCNC: 10.3 MG/DL (ref 8.7–10.5)
CHLORIDE SERPL-SCNC: 108 MMOL/L (ref 95–110)
CO2 SERPL-SCNC: 23 MMOL/L (ref 23–29)
CREAT SERPL-MCNC: 1.2 MG/DL (ref 0.5–1.4)
EST. GFR  (NO RACE VARIABLE): 47.2 ML/MIN/1.73 M^2
ESTIMATED AVG GLUCOSE: 123 MG/DL (ref 68–131)
GLUCOSE SERPL-MCNC: 144 MG/DL (ref 70–110)
HBA1C MFR BLD: 5.9 % (ref 4–5.6)
PHOSPHATE SERPL-MCNC: 2.7 MG/DL (ref 2.7–4.5)
POTASSIUM SERPL-SCNC: 4.3 MMOL/L (ref 3.5–5.1)
PTH-INTACT SERPL-MCNC: 74.4 PG/ML (ref 9–77)
SODIUM SERPL-SCNC: 142 MMOL/L (ref 136–145)

## 2022-11-11 PROCEDURE — 83970 ASSAY OF PARATHORMONE: CPT | Performed by: NURSE PRACTITIONER

## 2022-11-11 PROCEDURE — 83036 HEMOGLOBIN GLYCOSYLATED A1C: CPT | Mod: 59 | Performed by: NURSE PRACTITIONER

## 2022-11-11 PROCEDURE — 82306 VITAMIN D 25 HYDROXY: CPT | Performed by: NURSE PRACTITIONER

## 2022-11-11 PROCEDURE — 82985 ASSAY OF GLYCATED PROTEIN: CPT | Performed by: NURSE PRACTITIONER

## 2022-11-11 PROCEDURE — 80069 RENAL FUNCTION PANEL: CPT | Performed by: NURSE PRACTITIONER

## 2022-11-11 PROCEDURE — 36415 COLL VENOUS BLD VENIPUNCTURE: CPT | Performed by: NURSE PRACTITIONER

## 2022-11-12 NOTE — TELEPHONE ENCOUNTER
Pharmacy Medication Regimen Review  Susan Puckett is a 52 year old female who is currently in the Transitional Care Unit.    Assessment: Upon review of the medications and patient chart the following irregularities were found:     Medications without durations: nystatin oral suspension does not have end date. Consider adding end date as appropriate (started 11/3 with 11/17 being day 14 of therapy).    Psychotropic medications without durations: Please reassess hydroxyzine after 14 days, on 11/24, if wanting to continue.      Other Recommendations:     Patient's blood sugars have been elevated. Consider assessing insulin regimen and adjusting as appropriate. Patient also was taking Januvia prior to admission which was stopped at previous hospital. Can consider adding that if appropriate.     Patient was taking melatonin 3 mg at bedtime prior to admission. Could consider adding if patient is having trouble sleeping.      Plan:    Continue current medication regimen. Consider adjusting insulin as appropriate for elevated blood glucose levels and add end date to nystatin suspension.      Attending provider will be sent this note for review.  If there are any emergent issues noted above, pharmacist will contact provider directly by phone.      Pharmacy will periodically review the resident's medication regimen for any PRN medications not administered in > 72 hours and discontinue them. The pharmacist will discuss gradual dose reductions of psychopharmacologic medications with interdisciplinary team on a regular basis.    Please contact pharmacy if the above does not answer specific medication questions/concerns.  Sonia Grover PharmD   Phone #[phone]    Background:  A pharmacist has reviewed all medications and pertinent medical history today.  Medications were reviewed for appropriate use and any irregularities found are listed with recommendations.      Current Facility-Administered Medications:      [START ON  Pt with renal insuff - advised to stop losartan and double amlodipine.    Will start low dose levemir today.      Please schedule for nurse visit with Ivonne to review using insulin pen and drawing up in a syringe today if possible thanks.    Please schedule for next Friday for me with lab thanks.    11/13/2022] - Skin Test Reading -, , Does not apply, Q21 Days, Amor Justice MD     amylase-lipase-protease (CREON 24) 88270-07963 units per EC capsule 1 capsule, 1 capsule, Oral, With Snacks or Supplements, Amor Justice MD     amylase-lipase-protease (CREON 24) 51699-40422 units per EC capsule 2 capsule, 2 capsule, Oral, TID w/meals, Amor Justice MD, 2 capsule at 11/12/22 0754     aspirin (ASA) chewable tablet 81 mg, 81 mg, Oral, Daily, Amor Justice MD, 81 mg at 11/12/22 0752     bisacodyl (DULCOLAX) suppository 10 mg, 10 mg, Rectal, BID PRN, Amor Justice MD, 10 mg at 11/11/22 2232     calamine 8-8 % lotion, , Topical, Q1H PRN, Amor Justice MD     calcium carbonate 500 mg (elemental) (OSCAL) tablet 500 mg, 500 mg, Oral, BID w/meals, Amor Justice MD, 500 mg at 11/12/22 0752     glucose gel 15-30 g, 15-30 g, Oral, Q15 Min PRN **OR** dextrose 50 % injection 25-50 mL, 25-50 mL, Intravenous, Q15 Min PRN **OR** glucagon injection 1 mg, 1 mg, Subcutaneous, Q15 Min PRN, Amor Justice MD     diphenhydrAMINE-zinc acetate (BENADRYL) 1-0.1 % cream, , Topical, TID PRN, Amor Justice MD     escitalopram (LEXAPRO) tablet 10 mg, 10 mg, Oral, Daily, Amor Justice MD, 10 mg at 11/12/22 0753     gabapentin (NEURONTIN) capsule 200 mg, 200 mg, Oral, BID, Amor Justice MD, 200 mg at 11/12/22 0812     HYDROmorphone (DILAUDID) tablet 2 mg, 2 mg, Oral, Q6H PRN, Amor Justice MD, 2 mg at 11/12/22 0447     hydrOXYzine (ATARAX) tablet 25 mg, 25 mg, Oral, Q6H PRN **OR** hydrOXYzine (ATARAX) tablet 50 mg, 50 mg, Oral, Q6H PRN, Amor Justice MD, 50 mg at 11/11/22 0259     insulin aspart (NovoLOG) injection (RAPID ACTING), 1-10 Units, Subcutaneous, TID AC, Amor Justice MD, 8 Units at 11/12/22 0753     insulin aspart (NovoLOG) injection (RAPID ACTING), 1-7 Units, Subcutaneous, At Bedtime, Amor Justice MD, 2 Units at 11/11/22 2227     insulin aspart (NovoLOG) injection (RAPID ACTING), ,  Subcutaneous, TID w/meals, Amor Justice MD, 2 Units at 11/11/22 1301     insulin glargine (LANTUS PEN) injection 12 Units, 12 Units, Subcutaneous, QAM AC, Amor Justice MD, 12 Units at 11/12/22 0753     levothyroxine (SYNTHROID/LEVOTHROID) tablet 150 mcg, 150 mcg, Oral, QAM AC, Amor Justice MD, 150 mcg at 11/12/22 0645     Lidocaine (LIDOCARE) 4 % Patch 2 patch, 2 patch, Transdermal, Q24H, Amor Justice MD, 2 patch at 11/12/22 0755     lidocaine patch in PLACE, , Transdermal, Q8H JULIEN, Amor Justice MD     magnesium oxide (MAG-OX) tablet 400 mg, 400 mg, Oral, Daily, Amor Justice MD, 400 mg at 11/12/22 0752     methocarbamol (ROBAXIN) tablet 500 mg, 500 mg, Oral, 4x Daily, Amor Justice MD, 500 mg at 11/12/22 0812     multivitamin CF FORMULA (DEKAS PLUS) per capsule 1 capsule, 1 capsule, Oral, Daily, Amor Justice MD, 1 capsule at 11/11/22 1256     mycophenolate (GENERIC EQUIVALENT) capsule 750 mg, 750 mg, Oral, two times daily, Amor Justice MD, 750 mg at 11/12/22 0752     naloxone (NARCAN) injection 0.2 mg, 0.2 mg, Intravenous, Q2 Min PRN **OR** naloxone (NARCAN) injection 0.4 mg, 0.4 mg, Intravenous, Q2 Min PRN **OR** naloxone (NARCAN) injection 0.2 mg, 0.2 mg, Intramuscular, Q2 Min PRN **OR** naloxone (NARCAN) injection 0.4 mg, 0.4 mg, Intramuscular, Q2 Min PRN, Amor Justice MD     Nurse may request from Pharmacy a change of form of medication (e.g. Liquid to tablet)., , Does not apply, Continuous PRN, Amor Justice MD     nystatin (MYCOSTATIN) suspension 500,000 Units, 500,000 Units, Swish & Swallow, 4x Daily, Amor Justice MD, 500,000 Units at 11/12/22 0812     ondansetron (ZOFRAN ODT) ODT tab 4 mg, 4 mg, Oral, Q6H PRN, 4 mg at 11/11/22 1607 **OR** ondansetron (ZOFRAN) injection 4 mg, 4 mg, Intravenous, Q6H PRN, Amor Justice MD     pantoprazole (PROTONIX) EC tablet 40 mg, 40 mg, Oral, QAM AC, Amor Justice MD, 40 mg at 11/12/22 0645     polyethylene glycol (MIRALAX)  Packet 17 g, 17 g, Oral, Daily, Amor Justice MD, 17 g at 11/12/22 0750     potassium phosphate (monobasic) (K-PHOS) tablet 500 mg, 500 mg, Oral, BID, Amor Justice MD, 500 mg at 11/12/22 0812     senna-docusate (SENOKOT-S/PERICOLACE) 8.6-50 MG per tablet 2 tablet, 2 tablet, Oral, BID, Amor Justice MD, 2 tablet at 11/12/22 0812     sennosides (SENOKOT) tablet 1-2 tablet, 1-2 tablet, Oral, BID PRN, Amor Justice MD     simethicone (MYLICON) chewable tablet 80 mg, 80 mg, Oral, Q6H PRN, Amor Justice MD     sulfamethoxazole-trimethoprim (BACTRIM) 400-80 MG per tablet 1 tablet, 1 tablet, Oral, Daily, Amor Justice MD, 1 tablet at 11/12/22 0752     tacrolimus (GENERIC EQUIVALENT) capsule 6 mg, 6 mg, Oral, two times daily, Amor Justice MD, 6 mg at 11/12/22 0752     topiramate (TOPAMAX) tablet 50 mg, 50 mg, Oral, Daily, Amor Justice MD, 50 mg at 11/12/22 0752     traZODone (DESYREL) tablet 50 mg, 50 mg, Oral, At Bedtime, Amor Justice MD, 50 mg at 11/12/22 0022     tuberculin injection 5 Units, 5 Units, Intradermal, Q21 Days, Amor Justice MD     valGANciclovir (VALCYTE) tablet 450 mg, 450 mg, Oral, Daily, Amor Justice MD, 450 mg at 11/12/22 0817  No current outpatient prescriptions on file.   PMH: nonalcoholic steatohepatitis complicated by cirrhosis s/p TIPS with history of cirrhosis related complications including pleural effusion, ascites, hepatic encephalopathy, hepatic hydrothorax s/p multiple thoracenteses, anemia, insulin-dependent diabetes, GERD hypothyroidism, depression, anxiety, migraines, HSV infection, s/p Faustino-en-Y gastric bypass.

## 2022-11-14 LAB — FRUCTOSAMINE SERPL-SCNC: 413 UMOL /L

## 2022-11-18 NOTE — PROGRESS NOTES
Subjective:      Patient ID: Lina Davis is a 77 y.o. female.    Chief Complaint:  No chief complaint on file.    History of Present Illness  Lina Davis is here for follow up of T2DM and evaluation/management of thyroid nodules.  Previously seen by me 7/2022.     With regards to diabetes:    Diagnosed: ~2002/2003  DE: 7/2021  FH of DM: sister, brother   Denies any of her children have DM     Known complications:  DKA Denies  RN Denies  Eye Exam: 2/2021  PN : Yes   Podiatry: 12/2020  Nephropathy : Yes   CAD : Denies  Episode of pancreatitis in 2018 - attributed to Januvia    Current regimen: PAP - SGLT2i and insulin   Metformin 500mg twice daily   Actos 30mg daily   Jardiance 10mg daily   Levemir 12 units daily   Novolog 4 units before meals     Reports compliance.    Other medications tried:  Januvia- Discontinued in 2018 due to pancreatitis    Glucose Monitor:   3-4 times a day testing  Log reviewed: log provided and reviewed, scanned in media tab     Hypoglycemia:  Denies  Knows how to correct with 15 grams of carbs- juice, coke, or a peppermint.     Diet/Exercise:  Eats 2-3 meals a day. Trying to cut back on carbohydrates - portion rice and pasta   Snacks : occasionally - cinnamon roll, apple   Drinks : water, 1 or 2 8oz soft drinks a week, lemonade but will cut it with water.   Exercise - tries to stay active.      Diabetes Management Status    Hemoglobin A1C   Date Value Ref Range Status   11/11/2022 5.9 (H) 4.0 - 5.6 % Final     Comment:     ADA Screening Guidelines:  5.7-6.4%  Consistent with prediabetes  >or=6.5%  Consistent with diabetes    High levels of fetal hemoglobin interfere with the HbA1C  assay. Heterozygous hemoglobin variants (HbS, HgC, etc)do  not significantly interfere with this assay.   However, presence of multiple variants may affect accuracy.     07/12/2022 5.9 (H) 4.0 - 5.6 % Final     Comment:     ADA Screening Guidelines:  5.7-6.4%  Consistent with prediabetes  >or=6.5%  Consistent  with diabetes    High levels of fetal hemoglobin interfere with the HbA1C  assay. Heterozygous hemoglobin variants (HbS, HgC, etc)do  not significantly interfere with this assay.   However, presence of multiple variants may affect accuracy.     03/17/2022 6.4 (H) 4.0 - 5.6 % Final     Comment:     ADA Screening Guidelines:  5.7-6.4%  Consistent with prediabetes  >or=6.5%  Consistent with diabetes    High levels of fetal hemoglobin interfere with the HbA1C  assay. Heterozygous hemoglobin variants (HbS, HgC, etc)do  not significantly interfere with this assay.   However, presence of multiple variants may affect accuracy.         Statin: Taking  ACE/ARB: Taking  Screening or Prevention Patient's value Goal Complete/Controlled?   HgA1C Testing and Control   Lab Results   Component Value Date    HGBA1C 5.9 (H) 11/11/2022      Annually/Less than 8% Yes   Lipid profile : 07/12/2022 Annually Yes   LDL control Lab Results   Component Value Date    LDLCALC 73.2 07/12/2022    Annually/Less than 100 mg/dl  Yes   Nephropathy screening Lab Results   Component Value Date    LABMICR 49.0 07/26/2022     Lab Results   Component Value Date    PROTEINUA Negative 07/26/2022    Annually Yes   Blood pressure BP Readings from Last 1 Encounters:   11/23/22 122/62    Less than 140/90 No   Dilated retinal exam : 02/22/2021 Annually Yes   Foot exam   : 12/11/2020 Annually No     With regards to thyroid nodule:    Thyroid US:   8/2022  FINDINGS:  Comparison is 12/02/2016.  The right lobe measures 4.8 x 2.5 x 1.7 cm, the left lobe measures 5.1 x 2.1 x 2.1 cm.  Total volume is 22 mL.  There are 3 nodules on the right the largest midpole measuring 2.4 x 2.1 x 2.0 cm.  There are 2 nodules in the isthmus the largest measuring 1.0 x 0.5 x 1.0 cm.  There are 3 nodules left lower gland the largest measuring 2.2 x 1.7 x 2.0 cm.  No lymphadenopathy is seen.  The largest right nodule in the largest left nodule may be slightly larger when compared to  "12/02/2016.  Impression:  Largest right nodule and largest left nodule meet criteria for FNA biopsy.    FNA:   None    Signs or Symptoms:   Difficulty breathing: Denies  Difficulty swallowing: Denies  Voice Changes: Denies  FH of thyroid cancer: Denies  Personal history of radiation treatment or exposure: Denies    Lab Results   Component Value Date    TSH 2.578 07/12/2022    P2HMRFG 6.0 11/26/2008    FREET4 1.10 08/08/2017       With regards to the hypercalcemia:    Lab Results   Component Value Date    PTH 74.4 11/11/2022    CALCIUM 10.3 11/11/2022    PHOS 2.7 11/11/2022     Lab Results   Component Value Date    ALBUMIN 4.0 11/11/2022                     Daily intake of calcium is: Denies  Daily intake of vitamin D:  OTC Vit D3 2000iu daily     BMD:    12/2018  Normal BMD.  Compared to prior study in 9/2014, BMD has declined 5.0% at the spine and 4.3% at the hip.  RECOMMENDATIONS of [facility]:  1.  Calcium 0304-8466 mg daily and vitamin D 800-1000 units daily, adequate exercise.  2.  No need for repeat study in near future unless clinical change    Denies constipation, depression, muscle aches or pains.  Reports polyuria   Denies fractures, height loss or kidney stones.  Reports history of renal disease.  Denies family history of hyperparathyroidism or calcium problems.  Denies HCTZ., lithium, or chlorthiadone use.      Review of Systems   as above    Objective:   Physical Exam  Vitals reviewed.   Neck:      Thyroid: Thyromegaly present.   Cardiovascular:      Rate and Rhythm: Normal rate.      Comments: No edema present  Pulmonary:      Effort: Pulmonary effort is normal.   Abdominal:      Palpations: Abdomen is soft.     Injection sites are without edema or erythema. No lipo hypertropthy or atrophy.    Visit Vitals  /62   Pulse 103   Ht 5' 6" (1.676 m)   Wt 86.9 kg (191 lb 9.3 oz)   SpO2 98%   BMI 30.92 kg/m²         Body mass index is 30.92 kg/m².    Lab Review: "   Lab Results   Component Value Date    HGBA1C 5.9 (H) 11/11/2022    HGBA1C 5.9 (H) 07/12/2022    HGBA1C 6.4 (H) 03/17/2022       Lab Results   Component Value Date    CHOL 149 07/12/2022    HDL 55 07/12/2022    LDLCALC 73.2 07/12/2022    TRIG 104 07/12/2022    CHOLHDL 36.9 07/12/2022     Lab Results   Component Value Date     11/11/2022    K 4.3 11/11/2022     11/11/2022    CO2 23 11/11/2022     (H) 11/11/2022    BUN 24 (H) 11/11/2022    CREATININE 1.2 11/11/2022    CALCIUM 10.3 11/11/2022    PROT 7.4 07/12/2022    ALBUMIN 4.0 11/11/2022    BILITOT 0.3 07/12/2022    ALKPHOS 73 07/12/2022    AST 12 07/12/2022    ALT 10 07/12/2022    ANIONGAP 11 11/11/2022    ESTGFRAFRICA 39.0 (A) 07/12/2022    EGFRNONAA 33.8 (A) 07/12/2022    TSH 2.578 07/12/2022     Vit D, 25-Hydroxy   Date Value Ref Range Status   11/11/2022 37 30 - 96 ng/mL Final     Comment:     Vitamin D deficiency.........<10 ng/mL                              Vitamin D insufficiency......10-29 ng/mL       Vitamin D sufficiency........> or equal to 30 ng/mL  Vitamin D toxicity............>100 ng/mL       Assessment and Plan     1. Type 2 diabetes mellitus with diabetic nephropathy, with long-term current use of insulin  insulin aspart U-100 (NOVOLOG FLEXPEN U-100 INSULIN) 100 unit/mL (3 mL) In pen    Comprehensive Metabolic Panel    Hemoglobin A1C    Fructosamine      2. Thyroid nodule  US FNA Thyroid, 1st Lesion    US FNA Thyroid Ea Addl Lesion      3. Hypercalcemia        4. Stage 3a chronic kidney disease            Type 2 diabetes mellitus with diabetic nephropathy  -- Labs prior to follow up- check fructosamine.  -- A1c goal <7.5%.  -- Medications discussed:  MFM   GLP1-DPP4 - avoid given drug induced pancreatitis (Januvia)  SUNG   SGLT2   Reviewed potential adverse effects of SGLT-2 inhibitors, including genital mycotic infections, slightly increased risk of UTI, hypersensitivity, hypotension, and hyperkalemia. Advised to maintain  water intake of 8-10 cups per day. Advised we need to check chemistry panel at baseline and 2 weeks after starting. Discussed FDA warning reports of ketoacidosis associated with SGLT-2 inhibitors. Advised to seek immediate medical attention and stop the medication if symptoms such as difficulty breathing, nausea, vomiting, abdominal pain, confusion, and unusual fatigue/sleepiness. Discussed possible precipitating factors including major illness/reduced food and fluid intake (advised to stop under these circumstances), and reduced insulin dose. Discussed possible effects of increased fracture risk/decreased bone density. Discussed reports of increased risk of leg and foot amputations with canagliflozin and need to seek urgent care if developed new pain or tenderness, sores or ulcers, or infections in legs/feet.   Insulin   -- Reviewed logs/CGM:  Glucose variable but relatively controlled.  Instructed to send glucose logs in 14 days.  Reach out to me sooner for any glucose <70 or consistently >180.  -- Medication Changes:   Metformin 500mg twice daily   Actos 30mg daily   Jardiance 10mg daily   Levemir 12 units daily   Novolog 5 units before meals     Received SGLT2i through PAP.     -- Reviewed goals of therapy are to get the best control we can without hypoglycemia.  -- Reviewed patient's current insulin regimen. Clarified proper insulin dose and timing in relation to meals, etc. Insulin injection sites and proper rotation instructed.    -- Advised frequent self blood glucose monitoring.  Patient encouraged to document glucose results and bring them to every clinic visit.  -- Hypoglycemia precautions discussed. Instructed on precautions before driving.    -- Call for Bg repeatedly < 90 or > 180.   -- Close adherence to lifestyle changes recommended.   -- Periodic follow ups for eye evaluations, foot care and dental care suggested.    Thyroid nodule  -- I have reviewed management options including observation, FNA or  surgery.  -- FNA procedure explained in depth.  -- Discussed indications for repeat FNA as well as surgical indications (abnormal FNA, compressive symptoms or interval change).  -- Discussed possible results of FNA- Benign, Malignant, FLUS, or insufficient cells.  Discussed results auto released to patients, but advised the ordering provider will also contact to discuss.   -- If FNA is negative then plan RTO in 1 year with TSH and thyroid US prior.  -- All of the patients questions were answered.  -- Schedule FNA of left and right dominant nodules.    Hypercalcemia  -- Intermittent hypercalcemia.   -- Discussed indications for surgery if primary hyperparathyroidism proven.  -- Avoid dehydration, excessive calcium supplementation and meds (HCTZ) that can worsen hypercalcemia.  -- Repeat labs prior to follow up.    CKD (chronic kidney disease) stage 3, GFR 30-59 ml/min  -- Optimize glucose control.       Follow up in about 4 months (around 3/23/2023).

## 2022-11-23 ENCOUNTER — OFFICE VISIT (OUTPATIENT)
Dept: ENDOCRINOLOGY | Facility: CLINIC | Age: 77
End: 2022-11-23
Payer: MEDICARE

## 2022-11-23 VITALS
WEIGHT: 191.56 LBS | HEART RATE: 103 BPM | SYSTOLIC BLOOD PRESSURE: 122 MMHG | HEIGHT: 66 IN | DIASTOLIC BLOOD PRESSURE: 62 MMHG | BODY MASS INDEX: 30.79 KG/M2 | OXYGEN SATURATION: 98 %

## 2022-11-23 DIAGNOSIS — E11.21 TYPE 2 DIABETES MELLITUS WITH DIABETIC NEPHROPATHY, WITH LONG-TERM CURRENT USE OF INSULIN: Primary | Chronic | ICD-10-CM

## 2022-11-23 DIAGNOSIS — N18.31 STAGE 3A CHRONIC KIDNEY DISEASE: ICD-10-CM

## 2022-11-23 DIAGNOSIS — E83.52 HYPERCALCEMIA: ICD-10-CM

## 2022-11-23 DIAGNOSIS — Z79.4 TYPE 2 DIABETES MELLITUS WITH DIABETIC NEPHROPATHY, WITH LONG-TERM CURRENT USE OF INSULIN: Primary | Chronic | ICD-10-CM

## 2022-11-23 DIAGNOSIS — E04.1 THYROID NODULE: ICD-10-CM

## 2022-11-23 PROCEDURE — 1159F MED LIST DOCD IN RCRD: CPT | Mod: CPTII,S$GLB,, | Performed by: NURSE PRACTITIONER

## 2022-11-23 PROCEDURE — 3074F PR MOST RECENT SYSTOLIC BLOOD PRESSURE < 130 MM HG: ICD-10-PCS | Mod: CPTII,S$GLB,, | Performed by: NURSE PRACTITIONER

## 2022-11-23 PROCEDURE — 3044F HG A1C LEVEL LT 7.0%: CPT | Mod: CPTII,S$GLB,, | Performed by: NURSE PRACTITIONER

## 2022-11-23 PROCEDURE — 99999 PR PBB SHADOW E&M-EST. PATIENT-LVL V: CPT | Mod: PBBFAC,,, | Performed by: NURSE PRACTITIONER

## 2022-11-23 PROCEDURE — 1101F PR PT FALLS ASSESS DOC 0-1 FALLS W/OUT INJ PAST YR: ICD-10-PCS | Mod: CPTII,S$GLB,, | Performed by: NURSE PRACTITIONER

## 2022-11-23 PROCEDURE — 1101F PT FALLS ASSESS-DOCD LE1/YR: CPT | Mod: CPTII,S$GLB,, | Performed by: NURSE PRACTITIONER

## 2022-11-23 PROCEDURE — 1159F PR MEDICATION LIST DOCUMENTED IN MEDICAL RECORD: ICD-10-PCS | Mod: CPTII,S$GLB,, | Performed by: NURSE PRACTITIONER

## 2022-11-23 PROCEDURE — 3074F SYST BP LT 130 MM HG: CPT | Mod: CPTII,S$GLB,, | Performed by: NURSE PRACTITIONER

## 2022-11-23 PROCEDURE — 99214 PR OFFICE/OUTPT VISIT, EST, LEVL IV, 30-39 MIN: ICD-10-PCS | Mod: S$GLB,,, | Performed by: NURSE PRACTITIONER

## 2022-11-23 PROCEDURE — 1160F RVW MEDS BY RX/DR IN RCRD: CPT | Mod: CPTII,S$GLB,, | Performed by: NURSE PRACTITIONER

## 2022-11-23 PROCEDURE — 99214 OFFICE O/P EST MOD 30 MIN: CPT | Mod: S$GLB,,, | Performed by: NURSE PRACTITIONER

## 2022-11-23 PROCEDURE — 3288F FALL RISK ASSESSMENT DOCD: CPT | Mod: CPTII,S$GLB,, | Performed by: NURSE PRACTITIONER

## 2022-11-23 PROCEDURE — 99499 RISK ADDL DX/OHS AUDIT: ICD-10-PCS | Mod: HCNC,S$GLB,, | Performed by: NURSE PRACTITIONER

## 2022-11-23 PROCEDURE — 99999 PR PBB SHADOW E&M-EST. PATIENT-LVL V: ICD-10-PCS | Mod: PBBFAC,,, | Performed by: NURSE PRACTITIONER

## 2022-11-23 PROCEDURE — 4010F PR ACE/ARB THEARPY RXD/TAKEN: ICD-10-PCS | Mod: CPTII,S$GLB,, | Performed by: NURSE PRACTITIONER

## 2022-11-23 PROCEDURE — 1160F PR REVIEW ALL MEDS BY PRESCRIBER/CLIN PHARMACIST DOCUMENTED: ICD-10-PCS | Mod: CPTII,S$GLB,, | Performed by: NURSE PRACTITIONER

## 2022-11-23 PROCEDURE — 3288F PR FALLS RISK ASSESSMENT DOCUMENTED: ICD-10-PCS | Mod: CPTII,S$GLB,, | Performed by: NURSE PRACTITIONER

## 2022-11-23 PROCEDURE — 99499 UNLISTED E&M SERVICE: CPT | Mod: HCNC,S$GLB,, | Performed by: NURSE PRACTITIONER

## 2022-11-23 PROCEDURE — 4010F ACE/ARB THERAPY RXD/TAKEN: CPT | Mod: CPTII,S$GLB,, | Performed by: NURSE PRACTITIONER

## 2022-11-23 PROCEDURE — 3078F PR MOST RECENT DIASTOLIC BLOOD PRESSURE < 80 MM HG: ICD-10-PCS | Mod: CPTII,S$GLB,, | Performed by: NURSE PRACTITIONER

## 2022-11-23 PROCEDURE — 1126F AMNT PAIN NOTED NONE PRSNT: CPT | Mod: CPTII,S$GLB,, | Performed by: NURSE PRACTITIONER

## 2022-11-23 PROCEDURE — 3078F DIAST BP <80 MM HG: CPT | Mod: CPTII,S$GLB,, | Performed by: NURSE PRACTITIONER

## 2022-11-23 PROCEDURE — 3072F PR LOW RISK FOR RETINOPATHY: ICD-10-PCS | Mod: CPTII,S$GLB,, | Performed by: NURSE PRACTITIONER

## 2022-11-23 PROCEDURE — 3072F LOW RISK FOR RETINOPATHY: CPT | Mod: CPTII,S$GLB,, | Performed by: NURSE PRACTITIONER

## 2022-11-23 PROCEDURE — 3044F PR MOST RECENT HEMOGLOBIN A1C LEVEL <7.0%: ICD-10-PCS | Mod: CPTII,S$GLB,, | Performed by: NURSE PRACTITIONER

## 2022-11-23 PROCEDURE — 3066F NEPHROPATHY DOC TX: CPT | Mod: CPTII,S$GLB,, | Performed by: NURSE PRACTITIONER

## 2022-11-23 PROCEDURE — 3060F PR POS MICROALBUMINURIA RESULT DOCUMENTED/REVIEW: ICD-10-PCS | Mod: CPTII,S$GLB,, | Performed by: NURSE PRACTITIONER

## 2022-11-23 PROCEDURE — 1126F PR PAIN SEVERITY QUANTIFIED, NO PAIN PRESENT: ICD-10-PCS | Mod: CPTII,S$GLB,, | Performed by: NURSE PRACTITIONER

## 2022-11-23 PROCEDURE — 3060F POS MICROALBUMINURIA REV: CPT | Mod: CPTII,S$GLB,, | Performed by: NURSE PRACTITIONER

## 2022-11-23 PROCEDURE — 3066F PR DOCUMENTATION OF TREATMENT FOR NEPHROPATHY: ICD-10-PCS | Mod: CPTII,S$GLB,, | Performed by: NURSE PRACTITIONER

## 2022-11-23 RX ORDER — INSULIN ASPART 100 [IU]/ML
5 INJECTION, SOLUTION INTRAVENOUS; SUBCUTANEOUS
Qty: 30 ML | Refills: 3
Start: 2022-11-23 | End: 2023-09-19 | Stop reason: SDUPTHER

## 2022-11-23 NOTE — ASSESSMENT & PLAN NOTE
-- I have reviewed management options including observation, FNA or surgery.  -- FNA procedure explained in depth.  -- Discussed indications for repeat FNA as well as surgical indications (abnormal FNA, compressive symptoms or interval change).  -- Discussed possible results of FNA- Benign, Malignant, FLUS, or insufficient cells.  Discussed results auto released to patients, but advised the ordering provider will also contact to discuss.   -- If FNA is negative then plan RTO in 1 year with TSH and thyroid US prior.  -- All of the patients questions were answered.  -- Schedule FNA of left and right dominant nodules.

## 2022-11-23 NOTE — ASSESSMENT & PLAN NOTE
-- Labs prior to follow up- check fructosamine.  -- A1c goal <7.5%.  -- Medications discussed:  MFM   GLP1-DPP4 - avoid given drug induced pancreatitis (Januvia)  SUNG   SGLT2   Reviewed potential adverse effects of SGLT-2 inhibitors, including genital mycotic infections, slightly increased risk of UTI, hypersensitivity, hypotension, and hyperkalemia. Advised to maintain water intake of 8-10 cups per day. Advised we need to check chemistry panel at baseline and 2 weeks after starting. Discussed FDA warning reports of ketoacidosis associated with SGLT-2 inhibitors. Advised to seek immediate medical attention and stop the medication if symptoms such as difficulty breathing, nausea, vomiting, abdominal pain, confusion, and unusual fatigue/sleepiness. Discussed possible precipitating factors including major illness/reduced food and fluid intake (advised to stop under these circumstances), and reduced insulin dose. Discussed possible effects of increased fracture risk/decreased bone density. Discussed reports of increased risk of leg and foot amputations with canagliflozin and need to seek urgent care if developed new pain or tenderness, sores or ulcers, or infections in legs/feet.   Insulin   -- Reviewed logs/CGM:  Glucose variable but relatively controlled.  Instructed to send glucose logs in 14 days.  Reach out to me sooner for any glucose <70 or consistently >180.  -- Medication Changes:   Metformin 500mg twice daily   Actos 30mg daily   Jardiance 10mg daily   Levemir 12 units daily   Novolog 5 units before meals     Received SGLT2i through [facility].     -- Reviewed goals of therapy are to get the best control we can without hypoglycemia.  -- Reviewed patient's current insulin regimen. Clarified proper insulin dose and timing in relation to meals, etc. Insulin injection sites and proper rotation instructed.    -- Advised frequent self blood glucose monitoring.  Patient encouraged to document glucose results and bring them  to every clinic visit.  -- Hypoglycemia precautions discussed. Instructed on precautions before driving.    -- Call for Bg repeatedly < 90 or > 180.   -- Close adherence to lifestyle changes recommended.   -- Periodic follow ups for eye evaluations, foot care and dental care suggested.

## 2022-12-01 ENCOUNTER — TELEPHONE (OUTPATIENT)
Dept: PAIN MEDICINE | Facility: CLINIC | Age: 77
End: 2022-12-01
Payer: MEDICARE

## 2022-12-01 NOTE — TELEPHONE ENCOUNTER
Staff spoke with the patient regarding their procedure with Dr. Pena scheduled on 12/2/2022; the patient was provided the Arrival Information and scheduled time 7:00 AM.     The patient verbalized understanding.    Thank you,

## 2022-12-01 NOTE — PATIENT INSTRUCTIONS
[facility]  [facility]  [employer] service # [phone]    POST-PROCEDURE INSTRUCTIONS:    Today you had an injection that included a steroid medications.  The steroid may or may not have been mixed with a local anesthetic when it was injected.   If the injection was in the neck, you may feel some pressure, numbness, or slight weakness in the arm after the procedure for a short period of time (this is a normal response), if this persists for longer than 1 day please contact our office or go to the emergency room.  If the injection was in the low back, you may feel some pressure, numbness, or slight weakness in the leg after the procedure for a short period of time (this is a normal response), if this persists for longer than 1 day please contact our office or go to the emergency room.  You may get side effects from the steroid.  This is not uncommon.  Symptoms include: elevated blood sugar, elevated blood pressure, headache, flushing, nausea, insomnia.  These symptoms are transient and will resolve within 1-3 days.  If symptoms last longer than this please contact our office or head to the emergency room.  Steroid medications can take anywhere from 3-14 days to take effect (rarely longer).  You may notice that your pain worsens for a short period of time after the injection, this would not be unusual due to the pressure and trauma from the needle.    If you do not have a follow up appointment scheduled, please contact my office (or the office of the physician who referred you for the procedure) to get a post-procedure follow up scheduled 2-4 weeks after the procedure.  This can be done as a virtual visit if that is more convenient for you.      What you need to do:    Keep a record of your response to the injection you had today.    How much relief did you get?   When did the relief start and how long did it last?  Were you able to decrease the use of any of your pain  medications?  Were you able to increase your level of activity?  How long did the relief last?    What to watch out for:    If you experience any of the following symptoms after your procedure, please notify the messaging service immediately (see above for contact information):   fever (increased oral temperature)   bleeding or swelling at the injection site,    drainage, rash or redness at the injection site    possible signs of infection    increased pain at the injection site   worsening of your usual pain   severe headache   new or worsening numbness    new arm and/or leg weakness, or    changes in bowel and/or bladder function: urinating or defecating on yourself and not knowing that you did it.    PLEASE FOLLOW ALL INSTRUCTIONS CAREFULLY     Do not engage in strenuous activity (e.g., lifting or pushing heavy objects or repeated bending) for 24 hours.     Do not take a bath, swim or use Jacuzzi for 24 hours after procedure. (A shower is fine).   Remove any Band-Aids when you get home.    Use cold/ice, as needed for comfort.  We recommend the use of cold therapy alternating on for 20 minutes, off for 20 minutes.    Do not apply direct heat (heating pad or heat packs) to the injection site for 24 hours.     Resume your usual medications, unless instructed otherwise by your Pain Physician.     If you are on warfarin (Coumadin) or other blood thinner, resume this medication as instructed by your prescribing Physician.    IF AT ANY POINT YOU ARE VERY CONCERNED ABOUT YOUR SYMPTOMS, PLEASE GO TO THE EMERGENCY ROOM.    If you develop worsening pain, weakness, numbness, lose bowel or bladder control (i.e., having an accident where you did not even know you had to go to the bathroom and suddenly noticed you soiled yourself), saddle anesthesia (a loss of sensation restricted to the area of the buttocks, anus and between the legs -- i.e., those parts of your body that would touch a saddle if you were sitting on one) you  need to go immediately to the emergency department for evaluation and treatment.    ----------------------------------------------------------------------------------------------------------------------------------------------------------------  If you received Sedation please read the following instructions:  POST SEDATION INSTRUCTIONS    Today you received intravenous medication (also known as sedation) that was used to help you relax and/or decrease discomfort during your procedure. This medication will be acting in your body for the next 24 hours, so you might feel a little tired or sleepy. This feeling will slowly wear off.   Common side effects associated with these medications include: drowsiness, dizziness, sleepiness, confusion, feeling excited, difficulty remembering things, lack of steadiness with walking or balance, loss of fine muscle control, slowed reflexes, difficulty focusing, and blurred vision.  Some over-the-counter and prescription medications (e.g., muscle relaxants, opioids, mood-altering medications, sedatives/hypnotics, antihistamines) can interact with the intravenous medication you received and cause an increased risk of the side effects listed above in addition to other potentially life threatening side effects. Use extreme caution if you are taking such medications, and consult with your Pain Physician or prescribing physician if you have any questions.  For the next 12-24 hours:    DO NOT--Drive a car, operate machinery or power tools   DO NOT--Drink any alcoholic beverages (not even beer), they may dangerously increase the risk of side effects.    DO NOT--Make any important legal or business decisions or sign important documents.  We advise you to have someone to assist you at home. Move slowly and carefully. Do not make sudden changes in position. Be aware of dizziness or light-headedness and move accordingly.   If you seek medical treatment within 24 hours, let the nurse or doctor  caring for you know that you have received the above medications. If you have any questions or concerns related to your sedation or treatment today please contact us.

## 2022-12-02 ENCOUNTER — HOSPITAL ENCOUNTER (OUTPATIENT)
Facility: HOSPITAL | Age: 77
Discharge: HOME | End: 2022-12-02
Attending: STUDENT IN AN ORGANIZED HEALTH CARE EDUCATION/TRAINING PROGRAM | Admitting: STUDENT IN AN ORGANIZED HEALTH CARE EDUCATION/TRAINING PROGRAM
Payer: MEDICARE

## 2022-12-02 VITALS
HEIGHT: 66 IN | BODY MASS INDEX: 30.53 KG/M2 | TEMPERATURE: 98 F | DIASTOLIC BLOOD PRESSURE: 62 MMHG | WEIGHT: 190 LBS | OXYGEN SATURATION: 96 % | RESPIRATION RATE: 18 BRPM | SYSTOLIC BLOOD PRESSURE: 151 MMHG | HEART RATE: 99 BPM

## 2022-12-02 DIAGNOSIS — M54.16 LUMBAR RADICULOPATHY: Primary | ICD-10-CM

## 2022-12-02 LAB — POCT GLUCOSE: 136 MG/DL (ref 70–110)

## 2022-12-02 PROCEDURE — 25000003 PHARM REV CODE 250: Performed by: STUDENT IN AN ORGANIZED HEALTH CARE EDUCATION/TRAINING PROGRAM

## 2022-12-02 PROCEDURE — 64483 PR EPIDURAL INJ, ANES/STEROID, TRANSFORAMINAL, LUMB/SACR, SNGL LEVL: ICD-10-PCS | Mod: 50,,, | Performed by: STUDENT IN AN ORGANIZED HEALTH CARE EDUCATION/TRAINING PROGRAM

## 2022-12-02 PROCEDURE — 64483 NJX AA&/STRD TFRM EPI L/S 1: CPT | Mod: 50,GZ | Performed by: STUDENT IN AN ORGANIZED HEALTH CARE EDUCATION/TRAINING PROGRAM

## 2022-12-02 PROCEDURE — 99152 MOD SED SAME PHYS/QHP 5/>YRS: CPT | Performed by: STUDENT IN AN ORGANIZED HEALTH CARE EDUCATION/TRAINING PROGRAM

## 2022-12-02 PROCEDURE — 25500020 PHARM REV CODE 255: Performed by: STUDENT IN AN ORGANIZED HEALTH CARE EDUCATION/TRAINING PROGRAM

## 2022-12-02 PROCEDURE — 82962 GLUCOSE BLOOD TEST: CPT | Performed by: STUDENT IN AN ORGANIZED HEALTH CARE EDUCATION/TRAINING PROGRAM

## 2022-12-02 PROCEDURE — 64483 NJX AA&/STRD TFRM EPI L/S 1: CPT | Mod: 50,,, | Performed by: STUDENT IN AN ORGANIZED HEALTH CARE EDUCATION/TRAINING PROGRAM

## 2022-12-02 PROCEDURE — 63600175 PHARM REV CODE 636 W HCPCS: Performed by: STUDENT IN AN ORGANIZED HEALTH CARE EDUCATION/TRAINING PROGRAM

## 2022-12-02 RX ORDER — BUPIVACAINE HYDROCHLORIDE 2.5 MG/ML
INJECTION, SOLUTION EPIDURAL; INFILTRATION; INTRACAUDAL
Status: DISCONTINUED | OUTPATIENT
Start: 2022-12-02 | End: 2022-12-02 | Stop reason: HOSPADM

## 2022-12-02 RX ORDER — LIDOCAINE HYDROCHLORIDE 10 MG/ML
INJECTION, SOLUTION EPIDURAL; INFILTRATION; INTRACAUDAL; PERINEURAL
Status: DISCONTINUED | OUTPATIENT
Start: 2022-12-02 | End: 2022-12-02 | Stop reason: HOSPADM

## 2022-12-02 RX ORDER — DEXAMETHASONE SODIUM PHOSPHATE 4 MG/ML
INJECTION, SOLUTION INTRA-ARTICULAR; INTRALESIONAL; INTRAMUSCULAR; INTRAVENOUS; SOFT TISSUE
Status: DISCONTINUED | OUTPATIENT
Start: 2022-12-02 | End: 2022-12-02 | Stop reason: HOSPADM

## 2022-12-02 RX ORDER — FENTANYL CITRATE 50 UG/ML
25 INJECTION, SOLUTION INTRAMUSCULAR; INTRAVENOUS ONCE AS NEEDED
Status: COMPLETED | OUTPATIENT
Start: 2022-12-02 | End: 2022-12-02

## 2022-12-02 RX ORDER — SODIUM CHLORIDE 9 MG/ML
500 INJECTION, SOLUTION INTRAVENOUS CONTINUOUS
Status: ACTIVE | OUTPATIENT
Start: 2022-12-02 | End: 2024-02-03

## 2022-12-02 RX ORDER — MIDAZOLAM HYDROCHLORIDE 1 MG/ML
2 INJECTION INTRAMUSCULAR; INTRAVENOUS ONCE AS NEEDED
Status: COMPLETED | OUTPATIENT
Start: 2022-12-02 | End: 2022-12-02

## 2022-12-02 NOTE — OP NOTE
"PROCEDURE: bilateral Lumbar L4-5 Transforaminal Epidural Steroid Injection    Patient Name: Lina Davis  MRN: [id number]    PROCEDURE DATE: 12/2/2022    INJECTION # 3    DIAGNOSIS: Lumbar Radiculopathy  CPT CODE: 76428 (INJECTION(S), ANESTHETIC AGENT(S) AND/OR STEROID; TRANSFORAMINAL EPIDURAL, WITH IMAGING GUIDANCE (FLUOROSCOPY OR CT), LUMBAR OR SACRAL, SINGLE LEVEL), 38355 (Each additional level).     POSTPROCEDURE DIAGNOSIS: Same    PHYSICIAN: Leland Pena DO  NEEDLE TYPE: - 22G 5" Spinal Needle  MEDICATIONS INJECTED: 4ml mixture of 2.5ml Dexamethasone 4mg/ml and 1.5ml 1% lidocaine split equally between each site.  CONTRAST: Omni 300    Sedation Medications - Mild Sedation with 2mg Versed and 25mcg Fentanyl    Estimated Blood Loss - <2ml  Drains: None  Specimens Removed: None  Urine Output - Not Measured  Complications: None  Outcome: Good    Informed Consent:  The patient's condition and proposed procedures, risks, and alternatives were discussed with the patient or responsible party.  The patient's / responsible party's questions were answered.   The patient / responsible party appeared to understand and chose to proceed.  Informed consent was obtained.  After obtaining written consent, an IV hep lock was placed. (See nurses notes for details).     Procedure in Detail:  The patient was taken back to the OR suite and placed in a prone position. The skin overlying the injection site was prepped and draped in an aseptic fashion. The target injection site (see above) was identified with fluoroscopy.     Procedural Pause:  A procedural pause verifying correct patient, medical record number, allergies, medications to be administered, current vital signs, and surgical site was performed immediately prior to beginning the procedure.    The skin and subcutaneous tissue overlying the target site(s) of injection for the L4-5 transforaminal epidural steroid injection was/were anesthetized using 4 mL of 1% lidocaine " with a 25-gauge, 1½-inch needle.      The fluoroscopic beam was aligned to create a tunnel view.  The above noted needle was advanced parallel to the fluoroscopic beam towards the above noted foramen under fluoroscopic guidance.  The final position of the needle(s) was identified using AP and lateral views.  Paresthesias were not noted with final needle positioning.       A microbore extension tubing was attached to the needle to minimize any movement of the needle during injection or syringe change.  After negative aspiration for heme or CSF at each site(s) where the needle(s) was placed, 0.5ml of contrast dye was injected to confirm appropriate placement and that there was no vascular uptake.  Pain provocation by the injected contrast material was not noted.  Then the injectate solution described above was injected in increments.  The needle was then retracted approximately senior living and the needle track was flushed with 0.5 mL of Lidocaine 1%.  The needle(s) was then removed.     The same procedural technique outlined above was repeated on the OPPOSITE side.    The heart rate, pulse oximetry, and blood pressure were continuously monitored throughout the procedure.  The procedure was well tolerated. She was carefully escorted to the recovery room in stable condition. Patient was monitored by RN for recovery period.  The patient will be contacted in the next few days to determine extent of relief.  Patient was given post procedure and discharge instructions to follow at home.  The patient was discharged in a stable condition.    Note Electronically Signed By:  Leland Spencer  12/02/2022

## 2022-12-02 NOTE — PLAN OF CARE
Dr Stokes at bedside. Pt c/o slight nausea. Finger stick blood sugar performed. Glucose 136. Pt given emesis bag. Ok to give pt 4mg zofran IV if pt wants for continued nausea. Pt declines zofran at this time.

## 2022-12-02 NOTE — DISCHARGE SUMMARY
[facility] - [facility] (Hospital)  Discharge Note  Short Stay    Procedure(s) (LRB):  TFESI (B/L) L4-5 (Bilateral)      OUTCOME: Patient tolerated treatment/procedure well without complication and is now ready for discharge.    DISPOSITION: Home or Self Care    FINAL DIAGNOSIS:  <principal problem not specified>    FOLLOWUP: In clinic    DISCHARGE INSTRUCTIONS:  No discharge procedures on file.     TIME SPENT ON DISCHARGE: 10 minutes

## 2022-12-02 NOTE — PLAN OF CARE
VSS. Patient able to tolerate oral liquids. Patient reports tolerable pain level for discharge. Dressing intact.No distress noted. Patient states she is ready for discharge. Discharge instructions reviewed with patient and family, verbalized understanding. IV discontinued with catheter tip intact. Family at bedside to help patient dress. Patient and family waiting for ride home.

## 2022-12-02 NOTE — PLAN OF CARE
Pre-op complete. Pt calm, will continue to monitor. Pt's spouse visiting at bedside. Pt's belongings placed in locker #8, 1 bag of clothing and 1 purse received from spouse in waiting room. Pt and spouse to call friend for ride home.

## 2022-12-02 NOTE — H&P
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    Vitamin D deficiency disease      Past Surgical History:   Procedure Laterality Date    ANTERIOR CERVICAL DISCECTOMY W/ FUSION N/A 3/19/2020    Procedure: DISCECTOMY, SPINE, CERVICAL, ANTERIOR APPROACH, WITH FUSION, C3-C4, C4-C5, C5-C6;  Surgeon: Joseph Walton MD;  [facility];  Service: Neurosurgery;  Laterality: N/A;    CARPAL TUNNEL RELEASE      bilateral    COLONOSCOPY N/A 3/7/2018    Procedure: COLONOSCOPY;  Surgeon: Luís Soni MD;  [facility]  Service: Endoscopy;  Laterality: N/A;  ok to schedule per Elizabeth    COLONOSCOPY W/ BIOPSIES AND POLYPECTOMY      EPIDURAL STEROID INJECTION N/A 6/3/2022    Procedure: LESI L5-S1;  Surgeon: Leland Pena DO;  Location: [facility] OR;  Service: Pain Management;  Laterality: N/A;    HEMORRHOID SURGERY      HYSTERECTOMY      AUB    INJECTION OF JOINT Bilateral 4/12/2022    Procedure: SACROILIAC JOINT Steroid Injection-Bilateral;  Surgeon: Leland Pena DO;  Location: [facility] OR;  Service: Pain Management;  Laterality: Bilateral;    ROTATOR CUFF REPAIR      bilateral     Review of patient's allergies indicates:   Allergen Reactions    Januvia [sitagliptin] Other (See Comments)     Pancreatitis      Metformin Other (See Comments)     Nausea and vomiting with immediate release - tolerates extended release      Current Facility-Administered Medications   Medication    0.9%  NaCl infusion    fentaNYL 50 mcg/mL injection 25 mcg    midazolam (VERSED) 1 mg/mL injection 2 mg       PMHx, PSHx, Allergies, Medications reviewed in epic    ROS negative except pain  "complaints in HPI    OBJECTIVE:    /66 (BP Location: Right arm, Patient Position: Lying)   Pulse 100   Temp 97.3 °F (36.3 °C) (Tympanic)   Resp 18   Ht 5' 6" (1.676 m)   Wt 86.2 kg (190 lb)   SpO2 100%   Breastfeeding No   BMI 30.67 kg/m²     PHYSICAL EXAMINATION:    GENERAL: Well appearing, in no acute distress, alert and oriented x3.  PSYCH:  Mood and affect appropriate.  SKIN: Skin color, texture, turgor normal, no rashes or lesions which will impact the procedure.  CV: RRR with palpation of the radial artery.  PULM: No evidence of respiratory difficulty, symmetric chest rise. Clear to auscultation.  NEURO: Cranial nerves grossly intact.    Plan:    Proceed with procedure as planned Procedure(s) (LRB):  TFESI (B/L) L4-5 (Bilateral)    Leland Spencer  12/02/2022           "

## 2022-12-06 ENCOUNTER — OFFICE VISIT (OUTPATIENT)
Dept: INTERNAL MEDICINE | Facility: CLINIC | Age: 77
End: 2022-12-06
Payer: MEDICARE

## 2022-12-06 VITALS
WEIGHT: 188.06 LBS | OXYGEN SATURATION: 97 % | HEART RATE: 111 BPM | HEIGHT: 66 IN | DIASTOLIC BLOOD PRESSURE: 64 MMHG | SYSTOLIC BLOOD PRESSURE: 128 MMHG | BODY MASS INDEX: 30.22 KG/M2

## 2022-12-06 DIAGNOSIS — R30.0 DYSURIA: ICD-10-CM

## 2022-12-06 DIAGNOSIS — Z79.4 TYPE 2 DIABETES MELLITUS WITH DIABETIC NEPHROPATHY, WITH LONG-TERM CURRENT USE OF INSULIN: Primary | ICD-10-CM

## 2022-12-06 DIAGNOSIS — Z79.4 TYPE 2 DIABETES MELLITUS WITH DIABETIC NEPHROPATHY, WITH LONG-TERM CURRENT USE OF INSULIN: Chronic | ICD-10-CM

## 2022-12-06 DIAGNOSIS — Z12.31 SCREENING MAMMOGRAM, ENCOUNTER FOR: Primary | ICD-10-CM

## 2022-12-06 DIAGNOSIS — E11.21 TYPE 2 DIABETES MELLITUS WITH DIABETIC NEPHROPATHY, WITH LONG-TERM CURRENT USE OF INSULIN: Chronic | ICD-10-CM

## 2022-12-06 DIAGNOSIS — E11.21 TYPE 2 DIABETES MELLITUS WITH DIABETIC NEPHROPATHY, WITH LONG-TERM CURRENT USE OF INSULIN: Primary | ICD-10-CM

## 2022-12-06 PROCEDURE — 3074F PR MOST RECENT SYSTOLIC BLOOD PRESSURE < 130 MM HG: ICD-10-PCS | Mod: HCNC,CPTII,S$GLB, | Performed by: INTERNAL MEDICINE

## 2022-12-06 PROCEDURE — G0008 ADMIN INFLUENZA VIRUS VAC: HCPCS | Mod: HCNC,S$GLB,, | Performed by: INTERNAL MEDICINE

## 2022-12-06 PROCEDURE — 3078F PR MOST RECENT DIASTOLIC BLOOD PRESSURE < 80 MM HG: ICD-10-PCS | Mod: HCNC,CPTII,S$GLB, | Performed by: INTERNAL MEDICINE

## 2022-12-06 PROCEDURE — 99214 PR OFFICE/OUTPT VISIT, EST, LEVL IV, 30-39 MIN: ICD-10-PCS | Mod: HCNC,S$GLB,, | Performed by: INTERNAL MEDICINE

## 2022-12-06 PROCEDURE — 90694 FLU VACCINE - QUADRIVALENT - ADJUVANTED: ICD-10-PCS | Mod: HCNC,S$GLB,, | Performed by: INTERNAL MEDICINE

## 2022-12-06 PROCEDURE — 1159F MED LIST DOCD IN RCRD: CPT | Mod: HCNC,CPTII,S$GLB, | Performed by: INTERNAL MEDICINE

## 2022-12-06 PROCEDURE — 1126F AMNT PAIN NOTED NONE PRSNT: CPT | Mod: HCNC,CPTII,S$GLB, | Performed by: INTERNAL MEDICINE

## 2022-12-06 PROCEDURE — 90694 VACC AIIV4 NO PRSRV 0.5ML IM: CPT | Mod: HCNC,S$GLB,, | Performed by: INTERNAL MEDICINE

## 2022-12-06 PROCEDURE — 99999 PR PBB SHADOW E&M-EST. PATIENT-LVL IV: ICD-10-PCS | Mod: PBBFAC,HCNC,, | Performed by: INTERNAL MEDICINE

## 2022-12-06 PROCEDURE — G0008 FLU VACCINE - QUADRIVALENT - ADJUVANTED: ICD-10-PCS | Mod: HCNC,S$GLB,, | Performed by: INTERNAL MEDICINE

## 2022-12-06 PROCEDURE — 99999 PR PBB SHADOW E&M-EST. PATIENT-LVL IV: CPT | Mod: PBBFAC,HCNC,, | Performed by: INTERNAL MEDICINE

## 2022-12-06 PROCEDURE — 1101F PT FALLS ASSESS-DOCD LE1/YR: CPT | Mod: HCNC,CPTII,S$GLB, | Performed by: INTERNAL MEDICINE

## 2022-12-06 PROCEDURE — 1101F PR PT FALLS ASSESS DOC 0-1 FALLS W/OUT INJ PAST YR: ICD-10-PCS | Mod: HCNC,CPTII,S$GLB, | Performed by: INTERNAL MEDICINE

## 2022-12-06 PROCEDURE — 3074F SYST BP LT 130 MM HG: CPT | Mod: HCNC,CPTII,S$GLB, | Performed by: INTERNAL MEDICINE

## 2022-12-06 PROCEDURE — 1159F PR MEDICATION LIST DOCUMENTED IN MEDICAL RECORD: ICD-10-PCS | Mod: HCNC,CPTII,S$GLB, | Performed by: INTERNAL MEDICINE

## 2022-12-06 PROCEDURE — 3078F DIAST BP <80 MM HG: CPT | Mod: HCNC,CPTII,S$GLB, | Performed by: INTERNAL MEDICINE

## 2022-12-06 PROCEDURE — 99214 OFFICE O/P EST MOD 30 MIN: CPT | Mod: HCNC,S$GLB,, | Performed by: INTERNAL MEDICINE

## 2022-12-06 PROCEDURE — 3288F FALL RISK ASSESSMENT DOCD: CPT | Mod: HCNC,CPTII,S$GLB, | Performed by: INTERNAL MEDICINE

## 2022-12-06 PROCEDURE — 3288F PR FALLS RISK ASSESSMENT DOCUMENTED: ICD-10-PCS | Mod: HCNC,CPTII,S$GLB, | Performed by: INTERNAL MEDICINE

## 2022-12-06 PROCEDURE — 1126F PR PAIN SEVERITY QUANTIFIED, NO PAIN PRESENT: ICD-10-PCS | Mod: HCNC,CPTII,S$GLB, | Performed by: INTERNAL MEDICINE

## 2022-12-06 RX ORDER — ATORVASTATIN CALCIUM 40 MG/1
40 TABLET, FILM COATED ORAL DAILY
Qty: 90 TABLET | Refills: 1 | Status: SHIPPED | OUTPATIENT
Start: 2022-12-06 | End: 2023-06-07

## 2022-12-06 RX ORDER — ALLOPURINOL 300 MG/1
300 TABLET ORAL DAILY
Qty: 90 TABLET | Refills: 3 | Status: SHIPPED | OUTPATIENT
Start: 2022-12-06 | End: 2024-04-02 | Stop reason: SDUPTHER

## 2022-12-06 RX ORDER — OXYCODONE AND ACETAMINOPHEN 5; 325 MG/1; MG/1
1 TABLET ORAL
Qty: 30 TABLET | Refills: 0 | Status: SHIPPED | OUTPATIENT
Start: 2022-12-06 | End: 2023-02-10 | Stop reason: SDUPTHER

## 2022-12-07 ENCOUNTER — TELEPHONE (OUTPATIENT)
Dept: PHARMACY | Facility: CLINIC | Age: 77
End: 2022-12-07
Payer: MEDICARE

## 2022-12-07 ENCOUNTER — LAB VISIT (OUTPATIENT)
Dept: LAB | Facility: HOSPITAL | Age: 77
End: 2022-12-07
Attending: INTERNAL MEDICINE
Payer: MEDICARE

## 2022-12-07 ENCOUNTER — TELEPHONE (OUTPATIENT)
Dept: ENDOCRINOLOGY | Facility: CLINIC | Age: 77
End: 2022-12-07
Payer: MEDICARE

## 2022-12-07 DIAGNOSIS — R30.0 DYSURIA: ICD-10-CM

## 2022-12-07 LAB
BACTERIA #/AREA URNS AUTO: ABNORMAL /HPF
BILIRUB UR QL STRIP: NEGATIVE
CLARITY UR REFRACT.AUTO: ABNORMAL
COLOR UR AUTO: YELLOW
GLUCOSE UR QL STRIP: ABNORMAL
HGB UR QL STRIP: ABNORMAL
KETONES UR QL STRIP: NEGATIVE
LEUKOCYTE ESTERASE UR QL STRIP: ABNORMAL
MICROSCOPIC COMMENT: ABNORMAL
NITRITE UR QL STRIP: NEGATIVE
NON-SQ EPI CELLS #/AREA URNS AUTO: 1 /HPF
PH UR STRIP: 5 [PH] (ref 5–8)
PROT UR QL STRIP: NEGATIVE
RBC #/AREA URNS AUTO: 3 /HPF (ref 0–4)
SP GR UR STRIP: 1.01 (ref 1–1.03)
SQUAMOUS #/AREA URNS AUTO: 12 /HPF
URN SPEC COLLECT METH UR: ABNORMAL
WBC #/AREA URNS AUTO: 41 /HPF (ref 0–5)
WBC CLUMPS UR QL AUTO: ABNORMAL
YEAST UR QL AUTO: ABNORMAL

## 2022-12-07 PROCEDURE — 81001 URINALYSIS AUTO W/SCOPE: CPT | Performed by: INTERNAL MEDICINE

## 2022-12-07 PROCEDURE — 87086 URINE CULTURE/COLONY COUNT: CPT | Performed by: INTERNAL MEDICINE

## 2022-12-07 PROCEDURE — 87186 SC STD MICRODIL/AGAR DIL: CPT | Performed by: INTERNAL MEDICINE

## 2022-12-07 PROCEDURE — 87088 URINE BACTERIA CULTURE: CPT | Performed by: INTERNAL MEDICINE

## 2022-12-07 PROCEDURE — 87077 CULTURE AEROBIC IDENTIFY: CPT | Performed by: INTERNAL MEDICINE

## 2022-12-10 LAB — BACTERIA UR CULT: ABNORMAL

## 2022-12-12 ENCOUNTER — TELEPHONE (OUTPATIENT)
Dept: INTERNAL MEDICINE | Facility: CLINIC | Age: 77
End: 2022-12-12
Payer: MEDICARE

## 2022-12-12 RX ORDER — AMOXICILLIN AND CLAVULANATE POTASSIUM 500; 125 MG/1; MG/1
1 TABLET, FILM COATED ORAL 2 TIMES DAILY
Qty: 14 TABLET | Refills: 0 | Status: SHIPPED | OUTPATIENT
Start: 2022-12-12 | End: 2023-05-02

## 2022-12-13 ENCOUNTER — TELEPHONE (OUTPATIENT)
Dept: ENDOCRINOLOGY | Facility: CLINIC | Age: 77
End: 2022-12-13
Payer: MEDICARE

## 2023-01-09 ENCOUNTER — TELEPHONE (OUTPATIENT)
Dept: INTERNAL MEDICINE | Facility: CLINIC | Age: 78
End: 2023-01-09

## 2023-01-09 NOTE — PROGRESS NOTES
Subjective:       Patient ID: Lina Davis is a 77 y.o. female.    Chief Complaint: Follow-up    Follow-up  Pertinent negatives include no abdominal pain, chest pain (arm pain or jaw pain), headaches, nausea or vomiting. Pt is feeling well overall.   Has some dysuria.  No CP or SOB.  Review of Systems   Respiratory:  Negative for shortness of breath (PND or orthopnea).    Cardiovascular:  Negative for chest pain (arm pain or jaw pain).   Gastrointestinal:  Negative for abdominal pain, diarrhea, nausea and vomiting.   Genitourinary:  Negative for dysuria.   Neurological:  Negative for seizures, syncope and headaches.     Objective:      Physical Exam  Constitutional:       General: She is not in acute distress.     Appearance: She is well-developed.   HENT:      Head: Normocephalic.   Eyes:      Pupils: Pupils are equal, round, and reactive to light.   Neck:      Thyroid: No thyromegaly.      Vascular: No JVD.   Cardiovascular:      Rate and Rhythm: Normal rate and regular rhythm.      Heart sounds: Normal heart sounds. No murmur heard.    No friction rub. No gallop.   Pulmonary:      Effort: Pulmonary effort is normal.      Breath sounds: Normal breath sounds. No wheezing or rales.   Abdominal:      General: Bowel sounds are normal. There is no distension.      Palpations: Abdomen is soft. There is no mass.      Tenderness: There is no abdominal tenderness. There is no guarding or rebound.   Musculoskeletal:      Cervical back: Neck supple.   Lymphadenopathy:      Cervical: No cervical adenopathy.   Skin:     General: Skin is warm and dry.   Neurological:      Mental Status: She is alert and oriented to person, place, and time.      Deep Tendon Reflexes: Reflexes are normal and symmetric.   Psychiatric:         Behavior: Behavior normal.         Thought Content: Thought content normal.         Judgment: Judgment normal.       Assessment:       1. Screening mammogram, encounter for    2. Type 2 diabetes mellitus  with diabetic nephropathy, with long-term current use of insulin    3. Dysuria          Plan:   Screening mammogram, encounter for  -     Mammo Digital Screening Bilat w/ Efrem; Future; Expected date: 06/06/2023    Type 2 diabetes mellitus with diabetic nephropathy, with long-term current use of insulin  -     Ambulatory referral/consult to Optometry; Future; Expected date: 12/13/2022  -     Comprehensive Metabolic Panel; Future; Expected date: 12/06/2022    Dysuria  -     Urinalysis; Future; Expected date: 12/06/2022  -     Urine culture; Future; Expected date: 12/06/2022    Other orders  -     atorvastatin (LIPITOR) 40 MG tablet; Take 1 tablet (40 mg total) by mouth once daily.  Dispense: 90 tablet; Refill: 1  -     allopurinoL (ZYLOPRIM) 300 MG tablet; Take 1 tablet (300 mg total) by mouth once daily.  Dispense: 90 tablet; Refill: 3  -     oxyCODONE-acetaminophen (PERCOCET) 5-325 mg per tablet; Take 1 tablet by mouth every 24 hours as needed for Pain.  Dispense: 30 tablet; Refill: 0  -     Influenza - Quadrivalent (Adjuvanted)

## 2023-01-10 NOTE — ASSESSMENT & PLAN NOTE
72 year old with DM who presented with UTI and associated malaise, weakness, and diahrrea.  Found to have pan sensitive e coli in Blood culture x 1 on 1/27.  I feel patient likely has pyelonephritis given continued fevers.      Recommendations  - Repeat blood cultures today to assess for clearance.   - Consider US kidney to rule out abscess given persistent fevers if continues to be febrile tonight.  - Can transition to PO abx (recommend Cipro) tomorrow  - Would treat x 1 weeks from negative culture if using Cipro, otherwise would treat for 2 weeks.     Telemetry monitor called to say patient had a run of SVT, up to 166. Strip placed in patient's chart. MD notified. No orders given. Will continue to monitor.

## 2023-01-23 ENCOUNTER — HOSPITAL ENCOUNTER (OUTPATIENT)
Dept: ENDOCRINOLOGY | Facility: CLINIC | Age: 78
Discharge: HOME | End: 2023-01-23
Attending: NURSE PRACTITIONER
Payer: MEDICARE

## 2023-01-23 DIAGNOSIS — E04.1 THYROID NODULE: ICD-10-CM

## 2023-01-23 PROCEDURE — 88173 CYTOPATH EVAL FNA REPORT: CPT | Mod: 26,HCNC,, | Performed by: PATHOLOGY

## 2023-01-23 PROCEDURE — 10006 US FINE NEEDLE ASPIRATION THYROID EA ADDITIONAL LESION: ICD-10-PCS | Mod: HCNC,S$GLB,, | Performed by: INTERNAL MEDICINE

## 2023-01-23 PROCEDURE — 10005 FNA BX W/US GDN 1ST LES: CPT | Mod: HCNC,S$GLB,, | Performed by: INTERNAL MEDICINE

## 2023-01-23 PROCEDURE — 10006 FNA BX W/US GDN EA ADDL: CPT | Mod: HCNC,S$GLB,, | Performed by: INTERNAL MEDICINE

## 2023-01-23 PROCEDURE — 10005 US FINE NEEDLE ASPIRATION THYROID, FIRST LESION: ICD-10-PCS | Mod: HCNC,S$GLB,, | Performed by: INTERNAL MEDICINE

## 2023-01-23 PROCEDURE — 88173 CYTOPATH EVAL FNA REPORT: CPT | Mod: HCNC | Performed by: PATHOLOGY

## 2023-01-23 PROCEDURE — 88173 PR  INTERPRETATION OF FNA SMEAR: ICD-10-PCS | Mod: 26,HCNC,, | Performed by: PATHOLOGY

## 2023-01-24 LAB
FINAL PATHOLOGIC DIAGNOSIS: ABNORMAL
FINAL PATHOLOGIC DIAGNOSIS: NORMAL
Lab: ABNORMAL
Lab: NORMAL

## 2023-01-25 ENCOUNTER — PATIENT MESSAGE (OUTPATIENT)
Dept: ENDOCRINOLOGY | Facility: CLINIC | Age: 78
End: 2023-01-25
Payer: MEDICARE

## 2023-01-27 ENCOUNTER — TELEPHONE (OUTPATIENT)
Dept: ENDOCRINOLOGY | Facility: CLINIC | Age: 78
End: 2023-01-27
Payer: MEDICARE

## 2023-01-31 ENCOUNTER — PES CALL (OUTPATIENT)
Dept: ADMINISTRATIVE | Facility: CLINIC | Age: 78
End: 2023-01-31
Payer: MEDICARE

## 2023-02-09 ENCOUNTER — TELEPHONE (OUTPATIENT)
Dept: PHARMACY | Facility: CLINIC | Age: 78
End: 2023-02-09
Payer: MEDICARE

## 2023-02-09 DIAGNOSIS — Z00.00 ENCOUNTER FOR MEDICARE ANNUAL WELLNESS EXAM: ICD-10-CM

## 2023-02-09 NOTE — TELEPHONE ENCOUNTER
The signed and completed patient assistance application was received from the providers office and  has been submitted to [employer] for consideration of eligibility.

## 2023-02-23 NOTE — TELEPHONE ENCOUNTER
The signed and completed patient assistance application was received from the providers office and  has been submitted to [employer] for consideration of eligibility.

## 2023-02-28 ENCOUNTER — PES CALL (OUTPATIENT)
Dept: ADMINISTRATIVE | Facility: CLINIC | Age: 78
End: 2023-02-28
Payer: MEDICARE

## 2023-03-02 ENCOUNTER — HOSPITAL ENCOUNTER (OUTPATIENT)
Dept: RADIOLOGY | Facility: HOSPITAL | Age: 78
Discharge: HOME | End: 2023-03-02
Attending: INTERNAL MEDICINE
Payer: MEDICARE

## 2023-03-02 VITALS — WEIGHT: 194 LBS | BODY MASS INDEX: 31.31 KG/M2

## 2023-03-02 DIAGNOSIS — Z12.31 SCREENING MAMMOGRAM, ENCOUNTER FOR: ICD-10-CM

## 2023-03-02 PROCEDURE — 77067 SCR MAMMO BI INCL CAD: CPT | Mod: TC,HCNC

## 2023-03-02 PROCEDURE — 77067 SCR MAMMO BI INCL CAD: CPT | Mod: 26,HCNC,, | Performed by: INTERNAL MEDICINE

## 2023-03-02 PROCEDURE — 77063 BREAST TOMOSYNTHESIS BI: CPT | Mod: 26,HCNC,, | Performed by: INTERNAL MEDICINE

## 2023-03-02 PROCEDURE — 77067 MAMMO DIGITAL SCREENING BILAT WITH TOMO: ICD-10-PCS | Mod: 26,HCNC,, | Performed by: INTERNAL MEDICINE

## 2023-03-02 PROCEDURE — 77063 MAMMO DIGITAL SCREENING BILAT WITH TOMO: ICD-10-PCS | Mod: 26,HCNC,, | Performed by: INTERNAL MEDICINE

## 2023-03-02 NOTE — TELEPHONE ENCOUNTER
The signed and completed patient assistance application was received from the providers office and  has been submitted to [employer] for consideration of eligibility.   Patient submitted spouses income.

## 2023-03-16 ENCOUNTER — LAB VISIT (OUTPATIENT)
Dept: LAB | Facility: HOSPITAL | Age: 78
End: 2023-03-16
Payer: MEDICARE

## 2023-03-16 DIAGNOSIS — Z79.4 TYPE 2 DIABETES MELLITUS WITH DIABETIC NEPHROPATHY, WITH LONG-TERM CURRENT USE OF INSULIN: Chronic | ICD-10-CM

## 2023-03-16 DIAGNOSIS — E11.21 TYPE 2 DIABETES MELLITUS WITH DIABETIC NEPHROPATHY, WITH LONG-TERM CURRENT USE OF INSULIN: Chronic | ICD-10-CM

## 2023-03-16 LAB
ALBUMIN SERPL BCP-MCNC: 3.6 G/DL (ref 3.5–5.2)
ALP SERPL-CCNC: 71 U/L (ref 55–135)
ALT SERPL W/O P-5'-P-CCNC: 7 U/L (ref 10–44)
ANION GAP SERPL CALC-SCNC: 10 MMOL/L (ref 8–16)
AST SERPL-CCNC: 12 U/L (ref 10–40)
BILIRUB SERPL-MCNC: 0.4 MG/DL (ref 0.1–1)
BUN SERPL-MCNC: 25 MG/DL (ref 8–23)
CALCIUM SERPL-MCNC: 9.7 MG/DL (ref 8.7–10.5)
CHLORIDE SERPL-SCNC: 105 MMOL/L (ref 95–110)
CO2 SERPL-SCNC: 23 MMOL/L (ref 23–29)
CREAT SERPL-MCNC: 1.1 MG/DL (ref 0.5–1.4)
EST. GFR  (NO RACE VARIABLE): 52.4 ML/MIN/1.73 M^2
ESTIMATED AVG GLUCOSE: 126 MG/DL (ref 68–131)
GLUCOSE SERPL-MCNC: 245 MG/DL (ref 70–110)
HBA1C MFR BLD: 6 % (ref 4–5.6)
POTASSIUM SERPL-SCNC: 4.7 MMOL/L (ref 3.5–5.1)
PROT SERPL-MCNC: 7.5 G/DL (ref 6–8.4)
SODIUM SERPL-SCNC: 138 MMOL/L (ref 136–145)

## 2023-03-16 PROCEDURE — 80053 COMPREHEN METABOLIC PANEL: CPT | Mod: HCNC | Performed by: NURSE PRACTITIONER

## 2023-03-16 PROCEDURE — 82985 ASSAY OF GLYCATED PROTEIN: CPT | Mod: HCNC | Performed by: NURSE PRACTITIONER

## 2023-03-16 PROCEDURE — 83036 HEMOGLOBIN GLYCOSYLATED A1C: CPT | Mod: HCNC,59 | Performed by: NURSE PRACTITIONER

## 2023-03-16 PROCEDURE — 36415 COLL VENOUS BLD VENIPUNCTURE: CPT | Mod: HCNC | Performed by: NURSE PRACTITIONER

## 2023-03-20 LAB — FRUCTOSAMINE SERPL-SCNC: 386 UMOL /L

## 2023-03-21 NOTE — PROGRESS NOTES
Subjective:      Patient ID: Lina Davis is a 77 y.o. female.    Chief Complaint:  No chief complaint on file.      History of Present Illness  Lina Davis is here for follow up of T2DM and thyroid nodules.  Previously seen by me 11/2022.     With regards to diabetes:    Diagnosed: ~2002/2003  DE: 7/2021  FH of DM: sister, brother   Denies any of her children have DM     Known complications:  DKA Denies  RN Denies  Eye Exam: 2/2021  PN : Yes   Podiatry: 12/2020  Nephropathy : Yes   CAD : Denies  Episode of pancreatitis in 2018 - attributed to Januvia    Current regimen: PAP - SGLT2i and insulin   Metformin 500mg twice daily   Actos 30mg daily   Levemir 12 units daily   Novolog 5 units after meals      Received SGLT2i through PAP. Ran out of Jardiance ~ 12/2022 but has not received a new supply.     Reports compliance.    Other medications tried:  Januvia- Discontinued in 2018 due to pancreatitis    Glucose Monitor:   3-4 times a day testing  Log reviewed: log provided and reviewed, scanned in media tab     Hypoglycemia:  Denies  Knows how to correct with 15 grams of carbs- juice, coke, or a peppermint.     Diet/Exercise:  Eats 2-3 meals a day. Trying to cut back on carbohydrates - portion rice and pasta   Snacks : occasionally - cinnamon roll, apple   Drinks : water, 1 or 2 8oz soft drinks a week, lemonade but will cut it with water.   Exercise - tries to stay active.      Diabetes Management Status    Hemoglobin A1C   Date Value Ref Range Status   03/16/2023 6.0 (H) 4.0 - 5.6 % Final     Comment:     ADA Screening Guidelines:  5.7-6.4%  Consistent with prediabetes  >or=6.5%  Consistent with diabetes    High levels of fetal hemoglobin interfere with the HbA1C  assay. Heterozygous hemoglobin variants (HbS, HgC, etc)do  not significantly interfere with this assay.   However, presence of multiple variants may affect accuracy.     11/11/2022 5.9 (H) 4.0 - 5.6 % Final     Comment:     ADA Screening  Guidelines:  5.7-6.4%  Consistent with prediabetes  >or=6.5%  Consistent with diabetes    High levels of fetal hemoglobin interfere with the HbA1C  assay. Heterozygous hemoglobin variants (HbS, HgC, etc)do  not significantly interfere with this assay.   However, presence of multiple variants may affect accuracy.     07/12/2022 5.9 (H) 4.0 - 5.6 % Final     Comment:     ADA Screening Guidelines:  5.7-6.4%  Consistent with prediabetes  >or=6.5%  Consistent with diabetes    High levels of fetal hemoglobin interfere with the HbA1C  assay. Heterozygous hemoglobin variants (HbS, HgC, etc)do  not significantly interfere with this assay.   However, presence of multiple variants may affect accuracy.         Statin: Taking  ACE/ARB: Taking  Screening or Prevention Patient's value Goal Complete/Controlled?   HgA1C Testing and Control   Lab Results   Component Value Date    HGBA1C 6.0 (H) 03/16/2023      Annually/Less than 8% Yes   Lipid profile : 07/12/2022 Annually Yes   LDL control Lab Results   Component Value Date    LDLCALC 73.2 07/12/2022    Annually/Less than 100 mg/dl  Yes   Nephropathy screening Lab Results   Component Value Date    LABMICR 49.0 07/26/2022     Lab Results   Component Value Date    PROTEINUA Negative 12/07/2022    Annually Yes   Blood pressure BP Readings from Last 1 Encounters:   03/23/23 (!) 119/57    Less than 140/90 No   Dilated retinal exam : 02/22/2021 Annually Yes   Foot exam   : 12/11/2020 Annually No     With regards to thyroid nodule:    Thyroid US:   8/2022  FINDINGS:  Comparison is 12/02/2016.  The right lobe measures 4.8 x 2.5 x 1.7 cm, the left lobe measures 5.1 x 2.1 x 2.1 cm.  Total volume is 22 mL.  There are 3 nodules on the right the largest midpole measuring 2.4 x 2.1 x 2.0 cm.  There are 2 nodules in the isthmus the largest measuring 1.0 x 0.5 x 1.0 cm.  There are 3 nodules left lower gland the largest measuring 2.2 x 1.7 x 2.0 cm.  No lymphadenopathy is seen.  The largest right  nodule in the largest left nodule may be slightly larger when compared to 12/02/2016.  Impression:  Largest right nodule and largest left nodule meet criteria for FNA biopsy.    FNA:   1/23/2023  R mid: Unsatisfactory  L inferior: Benign    Signs or Symptoms:   Difficulty breathing: Denies  Difficulty swallowing: Denies  Voice Changes: Denies  FH of thyroid cancer: Denies  Personal history of radiation treatment or exposure: Denies    Lab Results   Component Value Date    TSH 2.578 07/12/2022    B7BCWEE 6.0 11/26/2008    FREET4 1.10 08/08/2017       With regards to the hypercalcemia:    Lab Results   Component Value Date    PTH 74.4 11/11/2022    CALCIUM 9.7 03/16/2023    PHOS 2.7 11/11/2022     Lab Results   Component Value Date    ALBUMIN 3.6 03/16/2023                     Daily intake of calcium is: Denies  Daily intake of vitamin D:  OTC Vit D3 3000iu daily     BMD:    12/2018  Normal BMD.  Compared to prior study in 9/2014, BMD has declined 5.0% at the spine and 4.3% at the hip.  RECOMMENDATIONS of [facility]:  1.  Calcium 1449-6739 mg daily and vitamin D 800-1000 units daily, adequate exercise.  2.  No need for repeat study in near future unless clinical change    Denies constipation, depression, muscle aches or pains.  Reports polyuria   Denies fractures, height loss or kidney stones.  Reports history of renal disease.  Denies family history of hyperparathyroidism or calcium problems.  Denies HCTZ., lithium, or chlorthiadone use.      Review of Systems  as above    Objective:   Physical Exam  Vitals reviewed.   Neck:      Thyroid: Thyromegaly present.   Cardiovascular:      Rate and Rhythm: Normal rate.      Comments: No edema present  Pulmonary:      Effort: Pulmonary effort is normal.   Abdominal:      Palpations: Abdomen is soft.     Injection sites are without edema or erythema. No lipo hypertropthy or atrophy.    Visit Vitals  BP (!) 119/57   Pulse (!) 113   Ht 5'  "6" (1.676 m)   Wt 86.1 kg (189 lb 13.1 oz)   SpO2 98%   BMI 30.64 kg/m²           Body mass index is 30.64 kg/m².    Lab Review:   Lab Results   Component Value Date    HGBA1C 6.0 (H) 03/16/2023    HGBA1C 5.9 (H) 11/11/2022    HGBA1C 5.9 (H) 07/12/2022       Lab Results   Component Value Date    CHOL 149 07/12/2022    HDL 55 07/12/2022    LDLCALC 73.2 07/12/2022    TRIG 104 07/12/2022    CHOLHDL 36.9 07/12/2022     Lab Results   Component Value Date     03/16/2023    K 4.7 03/16/2023     03/16/2023    CO2 23 03/16/2023     (H) 03/16/2023    BUN 25 (H) 03/16/2023    CREATININE 1.1 03/16/2023    CALCIUM 9.7 03/16/2023    PROT 7.5 03/16/2023    ALBUMIN 3.6 03/16/2023    BILITOT 0.4 03/16/2023    ALKPHOS 71 03/16/2023    AST 12 03/16/2023    ALT 7 (L) 03/16/2023    ANIONGAP 10 03/16/2023    ESTGFRAFRICA 39.0 (A) 07/12/2022    EGFRNONAA 33.8 (A) 07/12/2022    TSH 2.578 07/12/2022     Vit D, 25-Hydroxy   Date Value Ref Range Status   11/11/2022 37 30 - 96 ng/mL Final     Comment:     Vitamin D deficiency.........<10 ng/mL                              Vitamin D insufficiency......10-29 ng/mL       Vitamin D sufficiency........> or equal to 30 ng/mL  Vitamin D toxicity............>100 ng/mL       Assessment and Plan     1. Type 2 diabetes mellitus with diabetic nephropathy, with long-term current use of insulin  PTH, Intact    Renal Function Panel    Vitamin D    Hemoglobin A1C      2. Stage 3a chronic kidney disease        3. Thyroid nodule  US Soft Tissue Head Neck Thyroid    TSH      4. Other long term (current) drug therapy  Vitamin D          Type 2 diabetes mellitus with diabetic nephropathy  -- Labs prior to follow up.  -- A1c goal <7.5%.  -- Medications discussed:  MFM   GLP1-DPP4 - avoid given drug induced pancreatitis (Januvia)  SUNG   SGLT2   Reviewed potential adverse effects of SGLT-2 inhibitors, including genital mycotic infections, slightly increased risk of UTI, hypersensitivity, " hypotension, and hyperkalemia. Advised to maintain water intake of 8-10 cups per day. Advised we need to check chemistry panel at baseline and 2 weeks after starting. Discussed FDA warning reports of ketoacidosis associated with SGLT-2 inhibitors. Advised to seek immediate medical attention and stop the medication if symptoms such as difficulty breathing, nausea, vomiting, abdominal pain, confusion, and unusual fatigue/sleepiness. Discussed possible precipitating factors including major illness/reduced food and fluid intake (advised to stop under these circumstances), and reduced insulin dose. Discussed possible effects of increased fracture risk/decreased bone density. Discussed reports of increased risk of leg and foot amputations with canagliflozin and need to seek urgent care if developed new pain or tenderness, sores or ulcers, or infections in legs/feet.   Insulin   -- Reviewed logs/CGM:  Glucose relatively controlled.  Instructed to send glucose logs in 14 days.  Reach out to me sooner for any glucose <70 or consistently >180.  -- Medication Changes:   Metformin 500mg twice daily   Actos 30mg daily   Jardiance 10mg daily   Levemir 12 units daily   Novolog 5 units before meals     Received SGLT2i through [facility]. Check in with them about her order.    -- Reviewed goals of therapy are to get the best control we can without hypoglycemia.  -- Reviewed patient's current insulin regimen. Clarified proper insulin dose and timing in relation to meals, etc. Insulin injection sites and proper rotation instructed.    -- Advised frequent self blood glucose monitoring.  Patient encouraged to document glucose results and bring them to every clinic visit.  -- Hypoglycemia precautions discussed. Instructed on precautions before driving.    -- Call for Bg repeatedly < 90 or > 180.   -- Close adherence to lifestyle changes recommended.   -- Periodic follow ups for eye evaluations, foot care and dental care suggested.    Thyroid  nodule  -- I have reviewed management options including observation, FNA or surgery.  -- FNA procedure explained in depth.  -- Discussed indications for repeat FNA as well as surgical indications (abnormal FNA, compressive symptoms or interval change).  -- Discussed possible results of FNA- Benign, Malignant, FLUS, or insufficient cells.  Discussed results auto released to patients, but advised the ordering provider will also contact to discuss.   -- All of the patients questions were answered.  -- FNA:   1/23/2023  R mid: Unsatisfactory  L inferior: Benign  -- Repeat thyroid US 6/2023.    CKD (chronic kidney disease) stage 3, GFR 30-59 ml/min  -- Optimize glucose control.         Follow up in about 4 months (around 7/23/2023).

## 2023-03-23 ENCOUNTER — OFFICE VISIT (OUTPATIENT)
Dept: ENDOCRINOLOGY | Facility: CLINIC | Age: 78
End: 2023-03-23
Payer: MEDICARE

## 2023-03-23 VITALS
OXYGEN SATURATION: 98 % | DIASTOLIC BLOOD PRESSURE: 57 MMHG | HEART RATE: 113 BPM | BODY MASS INDEX: 30.51 KG/M2 | SYSTOLIC BLOOD PRESSURE: 119 MMHG | HEIGHT: 66 IN | WEIGHT: 189.81 LBS

## 2023-03-23 DIAGNOSIS — Z79.899 OTHER LONG TERM (CURRENT) DRUG THERAPY: ICD-10-CM

## 2023-03-23 DIAGNOSIS — E11.21 TYPE 2 DIABETES MELLITUS WITH DIABETIC NEPHROPATHY, WITH LONG-TERM CURRENT USE OF INSULIN: Primary | Chronic | ICD-10-CM

## 2023-03-23 DIAGNOSIS — N18.31 STAGE 3A CHRONIC KIDNEY DISEASE: ICD-10-CM

## 2023-03-23 DIAGNOSIS — Z79.4 TYPE 2 DIABETES MELLITUS WITH DIABETIC NEPHROPATHY, WITH LONG-TERM CURRENT USE OF INSULIN: Primary | Chronic | ICD-10-CM

## 2023-03-23 DIAGNOSIS — E04.1 THYROID NODULE: ICD-10-CM

## 2023-03-23 PROCEDURE — 1101F PT FALLS ASSESS-DOCD LE1/YR: CPT | Mod: HCNC,CPTII,S$GLB, | Performed by: NURSE PRACTITIONER

## 2023-03-23 PROCEDURE — 99214 PR OFFICE/OUTPT VISIT, EST, LEVL IV, 30-39 MIN: ICD-10-PCS | Mod: HCNC,S$GLB,, | Performed by: NURSE PRACTITIONER

## 2023-03-23 PROCEDURE — 3074F SYST BP LT 130 MM HG: CPT | Mod: HCNC,CPTII,S$GLB, | Performed by: NURSE PRACTITIONER

## 2023-03-23 PROCEDURE — 1159F PR MEDICATION LIST DOCUMENTED IN MEDICAL RECORD: ICD-10-PCS | Mod: HCNC,CPTII,S$GLB, | Performed by: NURSE PRACTITIONER

## 2023-03-23 PROCEDURE — 3074F PR MOST RECENT SYSTOLIC BLOOD PRESSURE < 130 MM HG: ICD-10-PCS | Mod: HCNC,CPTII,S$GLB, | Performed by: NURSE PRACTITIONER

## 2023-03-23 PROCEDURE — 99214 OFFICE O/P EST MOD 30 MIN: CPT | Mod: HCNC,S$GLB,, | Performed by: NURSE PRACTITIONER

## 2023-03-23 PROCEDURE — 1126F PR PAIN SEVERITY QUANTIFIED, NO PAIN PRESENT: ICD-10-PCS | Mod: HCNC,CPTII,S$GLB, | Performed by: NURSE PRACTITIONER

## 2023-03-23 PROCEDURE — 3288F FALL RISK ASSESSMENT DOCD: CPT | Mod: HCNC,CPTII,S$GLB, | Performed by: NURSE PRACTITIONER

## 2023-03-23 PROCEDURE — 1160F PR REVIEW ALL MEDS BY PRESCRIBER/CLIN PHARMACIST DOCUMENTED: ICD-10-PCS | Mod: HCNC,CPTII,S$GLB, | Performed by: NURSE PRACTITIONER

## 2023-03-23 PROCEDURE — 99999 PR PBB SHADOW E&M-EST. PATIENT-LVL V: ICD-10-PCS | Mod: PBBFAC,HCNC,, | Performed by: NURSE PRACTITIONER

## 2023-03-23 PROCEDURE — 1159F MED LIST DOCD IN RCRD: CPT | Mod: HCNC,CPTII,S$GLB, | Performed by: NURSE PRACTITIONER

## 2023-03-23 PROCEDURE — 3044F PR MOST RECENT HEMOGLOBIN A1C LEVEL <7.0%: ICD-10-PCS | Mod: HCNC,CPTII,S$GLB, | Performed by: NURSE PRACTITIONER

## 2023-03-23 PROCEDURE — 1101F PR PT FALLS ASSESS DOC 0-1 FALLS W/OUT INJ PAST YR: ICD-10-PCS | Mod: HCNC,CPTII,S$GLB, | Performed by: NURSE PRACTITIONER

## 2023-03-23 PROCEDURE — 1160F RVW MEDS BY RX/DR IN RCRD: CPT | Mod: HCNC,CPTII,S$GLB, | Performed by: NURSE PRACTITIONER

## 2023-03-23 PROCEDURE — 3078F PR MOST RECENT DIASTOLIC BLOOD PRESSURE < 80 MM HG: ICD-10-PCS | Mod: HCNC,CPTII,S$GLB, | Performed by: NURSE PRACTITIONER

## 2023-03-23 PROCEDURE — 99999 PR PBB SHADOW E&M-EST. PATIENT-LVL V: CPT | Mod: PBBFAC,HCNC,, | Performed by: NURSE PRACTITIONER

## 2023-03-23 PROCEDURE — 3044F HG A1C LEVEL LT 7.0%: CPT | Mod: HCNC,CPTII,S$GLB, | Performed by: NURSE PRACTITIONER

## 2023-03-23 PROCEDURE — 3288F PR FALLS RISK ASSESSMENT DOCUMENTED: ICD-10-PCS | Mod: HCNC,CPTII,S$GLB, | Performed by: NURSE PRACTITIONER

## 2023-03-23 PROCEDURE — 3078F DIAST BP <80 MM HG: CPT | Mod: HCNC,CPTII,S$GLB, | Performed by: NURSE PRACTITIONER

## 2023-03-23 PROCEDURE — 1126F AMNT PAIN NOTED NONE PRSNT: CPT | Mod: HCNC,CPTII,S$GLB, | Performed by: NURSE PRACTITIONER

## 2023-03-23 NOTE — ASSESSMENT & PLAN NOTE
-- Labs prior to follow up.  -- A1c goal <7.5%.  -- Medications discussed:  MFM   GLP1-DPP4 - avoid given drug induced pancreatitis (Januvia)  SUNG   SGLT2   Reviewed potential adverse effects of SGLT-2 inhibitors, including genital mycotic infections, slightly increased risk of UTI, hypersensitivity, hypotension, and hyperkalemia. Advised to maintain water intake of 8-10 cups per day. Advised we need to check chemistry panel at baseline and 2 weeks after starting. Discussed FDA warning reports of ketoacidosis associated with SGLT-2 inhibitors. Advised to seek immediate medical attention and stop the medication if symptoms such as difficulty breathing, nausea, vomiting, abdominal pain, confusion, and unusual fatigue/sleepiness. Discussed possible precipitating factors including major illness/reduced food and fluid intake (advised to stop under these circumstances), and reduced insulin dose. Discussed possible effects of increased fracture risk/decreased bone density. Discussed reports of increased risk of leg and foot amputations with canagliflozin and need to seek urgent care if developed new pain or tenderness, sores or ulcers, or infections in legs/feet.   Insulin   -- Reviewed logs/CGM:  Glucose relatively controlled.  Instructed to send glucose logs in 14 days.  Reach out to me sooner for any glucose <70 or consistently >180.  -- Medication Changes:   Metformin 500mg twice daily   Actos 30mg daily   Jardiance 10mg daily   Levemir 12 units daily   Novolog 5 units before meals     Received SGLT2i through [facility]. Check in with them about her order.    -- Reviewed goals of therapy are to get the best control we can without hypoglycemia.  -- Reviewed patient's current insulin regimen. Clarified proper insulin dose and timing in relation to meals, etc. Insulin injection sites and proper rotation instructed.    -- Advised frequent self blood glucose monitoring.  Patient encouraged to document glucose results and bring them  to every clinic visit.  -- Hypoglycemia precautions discussed. Instructed on precautions before driving.    -- Call for Bg repeatedly < 90 or > 180.   -- Close adherence to lifestyle changes recommended.   -- Periodic follow ups for eye evaluations, foot care and dental care suggested.

## 2023-03-23 NOTE — ASSESSMENT & PLAN NOTE
-- I have reviewed management options including observation, FNA or surgery.  -- FNA procedure explained in depth.  -- Discussed indications for repeat FNA as well as surgical indications (abnormal FNA, compressive symptoms or interval change).  -- Discussed possible results of FNA- Benign, Malignant, FLUS, or insufficient cells.  Discussed results auto released to patients, but advised the ordering provider will also contact to discuss.   -- All of the patients questions were answered.  -- FNA:   1/23/2023  R mid: Unsatisfactory  L inferior: Benign  -- Repeat thyroid US 6/2023.

## 2023-03-23 NOTE — TELEPHONE ENCOUNTER
I spoke with a representative , that stared the medication was delivered to the patient on yesterday, patient stated she have not received her medication.  I  called the program again and left my name, phone number & patient information.

## 2023-03-23 NOTE — PATIENT INSTRUCTIONS
Metformin 500mg twice daily   Actos 30mg daily   Levemir 12 units daily   Novolog 5 units before meals     Insulin types  You are taking two types of insulin and each of them has unique properties.     Long acting insulin:  Levemir is the long-acting insulin. It lasts about 24 hours after injection. You need to take it once a day at the same time. Skipping a meal does not usually affect the dose of lantus.         Short-acting insulin  Humalog/Novolog/Apidra is the short acting insulin. It is used to correct your high blood sugars after EACH MEAL.   It should be given within 15 minutes before EACH MEAL, so the frequency of the injection of the short acting insulin is the same as how many meals you eat a day. For example, if you eat only lunch and dinner, then you just need to use the short acting insulin before lunch and before dinner.   If you happen to skip a meal, please also skip the dose of the short acting insulin for that meal. Otherwise, you may have low blood sugars.   If you miss a dose of short acting insulin, please do NOT try to make up for that dose.

## 2023-03-24 ENCOUNTER — PES CALL (OUTPATIENT)
Dept: ADMINISTRATIVE | Facility: CLINIC | Age: 78
End: 2023-03-24
Payer: MEDICARE

## 2023-03-29 RX ORDER — LOSARTAN POTASSIUM 100 MG/1
100 TABLET ORAL DAILY
Qty: 90 TABLET | Refills: 2 | Status: SHIPPED | OUTPATIENT
Start: 2023-03-29 | End: 2023-09-14

## 2023-03-29 NOTE — TELEPHONE ENCOUNTER
No new care gaps identified.  [facility] Embedded Care Gaps. Reference number: [id number]. 3/29/2023   12:34:55 AM ANYT

## 2023-03-29 NOTE — TELEPHONE ENCOUNTER
Refill Decision Note   Lina Davis  is requesting a refill authorization.  Brief Assessment and Rationale for Refill:  Approve     Medication Therapy Plan:       Medication Reconciliation Completed: No    Comments:     No Care Gaps recommended.     Note composed:8:40 AM 03/29/2023

## 2023-04-03 ENCOUNTER — PATIENT MESSAGE (OUTPATIENT)
Dept: ADMINISTRATIVE | Facility: HOSPITAL | Age: 78
End: 2023-04-03
Payer: MEDICARE

## 2023-04-05 ENCOUNTER — PATIENT OUTREACH (OUTPATIENT)
Dept: ADMINISTRATIVE | Facility: HOSPITAL | Age: 78
End: 2023-04-05
Payer: MEDICARE

## 2023-04-05 DIAGNOSIS — E11.21 TYPE 2 DIABETES MELLITUS WITH DIABETIC NEPHROPATHY, WITH LONG-TERM CURRENT USE OF INSULIN: Chronic | ICD-10-CM

## 2023-04-05 DIAGNOSIS — Z79.4 TYPE 2 DIABETES MELLITUS WITH DIABETIC NEPHROPATHY, WITH LONG-TERM CURRENT USE OF INSULIN: Chronic | ICD-10-CM

## 2023-04-05 NOTE — PROGRESS NOTES
Health Maintenance Due   Topic Date Due    Foot Exam  12/11/2021    Eye Exam  02/22/2022    Colorectal Cancer Screening  03/07/2023     Chart reviewed.   Immunizations: Reconciled  Orders placed: N/A  Upcoming appts to satisfy VANI topics: N/A

## 2023-04-13 DIAGNOSIS — Z12.11 SCREEN FOR COLON CANCER: Primary | ICD-10-CM

## 2023-04-17 ENCOUNTER — TELEPHONE (OUTPATIENT)
Dept: INTERNAL MEDICINE | Facility: CLINIC | Age: 78
End: 2023-04-17
Payer: MEDICARE

## 2023-04-17 NOTE — TELEPHONE ENCOUNTER
----- Message from Kasey Rankin sent at 4/17/2023  7:24 AM CDT -----  Contact: Pt Mobile [phone]  Patient would like a call back in regards to her saying that she would like to know if you can squeeze her in for a three O'clock appointment on April seventeenth so that her and her  can be seen at the same time because she does not drive.

## 2023-04-18 ENCOUNTER — PES CALL (OUTPATIENT)
Dept: ADMINISTRATIVE | Facility: CLINIC | Age: 78
End: 2023-04-18
Payer: MEDICARE

## 2023-05-02 ENCOUNTER — LAB VISIT (OUTPATIENT)
Dept: LAB | Facility: HOSPITAL | Age: 78
End: 2023-05-02
Attending: INTERNAL MEDICINE
Payer: MEDICARE

## 2023-05-02 ENCOUNTER — OFFICE VISIT (OUTPATIENT)
Dept: INTERNAL MEDICINE | Facility: CLINIC | Age: 78
End: 2023-05-02
Payer: MEDICARE

## 2023-05-02 VITALS
WEIGHT: 192 LBS | BODY MASS INDEX: 30.86 KG/M2 | DIASTOLIC BLOOD PRESSURE: 60 MMHG | OXYGEN SATURATION: 98 % | HEART RATE: 89 BPM | HEIGHT: 66 IN | SYSTOLIC BLOOD PRESSURE: 124 MMHG

## 2023-05-02 DIAGNOSIS — E78.5 HYPERLIPIDEMIA, UNSPECIFIED HYPERLIPIDEMIA TYPE: ICD-10-CM

## 2023-05-02 DIAGNOSIS — I77.9 BILATERAL CAROTID ARTERY DISEASE, UNSPECIFIED TYPE: ICD-10-CM

## 2023-05-02 DIAGNOSIS — I67.2 CEREBRAL ATHEROSCLEROSIS: ICD-10-CM

## 2023-05-02 DIAGNOSIS — M46.1 BILATERAL SACROILIITIS: Primary | ICD-10-CM

## 2023-05-02 DIAGNOSIS — E11.21 TYPE 2 DIABETES MELLITUS WITH DIABETIC NEPHROPATHY, WITHOUT LONG-TERM CURRENT USE OF INSULIN: ICD-10-CM

## 2023-05-02 DIAGNOSIS — G95.89 OTHER SPECIFIED DISEASES OF SPINAL CORD: ICD-10-CM

## 2023-05-02 LAB
ALBUMIN/CREAT UR: 60 UG/MG (ref 0–30)
BACTERIA #/AREA URNS AUTO: ABNORMAL /HPF
BILIRUB UR QL STRIP: NEGATIVE
CLARITY UR REFRACT.AUTO: ABNORMAL
COLOR UR AUTO: YELLOW
CREAT UR-MCNC: 55 MG/DL (ref 15–325)
GLUCOSE UR QL STRIP: ABNORMAL
HGB UR QL STRIP: NEGATIVE
KETONES UR QL STRIP: NEGATIVE
LEUKOCYTE ESTERASE UR QL STRIP: ABNORMAL
MICROALBUMIN UR DL<=1MG/L-MCNC: 33 UG/ML
MICROSCOPIC COMMENT: ABNORMAL
NITRITE UR QL STRIP: NEGATIVE
PH UR STRIP: 5 [PH] (ref 5–8)
PROT UR QL STRIP: NEGATIVE
RBC #/AREA URNS AUTO: 1 /HPF (ref 0–4)
SP GR UR STRIP: 1.01 (ref 1–1.03)
URN SPEC COLLECT METH UR: ABNORMAL
WBC #/AREA URNS AUTO: >100 /HPF (ref 0–5)
WBC CLUMPS UR QL AUTO: ABNORMAL
YEAST UR QL AUTO: ABNORMAL

## 2023-05-02 PROCEDURE — 3074F PR MOST RECENT SYSTOLIC BLOOD PRESSURE < 130 MM HG: ICD-10-PCS | Mod: HCNC,CPTII,S$GLB, | Performed by: INTERNAL MEDICINE

## 2023-05-02 PROCEDURE — 99214 OFFICE O/P EST MOD 30 MIN: CPT | Mod: HCNC,S$GLB,, | Performed by: INTERNAL MEDICINE

## 2023-05-02 PROCEDURE — 99214 PR OFFICE/OUTPT VISIT, EST, LEVL IV, 30-39 MIN: ICD-10-PCS | Mod: HCNC,S$GLB,, | Performed by: INTERNAL MEDICINE

## 2023-05-02 PROCEDURE — 99999 PR PBB SHADOW E&M-EST. PATIENT-LVL IV: CPT | Mod: PBBFAC,HCNC,, | Performed by: INTERNAL MEDICINE

## 2023-05-02 PROCEDURE — 3288F PR FALLS RISK ASSESSMENT DOCUMENTED: ICD-10-PCS | Mod: HCNC,CPTII,S$GLB, | Performed by: INTERNAL MEDICINE

## 2023-05-02 PROCEDURE — 1125F AMNT PAIN NOTED PAIN PRSNT: CPT | Mod: HCNC,CPTII,S$GLB, | Performed by: INTERNAL MEDICINE

## 2023-05-02 PROCEDURE — 3078F DIAST BP <80 MM HG: CPT | Mod: HCNC,CPTII,S$GLB, | Performed by: INTERNAL MEDICINE

## 2023-05-02 PROCEDURE — 1101F PR PT FALLS ASSESS DOC 0-1 FALLS W/OUT INJ PAST YR: ICD-10-PCS | Mod: HCNC,CPTII,S$GLB, | Performed by: INTERNAL MEDICINE

## 2023-05-02 PROCEDURE — 1159F MED LIST DOCD IN RCRD: CPT | Mod: HCNC,CPTII,S$GLB, | Performed by: INTERNAL MEDICINE

## 2023-05-02 PROCEDURE — 3288F FALL RISK ASSESSMENT DOCD: CPT | Mod: HCNC,CPTII,S$GLB, | Performed by: INTERNAL MEDICINE

## 2023-05-02 PROCEDURE — 1125F PR PAIN SEVERITY QUANTIFIED, PAIN PRESENT: ICD-10-PCS | Mod: HCNC,CPTII,S$GLB, | Performed by: INTERNAL MEDICINE

## 2023-05-02 PROCEDURE — 81001 URINALYSIS AUTO W/SCOPE: CPT | Mod: HCNC | Performed by: INTERNAL MEDICINE

## 2023-05-02 PROCEDURE — 3078F PR MOST RECENT DIASTOLIC BLOOD PRESSURE < 80 MM HG: ICD-10-PCS | Mod: HCNC,CPTII,S$GLB, | Performed by: INTERNAL MEDICINE

## 2023-05-02 PROCEDURE — 1159F PR MEDICATION LIST DOCUMENTED IN MEDICAL RECORD: ICD-10-PCS | Mod: HCNC,CPTII,S$GLB, | Performed by: INTERNAL MEDICINE

## 2023-05-02 PROCEDURE — 82570 ASSAY OF URINE CREATININE: CPT | Mod: HCNC | Performed by: INTERNAL MEDICINE

## 2023-05-02 PROCEDURE — 1101F PT FALLS ASSESS-DOCD LE1/YR: CPT | Mod: HCNC,CPTII,S$GLB, | Performed by: INTERNAL MEDICINE

## 2023-05-02 PROCEDURE — 3074F SYST BP LT 130 MM HG: CPT | Mod: HCNC,CPTII,S$GLB, | Performed by: INTERNAL MEDICINE

## 2023-05-02 PROCEDURE — 99999 PR PBB SHADOW E&M-EST. PATIENT-LVL IV: ICD-10-PCS | Mod: PBBFAC,HCNC,, | Performed by: INTERNAL MEDICINE

## 2023-05-02 RX ORDER — OXYCODONE AND ACETAMINOPHEN 5; 325 MG/1; MG/1
1 TABLET ORAL
Qty: 30 TABLET | Refills: 0 | Status: SHIPPED | OUTPATIENT
Start: 2023-05-02 | End: 2023-06-15 | Stop reason: SDUPTHER

## 2023-05-02 RX ORDER — LIDOCAINE 50 MG/G
1 PATCH TOPICAL DAILY
Qty: 30 PATCH | Refills: 2 | Status: SHIPPED | OUTPATIENT
Start: 2023-05-02

## 2023-05-02 NOTE — PROGRESS NOTES
Subjective:       Patient ID: Lina Davis is a 78 y.o. female.    Chief Complaint: Follow-up and Hand Pain (left)    Follow-up  Pertinent negatives include no abdominal pain, chest pain (arm pain or jaw pain), headaches, nausea or vomiting.   Hand Pain   Pertinent negatives include no chest pain (arm pain or jaw pain). Pt is feeling ok except for L hand pain and some back pain.  No CP or SOB.  Review of Systems   Respiratory:  Negative for shortness of breath (PND or orthopnea).    Cardiovascular:  Negative for chest pain (arm pain or jaw pain).   Gastrointestinal:  Negative for abdominal pain, diarrhea, nausea and vomiting.   Genitourinary:  Negative for dysuria.   Neurological:  Negative for seizures, syncope and headaches.     Objective:      Physical Exam  Constitutional:       General: She is not in acute distress.     Appearance: She is well-developed.   HENT:      Head: Normocephalic.   Eyes:      Pupils: Pupils are equal, round, and reactive to light.   Neck:      Thyroid: No thyromegaly.      Vascular: No JVD.   Cardiovascular:      Rate and Rhythm: Normal rate and regular rhythm.      Heart sounds: Normal heart sounds. No murmur heard.    No friction rub. No gallop.   Pulmonary:      Effort: Pulmonary effort is normal.      Breath sounds: Normal breath sounds. No wheezing or rales.   Abdominal:      General: Bowel sounds are normal. There is no distension.      Palpations: Abdomen is soft. There is no mass.      Tenderness: There is no abdominal tenderness. There is no guarding or rebound.   Musculoskeletal:      Cervical back: Neck supple.   Lymphadenopathy:      Cervical: No cervical adenopathy.   Skin:     General: Skin is warm and dry.   Neurological:      Mental Status: She is alert and oriented to person, place, and time.      Deep Tendon Reflexes: Reflexes are normal and symmetric.   Psychiatric:         Behavior: Behavior normal.         Thought Content: Thought content normal.         Judgment:  Judgment normal.       Assessment:       1. Bilateral sacroiliitis    2. Other specified diseases of spinal cord    3. Bilateral carotid artery disease, unspecified type    4. Hyperlipidemia, unspecified hyperlipidemia type    5. Type 2 diabetes mellitus with diabetic nephropathy, without long-term current use of insulin    6. Cerebral atherosclerosis        Plan:   Bilateral sacroiliitis  -     LIDOcaine (LIDODERM) 5 %; Place 1 patch onto the skin once daily. Remove & Discard patch within 12 hours or as directed by MD  Dispense: 30 patch; Refill: 2    Other specified diseases of spinal cord  Hx of C spine surgery - doing well from that standpoint  Bilateral carotid artery disease, unspecified type  -     VAS US Carotid Bilateral; Future    Hyperlipidemia, unspecified hyperlipidemia type  -     CBC Auto Differential; Future; Expected date: 05/02/2023  -     Lipid Panel; Future; Expected date: 05/02/2023    Type 2 diabetes mellitus with diabetic nephropathy, without long-term current use of insulin  -     Urinalysis; Future; Expected date: 05/02/2023  -     Microalbumin/Creatinine Ratio, Urine; Future; Expected date: 05/02/2023    Cerebral atherosclerosis  -     VAS US Carotid Bilateral; Future    Other orders  -     oxyCODONE-acetaminophen (PERCOCET) 5-325 mg per tablet; Take 1 tablet by mouth every 24 hours as needed for Pain.  Dispense: 30 tablet; Refill: 0

## 2023-05-06 ENCOUNTER — TELEPHONE (OUTPATIENT)
Dept: INTERNAL MEDICINE | Facility: CLINIC | Age: 78
End: 2023-05-06
Payer: MEDICARE

## 2023-05-06 DIAGNOSIS — N39.0 URINARY TRACT INFECTION WITHOUT HEMATURIA, SITE UNSPECIFIED: Primary | ICD-10-CM

## 2023-05-06 RX ORDER — CEFUROXIME AXETIL 250 MG/1
250 TABLET ORAL 2 TIMES DAILY
Qty: 14 TABLET | Refills: 0 | Status: SHIPPED | OUTPATIENT
Start: 2023-05-06 | End: 2023-07-31

## 2023-05-16 ENCOUNTER — HOSPITAL ENCOUNTER (OUTPATIENT)
Dept: VASCULAR SURGERY | Facility: CLINIC | Age: 78
Discharge: HOME | End: 2023-05-16
Attending: INTERNAL MEDICINE
Payer: MEDICARE

## 2023-05-16 ENCOUNTER — LAB VISIT (OUTPATIENT)
Dept: LAB | Facility: HOSPITAL | Age: 78
End: 2023-05-16
Attending: INTERNAL MEDICINE
Payer: MEDICARE

## 2023-05-16 DIAGNOSIS — I77.9 BILATERAL CAROTID ARTERY DISEASE, UNSPECIFIED TYPE: ICD-10-CM

## 2023-05-16 DIAGNOSIS — I67.2 CEREBRAL ATHEROSCLEROSIS: ICD-10-CM

## 2023-05-16 DIAGNOSIS — I65.23 BILATERAL CAROTID ARTERY STENOSIS: Primary | ICD-10-CM

## 2023-05-16 DIAGNOSIS — N39.0 URINARY TRACT INFECTION WITHOUT HEMATURIA, SITE UNSPECIFIED: ICD-10-CM

## 2023-05-16 LAB
BACTERIA #/AREA URNS AUTO: ABNORMAL /HPF
BILIRUB UR QL STRIP: NEGATIVE
CLARITY UR REFRACT.AUTO: ABNORMAL
COLOR UR AUTO: YELLOW
GLUCOSE UR QL STRIP: ABNORMAL
HGB UR QL STRIP: NEGATIVE
KETONES UR QL STRIP: NEGATIVE
LEUKOCYTE ESTERASE UR QL STRIP: ABNORMAL
MICROSCOPIC COMMENT: ABNORMAL
NITRITE UR QL STRIP: NEGATIVE
PH UR STRIP: 6 [PH] (ref 5–8)
PROT UR QL STRIP: NEGATIVE
RBC #/AREA URNS AUTO: 1 /HPF (ref 0–4)
SP GR UR STRIP: 1.02 (ref 1–1.03)
SQUAMOUS #/AREA URNS AUTO: 5 /HPF
URN SPEC COLLECT METH UR: ABNORMAL
WBC #/AREA URNS AUTO: 48 /HPF (ref 0–5)
YEAST UR QL AUTO: ABNORMAL

## 2023-05-16 PROCEDURE — 87077 CULTURE AEROBIC IDENTIFY: CPT | Mod: HCNC | Performed by: INTERNAL MEDICINE

## 2023-05-16 PROCEDURE — 87086 URINE CULTURE/COLONY COUNT: CPT | Mod: HCNC | Performed by: INTERNAL MEDICINE

## 2023-05-16 PROCEDURE — 87186 SC STD MICRODIL/AGAR DIL: CPT | Mod: HCNC | Performed by: INTERNAL MEDICINE

## 2023-05-16 PROCEDURE — 93880 PR DUPLEX SCAN EXTRACRANIAL,BILAT: ICD-10-PCS | Mod: HCNC,,, | Performed by: SURGERY

## 2023-05-16 PROCEDURE — 93880 EXTRACRANIAL BILAT STUDY: CPT | Mod: HCNC,,, | Performed by: SURGERY

## 2023-05-16 PROCEDURE — 81001 URINALYSIS AUTO W/SCOPE: CPT | Mod: HCNC | Performed by: INTERNAL MEDICINE

## 2023-05-16 PROCEDURE — 87088 URINE BACTERIA CULTURE: CPT | Mod: HCNC | Performed by: INTERNAL MEDICINE

## 2023-05-18 LAB — BACTERIA UR CULT: ABNORMAL

## 2023-05-19 ENCOUNTER — TELEPHONE (OUTPATIENT)
Dept: INTERNAL MEDICINE | Facility: CLINIC | Age: 78
End: 2023-05-19

## 2023-05-19 ENCOUNTER — TELEPHONE (OUTPATIENT)
Dept: INTERNAL MEDICINE | Facility: CLINIC | Age: 78
End: 2023-05-19
Payer: MEDICARE

## 2023-05-19 NOTE — TELEPHONE ENCOUNTER
----- Message from Dora Freedman MD sent at 5/19/2023  1:39 PM CDT -----  Please inform pt of UTI and that antibiotics were sent in.

## 2023-06-07 ENCOUNTER — LAB VISIT (OUTPATIENT)
Dept: LAB | Facility: HOSPITAL | Age: 78
End: 2023-06-07
Payer: MEDICARE

## 2023-06-07 ENCOUNTER — HOSPITAL ENCOUNTER (OUTPATIENT)
Dept: ENDOCRINOLOGY | Facility: CLINIC | Age: 78
Discharge: HOME | End: 2023-06-07
Attending: NURSE PRACTITIONER
Payer: MEDICARE

## 2023-06-07 DIAGNOSIS — E11.21 TYPE 2 DIABETES MELLITUS WITH DIABETIC NEPHROPATHY, WITH LONG-TERM CURRENT USE OF INSULIN: Chronic | ICD-10-CM

## 2023-06-07 DIAGNOSIS — Z79.899 OTHER LONG TERM (CURRENT) DRUG THERAPY: ICD-10-CM

## 2023-06-07 DIAGNOSIS — E04.1 THYROID NODULE: ICD-10-CM

## 2023-06-07 DIAGNOSIS — Z79.4 TYPE 2 DIABETES MELLITUS WITH DIABETIC NEPHROPATHY, WITH LONG-TERM CURRENT USE OF INSULIN: Chronic | ICD-10-CM

## 2023-06-07 DIAGNOSIS — E78.5 HYPERLIPIDEMIA, UNSPECIFIED HYPERLIPIDEMIA TYPE: ICD-10-CM

## 2023-06-07 LAB
25(OH)D3+25(OH)D2 SERPL-MCNC: 36 NG/ML (ref 30–96)
ALBUMIN SERPL BCP-MCNC: 3.8 G/DL (ref 3.5–5.2)
ANION GAP SERPL CALC-SCNC: 9 MMOL/L (ref 8–16)
BASOPHILS # BLD AUTO: 0.05 K/UL (ref 0–0.2)
BASOPHILS NFR BLD: 0.7 % (ref 0–1.9)
BUN SERPL-MCNC: 23 MG/DL (ref 8–23)
CALCIUM SERPL-MCNC: 10.6 MG/DL (ref 8.7–10.5)
CHLORIDE SERPL-SCNC: 109 MMOL/L (ref 95–110)
CHOLEST SERPL-MCNC: 149 MG/DL (ref 120–199)
CHOLEST/HDLC SERPL: 2.6 {RATIO} (ref 2–5)
CO2 SERPL-SCNC: 24 MMOL/L (ref 23–29)
CREAT SERPL-MCNC: 1.3 MG/DL (ref 0.5–1.4)
DIFFERENTIAL METHOD: ABNORMAL
EOSINOPHIL # BLD AUTO: 0.1 K/UL (ref 0–0.5)
EOSINOPHIL NFR BLD: 1.5 % (ref 0–8)
ERYTHROCYTE [DISTWIDTH] IN BLOOD BY AUTOMATED COUNT: 14.9 % (ref 11.5–14.5)
EST. GFR  (NO RACE VARIABLE): 42.6 ML/MIN/1.73 M^2
ESTIMATED AVG GLUCOSE: 120 MG/DL (ref 68–131)
GLUCOSE SERPL-MCNC: 171 MG/DL (ref 70–110)
HBA1C MFR BLD: 5.8 % (ref 4–5.6)
HCT VFR BLD AUTO: 31.7 % (ref 37–48.5)
HDLC SERPL-MCNC: 58 MG/DL (ref 40–75)
HDLC SERPL: 38.9 % (ref 20–50)
HGB BLD-MCNC: 10.6 G/DL (ref 12–16)
IMM GRANULOCYTES # BLD AUTO: 0.05 K/UL (ref 0–0.04)
IMM GRANULOCYTES NFR BLD AUTO: 0.7 % (ref 0–0.5)
LDLC SERPL CALC-MCNC: 69.6 MG/DL (ref 63–159)
LYMPHOCYTES # BLD AUTO: 2.5 K/UL (ref 1–4.8)
LYMPHOCYTES NFR BLD: 33 % (ref 18–48)
MCH RBC QN AUTO: 29.4 PG (ref 27–31)
MCHC RBC AUTO-ENTMCNC: 33.4 G/DL (ref 32–36)
MCV RBC AUTO: 88 FL (ref 82–98)
MONOCYTES # BLD AUTO: 0.6 K/UL (ref 0.3–1)
MONOCYTES NFR BLD: 7.7 % (ref 4–15)
NEUTROPHILS # BLD AUTO: 4.2 K/UL (ref 1.8–7.7)
NEUTROPHILS NFR BLD: 56.4 % (ref 38–73)
NONHDLC SERPL-MCNC: 91 MG/DL
NRBC BLD-RTO: 0 /100 WBC
PHOSPHATE SERPL-MCNC: 3.2 MG/DL (ref 2.7–4.5)
PLATELET # BLD AUTO: 301 K/UL (ref 150–450)
PMV BLD AUTO: 9.6 FL (ref 9.2–12.9)
POTASSIUM SERPL-SCNC: 4 MMOL/L (ref 3.5–5.1)
PTH-INTACT SERPL-MCNC: 51.8 PG/ML (ref 9–77)
RBC # BLD AUTO: 3.6 M/UL (ref 4–5.4)
SODIUM SERPL-SCNC: 142 MMOL/L (ref 136–145)
TRIGL SERPL-MCNC: 107 MG/DL (ref 30–150)
TSH SERPL DL<=0.005 MIU/L-ACNC: 1.4 UIU/ML (ref 0.4–4)
WBC # BLD AUTO: 7.49 K/UL (ref 3.9–12.7)

## 2023-06-07 PROCEDURE — 80069 RENAL FUNCTION PANEL: CPT | Mod: HCNC | Performed by: NURSE PRACTITIONER

## 2023-06-07 PROCEDURE — 85025 COMPLETE CBC W/AUTO DIFF WBC: CPT | Mod: HCNC | Performed by: INTERNAL MEDICINE

## 2023-06-07 PROCEDURE — 82306 VITAMIN D 25 HYDROXY: CPT | Mod: HCNC | Performed by: NURSE PRACTITIONER

## 2023-06-07 PROCEDURE — 76536 US EXAM OF HEAD AND NECK: CPT | Mod: HCNC,S$GLB,, | Performed by: INTERNAL MEDICINE

## 2023-06-07 PROCEDURE — 83036 HEMOGLOBIN GLYCOSYLATED A1C: CPT | Mod: HCNC | Performed by: NURSE PRACTITIONER

## 2023-06-07 PROCEDURE — 83970 ASSAY OF PARATHORMONE: CPT | Mod: HCNC | Performed by: NURSE PRACTITIONER

## 2023-06-07 PROCEDURE — 76536 US SOFT TISSUE HEAD NECK THYROID: ICD-10-PCS | Mod: HCNC,S$GLB,, | Performed by: INTERNAL MEDICINE

## 2023-06-07 PROCEDURE — 84443 ASSAY THYROID STIM HORMONE: CPT | Mod: HCNC | Performed by: NURSE PRACTITIONER

## 2023-06-07 PROCEDURE — 80061 LIPID PANEL: CPT | Mod: HCNC | Performed by: INTERNAL MEDICINE

## 2023-06-07 PROCEDURE — 36415 COLL VENOUS BLD VENIPUNCTURE: CPT | Mod: HCNC | Performed by: NURSE PRACTITIONER

## 2023-06-07 RX ORDER — ATORVASTATIN CALCIUM 40 MG/1
TABLET, FILM COATED ORAL
Qty: 90 TABLET | Refills: 3 | Status: SHIPPED | OUTPATIENT
Start: 2023-06-07

## 2023-06-07 NOTE — TELEPHONE ENCOUNTER
Refill Decision Note   Lina Davis  is requesting a refill authorization.  Brief Assessment and Rationale for Refill:  Approve     Medication Therapy Plan:         Comments:     Note composed:1:19 PM 06/07/2023

## 2023-06-07 NOTE — TELEPHONE ENCOUNTER
No care due was identified.  [facility] Embedded Care Due Messages. Reference number: [id number].   6/07/2023 11:09:36 AM CDT

## 2023-06-15 NOTE — TELEPHONE ENCOUNTER
No care due was identified.  [facility] Embedded Care Due Messages. Reference number: [id number].   6/15/2023 4:06:23 PM CDT

## 2023-06-18 RX ORDER — OXYCODONE AND ACETAMINOPHEN 5; 325 MG/1; MG/1
1 TABLET ORAL
Qty: 30 TABLET | Refills: 0 | Status: SHIPPED | OUTPATIENT
Start: 2023-06-18 | End: 2023-07-31 | Stop reason: SDUPTHER

## 2023-07-19 NOTE — PROGRESS NOTES
Subjective:      Patient ID: Lina Davis is a 78 y.o. female.    Chief Complaint:  No chief complaint on file.    History of Present Illness  Lina Davis is here for follow up of T2DM and thyroid nodules.  Previously seen by me 3/2023.     With regards to diabetes:    Diagnosed: ~2002/2003  DE: 7/2021  FH of DM: sister, brother   Denies any of her children have DM     Known complications:  DKA Denies  RN Denies  Eye Exam: 2/2021  PN : Yes   Podiatry: 12/2020  Nephropathy : Yes   CAD : Denies  Episode of pancreatitis in 2018 - attributed to Januvia    Current regimen: PAP - SGLT2i and insulin   Metformin 500mg twice daily   Jardiance 10mg daily   Actos 30mg daily   Levemir 12 units daily   Novolog 5 units before meals     Reports compliance.    Other medications tried:  Januvia- Discontinued in 2018 due to pancreatitis    Glucose Monitor:   3-4 times a day testing  Log reviewed: log provided and reviewed, scanned in media tab     Hypoglycemia:  Denies  Knows how to correct with 15 grams of carbs- juice, coke, or a peppermint.     Diet/Exercise:  Eats 2-3 meals a day.   Snacks : occasionally - fig newtons.   Drinks : water, 1 or 2 8oz soft drinks a week, lemonade but will cut it with water.   Exercise - tries to stay active.  Recent illness, injury, steroids: Denies       Diabetes Management Status    Hemoglobin A1C   Date Value Ref Range Status   06/07/2023 5.8 (H) 4.0 - 5.6 % Final     Comment:     ADA Screening Guidelines:  5.7-6.4%  Consistent with prediabetes  >or=6.5%  Consistent with diabetes    High levels of fetal hemoglobin interfere with the HbA1C  assay. Heterozygous hemoglobin variants (HbS, HgC, etc)do  not significantly interfere with this assay.   However, presence of multiple variants may affect accuracy.     03/16/2023 6.0 (H) 4.0 - 5.6 % Final     Comment:     ADA Screening Guidelines:  5.7-6.4%  Consistent with prediabetes  >or=6.5%  Consistent with diabetes    High levels of fetal hemoglobin  interfere with the HbA1C  assay. Heterozygous hemoglobin variants (HbS, HgC, etc)do  not significantly interfere with this assay.   However, presence of multiple variants may affect accuracy.     11/11/2022 5.9 (H) 4.0 - 5.6 % Final     Comment:     ADA Screening Guidelines:  5.7-6.4%  Consistent with prediabetes  >or=6.5%  Consistent with diabetes    High levels of fetal hemoglobin interfere with the HbA1C  assay. Heterozygous hemoglobin variants (HbS, HgC, etc)do  not significantly interfere with this assay.   However, presence of multiple variants may affect accuracy.         Statin: Taking  ACE/ARB: Taking  Screening or Prevention Patient's value Goal Complete/Controlled?   HgA1C Testing and Control   Lab Results   Component Value Date    HGBA1C 5.8 (H) 06/07/2023      Annually/Less than 8% Yes   Lipid profile : 06/07/2023 Annually Yes   LDL control Lab Results   Component Value Date    LDLCALC 69.6 06/07/2023    Annually/Less than 100 mg/dl  Yes   Nephropathy screening Lab Results   Component Value Date    LABMICR 33.0 05/02/2023     Lab Results   Component Value Date    PROTEINUA Negative 05/16/2023    Annually Yes   Blood pressure BP Readings from Last 1 Encounters:   07/25/23 125/64    Less than 140/90 No   Dilated retinal exam : 02/22/2021 Annually Yes   Foot exam   Most Recent Foot Exam Date: Not Found Annually No     With regards to thyroid nodule:    Thyroid US:   6/2023  COMPARISON:  Neck ultrasound dated 08/05/2022.  Our records indicate a benign FNA of the left lobe nodule in 1/2023 and a non-diagnostic FNA of the right lobe nodule in 1/2023.  When compared to the prior study the above nodules are stable in size and features  Impression:  1.) Thyroid gland is normal in size with heterogeneous echotexture and normal vascularity  2.) 2.4 x 2.1 x 2.0 cm solid, isoechoic nodule is seen in the right mid lobe  3.) 0.8 x 0.4 x 0.6 cm spongiform nodule is seen in the right inferior pole  4.) 2.4 x 1.8 x 2.4  cm solid, isoechoic nodule is seen in the left inferior pole  5.) 1.1 x 0.5 x 1.1 cm spongiform nodule is seen in the left inferior pole  6.) 1.0 x 0.6 x 1.0 cm solid, isoechoic nodule is seen in the isthmus  RECOMMENDATIONS:  Recommend repeat thyroid ultrasound in 1-2 years.    FNA:   1/23/2023  R mid: Unsatisfactory  L inferior: Benign    Signs or Symptoms:   Difficulty breathing: Denies  Difficulty swallowing: Denies  Voice Changes: Denies  FH of thyroid cancer: Denies  Personal history of radiation treatment or exposure: Denies    Lab Results   Component Value Date    TSH 1.395 06/07/2023    Q1UBLOZ 6.0 11/26/2008    FREET4 1.10 08/08/2017       With regards to the hypercalcemia:    Lab Results   Component Value Date    PTH 51.8 06/07/2023    CALCIUM 10.6 (H) 06/07/2023    PHOS 3.2 06/07/2023     Lab Results   Component Value Date    ALBUMIN 3.8 06/07/2023                     Daily intake of calcium is: Denies  Daily intake of vitamin D:  OTC Vit D3 3000iu daily     BMD:    12/2018  Normal BMD.  Compared to prior study in 9/2014, BMD has declined 5.0% at the spine and 4.3% at the hip.  RECOMMENDATIONS of [facility]:  1.  Calcium 3126-8618 mg daily and vitamin D 800-1000 units daily, adequate exercise.  2.  No need for repeat study in near future unless clinical change    Denies constipation, depression, muscle aches or pains.  Reports polyuria   Denies fractures, height loss or kidney stones.  Reports history of renal disease.  Denies family history of hyperparathyroidism or calcium problems.  Denies HCTZ., lithium, or chlorthiadone use.      Review of Systems  as above    Objective:   Physical Exam  Vitals reviewed.   Neck:      Thyroid: Thyromegaly present.   Cardiovascular:      Rate and Rhythm: Normal rate.      Comments: No edema present  Pulmonary:      Effort: Pulmonary effort is normal.   Abdominal:      Palpations: Abdomen is soft.     Injection sites are without  "edema or erythema. No lipo hypertropthy or atrophy.    Visit Vitals  /64 (BP Location: Left arm, Patient Position: Sitting, BP Method: Medium (Manual))   Pulse 84   Ht 5' 7" (1.702 m)   Wt 83.4 kg (183 lb 15.6 oz)   SpO2 98%   BMI 28.81 kg/m²             Body mass index is 28.81 kg/m².    Lab Review:   Lab Results   Component Value Date    HGBA1C 5.8 (H) 06/07/2023    HGBA1C 6.0 (H) 03/16/2023    HGBA1C 5.9 (H) 11/11/2022       Lab Results   Component Value Date    CHOL 149 06/07/2023    HDL 58 06/07/2023    LDLCALC 69.6 06/07/2023    TRIG 107 06/07/2023    CHOLHDL 38.9 06/07/2023     Lab Results   Component Value Date     06/07/2023    K 4.0 06/07/2023     06/07/2023    CO2 24 06/07/2023     (H) 06/07/2023    BUN 23 06/07/2023    CREATININE 1.3 06/07/2023    CALCIUM 10.6 (H) 06/07/2023    PROT 7.5 03/16/2023    ALBUMIN 3.8 06/07/2023    BILITOT 0.4 03/16/2023    ALKPHOS 71 03/16/2023    AST 12 03/16/2023    ALT 7 (L) 03/16/2023    ANIONGAP 9 06/07/2023    ESTGFRAFRICA 39.0 (A) 07/12/2022    EGFRNONAA 33.8 (A) 07/12/2022    TSH 1.395 06/07/2023     Vit D, 25-Hydroxy   Date Value Ref Range Status   06/07/2023 36 30 - 96 ng/mL Final     Comment:     Vitamin D deficiency.........<10 ng/mL                              Vitamin D insufficiency......10-29 ng/mL       Vitamin D sufficiency........> or equal to 30 ng/mL  Vitamin D toxicity............>100 ng/mL       Assessment and Plan     1. Type 2 diabetes mellitus with diabetic nephropathy, with long-term current use of insulin  Renal Function Panel    Hemoglobin A1C      2. Hypercalcemia  Vitamin D    PTH, Intact      3. Thyroid nodule        4. Stage 3a chronic kidney disease            Type 2 diabetes mellitus with diabetic nephropathy  -- Labs prior to follow up.  -- A1c goal <7.5%.  -- Medications discussed:  MFM   GLP1-DPP4 - avoid given drug induced pancreatitis (Januvia)  SUNG   SGLT2   Reviewed potential adverse effects of SGLT-2 " inhibitors, including genital mycotic infections, slightly increased risk of UTI, hypersensitivity, hypotension, and hyperkalemia. Advised to maintain water intake of 8-10 cups per day. Advised we need to check chemistry panel at baseline and 2 weeks after starting. Discussed FDA warning reports of ketoacidosis associated with SGLT-2 inhibitors. Advised to seek immediate medical attention and stop the medication if symptoms such as difficulty breathing, nausea, vomiting, abdominal pain, confusion, and unusual fatigue/sleepiness. Discussed possible precipitating factors including major illness/reduced food and fluid intake (advised to stop under these circumstances), and reduced insulin dose. Discussed possible effects of increased fracture risk/decreased bone density. Discussed reports of increased risk of leg and foot amputations with canagliflozin and need to seek urgent care if developed new pain or tenderness, sores or ulcers, or infections in legs/feet.   Insulin   -- Reviewed logs/CGM:  Glucose controlled.  Instructed to send glucose logs in 7-14 days.  Reach out to me sooner for any glucose <70 or consistently >180.  -- Medication Changes:   Metformin 500mg twice daily   Jardiance 10mg daily   Actos 30mg daily   Levemir 12 units daily   TRIAL OFF Novolog 5 units before meals   -- Reviewed goals of therapy are to get the best control we can without hypoglycemia.  -- Reviewed patient's current insulin regimen. Clarified proper insulin dose and timing in relation to meals, etc. Insulin injection sites and proper rotation instructed.    -- Advised frequent self blood glucose monitoring.  Patient encouraged to document glucose results and bring them to every clinic visit.  -- Hypoglycemia precautions discussed. Instructed on precautions before driving.    -- Call for Bg repeatedly < 90 or > 180.   -- Close adherence to lifestyle changes recommended.   -- Periodic follow ups for eye evaluations, foot care and  dental care suggested.    Thyroid nodule  -- I have reviewed management options including observation, FNA or surgery.  -- FNA procedure explained in depth.  -- Discussed indications for repeat FNA as well as surgical indications (abnormal FNA, compressive symptoms or interval change).  -- Discussed possible results of FNA- Benign, Malignant, FLUS, or insufficient cells.  Discussed results auto released to patients, but advised the ordering provider will also contact to discuss.   -- All of the patients questions were answered.  -- FNA:   1/23/2023  R mid: Unsatisfactory  L inferior: Benign  -- Repeat thyroid US 6/2025.    Hypercalcemia  -- Intermittent hypercalcemia.   -- Discussed indications for surgery if primary hyperparathyroidism proven.  -- Avoid dehydration, excessive calcium supplementation and meds (HCTZ) that can worsen hypercalcemia.  -- Prefers to monitor for now - repeat labs.     CKD (chronic kidney disease) stage 3, GFR 30-59 ml/min  -- Optimize glucose control.           Follow up in about 4 months (around 11/25/2023).

## 2023-07-23 RX ORDER — OMEPRAZOLE 20 MG/1
CAPSULE, DELAYED RELEASE ORAL
Qty: 180 CAPSULE | Refills: 3 | Status: SHIPPED | OUTPATIENT
Start: 2023-07-23

## 2023-07-23 NOTE — TELEPHONE ENCOUNTER
Refill Decision Note   Lina Cageles  is requesting a refill authorization.  Brief Assessment and Rationale for Refill:  Approve     Medication Therapy Plan:         Comments:     Note composed:2:28 AM 07/23/2023

## 2023-07-25 ENCOUNTER — OFFICE VISIT (OUTPATIENT)
Dept: ENDOCRINOLOGY | Facility: CLINIC | Age: 78
End: 2023-07-25
Payer: MEDICARE

## 2023-07-25 VITALS
BODY MASS INDEX: 28.88 KG/M2 | DIASTOLIC BLOOD PRESSURE: 64 MMHG | OXYGEN SATURATION: 98 % | HEART RATE: 84 BPM | SYSTOLIC BLOOD PRESSURE: 125 MMHG | HEIGHT: 67 IN | WEIGHT: 184 LBS

## 2023-07-25 DIAGNOSIS — N18.31 STAGE 3A CHRONIC KIDNEY DISEASE: ICD-10-CM

## 2023-07-25 DIAGNOSIS — E11.21 TYPE 2 DIABETES MELLITUS WITH DIABETIC NEPHROPATHY, WITH LONG-TERM CURRENT USE OF INSULIN: Primary | Chronic | ICD-10-CM

## 2023-07-25 DIAGNOSIS — E04.1 THYROID NODULE: ICD-10-CM

## 2023-07-25 DIAGNOSIS — E83.52 HYPERCALCEMIA: ICD-10-CM

## 2023-07-25 DIAGNOSIS — Z79.4 TYPE 2 DIABETES MELLITUS WITH DIABETIC NEPHROPATHY, WITH LONG-TERM CURRENT USE OF INSULIN: Primary | Chronic | ICD-10-CM

## 2023-07-25 PROCEDURE — 1160F PR REVIEW ALL MEDS BY PRESCRIBER/CLIN PHARMACIST DOCUMENTED: ICD-10-PCS | Mod: HCNC,CPTII,S$GLB, | Performed by: NURSE PRACTITIONER

## 2023-07-25 PROCEDURE — 1159F MED LIST DOCD IN RCRD: CPT | Mod: HCNC,CPTII,S$GLB, | Performed by: NURSE PRACTITIONER

## 2023-07-25 PROCEDURE — 1101F PR PT FALLS ASSESS DOC 0-1 FALLS W/OUT INJ PAST YR: ICD-10-PCS | Mod: HCNC,CPTII,S$GLB, | Performed by: NURSE PRACTITIONER

## 2023-07-25 PROCEDURE — 1160F RVW MEDS BY RX/DR IN RCRD: CPT | Mod: HCNC,CPTII,S$GLB, | Performed by: NURSE PRACTITIONER

## 2023-07-25 PROCEDURE — 3288F FALL RISK ASSESSMENT DOCD: CPT | Mod: HCNC,CPTII,S$GLB, | Performed by: NURSE PRACTITIONER

## 2023-07-25 PROCEDURE — 1125F AMNT PAIN NOTED PAIN PRSNT: CPT | Mod: HCNC,CPTII,S$GLB, | Performed by: NURSE PRACTITIONER

## 2023-07-25 PROCEDURE — 1101F PT FALLS ASSESS-DOCD LE1/YR: CPT | Mod: HCNC,CPTII,S$GLB, | Performed by: NURSE PRACTITIONER

## 2023-07-25 PROCEDURE — 3078F DIAST BP <80 MM HG: CPT | Mod: HCNC,CPTII,S$GLB, | Performed by: NURSE PRACTITIONER

## 2023-07-25 PROCEDURE — 3074F SYST BP LT 130 MM HG: CPT | Mod: HCNC,CPTII,S$GLB, | Performed by: NURSE PRACTITIONER

## 2023-07-25 PROCEDURE — 3074F PR MOST RECENT SYSTOLIC BLOOD PRESSURE < 130 MM HG: ICD-10-PCS | Mod: HCNC,CPTII,S$GLB, | Performed by: NURSE PRACTITIONER

## 2023-07-25 PROCEDURE — 99999 PR PBB SHADOW E&M-EST. PATIENT-LVL V: CPT | Mod: PBBFAC,HCNC,, | Performed by: NURSE PRACTITIONER

## 2023-07-25 PROCEDURE — 1159F PR MEDICATION LIST DOCUMENTED IN MEDICAL RECORD: ICD-10-PCS | Mod: HCNC,CPTII,S$GLB, | Performed by: NURSE PRACTITIONER

## 2023-07-25 PROCEDURE — 3078F PR MOST RECENT DIASTOLIC BLOOD PRESSURE < 80 MM HG: ICD-10-PCS | Mod: HCNC,CPTII,S$GLB, | Performed by: NURSE PRACTITIONER

## 2023-07-25 PROCEDURE — 1125F PR PAIN SEVERITY QUANTIFIED, PAIN PRESENT: ICD-10-PCS | Mod: HCNC,CPTII,S$GLB, | Performed by: NURSE PRACTITIONER

## 2023-07-25 PROCEDURE — 99999 PR PBB SHADOW E&M-EST. PATIENT-LVL V: ICD-10-PCS | Mod: PBBFAC,HCNC,, | Performed by: NURSE PRACTITIONER

## 2023-07-25 PROCEDURE — 3288F PR FALLS RISK ASSESSMENT DOCUMENTED: ICD-10-PCS | Mod: HCNC,CPTII,S$GLB, | Performed by: NURSE PRACTITIONER

## 2023-07-25 PROCEDURE — 99214 OFFICE O/P EST MOD 30 MIN: CPT | Mod: HCNC,S$GLB,, | Performed by: NURSE PRACTITIONER

## 2023-07-25 PROCEDURE — 99214 PR OFFICE/OUTPT VISIT, EST, LEVL IV, 30-39 MIN: ICD-10-PCS | Mod: HCNC,S$GLB,, | Performed by: NURSE PRACTITIONER

## 2023-07-25 NOTE — ASSESSMENT & PLAN NOTE
-- Intermittent hypercalcemia.   -- Discussed indications for surgery if primary hyperparathyroidism proven.  -- Avoid dehydration, excessive calcium supplementation and meds (HCTZ) that can worsen hypercalcemia.  -- Prefers to monitor for now - repeat labs.

## 2023-07-25 NOTE — PATIENT INSTRUCTIONS
Metformin 500mg twice daily   Jardiance 10mg daily   Actos 30mg daily   Levemir 12 units daily   TRIAL OFF: Novolog 5 units before meals

## 2023-07-25 NOTE — ASSESSMENT & PLAN NOTE
-- Labs prior to follow up.  -- A1c goal <7.5%.  -- Medications discussed:  MFM   GLP1-DPP4 - avoid given drug induced pancreatitis (Januvia)  SUNG   SGLT2   Reviewed potential adverse effects of SGLT-2 inhibitors, including genital mycotic infections, slightly increased risk of UTI, hypersensitivity, hypotension, and hyperkalemia. Advised to maintain water intake of 8-10 cups per day. Advised we need to check chemistry panel at baseline and 2 weeks after starting. Discussed FDA warning reports of ketoacidosis associated with SGLT-2 inhibitors. Advised to seek immediate medical attention and stop the medication if symptoms such as difficulty breathing, nausea, vomiting, abdominal pain, confusion, and unusual fatigue/sleepiness. Discussed possible precipitating factors including major illness/reduced food and fluid intake (advised to stop under these circumstances), and reduced insulin dose. Discussed possible effects of increased fracture risk/decreased bone density. Discussed reports of increased risk of leg and foot amputations with canagliflozin and need to seek urgent care if developed new pain or tenderness, sores or ulcers, or infections in legs/feet.   Insulin   -- Reviewed logs/CGM:  Glucose controlled.  Instructed to send glucose logs in 7-14 days.  Reach out to me sooner for any glucose <70 or consistently >180.  -- Medication Changes:   Metformin 500mg twice daily   Jardiance 10mg daily   Actos 30mg daily   Levemir 12 units daily   TRIAL OFF Novolog 5 units before meals   -- Reviewed goals of therapy are to get the best control we can without hypoglycemia.  -- Reviewed patient's current insulin regimen. Clarified proper insulin dose and timing in relation to meals, etc. Insulin injection sites and proper rotation instructed.    -- Advised frequent self blood glucose monitoring.  Patient encouraged to document glucose results and bring them to every clinic visit.  -- Hypoglycemia precautions discussed.  Instructed on precautions before driving.    -- Call for Bg repeatedly < 90 or > 180.   -- Close adherence to lifestyle changes recommended.   -- Periodic follow ups for eye evaluations, foot care and dental care suggested.

## 2023-07-25 NOTE — ASSESSMENT & PLAN NOTE
-- I have reviewed management options including observation, FNA or surgery.  -- FNA procedure explained in depth.  -- Discussed indications for repeat FNA as well as surgical indications (abnormal FNA, compressive symptoms or interval change).  -- Discussed possible results of FNA- Benign, Malignant, FLUS, or insufficient cells.  Discussed results auto released to patients, but advised the ordering provider will also contact to discuss.   -- All of the patients questions were answered.  -- FNA:   1/23/2023  R mid: Unsatisfactory  L inferior: Benign  -- Repeat thyroid US 6/2025.

## 2023-07-26 ENCOUNTER — TELEPHONE (OUTPATIENT)
Dept: INTERNAL MEDICINE | Facility: CLINIC | Age: 78
End: 2023-07-26
Payer: MEDICARE

## 2023-07-26 DIAGNOSIS — E11.21 TYPE 2 DIABETES MELLITUS WITH DIABETIC NEPHROPATHY, UNSPECIFIED WHETHER LONG TERM INSULIN USE: ICD-10-CM

## 2023-07-26 NOTE — TELEPHONE ENCOUNTER
----- Message from Justyna Gallardo LPN sent at 7/25/2023  4:41 PM CDT -----  Contact: pt's  Ran [phone]    ----- Message -----  From: Aleyda Spencer  Sent: 7/25/2023   4:33 PM CDT  To: Jasmina LEROY Staff    Mr Tong is calling to schedule an appt for left hip pain for pt. The first available appt is 08/28. Pt does not want to see another provider. Pt would like a callback to schedule a sooner appt as soon as possible.             Thank you

## 2023-07-26 NOTE — TELEPHONE ENCOUNTER
Care Due:                  Date            Visit Type   Department     Provider  --------------------------------------------------------------------------------                                EP -                              PRIMARY      [facility] PRIMARY  Last Visit: 05-      CARE ([facility])   EKTA Freedman                              [facility] PRIMARY  Next Visit: 07-      CARE ([facility])   EKTA Freedman                                                            Last  Test          Frequency    Reason                     Performed    Due Date  --------------------------------------------------------------------------------    Uric Acid...  12 months..  allopurinoL..............  08- 08-    [facility] Embedded Care Due Messages. Reference number: [id number].   7/26/2023 12:15:46 PM CDT

## 2023-07-28 ENCOUNTER — TELEPHONE (OUTPATIENT)
Dept: ENDOCRINOLOGY | Facility: CLINIC | Age: 78
End: 2023-07-28
Payer: MEDICARE

## 2023-07-31 ENCOUNTER — OFFICE VISIT (OUTPATIENT)
Dept: INTERNAL MEDICINE | Facility: CLINIC | Age: 78
End: 2023-07-31
Payer: MEDICARE

## 2023-07-31 VITALS
OXYGEN SATURATION: 99 % | SYSTOLIC BLOOD PRESSURE: 132 MMHG | WEIGHT: 185.63 LBS | BODY MASS INDEX: 29.13 KG/M2 | HEIGHT: 67 IN | HEART RATE: 78 BPM | DIASTOLIC BLOOD PRESSURE: 68 MMHG

## 2023-07-31 DIAGNOSIS — M54.16 LUMBAR RADICULOPATHY: ICD-10-CM

## 2023-07-31 DIAGNOSIS — M54.9 DORSALGIA, UNSPECIFIED: Primary | ICD-10-CM

## 2023-07-31 PROCEDURE — 99999 PR PBB SHADOW E&M-EST. PATIENT-LVL V: CPT | Mod: PBBFAC,HCNC,, | Performed by: INTERNAL MEDICINE

## 2023-07-31 PROCEDURE — 3288F FALL RISK ASSESSMENT DOCD: CPT | Mod: HCNC,CPTII,S$GLB, | Performed by: INTERNAL MEDICINE

## 2023-07-31 PROCEDURE — 99999 PR PBB SHADOW E&M-EST. PATIENT-LVL V: ICD-10-PCS | Mod: PBBFAC,HCNC,, | Performed by: INTERNAL MEDICINE

## 2023-07-31 PROCEDURE — 3075F PR MOST RECENT SYSTOLIC BLOOD PRESS GE 130-139MM HG: ICD-10-PCS | Mod: HCNC,CPTII,S$GLB, | Performed by: INTERNAL MEDICINE

## 2023-07-31 PROCEDURE — 99213 OFFICE O/P EST LOW 20 MIN: CPT | Mod: HCNC,S$GLB,, | Performed by: INTERNAL MEDICINE

## 2023-07-31 PROCEDURE — 3075F SYST BP GE 130 - 139MM HG: CPT | Mod: HCNC,CPTII,S$GLB, | Performed by: INTERNAL MEDICINE

## 2023-07-31 PROCEDURE — 1125F PR PAIN SEVERITY QUANTIFIED, PAIN PRESENT: ICD-10-PCS | Mod: HCNC,CPTII,S$GLB, | Performed by: INTERNAL MEDICINE

## 2023-07-31 PROCEDURE — 3078F DIAST BP <80 MM HG: CPT | Mod: HCNC,CPTII,S$GLB, | Performed by: INTERNAL MEDICINE

## 2023-07-31 PROCEDURE — 1159F PR MEDICATION LIST DOCUMENTED IN MEDICAL RECORD: ICD-10-PCS | Mod: HCNC,CPTII,S$GLB, | Performed by: INTERNAL MEDICINE

## 2023-07-31 PROCEDURE — 1159F MED LIST DOCD IN RCRD: CPT | Mod: HCNC,CPTII,S$GLB, | Performed by: INTERNAL MEDICINE

## 2023-07-31 PROCEDURE — 3288F PR FALLS RISK ASSESSMENT DOCUMENTED: ICD-10-PCS | Mod: HCNC,CPTII,S$GLB, | Performed by: INTERNAL MEDICINE

## 2023-07-31 PROCEDURE — 3078F PR MOST RECENT DIASTOLIC BLOOD PRESSURE < 80 MM HG: ICD-10-PCS | Mod: HCNC,CPTII,S$GLB, | Performed by: INTERNAL MEDICINE

## 2023-07-31 PROCEDURE — 1101F PT FALLS ASSESS-DOCD LE1/YR: CPT | Mod: HCNC,CPTII,S$GLB, | Performed by: INTERNAL MEDICINE

## 2023-07-31 PROCEDURE — 99213 PR OFFICE/OUTPT VISIT, EST, LEVL III, 20-29 MIN: ICD-10-PCS | Mod: HCNC,S$GLB,, | Performed by: INTERNAL MEDICINE

## 2023-07-31 PROCEDURE — 1125F AMNT PAIN NOTED PAIN PRSNT: CPT | Mod: HCNC,CPTII,S$GLB, | Performed by: INTERNAL MEDICINE

## 2023-07-31 PROCEDURE — 1101F PR PT FALLS ASSESS DOC 0-1 FALLS W/OUT INJ PAST YR: ICD-10-PCS | Mod: HCNC,CPTII,S$GLB, | Performed by: INTERNAL MEDICINE

## 2023-07-31 RX ORDER — DULOXETIN HYDROCHLORIDE 30 MG/1
30 CAPSULE, DELAYED RELEASE ORAL DAILY
Qty: 90 CAPSULE | Refills: 1 | Status: SHIPPED | OUTPATIENT
Start: 2023-07-31 | End: 2024-01-24

## 2023-07-31 RX ORDER — OXYCODONE AND ACETAMINOPHEN 5; 325 MG/1; MG/1
1 TABLET ORAL
Qty: 30 TABLET | Refills: 0 | Status: SHIPPED | OUTPATIENT
Start: 2023-07-31 | End: 2023-08-25 | Stop reason: SDUPTHER

## 2023-07-31 RX ORDER — PREDNISONE 20 MG/1
20 TABLET ORAL DAILY
Qty: 5 TABLET | Refills: 0 | Status: SHIPPED | OUTPATIENT
Start: 2023-07-31 | End: 2023-12-05

## 2023-07-31 RX ORDER — ALPRAZOLAM 0.25 MG/1
TABLET ORAL
Qty: 4 TABLET | Refills: 0 | Status: SHIPPED | OUTPATIENT
Start: 2023-07-31 | End: 2024-03-11

## 2023-07-31 NOTE — PROGRESS NOTES
Subjective:       Patient ID: Lina Davis is a 78 y.o. female.    Chief Complaint: Hip Pain (left)    Hip Pain     Pt with left sided sciatica from under her left buttock down to her foot - no numbness but it gave way and she fell last month.  No CP or SOB.  Review of Systems   Respiratory:  Negative for shortness of breath (PND or orthopnea).    Cardiovascular:  Negative for chest pain (arm pain or jaw pain).   Gastrointestinal:  Negative for abdominal pain, diarrhea, nausea and vomiting.   Genitourinary:  Negative for dysuria.   Musculoskeletal:  Positive for back pain and gait problem.   Neurological:  Negative for seizures, syncope and headaches.       Objective:      Physical Exam  Constitutional:       General: She is not in acute distress.     Appearance: She is well-developed.   HENT:      Head: Normocephalic.   Eyes:      Pupils: Pupils are equal, round, and reactive to light.   Neck:      Thyroid: No thyromegaly.      Vascular: No JVD.   Cardiovascular:      Rate and Rhythm: Normal rate and regular rhythm.      Heart sounds: Normal heart sounds. No murmur heard.     No friction rub. No gallop.   Pulmonary:      Effort: Pulmonary effort is normal.      Breath sounds: Normal breath sounds. No wheezing or rales.   Abdominal:      General: Bowel sounds are normal. There is no distension.      Palpations: Abdomen is soft. There is no mass.      Tenderness: There is no abdominal tenderness. There is no guarding or rebound.   Musculoskeletal:      Cervical back: Neck supple.      Comments: 5/5 strength in BLE, 2+reflexes at knees and trace ankles bilaterally, pos SLR on left, limping - needing a cane due to pain   Lymphadenopathy:      Cervical: No cervical adenopathy.   Skin:     General: Skin is warm and dry.   Neurological:      Mental Status: She is alert and oriented to person, place, and time.      Deep Tendon Reflexes: Reflexes are normal and symmetric.   Psychiatric:         Behavior: Behavior normal.          Thought Content: Thought content normal.         Judgment: Judgment normal.         Assessment:       1. Dorsalgia, unspecified    2. Lumbar radiculopathy        Plan:   Dorsalgia, unspecified  -     MRI Lumbar Spine Without Contrast; Future; Expected date: 07/31/2023    Lumbar radiculopathy  -     MRI Lumbar Spine Without Contrast; Future; Expected date: 07/31/2023    Other orders  -     oxyCODONE-acetaminophen (PERCOCET) 5-325 mg per tablet; Take 1 tablet by mouth every 24 hours as needed for Pain.  Dispense: 30 tablet; Refill: 0  -     DULoxetine (CYMBALTA) 30 MG capsule; Take 1 capsule (30 mg total) by mouth once daily.  Dispense: 90 capsule; Refill: 1  -     predniSONE (DELTASONE) 20 MG tablet; Take 1 tablet (20 mg total) by mouth once daily.  Dispense: 5 tablet; Refill: 0  -     ALPRAZolam (XANAX) 0.25 MG tablet; One-two one hour prior to procedure then may repeat immediately prior to procedure  Dispense: 4 tablet; Refill: 0

## 2023-08-02 ENCOUNTER — TELEPHONE (OUTPATIENT)
Dept: ENDOCRINOLOGY | Facility: CLINIC | Age: 78
End: 2023-08-02
Payer: MEDICARE

## 2023-08-18 ENCOUNTER — HOSPITAL ENCOUNTER (OUTPATIENT)
Dept: RADIOLOGY | Facility: HOSPITAL | Age: 78
Discharge: HOME | End: 2023-08-18
Attending: INTERNAL MEDICINE
Payer: MEDICARE

## 2023-08-18 DIAGNOSIS — M54.9 DORSALGIA, UNSPECIFIED: ICD-10-CM

## 2023-08-18 DIAGNOSIS — M54.16 LUMBAR RADICULOPATHY: ICD-10-CM

## 2023-08-18 PROCEDURE — 72148 MRI LUMBAR SPINE W/O DYE: CPT | Mod: TC,HCNC

## 2023-08-18 PROCEDURE — 72148 MRI LUMBAR SPINE W/O DYE: CPT | Mod: 26,HCNC,, | Performed by: RADIOLOGY

## 2023-08-18 PROCEDURE — 72148 MRI LUMBAR SPINE WITHOUT CONTRAST: ICD-10-PCS | Mod: 26,HCNC,, | Performed by: RADIOLOGY

## 2023-08-21 ENCOUNTER — TELEPHONE (OUTPATIENT)
Dept: ENDOCRINOLOGY | Facility: CLINIC | Age: 78
End: 2023-08-21
Payer: MEDICARE

## 2023-08-21 NOTE — TELEPHONE ENCOUNTER
Called no answer.          ----- Message from Dk Sky sent at 8/21/2023 11:54 AM CDT -----  Regarding: Pt advice  Contact: [phone]  Pt is calling to speak with the nurse was told for sugar reading logs. Please call

## 2023-08-22 ENCOUNTER — TELEPHONE (OUTPATIENT)
Dept: ENDOCRINOLOGY | Facility: CLINIC | Age: 78
End: 2023-08-22
Payer: MEDICARE

## 2023-08-22 NOTE — TELEPHONE ENCOUNTER
----- Message from Jonelle Mills MA sent at 8/22/2023  2:27 PM CDT -----  Regarding: FW: Missed call  Contact: [phone]  8/15 163 111 115  8/16 168 123 missed dinner   8/17 143 135 160  8/18 145 169 142  8/19 163 no lunch or dinner   8/20 130 116 164  8/21 148 135 130   8/22 130 no lunch   ----- Message -----  From: Ihsan Gallegos  Sent: 8/22/2023   1:47 PM CDT  To: Reggie Krueger Staff  Subject: Missed call                                      Pt calling back in regards to a missed call. Please call back

## 2023-08-25 ENCOUNTER — PATIENT MESSAGE (OUTPATIENT)
Dept: INTERNAL MEDICINE | Facility: CLINIC | Age: 78
End: 2023-08-25
Payer: MEDICARE

## 2023-08-28 ENCOUNTER — TELEPHONE (OUTPATIENT)
Dept: NEUROSURGERY | Facility: CLINIC | Age: 78
End: 2023-08-28
Payer: MEDICARE

## 2023-08-28 NOTE — TELEPHONE ENCOUNTER
----- Message from Sandy Watson sent at 8/28/2023 11:25 AM CDT -----  Dr Freedman  has placed a referral for a Consult to Neurosurgery with Dr Walton. Please call Pt to schedule. Contact # [phone]            Thanks

## 2023-09-14 DIAGNOSIS — E11.21 TYPE 2 DIABETES MELLITUS WITH DIABETIC NEPHROPATHY, WITH LONG-TERM CURRENT USE OF INSULIN: ICD-10-CM

## 2023-09-14 DIAGNOSIS — Z79.4 TYPE 2 DIABETES MELLITUS WITH DIABETIC NEPHROPATHY, WITH LONG-TERM CURRENT USE OF INSULIN: ICD-10-CM

## 2023-09-14 RX ORDER — LOSARTAN POTASSIUM 100 MG/1
100 TABLET ORAL DAILY
Qty: 90 TABLET | Refills: 2 | Status: SHIPPED | OUTPATIENT
Start: 2023-09-14

## 2023-09-14 RX ORDER — PIOGLITAZONEHYDROCHLORIDE 30 MG/1
TABLET ORAL
Qty: 90 TABLET | Refills: 0 | Status: SHIPPED | OUTPATIENT
Start: 2023-09-14 | End: 2024-02-07

## 2023-09-14 NOTE — TELEPHONE ENCOUNTER
Refill Decision Note   Lina Davis  is requesting a refill authorization.  Brief Assessment and Rationale for Refill:  Approve     Medication Therapy Plan:         Comments:     No Care Gaps recommended.     Note composed:7:34 AM 09/14/2023

## 2023-09-14 NOTE — TELEPHONE ENCOUNTER
No care due was identified.  [facility] Embedded Care Due Messages. Reference number: [id number].   9/14/2023 2:00:42 AM CDT

## 2023-09-18 ENCOUNTER — TELEPHONE (OUTPATIENT)
Dept: NEUROSURGERY | Facility: CLINIC | Age: 78
End: 2023-09-18
Payer: MEDICARE

## 2023-09-18 NOTE — TELEPHONE ENCOUNTER
----- Message from Sandy Watson sent at 9/18/2023  2:32 PM CDT -----  Dr Freedman has placed a referral for a Consult to Neurosurgery with Dr Joseph Walton. Please call  Pt to scheduled. Contact # [phone]-  and [phone]-cell .     Diagnosis:Lumbar radiculopathy [M54.16]      Thanks

## 2023-09-19 ENCOUNTER — TELEPHONE (OUTPATIENT)
Dept: ENDOCRINOLOGY | Facility: CLINIC | Age: 78
End: 2023-09-19
Payer: MEDICARE

## 2023-09-19 DIAGNOSIS — E11.21 TYPE 2 DIABETES MELLITUS WITH DIABETIC NEPHROPATHY, WITH LONG-TERM CURRENT USE OF INSULIN: Primary | ICD-10-CM

## 2023-09-19 DIAGNOSIS — Z79.4 TYPE 2 DIABETES MELLITUS WITH DIABETIC NEPHROPATHY, WITH LONG-TERM CURRENT USE OF INSULIN: Chronic | ICD-10-CM

## 2023-09-19 DIAGNOSIS — Z79.4 TYPE 2 DIABETES MELLITUS WITH DIABETIC NEPHROPATHY, WITH LONG-TERM CURRENT USE OF INSULIN: Primary | ICD-10-CM

## 2023-09-19 DIAGNOSIS — E11.21 TYPE 2 DIABETES MELLITUS WITH DIABETIC NEPHROPATHY, WITH LONG-TERM CURRENT USE OF INSULIN: Chronic | ICD-10-CM

## 2023-09-19 RX ORDER — BLOOD SUGAR DIAGNOSTIC
STRIP MISCELLANEOUS
Qty: 500 EACH | Refills: 1 | Status: SHIPPED | OUTPATIENT
Start: 2023-09-19

## 2023-09-19 RX ORDER — INSULIN ASPART 100 [IU]/ML
5 INJECTION, SOLUTION INTRAVENOUS; SUBCUTANEOUS
Qty: 60 ML | Refills: 1 | Status: SHIPPED | OUTPATIENT
Start: 2023-09-19 | End: 2023-11-29

## 2023-09-19 NOTE — TELEPHONE ENCOUNTER
----- Message from Hammad Arriaza sent at 9/19/2023  1:26 PM CDT -----  Regarding: NovoFine 32g Pen [place] Patient Assistance Program  [facility] has received medication from [facility] patient assistance program for your patient Lina Davis ([id number]).  At your earliest convenience please send to [facility] escript for.  NovoFine 32g Pen Needles (qty 500)  Thanks

## 2023-09-19 NOTE — TELEPHONE ENCOUNTER
----- Message from Hammad Arriaza sent at 9/19/2023 12:10 PM CDT -----  Regarding: NovoLog FlexPen 100u Levemir FlexPen 100u Patient Assistance Program  [facility] has received medication from [facility] patient assistance program for your patient Lina Davis ([id number]).  At your earliest convenience please send to [facility] escript for.  1. NovoLog FlexPen 100u (qty 20 pens)  2. Levemir FlexPen 100u (qty 5 pens)  Thanks

## 2023-09-21 ENCOUNTER — TELEPHONE (OUTPATIENT)
Dept: ENDOSCOPY | Facility: HOSPITAL | Age: 78
End: 2023-09-21
Payer: MEDICARE

## 2023-09-21 NOTE — TELEPHONE ENCOUNTER
Contacted the patient to schedule an endoscopy procedure(s):  colonoscopy. The patient refuses scheduling until she speaks with her physician regarding this test.  She was provided with the contact number to call for scheduling.

## 2023-09-28 ENCOUNTER — TELEPHONE (OUTPATIENT)
Dept: ENDOSCOPY | Facility: HOSPITAL | Age: 78
End: 2023-09-28
Payer: MEDICARE

## 2023-09-28 NOTE — TELEPHONE ENCOUNTER
Contacted the patient to schedule an endoscopy procedure(s):  colonoscopy. The patient is still unsure about whether she wants to schedule this procedure.  Contact number provided in the event  that she wishes to schedule.

## 2023-10-06 ENCOUNTER — TELEPHONE (OUTPATIENT)
Dept: PAIN MEDICINE | Facility: CLINIC | Age: 78
End: 2023-10-06

## 2023-10-06 ENCOUNTER — OFFICE VISIT (OUTPATIENT)
Dept: PAIN MEDICINE | Facility: CLINIC | Age: 78
End: 2023-10-06
Payer: MEDICARE

## 2023-10-06 VITALS
DIASTOLIC BLOOD PRESSURE: 75 MMHG | HEIGHT: 67 IN | WEIGHT: 185.63 LBS | BODY MASS INDEX: 29.13 KG/M2 | SYSTOLIC BLOOD PRESSURE: 131 MMHG | HEART RATE: 88 BPM

## 2023-10-06 DIAGNOSIS — M54.16 LUMBAR RADICULOPATHY: Primary | ICD-10-CM

## 2023-10-06 DIAGNOSIS — M54.16 LUMBAR RADICULOPATHY: ICD-10-CM

## 2023-10-06 DIAGNOSIS — N18.30 STAGE 3 CHRONIC KIDNEY DISEASE, UNSPECIFIED WHETHER STAGE 3A OR 3B CKD: Primary | ICD-10-CM

## 2023-10-06 DIAGNOSIS — M51.36 DDD (DEGENERATIVE DISC DISEASE), LUMBAR: ICD-10-CM

## 2023-10-06 DIAGNOSIS — M47.816 LUMBAR SPONDYLOSIS: ICD-10-CM

## 2023-10-06 PROCEDURE — 1125F PR PAIN SEVERITY QUANTIFIED, PAIN PRESENT: ICD-10-PCS | Mod: CPTII,S$GLB,, | Performed by: STUDENT IN AN ORGANIZED HEALTH CARE EDUCATION/TRAINING PROGRAM

## 2023-10-06 PROCEDURE — 3075F PR MOST RECENT SYSTOLIC BLOOD PRESS GE 130-139MM HG: ICD-10-PCS | Mod: CPTII,S$GLB,, | Performed by: STUDENT IN AN ORGANIZED HEALTH CARE EDUCATION/TRAINING PROGRAM

## 2023-10-06 PROCEDURE — 3288F FALL RISK ASSESSMENT DOCD: CPT | Mod: CPTII,S$GLB,, | Performed by: STUDENT IN AN ORGANIZED HEALTH CARE EDUCATION/TRAINING PROGRAM

## 2023-10-06 PROCEDURE — 1101F PR PT FALLS ASSESS DOC 0-1 FALLS W/OUT INJ PAST YR: ICD-10-PCS | Mod: CPTII,S$GLB,, | Performed by: STUDENT IN AN ORGANIZED HEALTH CARE EDUCATION/TRAINING PROGRAM

## 2023-10-06 PROCEDURE — 1159F PR MEDICATION LIST DOCUMENTED IN MEDICAL RECORD: ICD-10-PCS | Mod: CPTII,S$GLB,, | Performed by: STUDENT IN AN ORGANIZED HEALTH CARE EDUCATION/TRAINING PROGRAM

## 2023-10-06 PROCEDURE — 1101F PT FALLS ASSESS-DOCD LE1/YR: CPT | Mod: CPTII,S$GLB,, | Performed by: STUDENT IN AN ORGANIZED HEALTH CARE EDUCATION/TRAINING PROGRAM

## 2023-10-06 PROCEDURE — 3078F DIAST BP <80 MM HG: CPT | Mod: CPTII,S$GLB,, | Performed by: STUDENT IN AN ORGANIZED HEALTH CARE EDUCATION/TRAINING PROGRAM

## 2023-10-06 PROCEDURE — 3288F PR FALLS RISK ASSESSMENT DOCUMENTED: ICD-10-PCS | Mod: CPTII,S$GLB,, | Performed by: STUDENT IN AN ORGANIZED HEALTH CARE EDUCATION/TRAINING PROGRAM

## 2023-10-06 PROCEDURE — 99999 PR PBB SHADOW E&M-EST. PATIENT-LVL IV: ICD-10-PCS | Mod: PBBFAC,,, | Performed by: STUDENT IN AN ORGANIZED HEALTH CARE EDUCATION/TRAINING PROGRAM

## 2023-10-06 PROCEDURE — 99214 PR OFFICE/OUTPT VISIT, EST, LEVL IV, 30-39 MIN: ICD-10-PCS | Mod: S$GLB,,, | Performed by: STUDENT IN AN ORGANIZED HEALTH CARE EDUCATION/TRAINING PROGRAM

## 2023-10-06 PROCEDURE — 99214 OFFICE O/P EST MOD 30 MIN: CPT | Mod: S$GLB,,, | Performed by: STUDENT IN AN ORGANIZED HEALTH CARE EDUCATION/TRAINING PROGRAM

## 2023-10-06 PROCEDURE — 3078F PR MOST RECENT DIASTOLIC BLOOD PRESSURE < 80 MM HG: ICD-10-PCS | Mod: CPTII,S$GLB,, | Performed by: STUDENT IN AN ORGANIZED HEALTH CARE EDUCATION/TRAINING PROGRAM

## 2023-10-06 PROCEDURE — 99999 PR PBB SHADOW E&M-EST. PATIENT-LVL IV: CPT | Mod: PBBFAC,,, | Performed by: STUDENT IN AN ORGANIZED HEALTH CARE EDUCATION/TRAINING PROGRAM

## 2023-10-06 PROCEDURE — 1125F AMNT PAIN NOTED PAIN PRSNT: CPT | Mod: CPTII,S$GLB,, | Performed by: STUDENT IN AN ORGANIZED HEALTH CARE EDUCATION/TRAINING PROGRAM

## 2023-10-06 PROCEDURE — 3075F SYST BP GE 130 - 139MM HG: CPT | Mod: CPTII,S$GLB,, | Performed by: STUDENT IN AN ORGANIZED HEALTH CARE EDUCATION/TRAINING PROGRAM

## 2023-10-06 PROCEDURE — 1159F MED LIST DOCD IN RCRD: CPT | Mod: CPTII,S$GLB,, | Performed by: STUDENT IN AN ORGANIZED HEALTH CARE EDUCATION/TRAINING PROGRAM

## 2023-10-06 NOTE — H&P (VIEW-ONLY)
Chronic Pain - f/u    Referring Physician: Dora Freedman MD    Date: 10/06/2023     Re: Lina Davis  MR#: [id number]  YOB: 1945  Age: 78 y.o.    Chief Complaint:  leg and back  No chief complaint on file.    **This note is dictated using the M*Modal Fluency Direct word recognition program. There are word recognition mistakes that are occasionally missed on review.**    ASSESSMENT: 78 y.o. year old female with back and leg pain, consistent with     1. Stage 3 chronic kidney disease, unspecified whether stage 3a or 3b CKD        2. Lumbar radiculopathy  Ambulatory referral/consult to Pain Clinic    Procedure Order to Pain Management    CANCELED: Procedure Order to Pain Management      3. Lumbar spondylosis        4. DDD (degenerative disc disease), lumbar          PLAN:     Chronic low back pain - multifactorial  -etiology not totally clear.  Likely multifactorial with elements of SI joint, lumbar spondylosis, lumbar radiculopathy, spinal stenosis, degenerative disc disease all playing some part.  -failed tizanidine  -failed PT for lumbar spine  -failed SIJ and L5-S1 LESI.    Sacroiliac joint dysfunction / Sacroiliitis  -s/p bilateral SIJ on 4/12/22 - 10% relief at 2 weeks.  -description and physical exam still suggest that she has SI joint involvement.  Might be more ligamentous then joint itself  -continue PT for SI stabilization, pelvic stabilization.     Left Lumbar radiculopathy  -s/p L5-S1 LESI without any improvement  12/2/22 - s/p b/l L4-5 TFESI   -discussed Left L5-S1. She would like to proceed. (+) Slump test. MRI shows left moderate spinal stenosis.  -monitor kidney function  -Possible SCS candidate. She does not want surgery.  -STOPPED Lyrica 50mg BID due to ineffective. Dose adjustment to 50% due to CKD3. Not helpful  -Taking tylenol    Lumbar spondylosis   -patient has notable facet arthritis of the lumbar spine and may be candidate for MBB/RFA.   -consider b/l L3,4,5 MBB    DM2  -on  insulin  -discussed risks of steroid injection while diabetic.    CKD3  -GFR 33.8 on 7/12/22    - RTC December  - Counseled patient regarding the importance of weight loss and activity modification and physical therapy.    The above plan and management options were discussed at length with patient. Patient is in agreement with the above and verbalized understanding. It will be communicated with the referring physician via electronic record, fax, or mail.  Lab/study reports reviewed were important and necessary because subsequent medical and treatment recommendations required review of the above lab/study reports. Images viewed/reviewed above were important and necessary because subsequent medical and treatment recommendations required review of the reviewed image(s).     Electronically signed by:  Leland Pena DO  10/06/2023    =========================================================================================================     SUBJECTIVE:    Interval History 10/6/2023:   Lina Davis is a 78 y.o. female presents to the clinic for follow up.  Since last visit the pain has has worsened. The pain for the last few months has been in the left back/buttocks radiating down the left posterior leg into the foot.    The pain is located in the left hip area and radiates to the lower left leg and top of left foot .  The pain is described as sharp and shooting    At BEST  6/10   At WORST  8/10 on the WORST day.    On average pain is rated as 7/10.   Today the pain is rated as 6/10  Symptoms interfere with daily activity and sleeping.   Exacerbating factors: Sitting and sitting on left hip.    Mitigating factors nothing.     Current pain medications: Percocet 5mg (2-3x/week), topicals, tylenol  Failed Pain Medications: gabapentin, meloxicam, oxycodone, steroids, tylenol, tramadol, tizanidine, tylenol, Lyrica    Pain Procedures  4/12/22 - b/l SIJ 4/12/22 - 10% relief.   6/3/22 -  L5-S1 LESI on 6/3 - RN sedation  - no improvement  12/2/22 - b/l L4-5 TFESI - RN sedation - doesn't remember    Interval History 11/1/2022:     Lina Davis is a 78 y.o. female presents to the clinic for follow up.  Since last visit the pain has is unchanged.  Pain is worse in the legs. Especailly down the sides of the legs and into the foot.    The pain is located in the lower back area and radiates to the buttocks down the legs .  The pain is described as aching, dull, and throbbing    At BEST  0/10   At WORST  8/10 on the WORST day.    On average pain is rated as 3/10.   Today the pain is rated as 6/10  Symptoms interfere with daily activity and sleeping.   Exacerbating factors: Standing, Bending, Walking, and Getting out of bed/chair.    Mitigating factors medications.     Interval History 8/30/2022:     Lina Davis is a 78 y.o. female presents to the clinic for follow up.  Since last visit the pain has has moderately improved. She is doing her home exercise program and that has been helping somewhat.  The pain has not gone away, but it is improved. She has no back pain at night when she lays down, only some leg pain.    The pain is located in the low back/buttocks area and does not radiate.  The pain is described as aching, dull, and throbbing    At BEST  0/10   At WORST  8/10 on the WORST day.    On average pain is rated as 6/10.   Today the pain is rated as 0/10  Symptoms interfere with daily activity.   Worse with standing.  Better with walking, sitting.    Interval History 8/2/2022:     Lina Davis is a 78 y.o. female presents to the clinic for follow up.  Since last visit the pain has is unchanged.  The patient finished her 2nd therapy session and is very sore.     The pain is located in the buttocks/low back area and does not radiate.  The pain is described as aching, dull and throbbing    At BEST  6/10   At WORST  9/10 on the WORST day.    On average pain is rated as 7/10.   Today the pain is rated as 6/10  Symptoms interfere with  daily activity and sleeping.   Exacerbating factors: Standing, Getting out of bed/chair and in and out the car.    Mitigating factors medications and sitting.     Interval History 6/28/2022:     Lina Davis is a 78 y.o. female presents to the clinic for follow up.  Since last visit the pain has has worsened slightly.  Did not get relief from the epidural injection.  Pain is still primarily left low back/buttocks with radiation in to the left leg.     The pain is located in the lower back area and radiates to the leg(knee) .  The pain is described as aching    At BEST  7/10   At WORST  10/10 on the WORST day.    On average pain is rated as 8/10.   Today the pain is rated as 5/10  Symptoms interfere with daily activity and sleeping.   Exacerbating factors: Standing, Getting out of bed/chair and in and out the car.    Mitigating factors medications and sitting.     Current pain medications: Percocet 5mg (2-3x/week), topicals  Failed Pain Medications: gabapentin, meloxicam, oxycodone, steroids, tylenol, tramadol, tizanidine, tylenol      Interval History 4/28/2022:     Lina Davis is a 78 y.o. female presents to the clinic for follow up.  Since last visit the pain has is unchanged. She did not get any signfiicant improvement from the SI joint injection. Her leg pain > back pain.  It goes down both lateral legs from the buttocks to the top of the feet on both sides.    The pain is located in the lower back area and radiates to the legs .  The pain is described as aching    At BEST  7/10   At WORST  10/10 on the WORST day.    On average pain is rated as 8/10.   Today the pain is rated as 7/10  Symptoms interfere with daily activity and sleeping.   Exacerbating factors: Sitting and night time.    Mitigating factors medications.     Current pain medications: Percocet 5mg (2-3x/week), topicals  Failed Pain Medications: gabapentin, meloxicam, oxycodone, steroids, tylenol, tramadol, tizanidine, tylenol    Initial hx:  Lina  FERNANDO Davis is a 78 y.o. female presents to the clinic for the evaluation of lower back pain. The pain started 6 year ago following no inciting event and symptoms have been worsening.  The patient states that she has low back pain that radiates down both legs to the top of the bilateral feet.   Sometimes the pain with radiate in to the inside of the leg. The pain waker her up in the middle of the night.  The pain usually starts when she lays down. The patient cannot sleep on her back, so she has to rotate from side to side.  The patient has tried different topicals ointments, tylenol, gabapentin.  Leg pain > back pain.     Dr. Freedman tried a cortisone shot which did not help.  She has a small script of oxycodone which she takes rarely.  She takes 2 tylenol extra strength every night.  She tried gabapentin 600mg TID and it was not helpful. Meloxicam not helpful.    Pain Description:    The pain is located in the lower back area and radiates to the legs down to the feet .    At BEST  6/10   At WORST  10/10 on the WORST day.    On average pain is rated as 7/10.   Today the pain is rated as 8/10  The pain is continuous.  The pain is described as aching and sharp    Symptoms interfere with daily activity and sleeping.   Exacerbating factors: Standing, Laying, Walking and Night Time.    Mitigating factors rubbing gel and nothing .   She reports 3 hours of sleep per night.    Physical Therapy/Home Exercise: No, not currently in physical therapy or home exercise program    Current Pain Medications:    - Opioids: Percocet 5mg (2-3x/week)  - Topicals: multiple    Failed Pain Medications:    - gabapentin, meloxicam, oxycodone, steroids, tylenol, tramadol    Pain Treatment Therapies:    Pain procedures: none  Physical Therapy: none  Chiropractor: none  Acupuncture: none  TENS unit: none  Spinal decompression: none  Joint replacement: none    Patient denies urinary incontinence, bowel incontinence and loss of  sensations.  Patient denies any suicidal or homicidal ideations     report:  Reviewed and consistent with medication use as prescribed.    Imaging:   XR lumbar 2/14/22:  Minimal mild levoscoliosis thoracic lumbar junction, elevation right hemipelvis, prominent bridging osteophytes lower dorsal and lumbar spine particularly L2-L3 and right L4-L5.  DJD SI joints.  Primary anterior subluxation L4 on L5, vacuum disc deformity L5-S1.  Prompt facet joint arthropathy particularly L3 through L5 levels.    XR HIPs 2/14/22:  Prominent degenerative disc spondylosis facet joint arthropathy lumbosacral junction, elevation right hemipelvis, injection site calcification left lateral buttocks.  DJD SI joints.  Mild DJD hip joints and hypertrophic change cortex greater tuberosities.      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    Vitamin D deficiency disease      Past Surgical History:   Procedure Laterality Date    ANTERIOR CERVICAL DISCECTOMY W/ FUSION N/A 3/19/2020    Procedure: DISCECTOMY, SPINE, CERVICAL, ANTERIOR APPROACH, WITH FUSION, C3-C4, C4-C5, C5-C6;  Surgeon: Joseph Walton MD;  Location: [address];  Service: Neurosurgery;  Laterality: N/A;    CARPAL TUNNEL RELEASE      bilateral    COLONOSCOPY N/A 3/7/2018    Procedure: COLONOSCOPY;  Surgeon: Luís Soni MD;  Location: [facility] ([address] [place] [address]);  Service: Endoscopy;  Laterality: N/A;  ok to schedule per Elizabeth    COLONOSCOPY W/ BIOPSIES AND POLYPECTOMY      EPIDURAL STEROID INJECTION N/A 6/3/2022    Procedure: LESI L5-S1;  Surgeon: Leland Pena DO;  Location: [facility];  Service: Pain Management;  Laterality: N/A;    HEMORRHOID SURGERY      HYSTERECTOMY      AUB    INJECTION OF JOINT Bilateral 4/12/2022    Procedure: SACROILIAC JOINT Steroid Injection-Bilateral;  Surgeon: Leland  DO Jean;  Location: [facility] OR;  Service: Pain Management;  Laterality: Bilateral;    ROTATOR CUFF REPAIR      bilateral    TRANSFORAMINAL EPIDURAL INJECTION OF STEROID Bilateral 12/2/2022    Procedure: TFESI (B/L) L4-5;  Surgeon: Leland Pena DO;  Location: [facility] OR;  Service: Pain Management;  Laterality: Bilateral;     Social History     Socioeconomic History    Marital status:    Tobacco Use    Smoking status: Never    Smokeless tobacco: Never   Substance and Sexual Activity    Alcohol use: No    Drug use: No    Sexual activity: Yes     Partners: Male     Birth control/protection: Surgical     Family History   Problem Relation Age of Onset    Heart disease Mother     Diabetes Mother     Heart disease Father     Cancer Father         liver    Diabetes Sister     Cataracts Sister     Kidney disease Brother     Heart disease Brother     Diabetes Sister     COPD Brother     COPD Brother     Gout Brother     Benign prostatic hyperplasia Brother     Heart disease Brother     Kidney disease Brother     Heart attack Brother         heart attack    Kidney failure Brother     Lupus Sister     Heart attack Brother     Drug abuse Brother     No Known Problems Daughter     No Known Problems Son     Amblyopia Neg Hx     Blindness Neg Hx     Breast cancer Neg Hx     Colon cancer Neg Hx     Ovarian cancer Neg Hx        Review of patient's allergies indicates:   Allergen Reactions    Gabapentin Other (See Comments)     ineffective    Januvia [sitagliptin] Other (See Comments)     Pancreatitis      Metformin Other (See Comments)     Nausea and vomiting with immediate release - tolerates extended release       Current Outpatient Medications   Medication Sig    alcohol swabs (DROPSAFE ALCOHOL PREP PADS) PadM USE TWICE DAILY AS DIRECTED, E 11.9    allopurinoL (ZYLOPRIM) 300 MG tablet Take 1 tablet (300 mg total) by mouth once daily.    ALPRAZolam (XANAX) 0.25 MG tablet One-two one hour prior to procedure  "then may repeat immediately prior to procedure    amLODIPine (NORVASC) 10 MG tablet TAKE 1 TABLET EVERY DAY    aspirin (ECOTRIN) 81 MG EC tablet Take 81 mg by mouth once daily.    atorvastatin (LIPITOR) 40 MG tablet TAKE 1 TABLET ONE TIME DAILY    blood glucose control, normal Soln True Metrix control solution E11.9 - pt tests twice daily    blood-glucose meter kit Check glucose daily E11.9 meter covered by insurance Don Metrix    DULoxetine (CYMBALTA) 30 MG capsule Take 1 capsule (30 mg total) by mouth once daily.    empagliflozin (JARDIANCE) 10 mg tablet Take 1 tablet (10 mg total) by mouth once daily.    insulin aspart U-100 (NOVOLOG FLEXPEN U-100 INSULIN) 100 unit/mL (3 mL) InPn pen Inject 5 Units into the skin 3 (three) times daily with meals. Plus sliding scale - Max TDD 45 units    insulin detemir U-100, Levemir, 100 unit/mL (3 mL) SubQ InPn pen Inject 12 Units into the skin once daily.    lancets (TRUEPLUS LANCETS) 28 gauge Misc 1 lancet by Misc.(Non-Drug; Combo Route) route 2 (two) times daily.    LIDOcaine (LIDODERM) 5 % Place 1 patch onto the skin once daily. Remove & Discard patch within 12 hours or as directed by MD    losartan (COZAAR) 100 MG tablet TAKE 1 TABLET (100 MG TOTAL) BY MOUTH ONCE DAILY.    metFORMIN (GLUCOPHAGE-XR) 500 MG ER 24hr tablet Take 1 tablet (500 mg total) by mouth 2 (two) times daily with meals.    omeprazole (PRILOSEC) 20 MG capsule TAKE 1 CAPSULE TWICE DAILY    oxyCODONE-acetaminophen (PERCOCET) 5-325 mg per tablet Take 1 tablet by mouth every 12 (twelve) hours as needed for Pain.    pen needle, diabetic (NOVOFINE 32) 32 gauge x 1/4" Ndle Use with insulin 4 times a day.    pioglitazone (ACTOS) 30 MG tablet TAKE 1 TABLET EVERY DAY    predniSONE (DELTASONE) 20 MG tablet Take 1 tablet (20 mg total) by mouth once daily.    triamcinolone acetonide 0.1% (KENALOG) 0.1 % cream APPLY TO THE AFFECTED AREA(S) ON LOWER LEGS TWICE DAILY AS NEEDED FOR ITCHY OR RED    TRUE METRIX GLUCOSE TEST " "STRIP Strp TEST BLOOD SUGAR TWICE DAILY     No current facility-administered medications for this visit.     Facility-Administered Medications Ordered in Other Visits   Medication    0.9%  NaCl infusion       REVIEW OF SYSTEMS:    GENERAL:  No weight loss, malaise or fevers.   HEENT:   No recent changes in vision or hearing   NECK:  Negative for lumps, no difficulty with swallowing.  RESPIRATORY:  Negative for cough, wheezing or shortness of breath, patient denies any recent URI.  CARDIOVASCULAR:  Negative for chest pain, leg swelling or palpitations.  GI:  Negative for abdominal discomfort, blood in stools or black stools or change in bowel habits.  MUSCULOSKELETAL:  See HPI.  SKIN:  Negative for lesions, rash, and itching.  PSYCH:  No mood disorder or recent psychosocial stressors.  Patients sleep is not disturbed secondary to pain.  HEMATOLOGY/LYMPHOLOGY:  Negative for prolonged bleeding, bruising easily or swollen nodes.  Patient is not currently taking any anti-coagulants  NEURO:   No history of headaches, syncope, paralysis, seizures or tremors.  All other reviewed and negative other than HPI.    OBJECTIVE:    /75 (BP Location: Left arm, Patient Position: Sitting)   Pulse 88   Ht 5' 7" (1.702 m)   Wt 84.2 kg (185 lb 10 oz)   BMI 29.07 kg/m²     PHYSICAL EXAMINATION:    PSYCH:  Mood and affect appropriate. Thought pattern is goal oriented. AOx3  GENERAL: Well appearing, in no acute distress, alert and oriented x3.  SKIN: Skin color, texture, turgor normal, no rashes or lesions.  HEAD/FACE:  Normocephalic, atraumatic. Cranial nerves grossly intact.  CV: RRR with palpation of the radial artery.  PULM: CTAB. No evidence of respiratory difficulty, symmetric chest rise.  GI:  Soft and non-tender.    BACK:   - No obvious deformity or signs of trauma, Normal lumbar lordotic curve  - Negative spinous process tenderness  - Negative paravertebral tenderness  - Positive pain to palpation over the facet joints of " the lumbar spine. On the left  - Negative QL / Iliac crest / Glut tenderness  - Slump test is Positive for radicular pain  - Slump test is Negative for back pain  - Supine Straight leg raising is Negative for radicular pain  - Supine Straight leg raising is Negative for back pain  - Lumbar ROM is diminished in Flexion with pain  - Lumbar ROM is diminished in Extension with pain  - Lumbar ROM is diminished in Lateral Flexion with pain  - Lumbar ROM is diminished in Rotation with pain  - Positive Sustained Hip Flexion test (for discogenic pain)  - Positive Altered Gait, Posture  - Axial facet loading test Positive on the Left side(s)    SI Joint exam:  - Positive SI joint tenderness to palpation  - William's sign Did not perform  - Yeoman's Test: Did not perform for SI joint pain indicating anterior SI ligament involvement. Did not perform for anterior thigh pain/paresthesia which indicates femoral nerve stretch.  - Gaenslen's Test:Positive for thigh pain  - Finger Yovani's Sign:Negative  - SI compression test:Positive  - SI distraction test:Negative  - Thigh Thrust: Negative  - SI Thrust: Did not perform    MUSKULOSKELETAL:    EXTREMITIES:   Hip Exam:  - Log Roll Negative  - FADIR Negative  - Stinchfield Negative  - Hip Scour Negative  - GTB Tenderness Negative    MUSCULOSKELETAL:  No atrophy or tone abnormalities are noted in the UE or LE.  No deformities, edema, or skin discoloration are noted on visible skin. Good capillary refill.    NEURO: Bilateral upper and lower extremity coordination and muscle stretch reflexes are physiologic and symmetric.    No loss of sensation is noted.    NEUROLOGICAL EXAM:  MENTAL STATUS: A x O x 3, good concentration, speech is fluent and goal directed  MEMORY: recent and remote are intact  CN: CN2-12 grossly intact  MOTOR: 4+/5 in all muscle groups of b/l LE  DTRs: 0 symmetric LE patella and achilles  Sensation:    -no Loss of sensation in a left lower and right lower L-1, L-2, L-3,  L-4 and L-5 bilaterally distribution.  Babinski: absent on the bilateral side(s)    GAIT: antalgic. Favors right side

## 2023-10-06 NOTE — TELEPHONE ENCOUNTER
----- Message from Leland Irving DO sent at 10/6/2023 12:06 PM CDT -----  Regarding: Order for NATHANAEL MARINO    Patient Name: NATHANAEL MARINO([id number])  Sex: Female  : 1945      PCP: ROSEMARY SHIPMAN    Center: [facility] CENTRAL BILLING OFFICE     Types of orders made on 10/06/2023: Outpatient Referral, Procedure Request    Order Date:10/6/2023  Ordering User:LELAND IRVING [[id number]]  Encounter Provider:Leland Irving DO [[id number]]  Authorizing Provider: Leland Irving DO [[id number]]  Department:[facility][[id number]]    Common Order Information  Procedure -> Transforaminal Injection (Specify level and laterality) Cmt: left             L5-S1 TFESI    Pre-op Diagnosis -> Lumbar radiculopathy     Order Specific Information  Order: Procedure Order to Pain Management [Custom: [id number]]  Order #:          [id number]: [id number] FUTURE      Priority: Routine  Class: Clinic Performed    Future Order Information      Expires on:10/06/2024            Expected by:10/06/2023                   Comment:10/26/23 - Left L5-S1 TFESI - RN sed - no AC    Associated Diagnoses      M54.16 Lumbar radiculopathy      Physician -> joaquimu         Is patient on anti-coagulants? -> No         Facility Name: -> [facility]           Priority: Routine  Cl  ass: Clinic Performed    Future Order Information      Expires on:10/06/2024            Expected by:10/06/2023                   Comment:10/26/23 - Left L5-S1 TFESI - RN sed - no AC    Associated Diagnoses      M54.16 Lumbar radiculopathy      Procedure -> Transforaminal Injection (Specify level and laterality) Cmt:                 left L5-S1 TFESI        Physician -> armidayu         Is patient on anti-coagulants? -> No           Pre-op Diagnosis -> Lumbar radiculopathy         Facility Name: -> [facility]

## 2023-10-06 NOTE — PROGRESS NOTES
Chronic Pain - f/u    Referring Physician: Dora Freedman MD    Date: 10/06/2023     Re: Lina Davis  MR#: [id number]  YOB: 1945  Age: 78 y.o.    Chief Complaint:  leg and back  No chief complaint on file.    **This note is dictated using the M*Modal Fluency Direct word recognition program. There are word recognition mistakes that are occasionally missed on review.**    ASSESSMENT: 78 y.o. year old female with back and leg pain, consistent with     1. Stage 3 chronic kidney disease, unspecified whether stage 3a or 3b CKD        2. Lumbar radiculopathy  Ambulatory referral/consult to Pain Clinic    Procedure Order to Pain Management    CANCELED: Procedure Order to Pain Management      3. Lumbar spondylosis        4. DDD (degenerative disc disease), lumbar          PLAN:     Chronic low back pain - multifactorial  -etiology not totally clear.  Likely multifactorial with elements of SI joint, lumbar spondylosis, lumbar radiculopathy, spinal stenosis, degenerative disc disease all playing some part.  -failed tizanidine  -failed PT for lumbar spine  -failed SIJ and L5-S1 LESI.    Sacroiliac joint dysfunction / Sacroiliitis  -s/p bilateral SIJ on 4/12/22 - 10% relief at 2 weeks.  -description and physical exam still suggest that she has SI joint involvement.  Might be more ligamentous then joint itself  -continue PT for SI stabilization, pelvic stabilization.     Left Lumbar radiculopathy  -s/p L5-S1 LESI without any improvement  12/2/22 - s/p b/l L4-5 TFESI   -discussed Left L5-S1. She would like to proceed. (+) Slump test. MRI shows left moderate spinal stenosis.  -monitor kidney function  -Possible SCS candidate. She does not want surgery.  -STOPPED Lyrica 50mg BID due to ineffective. Dose adjustment to 50% due to CKD3. Not helpful  -Taking tylenol    Lumbar spondylosis   -patient has notable facet arthritis of the lumbar spine and may be candidate for MBB/RFA.   -consider b/l L3,4,5 MBB    DM2  -on  insulin  -discussed risks of steroid injection while diabetic.    CKD3  -GFR 33.8 on 7/12/22    - RTC December  - Counseled patient regarding the importance of weight loss and activity modification and physical therapy.    The above plan and management options were discussed at length with patient. Patient is in agreement with the above and verbalized understanding. It will be communicated with the referring physician via electronic record, fax, or mail.  Lab/study reports reviewed were important and necessary because subsequent medical and treatment recommendations required review of the above lab/study reports. Images viewed/reviewed above were important and necessary because subsequent medical and treatment recommendations required review of the reviewed image(s).     Electronically signed by:  Leland Pena DO  10/06/2023    =========================================================================================================     SUBJECTIVE:    Interval History 10/6/2023:   Lina Davis is a 78 y.o. female presents to the clinic for follow up.  Since last visit the pain has has worsened. The pain for the last few months has been in the left back/buttocks radiating down the left posterior leg into the foot.    The pain is located in the left hip area and radiates to the lower left leg and top of left foot .  The pain is described as sharp and shooting    At BEST  6/10   At WORST  8/10 on the WORST day.    On average pain is rated as 7/10.   Today the pain is rated as 6/10  Symptoms interfere with daily activity and sleeping.   Exacerbating factors: Sitting and sitting on left hip.    Mitigating factors nothing.     Current pain medications: Percocet 5mg (2-3x/week), topicals, tylenol  Failed Pain Medications: gabapentin, meloxicam, oxycodone, steroids, tylenol, tramadol, tizanidine, tylenol, Lyrica    Pain Procedures  4/12/22 - b/l SIJ 4/12/22 - 10% relief.   6/3/22 -  L5-S1 LESI on 6/3 - RN sedation  - no improvement  12/2/22 - b/l L4-5 TFESI - RN sedation - doesn't remember    Interval History 11/1/2022:     Lian Davis is a 78 y.o. female presents to the clinic for follow up.  Since last visit the pain has is unchanged.  Pain is worse in the legs. Especailly down the sides of the legs and into the foot.    The pain is located in the lower back area and radiates to the buttocks down the legs .  The pain is described as aching, dull, and throbbing    At BEST  0/10   At WORST  8/10 on the WORST day.    On average pain is rated as 3/10.   Today the pain is rated as 6/10  Symptoms interfere with daily activity and sleeping.   Exacerbating factors: Standing, Bending, Walking, and Getting out of bed/chair.    Mitigating factors medications.     Interval History 8/30/2022:     Lina Davis is a 78 y.o. female presents to the clinic for follow up.  Since last visit the pain has has moderately improved. She is doing her home exercise program and that has been helping somewhat.  The pain has not gone away, but it is improved. She has no back pain at night when she lays down, only some leg pain.    The pain is located in the low back/buttocks area and does not radiate.  The pain is described as aching, dull, and throbbing    At BEST  0/10   At WORST  8/10 on the WORST day.    On average pain is rated as 6/10.   Today the pain is rated as 0/10  Symptoms interfere with daily activity.   Worse with standing.  Better with walking, sitting.    Interval History 8/2/2022:     Lina Davis is a 78 y.o. female presents to the clinic for follow up.  Since last visit the pain has is unchanged.  The patient finished her 2nd therapy session and is very sore.     The pain is located in the buttocks/low back area and does not radiate.  The pain is described as aching, dull and throbbing    At BEST  6/10   At WORST  9/10 on the WORST day.    On average pain is rated as 7/10.   Today the pain is rated as 6/10  Symptoms interfere with  daily activity and sleeping.   Exacerbating factors: Standing, Getting out of bed/chair and in and out the car.    Mitigating factors medications and sitting.     Interval History 6/28/2022:     Lina Davis is a 78 y.o. female presents to the clinic for follow up.  Since last visit the pain has has worsened slightly.  Did not get relief from the epidural injection.  Pain is still primarily left low back/buttocks with radiation in to the left leg.     The pain is located in the lower back area and radiates to the leg(knee) .  The pain is described as aching    At BEST  7/10   At WORST  10/10 on the WORST day.    On average pain is rated as 8/10.   Today the pain is rated as 5/10  Symptoms interfere with daily activity and sleeping.   Exacerbating factors: Standing, Getting out of bed/chair and in and out the car.    Mitigating factors medications and sitting.     Current pain medications: Percocet 5mg (2-3x/week), topicals  Failed Pain Medications: gabapentin, meloxicam, oxycodone, steroids, tylenol, tramadol, tizanidine, tylenol      Interval History 4/28/2022:     Lina Davis is a 78 y.o. female presents to the clinic for follow up.  Since last visit the pain has is unchanged. She did not get any signfiicant improvement from the SI joint injection. Her leg pain > back pain.  It goes down both lateral legs from the buttocks to the top of the feet on both sides.    The pain is located in the lower back area and radiates to the legs .  The pain is described as aching    At BEST  7/10   At WORST  10/10 on the WORST day.    On average pain is rated as 8/10.   Today the pain is rated as 7/10  Symptoms interfere with daily activity and sleeping.   Exacerbating factors: Sitting and night time.    Mitigating factors medications.     Current pain medications: Percocet 5mg (2-3x/week), topicals  Failed Pain Medications: gabapentin, meloxicam, oxycodone, steroids, tylenol, tramadol, tizanidine, tylenol    Initial hx:  Lina  FERNANDO Davis is a 78 y.o. female presents to the clinic for the evaluation of lower back pain. The pain started 6 year ago following no inciting event and symptoms have been worsening.  The patient states that she has low back pain that radiates down both legs to the top of the bilateral feet.   Sometimes the pain with radiate in to the inside of the leg. The pain waker her up in the middle of the night.  The pain usually starts when she lays down. The patient cannot sleep on her back, so she has to rotate from side to side.  The patient has tried different topicals ointments, tylenol, gabapentin.  Leg pain > back pain.     Dr. Freedman tried a cortisone shot which did not help.  She has a small script of oxycodone which she takes rarely.  She takes 2 tylenol extra strength every night.  She tried gabapentin 600mg TID and it was not helpful. Meloxicam not helpful.    Pain Description:    The pain is located in the lower back area and radiates to the legs down to the feet .    At BEST  6/10   At WORST  10/10 on the WORST day.    On average pain is rated as 7/10.   Today the pain is rated as 8/10  The pain is continuous.  The pain is described as aching and sharp    Symptoms interfere with daily activity and sleeping.   Exacerbating factors: Standing, Laying, Walking and Night Time.    Mitigating factors rubbing gel and nothing .   She reports 3 hours of sleep per night.    Physical Therapy/Home Exercise: No, not currently in physical therapy or home exercise program    Current Pain Medications:    - Opioids: Percocet 5mg (2-3x/week)  - Topicals: multiple    Failed Pain Medications:    - gabapentin, meloxicam, oxycodone, steroids, tylenol, tramadol    Pain Treatment Therapies:    Pain procedures: none  Physical Therapy: none  Chiropractor: none  Acupuncture: none  TENS unit: none  Spinal decompression: none  Joint replacement: none    Patient denies urinary incontinence, bowel incontinence and loss of  sensations.  Patient denies any suicidal or homicidal ideations     report:  Reviewed and consistent with medication use as prescribed.    Imaging:   XR lumbar 2/14/22:  Minimal mild levoscoliosis thoracic lumbar junction, elevation right hemipelvis, prominent bridging osteophytes lower dorsal and lumbar spine particularly L2-L3 and right L4-L5.  DJD SI joints.  Primary anterior subluxation L4 on L5, vacuum disc deformity L5-S1.  Prompt facet joint arthropathy particularly L3 through L5 levels.    XR HIPs 2/14/22:  Prominent degenerative disc spondylosis facet joint arthropathy lumbosacral junction, elevation right hemipelvis, injection site calcification left lateral buttocks.  DJD SI joints.  Mild DJD hip joints and hypertrophic change cortex greater tuberosities.      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    Vitamin D deficiency disease      Past Surgical History:   Procedure Laterality Date    ANTERIOR CERVICAL DISCECTOMY W/ FUSION N/A 3/19/2020    Procedure: DISCECTOMY, SPINE, CERVICAL, ANTERIOR APPROACH, WITH FUSION, C3-C4, C4-C5, C5-C6;  Surgeon: Joseph Walton MD;  Location: [address];  Service: Neurosurgery;  Laterality: N/A;    CARPAL TUNNEL RELEASE      bilateral    COLONOSCOPY N/A 3/7/2018    Procedure: COLONOSCOPY;  Surgeon: Luís Soni MD;  Location: [facility] ([address] [place] [address]);  Service: Endoscopy;  Laterality: N/A;  ok to schedule per Elizabeth    COLONOSCOPY W/ BIOPSIES AND POLYPECTOMY      EPIDURAL STEROID INJECTION N/A 6/3/2022    Procedure: LESI L5-S1;  Surgeon: Leland Pena DO;  Location: [facility];  Service: Pain Management;  Laterality: N/A;    HEMORRHOID SURGERY      HYSTERECTOMY      AUB    INJECTION OF JOINT Bilateral 4/12/2022    Procedure: SACROILIAC JOINT Steroid Injection-Bilateral;  Surgeon: Leland  DO Jean;  Location: [facility] OR;  Service: Pain Management;  Laterality: Bilateral;    ROTATOR CUFF REPAIR      bilateral    TRANSFORAMINAL EPIDURAL INJECTION OF STEROID Bilateral 12/2/2022    Procedure: TFESI (B/L) L4-5;  Surgeon: Leland Pena DO;  Location: [facility] OR;  Service: Pain Management;  Laterality: Bilateral;     Social History     Socioeconomic History    Marital status:    Tobacco Use    Smoking status: Never    Smokeless tobacco: Never   Substance and Sexual Activity    Alcohol use: No    Drug use: No    Sexual activity: Yes     Partners: Male     Birth control/protection: Surgical     Family History   Problem Relation Age of Onset    Heart disease Mother     Diabetes Mother     Heart disease Father     Cancer Father         liver    Diabetes Sister     Cataracts Sister     Kidney disease Brother     Heart disease Brother     Diabetes Sister     COPD Brother     COPD Brother     Gout Brother     Benign prostatic hyperplasia Brother     Heart disease Brother     Kidney disease Brother     Heart attack Brother         heart attack    Kidney failure Brother     Lupus Sister     Heart attack Brother     Drug abuse Brother     No Known Problems Daughter     No Known Problems Son     Amblyopia Neg Hx     Blindness Neg Hx     Breast cancer Neg Hx     Colon cancer Neg Hx     Ovarian cancer Neg Hx        Review of patient's allergies indicates:   Allergen Reactions    Gabapentin Other (See Comments)     ineffective    Januvia [sitagliptin] Other (See Comments)     Pancreatitis      Metformin Other (See Comments)     Nausea and vomiting with immediate release - tolerates extended release       Current Outpatient Medications   Medication Sig    alcohol swabs (DROPSAFE ALCOHOL PREP PADS) PadM USE TWICE DAILY AS DIRECTED, E 11.9    allopurinoL (ZYLOPRIM) 300 MG tablet Take 1 tablet (300 mg total) by mouth once daily.    ALPRAZolam (XANAX) 0.25 MG tablet One-two one hour prior to procedure  "then may repeat immediately prior to procedure    amLODIPine (NORVASC) 10 MG tablet TAKE 1 TABLET EVERY DAY    aspirin (ECOTRIN) 81 MG EC tablet Take 81 mg by mouth once daily.    atorvastatin (LIPITOR) 40 MG tablet TAKE 1 TABLET ONE TIME DAILY    blood glucose control, normal Soln True Metrix control solution E11.9 - pt tests twice daily    blood-glucose meter kit Check glucose daily E11.9 meter covered by insurance Don Metrix    DULoxetine (CYMBALTA) 30 MG capsule Take 1 capsule (30 mg total) by mouth once daily.    empagliflozin (JARDIANCE) 10 mg tablet Take 1 tablet (10 mg total) by mouth once daily.    insulin aspart U-100 (NOVOLOG FLEXPEN U-100 INSULIN) 100 unit/mL (3 mL) InPn pen Inject 5 Units into the skin 3 (three) times daily with meals. Plus sliding scale - Max TDD 45 units    insulin detemir U-100, Levemir, 100 unit/mL (3 mL) SubQ InPn pen Inject 12 Units into the skin once daily.    lancets (TRUEPLUS LANCETS) 28 gauge Misc 1 lancet by Misc.(Non-Drug; Combo Route) route 2 (two) times daily.    LIDOcaine (LIDODERM) 5 % Place 1 patch onto the skin once daily. Remove & Discard patch within 12 hours or as directed by MD    losartan (COZAAR) 100 MG tablet TAKE 1 TABLET (100 MG TOTAL) BY MOUTH ONCE DAILY.    metFORMIN (GLUCOPHAGE-XR) 500 MG ER 24hr tablet Take 1 tablet (500 mg total) by mouth 2 (two) times daily with meals.    omeprazole (PRILOSEC) 20 MG capsule TAKE 1 CAPSULE TWICE DAILY    oxyCODONE-acetaminophen (PERCOCET) 5-325 mg per tablet Take 1 tablet by mouth every 12 (twelve) hours as needed for Pain.    pen needle, diabetic (NOVOFINE 32) 32 gauge x 1/4" Ndle Use with insulin 4 times a day.    pioglitazone (ACTOS) 30 MG tablet TAKE 1 TABLET EVERY DAY    predniSONE (DELTASONE) 20 MG tablet Take 1 tablet (20 mg total) by mouth once daily.    triamcinolone acetonide 0.1% (KENALOG) 0.1 % cream APPLY TO THE AFFECTED AREA(S) ON LOWER LEGS TWICE DAILY AS NEEDED FOR ITCHY OR RED    TRUE METRIX GLUCOSE TEST " "STRIP Strp TEST BLOOD SUGAR TWICE DAILY     No current facility-administered medications for this visit.     Facility-Administered Medications Ordered in Other Visits   Medication    0.9%  NaCl infusion       REVIEW OF SYSTEMS:    GENERAL:  No weight loss, malaise or fevers.   HEENT:   No recent changes in vision or hearing   NECK:  Negative for lumps, no difficulty with swallowing.  RESPIRATORY:  Negative for cough, wheezing or shortness of breath, patient denies any recent URI.  CARDIOVASCULAR:  Negative for chest pain, leg swelling or palpitations.  GI:  Negative for abdominal discomfort, blood in stools or black stools or change in bowel habits.  MUSCULOSKELETAL:  See HPI.  SKIN:  Negative for lesions, rash, and itching.  PSYCH:  No mood disorder or recent psychosocial stressors.  Patients sleep is not disturbed secondary to pain.  HEMATOLOGY/LYMPHOLOGY:  Negative for prolonged bleeding, bruising easily or swollen nodes.  Patient is not currently taking any anti-coagulants  NEURO:   No history of headaches, syncope, paralysis, seizures or tremors.  All other reviewed and negative other than HPI.    OBJECTIVE:    /75 (BP Location: Left arm, Patient Position: Sitting)   Pulse 88   Ht 5' 7" (1.702 m)   Wt 84.2 kg (185 lb 10 oz)   BMI 29.07 kg/m²     PHYSICAL EXAMINATION:    PSYCH:  Mood and affect appropriate. Thought pattern is goal oriented. AOx3  GENERAL: Well appearing, in no acute distress, alert and oriented x3.  SKIN: Skin color, texture, turgor normal, no rashes or lesions.  HEAD/FACE:  Normocephalic, atraumatic. Cranial nerves grossly intact.  CV: RRR with palpation of the radial artery.  PULM: CTAB. No evidence of respiratory difficulty, symmetric chest rise.  GI:  Soft and non-tender.    BACK:   - No obvious deformity or signs of trauma, Normal lumbar lordotic curve  - Negative spinous process tenderness  - Negative paravertebral tenderness  - Positive pain to palpation over the facet joints of " the lumbar spine. On the left  - Negative QL / Iliac crest / Glut tenderness  - Slump test is Positive for radicular pain  - Slump test is Negative for back pain  - Supine Straight leg raising is Negative for radicular pain  - Supine Straight leg raising is Negative for back pain  - Lumbar ROM is diminished in Flexion with pain  - Lumbar ROM is diminished in Extension with pain  - Lumbar ROM is diminished in Lateral Flexion with pain  - Lumbar ROM is diminished in Rotation with pain  - Positive Sustained Hip Flexion test (for discogenic pain)  - Positive Altered Gait, Posture  - Axial facet loading test Positive on the Left side(s)    SI Joint exam:  - Positive SI joint tenderness to palpation  - William's sign Did not perform  - Yeoman's Test: Did not perform for SI joint pain indicating anterior SI ligament involvement. Did not perform for anterior thigh pain/paresthesia which indicates femoral nerve stretch.  - Gaenslen's Test:Positive for thigh pain  - Finger Yovani's Sign:Negative  - SI compression test:Positive  - SI distraction test:Negative  - Thigh Thrust: Negative  - SI Thrust: Did not perform    MUSKULOSKELETAL:    EXTREMITIES:   Hip Exam:  - Log Roll Negative  - FADIR Negative  - Stinchfield Negative  - Hip Scour Negative  - GTB Tenderness Negative    MUSCULOSKELETAL:  No atrophy or tone abnormalities are noted in the UE or LE.  No deformities, edema, or skin discoloration are noted on visible skin. Good capillary refill.    NEURO: Bilateral upper and lower extremity coordination and muscle stretch reflexes are physiologic and symmetric.    No loss of sensation is noted.    NEUROLOGICAL EXAM:  MENTAL STATUS: A x O x 3, good concentration, speech is fluent and goal directed  MEMORY: recent and remote are intact  CN: CN2-12 grossly intact  MOTOR: 4+/5 in all muscle groups of b/l LE  DTRs: 0 symmetric LE patella and achilles  Sensation:    -no Loss of sensation in a left lower and right lower L-1, L-2, L-3,  L-4 and L-5 bilaterally distribution.  Babinski: absent on the bilateral side(s)    GAIT: antalgic. Favors right side

## 2023-10-20 ENCOUNTER — TELEPHONE (OUTPATIENT)
Dept: PAIN MEDICINE | Facility: CLINIC | Age: 78
End: 2023-10-20
Payer: MEDICARE

## 2023-10-23 NOTE — TELEPHONE ENCOUNTER
----- Message from Glenda Andres sent at 10/23/2023  3:46 PM CDT -----  Contact: [phone]  Requesting an RX refill or new RX.  Is this a refill or new RX: refill  RX name and strength (copy/paste from chart): oxyCODONE-acetaminophen (PERCOCET) 5-325 mg per tablet   Is this a 30 day or 90 day RX: 30  Pharmacy name and phone # (copy/paste from chart):      [id number] - [place] - [facility] [facility] [address]  [address] [place] [address]  Phone: [phone] Fax: [phone]     The doctors have asked that we provide their patients with the following 2 reminders -- prescription refills can take up to 72 hours, and a friendly reminder that in the future you can use your [facility] account to request refills: yes

## 2023-10-23 NOTE — TELEPHONE ENCOUNTER
No care due was identified.  [facility] Embedded Care Due Messages. Reference number: [id number].   10/23/2023 4:01:15 PM CDT

## 2023-10-25 RX ORDER — OXYCODONE AND ACETAMINOPHEN 5; 325 MG/1; MG/1
1 TABLET ORAL EVERY 12 HOURS PRN
Qty: 60 TABLET | Refills: 0 | Status: SHIPPED | OUTPATIENT
Start: 2023-10-25 | End: 2023-12-05 | Stop reason: SDUPTHER

## 2023-10-25 NOTE — PRE-PROCEDURE INSTRUCTIONS
The following was discussed with pt via phone and sent to pt portal for pt's review.  Pt verbalized understanding.      Dear Lina ,    You are scheduled for a procedure with Dr. Leland Pena on 10/26/2023.  Your scheduled arrival time is 7:45 am.  This arrival time is roughly 1 hour before your anticipated procedure time to allow sufficient time for pre-op..  Please wear comfortable clothes.  Most patients do not need to change into a gown.  Please do not wear a dress.  This procedure will take place at the [facility] at the corner of [facility] and [facility].  [facility] [place] [facility] to [facility].  The address is:    [address] [place] [address].  [place] [id number]    After entering the building, you will proceed to the second floor where you can check in with registration. You should take any medications that you routinely take for blood pressure, heart medications, thyroid, cholesterol, etc.     The fasting restrictions are dependent on whether or not you are receiving sedation.  Sedation is not available for all procedures.     Your fasting instructions are as follow:  IV sedation. You should not eat for 8 hours and can only drink clear liquids (water or black coffee without cream/sugar) up until 2 hours before your scheduled time.  You CANNOT drive yourself and must have a .    If you are on blood thinners, you need to follow the anticoagulation instructions that had been discussed previously.  You should only stop the blood thinners if it was approved by your primary care physician or your cardiologist.  In the event that you are not able to stop your blood thinners, a blood thinner was not listed on your medication list, or we were not able to get clearance from your cardiologist, then the procedure may have to be postponed/canceled.     IF you were told to stop your blood thinners, this is how long you should generally hold some of the more common  ones.  Remember that stopping blood thinners is only necessary for certain procedures. If you are unsure of your instructions, please call us.   Aspirin - 5 days  Plavix/Clopidogrel - 7 days  Warfarin / Coumadin - 5 days  Eliquis - 3 days  Pradaxa/Dabigatran - 4 days  Xarelto/Rivaroxaban - 3 days    Hold all insulins in the Morning  No Jardiance until after procedure  Hold Actos in the morning  Hold Metformin night before and morning of procedure  No Lotions, powders, creams, oils, perfume, or deodorant   Shower night before and morning of with Antibacterial soap (dial)       If you are a diabetic, do not take your medication if you will be fasting, but bring it with you. Please plan on being here for roughly 2 hours.     Please call us if you have been sick (running fever, having any flu-like symptoms) or have been taking antibiotics in the past 2 weeks or had any outpatient procedures other than with us (colonoscopy, endoscopy, OBGYN, dental, etc.). If you have been previously COVID positive, you will need to hold off on your procedure until you are symptom free for 10 days. If you did not have any symptoms, you can have your procedure 10 days from your positive test result.      Thank you,  [facility]   [place], LPN [facility]

## 2023-10-26 ENCOUNTER — HOSPITAL ENCOUNTER (OUTPATIENT)
Facility: HOSPITAL | Age: 78
Discharge: HOME | End: 2023-10-26
Attending: STUDENT IN AN ORGANIZED HEALTH CARE EDUCATION/TRAINING PROGRAM | Admitting: STUDENT IN AN ORGANIZED HEALTH CARE EDUCATION/TRAINING PROGRAM
Payer: MEDICARE

## 2023-10-26 VITALS
TEMPERATURE: 98 F | OXYGEN SATURATION: 100 % | WEIGHT: 184 LBS | SYSTOLIC BLOOD PRESSURE: 169 MMHG | HEART RATE: 90 BPM | DIASTOLIC BLOOD PRESSURE: 84 MMHG | HEIGHT: 66 IN | BODY MASS INDEX: 29.57 KG/M2 | RESPIRATION RATE: 16 BRPM

## 2023-10-26 DIAGNOSIS — M54.16 LUMBAR RADICULOPATHY: Primary | ICD-10-CM

## 2023-10-26 LAB — POCT GLUCOSE: 167 MG/DL (ref 70–110)

## 2023-10-26 PROCEDURE — 99152 MOD SED SAME PHYS/QHP 5/>YRS: CPT | Performed by: STUDENT IN AN ORGANIZED HEALTH CARE EDUCATION/TRAINING PROGRAM

## 2023-10-26 PROCEDURE — 64483 PR EPIDURAL INJ, ANES/STEROID, TRANSFORAMINAL, LUMB/SACR, SNGL LEVL: ICD-10-PCS | Mod: LT,,, | Performed by: STUDENT IN AN ORGANIZED HEALTH CARE EDUCATION/TRAINING PROGRAM

## 2023-10-26 PROCEDURE — 64483 NJX AA&/STRD TFRM EPI L/S 1: CPT | Mod: LT | Performed by: STUDENT IN AN ORGANIZED HEALTH CARE EDUCATION/TRAINING PROGRAM

## 2023-10-26 PROCEDURE — 64483 NJX AA&/STRD TFRM EPI L/S 1: CPT | Mod: LT,,, | Performed by: STUDENT IN AN ORGANIZED HEALTH CARE EDUCATION/TRAINING PROGRAM

## 2023-10-26 PROCEDURE — 25000003 PHARM REV CODE 250: Performed by: STUDENT IN AN ORGANIZED HEALTH CARE EDUCATION/TRAINING PROGRAM

## 2023-10-26 PROCEDURE — 25500020 PHARM REV CODE 255: Performed by: STUDENT IN AN ORGANIZED HEALTH CARE EDUCATION/TRAINING PROGRAM

## 2023-10-26 PROCEDURE — 82962 GLUCOSE BLOOD TEST: CPT | Performed by: STUDENT IN AN ORGANIZED HEALTH CARE EDUCATION/TRAINING PROGRAM

## 2023-10-26 PROCEDURE — 63600175 PHARM REV CODE 636 W HCPCS: Performed by: STUDENT IN AN ORGANIZED HEALTH CARE EDUCATION/TRAINING PROGRAM

## 2023-10-26 RX ORDER — DEXAMETHASONE SODIUM PHOSPHATE 10 MG/ML
INJECTION INTRAMUSCULAR; INTRAVENOUS
Status: DISCONTINUED | OUTPATIENT
Start: 2023-10-26 | End: 2023-10-26 | Stop reason: HOSPADM

## 2023-10-26 RX ORDER — LIDOCAINE HYDROCHLORIDE 20 MG/ML
INJECTION, SOLUTION EPIDURAL; INFILTRATION; INTRACAUDAL; PERINEURAL
Status: DISCONTINUED | OUTPATIENT
Start: 2023-10-26 | End: 2023-10-26 | Stop reason: HOSPADM

## 2023-10-26 RX ORDER — LIDOCAINE HYDROCHLORIDE 10 MG/ML
INJECTION, SOLUTION EPIDURAL; INFILTRATION; INTRACAUDAL; PERINEURAL
Status: DISCONTINUED | OUTPATIENT
Start: 2023-10-26 | End: 2023-10-26 | Stop reason: HOSPADM

## 2023-10-26 RX ORDER — MIDAZOLAM HYDROCHLORIDE 1 MG/ML
2 INJECTION INTRAMUSCULAR; INTRAVENOUS ONCE AS NEEDED
Status: COMPLETED | OUTPATIENT
Start: 2023-10-26 | End: 2023-10-26

## 2023-10-26 RX ORDER — FENTANYL CITRATE 50 UG/ML
25 INJECTION, SOLUTION INTRAMUSCULAR; INTRAVENOUS ONCE AS NEEDED
Status: COMPLETED | OUTPATIENT
Start: 2023-10-26 | End: 2023-10-26

## 2023-10-26 RX ORDER — SODIUM CHLORIDE 9 MG/ML
500 INJECTION, SOLUTION INTRAVENOUS CONTINUOUS
Status: DISCONTINUED | OUTPATIENT
Start: 2023-10-26 | End: 2023-10-26 | Stop reason: HOSPADM

## 2023-10-26 NOTE — DISCHARGE INSTRUCTIONS
[facility]  [facility]  [employer] service # [phone]    POST-PROCEDURE INSTRUCTIONS:    Today you had an injection that included a steroid medications.  The steroid may or may not have been mixed with a local anesthetic when it was injected.   If the injection was in the neck, you may feel some pressure, numbness, or slight weakness in the arm after the procedure for a short period of time (this is a normal response), if this persists for longer than 1 day please contact our office or go to the emergency room.  If the injection was in the low back, you may feel some pressure, numbness, or slight weakness in the leg after the procedure for a short period of time (this is a normal response), if this persists for longer than 1 day please contact our office or go to the emergency room.  You may get side effects from the steroid.  This is not uncommon.  Symptoms include: elevated blood sugar, elevated blood pressure, headache, flushing, nausea, insomnia.  These symptoms are transient and will resolve within 1-3 days.  If symptoms last longer than this please contact our office or head to the emergency room.  Steroid medications can take anywhere from 3-14 days to take effect (rarely longer).  You may notice that your pain worsens for a short period of time after the injection, this would not be unusual due to the pressure and trauma from the needle.    If you do not have a follow up appointment scheduled, please contact my office (or the office of the physician who referred you for the procedure) to get a post-procedure follow up scheduled 2-4 weeks after the procedure.  This can be done as a virtual visit if that is more convenient for you.      What you need to do:    Keep a record of your response to the injection you had today.    How much relief did you get?   When did the relief start and how long did it last?  Were you able to decrease the use of any of your pain  medications?  Were you able to increase your level of activity?  How long did the relief last?    What to watch out for:    If you experience any of the following symptoms after your procedure, please notify the messaging service immediately (see above for contact information):   fever (increased oral temperature)   bleeding or swelling at the injection site,    drainage, rash or redness at the injection site    possible signs of infection    increased pain at the injection site   worsening of your usual pain   severe headache   new or worsening numbness    new arm and/or leg weakness, or    changes in bowel and/or bladder function: urinating or defecating on yourself and not knowing that you did it.    PLEASE FOLLOW ALL INSTRUCTIONS CAREFULLY     Do not engage in strenuous activity (e.g., lifting or pushing heavy objects or repeated bending) for 24 hours.     Do not take a bath, swim or use Jacuzzi for 24 hours after procedure. (A shower is fine).   Remove any Band-Aids when you get home.    Use cold/ice, as needed for comfort.  We recommend the use of cold therapy alternating on for 20 minutes, off for 20 minutes.    Do not apply direct heat (heating pad or heat packs) to the injection site for 24 hours.     Resume your usual medications, unless instructed otherwise by your Pain Physician.     If you are on warfarin (Coumadin) or other blood thinner, resume this medication as instructed by your prescribing Physician.    IF AT ANY POINT YOU ARE VERY CONCERNED ABOUT YOUR SYMPTOMS, PLEASE GO TO THE EMERGENCY ROOM.    If you develop worsening pain, weakness, numbness, lose bowel or bladder control (i.e., having an accident where you did not even know you had to go to the bathroom and suddenly noticed you soiled yourself), saddle anesthesia (a loss of sensation restricted to the area of the buttocks, anus and between the legs -- i.e., those parts of your body that would touch a saddle if you were sitting on one) you  need to go immediately to the emergency department for evaluation and treatment.    ----------------------------------------------------------------------------------------------------------------------------------------------------------------  If you received Sedation please read the following instructions:  POST SEDATION INSTRUCTIONS    Today you received intravenous medication (also known as sedation) that was used to help you relax and/or decrease discomfort during your procedure. This medication will be acting in your body for the next 24 hours, so you might feel a little tired or sleepy. This feeling will slowly wear off.   Common side effects associated with these medications include: drowsiness, dizziness, sleepiness, confusion, feeling excited, difficulty remembering things, lack of steadiness with walking or balance, loss of fine muscle control, slowed reflexes, difficulty focusing, and blurred vision.  Some over-the-counter and prescription medications (e.g., muscle relaxants, opioids, mood-altering medications, sedatives/hypnotics, antihistamines) can interact with the intravenous medication you received and cause an increased risk of the side effects listed above in addition to other potentially life threatening side effects. Use extreme caution if you are taking such medications, and consult with your Pain Physician or prescribing physician if you have any questions.  For the next 12-24 hours:    DO NOT--Drive a car, operate machinery or power tools   DO NOT--Drink any alcoholic beverages (not even beer), they may dangerously increase the risk of side effects.    DO NOT--Make any important legal or business decisions or sign important documents.  We advise you to have someone to assist you at home. Move slowly and carefully. Do not make sudden changes in position. Be aware of dizziness or light-headedness and move accordingly.   If you seek medical treatment within 24 hours, let the nurse or doctor  caring for you know that you have received the above medications. If you have any questions or concerns related to your sedation or treatment today please contact us.

## 2023-10-26 NOTE — OP NOTE
"PROCEDURE: left Lumbar L5-S1 Transforaminal Epidural Steroid Injection    Patient Name: Lina Davis  MRN: [id number]    PROCEDURE DATE: 10/26/2023    INJECTION # 2    DIAGNOSIS: Lumbar Radiculopathy  CPT CODE: 06901 (INJECTION(S), ANESTHETIC AGENT(S) AND/OR STEROID; TRANSFORAMINAL EPIDURAL, WITH IMAGING GUIDANCE (FLUOROSCOPY OR CT), LUMBAR OR SACRAL, SINGLE LEVEL), 20226 (Each additional level).     POSTPROCEDURE DIAGNOSIS: Same    PHYSICIAN: Leland Pena DO  NEEDLE TYPE: - 22G 5" Spinal Needle  MEDICATIONS INJECTED: 3ml mixture of 1ml Dexamethasone 10mg/ml and 2ml 1% lidocaine PF split equally between each site.  CONTRAST: Omni 300    Sedation Medications - Mild Sedation with 2mg Versed and 25mcg Fentanyl    Estimated Blood Loss - <2ml  Drains: None  Specimens Removed: None  Urine Output - Not Measured  Complications: None  Outcome: Good    Informed Consent:  The patient's condition and proposed procedures, risks, and alternatives were discussed with the patient or responsible party.  The patient's / responsible party's questions were answered.   The patient / responsible party appeared to understand and chose to proceed.  Informed consent was obtained.  After obtaining written consent, an IV hep lock was placed. (See nurses notes for details).     Procedure in Detail:  The patient was taken back to the OR suite and placed in a prone position. The skin overlying the injection site was prepped and draped in an aseptic fashion. The target injection site (see above) was identified with fluoroscopy.     Procedural Pause:  A procedural pause verifying correct patient, medical record number, allergies, medications to be administered, current vital signs, and surgical site was performed immediately prior to beginning the procedure.    The skin and subcutaneous tissue overlying the target site(s) of injection for the L5-S1 transforaminal epidural steroid injection was/were anesthetized using 4 mL of 1% lidocaine " with a 25-gauge, 1½-inch needle.      The fluoroscopic beam was aligned to create a tunnel view.  The above noted needle was advanced parallel to the fluoroscopic beam towards the above noted foramen under fluoroscopic guidance.  The final position of the needle(s) was identified using AP and lateral views.  Paresthesias were not noted with final needle positioning.       A microbore extension tubing was attached to the needle to minimize any movement of the needle during injection or syringe change.  After negative aspiration for heme or CSF at each site(s) where the needle(s) was placed, 0.5ml of contrast dye was injected to confirm appropriate placement and that there was no vascular uptake.  Pain provocation by the injected contrast material was noted.  Then the injectate solution described above was injected in increments.  The needle was then retracted approximately prison and the needle track was flushed with 0.5 mL of Lidocaine 1%.  The needle(s) was then removed.     The same procedural technique outlined above was not repeated on the OPPOSITE side.    The heart rate, pulse oximetry, and blood pressure were continuously monitored throughout the procedure.  The procedure was well tolerated. She was carefully escorted to the recovery room in stable condition. Patient was monitored by RN for recovery period.  The patient will be contacted in the next few days to determine extent of relief.  Patient was given post procedure and discharge instructions to follow at home.  The patient was discharged in a stable condition.    Note Electronically Signed By:  Leland Spencer  10/26/2023

## 2023-10-26 NOTE — DISCHARGE SUMMARY
[facility]  Discharge Note  Short Stay    Procedure(s) (LRB):  LT L5-S1 TFESI (Left)      OUTCOME: Patient tolerated treatment/procedure well without complication and is now ready for discharge.    DISPOSITION: Home or Self Care    FINAL DIAGNOSIS:  <principal problem not specified>    FOLLOWUP: In clinic    DISCHARGE INSTRUCTIONS:  No discharge procedures on file.     TIME SPENT ON DISCHARGE: 10 minutes

## 2023-11-17 ENCOUNTER — TELEPHONE (OUTPATIENT)
Dept: NEUROSURGERY | Facility: CLINIC | Age: 78
End: 2023-11-17
Payer: MEDICARE

## 2023-11-17 NOTE — TELEPHONE ENCOUNTER
----- Message from Zachariah Pruitt MA sent at 11/17/2023 11:15 AM CST -----    ----- Message -----  From: Zachariah Pruitt MA  Sent: 11/16/2023   5:54 PM CST  To: Zachariah Pruitt MA      ----- Message -----  From: Sandy Watson  Sent: 11/15/2023   4:38 PM CST  To: [facility] Neurosurgery Clinical Support    Dr Cobian has placed a referral for a Consult to Neurosurgery. Please call Pt to schedule.  Contact #[phone]    Diagnosis: Lumbar radiculopathy [M54.16]      Thanks

## 2023-11-21 ENCOUNTER — LAB VISIT (OUTPATIENT)
Dept: LAB | Facility: HOSPITAL | Age: 78
End: 2023-11-21
Payer: MEDICARE

## 2023-11-21 DIAGNOSIS — E83.52 HYPERCALCEMIA: ICD-10-CM

## 2023-11-21 DIAGNOSIS — Z79.4 TYPE 2 DIABETES MELLITUS WITH DIABETIC NEPHROPATHY, WITH LONG-TERM CURRENT USE OF INSULIN: Chronic | ICD-10-CM

## 2023-11-21 DIAGNOSIS — E11.21 TYPE 2 DIABETES MELLITUS WITH DIABETIC NEPHROPATHY, WITH LONG-TERM CURRENT USE OF INSULIN: Chronic | ICD-10-CM

## 2023-11-21 LAB
25(OH)D3+25(OH)D2 SERPL-MCNC: 41 NG/ML (ref 30–96)
ALBUMIN SERPL BCP-MCNC: 3.7 G/DL (ref 3.5–5.2)
ANION GAP SERPL CALC-SCNC: 7 MMOL/L (ref 8–16)
BUN SERPL-MCNC: 24 MG/DL (ref 8–23)
CALCIUM SERPL-MCNC: 9.9 MG/DL (ref 8.7–10.5)
CHLORIDE SERPL-SCNC: 110 MMOL/L (ref 95–110)
CO2 SERPL-SCNC: 23 MMOL/L (ref 23–29)
CREAT SERPL-MCNC: 1.1 MG/DL (ref 0.5–1.4)
EST. GFR  (NO RACE VARIABLE): 52.1 ML/MIN/1.73 M^2
ESTIMATED AVG GLUCOSE: 123 MG/DL (ref 68–131)
GLUCOSE SERPL-MCNC: 164 MG/DL (ref 70–110)
HBA1C MFR BLD: 5.9 % (ref 4–5.6)
PHOSPHATE SERPL-MCNC: 2.6 MG/DL (ref 2.7–4.5)
POTASSIUM SERPL-SCNC: 4 MMOL/L (ref 3.5–5.1)
PTH-INTACT SERPL-MCNC: 71.8 PG/ML (ref 9–77)
SODIUM SERPL-SCNC: 140 MMOL/L (ref 136–145)

## 2023-11-21 PROCEDURE — 80069 RENAL FUNCTION PANEL: CPT | Mod: HCNC | Performed by: NURSE PRACTITIONER

## 2023-11-21 PROCEDURE — 36415 COLL VENOUS BLD VENIPUNCTURE: CPT | Mod: HCNC | Performed by: NURSE PRACTITIONER

## 2023-11-21 PROCEDURE — 83036 HEMOGLOBIN GLYCOSYLATED A1C: CPT | Mod: HCNC | Performed by: NURSE PRACTITIONER

## 2023-11-21 PROCEDURE — 82306 VITAMIN D 25 HYDROXY: CPT | Mod: HCNC | Performed by: NURSE PRACTITIONER

## 2023-11-21 PROCEDURE — 83970 ASSAY OF PARATHORMONE: CPT | Mod: HCNC | Performed by: NURSE PRACTITIONER

## 2023-11-22 NOTE — PROGRESS NOTES
Subjective:      Patient ID: Lina Davis is a 78 y.o. female.    Chief Complaint:  Diabetes    History of Present Illness  Lina Davis is here for follow up of T2DM and Thyroid Nodules.  Previously seen by me 7/2023.     With regards to diabetes:    Diagnosed: ~2002/2003  DE: 7/2021  FH of DM: sister, brother   Denies any of her children have DM     Known complications:  DKA Denies  RN Denies  Eye Exam: 2/2021  PN : Yes   Podiatry: 12/2020  Nephropathy : Yes   CAD : Denies  Episode of pancreatitis in 2018 - attributed to Januvia    Diet/Exercise: small portions   Eats 2-3 meals a day.   Snacks : occasionally - fig newtons.   Drinks : water, 1 or 2 8oz soft drinks a week, lemonade but will cut it with water.   Exercise - tries to stay active.  Recent illness, injury, steroids: 3 days of oral steroids for back pain      Current regimen: PAP - SGLT2i and insulin   Metformin 500mg twice daily   Jardiance 10mg daily   Actos 30mg daily   Levemir 14 units daily     Reports compliance.    Other medications tried:  Januvia- Discontinued in 2018 due to pancreatitis  Novolog    Glucose Monitor:   3-4 times a day testing  Log reviewed: log provided and reviewed, scanned in media tab     Hypoglycemia:  Denies  Knows how to correct with 15 grams of carbs- juice, coke, or a peppermint.     Diabetes Management Status    Hemoglobin A1C   Date Value Ref Range Status   11/21/2023 5.9 (H) 4.0 - 5.6 % Final     Comment:     ADA Screening Guidelines:  5.7-6.4%  Consistent with prediabetes  >or=6.5%  Consistent with diabetes    High levels of fetal hemoglobin interfere with the HbA1C  assay. Heterozygous hemoglobin variants (HbS, HgC, etc)do  not significantly interfere with this assay.   However, presence of multiple variants may affect accuracy.     06/07/2023 5.8 (H) 4.0 - 5.6 % Final     Comment:     ADA Screening Guidelines:  5.7-6.4%  Consistent with prediabetes  >or=6.5%  Consistent with diabetes    High levels of fetal  hemoglobin interfere with the HbA1C  assay. Heterozygous hemoglobin variants (HbS, HgC, etc)do  not significantly interfere with this assay.   However, presence of multiple variants may affect accuracy.     03/16/2023 6.0 (H) 4.0 - 5.6 % Final     Comment:     ADA Screening Guidelines:  5.7-6.4%  Consistent with prediabetes  >or=6.5%  Consistent with diabetes    High levels of fetal hemoglobin interfere with the HbA1C  assay. Heterozygous hemoglobin variants (HbS, HgC, etc)do  not significantly interfere with this assay.   However, presence of multiple variants may affect accuracy.         Statin: Taking  ACE/ARB: Taking  Screening or Prevention Patient's value Goal Complete/Controlled?   HgA1C Testing and Control   Lab Results   Component Value Date    HGBA1C 5.9 (H) 11/21/2023      Annually/Less than 8% Yes   Lipid profile : 06/07/2023 Annually Yes   LDL control Lab Results   Component Value Date    LDLCALC 69.6 06/07/2023    Annually/Less than 100 mg/dl  Yes   Nephropathy screening Lab Results   Component Value Date    LABMICR 33.0 05/02/2023     Lab Results   Component Value Date    PROTEINUA Negative 05/16/2023    Annually Yes   Blood pressure BP Readings from Last 1 Encounters:   11/29/23 (!) 140/72    Less than 140/90 No   Dilated retinal exam : 02/22/2021 Annually Yes   Foot exam   Most Recent Foot Exam Date: Not Found Annually No     With regards to thyroid nodule:    Thyroid US:   6/2023  COMPARISON:  Neck ultrasound dated 08/05/2022.  Our records indicate a benign FNA of the left lobe nodule in 1/2023 and a non-diagnostic FNA of the right lobe nodule in 1/2023.  When compared to the prior study the above nodules are stable in size and features  Impression:  1.) Thyroid gland is normal in size with heterogeneous echotexture and normal vascularity  2.) 2.4 x 2.1 x 2.0 cm solid, isoechoic nodule is seen in the right mid lobe  3.) 0.8 x 0.4 x 0.6 cm spongiform nodule is seen in the right inferior pole  4.)  2.4 x 1.8 x 2.4 cm solid, isoechoic nodule is seen in the left inferior pole  5.) 1.1 x 0.5 x 1.1 cm spongiform nodule is seen in the left inferior pole  6.) 1.0 x 0.6 x 1.0 cm solid, isoechoic nodule is seen in the isthmus  RECOMMENDATIONS:  Recommend repeat thyroid ultrasound in 1-2 years.    FNA:   1/23/2023  R mid: Unsatisfactory  L inferior: Benign    Signs or Symptoms:   Difficulty breathing: Denies  Difficulty swallowing: Denies  Voice Changes: Denies  FH of thyroid cancer: Denies  Personal history of radiation treatment or exposure: Denies    Lab Results   Component Value Date    TSH 1.395 06/07/2023    A4HWHSD 6.0 11/26/2008    FREET4 1.10 08/08/2017       With regards to the hypercalcemia:    Lab Results   Component Value Date    PTH 71.8 11/21/2023    CALCIUM 9.9 11/21/2023    PHOS 2.6 (L) 11/21/2023     Lab Results   Component Value Date    ALBUMIN 3.7 11/21/2023                       Daily intake of calcium is: Denies  Daily intake of vitamin D:  OTC Vit D3 3000iu daily     BMD:    12/2018  Normal BMD.  Compared to prior study in 9/2014, BMD has declined 5.0% at the spine and 4.3% at the hip.  RECOMMENDATIONS of [facility]:  1.  Calcium 5007-4439 mg daily and vitamin D 800-1000 units daily, adequate exercise.  2.  No need for repeat study in near future unless clinical change    Denies constipation, depression, muscle aches or pains.  Reports polyuria   Denies fractures, height loss or kidney stones.  Reports history of renal disease.  Denies family history of hyperparathyroidism or calcium problems.  Denies HCTZ., lithium, or chlorthiadone use.      Review of Systems  as above    Objective:   Physical Exam  Vitals reviewed.   Neck:      Thyroid: Thyromegaly present.   Cardiovascular:      Rate and Rhythm: Normal rate.      Comments: No edema present  Pulmonary:      Effort: Pulmonary effort is normal.   Abdominal:      Palpations: Abdomen is soft.       Injection  "sites are without edema or erythema. No lipo hypertropthy or atrophy.    Visit Vitals  BP (!) 140/72 (BP Location: Left arm, Patient Position: Sitting, BP Method: Small (Manual))   Pulse 92   Resp 18   Ht 5' 6" (1.676 m)   Wt 82.9 kg (182 lb 12.2 oz)   SpO2 96%   BMI 29.50 kg/m²               Body mass index is 29.5 kg/m².    Lab Review:   Lab Results   Component Value Date    HGBA1C 5.9 (H) 11/21/2023    HGBA1C 5.8 (H) 06/07/2023    HGBA1C 6.0 (H) 03/16/2023       Lab Results   Component Value Date    CHOL 149 06/07/2023    HDL 58 06/07/2023    LDLCALC 69.6 06/07/2023    TRIG 107 06/07/2023    CHOLHDL 38.9 06/07/2023     Lab Results   Component Value Date     11/21/2023    K 4.0 11/21/2023     11/21/2023    CO2 23 11/21/2023     (H) 11/21/2023    BUN 24 (H) 11/21/2023    CREATININE 1.1 11/21/2023    CALCIUM 9.9 11/21/2023    PROT 7.5 03/16/2023    ALBUMIN 3.7 11/21/2023    BILITOT 0.4 03/16/2023    ALKPHOS 71 03/16/2023    AST 12 03/16/2023    ALT 7 (L) 03/16/2023    ANIONGAP 7 (L) 11/21/2023    ESTGFRAFRICA 39.0 (A) 07/12/2022    EGFRNONAA 33.8 (A) 07/12/2022    TSH 1.395 06/07/2023     Vit D, 25-Hydroxy   Date Value Ref Range Status   11/21/2023 41 30 - 96 ng/mL Final     Comment:     Vitamin D deficiency.........<10 ng/mL                              Vitamin D insufficiency......10-29 ng/mL       Vitamin D sufficiency........> or equal to 30 ng/mL  Vitamin D toxicity............>100 ng/mL       Assessment and Plan     1. Type 2 diabetes mellitus with diabetic nephropathy, with long-term current use of insulin  insulin detemir U-100, Levemir, 100 unit/mL (3 mL) SubQ InPn pen    Hemoglobin A1C    Renal Function Panel      2. Thyroid nodule        3. Hypercalcemia            Type 2 diabetes mellitus with diabetic nephropathy  -- Labs prior to follow up.  -- A1c goal <7.5%.  -- Medications discussed:  MFM   GLP1-DPP4 - avoid given drug induced pancreatitis (Januvia)  SUNG   SGLT2   Reviewed " potential adverse effects of SGLT-2 inhibitors, including genital mycotic infections, slightly increased risk of UTI, hypersensitivity, hypotension, and hyperkalemia. Advised to maintain water intake of 8-10 cups per day. Advised we need to check chemistry panel at baseline and 2 weeks after starting. Discussed FDA warning reports of ketoacidosis associated with SGLT-2 inhibitors. Advised to seek immediate medical attention and stop the medication if symptoms such as difficulty breathing, nausea, vomiting, abdominal pain, confusion, and unusual fatigue/sleepiness. Discussed possible precipitating factors including major illness/reduced food and fluid intake (advised to stop under these circumstances), and reduced insulin dose. Discussed possible effects of increased fracture risk/decreased bone density. Discussed reports of increased risk of leg and foot amputations with canagliflozin and need to seek urgent care if developed new pain or tenderness, sores or ulcers, or infections in legs/feet.   Insulin   -- Reviewed logs/CGM:  Glucose controlled.  Reach out to me sooner for any glucose <70 or consistently >180.  -- Medication Changes:   Metformin 500mg twice daily   Jardiance 10mg daily   Actos 30mg daily   Levemir 14 units daily     -- Reviewed goals of therapy are to get the best control we can without hypoglycemia.  -- Reviewed patient's current insulin regimen. Clarified proper insulin dose and timing in relation to meals, etc. Insulin injection sites and proper rotation instructed.    -- Advised frequent self blood glucose monitoring.  Patient encouraged to document glucose results and bring them to every clinic visit.  -- Hypoglycemia precautions discussed. Instructed on precautions before driving.    -- Call for Bg repeatedly < 90 or > 180.   -- Close adherence to lifestyle changes recommended.   -- Periodic follow ups for eye evaluations, foot care and dental care suggested.    Thyroid nodule  -- I have  reviewed management options including observation, FNA or surgery.  -- FNA procedure explained in depth.  -- Discussed indications for repeat FNA as well as surgical indications (abnormal FNA, compressive symptoms or interval change).  -- Discussed possible results of FNA- Benign, Malignant, FLUS, or insufficient cells.  Discussed results auto released to patients, but advised the ordering provider will also contact to discuss.   -- All of the patients questions were answered.  -- FNA:   1/23/2023  R mid: Unsatisfactory  L inferior: Benign  -- Repeat thyroid US 6/2025.    Hypercalcemia  -- Intermittent hypercalcemia.   -- Discussed indications for surgery if primary hyperparathyroidism proven.  -- Avoid dehydration, excessive calcium supplementation and meds (HCTZ) that can worsen hypercalcemia.  -- Prefers to monitor for now - labs.             Follow up in about 4 months (around 3/29/2024).

## 2023-11-29 ENCOUNTER — OFFICE VISIT (OUTPATIENT)
Dept: ENDOCRINOLOGY | Facility: CLINIC | Age: 78
End: 2023-11-29
Payer: MEDICARE

## 2023-11-29 VITALS
WEIGHT: 182.75 LBS | BODY MASS INDEX: 29.37 KG/M2 | RESPIRATION RATE: 18 BRPM | DIASTOLIC BLOOD PRESSURE: 72 MMHG | HEIGHT: 66 IN | SYSTOLIC BLOOD PRESSURE: 140 MMHG | HEART RATE: 92 BPM | OXYGEN SATURATION: 96 %

## 2023-11-29 DIAGNOSIS — Z79.4 TYPE 2 DIABETES MELLITUS WITH DIABETIC NEPHROPATHY, WITH LONG-TERM CURRENT USE OF INSULIN: Primary | Chronic | ICD-10-CM

## 2023-11-29 DIAGNOSIS — E11.21 TYPE 2 DIABETES MELLITUS WITH DIABETIC NEPHROPATHY, WITH LONG-TERM CURRENT USE OF INSULIN: ICD-10-CM

## 2023-11-29 DIAGNOSIS — E04.1 THYROID NODULE: ICD-10-CM

## 2023-11-29 DIAGNOSIS — E83.52 HYPERCALCEMIA: ICD-10-CM

## 2023-11-29 DIAGNOSIS — Z79.4 TYPE 2 DIABETES MELLITUS WITH DIABETIC NEPHROPATHY, WITH LONG-TERM CURRENT USE OF INSULIN: ICD-10-CM

## 2023-11-29 DIAGNOSIS — E11.21 TYPE 2 DIABETES MELLITUS WITH DIABETIC NEPHROPATHY, WITH LONG-TERM CURRENT USE OF INSULIN: Primary | Chronic | ICD-10-CM

## 2023-11-29 DIAGNOSIS — N18.31 STAGE 3A CHRONIC KIDNEY DISEASE: ICD-10-CM

## 2023-11-29 PROCEDURE — 1159F MED LIST DOCD IN RCRD: CPT | Mod: HCNC,CPTII,S$GLB, | Performed by: NURSE PRACTITIONER

## 2023-11-29 PROCEDURE — 3288F FALL RISK ASSESSMENT DOCD: CPT | Mod: HCNC,CPTII,S$GLB, | Performed by: NURSE PRACTITIONER

## 2023-11-29 PROCEDURE — 1160F RVW MEDS BY RX/DR IN RCRD: CPT | Mod: HCNC,CPTII,S$GLB, | Performed by: NURSE PRACTITIONER

## 2023-11-29 PROCEDURE — 3288F PR FALLS RISK ASSESSMENT DOCUMENTED: ICD-10-PCS | Mod: HCNC,CPTII,S$GLB, | Performed by: NURSE PRACTITIONER

## 2023-11-29 PROCEDURE — 99214 OFFICE O/P EST MOD 30 MIN: CPT | Mod: HCNC,S$GLB,, | Performed by: NURSE PRACTITIONER

## 2023-11-29 PROCEDURE — 1126F AMNT PAIN NOTED NONE PRSNT: CPT | Mod: HCNC,CPTII,S$GLB, | Performed by: NURSE PRACTITIONER

## 2023-11-29 PROCEDURE — 99999 PR PBB SHADOW E&M-EST. PATIENT-LVL V: CPT | Mod: PBBFAC,HCNC,, | Performed by: NURSE PRACTITIONER

## 2023-11-29 PROCEDURE — 1160F PR REVIEW ALL MEDS BY PRESCRIBER/CLIN PHARMACIST DOCUMENTED: ICD-10-PCS | Mod: HCNC,CPTII,S$GLB, | Performed by: NURSE PRACTITIONER

## 2023-11-29 PROCEDURE — 3078F DIAST BP <80 MM HG: CPT | Mod: HCNC,CPTII,S$GLB, | Performed by: NURSE PRACTITIONER

## 2023-11-29 PROCEDURE — 3077F PR MOST RECENT SYSTOLIC BLOOD PRESSURE >= 140 MM HG: ICD-10-PCS | Mod: HCNC,CPTII,S$GLB, | Performed by: NURSE PRACTITIONER

## 2023-11-29 PROCEDURE — 1101F PT FALLS ASSESS-DOCD LE1/YR: CPT | Mod: HCNC,CPTII,S$GLB, | Performed by: NURSE PRACTITIONER

## 2023-11-29 PROCEDURE — 1126F PR PAIN SEVERITY QUANTIFIED, NO PAIN PRESENT: ICD-10-PCS | Mod: HCNC,CPTII,S$GLB, | Performed by: NURSE PRACTITIONER

## 2023-11-29 PROCEDURE — 3078F PR MOST RECENT DIASTOLIC BLOOD PRESSURE < 80 MM HG: ICD-10-PCS | Mod: HCNC,CPTII,S$GLB, | Performed by: NURSE PRACTITIONER

## 2023-11-29 PROCEDURE — 3077F SYST BP >= 140 MM HG: CPT | Mod: HCNC,CPTII,S$GLB, | Performed by: NURSE PRACTITIONER

## 2023-11-29 PROCEDURE — 1101F PR PT FALLS ASSESS DOC 0-1 FALLS W/OUT INJ PAST YR: ICD-10-PCS | Mod: HCNC,CPTII,S$GLB, | Performed by: NURSE PRACTITIONER

## 2023-11-29 PROCEDURE — 1159F PR MEDICATION LIST DOCUMENTED IN MEDICAL RECORD: ICD-10-PCS | Mod: HCNC,CPTII,S$GLB, | Performed by: NURSE PRACTITIONER

## 2023-11-29 PROCEDURE — 99214 PR OFFICE/OUTPT VISIT, EST, LEVL IV, 30-39 MIN: ICD-10-PCS | Mod: HCNC,S$GLB,, | Performed by: NURSE PRACTITIONER

## 2023-11-29 PROCEDURE — 99999 PR PBB SHADOW E&M-EST. PATIENT-LVL V: ICD-10-PCS | Mod: PBBFAC,HCNC,, | Performed by: NURSE PRACTITIONER

## 2023-11-29 NOTE — ASSESSMENT & PLAN NOTE
-- Intermittent hypercalcemia.   -- Discussed indications for surgery if primary hyperparathyroidism proven.  -- Avoid dehydration, excessive calcium supplementation and meds (HCTZ) that can worsen hypercalcemia.  -- Prefers to monitor for now - labs.

## 2023-11-29 NOTE — ASSESSMENT & PLAN NOTE
-- Labs prior to follow up.  -- A1c goal <7.5%.  -- Medications discussed:  MFM   GLP1-DPP4 - avoid given drug induced pancreatitis (Januvia)  SUNG   SGLT2   Reviewed potential adverse effects of SGLT-2 inhibitors, including genital mycotic infections, slightly increased risk of UTI, hypersensitivity, hypotension, and hyperkalemia. Advised to maintain water intake of 8-10 cups per day. Advised we need to check chemistry panel at baseline and 2 weeks after starting. Discussed FDA warning reports of ketoacidosis associated with SGLT-2 inhibitors. Advised to seek immediate medical attention and stop the medication if symptoms such as difficulty breathing, nausea, vomiting, abdominal pain, confusion, and unusual fatigue/sleepiness. Discussed possible precipitating factors including major illness/reduced food and fluid intake (advised to stop under these circumstances), and reduced insulin dose. Discussed possible effects of increased fracture risk/decreased bone density. Discussed reports of increased risk of leg and foot amputations with canagliflozin and need to seek urgent care if developed new pain or tenderness, sores or ulcers, or infections in legs/feet.   Insulin   -- Reviewed logs/CGM:  Glucose controlled.  Reach out to me sooner for any glucose <70 or consistently >180.  -- Medication Changes:   Metformin 500mg twice daily   Jardiance 10mg daily   Actos 30mg daily   Levemir 14 units daily     -- Reviewed goals of therapy are to get the best control we can without hypoglycemia.  -- Reviewed patient's current insulin regimen. Clarified proper insulin dose and timing in relation to meals, etc. Insulin injection sites and proper rotation instructed.    -- Advised frequent self blood glucose monitoring.  Patient encouraged to document glucose results and bring them to every clinic visit.  -- Hypoglycemia precautions discussed. Instructed on precautions before driving.    -- Call for Bg repeatedly < 90 or > 180.   --  Close adherence to lifestyle changes recommended.   -- Periodic follow ups for eye evaluations, foot care and dental care suggested.

## 2023-12-05 ENCOUNTER — OFFICE VISIT (OUTPATIENT)
Dept: INTERNAL MEDICINE | Facility: CLINIC | Age: 78
End: 2023-12-05
Payer: MEDICARE

## 2023-12-05 VITALS — OXYGEN SATURATION: 98 % | BODY MASS INDEX: 29.3 KG/M2 | WEIGHT: 182.31 LBS | HEIGHT: 66 IN | HEART RATE: 82 BPM

## 2023-12-05 DIAGNOSIS — Z12.31 SCREENING MAMMOGRAM, ENCOUNTER FOR: ICD-10-CM

## 2023-12-05 DIAGNOSIS — E11.21 TYPE 2 DIABETES MELLITUS WITH DIABETIC NEPHROPATHY, WITH LONG-TERM CURRENT USE OF INSULIN: Chronic | ICD-10-CM

## 2023-12-05 DIAGNOSIS — G56.22 ULNAR NEUROPATHY AT ELBOW OF LEFT UPPER EXTREMITY: Primary | ICD-10-CM

## 2023-12-05 DIAGNOSIS — Z12.11 SCREENING FOR COLON CANCER: ICD-10-CM

## 2023-12-05 DIAGNOSIS — Z01.419 NORMAL GYNECOLOGIC EXAMINATION: ICD-10-CM

## 2023-12-05 DIAGNOSIS — Z79.4 TYPE 2 DIABETES MELLITUS WITH DIABETIC NEPHROPATHY, WITH LONG-TERM CURRENT USE OF INSULIN: Chronic | ICD-10-CM

## 2023-12-05 PROCEDURE — 1125F PR PAIN SEVERITY QUANTIFIED, PAIN PRESENT: ICD-10-PCS | Mod: HCNC,CPTII,S$GLB, | Performed by: INTERNAL MEDICINE

## 2023-12-05 PROCEDURE — 99999 PR PBB SHADOW E&M-EST. PATIENT-LVL V: ICD-10-PCS | Mod: PBBFAC,HCNC,, | Performed by: INTERNAL MEDICINE

## 2023-12-05 PROCEDURE — 3288F PR FALLS RISK ASSESSMENT DOCUMENTED: ICD-10-PCS | Mod: HCNC,CPTII,S$GLB, | Performed by: INTERNAL MEDICINE

## 2023-12-05 PROCEDURE — 1125F AMNT PAIN NOTED PAIN PRSNT: CPT | Mod: HCNC,CPTII,S$GLB, | Performed by: INTERNAL MEDICINE

## 2023-12-05 PROCEDURE — 99214 PR OFFICE/OUTPT VISIT, EST, LEVL IV, 30-39 MIN: ICD-10-PCS | Mod: HCNC,S$GLB,, | Performed by: INTERNAL MEDICINE

## 2023-12-05 PROCEDURE — 1101F PT FALLS ASSESS-DOCD LE1/YR: CPT | Mod: HCNC,CPTII,S$GLB, | Performed by: INTERNAL MEDICINE

## 2023-12-05 PROCEDURE — 99999 PR PBB SHADOW E&M-EST. PATIENT-LVL V: CPT | Mod: PBBFAC,HCNC,, | Performed by: INTERNAL MEDICINE

## 2023-12-05 PROCEDURE — 99214 OFFICE O/P EST MOD 30 MIN: CPT | Mod: HCNC,S$GLB,, | Performed by: INTERNAL MEDICINE

## 2023-12-05 PROCEDURE — 1101F PR PT FALLS ASSESS DOC 0-1 FALLS W/OUT INJ PAST YR: ICD-10-PCS | Mod: HCNC,CPTII,S$GLB, | Performed by: INTERNAL MEDICINE

## 2023-12-05 PROCEDURE — 3288F FALL RISK ASSESSMENT DOCD: CPT | Mod: HCNC,CPTII,S$GLB, | Performed by: INTERNAL MEDICINE

## 2023-12-05 RX ORDER — OXYCODONE AND ACETAMINOPHEN 5; 325 MG/1; MG/1
1 TABLET ORAL EVERY 12 HOURS PRN
Qty: 60 TABLET | Refills: 0 | Status: SHIPPED | OUTPATIENT
Start: 2023-12-05 | End: 2024-01-16 | Stop reason: SDUPTHER

## 2023-12-05 RX ORDER — PREGABALIN 50 MG/1
CAPSULE ORAL
Qty: 30 CAPSULE | Refills: 2 | Status: SHIPPED | OUTPATIENT
Start: 2023-12-05

## 2023-12-05 NOTE — PROGRESS NOTES
Subjective:       Patient ID: Lina Davis is a 78 y.o. female.    Chief Complaint: Follow-up, Hand Pain, Numbness (Left, hand), and Diabetes    Follow-up  Pertinent negatives include no abdominal pain, chest pain (arm pain or jaw pain), headaches, nausea or vomiting.   Hand Pain   Pertinent negatives include no chest pain (arm pain or jaw pain).   Diabetes  Pertinent negatives for hypoglycemia include no headaches or seizures. Pertinent negatives for diabetes include no chest pain (arm pain or jaw pain).   Pt with pain in left hand.  No CP or SOB. Blood sugars are controlled.   Review of Systems   Respiratory:  Negative for shortness of breath (PND or orthopnea).    Cardiovascular:  Negative for chest pain (arm pain or jaw pain).   Gastrointestinal:  Negative for abdominal pain, diarrhea, nausea and vomiting.   Genitourinary:  Negative for dysuria.   Neurological:  Negative for seizures, syncope and headaches.       Objective:      Physical Exam  Constitutional:       General: She is not in acute distress.     Appearance: She is well-developed.   HENT:      Head: Normocephalic.   Eyes:      Pupils: Pupils are equal, round, and reactive to light.   Neck:      Thyroid: No thyromegaly.      Vascular: No JVD.   Cardiovascular:      Rate and Rhythm: Normal rate and regular rhythm.      Heart sounds: Normal heart sounds. No murmur heard.     No friction rub. No gallop.   Pulmonary:      Effort: Pulmonary effort is normal.      Breath sounds: Normal breath sounds. No wheezing or rales.   Abdominal:      General: Bowel sounds are normal. There is no distension.      Palpations: Abdomen is soft. There is no mass.      Tenderness: There is no abdominal tenderness. There is no guarding or rebound.   Musculoskeletal:      Cervical back: Neck supple.   Lymphadenopathy:      Cervical: No cervical adenopathy.   Skin:     General: Skin is warm and dry.   Neurological:      Mental Status: She is alert and oriented to person,  place, and time.      Deep Tendon Reflexes: Reflexes are normal and symmetric.   Psychiatric:         Behavior: Behavior normal.         Thought Content: Thought content normal.         Judgment: Judgment normal.         Assessment:       1. Ulnar neuropathy at elbow of left upper extremity    2. Type 2 diabetes mellitus with diabetic nephropathy, with long-term current use of insulin    3. Screening mammogram, encounter for    4. Normal gynecologic examination    5. Screening for colon cancer        Plan:   Ulnar neuropathy at elbow of left upper extremity  -     EMG W/ ULTRASOUND AND NERVE CONDUCTION TEST 1 Extremity; Future  -     Ambulatory referral/consult to Orthopedics; Future; Expected date: 12/12/2023    Type 2 diabetes mellitus with diabetic nephropathy, with long-term current use of insulin  -     Comprehensive Metabolic Panel; Future; Expected date: 12/05/2023    Screening mammogram, encounter for  -     Mammo Digital Screening Bilat w/ Efrem; Future; Expected date: 06/05/2024    Normal gynecologic examination    Screening for colon cancer  -     Ambulatory referral/consult to Endo Procedure ; Future; Expected date: 12/06/2023    Other orders  -     oxyCODONE-acetaminophen (PERCOCET) 5-325 mg per tablet; Take 1 tablet by mouth every 12 (twelve) hours as needed for Pain.  Dispense: 60 tablet; Refill: 0  -     pregabalin (LYRICA) 50 MG capsule; One at bedtime  Dispense: 30 capsule; Refill: 2

## 2023-12-07 ENCOUNTER — CLINICAL SUPPORT (OUTPATIENT)
Dept: ENDOSCOPY | Facility: HOSPITAL | Age: 78
End: 2023-12-07
Attending: INTERNAL MEDICINE
Payer: MEDICARE

## 2023-12-07 DIAGNOSIS — Z12.11 SCREENING FOR COLON CANCER: ICD-10-CM

## 2023-12-07 DIAGNOSIS — Z12.11 SPECIAL SCREENING FOR MALIGNANT NEOPLASMS, COLON: Primary | ICD-10-CM

## 2023-12-07 NOTE — PLAN OF CARE
Called pt to schedule Colonoscopy.  No availability at this current time through April 12.  Will make new PAT appt for pt.

## 2023-12-08 ENCOUNTER — LAB VISIT (OUTPATIENT)
Dept: LAB | Facility: HOSPITAL | Age: 78
End: 2023-12-08
Attending: INTERNAL MEDICINE
Payer: MEDICARE

## 2023-12-08 DIAGNOSIS — Z79.4 TYPE 2 DIABETES MELLITUS WITH DIABETIC NEPHROPATHY, WITH LONG-TERM CURRENT USE OF INSULIN: Chronic | ICD-10-CM

## 2023-12-08 DIAGNOSIS — E11.21 TYPE 2 DIABETES MELLITUS WITH DIABETIC NEPHROPATHY, WITH LONG-TERM CURRENT USE OF INSULIN: Chronic | ICD-10-CM

## 2023-12-08 LAB
ALBUMIN SERPL BCP-MCNC: 3.8 G/DL (ref 3.5–5.2)
ALP SERPL-CCNC: 84 U/L (ref 55–135)
ALT SERPL W/O P-5'-P-CCNC: 11 U/L (ref 10–44)
ANION GAP SERPL CALC-SCNC: 8 MMOL/L (ref 8–16)
AST SERPL-CCNC: 13 U/L (ref 10–40)
BILIRUB SERPL-MCNC: 0.4 MG/DL (ref 0.1–1)
BUN SERPL-MCNC: 24 MG/DL (ref 8–23)
CALCIUM SERPL-MCNC: 10.1 MG/DL (ref 8.7–10.5)
CHLORIDE SERPL-SCNC: 109 MMOL/L (ref 95–110)
CO2 SERPL-SCNC: 24 MMOL/L (ref 23–29)
CREAT SERPL-MCNC: 1.2 MG/DL (ref 0.5–1.4)
EST. GFR  (NO RACE VARIABLE): 46.9 ML/MIN/1.73 M^2
GLUCOSE SERPL-MCNC: 215 MG/DL (ref 70–110)
POTASSIUM SERPL-SCNC: 4.2 MMOL/L (ref 3.5–5.1)
PROT SERPL-MCNC: 8.1 G/DL (ref 6–8.4)
SODIUM SERPL-SCNC: 141 MMOL/L (ref 136–145)

## 2023-12-08 PROCEDURE — 80053 COMPREHEN METABOLIC PANEL: CPT | Mod: HCNC | Performed by: INTERNAL MEDICINE

## 2023-12-08 PROCEDURE — 36415 COLL VENOUS BLD VENIPUNCTURE: CPT | Mod: HCNC | Performed by: INTERNAL MEDICINE

## 2023-12-14 ENCOUNTER — CLINICAL SUPPORT (OUTPATIENT)
Dept: ENDOSCOPY | Facility: HOSPITAL | Age: 78
End: 2023-12-14
Attending: INTERNAL MEDICINE
Payer: MEDICARE

## 2023-12-14 ENCOUNTER — TELEPHONE (OUTPATIENT)
Dept: ENDOSCOPY | Facility: HOSPITAL | Age: 78
End: 2023-12-14

## 2023-12-14 DIAGNOSIS — Z12.11 SPECIAL SCREENING FOR MALIGNANT NEOPLASMS, COLON: ICD-10-CM

## 2023-12-14 NOTE — PLAN OF CARE
Contacted patient to schedule colonoscopy. No slots available at this time. April 2024 schedule not yet open. New PAT appt scheduled. Patient verbalized understanding.

## 2023-12-26 ENCOUNTER — CLINICAL SUPPORT (OUTPATIENT)
Dept: ENDOSCOPY | Facility: HOSPITAL | Age: 78
End: 2023-12-26
Attending: INTERNAL MEDICINE
Payer: MEDICARE

## 2023-12-26 ENCOUNTER — TELEPHONE (OUTPATIENT)
Dept: ENDOSCOPY | Facility: HOSPITAL | Age: 78
End: 2023-12-26

## 2023-12-26 VITALS — HEIGHT: 66 IN | WEIGHT: 182 LBS | BODY MASS INDEX: 29.25 KG/M2

## 2023-12-26 DIAGNOSIS — Z86.010 HISTORY OF COLON POLYPS: Primary | ICD-10-CM

## 2023-12-26 DIAGNOSIS — Z12.11 SPECIAL SCREENING FOR MALIGNANT NEOPLASMS, COLON: ICD-10-CM

## 2023-12-26 NOTE — PLAN OF CARE
Spoke to pt to schedule procedure(s) Colonoscopy       Physician to perform procedure(s) Dr. HUANG Forman  Date of Procedure (s) 4/29/24  Arrival Time 7:10 AM  Time of Procedure(s) 8:10 AM   Location of Procedure(s) [place] 4th Floor  Type of Rx Prep sent to patient: Miralax  Instructions provided to patient via [facility] and mail    Patient was informed on the following information and verbalized understanding. Screening questionnaire reviewed with patient and complete. If procedure requires anesthesia, a responsible adult needs to be present to accompany the patient home, patient cannot drive after receiving anesthesia. Appointment details are tentative, especially check-in time. Patient will receive a prep-op call 7 days prior to confirm check-in time for procedure. If applicable the patient should contact their pharmacy to verify Rx for procedure prep is ready for pick-up. Patient was advised to call the scheduling department at [phone] if pharmacy states no Rx is available. Patient was advised to call the endoscopy scheduling department if any questions or concerns arise.      [facility] Scheduling Department

## 2023-12-26 NOTE — TELEPHONE ENCOUNTER
Spoke to pt to schedule procedure(s) Colonoscopy       Physician to perform procedure(s) Dr. HUANG Forman  Date of Procedure (s) 4/29/24  Arrival Time 7:10 AM  Time of Procedure(s) 8:10 AM   Location of Procedure(s) [place] 4th Floor  Type of Rx Prep sent to patient: Miralax  Instructions provided to patient via [facility] and mail    Patient was informed on the following information and verbalized understanding. Screening questionnaire reviewed with patient and complete. If procedure requires anesthesia, a responsible adult needs to be present to accompany the patient home, patient cannot drive after receiving anesthesia. Appointment details are tentative, especially check-in time. Patient will receive a prep-op call 7 days prior to confirm check-in time for procedure. If applicable the patient should contact their pharmacy to verify Rx for procedure prep is ready for pick-up. Patient was advised to call the scheduling department at [phone] if pharmacy states no Rx is available. Patient was advised to call the endoscopy scheduling department if any questions or concerns arise.      [facility] Scheduling Department

## 2024-01-01 NOTE — TELEPHONE ENCOUNTER
Care Due:                  Date            Visit Type   Department     Provider  --------------------------------------------------------------------------------                                EP -                              PRIMARY      [facility] INTERNAL  Last Visit: 12-      CARE ([facility])   UZIEL Freedman                              EP -                              PRIMARY      [facility] INTERNAL  Next Visit: 03-      CARE ([facility])   [facility]       Dora Freedman                                                            Last  Test          Frequency    Reason                     Performed    Due Date  --------------------------------------------------------------------------------    Uric Acid...  12 months..  allopurinoL..............  Not Found    Overdue    [employer] Embedded Care Due Messages. Reference number: [id number].   1/01/2024 2:03:01 PM CST

## 2024-01-11 ENCOUNTER — OFFICE VISIT (OUTPATIENT)
Dept: ORTHOPEDICS | Facility: CLINIC | Age: 79
End: 2024-01-11
Payer: MEDICARE

## 2024-01-11 DIAGNOSIS — M65.322 TRIGGER FINGER, LEFT INDEX FINGER: Primary | ICD-10-CM

## 2024-01-11 DIAGNOSIS — G56.22 ULNAR NEUROPATHY AT ELBOW OF LEFT UPPER EXTREMITY: ICD-10-CM

## 2024-01-11 DIAGNOSIS — M65.342 TRIGGER FINGER, LEFT RING FINGER: ICD-10-CM

## 2024-01-11 DIAGNOSIS — M65.352 TRIGGER FINGER, LEFT LITTLE FINGER: ICD-10-CM

## 2024-01-11 DIAGNOSIS — M65.332 TRIGGER FINGER, LEFT MIDDLE FINGER: ICD-10-CM

## 2024-01-11 DIAGNOSIS — E11.21 TYPE 2 DIABETES MELLITUS WITH DIABETIC NEPHROPATHY, WITH LONG-TERM CURRENT USE OF INSULIN: ICD-10-CM

## 2024-01-11 DIAGNOSIS — Z79.4 TYPE 2 DIABETES MELLITUS WITH DIABETIC NEPHROPATHY, WITH LONG-TERM CURRENT USE OF INSULIN: ICD-10-CM

## 2024-01-11 DIAGNOSIS — N18.31 STAGE 3A CHRONIC KIDNEY DISEASE: ICD-10-CM

## 2024-01-11 PROCEDURE — 99214 OFFICE O/P EST MOD 30 MIN: CPT | Mod: S$PBB,HCNC,, | Performed by: ORTHOPAEDIC SURGERY

## 2024-01-11 PROCEDURE — 99999 PR PBB SHADOW E&M-EST. PATIENT-LVL IV: CPT | Mod: PBBFAC,HCNC,, | Performed by: ORTHOPAEDIC SURGERY

## 2024-01-11 RX ORDER — CEFAZOLIN SODIUM 2 G/50ML
2 SOLUTION INTRAVENOUS
Status: CANCELLED | OUTPATIENT
Start: 2024-01-11

## 2024-01-11 RX ORDER — MUPIROCIN 20 MG/G
OINTMENT TOPICAL
Status: CANCELLED | OUTPATIENT
Start: 2024-01-11

## 2024-01-11 NOTE — H&P
[facility]  History & Physical  Orthopedics     SUBJECTIVE:       COVID-19 attestation:  This patient was treated during the COVID-19 pandemic.  This was discussed with the patient, they are aware of our current policies and procedures, were given the option of delaying their visit and or switching to a virtual visit, delaying their surgery when applicable, and they elect to proceed.     Chief Complaint:  Left hand     Referring Provider: Dora Freedman MD      History of Present Illness:  Patient is a 78 y.o. right hand dominant female who presents today with complaints of left hand pain.  The patient is reporting today that her biggest complaint is pain to the A1 pulley regions of the left hand to the index long ring and small fingers with sparing of the thumb.  She does note some intermittent and less bothersome numbness and tingling to the left hand and is status post a remote left carpal tunnel release done approximately 50 years ago.  Of greatest concern is the pain in her left palm.  She presents today for initial evaluation by myself and has no other complaints.      The patient is a/an retired.     Onset of symptoms/DOI was many years ago with recent worsening.     Symptoms are aggravated by activity and movement.     Symptoms are alleviated by rest.     Symptoms consist of pain, swelling, decreased ROM, and numbness/tingling.     The patient rates their pain as a 6/10.     Attempted treatment(s) and/or interventions include activity modifications, rest, steroid injection.     The patient denies any fevers, chills, N/V, D/C and presents for evaluation.             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    Vitamin D deficiency disease              Past Surgical History:   Procedure Laterality Date     ANTERIOR CERVICAL DISCECTOMY W/ FUSION N/A 3/19/2020     Procedure: DISCECTOMY, SPINE, CERVICAL, ANTERIOR APPROACH, WITH FUSION, C3-C4, C4-C5, C5-C6;  Surgeon: Joseph Walton MD;  Location: [facility] OR 2ND FLR;  Service: Neurosurgery;  Laterality: N/A;    CARPAL TUNNEL RELEASE         bilateral    COLONOSCOPY N/A 3/7/2018     Procedure: COLONOSCOPY;  Surgeon: Luís Soni MD;  Location: [facility] ENDO (2ND FLR);  Service: Endoscopy;  Laterality: N/A;  ok to schedule per Elizabeth    COLONOSCOPY W/ BIOPSIES AND POLYPECTOMY        EPIDURAL STEROID INJECTION N/A 6/3/2022     Procedure: LESI L5-S1;  Surgeon: Leland Pena DO;  Location: [facility] OR;  Service: Pain Management;  Laterality: N/A;    EPIDURAL STEROID INJECTION INTO LUMBAR SPINE Left 10/26/2023     Procedure: LT L5-S1 TFESI;  Surgeon: Leland Pena DO;  Location: [facility] PAIN MANAGEMENT;  Service: Pain Management;  Laterality: Left;  15 mins    HEMORRHOID SURGERY        HYSTERECTOMY         AUB    INJECTION OF JOINT Bilateral 4/12/2022     Procedure: SACROILIAC JOINT Steroid Injection-Bilateral;  Surgeon: Leland Pena DO;  Location: [facility] OR;  Service: Pain Management;  Laterality: Bilateral;    ROTATOR CUFF REPAIR         bilateral    TRANSFORAMINAL EPIDURAL INJECTION OF STEROID Bilateral 12/2/2022     Procedure: TFESI (B/L) L4-5;  Surgeon: Leland Pena DO;  Location: [facility] OR;  Service: Pain Management;  Laterality: Bilateral;            Review of patient's allergies indicates:   Allergen Reactions    Gabapentin Other (See Comments)       ineffective    Januvia [sitagliptin] Other (See Comments)       Pancreatitis       Metformin Other (See Comments)       Nausea and vomiting with immediate release - tolerates extended release      Social History          Social History Narrative    Not on file            Family History   Problem Relation Age of Onset    Heart disease Mother      Diabetes Mother      Heart disease Father      Cancer  Father           liver    Diabetes Sister      Cataracts Sister      Kidney disease Brother      Heart disease Brother      Diabetes Sister      COPD Brother      COPD Brother      Gout Brother      Benign prostatic hyperplasia Brother      Heart disease Brother      Kidney disease Brother      Heart attack Brother           heart attack    Kidney failure Brother      Lupus Sister      Heart attack Brother      Drug abuse Brother      No Known Problems Daughter      No Known Problems Son      Amblyopia Neg Hx      Blindness Neg Hx      Breast cancer Neg Hx      Colon cancer Neg Hx      Ovarian cancer Neg Hx              Current Outpatient Medications:     alcohol swabs (DROPSAFE ALCOHOL PREP PADS) PadM, USE TWICE DAILY AS DIRECTED, E 11.9, Disp: 200 each, Rfl: 3    allopurinoL (ZYLOPRIM) 300 MG tablet, Take 1 tablet (300 mg total) by mouth once daily., Disp: 90 tablet, Rfl: 3    ALPRAZolam (XANAX) 0.25 MG tablet, One-two one hour prior to procedure then may repeat immediately prior to procedure, Disp: 4 tablet, Rfl: 0    amLODIPine (NORVASC) 10 MG tablet, TAKE 1 TABLET EVERY DAY, Disp: 90 tablet, Rfl: 3    aspirin (ECOTRIN) 81 MG EC tablet, Take 81 mg by mouth once daily., Disp: , Rfl:     atorvastatin (LIPITOR) 40 MG tablet, TAKE 1 TABLET ONE TIME DAILY, Disp: 90 tablet, Rfl: 3    blood glucose control, normal Soln, True Metrix control solution E11.9 - pt tests twice daily, Disp: 1 each, Rfl: 1    blood-glucose meter kit, Check glucose daily E11.9 meter covered by insurance Don Metrix, Disp: 1 each, Rfl: 0    DULoxetine (CYMBALTA) 30 MG capsule, Take 1 capsule (30 mg total) by mouth once daily., Disp: 90 capsule, Rfl: 1    empagliflozin (JARDIANCE) 10 mg tablet, Take 1 tablet (10 mg total) by mouth once daily., Disp: 90 tablet, Rfl: 4    insulin detemir U-100, Levemir, 100 unit/mL (3 mL) SubQ InPn pen, Inject 14 Units into the skin once daily., Disp: 15 mL, Rfl: 1    lancets (TRUEPLUS LANCETS) 28 gauge [facility], 1  "lancet by Misc.(Non-Drug; Combo Route) route 2 (two) times daily., Disp: 200 each, Rfl: 2    LIDOcaine (LIDODERM) 5 %, Place 1 patch onto the skin once daily. Remove & Discard patch within 12 hours or as directed by MD, Disp: 30 patch, Rfl: 2    losartan (COZAAR) 100 MG tablet, TAKE 1 TABLET (100 MG TOTAL) BY MOUTH ONCE DAILY., Disp: 90 tablet, Rfl: 2    metFORMIN (GLUCOPHAGE-XR) 500 MG ER 24hr tablet, Take 1 tablet (500 mg total) by mouth 2 (two) times daily with meals., Disp: 180 tablet, Rfl: 3    omeprazole (PRILOSEC) 20 MG capsule, TAKE 1 CAPSULE TWICE DAILY, Disp: 180 capsule, Rfl: 3    oxyCODONE-acetaminophen (PERCOCET) 5-325 mg per tablet, Take 1 tablet by mouth every 12 (twelve) hours as needed for Pain., Disp: 60 tablet, Rfl: 0    pen needle, diabetic (NOVOFINE 32) 32 gauge x 1/4" Ndle, Use with insulin 4 times a day., Disp: 500 each, Rfl: 1    pioglitazone (ACTOS) 30 MG tablet, TAKE 1 TABLET EVERY DAY, Disp: 90 tablet, Rfl: 0    pregabalin (LYRICA) 50 MG capsule, One at bedtime, Disp: 30 capsule, Rfl: 2    triamcinolone acetonide 0.1% (KENALOG) 0.1 % cream, APPLY TO THE AFFECTED AREA(S) ON LOWER LEGS TWICE DAILY AS NEEDED FOR ITCHY OR RED, Disp: 454 g, Rfl: 0    TRUE METRIX GLUCOSE TEST STRIP Strp, TEST BLOOD SUGAR TWICE DAILY, Disp: 200 strip, Rfl: 3  No current facility-administered medications for this visit.     Facility-Administered Medications Ordered in Other Visits:     0.9%  NaCl infusion, 500 mL, Intravenous, Continuous, Leland Pena DO        Review of Systems:  As per HPI otherwise noncontributory     OBJECTIVE:       Vital Signs (Most Recent):  Vitals   There were no vitals filed for this visit.     There is no height or weight on file to calculate BMI.        Physical Exam:  Constitutional: The patient appears well-developed and well-nourished. No distress.   Skin: No lesions appreciated  Head: Normocephalic and atraumatic.   Nose: Nose normal.   Ears: No deformities seen  Eyes: " Conjunctivae and EOM are normal.   Neck: No tracheal deviation present.   Cardiovascular: Normal rate and intact distal pulses.    Pulmonary/Chest: Effort normal. No respiratory distress.   Abdominal: There is no guarding.   Neurological: The patient is alert.   Psychiatric: The patient has a normal mood and affect.      Left Hand/Wrist Examination:     Observation/Inspection:  Swelling                       none                  Deformity                     none  Discoloration               none                  Scars                           none                  Atrophy                        None  Patient with marked tenderness to palpation at the A1 pulleys of the left index long ring and small fingers with blaire triggering and locking of the ring finger in particular     HAND/WRIST EXAMINATION:  Finkelstein's Test                                Neg  WHAT Test                                         Neg  Snuff box tenderness                          Neg  Ellis's Test                                     Neg  Hook of Hamate Tenderness              Neg  CMC grind                                           Neg  Circumduction test                              Neg     Neurovascular Exam:  Digits WWP, brisk CR < 3s throughout  NVI motor/LTS to M/R/U nerves, radial pulse 2+  Tinel's Test - Carpal Tunnel                Neg  Tinel's Test - Cubital Tunnel               Neg  Phalen's Test                                      Neg  Median Nerve Compression Test       Neg     ROM hand full, painless     ROM wrist full, painless    ROM elbow full, painless     Abdomen not guarded  Respirations nonlabored  Perfusion intact     Diagnostic Results:     Imaging -none recently  EMG - Impression:  1. 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 Carpal tunnel syndrome).  There is motor axonal loss on the right.  There is no active denervation.  This is graded as  Moderate-Severe in severity on the right, and Moderate on the left.      2. 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     ASSESSMENT/PLAN:       78 y.o. yo female with stenosing tenosynovitis/trigger finger left index long ring and small fingers greater than left recurrent carpal tunnel syndrome and Guyon's ulnar nerve compression         Plan: The patient and I had a thorough discussion today.  We discussed the working diagnosis as well as several other potential alternative diagnoses.  Treatment options were discussed, both conservative and surgical.  Conservative treatment options would include things such as activity modifications, workplace modifications, a period of rest, oral vs topical OTC and prescription anti-inflammatory medications, occupational therapy, splinting/bracing, immobilization, corticosteroid injections, and others.  Surgical options were discussed as well.      At this time, I held an extensive discussion with the patient and her .  She certainly does have some element of recurrent carpal tunnel syndrome as well as ulnar nerve compression at the wrist as per her EMG.  At some point in time it may be worthwhile considering surgery for that which we did discuss extensively today.  Of more pressing concern for her is the pain in the palm at her A1 pulleys and the triggering to the ring finger greater than the index long and small fingers.  As such, I believe the most reasonable thing to tackle from a surgical perspective at this point in time would be A1 pulley releases of the left index long ring and small fingers.  I believe this would be the most helpful thing in terms of pain relief for her.  We can always revisit the option of a revision left carpal tunnel release and or ulnar nerve decompression at the wrist and or elbow but at this time I can not elicit any symptoms on examination from a peripheral nerve compressive neuropathy standpoint.  She  would like to proceed with A1 pulley releases of the left index long ring and small fingers on February 2, 2024 which I feel is reasonable I will get this set up.       The patient has not responded to adequate non operative treatment at this time and/or non operative treatment is not indicated. Thus, the risks, benefits and alternatives to surgery were discussed with the patient in detail.  Specific risks include but are not limited to bleeding, infection, vessel and/or nerve damage, pain, numbness, tingling, complex regional pain syndrome, compartment syndrome, failure to return to pre-injury and/or preoperative functional status, scar sensitivity, delayed healing, inability to return to work, pulley injury, tendon injury, bowstringing, partial and/or incomplete relief of symptoms, weakness, persistence of and/or worsening of symptoms, surgical failure, osteomyelitis, amputation, loss of function, stiffness, functional debility, dysfunction, decreased  strength, need for prolonged postoperative rehabilitation, need for further surgery, deep venous thrombosis, pulmonary embolism, arthritis and death.  The patient states an understanding and wishes to proceed with surgery.   All questions were answered.  No guarantees were implied or stated.  Written informed consent was obtained.          Justus Isabel M.D.     Please be aware that this note has been generated with the assistance of [employer] voice-to-text.  Please excuse any spelling or grammatical errors.     Thank you for choosing Dr. Justus Isabel for your orthopedic hand and upper extremity care. It is our goal to provide you with exceptional care that will help keep you healthy, active, and get you back in the game.     If you felt that you received exemplary care today, please consider leaving feedback for Dr. Isabel on [email or web address] at [email or web address].     Please do not hesitate to reach out to us via email, phone,  or [employer] with any questions, concerns, or feedback.

## 2024-01-11 NOTE — H&P (VIEW-ONLY)
[facility]  History & Physical  Orthopedics     SUBJECTIVE:       COVID-19 attestation:  This patient was treated during the COVID-19 pandemic.  This was discussed with the patient, they are aware of our current policies and procedures, were given the option of delaying their visit and or switching to a virtual visit, delaying their surgery when applicable, and they elect to proceed.     Chief Complaint:  Left hand     Referring Provider: Dora Freedman MD      History of Present Illness:  Patient is a 78 y.o. right hand dominant female who presents today with complaints of left hand pain.  The patient is reporting today that her biggest complaint is pain to the A1 pulley regions of the left hand to the index long ring and small fingers with sparing of the thumb.  She does note some intermittent and less bothersome numbness and tingling to the left hand and is status post a remote left carpal tunnel release done approximately 50 years ago.  Of greatest concern is the pain in her left palm.  She presents today for initial evaluation by myself and has no other complaints.      The patient is a/an retired.     Onset of symptoms/DOI was many years ago with recent worsening.     Symptoms are aggravated by activity and movement.     Symptoms are alleviated by rest.     Symptoms consist of pain, swelling, decreased ROM, and numbness/tingling.     The patient rates their pain as a 6/10.     Attempted treatment(s) and/or interventions include activity modifications, rest, steroid injection.     The patient denies any fevers, chills, N/V, D/C and presents for evaluation.             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    Vitamin D deficiency disease              Past Surgical History:   Procedure Laterality Date     ANTERIOR CERVICAL DISCECTOMY W/ FUSION N/A 3/19/2020     Procedure: DISCECTOMY, SPINE, CERVICAL, ANTERIOR APPROACH, WITH FUSION, C3-C4, C4-C5, C5-C6;  Surgeon: Joseph Walton MD;  Location: [facility] OR 2ND FLR;  Service: Neurosurgery;  Laterality: N/A;    CARPAL TUNNEL RELEASE         bilateral    COLONOSCOPY N/A 3/7/2018     Procedure: COLONOSCOPY;  Surgeon: Luís Soni MD;  Location: [facility] ENDO (2ND FLR);  Service: Endoscopy;  Laterality: N/A;  ok to schedule per Elizabeth    COLONOSCOPY W/ BIOPSIES AND POLYPECTOMY        EPIDURAL STEROID INJECTION N/A 6/3/2022     Procedure: LESI L5-S1;  Surgeon: Leland Pena DO;  Location: [facility] OR;  Service: Pain Management;  Laterality: N/A;    EPIDURAL STEROID INJECTION INTO LUMBAR SPINE Left 10/26/2023     Procedure: LT L5-S1 TFESI;  Surgeon: Leland Pena DO;  Location: [facility] PAIN MANAGEMENT;  Service: Pain Management;  Laterality: Left;  15 mins    HEMORRHOID SURGERY        HYSTERECTOMY         AUB    INJECTION OF JOINT Bilateral 4/12/2022     Procedure: SACROILIAC JOINT Steroid Injection-Bilateral;  Surgeon: Leland Pena DO;  Location: [facility] OR;  Service: Pain Management;  Laterality: Bilateral;    ROTATOR CUFF REPAIR         bilateral    TRANSFORAMINAL EPIDURAL INJECTION OF STEROID Bilateral 12/2/2022     Procedure: TFESI (B/L) L4-5;  Surgeon: Leland Pena DO;  Location: [facility] OR;  Service: Pain Management;  Laterality: Bilateral;            Review of patient's allergies indicates:   Allergen Reactions    Gabapentin Other (See Comments)       ineffective    Januvia [sitagliptin] Other (See Comments)       Pancreatitis       Metformin Other (See Comments)       Nausea and vomiting with immediate release - tolerates extended release      Social History          Social History Narrative    Not on file            Family History   Problem Relation Age of Onset    Heart disease Mother      Diabetes Mother      Heart disease Father      Cancer  Father           liver    Diabetes Sister      Cataracts Sister      Kidney disease Brother      Heart disease Brother      Diabetes Sister      COPD Brother      COPD Brother      Gout Brother      Benign prostatic hyperplasia Brother      Heart disease Brother      Kidney disease Brother      Heart attack Brother           heart attack    Kidney failure Brother      Lupus Sister      Heart attack Brother      Drug abuse Brother      No Known Problems Daughter      No Known Problems Son      Amblyopia Neg Hx      Blindness Neg Hx      Breast cancer Neg Hx      Colon cancer Neg Hx      Ovarian cancer Neg Hx              Current Outpatient Medications:     alcohol swabs (DROPSAFE ALCOHOL PREP PADS) PadM, USE TWICE DAILY AS DIRECTED, E 11.9, Disp: 200 each, Rfl: 3    allopurinoL (ZYLOPRIM) 300 MG tablet, Take 1 tablet (300 mg total) by mouth once daily., Disp: 90 tablet, Rfl: 3    ALPRAZolam (XANAX) 0.25 MG tablet, One-two one hour prior to procedure then may repeat immediately prior to procedure, Disp: 4 tablet, Rfl: 0    amLODIPine (NORVASC) 10 MG tablet, TAKE 1 TABLET EVERY DAY, Disp: 90 tablet, Rfl: 3    aspirin (ECOTRIN) 81 MG EC tablet, Take 81 mg by mouth once daily., Disp: , Rfl:     atorvastatin (LIPITOR) 40 MG tablet, TAKE 1 TABLET ONE TIME DAILY, Disp: 90 tablet, Rfl: 3    blood glucose control, normal Soln, True Metrix control solution E11.9 - pt tests twice daily, Disp: 1 each, Rfl: 1    blood-glucose meter kit, Check glucose daily E11.9 meter covered by insurance Don Metrix, Disp: 1 each, Rfl: 0    DULoxetine (CYMBALTA) 30 MG capsule, Take 1 capsule (30 mg total) by mouth once daily., Disp: 90 capsule, Rfl: 1    empagliflozin (JARDIANCE) 10 mg tablet, Take 1 tablet (10 mg total) by mouth once daily., Disp: 90 tablet, Rfl: 4    insulin detemir U-100, Levemir, 100 unit/mL (3 mL) SubQ InPn pen, Inject 14 Units into the skin once daily., Disp: 15 mL, Rfl: 1    lancets (TRUEPLUS LANCETS) 28 gauge [facility], 1  "lancet by Misc.(Non-Drug; Combo Route) route 2 (two) times daily., Disp: 200 each, Rfl: 2    LIDOcaine (LIDODERM) 5 %, Place 1 patch onto the skin once daily. Remove & Discard patch within 12 hours or as directed by MD, Disp: 30 patch, Rfl: 2    losartan (COZAAR) 100 MG tablet, TAKE 1 TABLET (100 MG TOTAL) BY MOUTH ONCE DAILY., Disp: 90 tablet, Rfl: 2    metFORMIN (GLUCOPHAGE-XR) 500 MG ER 24hr tablet, Take 1 tablet (500 mg total) by mouth 2 (two) times daily with meals., Disp: 180 tablet, Rfl: 3    omeprazole (PRILOSEC) 20 MG capsule, TAKE 1 CAPSULE TWICE DAILY, Disp: 180 capsule, Rfl: 3    oxyCODONE-acetaminophen (PERCOCET) 5-325 mg per tablet, Take 1 tablet by mouth every 12 (twelve) hours as needed for Pain., Disp: 60 tablet, Rfl: 0    pen needle, diabetic (NOVOFINE 32) 32 gauge x 1/4" Ndle, Use with insulin 4 times a day., Disp: 500 each, Rfl: 1    pioglitazone (ACTOS) 30 MG tablet, TAKE 1 TABLET EVERY DAY, Disp: 90 tablet, Rfl: 0    pregabalin (LYRICA) 50 MG capsule, One at bedtime, Disp: 30 capsule, Rfl: 2    triamcinolone acetonide 0.1% (KENALOG) 0.1 % cream, APPLY TO THE AFFECTED AREA(S) ON LOWER LEGS TWICE DAILY AS NEEDED FOR ITCHY OR RED, Disp: 454 g, Rfl: 0    TRUE METRIX GLUCOSE TEST STRIP Strp, TEST BLOOD SUGAR TWICE DAILY, Disp: 200 strip, Rfl: 3  No current facility-administered medications for this visit.     Facility-Administered Medications Ordered in Other Visits:     0.9%  NaCl infusion, 500 mL, Intravenous, Continuous, Leland Pena DO        Review of Systems:  As per HPI otherwise noncontributory     OBJECTIVE:       Vital Signs (Most Recent):  Vitals   There were no vitals filed for this visit.     There is no height or weight on file to calculate BMI.        Physical Exam:  Constitutional: The patient appears well-developed and well-nourished. No distress.   Skin: No lesions appreciated  Head: Normocephalic and atraumatic.   Nose: Nose normal.   Ears: No deformities seen  Eyes: " Conjunctivae and EOM are normal.   Neck: No tracheal deviation present.   Cardiovascular: Normal rate and intact distal pulses.    Pulmonary/Chest: Effort normal. No respiratory distress.   Abdominal: There is no guarding.   Neurological: The patient is alert.   Psychiatric: The patient has a normal mood and affect.      Left Hand/Wrist Examination:     Observation/Inspection:  Swelling                       none                  Deformity                     none  Discoloration               none                  Scars                           none                  Atrophy                        None  Patient with marked tenderness to palpation at the A1 pulleys of the left index long ring and small fingers with blaire triggering and locking of the ring finger in particular     HAND/WRIST EXAMINATION:  Finkelstein's Test                                Neg  WHAT Test                                         Neg  Snuff box tenderness                          Neg  Ellis's Test                                     Neg  Hook of Hamate Tenderness              Neg  CMC grind                                           Neg  Circumduction test                              Neg     Neurovascular Exam:  Digits WWP, brisk CR < 3s throughout  NVI motor/LTS to M/R/U nerves, radial pulse 2+  Tinel's Test - Carpal Tunnel                Neg  Tinel's Test - Cubital Tunnel               Neg  Phalen's Test                                      Neg  Median Nerve Compression Test       Neg     ROM hand full, painless     ROM wrist full, painless    ROM elbow full, painless     Abdomen not guarded  Respirations nonlabored  Perfusion intact     Diagnostic Results:     Imaging -none recently  EMG - Impression:  1. 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 Carpal tunnel syndrome).  There is motor axonal loss on the right.  There is no active denervation.  This is graded as  Moderate-Severe in severity on the right, and Moderate on the left.      2. 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     ASSESSMENT/PLAN:       78 y.o. yo female with stenosing tenosynovitis/trigger finger left index long ring and small fingers greater than left recurrent carpal tunnel syndrome and Guyon's ulnar nerve compression         Plan: The patient and I had a thorough discussion today.  We discussed the working diagnosis as well as several other potential alternative diagnoses.  Treatment options were discussed, both conservative and surgical.  Conservative treatment options would include things such as activity modifications, workplace modifications, a period of rest, oral vs topical OTC and prescription anti-inflammatory medications, occupational therapy, splinting/bracing, immobilization, corticosteroid injections, and others.  Surgical options were discussed as well.      At this time, I held an extensive discussion with the patient and her .  She certainly does have some element of recurrent carpal tunnel syndrome as well as ulnar nerve compression at the wrist as per her EMG.  At some point in time it may be worthwhile considering surgery for that which we did discuss extensively today.  Of more pressing concern for her is the pain in the palm at her A1 pulleys and the triggering to the ring finger greater than the index long and small fingers.  As such, I believe the most reasonable thing to tackle from a surgical perspective at this point in time would be A1 pulley releases of the left index long ring and small fingers.  I believe this would be the most helpful thing in terms of pain relief for her.  We can always revisit the option of a revision left carpal tunnel release and or ulnar nerve decompression at the wrist and or elbow but at this time I can not elicit any symptoms on examination from a peripheral nerve compressive neuropathy standpoint.  She  would like to proceed with A1 pulley releases of the left index long ring and small fingers on February 2, 2024 which I feel is reasonable I will get this set up.       The patient has not responded to adequate non operative treatment at this time and/or non operative treatment is not indicated. Thus, the risks, benefits and alternatives to surgery were discussed with the patient in detail.  Specific risks include but are not limited to bleeding, infection, vessel and/or nerve damage, pain, numbness, tingling, complex regional pain syndrome, compartment syndrome, failure to return to pre-injury and/or preoperative functional status, scar sensitivity, delayed healing, inability to return to work, pulley injury, tendon injury, bowstringing, partial and/or incomplete relief of symptoms, weakness, persistence of and/or worsening of symptoms, surgical failure, osteomyelitis, amputation, loss of function, stiffness, functional debility, dysfunction, decreased  strength, need for prolonged postoperative rehabilitation, need for further surgery, deep venous thrombosis, pulmonary embolism, arthritis and death.  The patient states an understanding and wishes to proceed with surgery.   All questions were answered.  No guarantees were implied or stated.  Written informed consent was obtained.          Justus Isabel M.D.     Please be aware that this note has been generated with the assistance of [employer] voice-to-text.  Please excuse any spelling or grammatical errors.     Thank you for choosing Dr. Justus Isabel for your orthopedic hand and upper extremity care. It is our goal to provide you with exceptional care that will help keep you healthy, active, and get you back in the game.     If you felt that you received exemplary care today, please consider leaving feedback for Dr. Isabel on [employer] In at [email or web address].     Please do not hesitate to reach out to us via email, phone,  or [place] with any questions, concerns, or feedback.

## 2024-01-11 NOTE — PROGRESS NOTES
[facility]  History & Physical  Orthopedics    SUBJECTIVE:      COVID-19 attestation:  This patient was treated during the COVID-19 pandemic.  This was discussed with the patient, they are aware of our current policies and procedures, were given the option of delaying their visit and or switching to a virtual visit, delaying their surgery when applicable, and they elect to proceed.    Chief Complaint:  Left hand    Referring Provider: Dora Freedman MD     History of Present Illness:  Patient is a 78 y.o. right hand dominant female who presents today with complaints of left hand pain.  The patient is reporting today that her biggest complaint is pain to the A1 pulley regions of the left hand to the index long ring and small fingers with sparing of the thumb.  She does note some intermittent and less bothersome numbness and tingling to the left hand and is status post a remote left carpal tunnel release done approximately 50 years ago.  Of greatest concern is the pain in her left palm.  She presents today for initial evaluation by myself and has no other complaints.     The patient is a/an retired.    Onset of symptoms/DOI was many years ago with recent worsening.    Symptoms are aggravated by activity and movement.    Symptoms are alleviated by rest.    Symptoms consist of pain, swelling, decreased ROM, and numbness/tingling.    The patient rates their pain as a 6/10.    Attempted treatment(s) and/or interventions include activity modifications, rest, steroid injection.     The patient denies any fevers, chills, N/V, D/C and presents for evaluation.       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    Vitamin D deficiency disease      Past Surgical History:   Procedure Laterality Date    ANTERIOR CERVICAL DISCECTOMY W/  FUSION N/A 3/19/2020    Procedure: DISCECTOMY, SPINE, CERVICAL, ANTERIOR APPROACH, WITH FUSION, C3-C4, C4-C5, C5-C6;  Surgeon: Joseph Walton MD;  Location: [facility] OR 2ND FLR;  Service: Neurosurgery;  Laterality: N/A;    CARPAL TUNNEL RELEASE      bilateral    COLONOSCOPY N/A 3/7/2018    Procedure: COLONOSCOPY;  Surgeon: Luís Soni MD;  Location: [facility] ENDO (2ND FLR);  Service: Endoscopy;  Laterality: N/A;  ok to schedule per Elizabeth    COLONOSCOPY W/ BIOPSIES AND POLYPECTOMY      EPIDURAL STEROID INJECTION N/A 6/3/2022    Procedure: LESI L5-S1;  Surgeon: Leland Pena DO;  Location: [facility] OR;  Service: Pain Management;  Laterality: N/A;    EPIDURAL STEROID INJECTION INTO LUMBAR SPINE Left 10/26/2023    Procedure: LT L5-S1 TFESI;  Surgeon: Leland Pena DO;  Location: [facility] PAIN MANAGEMENT;  Service: Pain Management;  Laterality: Left;  15 mins    HEMORRHOID SURGERY      HYSTERECTOMY      AUB    INJECTION OF JOINT Bilateral 4/12/2022    Procedure: SACROILIAC JOINT Steroid Injection-Bilateral;  Surgeon: Leland Pena DO;  Location: [facility] OR;  Service: Pain Management;  Laterality: Bilateral;    ROTATOR CUFF REPAIR      bilateral    TRANSFORAMINAL EPIDURAL INJECTION OF STEROID Bilateral 12/2/2022    Procedure: TFESI (B/L) L4-5;  Surgeon: Leland Pena DO;  Location: [facility] OR;  Service: Pain Management;  Laterality: Bilateral;     Review of patient's allergies indicates:   Allergen Reactions    Gabapentin Other (See Comments)     ineffective    Januvia [sitagliptin] Other (See Comments)     Pancreatitis      Metformin Other (See Comments)     Nausea and vomiting with immediate release - tolerates extended release     Social History     Social History Narrative    Not on file     Family History   Problem Relation Age of Onset    Heart disease Mother     Diabetes Mother     Heart disease Father     Cancer Father         liver    Diabetes Sister     Cataracts Sister     Kidney disease  Brother     Heart disease Brother     Diabetes Sister     COPD Brother     COPD Brother     Gout Brother     Benign prostatic hyperplasia Brother     Heart disease Brother     Kidney disease Brother     Heart attack Brother         heart attack    Kidney failure Brother     Lupus Sister     Heart attack Brother     Drug abuse Brother     No Known Problems Daughter     No Known Problems Son     Amblyopia Neg Hx     Blindness Neg Hx     Breast cancer Neg Hx     Colon cancer Neg Hx     Ovarian cancer Neg Hx          Current Outpatient Medications:     alcohol swabs (DROPSAFE ALCOHOL PREP PADS) PadM, USE TWICE DAILY AS DIRECTED, E 11.9, Disp: 200 each, Rfl: 3    allopurinoL (ZYLOPRIM) 300 MG tablet, Take 1 tablet (300 mg total) by mouth once daily., Disp: 90 tablet, Rfl: 3    ALPRAZolam (XANAX) 0.25 MG tablet, One-two one hour prior to procedure then may repeat immediately prior to procedure, Disp: 4 tablet, Rfl: 0    amLODIPine (NORVASC) 10 MG tablet, TAKE 1 TABLET EVERY DAY, Disp: 90 tablet, Rfl: 3    aspirin (ECOTRIN) 81 MG EC tablet, Take 81 mg by mouth once daily., Disp: , Rfl:     atorvastatin (LIPITOR) 40 MG tablet, TAKE 1 TABLET ONE TIME DAILY, Disp: 90 tablet, Rfl: 3    blood glucose control, normal Soln, True Metrix control solution E11.9 - pt tests twice daily, Disp: 1 each, Rfl: 1    blood-glucose meter kit, Check glucose daily E11.9 meter covered by insurance Don Metrix, Disp: 1 each, Rfl: 0    DULoxetine (CYMBALTA) 30 MG capsule, Take 1 capsule (30 mg total) by mouth once daily., Disp: 90 capsule, Rfl: 1    empagliflozin (JARDIANCE) 10 mg tablet, Take 1 tablet (10 mg total) by mouth once daily., Disp: 90 tablet, Rfl: 4    insulin detemir U-100, Levemir, 100 unit/mL (3 mL) SubQ InPn pen, Inject 14 Units into the skin once daily., Disp: 15 mL, Rfl: 1    lancets (TRUEPLUS LANCETS) 28 gauge Misc, 1 lancet by Misc.(Non-Drug; Combo Route) route 2 (two) times daily., Disp: 200 each, Rfl: 2    LIDOcaine (LIDODERM)  "5 %, Place 1 patch onto the skin once daily. Remove & Discard patch within 12 hours or as directed by MD, Disp: 30 patch, Rfl: 2    losartan (COZAAR) 100 MG tablet, TAKE 1 TABLET (100 MG TOTAL) BY MOUTH ONCE DAILY., Disp: 90 tablet, Rfl: 2    metFORMIN (GLUCOPHAGE-XR) 500 MG ER 24hr tablet, Take 1 tablet (500 mg total) by mouth 2 (two) times daily with meals., Disp: 180 tablet, Rfl: 3    omeprazole (PRILOSEC) 20 MG capsule, TAKE 1 CAPSULE TWICE DAILY, Disp: 180 capsule, Rfl: 3    oxyCODONE-acetaminophen (PERCOCET) 5-325 mg per tablet, Take 1 tablet by mouth every 12 (twelve) hours as needed for Pain., Disp: 60 tablet, Rfl: 0    pen needle, diabetic (NOVOFINE 32) 32 gauge x 1/4" Ndle, Use with insulin 4 times a day., Disp: 500 each, Rfl: 1    pioglitazone (ACTOS) 30 MG tablet, TAKE 1 TABLET EVERY DAY, Disp: 90 tablet, Rfl: 0    pregabalin (LYRICA) 50 MG capsule, One at bedtime, Disp: 30 capsule, Rfl: 2    triamcinolone acetonide 0.1% (KENALOG) 0.1 % cream, APPLY TO THE AFFECTED AREA(S) ON LOWER LEGS TWICE DAILY AS NEEDED FOR ITCHY OR RED, Disp: 454 g, Rfl: 0    TRUE METRIX GLUCOSE TEST STRIP Strp, TEST BLOOD SUGAR TWICE DAILY, Disp: 200 strip, Rfl: 3  No current facility-administered medications for this visit.    Facility-Administered Medications Ordered in Other Visits:     0.9%  NaCl infusion, 500 mL, Intravenous, Continuous, Leland Pena DO      Review of Systems:  As per HPI otherwise noncontributory    OBJECTIVE:      Vital Signs (Most Recent):  There were no vitals filed for this visit.  There is no height or weight on file to calculate BMI.      Physical Exam:  Constitutional: The patient appears well-developed and well-nourished. No distress.   Skin: No lesions appreciated  Head: Normocephalic and atraumatic.   Nose: Nose normal.   Ears: No deformities seen  Eyes: Conjunctivae and EOM are normal.   Neck: No tracheal deviation present.   Cardiovascular: Normal rate and intact distal pulses.  "   Pulmonary/Chest: Effort normal. No respiratory distress.   Abdominal: There is no guarding.   Neurological: The patient is alert.   Psychiatric: The patient has a normal mood and affect.     Left Hand/Wrist Examination:    Observation/Inspection:  Swelling  none    Deformity  none  Discoloration  none     Scars   none    Atrophy  None  Patient with marked tenderness to palpation at the A1 pulleys of the left index long ring and small fingers with blaire triggering and locking of the ring finger in particular    HAND/WRIST EXAMINATION:  Finkelstein's Test   Neg  WHAT Test    Neg  Snuff box tenderness   Neg  Ellis's Test    Neg  Hook of Hamate Tenderness  Neg  CMC grind    Neg  Circumduction test   Neg    Neurovascular Exam:  Digits WWP, brisk CR < 3s throughout  NVI motor/LTS to M/R/U nerves, radial pulse 2+  Tinel's Test - Carpal Tunnel  Neg  Tinel's Test - Cubital Tunnel  Neg  Phalen's Test    Neg  Median Nerve Compression Test Neg    ROM hand full, painless    ROM wrist full, painless    ROM elbow full, painless    Abdomen not guarded  Respirations nonlabored  Perfusion intact    Diagnostic Results:     Imaging -none recently  EMG - Impression:  1. 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 Carpal tunnel syndrome).  There is motor axonal loss on the right.  There is no active denervation.  This is graded as Moderate-Severe in severity on the right, and Moderate on the left.      2. 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    ASSESSMENT/PLAN:      78 y.o. yo female with stenosing tenosynovitis/trigger finger left index long ring and small fingers greater than left recurrent carpal tunnel syndrome and Guyon's ulnar nerve compression       Plan: The patient and I had a thorough discussion today.  We discussed the working diagnosis as well as several other potential alternative diagnoses.  Treatment options were  discussed, both conservative and surgical.  Conservative treatment options would include things such as activity modifications, workplace modifications, a period of rest, oral vs topical OTC and prescription anti-inflammatory medications, occupational therapy, splinting/bracing, immobilization, corticosteroid injections, and others.  Surgical options were discussed as well.     At this time, I held an extensive discussion with the patient and her .  She certainly does have some element of recurrent carpal tunnel syndrome as well as ulnar nerve compression at the wrist as per her EMG.  At some point in time it may be worthwhile considering surgery for that which we did discuss extensively today.  Of more pressing concern for her is the pain in the palm at her A1 pulleys and the triggering to the ring finger greater than the index long and small fingers.  As such, I believe the most reasonable thing to tackle from a surgical perspective at this point in time would be A1 pulley releases of the left index long ring and small fingers.  I believe this would be the most helpful thing in terms of pain relief for her.  We can always revisit the option of a revision left carpal tunnel release and or ulnar nerve decompression at the wrist and or elbow but at this time I can not elicit any symptoms on examination from a peripheral nerve compressive neuropathy standpoint.  She would like to proceed with A1 pulley releases of the left index long ring and small fingers on February 2, 2024 which I feel is reasonable I will get this set up.      The patient has not responded to adequate non operative treatment at this time and/or non operative treatment is not indicated. Thus, the risks, benefits and alternatives to surgery were discussed with the patient in detail.  Specific risks include but are not limited to bleeding, infection, vessel and/or nerve damage, pain, numbness, tingling, complex regional pain syndrome,  compartment syndrome, failure to return to pre-injury and/or preoperative functional status, scar sensitivity, delayed healing, inability to return to work, pulley injury, tendon injury, bowstringing, partial and/or incomplete relief of symptoms, weakness, persistence of and/or worsening of symptoms, surgical failure, osteomyelitis, amputation, loss of function, stiffness, functional debility, dysfunction, decreased  strength, need for prolonged postoperative rehabilitation, need for further surgery, deep venous thrombosis, pulmonary embolism, arthritis and death.  The patient states an understanding and wishes to proceed with surgery.   All questions were answered.  No guarantees were implied or stated.  Written informed consent was obtained.        Justus Isabel M.D.    Please be aware that this note has been generated with the assistance of [employer] voice-to-text.  Please excuse any spelling or grammatical errors.    Thank you for choosing Dr. Justus Isabel for your orthopedic hand and upper extremity care. It is our goal to provide you with exceptional care that will help keep you healthy, active, and get you back in the game.     If you felt that you received exemplary care today, please consider leaving feedback for Dr. Isabel on [employer] at [email or web address].    Please do not hesitate to reach out to us via email, phone, or MyChart with any questions, concerns, or feedback.

## 2024-01-16 NOTE — TELEPHONE ENCOUNTER
----- Message from Jelena Sommer sent at 1/16/2024  2:21 PM CST -----  Contact: self  [phone]  Requesting an RX refill or new RX.  Is this a refill or new RX: refill  RX name and strength (copy/paste from chart):  oxyCODONE-acetaminophen (PERCOCET) 5-325 mg per tablet  Is this a 30 day or 90 day RX:   Pharmacy name and phone # (copy/paste from chart):  [id number] - [place], [place] - [address] [address] AT [facility] [address]  [place] [address]      The doctors have asked that we provide their patients with the following 2 reminders -- prescription refills can take up to 72 hours, and a friendly reminder that in the future you can use your [facility] account to request refills: yes    Please call and advise

## 2024-01-16 NOTE — TELEPHONE ENCOUNTER
No care due was identified.  [facility] Embedded Care Due Messages. Reference number: [id number].   1/16/2024 3:01:16 PM CST

## 2024-01-22 ENCOUNTER — ANESTHESIA EVENT (OUTPATIENT)
Dept: SURGERY | Facility: HOSPITAL | Age: 79
End: 2024-01-22
Payer: MEDICARE

## 2024-01-24 ENCOUNTER — PROCEDURE VISIT (OUTPATIENT)
Dept: NEUROLOGY | Facility: CLINIC | Age: 79
End: 2024-01-24
Payer: MEDICARE

## 2024-01-24 DIAGNOSIS — G56.22 ULNAR NEUROPATHY AT ELBOW OF LEFT UPPER EXTREMITY: ICD-10-CM

## 2024-01-24 PROCEDURE — 95909 NRV CNDJ TST 5-6 STUDIES: CPT | Mod: S$GLB,,, | Performed by: PHYSICAL MEDICINE & REHABILITATION

## 2024-01-24 PROCEDURE — 95886 MUSC TEST DONE W/N TEST COMP: CPT | Mod: LT,S$GLB,, | Performed by: PHYSICAL MEDICINE & REHABILITATION

## 2024-01-24 RX ORDER — DULOXETIN HYDROCHLORIDE 30 MG/1
30 CAPSULE, DELAYED RELEASE ORAL
Qty: 90 CAPSULE | Refills: 3 | Status: SHIPPED | OUTPATIENT
Start: 2024-01-24

## 2024-01-24 RX ORDER — OXYCODONE AND ACETAMINOPHEN 5; 325 MG/1; MG/1
1 TABLET ORAL EVERY 12 HOURS PRN
Qty: 60 TABLET | Refills: 0 | Status: ON HOLD | OUTPATIENT
Start: 2024-01-24 | End: 2024-02-02 | Stop reason: HOSPADM

## 2024-01-24 NOTE — PROCEDURES
Test Date:  2024    Patient: lina davis : 1945 Physician: Enrique Prado D.O.   ID#: [id number] SEX: Female Ref. Phys: Dora Freedman MD     HPI: Lina Davis is a 78 y.o.female who presents for NCS/EMG to evaluate for CTS and ulnar neuropathy on the left.  Hx of bilateral CTR in 2022.     NCV & EMG Findings:  Evaluation of the left median motor nerve showed prolonged distal onset latency.  The left median sensory nerve showed prolonged distal peak latency, reduced amplitude, and decreased conduction velocity.  All remaining nerves (as indicated in the following tables) were within normal limits.  All examined muscles (as indicated in the following table) showed no evidence of electrical instability.    Impression:  There is electrophysiologic evidence of a left sensorimotor median mononeuropathy across the wrist (I.e. Carpal tunnel syndrome).  There is no motor axonal loss.  There is no active denervation.  This is graded as Moderate in severity on the left.  When compared to the study from 2022, there has been improvement in distal latency, but slight worsening in sensory amplitude.  Note that the slowing at the wrist is relatively mild.  After successful release, mild slowing can persist indefinitely, so some of these changes could also be residual changes from the prior CTS.            ___________________________  Enrique Prado D.O.        NCS+  Motor Nerve Results      Latency Amplitude F-Lat Segment Distance CV Comment   Site (ms) Norm (mV) Norm (ms)  (cm) (m/s) Norm    Left Median (APB)   Wrist *4.6  < 4.4 5.0  > 3.8  Wrist-Palm - - -    Elbow 8.9 - 3.7 -  Elbow-Wrist 23 53  > 51    Left Ulnar (ADM)   Wrist 3.5  < 3.7 3.9  > 3.0         Bel Elbow 8.3 - 3.3 -  Bel Elbow-Wrist 26 54  > 52    Abv Elbow 10.1 - 3.3 -  Abv Elbow-Bel Elbow 10 56  > 43      Sensory Nerve Results      Latency (Peak) Amplitude (P-P) Segment Distance CV Comment   Site (ms) Norm (µV) Norm  (cm)  (m/s) Norm    Left Median   Wrist-Dig II *4.3  < 4.0 *6  > 8 Wrist-Dig II 14 *33  > 39    Left Ulnar   Wrist-Dig V 3.2  < 4.0 28  > 4 Wrist-Dig V 14 44  > 38    Left Radial   Forearm-Wrist 2.1  < 2.8 21  > 11 Forearm-Wrist 10 48 -      EMG+     Side Muscle Nerve Root Ins Act Fibs Psw Amp Dur Poly Recrt Int Pat Comment   Left Deltoid Axillary C5-C6 Nml Nml Nml Nml Nml 0 Nml Nml    Left Biceps Musculocut C5-C6 Nml Nml Nml Nml Nml 0 Nml Nml    Left Triceps Radial C6-C8 Nml Nml Nml Nml Nml 0 Nml Nml    Left Pronator Teres Median C6-C7 Nml Nml Nml Nml Nml 0 Nml Nml    Left APB Median C8-T1 Nml Nml Nml Nml Nml 0 Nml Nml            Waveforms:    Motor       Sensory

## 2024-01-30 RX ORDER — TRAMADOL HYDROCHLORIDE 50 MG/1
50 TABLET ORAL EVERY 6 HOURS
Qty: 20 TABLET | Refills: 0 | Status: SHIPPED | OUTPATIENT
Start: 2024-01-30 | End: 2024-02-15

## 2024-01-30 RX ORDER — ACETAMINOPHEN 500 MG
1000 TABLET ORAL 3 TIMES DAILY
Qty: 21 TABLET | Refills: 0 | Status: SHIPPED | OUTPATIENT
Start: 2024-01-30

## 2024-01-30 RX ORDER — IBUPROFEN 600 MG/1
600 TABLET ORAL 3 TIMES DAILY
Qty: 21 TABLET | Refills: 0 | Status: ON HOLD | OUTPATIENT
Start: 2024-01-30 | End: 2024-03-10 | Stop reason: HOSPADM

## 2024-02-01 ENCOUNTER — TELEPHONE (OUTPATIENT)
Dept: ORTHOPEDICS | Facility: CLINIC | Age: 79
End: 2024-02-01
Payer: MEDICARE

## 2024-02-01 ENCOUNTER — PATIENT MESSAGE (OUTPATIENT)
Dept: ORTHOPEDICS | Facility: CLINIC | Age: 79
End: 2024-02-01
Payer: MEDICARE

## 2024-02-01 NOTE — TELEPHONE ENCOUNTER
Spoke with the patient. Notified of 5 AM arrival time to the [facility].  Informed of current visitor policy.  Reminded of NPO and need for transportation. Patient verbalized understanding to all.    Betty Zee MS, OTC  Clinical Assistant to Dr. Ross Dunbar [facility]

## 2024-02-02 ENCOUNTER — HOSPITAL ENCOUNTER (OUTPATIENT)
Facility: HOSPITAL | Age: 79
Discharge: HOME | End: 2024-02-02
Attending: ORTHOPAEDIC SURGERY | Admitting: ORTHOPAEDIC SURGERY
Payer: MEDICARE

## 2024-02-02 ENCOUNTER — ANESTHESIA (OUTPATIENT)
Dept: SURGERY | Facility: HOSPITAL | Age: 79
End: 2024-02-02
Payer: MEDICARE

## 2024-02-02 DIAGNOSIS — G56.22 ULNAR NEUROPATHY AT ELBOW OF LEFT UPPER EXTREMITY: ICD-10-CM

## 2024-02-02 DIAGNOSIS — M65.352 TRIGGER FINGER, LEFT LITTLE FINGER: ICD-10-CM

## 2024-02-02 DIAGNOSIS — M65.342 TRIGGER FINGER, LEFT RING FINGER: ICD-10-CM

## 2024-02-02 DIAGNOSIS — M65.322 TRIGGER FINGER, LEFT INDEX FINGER: Primary | ICD-10-CM

## 2024-02-02 DIAGNOSIS — M65.332 TRIGGER FINGER, LEFT MIDDLE FINGER: ICD-10-CM

## 2024-02-02 PROBLEM — M65.30 TRIGGER FINGER, LEFT: Status: ACTIVE | Noted: 2024-02-02

## 2024-02-02 LAB
POCT GLUCOSE: 134 MG/DL (ref 70–110)
POCT GLUCOSE: 164 MG/DL (ref 70–110)

## 2024-02-02 PROCEDURE — 37000009 HC ANESTHESIA EA ADD 15 MINS: Performed by: ORTHOPAEDIC SURGERY

## 2024-02-02 PROCEDURE — 64415 NJX AA&/STRD BRCH PLXS IMG: CPT | Performed by: ANESTHESIOLOGY

## 2024-02-02 PROCEDURE — 25000003 PHARM REV CODE 250: Performed by: PHYSICIAN ASSISTANT

## 2024-02-02 PROCEDURE — 25000003 PHARM REV CODE 250: Performed by: ORTHOPAEDIC SURGERY

## 2024-02-02 PROCEDURE — 82962 GLUCOSE BLOOD TEST: CPT | Performed by: ORTHOPAEDIC SURGERY

## 2024-02-02 PROCEDURE — 63600175 PHARM REV CODE 636 W HCPCS: Performed by: PHYSICIAN ASSISTANT

## 2024-02-02 PROCEDURE — 27201423 OPTIME MED/SURG SUP & DEVICES STERILE SUPPLY: Performed by: ORTHOPAEDIC SURGERY

## 2024-02-02 PROCEDURE — D9220A PRA ANESTHESIA: Mod: ANES,,, | Performed by: ANESTHESIOLOGY

## 2024-02-02 PROCEDURE — 71000015 HC POSTOP RECOV 1ST HR: Performed by: ORTHOPAEDIC SURGERY

## 2024-02-02 PROCEDURE — 25000003 PHARM REV CODE 250: Performed by: NURSE ANESTHETIST, CERTIFIED REGISTERED

## 2024-02-02 PROCEDURE — 26055 INCISE FINGER TENDON SHEATH: CPT | Mod: 51,F4,HCNC, | Performed by: ORTHOPAEDIC SURGERY

## 2024-02-02 PROCEDURE — 94761 N-INVAS EAR/PLS OXIMETRY MLT: CPT

## 2024-02-02 PROCEDURE — 99900035 HC TECH TIME PER 15 MIN (STAT)

## 2024-02-02 PROCEDURE — 63600175 PHARM REV CODE 636 W HCPCS: Performed by: ANESTHESIOLOGY

## 2024-02-02 PROCEDURE — 36000707: Performed by: ORTHOPAEDIC SURGERY

## 2024-02-02 PROCEDURE — D9220A PRA ANESTHESIA: Mod: CRNA,,, | Performed by: NURSE ANESTHETIST, CERTIFIED REGISTERED

## 2024-02-02 PROCEDURE — 63600175 PHARM REV CODE 636 W HCPCS: Performed by: ORTHOPAEDIC SURGERY

## 2024-02-02 PROCEDURE — 37000008 HC ANESTHESIA 1ST 15 MINUTES: Performed by: ORTHOPAEDIC SURGERY

## 2024-02-02 PROCEDURE — 63600175 PHARM REV CODE 636 W HCPCS: Performed by: NURSE ANESTHETIST, CERTIFIED REGISTERED

## 2024-02-02 PROCEDURE — 71000033 HC RECOVERY, INTIAL HOUR: Performed by: ORTHOPAEDIC SURGERY

## 2024-02-02 PROCEDURE — 36000706: Performed by: ORTHOPAEDIC SURGERY

## 2024-02-02 PROCEDURE — 25000003 PHARM REV CODE 250: Performed by: STUDENT IN AN ORGANIZED HEALTH CARE EDUCATION/TRAINING PROGRAM

## 2024-02-02 RX ORDER — CELECOXIB 200 MG/1
400 CAPSULE ORAL
Status: ACTIVE | OUTPATIENT
Start: 2024-02-02 | End: 2024-02-02

## 2024-02-02 RX ORDER — ONDANSETRON HYDROCHLORIDE 2 MG/ML
4 INJECTION, SOLUTION INTRAVENOUS ONCE AS NEEDED
Status: DISCONTINUED | OUTPATIENT
Start: 2024-02-02 | End: 2024-02-02 | Stop reason: HOSPADM

## 2024-02-02 RX ORDER — FENTANYL CITRATE 50 UG/ML
100 INJECTION, SOLUTION INTRAMUSCULAR; INTRAVENOUS
Status: DISPENSED | OUTPATIENT
Start: 2024-02-02

## 2024-02-02 RX ORDER — DEXAMETHASONE SODIUM PHOSPHATE 4 MG/ML
INJECTION, SOLUTION INTRA-ARTICULAR; INTRALESIONAL; INTRAMUSCULAR; INTRAVENOUS; SOFT TISSUE
Status: DISCONTINUED | OUTPATIENT
Start: 2024-02-02 | End: 2024-02-02

## 2024-02-02 RX ORDER — PROPOFOL 10 MG/ML
VIAL (ML) INTRAVENOUS CONTINUOUS PRN
Status: DISCONTINUED | OUTPATIENT
Start: 2024-02-02 | End: 2024-02-02

## 2024-02-02 RX ORDER — PROPOFOL 10 MG/ML
VIAL (ML) INTRAVENOUS
Status: DISCONTINUED | OUTPATIENT
Start: 2024-02-02 | End: 2024-02-02

## 2024-02-02 RX ORDER — ONDANSETRON HYDROCHLORIDE 2 MG/ML
INJECTION, SOLUTION INTRAVENOUS
Status: DISCONTINUED | OUTPATIENT
Start: 2024-02-02 | End: 2024-02-02

## 2024-02-02 RX ORDER — ROPIVACAINE HYDROCHLORIDE 5 MG/ML
INJECTION, SOLUTION EPIDURAL; INFILTRATION; PERINEURAL
Status: COMPLETED | OUTPATIENT
Start: 2024-02-02 | End: 2024-02-02

## 2024-02-02 RX ORDER — MUPIROCIN 20 MG/G
OINTMENT TOPICAL 2 TIMES DAILY
Status: DISCONTINUED | OUTPATIENT
Start: 2024-02-02 | End: 2024-02-02 | Stop reason: HOSPADM

## 2024-02-02 RX ORDER — MIDAZOLAM HYDROCHLORIDE 1 MG/ML
1 INJECTION INTRAMUSCULAR; INTRAVENOUS
Status: DISPENSED | OUTPATIENT
Start: 2024-02-02

## 2024-02-02 RX ORDER — SODIUM CHLORIDE 0.9 % (FLUSH) 0.9 %
10 SYRINGE (ML) INJECTION
Status: DISCONTINUED | OUTPATIENT
Start: 2024-02-02 | End: 2024-02-02 | Stop reason: HOSPADM

## 2024-02-02 RX ORDER — MUPIROCIN 20 MG/G
OINTMENT TOPICAL
Status: DISCONTINUED | OUTPATIENT
Start: 2024-02-02 | End: 2024-02-02 | Stop reason: HOSPADM

## 2024-02-02 RX ORDER — LIDOCAINE HYDROCHLORIDE 20 MG/ML
INJECTION INTRAVENOUS
Status: DISCONTINUED | OUTPATIENT
Start: 2024-02-02 | End: 2024-02-02

## 2024-02-02 RX ORDER — ACETAMINOPHEN 500 MG
1000 TABLET ORAL
Status: COMPLETED | OUTPATIENT
Start: 2024-02-02 | End: 2024-02-02

## 2024-02-02 RX ORDER — SODIUM CHLORIDE 0.9 % (FLUSH) 0.9 %
3 SYRINGE (ML) INJECTION
Status: DISCONTINUED | OUTPATIENT
Start: 2024-02-02 | End: 2024-02-02 | Stop reason: HOSPADM

## 2024-02-02 RX ORDER — HYDROCODONE BITARTRATE AND ACETAMINOPHEN 5; 325 MG/1; MG/1
1 TABLET ORAL EVERY 4 HOURS PRN
Status: DISCONTINUED | OUTPATIENT
Start: 2024-02-02 | End: 2024-02-02 | Stop reason: HOSPADM

## 2024-02-02 RX ADMIN — ROPIVACAINE HYDROCHLORIDE 30 ML: 5 INJECTION EPIDURAL; INFILTRATION; PERINEURAL at 06:02

## 2024-02-02 RX ADMIN — ONDANSETRON 4 MG: 2 INJECTION INTRAMUSCULAR; INTRAVENOUS at 07:02

## 2024-02-02 RX ADMIN — DEXAMETHASONE SODIUM PHOSPHATE 4 MG: 4 INJECTION, SOLUTION INTRAMUSCULAR; INTRAVENOUS at 07:02

## 2024-02-02 RX ADMIN — MUPIROCIN: 20 OINTMENT TOPICAL at 06:02

## 2024-02-02 RX ADMIN — ACETAMINOPHEN 1000 MG: 500 TABLET ORAL at 06:02

## 2024-02-02 RX ADMIN — PROPOFOL 30 MG: 10 INJECTION, EMULSION INTRAVENOUS at 07:02

## 2024-02-02 RX ADMIN — MIDAZOLAM 1 MG: 1 INJECTION INTRAMUSCULAR; INTRAVENOUS at 06:02

## 2024-02-02 RX ADMIN — SODIUM CHLORIDE: 0.9 INJECTION, SOLUTION INTRAVENOUS at 06:02

## 2024-02-02 RX ADMIN — PROPOFOL 50 MCG/KG/MIN: 10 INJECTION, EMULSION INTRAVENOUS at 07:02

## 2024-02-02 RX ADMIN — LIDOCAINE HYDROCHLORIDE 75 MG: 20 INJECTION INTRAVENOUS at 07:02

## 2024-02-02 RX ADMIN — FENTANYL CITRATE 100 MCG: 50 INJECTION, SOLUTION INTRAMUSCULAR; INTRAVENOUS at 06:02

## 2024-02-02 RX ADMIN — CEFAZOLIN 2 G: 2 INJECTION, POWDER, FOR SOLUTION INTRAMUSCULAR; INTRAVENOUS at 07:02

## 2024-02-02 NOTE — DISCHARGE SUMMARY
[facility] (Hospital)  Discharge Note  Short Stay    Procedure(s) (LRB):  RELEASE, TRIGGER FINGER - left index, long, ring, and small fingers (Left)      OUTCOME: Patient tolerated treatment/procedure well without complication and is now ready for discharge.    DISPOSITION: Home or Self Care    FINAL DIAGNOSIS:  Trigger finger, left    FOLLOWUP: In clinic    DISCHARGE INSTRUCTIONS:    Discharge Procedure Orders   Diet general     Call MD for:  temperature >100.4     Call MD for:  persistent nausea and vomiting     Call MD for:  severe uncontrolled pain     Call MD for:  difficulty breathing, headache or visual disturbances     Call MD for:  redness, tenderness, or signs of infection (pain, swelling, redness, odor or green/yellow discharge around incision site)     Call MD for:  hives     Call MD for:  persistent dizziness or light-headedness     Call MD for:  extreme fatigue        TIME SPENT ON DISCHARGE: 10 minutes

## 2024-02-02 NOTE — TRANSFER OF CARE
"Anesthesia Transfer of Care Note    Patient: Lina Davis    Procedure(s) Performed: Procedure(s) (LRB):  RELEASE, TRIGGER FINGER - left index, long, ring, and small fingers (Left)    Patient location: PACU    Anesthesia Type: general    Transport from OR: Transported from OR on room air with adequate spontaneous ventilation    Post pain: adequate analgesia    Post assessment: no apparent anesthetic complications and tolerated procedure well    Post vital signs: stable    Level of consciousness: awake, alert and oriented    Nausea/Vomiting: no nausea/vomiting    Complications: none    Transfer of care protocol was followed      Last vitals: Visit Vitals  BP (!) 181/80 (BP Location: Right arm, Patient Position: Lying)   Pulse 93   Temp 36.6 °C (97.9 °F) (Temporal)   Resp 20   Ht 5' 6" (1.676 m)   Wt 82.6 kg (182 lb)   SpO2 97%   Breastfeeding No   BMI 29.38 kg/m²     "

## 2024-02-02 NOTE — PLAN OF CARE
Pre op complete. Questions answered. Resting comfortably. Call light in reach. Personal belongings placed in locker. Daughter needs a 30 minute phone call,  in waiting room but does not drive.

## 2024-02-02 NOTE — ANESTHESIA PREPROCEDURE EVALUATION
02/02/2024    Pre-operative evaluation for Procedure(s) (LRB):  RELEASE, TRIGGER FINGER - left index, long, ring, and small fingers (Left)    Lina Davis is a 78 y.o. female     Patient Active Problem List   Diagnosis    Hyperlipidemia, mixed    HTN (hypertension), benign    Proteinuria    Facet arthropathy    DDD (degenerative disc disease), lumbar    Diabetic polyneuropathy    Diverticulosis    Menopausal symptoms    GERD (gastroesophageal reflux disease)    Type 2 diabetes mellitus with diabetic nephropathy    Complete tear of right rotator cuff    Decreased ROM of right shoulder    Stenosis of left carotid artery    Atherosclerosis of aorta    History of gout    Left arm pain    Anemia    Family history of heart disease    History of fatty infiltration of liver    Cervical stenosis of spinal canal    Obesity (BMI 30.0-34.9)    CKD (chronic kidney disease) stage 3, GFR 30-59 ml/min    Bilateral carotid artery disease    Hypercalcemia    Impaired functional mobility and activity tolerance    Thyroid nodule    Bilateral sacroiliitis    Other specified diseases of spinal cord       Review of patient's allergies indicates:   Allergen Reactions    Gabapentin Other (See Comments)     ineffective    Januvia [sitagliptin] Other (See Comments)     Pancreatitis      Metformin Other (See Comments)     Nausea and vomiting with immediate release - tolerates extended release       Current Facility-Administered Medications on File Prior to Encounter   Medication Dose Route Frequency Provider Last Rate Last Admin    0.9%  NaCl infusion  500 mL Intravenous Continuous Leland Pena,          Current Outpatient Medications on File Prior to Encounter   Medication Sig Dispense Refill    allopurinoL (ZYLOPRIM) 300 MG tablet Take 1 tablet (300 mg total) by mouth once daily. 90 tablet 3    ALPRAZolam (XANAX) 0.25 MG  "tablet One-two one hour prior to procedure then may repeat immediately prior to procedure 4 tablet 0    amLODIPine (NORVASC) 10 MG tablet TAKE 1 TABLET EVERY DAY 90 tablet 3    aspirin (ECOTRIN) 81 MG EC tablet Take 81 mg by mouth once daily.      atorvastatin (LIPITOR) 40 MG tablet TAKE 1 TABLET ONE TIME DAILY 90 tablet 3    empagliflozin (JARDIANCE) 10 mg tablet Take 1 tablet (10 mg total) by mouth once daily. 90 tablet 4    insulin detemir U-100, Levemir, 100 unit/mL (3 mL) SubQ InPn pen Inject 14 Units into the skin once daily. 15 mL 1    losartan (COZAAR) 100 MG tablet TAKE 1 TABLET (100 MG TOTAL) BY MOUTH ONCE DAILY. 90 tablet 2    metFORMIN (GLUCOPHAGE-XR) 500 MG ER 24hr tablet Take 1 tablet (500 mg total) by mouth 2 (two) times daily with meals. 180 tablet 3    omeprazole (PRILOSEC) 20 MG capsule TAKE 1 CAPSULE TWICE DAILY 180 capsule 3    pioglitazone (ACTOS) 30 MG tablet TAKE 1 TABLET EVERY DAY 90 tablet 0    pregabalin (LYRICA) 50 MG capsule One at bedtime 30 capsule 2    alcohol swabs (DROPSAFE ALCOHOL PREP PADS) PadM USE TWICE DAILY AS DIRECTED, E 11.9 200 each 3    blood glucose control, normal Soln True Metrix control solution E11.9 - pt tests twice daily 1 each 1    blood-glucose meter kit Check glucose daily E11.9 meter covered by insurance Don Metrix 1 each 0    DULoxetine (CYMBALTA) 30 MG capsule TAKE 1 CAPSULE ONE TIME DAILY 90 capsule 3    lancets (TRUEPLUS LANCETS) 28 gauge Misc 1 lancet by Misc.(Non-Drug; Combo Route) route 2 (two) times daily. 200 each 2    LIDOcaine (LIDODERM) 5 % Place 1 patch onto the skin once daily. Remove & Discard patch within 12 hours or as directed by MD 30 patch 2    pen needle, diabetic (NOVOFINE 32) 32 gauge x 1/4" Ndle Use with insulin 4 times a day. 500 each 1    triamcinolone acetonide 0.1% (KENALOG) 0.1 % cream APPLY TO THE AFFECTED AREA(S) ON LOWER LEGS TWICE DAILY AS NEEDED FOR ITCHY OR  g 0    TRUE METRIX GLUCOSE TEST STRIP Strp TEST BLOOD SUGAR " TWICE DAILY 200 strip 3       Past Surgical History:   Procedure Laterality Date    ANTERIOR CERVICAL DISCECTOMY W/ FUSION N/A 3/19/2020    Procedure: DISCECTOMY, SPINE, CERVICAL, ANTERIOR APPROACH, WITH FUSION, C3-C4, C4-C5, C5-C6;  Surgeon: Joseph Walton MD;  Location: [facility] OR 2ND FLR;  Service: Neurosurgery;  Laterality: N/A;    CARPAL TUNNEL RELEASE      bilateral    COLONOSCOPY N/A 3/7/2018    Procedure: COLONOSCOPY;  Surgeon: Luís Soni MD;  Location: [facility] ENDO (2ND FLR);  Service: Endoscopy;  Laterality: N/A;  ok to schedule per Elizabeth    COLONOSCOPY W/ BIOPSIES AND POLYPECTOMY      EPIDURAL STEROID INJECTION N/A 6/3/2022    Procedure: LESI L5-S1;  Surgeon: Leland Pena DO;  Location: [facility] OR;  Service: Pain Management;  Laterality: N/A;    EPIDURAL STEROID INJECTION INTO LUMBAR SPINE Left 10/26/2023    Procedure: LT L5-S1 TFESI;  Surgeon: Leland Pena DO;  Location: [facility];  Service: Pain Management;  Laterality: Left;  15 mins    HEMORRHOID SURGERY      HYSTERECTOMY      AUB    INJECTION OF JOINT Bilateral 4/12/2022    Procedure: SACROILIAC JOINT Steroid Injection-Bilateral;  Surgeon: Leladn Pena DO;  Location: [facility] OR;  Service: Pain Management;  Laterality: Bilateral;    ROTATOR CUFF REPAIR      bilateral    TRANSFORAMINAL EPIDURAL INJECTION OF STEROID Bilateral 12/2/2022    Procedure: TFESI (B/L) L4-5;  Surgeon: Leland Pena DO;  Location: [facility] OR;  Service: Pain Management;  Laterality: Bilateral;       Social History     Socioeconomic History    Marital status:    Tobacco Use    Smoking status: Never    Smokeless tobacco: Never   Substance and Sexual Activity    Alcohol use: No    Drug use: No    Sexual activity: Yes     Partners: Male     Birth control/protection: Surgical       EKG:  Normal sinus rhythm with sinus arrhythmia   Voltage criteria for left ventricular hypertrophy   Nonspecific T wave abnormality   Abnormal ECG   When  compared with ECG of 31-JAN-2020 10:24,   No significant change was found   Confirmed by TRI DUKES MD ([id number]) on 12/5/2020 11:20:09 AM       2D Echo:  No results found. However, due to the size of the patient record, not all encounters were searched. Please check Results Review for a complete set of results.        Pre-op Assessment    I have reviewed the Patient Summary Reports.     I have reviewed the Nursing Notes. I have reviewed the NPO Status.   I have reviewed the Medications.     Review of Systems  Anesthesia Hx:             Denies Family Hx of Anesthesia complications.    Denies Personal Hx of Anesthesia complications.                    Cardiovascular:  Exercise tolerance: good   Hypertension       Denies Angina.       Denies PETERS.                            Pulmonary:  Denies Pneumonia  Denies COPD.     Denies Recent URI.                 Renal/:  Chronic Renal Disease, CKD                Hepatic/GI:     GERD             Musculoskeletal:  Arthritis               Neurological:    Denies CVA.    Denies Seizures.          Peripheral Neuropathy                          Endocrine:  Diabetes, well controlled, type 2               Physical Exam  General: Well nourished, Cooperative, Alert and Oriented    Airway:  Mallampati: III / II  Mouth Opening: Normal  TM Distance: Normal  Tongue: Normal  Neck ROM: Normal ROM    Dental:  Intact    Chest/Lungs:  Clear to auscultation, Normal Respiratory Rate    Heart:  Rate: Normal  Rhythm: Regular Rhythm        Anesthesia Plan  Type of Anesthesia, risks & benefits discussed:    Anesthesia Type: Gen Natural Airway, Regional  Intra-op Monitoring Plan: Standard ASA Monitors  Post Op Pain Control Plan: multimodal analgesia and peripheral nerve block  Induction:  IV  Informed Consent: Informed consent signed with the Patient and all parties understand the risks and agree with anesthesia plan.  All questions answered. Patient consented to blood products? Yes  ASA Score:  2  Day of Surgery Review of History & Physical: H&P Update referred to the surgeon/provider.    Ready For Surgery From Anesthesia Perspective.     .

## 2024-02-02 NOTE — ANESTHESIA PROCEDURE NOTES
Supraclavicular Single Shot Nerve Block    Patient location during procedure: pre-op   Block not for primary anesthetic.  Reason for block: at surgeon's request and post-op pain management   Post-op Pain Location: L arm pain   Start time: 2/2/2024 6:40 AM  Timeout: 2/2/2024 6:40 AM   End time: 2/2/2024 6:42 AM    Staffing  Authorizing Provider: William Trujillo MD  Performing Provider: William Trujillo MD    Staffing  Performed by: William Trujillo MD  Authorized by: William Trujillo MD    Preanesthetic Checklist  Completed: patient identified, IV checked, site marked, risks and benefits discussed, surgical consent, monitors and equipment checked, pre-op evaluation and timeout performed  Peripheral Block  Patient position: supine  Prep: ChloraPrep  Patient monitoring: heart rate, cardiac monitor, continuous pulse ox, continuous capnometry and frequent blood pressure checks  Block type: supraclavicular  Laterality: left  Injection technique: single shot  Needle  Needle type: Stimuplex   Needle gauge: 22 G  Needle length: 2 in  Needle localization: anatomical landmarks and ultrasound guidance   -ultrasound image captured on disc.  Assessment  Injection assessment: negative aspiration, negative parasthesia and local visualized surrounding nerve  Paresthesia pain: none  Heart rate change: no  Slow fractionated injection: yes  Pain Tolerance: comfortable throughout block and no complaints  Medications:    Medications: ropivacaine (NAROPIN) injection 0.5% - Perineural   30 mL - 2/2/2024 6:42:00 AM    Additional Notes  VSS.  [facility] RN monitoring vitals throughout procedure.  Patient tolerated procedure well.

## 2024-02-02 NOTE — DISCHARGE INSTRUCTIONS
"You will be discharged on a "multi-modal" pain regimen. This means that different medications are used to control your pain. Each medication works differently to control your pain so that your pain control is optimized.    You have been prescribed Ultram (Tramadol). You have been prescribed 20 pills. You may also take Motrin (Ibuprofen) and Tyelnol (Acetaminophen).    Norco and Percocet contain tylenol, do not take tylenol with these medications if you were prescribed them.     Do not bear weight on the operative extremity.    Leave your dressing on until your follow up appointment.     Call us if you experience: fever, chills, chest pain, shortness of breath, severe headache, pain not relieved by oral medications, increased pain and swelling at the operative site, or any other questions or concerns. We are happy to assist you at any time.       AFTER HAND SURGERY    DOS:  Keep affected wrist elevated above the level of your heart  Check circulation frequently in fingers by pressing on the nail bed. Nail bed should turn white and then pink when released.  Exercise fingers to promote circulation.  Advance diet as tolerated  Resume home medications today.  In 5 days, you  may shower after you remove the nimbus pain pump.  Keep sling on until you remove the nimbus pain pump and regain your feeling.      DONT:  No driving for 24 hours or while taking narcotic pain medication      CALL PHYSICIAN FOR:  Obvious bleeding  Excessive swelling  Drainage (pus) from the wound  Pain unrelieved by pain medication.       "

## 2024-02-02 NOTE — OR NURSING
VSS.  Patient tolerating oral liquids without difficulty.   No apparent s&s of distress noted at this time, no complaints voiced at this time.   Discharge instructions reviewed with patient/family/friend with good verbal feedback received.   Post op medications delivered to bedside, DME not needed.  Patient ready for discharge.

## 2024-02-05 VITALS
RESPIRATION RATE: 25 BRPM | OXYGEN SATURATION: 95 % | HEIGHT: 66 IN | WEIGHT: 182 LBS | DIASTOLIC BLOOD PRESSURE: 71 MMHG | TEMPERATURE: 98 F | HEART RATE: 81 BPM | SYSTOLIC BLOOD PRESSURE: 156 MMHG | BODY MASS INDEX: 29.25 KG/M2

## 2024-02-05 NOTE — OP NOTE
DATE OF PROCEDURE:  02/05/2024     SERVICE:  Orthopedics.     SURGEON:  Justus Isabel M.D.     FIRST ASSISTANT:  SMA Saadia     PREOPERATIVE DIAGNOSIS:    1) left index finger trigger finger.  2) left long finger trigger finger  3) left ring finger trigger finger  4) left small finger trigger finger     POSTOPERATIVE DIAGNOSIS:    1) left index finger trigger finger.  2) left long finger trigger finger  3) left ring finger trigger finger  4) left small finger trigger finger     PROCEDURE PERFORMED:    1) A1 pulley release of left index finger.  2) A1 pulley release of left long finger.  3) A1 pulley release of left ring finger.  4) A1 pulley release of left small finger.     ANESTHESIA:  Regional.     ESTIMATED BLOOD LOSS:  3 mL.     SPECIMENS:  None.     IMPLANTS:  None.     COMPLICATIONS:  None.     INTRAVENOUS FLUIDS:  Crystalloid.     TOURNIQUET TIME:  18 minutes at 250mmHg.     INDICATIONS FOR PROCEDURE:  The patient is a 78-year-old female who   presented to the Orthopedics Clinic complaining of significantly symptomatic   Trigger fingers of the left index long ring and small fingers.  The risks, benefits and alternatives to surgery were discussed with the patient in detail.  Specific risks discussed include but are not limited to bleeding, infection, vessel and/or nerve damage, pain, numbness, tingling, complex regional pain syndrome, compartment syndrome, failure to return to pre-injury and/or preoperative functional status, scar sensitivity, delayed healing, inability to return to work, pulley injury, tendon injury, bowstringing, partial and/or incomplete relief of symptoms, weakness, persistence of and/or worsening of symptoms, surgical failure, osteomyelitis, amputation, loss of function, stiffness, functional debility, dysfunction, decreased  strength, need for prolonged postoperative rehabilitation, need for further surgery, deep venous thrombosis, pulmonary embolism, arthritis and death.  The  patient states an understanding and wishes to proceed with surgery.   All questions were answered.  No guarantees were implied or stated.  Written informed consent was obtained.       PROCEDURE IN DETAIL:  On the date of the operative intervention, the patient was   evaluated in the preoperative holding area.  With their  participation, the operative digits were marked as the operative sites.  The patient was then wheeled to the   Operating Room with the left upper extremity placed on a hand table.  A   nonsterile tourniquet was placed on the patient's upper arm.  The extremity was prepped and draped in the usual sterile fashion.  A timeout   was taken to confirm the correct patient, site and procedure.  All were in   agreement, so I proceeded.   After adequate   analgesia, an Esmarch was utilized to exsanguinate the extremity and   then tourniquet was insufflated to 250 mmHg where it remained for the duration   of the procedure.  I first marked out an approximately 1 cm incision overlying   the patient's A1 pulley of the left index finger.  This incision was made   sharply with a scalpel and dissection was carried down through the skin and   subcutaneous tissues.  The neurovascular bundles were retracted both radially   and ulnarly and protected for the duration of the procedure.  Dissection was   carried down more deeply to the level of the A1 pulley.    This was identified and exposed and then a scalpel was utilized to enter the   flexor tendon sheath with a small poke hole in the pulley.  At this time,   scissor dissection was then utilized to complete both proximal and distal   division of the A1 pulley in its entirety.  After direct visual confirmation   that the pulley transection was complete, I then utilized a Ragnell retractor to   retract the flexor tendons out of the wound, which they did so without any   resistance and the fingers were taken through range of motion and were noted to   glide smoothly  without any evidence of further constriction.  At this time, the wound was then irrigated copiously   with sterile saline and closed with 4-0 nylon in horizontal mattress fashion.      Having completed the prior A1 pulley release, attention was next turned toward A1 pulley release of the left long finger.  After adequate   analgesia, I then marked out an approximately 1 cm incision overlying   the patient's A1 pulley.  This incision was made   sharply with a scalpel and dissection was carried down through the skin and   subcutaneous tissues.  The neurovascular bundles were retracted both radially   and ulnarly and protected for the duration of the procedure.  Dissection was   carried down more deeply to the level of the A1 pulley.    This was identified and exposed and then a scalpel was utilized to enter the   flexor tendon sheath with a small poke hole in the pulley.  At this time,   scissor dissection was then utilized to complete both proximal and distal   division of the A1 pulley in its entirety.  After direct visual confirmation   that the pulley transection was complete, I then utilized a Ragnell retractor to   retract the flexor tendons out of the wound, which they did so without any   resistance and the fingers were taken through range of motion and were noted to   glide smoothly without any evidence of further constriction.   At this time, the wound was then irrigated copiously   with sterile saline and closed with 4-0 nylon in horizontal mattress fashion.      Having completed the prior A1 pulley release, attention was next turned toward A1 pulley release of the left ring finger.  After adequate   analgesia, I then marked out an approximately 1 cm incision overlying   the patient's A1 pulley.  This incision was made   sharply with a scalpel and dissection was carried down through the skin and   subcutaneous tissues.  The neurovascular bundles were retracted both radially   and ulnarly and protected for  the duration of the procedure.  Dissection was   carried down more deeply to the level of the A1 pulley.    This was identified and exposed and then a scalpel was utilized to enter the   flexor tendon sheath with a small poke hole in the pulley.  At this time,   scissor dissection was then utilized to complete both proximal and distal   division of the A1 pulley in its entirety.  After direct visual confirmation   that the pulley transection was complete, I then utilized a Ragnell retractor to   retract the flexor tendons out of the wound, which they did so without any   resistance and the fingers were taken through range of motion and were noted to   glide smoothly without any evidence of further constriction.   At this time, the wound was then irrigated copiously   with sterile saline and closed with 4-0 nylon in horizontal mattress fashion.      Having completed the prior A1 pulley release, attention was next turned toward A1 pulley release of the left small finger.  After adequate   analgesia, I then marked out an approximately 1 cm incision overlying   the patient's A1 pulley.  This incision was made   sharply with a scalpel and dissection was carried down through the skin and   subcutaneous tissues.  The neurovascular bundles were retracted both radially   and ulnarly and protected for the duration of the procedure.  Dissection was   carried down more deeply to the level of the A1 pulley.    This was identified and exposed and then a scalpel was utilized to enter the   flexor tendon sheath with a small poke hole in the pulley.  At this time,   scissor dissection was then utilized to complete both proximal and distal   division of the A1 pulley in its entirety.  After direct visual confirmation   that the pulley transection was complete, I then utilized a Ragnell retractor to   retract the flexor tendons out of the wound, which they did so without any   resistance and the fingers were taken through range of  motion and were noted to   glide smoothly without any evidence of further constriction.   At this time, the wound was then irrigated copiously   with sterile saline and closed with 4-0 nylon in horizontal mattress fashion.      Sterile dressings were then applied consisting of Xeroform, 4 x 4 gauze, and loose coban.  The tourniquet was then   deflated and brisk capillary refill ensued throughout the patient's hand.  The patient was then awakened from anesthesia   and returned to the Postanesthesia Care Unit in stable condition.  There were no   complications.  As the attending surgeon, I was present and performed the   critical portion of the procedure.     POSTOPERATIVE PLAN FOR THE PATIENT:  The patient will be discharged home in   stable condition.  I will reevaluate the patient in clinic in approximately 2 weeks for   suture removal and reevaluation of the postoperative plan.  Range of motion will be as tolerated and unrestricted.  Should the patient develop any stiffness, occupational therapy will be recommended and ordered at the 2 week postop visit.

## 2024-02-05 NOTE — ANESTHESIA POSTPROCEDURE EVALUATION
Anesthesia Post Evaluation    Patient: Lina Davis    Procedure(s) Performed: Procedure(s) (LRB):  RELEASE, TRIGGER FINGER - left index, long, ring, and small fingers (Left)    Final Anesthesia Type: general      Patient location during evaluation: PACU  Patient participation: Yes- Able to Participate  Level of consciousness: awake and alert and oriented  Post-procedure vital signs: reviewed and stable  Pain management: adequate  Airway patency: patent    PONV status at discharge: No PONV  Anesthetic complications: no      Cardiovascular status: blood pressure returned to baseline and hemodynamically stable  Respiratory status: unassisted, room air and spontaneous ventilation  Hydration status: euvolemic  Follow-up not needed.              Vitals Value Taken Time   /79 02/02/24 0833   Temp 36.5 °C (97.7 °F) 02/02/24 0830   Pulse 88 02/02/24 0837   Resp 17 02/02/24 0836   SpO2 93 % 02/02/24 0837   Vitals shown include unvalidated device data.      Event Time   Out of Recovery 08:16:00         Pain/Adriana Score: No data recorded

## 2024-02-07 DIAGNOSIS — E11.21 TYPE 2 DIABETES MELLITUS WITH DIABETIC NEPHROPATHY, WITH LONG-TERM CURRENT USE OF INSULIN: ICD-10-CM

## 2024-02-07 DIAGNOSIS — Z79.4 TYPE 2 DIABETES MELLITUS WITH DIABETIC NEPHROPATHY, WITH LONG-TERM CURRENT USE OF INSULIN: ICD-10-CM

## 2024-02-07 RX ORDER — PIOGLITAZONEHYDROCHLORIDE 30 MG/1
30 TABLET ORAL DAILY
Qty: 90 TABLET | Refills: 1 | Status: SHIPPED | OUTPATIENT
Start: 2024-02-07

## 2024-02-07 NOTE — TELEPHONE ENCOUNTER
No care due was identified.  [facility] Embedded Care Due Messages. Reference number: [id number].   2/07/2024 2:29:31 AM CST

## 2024-02-07 NOTE — TELEPHONE ENCOUNTER
Refill Decision Note   Lina Davis  is requesting a refill authorization.  Brief Assessment and Rationale for Refill:  Approve     Medication Therapy Plan:         Comments:     Note composed:9:36 AM 02/07/2024             Appointments     Last Visit   12/5/2023 Dora Freedman MD   Next Visit   3/12/2024 Dora Freedman MD

## 2024-02-08 RX ORDER — AMOXICILLIN AND CLAVULANATE POTASSIUM 500; 125 MG/1; MG/1
1 TABLET, FILM COATED ORAL 3 TIMES DAILY
Qty: 14 TABLET | Refills: 0 | OUTPATIENT
Start: 2024-02-08

## 2024-02-15 ENCOUNTER — OFFICE VISIT (OUTPATIENT)
Dept: ORTHOPEDICS | Facility: CLINIC | Age: 79
End: 2024-02-15
Payer: MEDICARE

## 2024-02-15 VITALS — HEIGHT: 66 IN | WEIGHT: 182 LBS | BODY MASS INDEX: 29.25 KG/M2

## 2024-02-15 DIAGNOSIS — M65.342 TRIGGER FINGER, LEFT RING FINGER: ICD-10-CM

## 2024-02-15 DIAGNOSIS — M65.322 TRIGGER FINGER, LEFT INDEX FINGER: Primary | ICD-10-CM

## 2024-02-15 DIAGNOSIS — M65.332 TRIGGER FINGER, LEFT MIDDLE FINGER: ICD-10-CM

## 2024-02-15 PROCEDURE — 99999 PR PBB SHADOW E&M-EST. PATIENT-LVL III: CPT | Mod: PBBFAC,HCNC,, | Performed by: ORTHOPAEDIC SURGERY

## 2024-02-15 PROCEDURE — 3288F FALL RISK ASSESSMENT DOCD: CPT | Mod: HCNC,CPTII,S$GLB, | Performed by: ORTHOPAEDIC SURGERY

## 2024-02-15 PROCEDURE — 1101F PT FALLS ASSESS-DOCD LE1/YR: CPT | Mod: HCNC,CPTII,S$GLB, | Performed by: ORTHOPAEDIC SURGERY

## 2024-02-15 PROCEDURE — 99024 POSTOP FOLLOW-UP VISIT: CPT | Mod: HCNC,S$GLB,, | Performed by: ORTHOPAEDIC SURGERY

## 2024-02-15 PROCEDURE — 1159F MED LIST DOCD IN RCRD: CPT | Mod: HCNC,CPTII,S$GLB, | Performed by: ORTHOPAEDIC SURGERY

## 2024-02-15 RX ORDER — TRAMADOL HYDROCHLORIDE 50 MG/1
50 TABLET ORAL EVERY 6 HOURS PRN
Qty: 28 TABLET | Refills: 0 | Status: SHIPPED | OUTPATIENT
Start: 2024-02-15

## 2024-02-15 NOTE — PROGRESS NOTES
[place] presents for post-operative evaluation.  The patient is now 2 weeks s/p:    PROCEDURE PERFORMED:    1) A1 pulley release of left index finger.  2) A1 pulley release of left long finger.  3) A1 pulley release of left ring finger.  4) A1 pulley release of left small finger.    Overall the patient reports doing well.  The patient reports appropriate postoperative soreness with well controlled overall pain.  She has been working very diligently on range of motion as tolerated and notes that this has helped her recover just about all of her motion but she still has mild stiffness.  She is happy with her result and notes her triggering is resolved.    PE:    AA&O x 4.  NAD  HEENT:  NCAT, sclera nonicteric  Lungs:  Respirations are equal and unlabored.  CV:  2+ bilateral upper and lower extremity pulses.  MSK: The wound is healing well with no signs of erythema or warmth.  There is no drainage.  No clinical signs or symptoms of infection are present.  All sutures were removed today.  Patient with nearly full painless range of motion throughout the left hand.  Left upper extremity neurovascular intact.    A/P: Status post above, doing well  1) Continue with full weight bearing.  OT referral for mild stiffness and for rehabilitation.  Tramadol refill sent to pharmacy.  2) F/U for weeks  3) Call with any questions/concerns in the interim    Please be aware that this note has been generated with the assistance of [employer] voice-to-text.  Please excuse any spelling or grammatical errors.

## 2024-02-22 DIAGNOSIS — E11.21 TYPE 2 DIABETES MELLITUS WITH DIABETIC NEPHROPATHY, WITH LONG-TERM CURRENT USE OF INSULIN: Chronic | ICD-10-CM

## 2024-02-22 DIAGNOSIS — Z79.4 TYPE 2 DIABETES MELLITUS WITH DIABETIC NEPHROPATHY, WITH LONG-TERM CURRENT USE OF INSULIN: Chronic | ICD-10-CM

## 2024-02-26 DIAGNOSIS — Z79.4 TYPE 2 DIABETES MELLITUS WITH DIABETIC NEPHROPATHY, WITH LONG-TERM CURRENT USE OF INSULIN: Chronic | ICD-10-CM

## 2024-02-26 DIAGNOSIS — E11.21 TYPE 2 DIABETES MELLITUS WITH DIABETIC NEPHROPATHY, WITH LONG-TERM CURRENT USE OF INSULIN: Chronic | ICD-10-CM

## 2024-03-08 ENCOUNTER — HOSPITAL ENCOUNTER (OUTPATIENT)
Facility: HOSPITAL | Age: 79
Discharge: HOME | End: 2024-03-10
Attending: STUDENT IN AN ORGANIZED HEALTH CARE EDUCATION/TRAINING PROGRAM | Admitting: HOSPITALIST
Payer: MEDICARE

## 2024-03-08 DIAGNOSIS — N30.00 ACUTE CYSTITIS WITHOUT HEMATURIA: ICD-10-CM

## 2024-03-08 DIAGNOSIS — N17.9 AKI (ACUTE KIDNEY INJURY): Primary | ICD-10-CM

## 2024-03-08 DIAGNOSIS — R05.9 COUGH: ICD-10-CM

## 2024-03-08 DIAGNOSIS — R07.9 CHEST PAIN: ICD-10-CM

## 2024-03-08 DIAGNOSIS — R42 DIZZINESS: ICD-10-CM

## 2024-03-08 DIAGNOSIS — R94.31 ABNORMAL EKG: ICD-10-CM

## 2024-03-08 LAB
ALBUMIN SERPL BCP-MCNC: 3.7 G/DL (ref 3.5–5.2)
ALP SERPL-CCNC: 77 U/L (ref 55–135)
ALT SERPL W/O P-5'-P-CCNC: 8 U/L (ref 10–44)
ANION GAP SERPL CALC-SCNC: 13 MMOL/L (ref 8–16)
AST SERPL-CCNC: 19 U/L (ref 10–40)
BASOPHILS # BLD AUTO: 0.02 K/UL (ref 0–0.2)
BASOPHILS NFR BLD: 0.2 % (ref 0–1.9)
BILIRUB SERPL-MCNC: 0.3 MG/DL (ref 0.1–1)
BNP SERPL-MCNC: 21 PG/ML (ref 0–99)
BUN SERPL-MCNC: 41 MG/DL (ref 8–23)
BUN SERPL-MCNC: 49 MG/DL (ref 6–30)
CALCIUM SERPL-MCNC: 10.4 MG/DL (ref 8.7–10.5)
CHLORIDE SERPL-SCNC: 114 MMOL/L (ref 95–110)
CHLORIDE SERPL-SCNC: 114 MMOL/L (ref 95–110)
CO2 SERPL-SCNC: 16 MMOL/L (ref 23–29)
CREAT SERPL-MCNC: 1.7 MG/DL (ref 0.5–1.4)
CREAT SERPL-MCNC: 1.7 MG/DL (ref 0.5–1.4)
DIFFERENTIAL METHOD BLD: ABNORMAL
EOSINOPHIL # BLD AUTO: 0.1 K/UL (ref 0–0.5)
EOSINOPHIL NFR BLD: 0.7 % (ref 0–8)
ERYTHROCYTE [DISTWIDTH] IN BLOOD BY AUTOMATED COUNT: 15 % (ref 11.5–14.5)
EST. GFR  (NO RACE VARIABLE): 30.9 ML/MIN/1.73 M^2
GLUCOSE SERPL-MCNC: 155 MG/DL (ref 70–110)
GLUCOSE SERPL-MCNC: 162 MG/DL (ref 70–110)
HCT VFR BLD AUTO: 31.4 % (ref 37–48.5)
HCT VFR BLD CALC: 30 %PCV (ref 36–54)
HCV AB SERPL QL IA: NORMAL
HGB BLD-MCNC: 10.2 G/DL (ref 12–16)
HIV 1+2 AB+HIV1 P24 AG SERPL QL IA: NORMAL
IMM GRANULOCYTES # BLD AUTO: 0.09 K/UL (ref 0–0.04)
IMM GRANULOCYTES NFR BLD AUTO: 1.1 % (ref 0–0.5)
LIPASE SERPL-CCNC: 39 U/L (ref 4–60)
LYMPHOCYTES # BLD AUTO: 2.6 K/UL (ref 1–4.8)
LYMPHOCYTES NFR BLD: 32.6 % (ref 18–48)
MAGNESIUM SERPL-MCNC: 1.9 MG/DL (ref 1.6–2.6)
MCH RBC QN AUTO: 28.7 PG (ref 27–31)
MCHC RBC AUTO-ENTMCNC: 32.5 G/DL (ref 32–36)
MCV RBC AUTO: 88 FL (ref 82–98)
MONOCYTES # BLD AUTO: 0.7 K/UL (ref 0.3–1)
MONOCYTES NFR BLD: 8.9 % (ref 4–15)
NEUTROPHILS # BLD AUTO: 4.5 K/UL (ref 1.8–7.7)
NEUTROPHILS NFR BLD: 56.5 % (ref 38–73)
NRBC BLD-RTO: 0 /100 WBC
PLATELET # BLD AUTO: 333 K/UL (ref 150–450)
PMV BLD AUTO: 10 FL (ref 9.2–12.9)
POC IONIZED CALCIUM: 1.26 MMOL/L (ref 1.06–1.42)
POC TCO2 (MEASURED): 20 MMOL/L (ref 23–29)
POTASSIUM BLD-SCNC: 5 MMOL/L (ref 3.5–5.1)
POTASSIUM SERPL-SCNC: 5 MMOL/L (ref 3.5–5.1)
PROT SERPL-MCNC: 8.6 G/DL (ref 6–8.4)
RBC # BLD AUTO: 3.56 M/UL (ref 4–5.4)
SAMPLE: ABNORMAL
SODIUM BLD-SCNC: 143 MMOL/L (ref 136–145)
SODIUM SERPL-SCNC: 143 MMOL/L (ref 136–145)
TROPONIN I SERPL DL<=0.01 NG/ML-MCNC: 0.02 NG/ML (ref 0–0.03)
WBC # BLD AUTO: 8.06 K/UL (ref 3.9–12.7)

## 2024-03-08 PROCEDURE — 87086 URINE CULTURE/COLONY COUNT: CPT | Mod: HCNC | Performed by: STUDENT IN AN ORGANIZED HEALTH CARE EDUCATION/TRAINING PROGRAM

## 2024-03-08 PROCEDURE — 93010 ELECTROCARDIOGRAM REPORT: CPT | Mod: HCNC,,, | Performed by: INTERNAL MEDICINE

## 2024-03-08 PROCEDURE — 85025 COMPLETE CBC W/AUTO DIFF WBC: CPT | Mod: HCNC | Performed by: STUDENT IN AN ORGANIZED HEALTH CARE EDUCATION/TRAINING PROGRAM

## 2024-03-08 PROCEDURE — 87186 SC STD MICRODIL/AGAR DIL: CPT | Mod: HCNC | Performed by: STUDENT IN AN ORGANIZED HEALTH CARE EDUCATION/TRAINING PROGRAM

## 2024-03-08 PROCEDURE — 81001 URINALYSIS AUTO W/SCOPE: CPT | Mod: HCNC | Performed by: STUDENT IN AN ORGANIZED HEALTH CARE EDUCATION/TRAINING PROGRAM

## 2024-03-08 PROCEDURE — 80053 COMPREHEN METABOLIC PANEL: CPT | Mod: HCNC | Performed by: STUDENT IN AN ORGANIZED HEALTH CARE EDUCATION/TRAINING PROGRAM

## 2024-03-08 PROCEDURE — 80047 BASIC METABLC PNL IONIZED CA: CPT

## 2024-03-08 PROCEDURE — 87077 CULTURE AEROBIC IDENTIFY: CPT | Mod: HCNC | Performed by: STUDENT IN AN ORGANIZED HEALTH CARE EDUCATION/TRAINING PROGRAM

## 2024-03-08 PROCEDURE — 84484 ASSAY OF TROPONIN QUANT: CPT | Mod: HCNC | Performed by: STUDENT IN AN ORGANIZED HEALTH CARE EDUCATION/TRAINING PROGRAM

## 2024-03-08 PROCEDURE — 96361 HYDRATE IV INFUSION ADD-ON: CPT

## 2024-03-08 PROCEDURE — 83690 ASSAY OF LIPASE: CPT | Mod: HCNC | Performed by: STUDENT IN AN ORGANIZED HEALTH CARE EDUCATION/TRAINING PROGRAM

## 2024-03-08 PROCEDURE — 87088 URINE BACTERIA CULTURE: CPT | Mod: HCNC | Performed by: STUDENT IN AN ORGANIZED HEALTH CARE EDUCATION/TRAINING PROGRAM

## 2024-03-08 PROCEDURE — 96375 TX/PRO/DX INJ NEW DRUG ADDON: CPT

## 2024-03-08 PROCEDURE — 93005 ELECTROCARDIOGRAM TRACING: CPT | Mod: HCNC

## 2024-03-08 PROCEDURE — 87389 HIV-1 AG W/HIV-1&-2 AB AG IA: CPT | Mod: HCNC | Performed by: PHYSICIAN ASSISTANT

## 2024-03-08 PROCEDURE — 83735 ASSAY OF MAGNESIUM: CPT | Mod: HCNC | Performed by: STUDENT IN AN ORGANIZED HEALTH CARE EDUCATION/TRAINING PROGRAM

## 2024-03-08 PROCEDURE — 86803 HEPATITIS C AB TEST: CPT | Mod: HCNC | Performed by: PHYSICIAN ASSISTANT

## 2024-03-08 PROCEDURE — 83880 ASSAY OF NATRIURETIC PEPTIDE: CPT | Mod: HCNC | Performed by: STUDENT IN AN ORGANIZED HEALTH CARE EDUCATION/TRAINING PROGRAM

## 2024-03-08 PROCEDURE — 63600175 PHARM REV CODE 636 W HCPCS: Mod: HCNC | Performed by: STUDENT IN AN ORGANIZED HEALTH CARE EDUCATION/TRAINING PROGRAM

## 2024-03-08 PROCEDURE — 99285 EMERGENCY DEPT VISIT HI MDM: CPT | Mod: 25

## 2024-03-08 PROCEDURE — 25000003 PHARM REV CODE 250: Mod: HCNC | Performed by: STUDENT IN AN ORGANIZED HEALTH CARE EDUCATION/TRAINING PROGRAM

## 2024-03-08 RX ORDER — ONDANSETRON HYDROCHLORIDE 2 MG/ML
4 INJECTION, SOLUTION INTRAVENOUS
Status: COMPLETED | OUTPATIENT
Start: 2024-03-08 | End: 2024-03-08

## 2024-03-08 RX ORDER — ACETAMINOPHEN 500 MG
1000 TABLET ORAL
Status: COMPLETED | OUTPATIENT
Start: 2024-03-08 | End: 2024-03-08

## 2024-03-08 RX ADMIN — IOHEXOL 75 ML: 350 INJECTION, SOLUTION INTRAVENOUS at 11:03

## 2024-03-08 RX ADMIN — ONDANSETRON 4 MG: 2 INJECTION INTRAMUSCULAR; INTRAVENOUS at 10:03

## 2024-03-08 RX ADMIN — ACETAMINOPHEN 1000 MG: 500 TABLET ORAL at 10:03

## 2024-03-08 RX ADMIN — SODIUM CHLORIDE, POTASSIUM CHLORIDE, SODIUM LACTATE AND CALCIUM CHLORIDE 1000 ML: 600; 310; 30; 20 INJECTION, SOLUTION INTRAVENOUS at 10:03

## 2024-03-08 NOTE — FIRST PROVIDER EVALUATION
Medical screening examination initiated.  I have conducted a focused provider triage encounter, findings are as follows:    Brief history of present illness:  79 yo F w/ cough, congestion, post tussive emesis.  w/ similar symptoms    Vitals:    03/08/24 1711   BP: (!) 145/83   Pulse: 100   Resp: 18   Temp: 98.5 °F (36.9 °C)   TempSrc: Oral   SpO2: 98%   Weight: 82.6 kg (182 lb)       Pertinent physical exam:  nontoxic. tachycardic    Brief workup plan:  cbc, cmp, covid, cxr    Preliminary workup initiated; this workup will be continued and followed by the physician or advanced practice provider that is assigned to the patient when roomed.

## 2024-03-09 PROBLEM — N18.9 ACUTE KIDNEY INJURY SUPERIMPOSED ON CHRONIC KIDNEY DISEASE: Status: ACTIVE | Noted: 2018-01-27

## 2024-03-09 PROBLEM — R10.9 ABDOMINAL PAIN: Status: ACTIVE | Noted: 2024-03-09

## 2024-03-09 PROBLEM — U07.1 COVID: Status: ACTIVE | Noted: 2024-03-09

## 2024-03-09 PROBLEM — R19.7 DIARRHEA: Status: ACTIVE | Noted: 2024-03-09

## 2024-03-09 PROBLEM — R93.89 ABNORMAL FINDING ON CT SCAN: Status: ACTIVE | Noted: 2024-03-09

## 2024-03-09 PROBLEM — N30.00 ACUTE CYSTITIS WITHOUT HEMATURIA: Status: ACTIVE | Noted: 2024-03-09

## 2024-03-09 LAB
AMORPH CRY UR QL COMP ASSIST: ABNORMAL
ANION GAP SERPL CALC-SCNC: 10 MMOL/L (ref 8–16)
BACTERIA #/AREA URNS AUTO: ABNORMAL /HPF
BASOPHILS # BLD AUTO: 0.04 K/UL (ref 0–0.2)
BASOPHILS NFR BLD: 0.5 % (ref 0–1.9)
BILIRUB UR QL STRIP: NEGATIVE
BUN SERPL-MCNC: 35 MG/DL (ref 8–23)
CALCIUM SERPL-MCNC: 9.6 MG/DL (ref 8.7–10.5)
CHLORIDE SERPL-SCNC: 113 MMOL/L (ref 95–110)
CLARITY UR REFRACT.AUTO: ABNORMAL
CO2 SERPL-SCNC: 18 MMOL/L (ref 23–29)
COLOR UR AUTO: YELLOW
CREAT SERPL-MCNC: 1.4 MG/DL (ref 0.5–1.4)
CTP QC/QA: YES
DIFFERENTIAL METHOD BLD: ABNORMAL
EOSINOPHIL # BLD AUTO: 0.1 K/UL (ref 0–0.5)
EOSINOPHIL NFR BLD: 0.8 % (ref 0–8)
ERYTHROCYTE [DISTWIDTH] IN BLOOD BY AUTOMATED COUNT: 17.6 % (ref 11.5–14.5)
EST. GFR  (NO RACE VARIABLE): 39 ML/MIN/1.73 M^2
GLUCOSE SERPL-MCNC: 167 MG/DL (ref 70–110)
GLUCOSE UR QL STRIP: ABNORMAL
GRAN CASTS UR QL COMP ASSIST: 3 /LPF
HCT VFR BLD AUTO: 30 % (ref 37–48.5)
HGB BLD-MCNC: 9.8 G/DL (ref 12–16)
HGB UR QL STRIP: ABNORMAL
HYALINE CASTS UR QL AUTO: 3 /LPF
IMM GRANULOCYTES # BLD AUTO: 0.09 K/UL (ref 0–0.04)
IMM GRANULOCYTES NFR BLD AUTO: 1.1 % (ref 0–0.5)
KETONES UR QL STRIP: NEGATIVE
LEUKOCYTE ESTERASE UR QL STRIP: ABNORMAL
LYMPHOCYTES # BLD AUTO: 2.2 K/UL (ref 1–4.8)
LYMPHOCYTES NFR BLD: 27.1 % (ref 18–48)
MAGNESIUM SERPL-MCNC: 1.8 MG/DL (ref 1.6–2.6)
MCH RBC QN AUTO: 30 PG (ref 27–31)
MCHC RBC AUTO-ENTMCNC: 32.7 G/DL (ref 32–36)
MCV RBC AUTO: 92 FL (ref 82–98)
MICROSCOPIC COMMENT: ABNORMAL
MONOCYTES # BLD AUTO: 0.8 K/UL (ref 0.3–1)
MONOCYTES NFR BLD: 9.3 % (ref 4–15)
NEUTROPHILS # BLD AUTO: 5.1 K/UL (ref 1.8–7.7)
NEUTROPHILS NFR BLD: 61.2 % (ref 38–73)
NITRITE UR QL STRIP: NEGATIVE
NRBC BLD-RTO: 0 /100 WBC
OHS QRS DURATION: 94 MS
OHS QTC CALCULATION: 484 MS
PH UR STRIP: 6 [PH] (ref 5–8)
PLATELET # BLD AUTO: 312 K/UL (ref 150–450)
PMV BLD AUTO: 10.3 FL (ref 9.2–12.9)
POCT GLUCOSE: 156 MG/DL (ref 70–110)
POCT GLUCOSE: 159 MG/DL (ref 70–110)
POCT GLUCOSE: 168 MG/DL (ref 70–110)
POCT GLUCOSE: 177 MG/DL (ref 70–110)
POCT GLUCOSE: 194 MG/DL (ref 70–110)
POTASSIUM SERPL-SCNC: 4.3 MMOL/L (ref 3.5–5.1)
PROCALCITONIN SERPL IA-MCNC: 0.09 NG/ML
PROT UR QL STRIP: ABNORMAL
RBC # BLD AUTO: 3.27 M/UL (ref 4–5.4)
RBC #/AREA URNS AUTO: 3 /HPF (ref 0–4)
SARS-COV-2 RDRP RESP QL NAA+PROBE: POSITIVE
SODIUM SERPL-SCNC: 141 MMOL/L (ref 136–145)
SP GR UR STRIP: 1.02 (ref 1–1.03)
SQUAMOUS #/AREA URNS AUTO: 2 /HPF
URN SPEC COLLECT METH UR: ABNORMAL
WBC # BLD AUTO: 8.27 K/UL (ref 3.9–12.7)
WBC #/AREA URNS AUTO: >100 /HPF (ref 0–5)
WBC CLUMPS UR QL AUTO: ABNORMAL
YEAST UR QL AUTO: ABNORMAL

## 2024-03-09 PROCEDURE — 63600175 PHARM REV CODE 636 W HCPCS: Mod: HCNC | Performed by: STUDENT IN AN ORGANIZED HEALTH CARE EDUCATION/TRAINING PROGRAM

## 2024-03-09 PROCEDURE — 87635 SARS-COV-2 COVID-19 AMP PRB: CPT | Mod: HCNC | Performed by: STUDENT IN AN ORGANIZED HEALTH CARE EDUCATION/TRAINING PROGRAM

## 2024-03-09 PROCEDURE — 25000003 PHARM REV CODE 250: Mod: HCNC

## 2024-03-09 PROCEDURE — 96365 THER/PROPH/DIAG IV INF INIT: CPT

## 2024-03-09 PROCEDURE — 25000003 PHARM REV CODE 250: Mod: HCNC | Performed by: STUDENT IN AN ORGANIZED HEALTH CARE EDUCATION/TRAINING PROGRAM

## 2024-03-09 PROCEDURE — 96372 THER/PROPH/DIAG INJ SC/IM: CPT | Mod: 59

## 2024-03-09 PROCEDURE — 63600175 PHARM REV CODE 636 W HCPCS: Mod: HCNC

## 2024-03-09 PROCEDURE — G0378 HOSPITAL OBSERVATION PER HR: HCPCS | Mod: HCNC

## 2024-03-09 PROCEDURE — 84145 PROCALCITONIN (PCT): CPT | Mod: HCNC

## 2024-03-09 PROCEDURE — 80048 BASIC METABOLIC PNL TOTAL CA: CPT | Mod: HCNC

## 2024-03-09 PROCEDURE — 25500020 PHARM REV CODE 255: Mod: HCNC | Performed by: STUDENT IN AN ORGANIZED HEALTH CARE EDUCATION/TRAINING PROGRAM

## 2024-03-09 PROCEDURE — 82962 GLUCOSE BLOOD TEST: CPT

## 2024-03-09 PROCEDURE — 96361 HYDRATE IV INFUSION ADD-ON: CPT

## 2024-03-09 PROCEDURE — 83735 ASSAY OF MAGNESIUM: CPT | Mod: HCNC

## 2024-03-09 PROCEDURE — 85025 COMPLETE CBC W/AUTO DIFF WBC: CPT | Mod: HCNC | Performed by: HOSPITALIST

## 2024-03-09 RX ORDER — CETIRIZINE HYDROCHLORIDE 10 MG/1
10 TABLET ORAL NIGHTLY
Status: DISCONTINUED | OUTPATIENT
Start: 2024-03-09 | End: 2024-03-10 | Stop reason: HOSPADM

## 2024-03-09 RX ORDER — IPRATROPIUM BROMIDE AND ALBUTEROL SULFATE 2.5; .5 MG/3ML; MG/3ML
3 SOLUTION RESPIRATORY (INHALATION) EVERY 4 HOURS PRN
Status: DISCONTINUED | OUTPATIENT
Start: 2024-03-09 | End: 2024-03-10 | Stop reason: HOSPADM

## 2024-03-09 RX ORDER — ALLOPURINOL 300 MG/1
300 TABLET ORAL DAILY
Status: DISCONTINUED | OUTPATIENT
Start: 2024-03-09 | End: 2024-03-10 | Stop reason: HOSPADM

## 2024-03-09 RX ORDER — IBUPROFEN 200 MG
16 TABLET ORAL
Status: DISCONTINUED | OUTPATIENT
Start: 2024-03-09 | End: 2024-03-10 | Stop reason: HOSPADM

## 2024-03-09 RX ORDER — ACETAMINOPHEN 325 MG/1
650 TABLET ORAL EVERY 4 HOURS PRN
Status: DISCONTINUED | OUTPATIENT
Start: 2024-03-09 | End: 2024-03-10 | Stop reason: HOSPADM

## 2024-03-09 RX ORDER — SIMETHICONE 80 MG
1 TABLET,CHEWABLE ORAL 4 TIMES DAILY PRN
Status: DISCONTINUED | OUTPATIENT
Start: 2024-03-09 | End: 2024-03-10 | Stop reason: HOSPADM

## 2024-03-09 RX ORDER — INSULIN ASPART 100 [IU]/ML
0-5 INJECTION, SOLUTION INTRAVENOUS; SUBCUTANEOUS
Status: DISCONTINUED | OUTPATIENT
Start: 2024-03-09 | End: 2024-03-10 | Stop reason: HOSPADM

## 2024-03-09 RX ORDER — SODIUM CHLORIDE, SODIUM LACTATE, POTASSIUM CHLORIDE, CALCIUM CHLORIDE 600; 310; 30; 20 MG/100ML; MG/100ML; MG/100ML; MG/100ML
INJECTION, SOLUTION INTRAVENOUS CONTINUOUS
Status: DISCONTINUED | OUTPATIENT
Start: 2024-03-09 | End: 2024-03-09

## 2024-03-09 RX ORDER — NALOXONE HCL 0.4 MG/ML
0.02 VIAL (ML) INJECTION
Status: DISCONTINUED | OUTPATIENT
Start: 2024-03-09 | End: 2024-03-10 | Stop reason: HOSPADM

## 2024-03-09 RX ORDER — PROCHLORPERAZINE EDISYLATE 5 MG/ML
5 INJECTION INTRAMUSCULAR; INTRAVENOUS EVERY 6 HOURS PRN
Status: DISCONTINUED | OUTPATIENT
Start: 2024-03-09 | End: 2024-03-10 | Stop reason: HOSPADM

## 2024-03-09 RX ORDER — AMLODIPINE BESYLATE 10 MG/1
10 TABLET ORAL DAILY
Status: DISCONTINUED | OUTPATIENT
Start: 2024-03-09 | End: 2024-03-10 | Stop reason: HOSPADM

## 2024-03-09 RX ORDER — IBUPROFEN 200 MG
24 TABLET ORAL
Status: DISCONTINUED | OUTPATIENT
Start: 2024-03-09 | End: 2024-03-10 | Stop reason: HOSPADM

## 2024-03-09 RX ORDER — HEPARIN SODIUM 5000 [USP'U]/ML
5000 INJECTION, SOLUTION INTRAVENOUS; SUBCUTANEOUS EVERY 12 HOURS
Status: DISCONTINUED | OUTPATIENT
Start: 2024-03-09 | End: 2024-03-10 | Stop reason: HOSPADM

## 2024-03-09 RX ORDER — BENZONATATE 100 MG/1
100 CAPSULE ORAL 3 TIMES DAILY
Status: DISCONTINUED | OUTPATIENT
Start: 2024-03-09 | End: 2024-03-10 | Stop reason: HOSPADM

## 2024-03-09 RX ORDER — SODIUM CHLORIDE 0.9 % (FLUSH) 0.9 %
5 SYRINGE (ML) INJECTION
Status: DISCONTINUED | OUTPATIENT
Start: 2024-03-09 | End: 2024-03-10 | Stop reason: HOSPADM

## 2024-03-09 RX ORDER — ALUMINUM HYDROXIDE, MAGNESIUM HYDROXIDE, AND SIMETHICONE 1200; 120; 1200 MG/30ML; MG/30ML; MG/30ML
30 SUSPENSION ORAL 4 TIMES DAILY PRN
Status: DISCONTINUED | OUTPATIENT
Start: 2024-03-09 | End: 2024-03-10 | Stop reason: HOSPADM

## 2024-03-09 RX ORDER — POLYETHYLENE GLYCOL 3350 17 G/17G
17 POWDER, FOR SOLUTION ORAL 2 TIMES DAILY PRN
Status: DISCONTINUED | OUTPATIENT
Start: 2024-03-09 | End: 2024-03-10 | Stop reason: HOSPADM

## 2024-03-09 RX ORDER — GLUCAGON 1 MG
1 KIT INJECTION
Status: DISCONTINUED | OUTPATIENT
Start: 2024-03-09 | End: 2024-03-10 | Stop reason: HOSPADM

## 2024-03-09 RX ORDER — ONDANSETRON 8 MG/1
8 TABLET, ORALLY DISINTEGRATING ORAL EVERY 8 HOURS PRN
Status: DISCONTINUED | OUTPATIENT
Start: 2024-03-09 | End: 2024-03-10 | Stop reason: HOSPADM

## 2024-03-09 RX ORDER — BISACODYL 10 MG/1
10 SUPPOSITORY RECTAL DAILY PRN
Status: DISCONTINUED | OUTPATIENT
Start: 2024-03-09 | End: 2024-03-10 | Stop reason: HOSPADM

## 2024-03-09 RX ORDER — BENZONATATE 100 MG/1
100 CAPSULE ORAL 3 TIMES DAILY PRN
Status: DISCONTINUED | OUTPATIENT
Start: 2024-03-09 | End: 2024-03-09

## 2024-03-09 RX ORDER — TALC
6 POWDER (GRAM) TOPICAL NIGHTLY PRN
Status: DISCONTINUED | OUTPATIENT
Start: 2024-03-09 | End: 2024-03-10 | Stop reason: HOSPADM

## 2024-03-09 RX ORDER — ATORVASTATIN CALCIUM 40 MG/1
40 TABLET, FILM COATED ORAL DAILY
Status: DISCONTINUED | OUTPATIENT
Start: 2024-03-09 | End: 2024-03-10 | Stop reason: HOSPADM

## 2024-03-09 RX ORDER — PANTOPRAZOLE SODIUM 40 MG/1
40 TABLET, DELAYED RELEASE ORAL DAILY
Status: DISCONTINUED | OUTPATIENT
Start: 2024-03-09 | End: 2024-03-10 | Stop reason: HOSPADM

## 2024-03-09 RX ORDER — ASPIRIN 81 MG/1
81 TABLET ORAL DAILY
Status: DISCONTINUED | OUTPATIENT
Start: 2024-03-09 | End: 2024-03-10 | Stop reason: HOSPADM

## 2024-03-09 RX ORDER — ACETAMINOPHEN 500 MG
1000 TABLET ORAL EVERY 8 HOURS PRN
Status: DISCONTINUED | OUTPATIENT
Start: 2024-03-09 | End: 2024-03-10 | Stop reason: HOSPADM

## 2024-03-09 RX ADMIN — BENZONATATE 100 MG: 100 CAPSULE ORAL at 09:03

## 2024-03-09 RX ADMIN — CEFTRIAXONE 1 G: 1 INJECTION, POWDER, FOR SOLUTION INTRAMUSCULAR; INTRAVENOUS at 01:03

## 2024-03-09 RX ADMIN — INSULIN DETEMIR 10 UNITS: 100 INJECTION, SOLUTION SUBCUTANEOUS at 09:03

## 2024-03-09 RX ADMIN — HEPARIN SODIUM 5000 UNITS: 5000 INJECTION INTRAVENOUS; SUBCUTANEOUS at 09:03

## 2024-03-09 RX ADMIN — ALLOPURINOL 300 MG: 100 TABLET ORAL at 09:03

## 2024-03-09 RX ADMIN — ASPIRIN 81 MG: 81 TABLET, COATED ORAL at 09:03

## 2024-03-09 RX ADMIN — GUAIFENESIN AND DEXTROMETHORPHAN HYDROBROMIDE 1 TABLET: 600; 30 TABLET, EXTENDED RELEASE ORAL at 09:03

## 2024-03-09 RX ADMIN — SODIUM CHLORIDE, POTASSIUM CHLORIDE, SODIUM LACTATE AND CALCIUM CHLORIDE: 600; 310; 30; 20 INJECTION, SOLUTION INTRAVENOUS at 05:03

## 2024-03-09 RX ADMIN — CETIRIZINE HYDROCHLORIDE 10 MG: 10 TABLET, FILM COATED ORAL at 05:03

## 2024-03-09 RX ADMIN — AMLODIPINE BESYLATE 10 MG: 10 TABLET ORAL at 09:03

## 2024-03-09 RX ADMIN — CETIRIZINE HYDROCHLORIDE 10 MG: 10 TABLET, FILM COATED ORAL at 09:03

## 2024-03-09 RX ADMIN — PANTOPRAZOLE SODIUM 40 MG: 40 TABLET, DELAYED RELEASE ORAL at 09:03

## 2024-03-09 RX ADMIN — BENZONATATE 100 MG: 100 CAPSULE ORAL at 01:03

## 2024-03-09 RX ADMIN — ATORVASTATIN CALCIUM 40 MG: 40 TABLET, FILM COATED ORAL at 09:03

## 2024-03-09 NOTE — ED NOTES
Bed: EDOU06  Expected date:   Expected time:   Means of arrival:   Comments:  Eventually other ccr3

## 2024-03-09 NOTE — NURSING
1429 Report given to Julio César MAN, patient transferring to room 1151. She is alert and oriented x4 and has no complaints at this time. Vital signs are stable.

## 2024-03-09 NOTE — SUBJECTIVE & OBJECTIVE
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    Vitamin D deficiency disease        Past Surgical History:   Procedure Laterality Date    ANTERIOR CERVICAL DISCECTOMY W/ FUSION N/A 3/19/2020    Procedure: DISCECTOMY, SPINE, CERVICAL, ANTERIOR APPROACH, WITH FUSION, C3-C4, C4-C5, C5-C6;  Surgeon: Joseph Walton MD;  [facility];  Service: Neurosurgery;  Laterality: N/A;    CARPAL TUNNEL RELEASE      bilateral    COLONOSCOPY N/A 3/7/2018    Procedure: COLONOSCOPY;  Surgeon: Luís Soni MD;  Location: [facility] ENDO (2ND FLR);  Service: Endoscopy;  Laterality: N/A;  ok to schedule per [facility]    COLONOSCOPY W/ BIOPSIES AND POLYPECTOMY      EPIDURAL STEROID INJECTION N/A 6/3/2022    Procedure: LESI L5-S1;  Surgeon: Leland Pena DO;  Location: [facility] OR;  Service: Pain Management;  Laterality: N/A;    EPIDURAL STEROID INJECTION INTO LUMBAR SPINE Left 10/26/2023    Procedure: LT L5-S1 TFESI;  Surgeon: Leland Pena DO;  Location: [facility] PAIN MANAGEMENT;  Service: Pain Management;  Laterality: Left;  15 mins    HEMORRHOID SURGERY      HYSTERECTOMY      AUB    INJECTION OF JOINT Bilateral 4/12/2022    Procedure: SACROILIAC JOINT Steroid Injection-Bilateral;  Surgeon: Leland Pena DO;  Location: [facility] OR;  Service: Pain Management;  Laterality: Bilateral;    ROTATOR CUFF REPAIR      bilateral    TRANSFORAMINAL EPIDURAL INJECTION OF STEROID Bilateral 12/2/2022    Procedure: TFESI (B/L) L4-5;  Surgeon: Leland Pena DO;  Location: [facility] OR;  Service: Pain Management;  Laterality: Bilateral;    TRIGGER FINGER RELEASE Left 2/2/2024    Procedure: RELEASE, TRIGGER FINGER - left index, long, ring, and small fingers;  Surgeon: Justus Isabel MD;  Location: [facility] OR;  Service: Orthopedics;  Laterality: Left;       Review of  patient's allergies indicates:   Allergen Reactions    Gabapentin Other (See Comments)     ineffective    Januvia [sitagliptin] Other (See Comments)     Pancreatitis      Metformin Other (See Comments)     Nausea and vomiting with immediate release - tolerates extended release       Current Facility-Administered Medications on File Prior to Encounter   Medication    fentaNYL 50 mcg/mL injection 100 mcg    midazolam (VERSED) 1 mg/mL injection 1 mg     Current Outpatient Medications on File Prior to Encounter   Medication Sig    acetaminophen (TYLENOL) 500 MG tablet Take 2 tablets (1,000 mg total) by mouth 3 (three) times daily.    alcohol swabs (DROPSAFE ALCOHOL PREP PADS) PadM USE TWICE DAILY AS DIRECTED, E 11.9    allopurinoL (ZYLOPRIM) 300 MG tablet Take 1 tablet (300 mg total) by mouth once daily.    ALPRAZolam (XANAX) 0.25 MG tablet One-two one hour prior to procedure then may repeat immediately prior to procedure    amLODIPine (NORVASC) 10 MG tablet TAKE 1 TABLET EVERY DAY    aspirin (ECOTRIN) 81 MG EC tablet Take 81 mg by mouth once daily.    atorvastatin (LIPITOR) 40 MG tablet TAKE 1 TABLET ONE TIME DAILY    blood glucose control, normal Soln True Metrix control solution E11.9 - pt tests twice daily    blood-glucose meter kit Check glucose daily E11.9 meter covered by insurance Don [employer]    DULoxetine (CYMBALTA) 30 MG capsule TAKE 1 CAPSULE ONE TIME DAILY    empagliflozin (JARDIANCE) 10 mg tablet Take 1 tablet (10 mg total) by mouth once daily.    ibuprofen (ADVIL,MOTRIN) 600 MG tablet Take 1 tablet (600 mg total) by mouth 3 (three) times daily.    insulin detemir U-100, Levemir, 100 unit/mL (3 mL) SubQ InPn pen Inject 14 Units into the skin once daily.    lancets (TRUEPLUS LANCETS) 28 gauge Misc 1 lancet by Misc.(Non-Drug; Combo Route) route 2 (two) times daily.    LIDOcaine (LIDODERM) 5 % Place 1 patch onto the skin once daily. Remove & Discard patch within 12 hours or as directed by MD    losartan  "(COZAAR) 100 MG tablet TAKE 1 TABLET (100 MG TOTAL) BY MOUTH ONCE DAILY.    metFORMIN (GLUCOPHAGE-XR) 500 MG ER 24hr tablet Take 1 tablet (500 mg total) by mouth 2 (two) times daily with meals.    omeprazole (PRILOSEC) 20 MG capsule TAKE 1 CAPSULE TWICE DAILY    pen needle, diabetic (NOVOFINE 32) 32 gauge x 1/4" Ndle Use with insulin 4 times a day.    pioglitazone (ACTOS) 30 MG tablet Take 1 tablet (30 mg total) by mouth once daily.    pregabalin (LYRICA) 50 MG capsule One at bedtime    traMADoL (ULTRAM) 50 mg tablet Take 1 tablet (50 mg total) by mouth every 6 (six) hours as needed for Pain.    triamcinolone acetonide 0.1% (KENALOG) 0.1 % cream APPLY TO THE AFFECTED AREA(S) ON LOWER LEGS TWICE DAILY AS NEEDED FOR ITCHY OR RED    TRUE METRIX GLUCOSE TEST STRIP Strp TEST BLOOD SUGAR TWICE DAILY     Family History       Problem Relation (Age of Onset)    Benign prostatic hyperplasia Brother    COPD Brother, Brother    Cancer Father    Cataracts Sister    Diabetes Mother, Sister, Sister    Drug abuse Brother    Gout Brother    Heart attack Brother, Brother    Heart disease Mother, Father, Brother, Brother    Kidney disease Brother, Brother    Kidney failure Brother    Lupus Sister    No Known Problems Daughter, Son          Tobacco Use    Smoking status: Never    Smokeless tobacco: Never   Substance and Sexual Activity    Alcohol use: No    Drug use: No    Sexual activity: Yes     Partners: Male     Birth control/protection: Surgical     Review of Systems   Constitutional:  Positive for appetite change and chills. Negative for activity change and fever.   HENT:  Positive for congestion. Negative for trouble swallowing.    Eyes:  Negative for photophobia and visual disturbance.   Respiratory:  Positive for cough. Negative for chest tightness and shortness of breath.    Cardiovascular:  Negative for chest pain, palpitations and leg swelling.   Gastrointestinal:  Positive for abdominal pain, diarrhea, nausea and vomiting. " Negative for constipation.   Genitourinary:  Positive for frequency. Negative for dysuria and hematuria.   Musculoskeletal:  Negative for back pain, gait problem and neck pain.   Skin:  Negative for rash and wound.   Neurological:  Negative for dizziness, syncope, speech difficulty and light-headedness.   Psychiatric/Behavioral:  Negative for agitation and confusion. The patient is not nervous/anxious.      Objective:     Vital Signs (Most Recent):  Temp: 98.8 °F (37.1 °C) (03/08/24 2338)  Pulse: 87 (03/08/24 2338)  Resp: 18 (03/08/24 2338)  BP: (!) 149/66 (03/08/24 2338)  SpO2: 100 % (03/08/24 2338) Vital Signs (24h Range):  Temp:  [98.5 °F (36.9 °C)-98.8 °F (37.1 °C)] 98.8 °F (37.1 °C)  Pulse:  [] 87  Resp:  [18] 18  SpO2:  [98 %-100 %] 100 %  BP: (145-149)/(66-83) 149/66     Weight: 82.6 kg (182 lb)  Body mass index is 29.38 kg/m².     Physical Exam  Vitals and nursing note reviewed.   Constitutional:       General: She is not in acute distress.     Appearance: She is well-developed.   HENT:      Head: Normocephalic and atraumatic.      Nose: Congestion present.      Mouth/Throat:      Pharynx: No oropharyngeal exudate.   Eyes:      Conjunctiva/sclera: Conjunctivae normal.      Pupils: Pupils are equal, round, and reactive to light.   Cardiovascular:      Rate and Rhythm: Normal rate and regular rhythm.      Heart sounds: Normal heart sounds.   Pulmonary:      Effort: Pulmonary effort is normal. No respiratory distress.      Breath sounds: Normal breath sounds. No wheezing.      Comments: Coughing frequently  Abdominal:      General: Bowel sounds are normal. There is no distension.      Palpations: Abdomen is soft.      Tenderness: There is no abdominal tenderness. There is no guarding.   Musculoskeletal:         General: No tenderness. Normal range of motion.      Cervical back: Normal range of motion and neck supple.      Right lower leg: No edema.      Left lower leg: No edema.   Lymphadenopathy:       Cervical: No cervical adenopathy.   Skin:     General: Skin is warm and dry.      Capillary Refill: Capillary refill takes less than 2 seconds.      Findings: No rash.   Neurological:      Mental Status: She is alert and oriented to person, place, and time.      Cranial Nerves: No cranial nerve deficit.      Sensory: No sensory deficit.      Coordination: Coordination normal.   Psychiatric:         Behavior: Behavior normal.         Thought Content: Thought content normal.         Judgment: Judgment normal.              CRANIAL NERVES     CN III, IV, VI   Pupils are equal, round, and reactive to light.       Significant Labs: All pertinent labs within the past 24 hours have been reviewed.  CBC:   Recent Labs   Lab 03/08/24 2144 03/08/24 2149   WBC 8.06  --    HGB 10.2*  --    HCT 31.4* 30*     --      CMP:   Recent Labs   Lab 03/08/24 2144      K 5.0   *   CO2 16*   *   BUN 41*   CREATININE 1.7*   CALCIUM 10.4   PROT 8.6*   ALBUMIN 3.7   BILITOT 0.3   ALKPHOS 77   AST 19   ALT 8*   ANIONGAP 13     Cardiac Markers:   Recent Labs   Lab 03/08/24 2144   BNP 21     Troponin:   Recent Labs   Lab 03/08/24 2144   TROPONINI 0.016     Urine Studies:   Recent Labs   Lab 03/08/24  2324   COLORU Yellow   APPEARANCEUA Hazy*   PHUR 6.0   SPECGRAV 1.020   PROTEINUA 1+*   GLUCUA 4+*   KETONESU Negative   BILIRUBINUA Negative   OCCULTUA Trace*   NITRITE Negative   LEUKOCYTESUR 3+*   RBCUA 3   WBCUA >100*   BACTERIA Moderate*   SQUAMEPITHEL 2   HYALINECASTS 3*       Significant Imaging: I have reviewed all pertinent imaging results/findings within the past 24 hours.  Imaging Results              CT Abdomen Pelvis With IV Contrast NO Oral Contrast (Final result)  Result time 03/09/24 00:46:04      Final result by Britta Chow MD (03/09/24 00:46:04)                   Impression:      1. Questionable urothelial enhancement and slight bladder wall thickening.  Correlation with urinalysis for  cystitis/ascending urinary tract infection advised.  2. Diverticulosis with slight wall thickening in a region of diverticular disease in the sigmoid colon which could reflect muscular hypertrophy noting no significant inflammatory change appreciated on current study.  Correlation for symptoms of early/developing acute diverticulitis advised.  3. Prominence of the right ovary.  Further assessment with ultrasound can be performed as clinically warranted.  4. Additional findings as discussed above.      Electronically signed by: Britta Chow MD  Date:    03/09/2024  Time:    00:46               Narrative:    EXAMINATION:  CT ABDOMEN PELVIS WITH IV CONTRAST    CLINICAL HISTORY:  Abdominal pain, acute, non-localized;Nausea/vomiting;    TECHNIQUE:  Low dose axial images, sagittal and coronal reformations were obtained from the lung bases to the pubic symphysis following the IV administration of 75 mL of Omnipaque 350 .  No oral contrast.    COMPARISON:  CT abdomen and pelvis 2/27/2018    FINDINGS:  The visualized lung bases demonstrate minimal patchy peripheral right basilar ground-glass attenuation which could relate to atelectasis although correlation for early infectious process advised.  The visualized portions of the heart and pericardium are within normal limits.    Evaluation of solid organ parenchyma is slightly limited due to artifact from the patient's right upper extremity.  The liver demonstrates no focal abnormality.  The portal vein and splenic vein are patent.  The spleen and adrenal glands are unremarkable.  There is fatty atrophy of the pancreas is otherwise unremarkable.  No calcified stones in the gallbladder lumen.  No significant intra or extrahepatic biliary ductal dilatation.    There is right renal parenchymal cortical scarring.  There is a prominent right extrarenal pelvis, unchanged from prior study.  There is a 1 0.0 cm right renal cortical hypodensity possibly a cyst but too small to  definitively characterize.  The kidneys appear to enhance symmetrically.  There is abnormal urothelial enhancement.  Urinary bladder demonstrates wall thickening.  Correlation for cystitis/ascending urinary tract infection with urinalysis advised.  The uterus is surgically absent.  There is nonspecific prominence of the right ovary.  Further assessment with ultrasound can be performed.  The left adnexal region is within normal limits.    The abdominal aorta is nonaneurysmal with atherosclerosis.  There are shotty periaortic lymph nodes.    The visualized loops of small bowel demonstrate no evidence of obstruction or inflammatory change.  There are extensive colonic diverticula present.  There is slight wall thickening in a region of diverticular disease in the sigmoid colon, which could relate to muscular hypertrophy noting no significant pericolonic inflammatory change identified at this time.  However, core clinical correlation for symptoms of early/developing acute diverticulitis advised.  The appendix is unremarkable.  There is no free intraperitoneal air, portal venous gas or ascites.    The visualized osseous structures are intact with degenerative change noting grade 1 anterolisthesis of L4 on L5.  There is a small fat containing umbilical hernia present.  Slight nonspecific soft tissue induration involving the anterior left abdominal wall.                                       X-Ray Chest AP Portable (Final result)  Result time 03/08/24 22:32:36      Final result by Jd Cm MD (03/08/24 22:32:36)                   Impression:      No acute radiographic abnormality.      Electronically signed by: Jd Cm  Date:    03/08/2024  Time:    22:32               Narrative:    EXAMINATION:  XR CHEST AP PORTABLE    CLINICAL HISTORY:  Cough, unspecified    TECHNIQUE:  Single frontal view of the chest was performed.    COMPARISON:  02/05/2018    FINDINGS:  Cardiac silhouette is within normal limits.  No  significant effusion.  No evidence of pneumothorax.  Minimal thickening along the minor fissure on the right.    No mass, consolidation or significant infiltrate.  No acute osseous abnormality.  Degenerative changes of the spine.

## 2024-03-09 NOTE — ED PROVIDER NOTES
Encounter Date: 3/8/2024       History     Chief Complaint   Patient presents with    Cough     Cough, vomiting, dizziness x 1 week.  has same symptoms     78 y.o. female with DM, HTN, gout, CKD presents for cough for the past 3 weeks.  Patient reports approximately 3 weeks of URI symptoms including cough and nausea.  Over the past week, her symptoms have worsened and she is now becoming dizzy.  She also reports periumbilical abdominal pain and diarrhea.  She denies any fevers but has had some chills.  She denies any shortness of breath, chest pain no dysuria.  Her  has similar symptoms currently      The history is provided by the patient and medical records.     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    Vitamin D deficiency disease      Past Surgical History:   Procedure Laterality Date    ANTERIOR CERVICAL DISCECTOMY W/ FUSION N/A 3/19/2020    Procedure: DISCECTOMY, SPINE, CERVICAL, ANTERIOR APPROACH, WITH FUSION, C3-C4, C4-C5, C5-C6;  Surgeon: Joseph Walton MD;  Location: [address];  Service: Neurosurgery;  Laterality: N/A;    CARPAL TUNNEL RELEASE      bilateral    COLONOSCOPY N/A 3/7/2018    Procedure: COLONOSCOPY;  Surgeon: Luís Soni MD;  Location: [facility] ([address] [place] [address]);  Service: Endoscopy;  Laterality: N/A;  ok to schedule per Elizabeth    COLONOSCOPY W/ BIOPSIES AND POLYPECTOMY      EPIDURAL STEROID INJECTION N/A 6/3/2022    Procedure: LESI L5-S1;  Surgeon: Leland Pena DO;  Location: [facility] OR;  Service: Pain Management;   Laterality: N/A;    EPIDURAL STEROID INJECTION INTO LUMBAR SPINE Left 10/26/2023    Procedure: LT L5-S1 TFESI;  Surgeon: Leland Pena DO;  Location: [facility] PAIN MANAGEMENT;  Service: Pain Management;  Laterality: Left;  15 mins    HEMORRHOID SURGERY      HYSTERECTOMY      AUB    INJECTION OF JOINT Bilateral 4/12/2022    Procedure: SACROILIAC JOINT Steroid Injection-Bilateral;  Surgeon: Leland Pena DO;  Location: [facility] OR;  Service: Pain Management;  Laterality: Bilateral;    ROTATOR CUFF REPAIR      bilateral    TRANSFORAMINAL EPIDURAL INJECTION OF STEROID Bilateral 12/2/2022    Procedure: TFESI (B/L) L4-5;  Surgeon: Leland Pena DO;  Location: [facility] OR;  Service: Pain Management;  Laterality: Bilateral;    TRIGGER FINGER RELEASE Left 2/2/2024    Procedure: RELEASE, TRIGGER FINGER - left index, long, ring, and small fingers;  Surgeon: Justus Isabel MD;  Location: [facility] OR;  Service: Orthopedics;  Laterality: Left;     Family History   Problem Relation Age of Onset    Heart disease Mother     Diabetes Mother     Heart disease Father     Cancer Father         liver    Diabetes Sister     Cataracts Sister     Kidney disease Brother     Heart disease Brother     Diabetes Sister     COPD Brother     COPD Brother     Gout Brother     Benign prostatic hyperplasia Brother     Heart disease Brother     Kidney disease Brother     Heart attack Brother         heart attack    Kidney failure Brother     Lupus Sister     Heart attack Brother     Drug abuse Brother     No Known Problems Daughter     No Known Problems Son     Amblyopia Neg Hx     Blindness Neg Hx     Breast cancer Neg Hx     Colon cancer Neg Hx     Ovarian cancer Neg Hx      Social History     Tobacco Use    Smoking status: Never    Smokeless tobacco: Never   Substance Use Topics    Alcohol use: No    Drug use: No     Review of Systems   Reason unable to perform ROS: See HPI for relevant ROS.       Physical Exam     Initial Vitals  [03/08/24 1711]   BP Pulse Resp Temp SpO2   (!) 145/83 100 18 98.5 °F (36.9 °C) 98 %      MAP       --         Physical Exam    Nursing note and vitals reviewed.  Constitutional:   Alert, normal work of breathing, no acute distress   Eyes: Conjunctivae are normal. No scleral icterus.   Cardiovascular:  Normal rate, regular rhythm and intact distal pulses.           Pulmonary/Chest: Breath sounds normal. No stridor. No respiratory distress.   Coughing frequently during exam   Abdominal:   Abdomen soft and nondistended, mild periumbilical abdominal tenderness   Musculoskeletal:         General: No edema.     Neurological: She is alert and oriented to person, place, and time. She has normal strength.   No ataxia   Skin: Skin is warm and dry.         ED Course   Procedures  Labs Reviewed   CBC W/ AUTO DIFFERENTIAL - Abnormal; Notable for the following components:       Result Value    RBC 3.56 (*)     Hemoglobin 10.2 (*)     Hematocrit 31.4 (*)     RDW 15.0 (*)     Immature Granulocytes 1.1 (*)     Immature Grans (Abs) 0.09 (*)     All other components within normal limits   COMPREHENSIVE METABOLIC PANEL - Abnormal; Notable for the following components:    Chloride 114 (*)     CO2 16 (*)     Glucose 155 (*)     BUN 41 (*)     Creatinine 1.7 (*)     Total Protein 8.6 (*)     ALT 8 (*)     eGFR 30.9 (*)     All other components within normal limits   URINALYSIS, REFLEX TO URINE CULTURE - Abnormal; Notable for the following components:    Appearance, UA Hazy (*)     Protein, UA 1+ (*)     Glucose, UA 4+ (*)     Occult Blood UA Trace (*)     Leukocytes, UA 3+ (*)     All other components within normal limits    Narrative:     Specimen Source->Urine   URINALYSIS MICROSCOPIC - Abnormal; Notable for the following components:    WBC, UA >100 (*)     WBC Clumps, UA Many (*)     Bacteria Moderate (*)     Hyaline Casts, UA 3 (*)     Granular Casts, UA 3 (*)     All other components within normal limits    Narrative:     Specimen  Source->Urine   BASIC METABOLIC PANEL - Abnormal; Notable for the following components:    Chloride 113 (*)     CO2 18 (*)     Glucose 167 (*)     BUN 35 (*)     eGFR 39.0 (*)     All other components within normal limits   CBC W/ AUTO DIFFERENTIAL - Abnormal; Notable for the following components:    RBC 3.27 (*)     Hemoglobin 9.8 (*)     Hematocrit 30.0 (*)     RDW 17.6 (*)     Immature Granulocytes 1.1 (*)     Immature Grans (Abs) 0.09 (*)     All other components within normal limits   SARS-COV-2 RDRP GENE - Abnormal; Notable for the following components:    POC Rapid COVID Positive (*)     All other components within normal limits   ISTAT PROCEDURE - Abnormal; Notable for the following components:    POC Glucose 162 (*)     POC BUN 49 (*)     POC Creatinine 1.7 (*)     POC Chloride 114 (*)     POC TCO2 (MEASURED) 20 (*)     POC Hematocrit 30 (*)     All other components within normal limits   POCT GLUCOSE - Abnormal; Notable for the following components:    POCT Glucose 168 (*)     All other components within normal limits   POCT GLUCOSE - Abnormal; Notable for the following components:    POCT Glucose 177 (*)     All other components within normal limits   POCT GLUCOSE - Abnormal; Notable for the following components:    POCT Glucose 194 (*)     All other components within normal limits   HIV 1 / 2 ANTIBODY    Narrative:     Release to patient->Immediate   HEPATITIS C ANTIBODY    Narrative:     Release to patient->Immediate   TROPONIN I   B-TYPE NATRIURETIC PEPTIDE    Narrative:     Add on MG per Dr. Guerrero @ 22:27 pm to order # [id number]   LIPASE   MAGNESIUM   MAGNESIUM    Narrative:     Add on MG per Dr. Guerrero @ 22:27 pm to order # [id number]   MAGNESIUM   PROCALCITONIN        ECG Results              Repeat EKG 12-lead (Final result)        Collection Time Result Time QRS Duration OHS QTC Calculation    03/08/24 22:34:37 03/10/24 11:15:37 100 406                     Final result by Interface, Lab In  [facility] (03/10/24 11:15:44)                   Narrative:    Test Reason : R94.31,    Vent. Rate : 081 BPM     Atrial Rate : 081 BPM     P-R Int : 190 ms          QRS Dur : 100 ms      QT Int : 350 ms       P-R-T Axes : 064 -35 091 degrees     QTc Int : 406 ms    Normal sinus rhythm  Left axis deviation  LVH with QRS widening and repolarization abnormality ( R in aVL )  Abnormal ECG  When compared with ECG of 08-MAR-2024 21:11,  Sinus rhythm has replaced Atrial flutter  Nonspecific T wave abnormality, worse in Inferior leads  T wave inversion no longer evident in Lateral leads  Lead V2 is probably placed incorrectly  QT has shortened  Confirmed by Bean Mcnally MD ([id number]) on 3/10/2024 11:15:36 AM    Referred By: NENA   SELF           Confirmed By:Bean Mcnally MD                                     EKG 12-lead (Final result)        Collection Time Result Time QRS Duration OHS QTC Calculation    03/08/24 21:11:42 03/09/24 05:33:32 94 484                     Final result by Interface, Lab In [facility] (03/09/24 05:33:41)                   Narrative:    Test Reason : R42,    Vent. Rate : 093 BPM     Atrial Rate : 214 BPM     P-R Int : 000 ms          QRS Dur : 094 ms      QT Int : 390 ms       P-R-T Axes : 069 -39 084 degrees     QTc Int : 484 ms    Atrial flutter with variable A-V block  Left axis deviation  LVH with repolarization abnormality ( R in aVL )  Prolonged QT  Abnormal ECG  When compared with ECG of 04-DEC-2020 21:10,  Marked Increase in t wave voltage -consider electrolyte abnormality such as  hyperkalemia  Confirmed by Bean Mcnally MD ([id number]) on 3/9/2024 5:33:30 AM    Referred By: NENA   SELF           Confirmed By:Bean Mcnally MD                                  Imaging Results              CT Abdomen Pelvis With IV Contrast NO Oral Contrast (Final result)  Result time 03/09/24 00:46:04      Final result by Britta Chow MD (03/09/24 00:46:04)                   Impression:      1. Questionable urothelial  enhancement and slight bladder wall thickening.  Correlation with urinalysis for cystitis/ascending urinary tract infection advised.  2. Diverticulosis with slight wall thickening in a region of diverticular disease in the sigmoid colon which could reflect muscular hypertrophy noting no significant inflammatory change appreciated on current study.  Correlation for symptoms of early/developing acute diverticulitis advised.  3. Prominence of the right ovary.  Further assessment with ultrasound can be performed as clinically warranted.  4. Additional findings as discussed above.      Electronically signed by: Britta Chow MD  Date:    03/09/2024  Time:    00:46               Narrative:    EXAMINATION:  CT ABDOMEN PELVIS WITH IV CONTRAST    CLINICAL HISTORY:  Abdominal pain, acute, non-localized;Nausea/vomiting;    TECHNIQUE:  Low dose axial images, sagittal and coronal reformations were obtained from the lung bases to the pubic symphysis following the IV administration of 75 mL of Omnipaque 350 .  No oral contrast.    COMPARISON:  CT abdomen and pelvis 2/27/2018    FINDINGS:  The visualized lung bases demonstrate minimal patchy peripheral right basilar ground-glass attenuation which could relate to atelectasis although correlation for early infectious process advised.  The visualized portions of the heart and pericardium are within normal limits.    Evaluation of solid organ parenchyma is slightly limited due to artifact from the patient's right upper extremity.  The liver demonstrates no focal abnormality.  The portal vein and splenic vein are patent.  The spleen and adrenal glands are unremarkable.  There is fatty atrophy of the pancreas is otherwise unremarkable.  No calcified stones in the gallbladder lumen.  No significant intra or extrahepatic biliary ductal dilatation.    There is right renal parenchymal cortical scarring.  There is a prominent right extrarenal pelvis, unchanged from prior study.  There is a 1  0.0 cm right renal cortical hypodensity possibly a cyst but too small to definitively characterize.  The kidneys appear to enhance symmetrically.  There is abnormal urothelial enhancement.  Urinary bladder demonstrates wall thickening.  Correlation for cystitis/ascending urinary tract infection with urinalysis advised.  The uterus is surgically absent.  There is nonspecific prominence of the right ovary.  Further assessment with ultrasound can be performed.  The left adnexal region is within normal limits.    The abdominal aorta is nonaneurysmal with atherosclerosis.  There are shotty periaortic lymph nodes.    The visualized loops of small bowel demonstrate no evidence of obstruction or inflammatory change.  There are extensive colonic diverticula present.  There is slight wall thickening in a region of diverticular disease in the sigmoid colon, which could relate to muscular hypertrophy noting no significant pericolonic inflammatory change identified at this time.  However, core clinical correlation for symptoms of early/developing acute diverticulitis advised.  The appendix is unremarkable.  There is no free intraperitoneal air, portal venous gas or ascites.    The visualized osseous structures are intact with degenerative change noting grade 1 anterolisthesis of L4 on L5.  There is a small fat containing umbilical hernia present.  Slight nonspecific soft tissue induration involving the anterior left abdominal wall.                                       X-Ray Chest AP Portable (Final result)  Result time 03/08/24 22:32:36      Final result by Jd Cm MD (03/08/24 22:32:36)                   Impression:      No acute radiographic abnormality.      Electronically signed by: Jd Cm  Date:    03/08/2024  Time:    22:32               Narrative:    EXAMINATION:  XR CHEST AP PORTABLE    CLINICAL HISTORY:  Cough, unspecified    TECHNIQUE:  Single frontal view of the chest was  performed.    COMPARISON:  02/05/2018    FINDINGS:  Cardiac silhouette is within normal limits.  No significant effusion.  No evidence of pneumothorax.  Minimal thickening along the minor fissure on the right.    No mass, consolidation or significant infiltrate.  No acute osseous abnormality.  Degenerative changes of the spine.                                       Medications   lactated ringers bolus 1,000 mL (0 mLs Intravenous Stopped 3/8/24 2332)   ondansetron injection 4 mg (4 mg Intravenous Given 3/8/24 2214)   acetaminophen tablet 1,000 mg (1,000 mg Oral Given 3/8/24 2214)   iohexoL (OMNIPAQUE 350) injection 75 mL (75 mLs Intravenous Given 3/8/24 2342)   cefTRIAXone (Rocephin) 1 g in dextrose 5 % in water (D5W) 100 mL IVPB (MB+) (0 g Intravenous Stopped 3/9/24 0132)     Medical Decision Making  78 y.o. female with DM, HTN, gout, CKD presents for cough for the past 3 weeks.  Differentials include URI, pneumonia, metabolic derangement, UTI  Patient presenting for 3 weeks of URI symptoms with a cough.  Her  has similar symptoms currently.  No hypoxia or respiratory distress.  Patient hemodynamically stable, afebrile.  She does report some generalized malaise and dizziness.  EKG without evidence of ischemia or arrhythmia, patient is alert in the ED with no persistent dizziness, no CNS deficits, doubt central vertigo.  Patient does appear hypovolemia, IV fluids were ordered with improvement in patient's symptoms.  Patient found to be COVID positive.  Chest x-ray without focal consolidation or infiltrate  Patient denies any chest pain.  Her EKG initially showed large morphology of T-waves with no STEMI, repeat EKG reassuring and within normal limits  Patient was found to have JACK and UTI.  Treated with Rocephin, admitted to hospital medicine for further management    Amount and/or Complexity of Data Reviewed  Labs: ordered. Decision-making details documented in ED Course.  Radiology: ordered. Decision-making  details documented in ED Course.  ECG/medicine tests:  Decision-making details documented in ED Course.    Risk  OTC drugs.  Prescription drug management.               ED Course as of 03/11/24 0543   Fri Mar 08, 2024   2132 EKG 12-lead  Findings, per independent interpretation:  Sinus rhythm, regular, narrow complex, rate of 93, no STEMI, ST changes with enlarged T-waves, prolonged QT, ST changes or now present compared to prior.  Discussed briefly with Cardiology, repeat EKG within normal limits, no indication for cath lab activation context of symptoms and nonspecific EKG changes [OK]   2249 Comprehensive metabolic panel(!)  JACK [OK]   2250 X-Ray Chest AP Portable  Findings, per independent interpretation:  Symmetrically expanded lungs, no focal consolidation, linear opacity in the right lung [OK]   Sat Mar 09, 2024   0046 Urinalysis Microscopic(!)  Concerning for UTI [OK]      ED Course User Index  [OK] Mckinley Guerrero MD                           Clinical Impression:  Final diagnoses:  [R05.9] Cough  [R42] Dizziness  [R94.31] Abnormal EKG  [N17.9] JACK (acute kidney injury) (Primary)  [N30.00] Acute cystitis without hematuria          ED Disposition Condition    Observation                    Mckinley Guerrero MD  03/11/24 0543

## 2024-03-09 NOTE — ASSESSMENT & PLAN NOTE
Patient's anemia is currently controlled. Has not received any PRBCs to date. Etiology likely d/t chronic disease due to Chronic Kidney Disease  Current CBC reviewed-   Lab Results   Component Value Date    HGB 10.2 (L) 03/08/2024    HCT 30 (L) 03/08/2024     Monitor serial CBC and transfuse if patient becomes hemodynamically unstable, symptomatic or H/H drops below 7/21.

## 2024-03-09 NOTE — NURSING
Nurses Note -- 4 Eyes      3/9/2024   4:11 PM      Skin assessed during: Admit      [x] No Altered Skin Integrity Present    [x]Prevention Measures Documented      [] Yes- Altered Skin Integrity Present or Discovered   [] LDA Added if Not in Epic (Describe Wound)   [] New Altered Skin Integrity was Present on Admit and Documented in LDA   [] Wound Image Taken    Wound Care Consulted? No    Attending Nurse:  Julio César Bedolla RN/Staff Member:   Afia

## 2024-03-09 NOTE — ASSESSMENT & PLAN NOTE
Patient is identified as High risk for severe complications of COVID 19 based on COVID risk score of 4   Initiate standard COVID protocols; COVID-19 testing ,Infection Control notification  and isolation- respiratory, contact and droplet per protocol    Diagnostics: CBC, CMP, Procalcitonin, BNP, Troponin, ECG, Portable CXR, and UA and culture    Management: Maintain oxygen saturations 92-96% via Nasal Cannula  LPM and monitor with continuous/intermittent pulse oximetry.  and Inhaled bronchodilators as needed for shortness of breath.    Advance Care Planning  Current advance care plan has been discussed with patient/family/POA and patient currently wishes Full Code.     - will not initiate remdesivir as patient has had symptoms for multiple weeks  - start supportive care with scheduled zyrtec and mucinex, and prn tessalon   - CT also noted R basilar GG attenuation concerning for atelectasis vs early infectious process. Will order procal  - IS

## 2024-03-09 NOTE — ED NOTES
I-STAT Chem-8+ Results:   Value Reference Range   Sodium 143 136-145 mmol/L   Potassium  5 3.5-5.1 mmol/L   Chloride 114  mmol/L   Ionized Calcium 1.26 1.06-1.42 mmol/L   CO2 (measured) 20 23-29 mmol/L   Glucose 162  mg/dL   BUN 49 6-30 mg/dL   Creatinine 1.7 0.5-1.4 mg/dL   Hematocrit 30 36-54%

## 2024-03-09 NOTE — ED NOTES
Lina Davis, a 78 y.o. female presents to the ED w/ complaint of     Triage note:  Chief Complaint   Patient presents with    Cough     Cough, vomiting, dizziness x 1 week.  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    LOC: The patient is awake, alert and aware of environment with an appropriate affect, the patient is oriented x 4 and speaking appropriately.  APPEARANCE: Patient resting comfortably and in no acute distress, patient is clean and well groomed, patient's clothing is properly fastened.  SKIN: The skin is warm and dry, color consistent with ethnicity, patient has normal skin turgor and moist mucus membranes, skin intact, no breakdown or bruising noted.  MUSCULOSKELETAL: Patient moving all extremities well, no obvious swelling or deformities noted.  RESPIRATORY: Airway is open and patent, respirations are spontaneous and even, patient has a normal effort and rate.  CARDIAC: Patient has a normal rate and rhythm, no periphreal edema noted, capillary refill < 3 seconds. Normal +2 pedal pulses present.+productive cough with yellow sputum  ABDOMEN: Soft and non tender to palpation, no distention noted. Patient denies any constipation. +n/v/d  NEUROLOGIC: Eyes open spontaneously, PERRL, behavior appropriate to situation, follows commands, facial expression symmetrical, bilateral hand grasp equal and even, purposeful motor response  noted, normal sensation in all extremities.

## 2024-03-09 NOTE — CARE UPDATE
Lina FERNANDO CageKenefic was admitted to [facility] medicine for management of UTI, JACK and COVID-19. JACK resolved with IV fluids, treating UTI with ceftriaxone while urine cultures pending. Symptomatic control for post-viral cough.      Pricilla Ames PA-C  [facility] Medicine   [facility] [address]

## 2024-03-09 NOTE — ASSESSMENT & PLAN NOTE
- UA infectious on admit with associated abdominal pain  - CT with questionable urothelial enhancement and slight bladder wall thickening  - started on IV ceftriaxone in ED; will continue  - follow urine cultures

## 2024-03-09 NOTE — ASSESSMENT & PLAN NOTE
Diarrhea  - patient c/o abdominal pain with nausea, vomiting after coughing, and ~2 episodes of diarrhea/day over the past couple of days  - CT A/P noting diverticulosis with possible developing early diverticulitis   - abdomen non tender on exam   - symptoms likely 2/2 Covid infection and UTI. Low suspicion for diverticulitis at this time  - prn antiemetics and treat UTI

## 2024-03-09 NOTE — H&P
Awais Dennis - Emergency Dept  [facility] Medicine  History & Physical    Patient Name: Lina Davis  MRN: [id number]  Patient Class: OP- Observation  Admission Date: 3/8/2024  Attending Physician: Ran Uriarte MD   Primary Care Provider: Dora Freedman MD         Patient information was obtained from patient and ER records.     Subjective:     Principal Problem:Acute kidney injury superimposed on chronic kidney disease    Chief Complaint:   Chief Complaint   Patient presents with    Cough     Cough, vomiting, dizziness x 1 week.  has same symptoms        HPI: Lina Davis is a 77 yo F with PMHx of insulin dependent T2DM, HTN, HLD, gout and CKD3 who presented to ED for 3 weeks of cough. Patient reports approximately 3 weeks of URI symptoms including productive cough, nausea, and decreased appetite. Over the past week, her symptoms have worsened. She states that sometimes she has vomited after episodes of coughing. She also reports periumbilical and suprapubic abdominal pain that is worse with eating. Over the past 2 days, she has had 2-3 episodes of watery diarrhea each day. Denies dysuria or urinary urgency. Admits urinary frequency but states that this is not new. She denies any fevers but has had some chills. Her  has similar symptoms and is also being admitted to the hospital tonight. Denies fever, chest pain, palpitations, SOB, and LE swelling.    In ED: Afebrile. HDS. Satting well on RA. No leukocytosis. Hgb 10.2. JACK with BUN 41 and Cr 1.7 (baseline 1.2). BNP 21. Trop neg x1. Covid positive. UA infectious with 3+ leuks, >100 WBC, and many bacteria. CXR without acute process. CT A/P urothelial enhancement and slight bladder wall thickening, diverticulosis with possibly early/developing acute diverticulitis, and prominence of R ovary. Given tylenol, IV rocephin, IV zofran and 1L LR. Placed in observation with HM.     Past Medical History:   Diagnosis Date    Anemia     Arthritis      Cataract     Gout, arthritis     HTN (hypertension), benign     Polyneuropathy     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    Vitamin D deficiency disease        Past Surgical History:   Procedure Laterality Date    ANTERIOR CERVICAL DISCECTOMY W/ FUSION N/A 3/19/2020    Procedure: DISCECTOMY, SPINE, CERVICAL, ANTERIOR APPROACH, WITH FUSION, C3-C4, C4-C5, C5-C6;  Surgeon: Joseph Walton MD;  Location: [facility] OR 2ND FLR;  Service: Neurosurgery;  Laterality: N/A;    CARPAL TUNNEL RELEASE      bilateral    COLONOSCOPY N/A 3/7/2018    Procedure: COLONOSCOPY;  Surgeon: Luís Soni MD;  Location: [facility] ENDO (2ND FLR);  Service: Endoscopy;  Laterality: N/A;  ok to schedule per Elizabeth    COLONOSCOPY W/ BIOPSIES AND POLYPECTOMY      EPIDURAL STEROID INJECTION N/A 6/3/2022    Procedure: LESI L5-S1;  Surgeon: Leland Pena DO;  Location: [facility] OR;  Service: Pain Management;  Laterality: N/A;    EPIDURAL STEROID INJECTION INTO LUMBAR SPINE Left 10/26/2023    Procedure: LT L5-S1 TFESI;  Surgeon: Leland Pena DO;  Location: [facility] PAIN MANAGEMENT;  Service: Pain Management;  Laterality: Left;  15 mins    HEMORRHOID SURGERY      HYSTERECTOMY      AUB    INJECTION OF JOINT Bilateral 4/12/2022    Procedure: SACROILIAC JOINT Steroid Injection-Bilateral;  Surgeon: Leland Pena DO;  Location: [facility] OR;  Service: Pain Management;  Laterality: Bilateral;    ROTATOR CUFF REPAIR      bilateral    TRANSFORAMINAL EPIDURAL INJECTION OF STEROID Bilateral 12/2/2022    Procedure: TFESI (B/L) L4-5;  Surgeon: Leland Pena DO;  Location: [facility] OR;  Service: Pain Management;  Laterality: Bilateral;    TRIGGER FINGER RELEASE Left 2/2/2024    Procedure: RELEASE, TRIGGER FINGER - left index, long, ring, and small fingers;  Surgeon: Justus Isabel MD;  Location: [facility] OR;  Service: Orthopedics;  Laterality: Left;       Review of patient's allergies indicates:   Allergen Reactions    Gabapentin  Other (See Comments)     ineffective    Januvia [sitagliptin] Other (See Comments)     Pancreatitis      Metformin Other (See Comments)     Nausea and vomiting with immediate release - tolerates extended release       Current Facility-Administered Medications on File Prior to Encounter   Medication    fentaNYL 50 mcg/mL injection 100 mcg    midazolam (VERSED) 1 mg/mL injection 1 mg     Current Outpatient Medications on File Prior to Encounter   Medication Sig    acetaminophen (TYLENOL) 500 MG tablet Take 2 tablets (1,000 mg total) by mouth 3 (three) times daily.    alcohol swabs (DROPSAFE ALCOHOL PREP PADS) PadM USE TWICE DAILY AS DIRECTED, E 11.9    allopurinoL (ZYLOPRIM) 300 MG tablet Take 1 tablet (300 mg total) by mouth once daily.    ALPRAZolam (XANAX) 0.25 MG tablet One-two one hour prior to procedure then may repeat immediately prior to procedure    amLODIPine (NORVASC) 10 MG tablet TAKE 1 TABLET EVERY DAY    aspirin (ECOTRIN) 81 MG EC tablet Take 81 mg by mouth once daily.    atorvastatin (LIPITOR) 40 MG tablet TAKE 1 TABLET ONE TIME DAILY    blood glucose control, normal Soln True Metrix control solution E11.9 - pt tests twice daily    blood-glucose meter kit Check glucose daily E11.9 meter covered by insurance Don Metrix    DULoxetine (CYMBALTA) 30 MG capsule TAKE 1 CAPSULE ONE TIME DAILY    empagliflozin (JARDIANCE) 10 mg tablet Take 1 tablet (10 mg total) by mouth once daily.    ibuprofen (ADVIL,MOTRIN) 600 MG tablet Take 1 tablet (600 mg total) by mouth 3 (three) times daily.    insulin detemir U-100, Levemir, 100 unit/mL (3 mL) SubQ InPn pen Inject 14 Units into the skin once daily.    lancets (TRUEPLUS LANCETS) 28 gauge Misc 1 lancet by Misc.(Non-Drug; Combo Route) route 2 (two) times daily.    LIDOcaine (LIDODERM) 5 % Place 1 patch onto the skin once daily. Remove & Discard patch within 12 hours or as directed by MD    losartan (COZAAR) 100 MG tablet TAKE 1 TABLET (100 MG TOTAL) BY MOUTH ONCE DAILY.  "   metFORMIN (GLUCOPHAGE-XR) 500 MG ER 24hr tablet Take 1 tablet (500 mg total) by mouth 2 (two) times daily with meals.    omeprazole (PRILOSEC) 20 MG capsule TAKE 1 CAPSULE TWICE DAILY    pen needle, diabetic (NOVOFINE 32) 32 gauge x 1/4" Ndle Use with insulin 4 times a day.    pioglitazone (ACTOS) 30 MG tablet Take 1 tablet (30 mg total) by mouth once daily.    pregabalin (LYRICA) 50 MG capsule One at bedtime    traMADoL (ULTRAM) 50 mg tablet Take 1 tablet (50 mg total) by mouth every 6 (six) hours as needed for Pain.    triamcinolone acetonide 0.1% (KENALOG) 0.1 % cream APPLY TO THE AFFECTED AREA(S) ON LOWER LEGS TWICE DAILY AS NEEDED FOR ITCHY OR RED    TRUE METRIX GLUCOSE TEST STRIP Strp TEST BLOOD SUGAR TWICE DAILY     Family History       Problem Relation (Age of Onset)    Benign prostatic hyperplasia Brother    COPD Brother, Brother    Cancer Father    Cataracts Sister    Diabetes Mother, Sister, Sister    Drug abuse Brother    Gout Brother    Heart attack Brother, Brother    Heart disease Mother, Father, Brother, Brother    Kidney disease Brother, Brother    Kidney failure Brother    Lupus Sister    No Known Problems Daughter, Son          Tobacco Use    Smoking status: Never    Smokeless tobacco: Never   Substance and Sexual Activity    Alcohol use: No    Drug use: No    Sexual activity: Yes     Partners: Male     Birth control/protection: Surgical     Review of Systems   Constitutional:  Positive for appetite change and chills. Negative for activity change and fever.   HENT:  Positive for congestion. Negative for trouble swallowing.    Eyes:  Negative for photophobia and visual disturbance.   Respiratory:  Positive for cough. Negative for chest tightness and shortness of breath.    Cardiovascular:  Negative for chest pain, palpitations and leg swelling.   Gastrointestinal:  Positive for abdominal pain, diarrhea, nausea and vomiting. Negative for constipation.   Genitourinary:  Positive for frequency. " Negative for dysuria and hematuria.   Musculoskeletal:  Negative for back pain, gait problem and neck pain.   Skin:  Negative for rash and wound.   Neurological:  Negative for dizziness, syncope, speech difficulty and light-headedness.   Psychiatric/Behavioral:  Negative for agitation and confusion. The patient is not nervous/anxious.      Objective:     Vital Signs (Most Recent):  Temp: 98.8 °F (37.1 °C) (03/08/24 2338)  Pulse: 87 (03/08/24 2338)  Resp: 18 (03/08/24 2338)  BP: (!) 149/66 (03/08/24 2338)  SpO2: 100 % (03/08/24 2338) Vital Signs (24h Range):  Temp:  [98.5 °F (36.9 °C)-98.8 °F (37.1 °C)] 98.8 °F (37.1 °C)  Pulse:  [] 87  Resp:  [18] 18  SpO2:  [98 %-100 %] 100 %  BP: (145-149)/(66-83) 149/66     Weight: 82.6 kg (182 lb)  Body mass index is 29.38 kg/m².     Physical Exam  Vitals and nursing note reviewed.   Constitutional:       General: She is not in acute distress.     Appearance: She is well-developed.   HENT:      Head: Normocephalic and atraumatic.      Nose: Congestion present.      Mouth/Throat:      Pharynx: No oropharyngeal exudate.   Eyes:      Conjunctiva/sclera: Conjunctivae normal.      Pupils: Pupils are equal, round, and reactive to light.   Cardiovascular:      Rate and Rhythm: Normal rate and regular rhythm.      Heart sounds: Normal heart sounds.   Pulmonary:      Effort: Pulmonary effort is normal. No respiratory distress.      Breath sounds: Normal breath sounds. No wheezing.      Comments: Coughing frequently  Abdominal:      General: Bowel sounds are normal. There is no distension.      Palpations: Abdomen is soft.      Tenderness: There is no abdominal tenderness. There is no guarding.   Musculoskeletal:         General: No tenderness. Normal range of motion.      Cervical back: Normal range of motion and neck supple.      Right lower leg: No edema.      Left lower leg: No edema.   Lymphadenopathy:      Cervical: No cervical adenopathy.   Skin:     General: Skin is warm and  dry.      Capillary Refill: Capillary refill takes less than 2 seconds.      Findings: No rash.   Neurological:      Mental Status: She is alert and oriented to person, place, and time.      Cranial Nerves: No cranial nerve deficit.      Sensory: No sensory deficit.      Coordination: Coordination normal.   Psychiatric:         Behavior: Behavior normal.         Thought Content: Thought content normal.         Judgment: Judgment normal.              CRANIAL NERVES     CN III, IV, VI   Pupils are equal, round, and reactive to light.       Significant Labs: All pertinent labs within the past 24 hours have been reviewed.  CBC:   Recent Labs   Lab 03/08/24 2144 03/08/24 2149   WBC 8.06  --    HGB 10.2*  --    HCT 31.4* 30*     --      CMP:   Recent Labs   Lab 03/08/24 2144      K 5.0   *   CO2 16*   *   BUN 41*   CREATININE 1.7*   CALCIUM 10.4   PROT 8.6*   ALBUMIN 3.7   BILITOT 0.3   ALKPHOS 77   AST 19   ALT 8*   ANIONGAP 13     Cardiac Markers:   Recent Labs   Lab 03/08/24 2144   BNP 21     Troponin:   Recent Labs   Lab 03/08/24 2144   TROPONINI 0.016     Urine Studies:   Recent Labs   Lab 03/08/24  2324   COLORU Yellow   APPEARANCEUA Hazy*   PHUR 6.0   SPECGRAV 1.020   PROTEINUA 1+*   GLUCUA 4+*   KETONESU Negative   BILIRUBINUA Negative   OCCULTUA Trace*   NITRITE Negative   LEUKOCYTESUR 3+*   RBCUA 3   WBCUA >100*   BACTERIA Moderate*   SQUAMEPITHEL 2   HYALINECASTS 3*       Significant Imaging: I have reviewed all pertinent imaging results/findings within the past 24 hours.  Imaging Results              CT Abdomen Pelvis With IV Contrast NO Oral Contrast (Final result)  Result time 03/09/24 00:46:04      Final result by Brtita Chow MD (03/09/24 00:46:04)                   Impression:      1. Questionable urothelial enhancement and slight bladder wall thickening.  Correlation with urinalysis for cystitis/ascending urinary tract infection advised.  2. Diverticulosis with slight  wall thickening in a region of diverticular disease in the sigmoid colon which could reflect muscular hypertrophy noting no significant inflammatory change appreciated on current study.  Correlation for symptoms of early/developing acute diverticulitis advised.  3. Prominence of the right ovary.  Further assessment with ultrasound can be performed as clinically warranted.  4. Additional findings as discussed above.      Electronically signed by: Britta Chow MD  Date:    03/09/2024  Time:    00:46               Narrative:    EXAMINATION:  CT ABDOMEN PELVIS WITH IV CONTRAST    CLINICAL HISTORY:  Abdominal pain, acute, non-localized;Nausea/vomiting;    TECHNIQUE:  Low dose axial images, sagittal and coronal reformations were obtained from the lung bases to the pubic symphysis following the IV administration of 75 mL of Omnipaque 350 .  No oral contrast.    COMPARISON:  CT abdomen and pelvis 2/27/2018    FINDINGS:  The visualized lung bases demonstrate minimal patchy peripheral right basilar ground-glass attenuation which could relate to atelectasis although correlation for early infectious process advised.  The visualized portions of the heart and pericardium are within normal limits.    Evaluation of solid organ parenchyma is slightly limited due to artifact from the patient's right upper extremity.  The liver demonstrates no focal abnormality.  The portal vein and splenic vein are patent.  The spleen and adrenal glands are unremarkable.  There is fatty atrophy of the pancreas is otherwise unremarkable.  No calcified stones in the gallbladder lumen.  No significant intra or extrahepatic biliary ductal dilatation.    There is right renal parenchymal cortical scarring.  There is a prominent right extrarenal pelvis, unchanged from prior study.  There is a 1 0.0 cm right renal cortical hypodensity possibly a cyst but too small to definitively characterize.  The kidneys appear to enhance symmetrically.  There is abnormal  urothelial enhancement.  Urinary bladder demonstrates wall thickening.  Correlation for cystitis/ascending urinary tract infection with urinalysis advised.  The uterus is surgically absent.  There is nonspecific prominence of the right ovary.  Further assessment with ultrasound can be performed.  The left adnexal region is within normal limits.    The abdominal aorta is nonaneurysmal with atherosclerosis.  There are shotty periaortic lymph nodes.    The visualized loops of small bowel demonstrate no evidence of obstruction or inflammatory change.  There are extensive colonic diverticula present.  There is slight wall thickening in a region of diverticular disease in the sigmoid colon, which could relate to muscular hypertrophy noting no significant pericolonic inflammatory change identified at this time.  However, core clinical correlation for symptoms of early/developing acute diverticulitis advised.  The appendix is unremarkable.  There is no free intraperitoneal air, portal venous gas or ascites.    The visualized osseous structures are intact with degenerative change noting grade 1 anterolisthesis of L4 on L5.  There is a small fat containing umbilical hernia present.  Slight nonspecific soft tissue induration involving the anterior left abdominal wall.                                       X-Ray Chest AP Portable (Final result)  Result time 03/08/24 22:32:36      Final result by Jd Cm MD (03/08/24 22:32:36)                   Impression:      No acute radiographic abnormality.      Electronically signed by: Jd Cm  Date:    03/08/2024  Time:    22:32               Narrative:    EXAMINATION:  XR CHEST AP PORTABLE    CLINICAL HISTORY:  Cough, unspecified    TECHNIQUE:  Single frontal view of the chest was performed.    COMPARISON:  02/05/2018    FINDINGS:  Cardiac silhouette is within normal limits.  No significant effusion.  No evidence of pneumothorax.  Minimal thickening along the minor  fissure on the right.    No mass, consolidation or significant infiltrate.  No acute osseous abnormality.  Degenerative changes of the spine.                                    Assessment/Plan:     * Acute kidney injury superimposed on chronic kidney disease  Patient with acute kidney injury/acute renal failure likely due to pre-renal azotemia due to dehydration JACK is currently stable. Baseline creatinine  1.2  - Labs reviewed- Renal function/electrolytes with Estimated Creatinine Clearance: 30.5 mL/min (A) (based on SCr of 1.7 mg/dL (H)). according to latest data. Monitor urine output and serial BMP and adjust therapy as needed. Avoid nephrotoxins and renally dose meds for GFR listed above.  - hold losartan  - given 1L LR in ED, will start cIVFs overnight    Acute cystitis without hematuria  - UA infectious on admit with associated abdominal pain  - CT with questionable urothelial enhancement and slight bladder wall thickening  - started on IV ceftriaxone in ED; will continue  - follow urine cultures     COVID  Patient is identified as High risk for severe complications of COVID 19 based on COVID risk score of 4   Initiate standard COVID protocols; COVID-19 testing ,Infection Control notification  and isolation- respiratory, contact and droplet per protocol    Diagnostics: CBC, CMP, Procalcitonin, BNP, Troponin, ECG, Portable CXR, and UA and culture    Management: Maintain oxygen saturations 92-96% via Nasal Cannula  LPM and monitor with continuous/intermittent pulse oximetry.  and Inhaled bronchodilators as needed for shortness of breath.    Advance Care Planning Current advance care plan has been discussed with patient/family/POA and patient currently wishes Full Code.     - will not initiate remdesivir as patient has had symptoms for multiple weeks  - start supportive care with scheduled zyrtec and mucinex, and prn tessalon   - CT also noted R basilar GG attenuation concerning for atelectasis vs early  "infectious process. Will order procal  - IS    Abdominal pain  Diarrhea  - patient c/o abdominal pain with nausea, vomiting after coughing, and ~2 episodes of diarrhea/day over the past couple of days  - CT A/P noting diverticulosis with possible developing early diverticulitis   - abdomen non tender on exam   - symptoms likely 2/2 Covid infection and UTI. Low suspicion for diverticulitis at this time  - prn antiemetics and treat UTI     Type 2 diabetes mellitus with diabetic nephropathy  Patient's FSGs are controlled on current medication regimen.  Last A1c reviewed-   Lab Results   Component Value Date    HGBA1C 5.9 (H) 11/21/2023     Most recent fingerstick glucose reviewed- No results for input(s): "POCTGLUCOSE" in the last 24 hours.  Current correctional scale  Low  Maintain anti-hyperglycemic dose as follows-   Antihyperglycemics (From admission, onward)      Start     Stop Route Frequency Ordered    03/09/24 0900  insulin detemir U-100 (Levemir) pen 10 Units         -- SubQ Daily 03/09/24 0341    03/09/24 0309  insulin aspart U-100 pen 0-5 Units         -- SubQ Before meals & nightly PRN 03/09/24 0209          Hold Oral hypoglycemics while patient is in the hospital.  - diabetic diet    HTN (hypertension), benign  - controlled on admit  - continue amlodipine 10 mg daily  - hold losartan for JACK    Abnormal finding on CT scan  - CT noting Prominence of the right ovary  - OP US at discharge for further evaluation     CKD (chronic kidney disease) stage 3, GFR 30-59 ml/min  - see JACK above    Anemia  Patient's anemia is currently controlled. Has not received any PRBCs to date. Etiology likely d/t chronic disease due to Chronic Kidney Disease  Current CBC reviewed-   Lab Results   Component Value Date    HGB 10.2 (L) 03/08/2024    HCT 30 (L) 03/08/2024     Monitor serial CBC and transfuse if patient becomes hemodynamically unstable, symptomatic or H/H drops below 7/21.    History of gout  - continue " allopurinol    GERD (gastroesophageal reflux disease)  - continue ppi    Hyperlipidemia, mixed  - continue statin      VTE Risk Mitigation (From admission, onward)           Ordered     heparin (porcine) injection 5,000 Units  Every 12 hours         03/09/24 0209     IP VTE HIGH RISK PATIENT  Once         03/09/24 0209                         On 03/09/2024, patient should be placed in hospital observation services under my care in collaboration with Dr. Rod Laird.           Buffy Rainey PA-C  Department of Hospital Medicine  [facility] - Emergency Dept

## 2024-03-09 NOTE — ASSESSMENT & PLAN NOTE
"Patient's FSGs are controlled on current medication regimen.  Last A1c reviewed-   Lab Results   Component Value Date    HGBA1C 5.9 (H) 11/21/2023     Most recent fingerstick glucose reviewed- No results for input(s): "POCTGLUCOSE" in the last 24 hours.  Current correctional scale  Low  Maintain anti-hyperglycemic dose as follows-   Antihyperglycemics (From admission, onward)      Start     Stop Route Frequency Ordered    03/09/24 0900  insulin detemir U-100 (Levemir) pen 10 Units         -- SubQ Daily 03/09/24 0341    03/09/24 0309  insulin aspart U-100 pen 0-5 Units         -- SubQ Before meals & nightly PRN 03/09/24 0209          Hold Oral hypoglycemics while patient is in the hospital.  - diabetic diet  "

## 2024-03-09 NOTE — ED NOTES
Arrived via transport AAOx4. No issues vital signs stable. Pt resting in bed respirations even and unlabored, call bell within reach.

## 2024-03-09 NOTE — HPI
Lina Davis is a 77 yo F with PMHx of insulin dependent T2DM, HTN, HLD, gout and CKD3 who presented to ED for 3 weeks of cough. Patient reports approximately 3 weeks of URI symptoms including productive cough, nausea, and decreased appetite. Over the past week, her symptoms have worsened. She states that sometimes she has vomited after episodes of coughing. She also reports periumbilical and suprapubic abdominal pain that is worse with eating. Over the past 2 days, she has had 2-3 episodes of watery diarrhea each day. Denies dysuria or urinary urgency. Admits urinary frequency but states that this is not new. She denies any fevers but has had some chills. Her  has similar symptoms and is also being admitted to the hospital tonight. Denies fever, chest pain, palpitations, SOB, and LE swelling.    In ED: Afebrile. HDS. Satting well on RA. No leukocytosis. Hgb 10.2. JACK with BUN 41 and Cr 1.7 (baseline 1.2). BNP 21. Trop neg x1. Covid positive. UA infectious with 3+ leuks, >100 WBC, and many bacteria. CXR without acute process. CT A/P urothelial enhancement and slight bladder wall thickening, diverticulosis with possibly early/developing acute diverticulitis, and prominence of R ovary. Given tylenol, IV rocephin, IV zofran and 1L LR. Placed in observation with HM.

## 2024-03-09 NOTE — ASSESSMENT & PLAN NOTE
Patient with acute kidney injury/acute renal failure likely due to pre-renal azotemia due to dehydration JACK is currently stable. Baseline creatinine  1.2  - Labs reviewed- Renal function/electrolytes with Estimated Creatinine Clearance: 30.5 mL/min (A) (based on SCr of 1.7 mg/dL (H)). according to latest data. Monitor urine output and serial BMP and adjust therapy as needed. Avoid nephrotoxins and renally dose meds for GFR listed above.  - hold losartan  - given 1L LR in ED, will start cIVFs overnight

## 2024-03-10 VITALS
HEIGHT: 66 IN | SYSTOLIC BLOOD PRESSURE: 166 MMHG | HEART RATE: 85 BPM | TEMPERATURE: 98 F | WEIGHT: 183 LBS | DIASTOLIC BLOOD PRESSURE: 77 MMHG | RESPIRATION RATE: 18 BRPM | OXYGEN SATURATION: 98 % | BODY MASS INDEX: 29.41 KG/M2

## 2024-03-10 DIAGNOSIS — U07.1 COVID-19 VIRUS DETECTED: ICD-10-CM

## 2024-03-10 LAB
ANION GAP SERPL CALC-SCNC: 9 MMOL/L (ref 8–16)
BACTERIA UR CULT: ABNORMAL
BUN SERPL-MCNC: 29 MG/DL (ref 8–23)
CALCIUM SERPL-MCNC: 9.7 MG/DL (ref 8.7–10.5)
CHLORIDE SERPL-SCNC: 116 MMOL/L (ref 95–110)
CO2 SERPL-SCNC: 19 MMOL/L (ref 23–29)
CREAT SERPL-MCNC: 1.1 MG/DL (ref 0.5–1.4)
ERYTHROCYTE [DISTWIDTH] IN BLOOD BY AUTOMATED COUNT: 15 % (ref 11.5–14.5)
EST. GFR  (NO RACE VARIABLE): 52.1 ML/MIN/1.73 M^2
GLUCOSE SERPL-MCNC: 139 MG/DL (ref 70–110)
HCT VFR BLD AUTO: 27.6 % (ref 37–48.5)
HGB BLD-MCNC: 9.1 G/DL (ref 12–16)
MAGNESIUM SERPL-MCNC: 1.8 MG/DL (ref 1.6–2.6)
MCH RBC QN AUTO: 29.3 PG (ref 27–31)
MCHC RBC AUTO-ENTMCNC: 33 G/DL (ref 32–36)
MCV RBC AUTO: 89 FL (ref 82–98)
OHS QRS DURATION: 100 MS
OHS QTC CALCULATION: 406 MS
PLATELET # BLD AUTO: 302 K/UL (ref 150–450)
PMV BLD AUTO: 9.6 FL (ref 9.2–12.9)
POCT GLUCOSE: 136 MG/DL (ref 70–110)
POCT GLUCOSE: 149 MG/DL (ref 70–110)
POTASSIUM SERPL-SCNC: 4.4 MMOL/L (ref 3.5–5.1)
RBC # BLD AUTO: 3.11 M/UL (ref 4–5.4)
SODIUM SERPL-SCNC: 144 MMOL/L (ref 136–145)
WBC # BLD AUTO: 6.27 K/UL (ref 3.9–12.7)

## 2024-03-10 PROCEDURE — 85027 COMPLETE CBC AUTOMATED: CPT | Mod: HCNC

## 2024-03-10 PROCEDURE — G0378 HOSPITAL OBSERVATION PER HR: HCPCS | Mod: HCNC

## 2024-03-10 PROCEDURE — 80048 BASIC METABOLIC PNL TOTAL CA: CPT | Mod: HCNC

## 2024-03-10 PROCEDURE — 96365 THER/PROPH/DIAG IV INF INIT: CPT | Mod: 59

## 2024-03-10 PROCEDURE — 96372 THER/PROPH/DIAG INJ SC/IM: CPT

## 2024-03-10 PROCEDURE — 36415 COLL VENOUS BLD VENIPUNCTURE: CPT | Mod: HCNC

## 2024-03-10 PROCEDURE — 63600175 PHARM REV CODE 636 W HCPCS: Mod: HCNC

## 2024-03-10 PROCEDURE — 25000003 PHARM REV CODE 250: Mod: HCNC

## 2024-03-10 PROCEDURE — 83735 ASSAY OF MAGNESIUM: CPT | Mod: HCNC

## 2024-03-10 RX ORDER — CEFUROXIME AXETIL 500 MG/1
500 TABLET ORAL 2 TIMES DAILY
Qty: 10 TABLET | Refills: 0 | Status: SHIPPED | OUTPATIENT
Start: 2024-03-10 | End: 2024-03-15

## 2024-03-10 RX ORDER — BENZONATATE 100 MG/1
100 CAPSULE ORAL 3 TIMES DAILY
Qty: 30 CAPSULE | Refills: 0 | Status: SHIPPED | OUTPATIENT
Start: 2024-03-10 | End: 2024-03-20

## 2024-03-10 RX ADMIN — HEPARIN SODIUM 5000 UNITS: 5000 INJECTION INTRAVENOUS; SUBCUTANEOUS at 09:03

## 2024-03-10 RX ADMIN — ATORVASTATIN CALCIUM 40 MG: 40 TABLET, FILM COATED ORAL at 09:03

## 2024-03-10 RX ADMIN — ALLOPURINOL 300 MG: 100 TABLET ORAL at 09:03

## 2024-03-10 RX ADMIN — INSULIN DETEMIR 10 UNITS: 100 INJECTION, SOLUTION SUBCUTANEOUS at 09:03

## 2024-03-10 RX ADMIN — BENZONATATE 100 MG: 100 CAPSULE ORAL at 09:03

## 2024-03-10 RX ADMIN — AMLODIPINE BESYLATE 10 MG: 10 TABLET ORAL at 09:03

## 2024-03-10 RX ADMIN — CEFTRIAXONE 1 G: 1 INJECTION, POWDER, FOR SOLUTION INTRAMUSCULAR; INTRAVENOUS at 12:03

## 2024-03-10 RX ADMIN — PANTOPRAZOLE SODIUM 40 MG: 40 TABLET, DELAYED RELEASE ORAL at 09:03

## 2024-03-10 RX ADMIN — ASPIRIN 81 MG: 81 TABLET, COATED ORAL at 09:03

## 2024-03-10 RX ADMIN — GUAIFENESIN AND DEXTROMETHORPHAN HYDROBROMIDE 1 TABLET: 600; 30 TABLET, EXTENDED RELEASE ORAL at 09:03

## 2024-03-10 NOTE — PHARMACY MED REC
"  Admission Medication History     The home medication history was taken by Katie Suarez.    You may go to "Admission" then "Reconcile Home Medications" tabs to review and/or act upon these items.     The home medication list has been updated by the Pharmacy department.   Please read ALL comments highlighted in yellow.   Please address this information as you see fit.    Feel free to contact us if you have any questions or require assistance.      The medications listed below were removed from the home medication list. Please reorder if appropriate:  Patient reports no longer taking the following medication(s):  Ibuprofen (ADVIL,MOTRIN) 600 MG tablet  Triamcinolone acetonide 0.1% (KENALOG) 0.1 % cream  TraMADoL (ULTRAM) 50 mg tablet  Pregabalin (LYRICA) 50 MG capsule  LIDOcaine (LIDODERM) 5 %  DULoxetine (CYMBALTA) 30 MG capsule  ALPRAZolam (XANAX) 0.25 MG tablet    Medications listed below were obtained from: Patient/family and Analytic software- [employer] Medications   Medication Sig    acetaminophen (TYLENOL) 500 MG tablet   Take 500 mg by mouth as needed for Pain.    allopurinoL (ZYLOPRIM) 300 MG tablet   Take 1 tablet (300 mg total) by mouth once daily.    amLODIPine (NORVASC) 10 MG tablet   TAKE 1 TABLET EVERY DAY    aspirin (ECOTRIN) 81 MG EC tablet   Take 81 mg by mouth once daily.    atorvastatin (LIPITOR) 40 MG tablet   TAKE 1 TABLET ONE TIME DAILY    empagliflozin (JARDIANCE) 10 mg tablet   Take 1 tablet (10 mg total) by mouth once daily.    insulin detemir U-100, Levemir, 100 unit/mL (3 mL) SubQ InPn pen   Inject 14 Units into the skin once daily.    losartan (COZAAR) 100 MG tablet   TAKE 1 TABLET (100 MG TOTAL) BY MOUTH ONCE DAILY.    metFORMIN (GLUCOPHAGE-XR) 500 MG ER 24hr tablet   Take 1 tablet (500 mg total) by mouth 2 (two) times daily with meals.    omeprazole (PRILOSEC) 20 MG capsule    Take 20 mg by mouth once daily.)    pioglitazone (ACTOS) 30 MG tablet   Take 1 tablet (30 mg total) by mouth " "once daily.    alcohol swabs (DROPSAFE ALCOHOL PREP PADS) PadM USE TWICE DAILY AS DIRECTED, E 11.9    ALPRAZolam (XANAX) 0.25 MG tablet One-two one hour prior to procedure then may repeat immediately prior to procedure   (Patient not taking: Reported on 3/10/2024)    blood glucose control, normal Soln   True Metrix control solution E11.9 - pt tests twice daily    blood-glucose meter kit Check glucose daily E11.9 meter covered by insurance [employer]      DULoxetine (CYMBALTA) 30 MG capsule TAKE 1 CAPSULE ONE TIME DAILY   (Patient not taking: Reported on 3/10/2024)      lancets (TRUEPLUS LANCETS) 28 gauge Misc 1 lancet by Misc.(Non-Drug; Combo Route) route 2 (two) times daily.      LIDOcaine (LIDODERM) 5 % Place 1 patch onto the skin once daily. Remove & Discard patch within 12 hours or as directed by MD (Patient not taking: Reported on 3/10/2024)      pen needle, diabetic (NOVOFINE 32) 32 gauge x 1/4" Ndle   Use with insulin 4 times a day.    pregabalin (LYRICA) 50 MG capsule   One at bedtime   (Patient not taking: Reported on 3/10/2024)    traMADoL (ULTRAM) 50 mg tablet Take 1 tablet (50 mg total) by mouth every 6 (six) hours as needed for Pain.   (Patient not taking: Reported on 3/10/2024)      triamcinolone acetonide 0.1% (KENALOG) 0.1 % cream APPLY TO THE AFFECTED AREA(S) ON LOWER LEGS TWICE DAILY AS NEEDED FOR ITCHY OR RED (Patient not taking: Reported on 3/10/2024)      TRUE METRIX GLUCOSE TEST STRIP Strp   TEST BLOOD SUGAR TWICE DAILY    [DISCONTINUED] ibuprofen (ADVIL,MOTRIN) 600 MG tablet Take 1 tablet (600 mg total) by mouth 3 (three) times daily.     Katie Burfect  EXT [id number]               .          "

## 2024-03-10 NOTE — PLAN OF CARE
Problem: Adult Inpatient Plan of Care  Goal: Plan of Care Review  Outcome: Ongoing, Progressing  Flowsheets (Taken 3/9/2024 1906)  Plan of Care Reviewed With: patient  Goal: Patient-Specific Goal (Individualized)  Outcome: Ongoing, Progressing  Goal: Absence of Hospital-Acquired Illness or Injury  Outcome: Ongoing, Progressing  Goal: Optimal Comfort and Wellbeing  Outcome: Ongoing, Progressing  Goal: Readiness for Transition of Care  Outcome: Ongoing, Progressing

## 2024-03-10 NOTE — PLAN OF CARE
Problem: Adult Inpatient Plan of Care  Goal: Plan of Care Review  Outcome: Ongoing, Progressing  Goal: Patient-Specific Goal (Individualized)  Outcome: Ongoing, Progressing  Goal: Absence of Hospital-Acquired Illness or Injury  Outcome: Ongoing, Progressing  Goal: Optimal Comfort and Wellbeing  Outcome: Ongoing, Progressing     Problem: Infection  Goal: Absence of Infection Signs and Symptoms  Outcome: Ongoing, Progressing     Problem: Diabetes Comorbidity  Goal: Blood Glucose Level Within Targeted Range  Outcome: Ongoing, Progressing     Problem: Fluid and Electrolyte Imbalance (Acute Kidney Injury/Impairment)  Goal: Fluid and Electrolyte Balance  Outcome: Ongoing, Progressing     Problem: Oral Intake Inadequate (Acute Kidney Injury/Impairment)  Goal: Optimal Nutrition Intake  Outcome: Ongoing, Progressing     Problem: Renal Function Impairment (Acute Kidney Injury/Impairment)  Goal: Effective Renal Function  Outcome: Ongoing, Progressing     Problem: Fatigue  Goal: Improved Activity Tolerance  Outcome: Ongoing, Progressing     Problem: Fall Injury Risk  Goal: Absence of Fall and Fall-Related Injury  Outcome: Ongoing, Progressing

## 2024-03-11 NOTE — PLAN OF CARE
[facility]  Discharge Final Note    Primary Care Provider: Dora Freedman MD    Expected Discharge Date: 3/10/2024    Patient cleared for discharge from case management standpoint.    Final Discharge Note (most recent)       Final Note - 03/10/24 1435          Final Note    Assessment Type Final Discharge Note     Anticipated Discharge Disposition Home or Self Care     What phone number can be called within the next 1-3 days to see how you are doing after discharge? [phone]     Hospital Resources/Appts/Education Provided Provided patient/caregiver with written discharge plan information        Post-Acute Status    Discharge Delays None known at this time                   Important Message from Medicare         Contact Info       Dora Freedman MD   Specialty: Internal Medicine   Relationship: PCP - General    [address]  [facility] [address]   Phone: [phone]       Next Steps: Follow up          Ana Laura Orona RN  [facility]  - [facility]  Spectra: [phone]

## 2024-03-11 NOTE — HOSPITAL COURSE
Lina Davis was admitted to hospital medicine for management of JACK and UTI. Given IV hydration and renal function returned to baseline. Treated with IV ceftiraxone while urine cultures pending. Transitioned to PO ceftin for a total of 7 days. Symptomatically still recovering from COVID infectious, prescribed tessalon perles for post viral cough. Advised to follow up with PCP for further monitoring.     On day of discharge patient's vital signs were stable and patient appeared clinically ready for discharge. Hospital course, discharge plan and return precautions discussed - patient expressed understanding. All questions answered at that time.

## 2024-03-11 NOTE — DISCHARGE SUMMARY
[facility] [address] [place] [address] Medicine  Discharge Summary    Patient Name: Lina Davis  MRN: [id number]  LEI: [id number]  Patient Class: OP- Observation  Admission Date: 3/8/2024  Hospital Length of Stay: 0 days  Discharge Date and Time: 3/10/2024  2:38 PM  Attending Physician: No att. providers found   Discharging Provider: Pricilla Ames PA-C  Primary Care Provider: Dora Freedman MD  [facility] Medicine Team: [facility] MED  Pricilla Ames PA-C  Primary Care Team: [facility] MED     HPI:   Lina Davis is a 79 yo F with PMHx of insulin dependent T2DM, HTN, HLD, gout and CKD3 who presented to ED for 3 weeks of cough. Patient reports approximately 3 weeks of URI symptoms including productive cough, nausea, and decreased appetite. Over the past week, her symptoms have worsened. She states that sometimes she has vomited after episodes of coughing. She also reports periumbilical and suprapubic abdominal pain that is worse with eating. Over the past 2 days, she has had 2-3 episodes of watery diarrhea each day. Denies dysuria or urinary urgency. Admits urinary frequency but states that this is not new. She denies any fevers but has had some chills. Her  has similar symptoms and is also being admitted to the hospital tonight. Denies fever, chest pain, palpitations, SOB, and LE swelling.    In ED: Afebrile. HDS. Satting well on RA. No leukocytosis. Hgb 10.2. JACK with BUN 41 and Cr 1.7 (baseline 1.2). BNP 21. Trop neg x1. Covid positive. UA infectious with 3+ leuks, >100 WBC, and many bacteria. CXR without acute process. CT A/P urothelial enhancement and slight bladder wall thickening, diverticulosis with possibly early/developing acute diverticulitis, and prominence of R ovary. Given tylenol, IV rocephin, IV zofran and 1L LR. Placed in observation with HM.     * No surgery found *      Hospital Course:   Lina Davis was admitted to hospital medicine for management of JACK and UTI. Given IV  hydration and renal function returned to baseline. Treated with IV ceftiraxone while urine cultures pending. Transitioned to PO ceftin for a total of 7 days. Symptomatically still recovering from COVID infectious, prescribed tessalon perles for post viral cough. Advised to follow up with PCP for further monitoring.     On day of discharge patient's vital signs were stable and patient appeared clinically ready for discharge. Hospital course, discharge plan and return precautions discussed - patient expressed understanding. All questions answered at that time.      Goals of Care Treatment Preferences:  Code Status: Full Code      Consults:     No new Assessment & Plan notes have been filed under this hospital service since the last note was generated.  Service: Hospital Medicine    Final Active Diagnoses:    Diagnosis Date Noted POA    PRINCIPAL PROBLEM:  Acute kidney injury superimposed on chronic kidney disease [N17.9, N18.9] 01/27/2018 Yes    Acute cystitis without hematuria [N30.00] 03/09/2024 Yes    COVID [U07.1] 03/09/2024 Yes    Abdominal pain [R10.9] 03/09/2024 Yes    Diarrhea [R19.7] 03/09/2024 Yes    Abnormal finding on CT scan [R93.89] 03/09/2024 Yes    CKD (chronic kidney disease) stage 3, GFR 30-59 ml/min [N18.30] 09/22/2020 Yes    History of gout [Z87.39] 03/13/2020 Yes    Anemia [D64.9] 03/13/2020 Yes    Type 2 diabetes mellitus with diabetic nephropathy [E11.21] 04/12/2016 Yes     Chronic    GERD (gastroesophageal reflux disease) [K21.9] 02/15/2016 Yes    Hyperlipidemia, mixed [E78.2] 12/07/2012 Yes    HTN (hypertension), benign [I10] 12/07/2012 Yes      Problems Resolved During this Admission:       Discharged Condition: good    Disposition: Home or Self Care    Follow Up:   Follow-up Information       Dora Freedman MD Follow up.    Specialty: Internal Medicine  Contact information:  [facility] [address] [phone]                           Patient Instructions:       Diet Cardiac     Diet diabetic     Notify your health care provider if you experience any of the following:  temperature >100.4     Notify your health care provider if you experience any of the following:  persistent nausea and vomiting or diarrhea     Notify your health care provider if you experience any of the following:  severe uncontrolled pain     Notify your health care provider if you experience any of the following:  difficulty breathing or increased cough     Notify your health care provider if you experience any of the following:  severe persistent headache     Notify your health care provider if you experience any of the following:  worsening rash     Notify your health care provider if you experience any of the following:  persistent dizziness, light-headedness, or visual disturbances     Notify your health care provider if you experience any of the following:  increased confusion or weakness     Activity as tolerated       Significant Diagnostic Studies:   Lab Results   Component Value Date    WBC 6.27 03/10/2024    HGB 9.1 (L) 03/10/2024    HCT 27.6 (L) 03/10/2024    MCV 89 03/10/2024     03/10/2024       BMP  Lab Results   Component Value Date     03/10/2024    K 4.4 03/10/2024     (H) 03/10/2024    CO2 19 (L) 03/10/2024    BUN 29 (H) 03/10/2024    CREATININE 1.1 03/10/2024    CALCIUM 9.7 03/10/2024    ANIONGAP 9 03/10/2024    EGFRNORACEVR 52.1 (A) 03/10/2024     Imaging Results              CT Abdomen Pelvis With IV Contrast NO Oral Contrast (Final result)  Result time 03/09/24 00:46:04      Final result by Britta Chow MD (03/09/24 00:46:04)                   Impression:      1. Questionable urothelial enhancement and slight bladder wall thickening.  Correlation with urinalysis for cystitis/ascending urinary tract infection advised.  2. Diverticulosis with slight wall thickening in a region of diverticular disease in the sigmoid colon which could reflect muscular  hypertrophy noting no significant inflammatory change appreciated on current study.  Correlation for symptoms of early/developing acute diverticulitis advised.  3. Prominence of the right ovary.  Further assessment with ultrasound can be performed as clinically warranted.  4. Additional findings as discussed above.      Electronically signed by: Britta Chow MD  Date:    03/09/2024  Time:    00:46               Narrative:    EXAMINATION:  CT ABDOMEN PELVIS WITH IV CONTRAST    CLINICAL HISTORY:  Abdominal pain, acute, non-localized;Nausea/vomiting;    TECHNIQUE:  Low dose axial images, sagittal and coronal reformations were obtained from the lung bases to the pubic symphysis following the IV administration of 75 mL of Omnipaque 350 .  No oral contrast.    COMPARISON:  CT abdomen and pelvis 2/27/2018    FINDINGS:  The visualized lung bases demonstrate minimal patchy peripheral right basilar ground-glass attenuation which could relate to atelectasis although correlation for early infectious process advised.  The visualized portions of the heart and pericardium are within normal limits.    Evaluation of solid organ parenchyma is slightly limited due to artifact from the patient's right upper extremity.  The liver demonstrates no focal abnormality.  The portal vein and splenic vein are patent.  The spleen and adrenal glands are unremarkable.  There is fatty atrophy of the pancreas is otherwise unremarkable.  No calcified stones in the gallbladder lumen.  No significant intra or extrahepatic biliary ductal dilatation.    There is right renal parenchymal cortical scarring.  There is a prominent right extrarenal pelvis, unchanged from prior study.  There is a 1 0.0 cm right renal cortical hypodensity possibly a cyst but too small to definitively characterize.  The kidneys appear to enhance symmetrically.  There is abnormal urothelial enhancement.  Urinary bladder demonstrates wall thickening.  Correlation for  cystitis/ascending urinary tract infection with urinalysis advised.  The uterus is surgically absent.  There is nonspecific prominence of the right ovary.  Further assessment with ultrasound can be performed.  The left adnexal region is within normal limits.    The abdominal aorta is nonaneurysmal with atherosclerosis.  There are shotty periaortic lymph nodes.    The visualized loops of small bowel demonstrate no evidence of obstruction or inflammatory change.  There are extensive colonic diverticula present.  There is slight wall thickening in a region of diverticular disease in the sigmoid colon, which could relate to muscular hypertrophy noting no significant pericolonic inflammatory change identified at this time.  However, core clinical correlation for symptoms of early/developing acute diverticulitis advised.  The appendix is unremarkable.  There is no free intraperitoneal air, portal venous gas or ascites.    The visualized osseous structures are intact with degenerative change noting grade 1 anterolisthesis of L4 on L5.  There is a small fat containing umbilical hernia present.  Slight nonspecific soft tissue induration involving the anterior left abdominal wall.                                       X-Ray Chest AP Portable (Final result)  Result time 03/08/24 22:32:36      Final result by Jd Cm MD (03/08/24 22:32:36)                   Impression:      No acute radiographic abnormality.      Electronically signed by: Jd Cm  Date:    03/08/2024  Time:    22:32               Narrative:    EXAMINATION:  XR CHEST AP PORTABLE    CLINICAL HISTORY:  Cough, unspecified    TECHNIQUE:  Single frontal view of the chest was performed.    COMPARISON:  02/05/2018    FINDINGS:  Cardiac silhouette is within normal limits.  No significant effusion.  No evidence of pneumothorax.  Minimal thickening along the minor fissure on the right.    No mass, consolidation or significant infiltrate.  No acute osseous  abnormality.  Degenerative changes of the spine.                                        Pending Diagnostic Studies:       None           Medications:  Reconciled Home Medications:      Medication List        START taking these medications      benzonatate 100 MG capsule  Commonly known as: TESSALON  Take 1 capsule (100 mg total) by mouth 3 (three) times daily. for 10 days     cefUROXime 500 MG tablet  Commonly known as: CEFTIN  Take 1 tablet (500 mg total) by mouth 2 (two) times daily. for 5 days            CHANGE how you take these medications      acetaminophen 500 MG tablet  Commonly known as: TYLENOL  Take 2 tablets (1,000 mg total) by mouth 3 (three) times daily.  What changed:   how much to take  when to take this  reasons to take this     omeprazole 20 MG capsule  Commonly known as: PRILOSEC  TAKE 1 CAPSULE TWICE DAILY  What changed:   how much to take  how to take this  when to take this  additional instructions            CONTINUE taking these medications      alcohol swabs Padm  Commonly known as: DROPSAFE ALCOHOL PREP PADS  USE TWICE DAILY AS DIRECTED, E 11.9     allopurinoL 300 MG tablet  Commonly known as: ZYLOPRIM  Take 1 tablet (300 mg total) by mouth once daily.     amLODIPine 10 MG tablet  Commonly known as: NORVASC  TAKE 1 TABLET EVERY DAY     aspirin 81 MG EC tablet  Commonly known as: ECOTRIN  Take 81 mg by mouth once daily.     atorvastatin 40 MG tablet  Commonly known as: LIPITOR  TAKE 1 TABLET ONE TIME DAILY     blood glucose control, normal Soln  True Metrix control solution E11.9 - pt tests twice daily     blood-glucose meter kit  Check glucose daily E11.9 meter covered by insurance [employer]     DULoxetine 30 MG capsule  Commonly known as: CYMBALTA  TAKE 1 CAPSULE ONE TIME DAILY     empagliflozin 10 mg tablet  Commonly known as: Jardiance  Take 1 tablet (10 mg total) by mouth once daily.     insulin detemir U-100 (Levemir) 100 unit/mL (3 mL) Inpn pen  Inject 14 Units into the skin once  "daily.     lancets 28 gauge Misc  Commonly known as: TRUEPLUS LANCETS  1 lancet by Misc.(Non-Drug; Combo Route) route 2 (two) times daily.     LIDOcaine 5 %  Commonly known as: LIDODERM  Place 1 patch onto the skin once daily. Remove & Discard patch within 12 hours or as directed by MD     losartan 100 MG tablet  Commonly known as: COZAAR  TAKE 1 TABLET (100 MG TOTAL) BY MOUTH ONCE DAILY.     metFORMIN 500 MG ER 24hr tablet  Commonly known as: GLUCOPHAGE-XR  Take 1 tablet (500 mg total) by mouth 2 (two) times daily with meals.     NOVOFINE 32 32 gauge x 1/4" Ndle  Generic drug: pen needle, diabetic  Use with insulin 4 times a day.     pioglitazone 30 MG tablet  Commonly known as: ACTOS  Take 1 tablet (30 mg total) by mouth once daily.     pregabalin 50 MG capsule  Commonly known as: LYRICA  One at bedtime     traMADoL 50 mg tablet  Commonly known as: ULTRAM  Take 1 tablet (50 mg total) by mouth every 6 (six) hours as needed for Pain.     TRUE METRIX GLUCOSE TEST STRIP Strp  Generic drug: blood sugar diagnostic  TEST BLOOD SUGAR TWICE DAILY            STOP taking these medications      ibuprofen 600 MG tablet  Commonly known as: ADVIL,MOTRIN              Indwelling Lines/Drains at time of discharge:   Lines/Drains/Airways       None                   Time spent on the discharge of patient: 36 minutes         Pricilla Ames PA-C  Department of Hospital Medicine  [facility]  "

## 2024-03-12 ENCOUNTER — PATIENT MESSAGE (OUTPATIENT)
Dept: ORTHOPEDICS | Facility: CLINIC | Age: 79
End: 2024-03-12
Payer: MEDICARE

## 2024-03-12 ENCOUNTER — PATIENT OUTREACH (OUTPATIENT)
Dept: ADMINISTRATIVE | Facility: CLINIC | Age: 79
End: 2024-03-12
Payer: MEDICARE

## 2024-03-12 DIAGNOSIS — M79.642 LEFT HAND PAIN: Primary | ICD-10-CM

## 2024-03-12 NOTE — PROGRESS NOTES
C3 nurse attempted to contact Lina Davis  for a TCC post hospital discharge follow up call. The patient is unable to conduct the call @ this time. The patient's spouse requested a callback.    The patient does not have a scheduled HOSFU appointment within 5-7 days post hospital discharge date 3/10/2024. Message sent to Physician staff to assist with HOSFU appointment scheduling.

## 2024-03-13 NOTE — PROGRESS NOTES
2nd attempt made to reach patient for TCC call. Left voicemail please call [phone] leave first name, last name, and  and I will return your call.

## 2024-03-13 NOTE — PROGRESS NOTES
3rd attempt made to reach patient for TCC call. Left voicemail please call [phone] leave first name, last name, and  and I will return your call.

## 2024-03-17 NOTE — TELEPHONE ENCOUNTER
Care Due:                  Date            Visit Type   Department     Provider  --------------------------------------------------------------------------------                                EP -                              PRIMARY      [facility] INTERNAL  Last Visit: 12-      CARE ([facility])   MEDICINE       [facility] INTERNAL  Next Visit: 04-      FOLLOW UP    MEDICINE       [facility]                                                            Last  Test          Frequency    Reason                     Performed    Due Date  --------------------------------------------------------------------------------    HBA1C.......  6 months...  pioglitazone.............  11- 05-    Lipid Panel.  12 months..  atorvastatin.............  06- 06-    Uric Acid...  12 months..  allopurinoL..............  Not Found    Overdue    [employer] Embedded Care Due Messages. Reference number: [id number].   3/17/2024 2:55:40 AM CDT

## 2024-03-18 NOTE — TELEPHONE ENCOUNTER
Refill Routing Note   Medication(s) are not appropriate for processing by [facility] for the following reason(s):        Required labs outdated  ED/Hospital Visit since last OV with provider    [facility] action(s):  Defer   Requires labs : Yes               Appointments  past 12m or future 3m with PCP    Date Provider   Last Visit   12/5/2023 Dora Freedman MD   Next Visit   4/25/2024 Dora Freedman MD   ED visits in past 90 days: 0        Note composed:10:25 AM 03/18/2024

## 2024-03-19 NOTE — PROGRESS NOTES
Subjective:      Patient ID: Lina Davis is a 78 y.o. female.    Chief Complaint:  No chief complaint on file.    History of Present Illness  Lina Davis is here for follow up of T2DM and Thyroid Nodules.  Previously seen by me 2023.   This is a MyChart video visit.    The patient location is: [place]  The chief complaint leading to consultation is: DM  Visit type: Virtual visit with synchronous audio and video  Total time spent with patient: see below  Each patient to whom he or she provides medical services by telemedicine is:  (1) informed of the relationship between the physician and patient and the respective role of any other health care provider with respect to management of the patient; and (2) notified that he or she may decline to receive medical services by telemedicine and may withdraw from such care at any time.      With regards to Diabetes:    Diagnosed: ~  DE: 2021  FH of DM: sister, brother   Denies any of her children have DM     Known complications:  DKA Denies  RN Denies  Eye Exam: 2021  PN : Yes   Podiatry: 2020  Nephropathy : Yes   CAD : Denies  Episode of pancreatitis in 2018 - attributed to Januvia    Diet/Exercise: reduced appetite from being sick   Eats 2-3 meals a day.   Snacks : occasionally - fig newtons.   Drinks : water, 1 or 2 8oz soft drinks a week, lemonade but will cut it with water.   Exercise - tries to stay active.  Recent illness, injury, steroids: COVID 3/2024       Current regimen: PAP - SGLT2i and insulin   Metformin 500mg twice daily   Jardiance 10mg daily   Actos 30mg daily   Levemir 14 units daily     Reports compliance.    Other medications tried:  Januvia- Discontinued in 2018 due to pancreatitis  Novolog    Glucose Monitor:   1 times a day testing  Log reviewed: oral recall  Fastin-165    Hypoglycemia:  Denies  Knows how to correct with 15 grams of carbs- juice, coke, or a peppermint.     Diabetes Management Status    Hemoglobin A1C   Date Value  Ref Range Status   11/21/2023 5.9 (H) 4.0 - 5.6 % Final     Comment:     ADA Screening Guidelines:  5.7-6.4%  Consistent with prediabetes  >or=6.5%  Consistent with diabetes    High levels of fetal hemoglobin interfere with the HbA1C  assay. Heterozygous hemoglobin variants (HbS, HgC, etc)do  not significantly interfere with this assay.   However, presence of multiple variants may affect accuracy.     06/07/2023 5.8 (H) 4.0 - 5.6 % Final     Comment:     ADA Screening Guidelines:  5.7-6.4%  Consistent with prediabetes  >or=6.5%  Consistent with diabetes    High levels of fetal hemoglobin interfere with the HbA1C  assay. Heterozygous hemoglobin variants (HbS, HgC, etc)do  not significantly interfere with this assay.   However, presence of multiple variants may affect accuracy.     03/16/2023 6.0 (H) 4.0 - 5.6 % Final     Comment:     ADA Screening Guidelines:  5.7-6.4%  Consistent with prediabetes  >or=6.5%  Consistent with diabetes    High levels of fetal hemoglobin interfere with the HbA1C  assay. Heterozygous hemoglobin variants (HbS, HgC, etc)do  not significantly interfere with this assay.   However, presence of multiple variants may affect accuracy.         Statin: Taking  ACE/ARB: Taking  Screening or Prevention Patient's value Goal Complete/Controlled?   HgA1C Testing and Control   Lab Results   Component Value Date    HGBA1C 5.9 (H) 11/21/2023      Annually/Less than 8% Yes   Lipid profile : 06/07/2023 Annually Yes   LDL control Lab Results   Component Value Date    LDLCALC 69.6 06/07/2023    Annually/Less than 100 mg/dl  Yes   Nephropathy screening Lab Results   Component Value Date    LABMICR 33.0 05/02/2023     Lab Results   Component Value Date    PROTEINUA 1+ (A) 03/08/2024    Annually Yes   Blood pressure BP Readings from Last 1 Encounters:   03/10/24 (!) 166/77    Less than 140/90 No   Dilated retinal exam : 02/22/2021 Annually Yes   Foot exam   Most Recent Foot Exam Date: Not Found Annually No      With regards to thyroid nodule:    Thyroid US:   6/2023  COMPARISON:  Neck ultrasound dated 08/05/2022.  Our records indicate a benign FNA of the left lobe nodule in 1/2023 and a non-diagnostic FNA of the right lobe nodule in 1/2023.  When compared to the prior study the above nodules are stable in size and features  Impression:  1.) Thyroid gland is normal in size with heterogeneous echotexture and normal vascularity  2.) 2.4 x 2.1 x 2.0 cm solid, isoechoic nodule is seen in the right mid lobe  3.) 0.8 x 0.4 x 0.6 cm spongiform nodule is seen in the right inferior pole  4.) 2.4 x 1.8 x 2.4 cm solid, isoechoic nodule is seen in the left inferior pole  5.) 1.1 x 0.5 x 1.1 cm spongiform nodule is seen in the left inferior pole  6.) 1.0 x 0.6 x 1.0 cm solid, isoechoic nodule is seen in the isthmus  RECOMMENDATIONS:  Recommend repeat thyroid ultrasound in 1-2 years.    FNA:   1/23/2023  R mid: Unsatisfactory  L inferior: Benign    Signs or Symptoms:   Difficulty breathing: Denies  Difficulty swallowing: Denies  Voice Changes: Denies  FH of thyroid cancer: Denies  Personal history of radiation treatment or exposure: Denies    Lab Results   Component Value Date    TSH 1.395 06/07/2023    T5IDJHV 6.0 11/26/2008    FREET4 1.10 08/08/2017       With regards to the hypercalcemia:    Lab Results   Component Value Date    PTH 71.8 11/21/2023    CALCIUM 9.7 03/10/2024    PHOS 2.6 (L) 11/21/2023     Lab Results   Component Value Date    ALBUMIN 3.7 03/08/2024                       Daily intake of calcium is: Denies  Daily intake of vitamin D:  OTC Vit D3 3000iu daily     BMD:    12/2018  Normal BMD.  Compared to prior study in 9/2014, BMD has declined 5.0% at the spine and 4.3% at the hip.  RECOMMENDATIONS of [facility]:  1.  Calcium 0700-8583 mg daily and vitamin D 800-1000 units daily, adequate exercise.  2.  No need for repeat study in near future unless clinical change    Denies  constipation, depression, muscle aches or pains.  Reports polyuria   Denies fractures, height loss or kidney stones.  Reports history of renal disease.  Denies family history of hyperparathyroidism or calcium problems.  Denies HCTZ., lithium, or chlorthiadone use.      Review of Systems  as above      There were no vitals taken for this visit.              There is no height or weight on file to calculate BMI.    Lab Review:   Lab Results   Component Value Date    HGBA1C 5.9 (H) 11/21/2023    HGBA1C 5.8 (H) 06/07/2023    HGBA1C 6.0 (H) 03/16/2023       Lab Results   Component Value Date    CHOL 149 06/07/2023    HDL 58 06/07/2023    LDLCALC 69.6 06/07/2023    TRIG 107 06/07/2023    CHOLHDL 38.9 06/07/2023     Lab Results   Component Value Date     03/10/2024    K 4.4 03/10/2024     (H) 03/10/2024    CO2 19 (L) 03/10/2024     (H) 03/10/2024    BUN 29 (H) 03/10/2024    CREATININE 1.1 03/10/2024    CALCIUM 9.7 03/10/2024    PROT 8.6 (H) 03/08/2024    ALBUMIN 3.7 03/08/2024    BILITOT 0.3 03/08/2024    ALKPHOS 77 03/08/2024    AST 19 03/08/2024    ALT 8 (L) 03/08/2024    ANIONGAP 9 03/10/2024    ESTGFRAFRICA 39.0 (A) 07/12/2022    EGFRNONAA 33.8 (A) 07/12/2022    TSH 1.395 06/07/2023     Vit D, 25-Hydroxy   Date Value Ref Range Status   11/21/2023 41 30 - 96 ng/mL Final     Comment:     Vitamin D deficiency.........<10 ng/mL                              Vitamin D insufficiency......10-29 ng/mL       Vitamin D sufficiency........> or equal to 30 ng/mL  Vitamin D toxicity............>100 ng/mL       Assessment and Plan     1. Type 2 diabetes mellitus with diabetic nephropathy, with long-term current use of insulin  Hemoglobin A1C      2. Thyroid nodule  TSH      3. Hypercalcemia  Renal Function Panel    PTH, Intact    Vitamin D      4. Stage 3a chronic kidney disease            Type 2 diabetes mellitus with diabetic nephropathy  -- Labs prior to follow up.  -- A1c goal <7.5%.  -- Medications  discussed:  MFM   GLP1-DPP4 - avoid given drug induced pancreatitis (Januvia)  SUNG   SGLT2   Reviewed potential adverse effects of SGLT-2 inhibitors, including genital mycotic infections, slightly increased risk of UTI, hypersensitivity, hypotension, and hyperkalemia. Advised to maintain water intake of 8-10 cups per day. Advised we need to check chemistry panel at baseline and 2 weeks after starting. Discussed FDA warning reports of ketoacidosis associated with SGLT-2 inhibitors. Advised to seek immediate medical attention and stop the medication if symptoms such as difficulty breathing, nausea, vomiting, abdominal pain, confusion, and unusual fatigue/sleepiness. Discussed possible precipitating factors including major illness/reduced food and fluid intake (advised to stop under these circumstances), and reduced insulin dose. Discussed possible effects of increased fracture risk/decreased bone density. Discussed reports of increased risk of leg and foot amputations with canagliflozin and need to seek urgent care if developed new pain or tenderness, sores or ulcers, or infections in legs/feet.   Insulin   -- Reviewed logs/CGM:  Glucose controlled. Encouraged to rotate timing.  Reach out to me sooner for any glucose <70 or consistently >180.  -- Message PAP to find out what she can get in place of Levemir since no longer manufactured.   -- Medication Changes:   Metformin 500mg twice daily   Jardiance 10mg daily   Actos 30mg daily   Levemir 14 units daily     -- Reviewed goals of therapy are to get the best control we can without hypoglycemia.  -- Reviewed patient's current insulin regimen. Clarified proper insulin dose and timing in relation to meals, etc. Insulin injection sites and proper rotation instructed.    -- Advised frequent self blood glucose monitoring.  Patient encouraged to document glucose results and bring them to every clinic visit.  -- Hypoglycemia precautions discussed. Instructed on precautions  before driving.    -- Call for Bg repeatedly < 90 or > 180.   -- Close adherence to lifestyle changes recommended.   -- Periodic follow ups for eye evaluations, foot care and dental care suggested.    Thyroid nodule  -- I have reviewed management options including observation, FNA or surgery.  -- FNA procedure explained in depth.  -- Discussed indications for repeat FNA as well as surgical indications (abnormal FNA, compressive symptoms or interval change).  -- Discussed possible results of FNA- Benign, Malignant, FLUS, or insufficient cells.  Discussed results auto released to patients, but advised the ordering provider will also contact to discuss.   -- All of the patients questions were answered.  -- FNA:   1/23/2023  R mid: Unsatisfactory  L inferior: Benign  -- Repeat thyroid US 6/2025.    Hypercalcemia  -- Intermittent hypercalcemia.   -- Discussed indications for surgery if primary hyperparathyroidism proven.  -- Avoid dehydration, excessive calcium supplementation and meds (HCTZ) that can worsen hypercalcemia.  -- Prefers to monitor for now - labs.     CKD (chronic kidney disease) stage 3, GFR 30-59 ml/min  -- Optimize glucose control.               Follow up in about 3 months (around 6/20/2024).        I spent 20 minutes face-to-face with the patient, over half of the visit was spent on counseling and/or coordinating the care of the patient.    Counseling includes:  Diagnostic results, impressions, recommendations   Prognosis   Risk and benefits of management/treatment options   Instructions for management treatment and or follow-up   Importance of compliance with management   Risk factor reduction   Patient education        Visit today included increased complexity associated with the care of the problems addressed and managing the longitudinal care of the patient due to the serious and/or complex managed problems.

## 2024-03-20 ENCOUNTER — OFFICE VISIT (OUTPATIENT)
Dept: ENDOCRINOLOGY | Facility: CLINIC | Age: 79
End: 2024-03-20
Payer: MEDICARE

## 2024-03-20 DIAGNOSIS — E83.52 HYPERCALCEMIA: ICD-10-CM

## 2024-03-20 DIAGNOSIS — N18.31 STAGE 3A CHRONIC KIDNEY DISEASE: ICD-10-CM

## 2024-03-20 DIAGNOSIS — E11.21 TYPE 2 DIABETES MELLITUS WITH DIABETIC NEPHROPATHY, WITH LONG-TERM CURRENT USE OF INSULIN: Primary | Chronic | ICD-10-CM

## 2024-03-20 DIAGNOSIS — E04.1 THYROID NODULE: ICD-10-CM

## 2024-03-20 DIAGNOSIS — Z79.4 TYPE 2 DIABETES MELLITUS WITH DIABETIC NEPHROPATHY, WITH LONG-TERM CURRENT USE OF INSULIN: Primary | Chronic | ICD-10-CM

## 2024-03-20 PROCEDURE — 1160F RVW MEDS BY RX/DR IN RCRD: CPT | Mod: CPTII,95,, | Performed by: NURSE PRACTITIONER

## 2024-03-20 PROCEDURE — G2211 COMPLEX E/M VISIT ADD ON: HCPCS | Mod: 95,,, | Performed by: NURSE PRACTITIONER

## 2024-03-20 PROCEDURE — 99214 OFFICE O/P EST MOD 30 MIN: CPT | Mod: 95,,, | Performed by: NURSE PRACTITIONER

## 2024-03-20 PROCEDURE — 1159F MED LIST DOCD IN RCRD: CPT | Mod: CPTII,95,, | Performed by: NURSE PRACTITIONER

## 2024-03-20 NOTE — ASSESSMENT & PLAN NOTE
-- Labs prior to follow up.  -- A1c goal <7.5%.  -- Medications discussed:  MFM   GLP1-DPP4 - avoid given drug induced pancreatitis (Januvia)  SUNG   SGLT2   Reviewed potential adverse effects of SGLT-2 inhibitors, including genital mycotic infections, slightly increased risk of UTI, hypersensitivity, hypotension, and hyperkalemia. Advised to maintain water intake of 8-10 cups per day. Advised we need to check chemistry panel at baseline and 2 weeks after starting. Discussed FDA warning reports of ketoacidosis associated with SGLT-2 inhibitors. Advised to seek immediate medical attention and stop the medication if symptoms such as difficulty breathing, nausea, vomiting, abdominal pain, confusion, and unusual fatigue/sleepiness. Discussed possible precipitating factors including major illness/reduced food and fluid intake (advised to stop under these circumstances), and reduced insulin dose. Discussed possible effects of increased fracture risk/decreased bone density. Discussed reports of increased risk of leg and foot amputations with canagliflozin and need to seek urgent care if developed new pain or tenderness, sores or ulcers, or infections in legs/feet.   Insulin   -- Reviewed logs/CGM:  Glucose controlled. Encouraged to rotate timing.  Reach out to me sooner for any glucose <70 or consistently >180.  -- Message PAP to find out what she can get in place of Levemir since no longer manufactured.   -- Medication Changes:   Metformin 500mg twice daily   Jardiance 10mg daily   Actos 30mg daily   Levemir 14 units daily     -- Reviewed goals of therapy are to get the best control we can without hypoglycemia.  -- Reviewed patient's current insulin regimen. Clarified proper insulin dose and timing in relation to meals, etc. Insulin injection sites and proper rotation instructed.    -- Advised frequent self blood glucose monitoring.  Patient encouraged to document glucose results and bring them to every clinic visit.  --  Hypoglycemia precautions discussed. Instructed on precautions before driving.    -- Call for Bg repeatedly < 90 or > 180.   -- Close adherence to lifestyle changes recommended.   -- Periodic follow ups for eye evaluations, foot care and dental care suggested.

## 2024-03-20 NOTE — Clinical Note
RTC in 3 months with labs prior Orders Placed This Encounter     Hemoglobin A1C     Renal Function Panel     PTH, Intact     Vitamin D     TSH

## 2024-03-20 NOTE — Clinical Note
Hello, since Levemir is no longer being made what are our options for long-acting insulin through assistance program?

## 2024-03-20 NOTE — ASSESSMENT & PLAN NOTE
"Requested Prescriptions   Pending Prescriptions Disp Refills     BD LUER-SINA SYRINGE 25G X 1-1/2\" 3 ML MISC [Pharmacy Med Name: BD LUER-SINA SYRIN 04OB5-9/2\" 3ML]  Last Written Prescription Date:  08/08/2018  Last Fill Quantity: 2 each,  # refills: 3   Last office visit: 9/6/2018 with prescribing provider:  Andre Fischer MD   Future Office Visit:     2 each 2     Sig: USE ONE SYRINGE TO INJECT TESTOSTERONE EVERY 14 DAYS    There is no refill protocol information for this order          " -- Optimize glucose control.

## 2024-03-27 ENCOUNTER — TELEPHONE (OUTPATIENT)
Dept: PHARMACY | Facility: CLINIC | Age: 79
End: 2024-03-27
Payer: MEDICARE

## 2024-03-27 NOTE — TELEPHONE ENCOUNTER
Hello,       A Patient Assistance Application for Lina Davis - MRN  [id number] was faxed to your office @ [phone]. Please have Pati Paredes NP review the application to ensure the prescription is correct. If correct, sign and fax the application back to the Pharmacy Patient Assistance Team @[phone].     Please do not write any corrections on this application.  If changes are needed on this application, please inform the PAP team, and the corrected application will be faxed back to your office for approval and signature.            Pharmacy Patient Assistance  [address] [place] [address]  [address] [place] [id number]  Fax: [phone]  Email: [email or web address]

## 2024-04-02 ENCOUNTER — TELEPHONE (OUTPATIENT)
Dept: INTERNAL MEDICINE | Facility: CLINIC | Age: 79
End: 2024-04-02
Payer: MEDICARE

## 2024-04-02 RX ORDER — OXYCODONE AND ACETAMINOPHEN 5; 325 MG/1; MG/1
1 TABLET ORAL EVERY 12 HOURS PRN
Qty: 60 TABLET | Refills: 0 | Status: SHIPPED | OUTPATIENT
Start: 2024-04-02 | End: 2024-05-10 | Stop reason: SDUPTHER

## 2024-04-02 RX ORDER — ALLOPURINOL 300 MG/1
300 TABLET ORAL DAILY
Qty: 90 TABLET | Refills: 3 | Status: SHIPPED | OUTPATIENT
Start: 2024-04-02

## 2024-04-03 RX ORDER — AMLODIPINE BESYLATE 10 MG/1
TABLET ORAL
Qty: 90 TABLET | Refills: 3 | Status: SHIPPED | OUTPATIENT
Start: 2024-04-03

## 2024-04-03 RX ORDER — ALLOPURINOL 300 MG/1
300 TABLET ORAL
Qty: 90 TABLET | Refills: 0 | OUTPATIENT
Start: 2024-04-03

## 2024-04-05 NOTE — TELEPHONE ENCOUNTER
The prescription portion of Ms. Ryan 's Jardiance application was received from the providers office. The completed patient assistance application has been submitted to  [employer] for consideration of eligibility.

## 2024-04-16 ENCOUNTER — TELEPHONE (OUTPATIENT)
Dept: RADIOLOGY | Facility: HOSPITAL | Age: 79
End: 2024-04-16
Payer: MEDICARE

## 2024-04-16 NOTE — TELEPHONE ENCOUNTER
----- Message from Dirk Berry sent at 4/16/2024  1:21 PM CDT -----  Regarding: Missed call  Contact: [phone]  Caller is returning a missed called from Malik To LPN in the office. Please call back as soon as possible.

## 2024-04-16 NOTE — TELEPHONE ENCOUNTER
----- Message from Glenys Solares sent at 4/16/2024 12:16 PM CDT -----  Regarding: Appt  Contact: Pt  [phone]  Pt is calling to reschedule mammo please call

## 2024-04-17 ENCOUNTER — TELEPHONE (OUTPATIENT)
Dept: ENDOCRINOLOGY | Facility: CLINIC | Age: 79
End: 2024-04-17
Payer: MEDICARE

## 2024-04-18 ENCOUNTER — TELEPHONE (OUTPATIENT)
Dept: ENDOCRINOLOGY | Facility: CLINIC | Age: 79
End: 2024-04-18
Payer: MEDICARE

## 2024-04-19 ENCOUNTER — TELEPHONE (OUTPATIENT)
Dept: ENDOCRINOLOGY | Facility: CLINIC | Age: 79
End: 2024-04-19
Payer: MEDICARE

## 2024-04-19 DIAGNOSIS — Z79.4 TYPE 2 DIABETES MELLITUS WITH DIABETIC NEPHROPATHY, WITH LONG-TERM CURRENT USE OF INSULIN: Primary | ICD-10-CM

## 2024-04-19 DIAGNOSIS — E11.21 TYPE 2 DIABETES MELLITUS WITH DIABETIC NEPHROPATHY, WITH LONG-TERM CURRENT USE OF INSULIN: Primary | ICD-10-CM

## 2024-04-19 RX ORDER — INSULIN GLARGINE 300 [IU]/ML
14 INJECTION, SOLUTION SUBCUTANEOUS DAILY
Qty: 4.2 ML | Refills: 3 | Status: SHIPPED | OUTPATIENT
Start: 2024-04-19 | End: 2024-05-30 | Stop reason: SDUPTHER

## 2024-04-19 NOTE — TELEPHONE ENCOUNTER
----- Message from Jonelle Mills MA sent at 4/19/2024 12:57 PM CDT -----  Regarding: FW: Call Back  Contact: Pt [phone]  Pt says [facility] will pay for Aspart, Novolog, Toujeo and Humulin. Says Gilma Jerome will take it from there once you put in one of those.  ----- Message -----  From: Glenys Solares  Sent: 4/19/2024  12:42 PM CDT  To: [facility]ueger Staff  Subject: Call Back                                        Pt is requesting to speak to Jonelle please call

## 2024-04-23 ENCOUNTER — TELEPHONE (OUTPATIENT)
Dept: ENDOSCOPY | Facility: HOSPITAL | Age: 79
End: 2024-04-23
Payer: MEDICARE

## 2024-04-25 ENCOUNTER — OFFICE VISIT (OUTPATIENT)
Dept: INTERNAL MEDICINE | Facility: CLINIC | Age: 79
End: 2024-04-25
Payer: MEDICARE

## 2024-04-25 ENCOUNTER — LAB VISIT (OUTPATIENT)
Dept: LAB | Facility: HOSPITAL | Age: 79
End: 2024-04-25
Attending: INTERNAL MEDICINE
Payer: MEDICARE

## 2024-04-25 VITALS
HEART RATE: 88 BPM | BODY MASS INDEX: 29.11 KG/M2 | WEIGHT: 181.13 LBS | OXYGEN SATURATION: 99 % | SYSTOLIC BLOOD PRESSURE: 136 MMHG | HEIGHT: 66 IN | DIASTOLIC BLOOD PRESSURE: 64 MMHG

## 2024-04-25 DIAGNOSIS — D64.9 ANEMIA, UNSPECIFIED TYPE: ICD-10-CM

## 2024-04-25 DIAGNOSIS — N39.0 URINARY TRACT INFECTION WITHOUT HEMATURIA, SITE UNSPECIFIED: ICD-10-CM

## 2024-04-25 DIAGNOSIS — N83.201 CYST OF RIGHT OVARY: ICD-10-CM

## 2024-04-25 DIAGNOSIS — Z87.39 HISTORY OF GOUT: Primary | ICD-10-CM

## 2024-04-25 DIAGNOSIS — E11.21 TYPE 2 DIABETES MELLITUS WITH DIABETIC NEPHROPATHY, WITHOUT LONG-TERM CURRENT USE OF INSULIN: ICD-10-CM

## 2024-04-25 DIAGNOSIS — U07.1 COVID: ICD-10-CM

## 2024-04-25 DIAGNOSIS — D51.8 OTHER VITAMIN B12 DEFICIENCY ANEMIAS: ICD-10-CM

## 2024-04-25 LAB
ALBUMIN SERPL BCP-MCNC: 3.9 G/DL (ref 3.5–5.2)
ALP SERPL-CCNC: 79 U/L (ref 55–135)
ALT SERPL W/O P-5'-P-CCNC: 12 U/L (ref 10–44)
ANION GAP SERPL CALC-SCNC: 9 MMOL/L (ref 8–16)
AST SERPL-CCNC: 18 U/L (ref 10–40)
BASOPHILS # BLD AUTO: 0.06 K/UL (ref 0–0.2)
BASOPHILS NFR BLD: 0.6 % (ref 0–1.9)
BILIRUB SERPL-MCNC: 0.3 MG/DL (ref 0.1–1)
BUN SERPL-MCNC: 28 MG/DL (ref 8–23)
CALCIUM SERPL-MCNC: 11 MG/DL (ref 8.7–10.5)
CHLORIDE SERPL-SCNC: 109 MMOL/L (ref 95–110)
CO2 SERPL-SCNC: 23 MMOL/L (ref 23–29)
CREAT SERPL-MCNC: 1.2 MG/DL (ref 0.5–1.4)
DIFFERENTIAL METHOD BLD: ABNORMAL
EOSINOPHIL # BLD AUTO: 0.1 K/UL (ref 0–0.5)
EOSINOPHIL NFR BLD: 1.4 % (ref 0–8)
ERYTHROCYTE [DISTWIDTH] IN BLOOD BY AUTOMATED COUNT: 14.9 % (ref 11.5–14.5)
EST. GFR  (NO RACE VARIABLE): 46.6 ML/MIN/1.73 M^2
ESTIMATED AVG GLUCOSE: 131 MG/DL (ref 68–131)
FERRITIN SERPL-MCNC: 510 NG/ML (ref 20–300)
GLUCOSE SERPL-MCNC: 155 MG/DL (ref 70–110)
HBA1C MFR BLD: 6.2 % (ref 4–5.6)
HCT VFR BLD AUTO: 32.7 % (ref 37–48.5)
HGB BLD-MCNC: 10.7 G/DL (ref 12–16)
IMM GRANULOCYTES # BLD AUTO: 0.04 K/UL (ref 0–0.04)
IMM GRANULOCYTES NFR BLD AUTO: 0.4 % (ref 0–0.5)
IRON SERPL-MCNC: 76 UG/DL (ref 30–160)
LYMPHOCYTES # BLD AUTO: 3 K/UL (ref 1–4.8)
LYMPHOCYTES NFR BLD: 31.9 % (ref 18–48)
MCH RBC QN AUTO: 29.8 PG (ref 27–31)
MCHC RBC AUTO-ENTMCNC: 32.7 G/DL (ref 32–36)
MCV RBC AUTO: 91 FL (ref 82–98)
MONOCYTES # BLD AUTO: 0.7 K/UL (ref 0.3–1)
MONOCYTES NFR BLD: 7 % (ref 4–15)
NEUTROPHILS # BLD AUTO: 5.5 K/UL (ref 1.8–7.7)
NEUTROPHILS NFR BLD: 58.7 % (ref 38–73)
NRBC BLD-RTO: 0 /100 WBC
PLATELET # BLD AUTO: 290 K/UL (ref 150–450)
PMV BLD AUTO: 10.6 FL (ref 9.2–12.9)
POTASSIUM SERPL-SCNC: 4.6 MMOL/L (ref 3.5–5.1)
PROT SERPL-MCNC: 8.4 G/DL (ref 6–8.4)
RBC # BLD AUTO: 3.59 M/UL (ref 4–5.4)
SATURATED IRON: 23 % (ref 20–50)
SODIUM SERPL-SCNC: 141 MMOL/L (ref 136–145)
TOTAL IRON BINDING CAPACITY: 326 UG/DL (ref 250–450)
TRANSFERRIN SERPL-MCNC: 220 MG/DL (ref 200–375)
TSH SERPL DL<=0.005 MIU/L-ACNC: 0.91 UIU/ML (ref 0.4–4)
VIT B12 SERPL-MCNC: 503 PG/ML (ref 210–950)
WBC # BLD AUTO: 9.38 K/UL (ref 3.9–12.7)

## 2024-04-25 PROCEDURE — 99214 OFFICE O/P EST MOD 30 MIN: CPT | Mod: HCNC,S$GLB,, | Performed by: INTERNAL MEDICINE

## 2024-04-25 PROCEDURE — 36415 COLL VENOUS BLD VENIPUNCTURE: CPT | Mod: HCNC,PO | Performed by: INTERNAL MEDICINE

## 2024-04-25 PROCEDURE — 83921 ORGANIC ACID SINGLE QUANT: CPT | Mod: HCNC | Performed by: INTERNAL MEDICINE

## 2024-04-25 PROCEDURE — 83540 ASSAY OF IRON: CPT | Mod: HCNC | Performed by: INTERNAL MEDICINE

## 2024-04-25 PROCEDURE — 83036 HEMOGLOBIN GLYCOSYLATED A1C: CPT | Mod: HCNC | Performed by: INTERNAL MEDICINE

## 2024-04-25 PROCEDURE — 82728 ASSAY OF FERRITIN: CPT | Mod: HCNC | Performed by: INTERNAL MEDICINE

## 2024-04-25 PROCEDURE — 3075F SYST BP GE 130 - 139MM HG: CPT | Mod: HCNC,CPTII,S$GLB, | Performed by: INTERNAL MEDICINE

## 2024-04-25 PROCEDURE — 1125F AMNT PAIN NOTED PAIN PRSNT: CPT | Mod: HCNC,CPTII,S$GLB, | Performed by: INTERNAL MEDICINE

## 2024-04-25 PROCEDURE — 85025 COMPLETE CBC W/AUTO DIFF WBC: CPT | Mod: HCNC | Performed by: INTERNAL MEDICINE

## 2024-04-25 PROCEDURE — 84443 ASSAY THYROID STIM HORMONE: CPT | Mod: HCNC | Performed by: INTERNAL MEDICINE

## 2024-04-25 PROCEDURE — 1101F PT FALLS ASSESS-DOCD LE1/YR: CPT | Mod: HCNC,CPTII,S$GLB, | Performed by: INTERNAL MEDICINE

## 2024-04-25 PROCEDURE — 3288F FALL RISK ASSESSMENT DOCD: CPT | Mod: HCNC,CPTII,S$GLB, | Performed by: INTERNAL MEDICINE

## 2024-04-25 PROCEDURE — 99999 PR PBB SHADOW E&M-EST. PATIENT-LVL V: CPT | Mod: PBBFAC,HCNC,, | Performed by: INTERNAL MEDICINE

## 2024-04-25 PROCEDURE — 82607 VITAMIN B-12: CPT | Mod: HCNC | Performed by: INTERNAL MEDICINE

## 2024-04-25 PROCEDURE — 80053 COMPREHEN METABOLIC PANEL: CPT | Mod: HCNC | Performed by: INTERNAL MEDICINE

## 2024-04-25 PROCEDURE — 3078F DIAST BP <80 MM HG: CPT | Mod: HCNC,CPTII,S$GLB, | Performed by: INTERNAL MEDICINE

## 2024-04-25 NOTE — PROGRESS NOTES
Subjective:       Patient ID: Lina Davis is a 79 y.o. female.    Chief Complaint: Annual Exam    HPIPt with persistent L hand pain.  She had COVID and was hospitalized.  No CP or SOB. Had recent UTI.  Cyst noticed on her ovary.    Review of Systems   Respiratory:  Negative for shortness of breath (PND or orthopnea).    Cardiovascular:  Negative for chest pain (arm pain or jaw pain).   Gastrointestinal:  Negative for abdominal pain, diarrhea, nausea and vomiting.   Genitourinary:  Negative for dysuria.   Neurological:  Negative for seizures, syncope and headaches.       Objective:      Physical Exam  Constitutional:       General: She is not in acute distress.     Appearance: She is well-developed.   HENT:      Head: Normocephalic.   Eyes:      Pupils: Pupils are equal, round, and reactive to light.   Neck:      Thyroid: No thyromegaly.      Vascular: No JVD.   Cardiovascular:      Rate and Rhythm: Normal rate and regular rhythm.      Heart sounds: Normal heart sounds. No murmur heard.     No friction rub. No gallop.   Pulmonary:      Effort: Pulmonary effort is normal.      Breath sounds: Normal breath sounds. No wheezing or rales.   Abdominal:      General: Bowel sounds are normal. There is no distension.      Palpations: Abdomen is soft. There is no mass.      Tenderness: There is no abdominal tenderness. There is no guarding or rebound.   Musculoskeletal:      Cervical back: Neck supple.   Lymphadenopathy:      Cervical: No cervical adenopathy.   Skin:     General: Skin is warm and dry.   Neurological:      Mental Status: She is alert and oriented to person, place, and time.      Deep Tendon Reflexes: Reflexes are normal and symmetric.   Psychiatric:         Behavior: Behavior normal.         Thought Content: Thought content normal.         Judgment: Judgment normal.         Assessment:       1. History of gout    2. Type 2 diabetes mellitus with diabetic nephropathy, without long-term current use of insulin     3. COVID    4. Urinary tract infection without hematuria, site unspecified    5. Anemia, unspecified type    6. Cyst of right ovary    7. Other vitamin B12 deficiency anemias        Plan:   History of gout  Stable none recently  Type 2 diabetes mellitus with diabetic nephropathy, without long-term current use of insulin  -     CBC Auto Differential; Future; Expected date: 04/25/2024  -     Comprehensive Metabolic Panel; Future; Expected date: 04/25/2024  -     TSH; Future; Expected date: 04/25/2024  -     Hemoglobin A1C; Future; Expected date: 04/25/2024    COVID  -     X-Ray Chest PA And Lateral; Future; Expected date: 04/25/2024    Urinary tract infection without hematuria, site unspecified  -     Urinalysis; Future; Expected date: 04/25/2024  -     Urine culture; Future; Expected date: 04/25/2024    Anemia, unspecified type  -     Iron and TIBC; Future; Expected date: 04/25/2024  -     Ferritin; Future; Expected date: 04/25/2024  -     Vitamin B12; Future; Expected date: 05/25/2024  -     Methylmalonic Acid, Serum; Future; Expected date: 04/25/2024    Cyst of right ovary  -     US Pelvis Complete Non OB; Future; Expected date: 04/25/2024    Other vitamin B12 deficiency anemias  -     Vitamin B12; Future; Expected date: 05/25/2024

## 2024-04-26 ENCOUNTER — HOSPITAL ENCOUNTER (OUTPATIENT)
Dept: RADIOLOGY | Facility: HOSPITAL | Age: 79
Discharge: HOME | End: 2024-04-26
Attending: INTERNAL MEDICINE
Payer: MEDICARE

## 2024-04-26 DIAGNOSIS — Z12.31 SCREENING MAMMOGRAM, ENCOUNTER FOR: ICD-10-CM

## 2024-04-26 PROCEDURE — 77063 BREAST TOMOSYNTHESIS BI: CPT | Mod: 26,HCNC,, | Performed by: RADIOLOGY

## 2024-04-26 PROCEDURE — 77063 BREAST TOMOSYNTHESIS BI: CPT | Mod: TC,HCNC

## 2024-04-26 PROCEDURE — 77067 SCR MAMMO BI INCL CAD: CPT | Mod: 26,HCNC,, | Performed by: RADIOLOGY

## 2024-04-26 NOTE — TELEPHONE ENCOUNTER
The prescription portion of Ms. Ryan 's Jardiance application was received from the providers office. The completed patient assistance application has been submitted to  "[employer] for consideration of eligibility.

## 2024-04-28 ENCOUNTER — ANESTHESIA EVENT (OUTPATIENT)
Dept: ENDOSCOPY | Facility: HOSPITAL | Age: 79
End: 2024-04-28
Payer: MEDICARE

## 2024-04-28 NOTE — PROGRESS NOTES
Excellent diabetes control. Stable anemia.  Your calcium is elevated - I would like to recheck it and recheck a urine as well.

## 2024-04-28 NOTE — ANESTHESIA PREPROCEDURE EVALUATION
04/28/2024  Lina Davis is a 79 y.o., female.  Procedure: COLONOSCOPY (N/A)         Patient Active Problem List   Diagnosis    Hyperlipidemia, mixed    HTN (hypertension), benign    Proteinuria    Facet arthropathy    DDD (degenerative disc disease), lumbar    Diabetic polyneuropathy    Diverticulosis    Menopausal symptoms    GERD (gastroesophageal reflux disease)    Type 2 diabetes mellitus with diabetic nephropathy    Complete tear of right rotator cuff    Decreased ROM of right shoulder    Acute kidney injury superimposed on chronic kidney disease    Stenosis of left carotid artery    Atherosclerosis of aorta    History of gout    Left arm pain    Anemia    Family history of heart disease    History of fatty infiltration of liver    Cervical stenosis of spinal canal    Obesity (BMI 30.0-34.9)    CKD (chronic kidney disease) stage 3, GFR 30-59 ml/min    Bilateral carotid artery disease    Hypercalcemia    Impaired functional mobility and activity tolerance    Thyroid nodule    Bilateral sacroiliitis    Other specified diseases of spinal cord    Trigger finger, left index/long/ring/small fingers    Acute cystitis without hematuria    COVID    Abdominal pain    Diarrhea    Abnormal finding on CT scan       Past Medical History:   Diagnosis Date    Anemia     Arthritis     Cataract     Gout, arthritis     HTN (hypertension), benign     Polyneuropathy     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    Vitamin D deficiency disease        ECHO: No results found for this or any previous visit.      There is no height or weight on file to calculate BMI.    Tobacco Use: Low Risk  (3/20/2024)    Patient History     Smoking Tobacco Use: Never     Smokeless Tobacco Use: Never     Passive Exposure: Not on file       Social History     Substance and Sexual  Activity   Drug Use No        Alcohol Use: Not on file       Review of patient's allergies indicates:   Allergen Reactions    Gabapentin Other (See Comments)     ineffective    Januvia [sitagliptin] Other (See Comments)     Pancreatitis      Metformin Other (See Comments)     Nausea and vomiting with immediate release - tolerates extended release         Airway:  No value filed.      Pre-op Assessment    I have reviewed the Patient Summary Reports.    I have reviewed the NPO Status.   I have reviewed the Medications.     Review of Systems  Anesthesia Hx:             Denies Family Hx of Anesthesia complications.    Denies Personal Hx of Anesthesia complications.                    Hematology/Oncology:  Hematology Normal   Oncology Normal                                   EENT/Dental:  EENT/Dental Normal           Cardiovascular:     Hypertension                                        Pulmonary:  Pulmonary Normal                       Renal/:  Chronic Renal Disease                Hepatic/GI:     GERD             Musculoskeletal:  Arthritis               Neurological:    Neuromuscular Disease,                                   Endocrine:  Diabetes, type 2           Dermatological:  Skin Normal    Psych:  Psychiatric Normal                     Anesthesia Plan  Type of Anesthesia, risks & benefits discussed:    Anesthesia Type: Gen Natural Airway  Intra-op Monitoring Plan: Standard ASA Monitors  Post Op Pain Control Plan: multimodal analgesia  Induction:  IV  Informed Consent: Informed consent signed with the Patient and all parties understand the risks and agree with anesthesia plan.  All questions answered.   ASA Score: 2  Day of Surgery Review of History & Physical: H&P Update referred to the surgeon/provider.I have interviewed and examined the patient. I have reviewed the patient's H&P dated:     Ready For Surgery From Anesthesia Perspective.     .

## 2024-04-29 ENCOUNTER — PATIENT MESSAGE (OUTPATIENT)
Dept: ENDOCRINOLOGY | Facility: CLINIC | Age: 79
End: 2024-04-29
Payer: MEDICARE

## 2024-04-29 ENCOUNTER — ANESTHESIA (OUTPATIENT)
Dept: ENDOSCOPY | Facility: HOSPITAL | Age: 79
End: 2024-04-29
Payer: MEDICARE

## 2024-04-29 DIAGNOSIS — N18.31 STAGE 3A CHRONIC KIDNEY DISEASE: ICD-10-CM

## 2024-04-29 DIAGNOSIS — Z79.4 TYPE 2 DIABETES MELLITUS WITH DIABETIC NEPHROPATHY, WITH LONG-TERM CURRENT USE OF INSULIN: ICD-10-CM

## 2024-04-29 DIAGNOSIS — E11.21 TYPE 2 DIABETES MELLITUS WITH DIABETIC NEPHROPATHY, WITH LONG-TERM CURRENT USE OF INSULIN: ICD-10-CM

## 2024-04-29 NOTE — TELEPHONE ENCOUNTER
----- Message from Trent Tavarez sent at 4/29/2024  8:09 AM CDT -----  Regarding: Medication  Contact: pt.  [phone]  Pt is calling to speak with Jonelle about her medication  empagliflozin (JARDIANCE) 10 mg tablet.   Pt says she has been of of medictaion for about a month and needs to know what will she be taking moving forward. Asking to speak with Jonelle.  Patient Requesting Call Back @ pt. [phone]

## 2024-04-30 LAB — METHYLMALONATE SERPL-SCNC: 0.27 UMOL/L

## 2024-05-07 NOTE — TELEPHONE ENCOUNTER
We are pleased to inform you Lina Davis has been approved in the [employer] Patient Assistance Program.  [employer] has shared this information with your patient.   Approval Details  Eligibility start Date: 2024  Eligibility end date: 2024 (Updated proof of eligibility required to re-enroll for 2025).  Approved Medication: Jardiance  Day supply: 90  Allotted Refills: 3 (Please be advised Program allows only 3 refills per eligibility period regardless of changes in strength).   Estimated Shippin-10 business days or longer depending on availability and/or projected refill date  Shipping Location: Patient's Home (You and your patient will receive [facility] once Medication is received)                                            Important Note  It is imperative that any changes to strength or discontinuation of this medication be referred to the Pharmacy Patient Assistance Team Pool Via EPIC to prevent Gaps in therapy.

## 2024-05-10 ENCOUNTER — TELEPHONE (OUTPATIENT)
Dept: INTERNAL MEDICINE | Facility: CLINIC | Age: 79
End: 2024-05-10
Payer: MEDICARE

## 2024-05-10 NOTE — TELEPHONE ENCOUNTER
----- Message from Yoli Bonilla sent at 5/10/2024  2:33 PM CDT -----  Contact: Pt  [phone]  Requesting an RX refill or new RX.  Is this a refill or new RX: refill  RX name and strength (copy/paste from chart):  oxyCODONE-acetaminophen (PERCOCET) 5-325 mg per tablet  Is this a 30 day or 90 day RX:   Pharmacy name and phone # (copy/paste from chart):  [id number] - [place], [place] - [address] [place] [address] AT [facility] [address] [place] [address]   Phone: [phone]  Fax: [phone]    The doctors have asked that we provide their patients with the following 2 reminders -- prescription refills can take up to 72 hours, and a friendly reminder that in the future you can use your [facility] account to request refills: yes

## 2024-05-14 ENCOUNTER — LAB VISIT (OUTPATIENT)
Dept: LAB | Facility: HOSPITAL | Age: 79
End: 2024-05-14
Attending: INTERNAL MEDICINE
Payer: MEDICARE

## 2024-05-14 DIAGNOSIS — N39.0 URINARY TRACT INFECTION WITHOUT HEMATURIA, SITE UNSPECIFIED: ICD-10-CM

## 2024-05-14 LAB
BACTERIA #/AREA URNS AUTO: ABNORMAL /HPF
BILIRUB UR QL STRIP: NEGATIVE
CLARITY UR REFRACT.AUTO: ABNORMAL
COLOR UR AUTO: YELLOW
GLUCOSE UR QL STRIP: ABNORMAL
HGB UR QL STRIP: ABNORMAL
HYALINE CASTS UR QL AUTO: 0 /LPF
KETONES UR QL STRIP: NEGATIVE
LEUKOCYTE ESTERASE UR QL STRIP: ABNORMAL
MICROSCOPIC COMMENT: ABNORMAL
NITRITE UR QL STRIP: NEGATIVE
PH UR STRIP: 6 [PH] (ref 5–8)
PROT UR QL STRIP: ABNORMAL
RBC #/AREA URNS AUTO: 6 /HPF (ref 0–4)
SP GR UR STRIP: 1.01 (ref 1–1.03)
SQUAMOUS #/AREA URNS AUTO: 1 /HPF
URN SPEC COLLECT METH UR: ABNORMAL
WBC #/AREA URNS AUTO: >100 /HPF (ref 0–5)
WBC CLUMPS UR QL AUTO: ABNORMAL
YEAST UR QL AUTO: ABNORMAL

## 2024-05-14 PROCEDURE — 81001 URINALYSIS AUTO W/SCOPE: CPT | Mod: HCNC | Performed by: INTERNAL MEDICINE

## 2024-05-14 PROCEDURE — 87086 URINE CULTURE/COLONY COUNT: CPT | Mod: HCNC | Performed by: INTERNAL MEDICINE

## 2024-05-15 LAB
BACTERIA UR CULT: NORMAL
BACTERIA UR CULT: NORMAL

## 2024-05-21 NOTE — TELEPHONE ENCOUNTER
Hello,       A Patient Assistance Application for Lina Davis - MRN  [id number] was faxed to your office @[phone]. Please have Pati Paredes NP review the application to ensure the prescription is correct. If correct, sign and fax the application back to the Pharmacy Patient Assistance Team @[phone].     Please do not write any corrections on this application.  If changes are needed on this application, please inform the PAP team, and the corrected application will be faxed back to your office for approval and signature.            Pharmacy Patient Assistance  [address] [place] [address]  [id number] [place] [address]  Fax: [phone]  Email: [email or web address]

## 2024-05-29 NOTE — TELEPHONE ENCOUNTER
A Patient Assistance Application for Lina Davis - MRN  [id number] was faxed to your office @[phone]. Please have Pati Paredes NP review the application to ensure the prescription is correct. If correct, sign and fax the application back to the Pharmacy Patient Assistance Team @[phone].   2nd request

## 2024-05-30 ENCOUNTER — TELEPHONE (OUTPATIENT)
Dept: ENDOCRINOLOGY | Facility: CLINIC | Age: 79
End: 2024-05-30
Payer: MEDICARE

## 2024-05-30 DIAGNOSIS — E11.21 TYPE 2 DIABETES MELLITUS WITH DIABETIC NEPHROPATHY, WITH LONG-TERM CURRENT USE OF INSULIN: ICD-10-CM

## 2024-05-30 DIAGNOSIS — Z79.4 TYPE 2 DIABETES MELLITUS WITH DIABETIC NEPHROPATHY, WITH LONG-TERM CURRENT USE OF INSULIN: ICD-10-CM

## 2024-05-30 RX ORDER — INSULIN GLARGINE 300 [IU]/ML
14 INJECTION, SOLUTION SUBCUTANEOUS DAILY
Qty: 4.2 ML | Refills: 3 | Status: SHIPPED | OUTPATIENT
Start: 2024-05-30 | End: 2024-05-30 | Stop reason: SDUPTHER

## 2024-05-30 RX ORDER — INSULIN GLARGINE 300 U/ML
14 INJECTION, SOLUTION SUBCUTANEOUS DAILY
Qty: 4.2 ML | Refills: 3 | Status: SHIPPED | OUTPATIENT
Start: 2024-05-30 | End: 2024-05-30

## 2024-05-30 RX ORDER — INSULIN GLARGINE 300 U/ML
14 INJECTION, SOLUTION SUBCUTANEOUS DAILY
Qty: 4.2 ML | Refills: 3 | Status: SHIPPED | OUTPATIENT
Start: 2024-05-30 | End: 2024-06-17 | Stop reason: SDUPTHER

## 2024-05-30 NOTE — TELEPHONE ENCOUNTER
----- Message from Jonelle Mills MA sent at 5/29/2024  1:18 PM CDT -----  Regarding: FW: PAP program & script for Toujeo    ----- Message -----  From: Gilma Jerome  Sent: 5/29/2024   1:01 PM CDT  To: Reggie Krueger Staff  Subject: PAP program & script for Toujeo                  Hi,   I spoke with Mrs Davis, and she asked that Pati [facility]jeo, so she can start the injection and I just re-faxed the doctors section of the [employer] application for the Toujeo, please have Pati sign and then fax back to [phone].    Thank you  Gilma

## 2024-06-10 PROBLEM — N17.9 ACUTE KIDNEY INJURY SUPERIMPOSED ON CHRONIC KIDNEY DISEASE: Status: RESOLVED | Noted: 2018-01-27 | Resolved: 2024-06-10

## 2024-06-10 PROBLEM — N18.9 ACUTE KIDNEY INJURY SUPERIMPOSED ON CHRONIC KIDNEY DISEASE: Status: RESOLVED | Noted: 2018-01-27 | Resolved: 2024-06-10

## 2024-06-12 ENCOUNTER — PATIENT MESSAGE (OUTPATIENT)
Dept: PHARMACY | Facility: CLINIC | Age: 79
End: 2024-06-12
Payer: MEDICARE

## 2024-06-12 NOTE — TELEPHONE ENCOUNTER
We are pleased to inform you Lina Davis has been approved in the [employer] Patient Assistance Program.  [employer] has shared this information with your patient.   Approval Details  Eligibility start Date: 06/12/24  Eligibility end date: 12/31/24 (Updated proof of eligibility required to re-enroll for 2025 calendar year).  Approved Medication: Toujeo Solostar  Day supply: 90  Allotted Refills: 1 refill remains (Please be advised Program allows only 3 refills per eligibility period regardless of changes in strength).   Estimated Shipping: 10-14 business days or longer depending on availability and/or projected refill date  Shipping Location: [facility] (You and your patient will receive further communication from an [facility] Rep once Medication is received)                                            Important Note  It is imperative that any changes to strength or discontinuation of this medication be referred to the Pharmacy Patient Assistance Team Pool Via EP[employer] to prevent Gaps in therapy.

## 2024-06-17 ENCOUNTER — TELEPHONE (OUTPATIENT)
Dept: ENDOCRINOLOGY | Facility: CLINIC | Age: 79
End: 2024-06-17
Payer: MEDICARE

## 2024-06-17 DIAGNOSIS — Z79.4 TYPE 2 DIABETES MELLITUS WITH DIABETIC NEPHROPATHY, WITH LONG-TERM CURRENT USE OF INSULIN: ICD-10-CM

## 2024-06-17 DIAGNOSIS — E11.21 TYPE 2 DIABETES MELLITUS WITH DIABETIC NEPHROPATHY, WITH LONG-TERM CURRENT USE OF INSULIN: ICD-10-CM

## 2024-06-17 RX ORDER — INSULIN GLARGINE 300 U/ML
14 INJECTION, SOLUTION SUBCUTANEOUS DAILY
Qty: 3 PEN | Refills: 1 | Status: SHIPPED | OUTPATIENT
Start: 2024-06-17 | End: 2025-06-17

## 2024-06-17 NOTE — TELEPHONE ENCOUNTER
----- Message from Hammad Arriaza sent at 6/17/2024 11:58 AM CDT -----  Regarding: Gé[employer] Restrepo 300u Patient Assistance Program  [facility] has received medication from [facility] patient assistance program for your patient Lina Davis ([id number]).  At your earliest convenience please send to [facility] escript for.   Toujeo SoloStar 300u (qty 3 pens) Thanks

## 2024-06-21 ENCOUNTER — OFFICE VISIT (OUTPATIENT)
Dept: INTERNAL MEDICINE | Facility: CLINIC | Age: 79
End: 2024-06-21
Payer: MEDICARE

## 2024-06-21 VITALS
HEIGHT: 66 IN | WEIGHT: 179.38 LBS | DIASTOLIC BLOOD PRESSURE: 68 MMHG | HEART RATE: 82 BPM | SYSTOLIC BLOOD PRESSURE: 136 MMHG | BODY MASS INDEX: 28.83 KG/M2 | OXYGEN SATURATION: 99 %

## 2024-06-21 DIAGNOSIS — I77.9 BILATERAL CAROTID ARTERY DISEASE, UNSPECIFIED TYPE: ICD-10-CM

## 2024-06-21 DIAGNOSIS — U07.1 COVID: ICD-10-CM

## 2024-06-21 DIAGNOSIS — G56.02 CARPAL TUNNEL SYNDROME OF LEFT WRIST: ICD-10-CM

## 2024-06-21 DIAGNOSIS — R35.0 URINARY FREQUENCY: ICD-10-CM

## 2024-06-21 DIAGNOSIS — M46.1 BILATERAL SACROILIITIS: ICD-10-CM

## 2024-06-21 DIAGNOSIS — N18.32 STAGE 3B CHRONIC KIDNEY DISEASE: ICD-10-CM

## 2024-06-21 DIAGNOSIS — I25.10 CVD (CARDIOVASCULAR DISEASE): Primary | ICD-10-CM

## 2024-06-21 DIAGNOSIS — N83.8 MASS OF RIGHT OVARY: ICD-10-CM

## 2024-06-21 DIAGNOSIS — E11.21 TYPE 2 DIABETES MELLITUS WITH DIABETIC NEPHROPATHY, WITHOUT LONG-TERM CURRENT USE OF INSULIN: Chronic | ICD-10-CM

## 2024-06-21 PROBLEM — G95.89 OTHER SPECIFIED DISEASES OF SPINAL CORD: Status: RESOLVED | Noted: 2023-05-02 | Resolved: 2024-06-21

## 2024-06-21 PROCEDURE — 99999 PR PBB SHADOW E&M-EST. PATIENT-LVL V: CPT | Mod: PBBFAC,,, | Performed by: INTERNAL MEDICINE

## 2024-06-21 PROCEDURE — 1101F PT FALLS ASSESS-DOCD LE1/YR: CPT | Mod: CPTII,S$GLB,, | Performed by: INTERNAL MEDICINE

## 2024-06-21 PROCEDURE — 3288F FALL RISK ASSESSMENT DOCD: CPT | Mod: CPTII,S$GLB,, | Performed by: INTERNAL MEDICINE

## 2024-06-21 PROCEDURE — 3078F DIAST BP <80 MM HG: CPT | Mod: CPTII,S$GLB,, | Performed by: INTERNAL MEDICINE

## 2024-06-21 PROCEDURE — 3075F SYST BP GE 130 - 139MM HG: CPT | Mod: CPTII,S$GLB,, | Performed by: INTERNAL MEDICINE

## 2024-06-21 PROCEDURE — 99214 OFFICE O/P EST MOD 30 MIN: CPT | Mod: S$GLB,,, | Performed by: INTERNAL MEDICINE

## 2024-06-21 PROCEDURE — 1159F MED LIST DOCD IN RCRD: CPT | Mod: CPTII,S$GLB,, | Performed by: INTERNAL MEDICINE

## 2024-06-21 PROCEDURE — 1125F AMNT PAIN NOTED PAIN PRSNT: CPT | Mod: CPTII,S$GLB,, | Performed by: INTERNAL MEDICINE

## 2024-06-21 RX ORDER — OXYCODONE AND ACETAMINOPHEN 5; 325 MG/1; MG/1
1 TABLET ORAL EVERY 12 HOURS PRN
Qty: 60 TABLET | Refills: 0 | Status: SHIPPED | OUTPATIENT
Start: 2024-06-21

## 2024-06-21 NOTE — PROGRESS NOTES
Subjective:       Patient ID: Lina Davis is a 79 y.o. female.    Chief Complaint: Annual Exam and Follow-up    Follow-up  Pertinent negatives include no abdominal pain, chest pain (arm pain or jaw pain), headaches, nausea or vomiting.   Pt is here alone as her  is in the hospital.  No CP or SOB.  Review of Systems   Respiratory:  Negative for shortness of breath (PND or orthopnea).    Cardiovascular:  Negative for chest pain (arm pain or jaw pain).   Gastrointestinal:  Negative for abdominal pain, diarrhea, nausea and vomiting.   Genitourinary:  Negative for dysuria.   Neurological:  Negative for seizures, syncope and headaches.       Objective:      Physical Exam  Constitutional:       General: She is not in acute distress.     Appearance: She is well-developed.   HENT:      Head: Normocephalic.   Eyes:      Pupils: Pupils are equal, round, and reactive to light.   Neck:      Thyroid: No thyromegaly.      Vascular: No JVD.   Cardiovascular:      Rate and Rhythm: Normal rate and regular rhythm.      Heart sounds: Normal heart sounds. No murmur heard.     No friction rub. No gallop.   Pulmonary:      Effort: Pulmonary effort is normal.      Breath sounds: Normal breath sounds. No wheezing or rales.   Abdominal:      General: Bowel sounds are normal. There is no distension.      Palpations: Abdomen is soft. There is no mass.      Tenderness: There is no abdominal tenderness. There is no guarding or rebound.   Musculoskeletal:      Cervical back: Neck supple.   Lymphadenopathy:      Cervical: No cervical adenopathy.   Skin:     General: Skin is warm and dry.   Neurological:      Mental Status: She is alert and oriented to person, place, and time.      Deep Tendon Reflexes: Reflexes are normal and symmetric.   Psychiatric:         Behavior: Behavior normal.         Thought Content: Thought content normal.         Judgment: Judgment normal.         Assessment:       1. CVD (cardiovascular disease)    2.  Bilateral sacroiliitis    3. Bilateral carotid artery disease, unspecified type    4. Stage 3b chronic kidney disease    5. Type 2 diabetes mellitus with diabetic nephropathy, without long-term current use of insulin    6. COVID    7. Mass of right ovary    8. Urinary frequency    9. Carpal tunnel syndrome of left wrist        Plan:   CVD (cardiovascular disease)  -     VAS US Carotid Bilateral; Future    Bilateral sacroiliitis  stable  monitored  Bilateral carotid artery disease, unspecified type  Checking a carotid for stability  Stage 3b chronic kidney disease  stable  Monitored  Type 2 diabetes mellitus with diabetic nephropathy, without long-term current use of insulin  -     Ambulatory referral/consult to Optometry; Future; Expected date: 06/28/2024    COVID  -     X-Ray Chest PA And Lateral; Future; Expected date: 06/21/2024    Mass of right ovary  -     US Pelvis Complete Non OB; Future; Expected date: 06/21/2024    Urinary frequency  -     Ambulatory referral/consult to Urology; Future; Expected date: 06/28/2024  -     Urinalysis; Future; Expected date: 06/21/2024  -     Urine culture; Future; Expected date: 06/21/2024    Carpal tunnel syndrome of left wrist  -     Ambulatory referral/consult to Orthopedics; Future; Expected date: 06/28/2024  Hand is still painful despite recent surgery - maybe it is du to carpal tunnel  Other orders  -     oxyCODONE-acetaminophen (PERCOCET) 5-325 mg per tablet; Take 1 tablet by mouth every 12 (twelve) hours as needed for Pain.  Dispense: 60 tablet; Refill: 0

## 2024-06-24 NOTE — PROGRESS NOTES
Subjective:      Patient ID: Lina Davis is a 79 y.o. female.    Chief Complaint:  Diabetes    History of Present Illness  Lina Davis is here for follow up of T2DM and Thyroid Nodules.  Previously seen by me 3/2024.     With regards to Diabetes:    Diagnosed: ~  DE: 2021  FH of DM: sister, brother   Denies any of her children have DM     Known complications:  DKA Denies  RN Denies  Eye Exam: 2021  PN : Yes   Podiatry: 2020  Nephropathy : Yes   CAD : Denies  Episode of pancreatitis in 2018 - attributed to Januvia    Diet/Exercise: reduced appetite   Eats 2-3 meals a day.   Snacks : occasionally - fig newtons.   Drinks : water, 1 or 2 8oz soft drinks a week, lemonade but will cut it with water.   Exercise - tries to stay active.  Recent illness, injury, steroids: Denies     Current regimen: PAP - SGLT2i and insulin   Metformin 500mg twice daily   Jardiance 10mg daily   Actos 30mg daily   Toujeo 14 units daily     Reports compliance.    Other medications tried:  Januvia- Discontinued in 2018 due to pancreatitis  Novolog    Glucose Monitor:   1 times a day testing  Log reviewed: oral recall  Fastin  Evenin  Highest 160    Hypoglycemia:  One episode in the 70s   Knows how to correct with 15 grams of carbs- juice, coke, or a peppermint.     Diabetes Management Status    Hemoglobin A1C   Date Value Ref Range Status   2024 6.1 (H) 4.0 - 5.6 % Final     Comment:     ADA Screening Guidelines:  5.7-6.4%  Consistent with prediabetes  >or=6.5%  Consistent with diabetes    High levels of fetal hemoglobin interfere with the HbA1C  assay. Heterozygous hemoglobin variants (HbS, HgC, etc)do  not significantly interfere with this assay.   However, presence of multiple variants may affect accuracy.     2024 6.2 (H) 4.0 - 5.6 % Final     Comment:     ADA Screening Guidelines:  5.7-6.4%  Consistent with prediabetes  >or=6.5%  Consistent with diabetes    High levels of fetal hemoglobin interfere  with the HbA1C  assay. Heterozygous hemoglobin variants (HbS, HgC, etc)do  not significantly interfere with this assay.   However, presence of multiple variants may affect accuracy.     11/21/2023 5.9 (H) 4.0 - 5.6 % Final     Comment:     ADA Screening Guidelines:  5.7-6.4%  Consistent with prediabetes  >or=6.5%  Consistent with diabetes    High levels of fetal hemoglobin interfere with the HbA1C  assay. Heterozygous hemoglobin variants (HbS, HgC, etc)do  not significantly interfere with this assay.   However, presence of multiple variants may affect accuracy.         Statin: Taking  ACE/ARB: Taking  Screening or Prevention Patient's value Goal Complete/Controlled?   HgA1C Testing and Control   Lab Results   Component Value Date    HGBA1C 6.1 (H) 06/26/2024      Annually/Less than 8% Yes   Lipid profile : 06/07/2023 Annually Yes   LDL control Lab Results   Component Value Date    LDLCALC 69.6 06/07/2023    Annually/Less than 100 mg/dl  Yes   Nephropathy screening Lab Results   Component Value Date    LABMICR 33.0 05/02/2023     Lab Results   Component Value Date    PROTEINUA Trace (A) 06/26/2024    Annually Yes   Blood pressure BP Readings from Last 1 Encounters:   07/03/24 127/68    Less than 140/90 No   Dilated retinal exam : 02/22/2021 Annually Yes   Foot exam   Most Recent Foot Exam Date: Not Found Annually No     With regards to Thyroid Nodule:    Thyroid US:   6/2023  COMPARISON:  Neck ultrasound dated 08/05/2022.  Our records indicate a benign FNA of the left lobe nodule in 1/2023 and a non-diagnostic FNA of the right lobe nodule in 1/2023.  When compared to the prior study the above nodules are stable in size and features  Impression:  1.) Thyroid gland is normal in size with heterogeneous echotexture and normal vascularity  2.) 2.4 x 2.1 x 2.0 cm solid, isoechoic nodule is seen in the right mid lobe  3.) 0.8 x 0.4 x 0.6 cm spongiform nodule is seen in the right inferior pole  4.) 2.4 x 1.8 x 2.4 cm solid,  "isoechoic nodule is seen in the left inferior pole  5.) 1.1 x 0.5 x 1.1 cm spongiform nodule is seen in the left inferior pole  6.) 1.0 x 0.6 x 1.0 cm solid, isoechoic nodule is seen in the isthmus  RECOMMENDATIONS:  Recommend repeat thyroid ultrasound in 1-2 years.    FNA:   1/23/2023  R mid: Unsatisfactory  L inferior: Benign    Signs or Symptoms:   Difficulty breathing: Denies  Difficulty swallowing: Denies  Voice Changes: Denies  FH of thyroid cancer: Denies  Personal history of radiation treatment or exposure: Denies    Lab Results   Component Value Date    TSH 1.963 06/26/2024    D2PLFLY 6.0 11/26/2008    FREET4 1.10 08/08/2017       With regards to the Hypercalcemia:    Lab Results   Component Value Date    PTH 52.5 06/26/2024    CALCIUM 10.1 06/26/2024    PHOS 3.4 06/26/2024     Lab Results   Component Value Date    ALBUMIN 3.5 06/26/2024                       Daily intake of calcium is: Denies  Daily intake of vitamin D:  OTC Vit D3 3000iu daily     BMD:    12/2018  Normal BMD.  Compared to prior study in 9/2014, BMD has declined 5.0% at the spine and 4.3% at the hip.  RECOMMENDATIONS of [facility]:  1.  Calcium 7611-4772 mg daily and vitamin D 800-1000 units daily, adequate exercise.  2.  No need for repeat study in near future unless clinical change    Denies constipation, depression, muscle aches or pains.  Reports polyuria   Denies fractures, height loss or kidney stones.  Reports history of renal disease.  Denies family history of hyperparathyroidism or calcium problems.  Denies HCTZ., lithium, or chlorthiadone use.      Review of Systems  as above      Visit Vitals  /68   Pulse 93   Ht 5' 6" (1.676 m)   Wt 81.5 kg (179 lb 12.6 oz)   SpO2 97%   BMI 29.02 kg/m²                 Body mass index is 29.02 kg/m².    Lab Review:   Lab Results   Component Value Date    HGBA1C 6.1 (H) 06/26/2024    HGBA1C 6.2 (H) 04/25/2024    HGBA1C 5.9 (H) 11/21/2023       Lab " "Results   Component Value Date    CHOL 149 06/07/2023    HDL 58 06/07/2023    LDLCALC 69.6 06/07/2023    TRIG 107 06/07/2023    CHOLHDL 38.9 06/07/2023     Lab Results   Component Value Date     06/26/2024    K 4.2 06/26/2024     (H) 06/26/2024    CO2 20 (L) 06/26/2024     (H) 06/26/2024    BUN 31 (H) 06/26/2024    CREATININE 1.4 06/26/2024    CALCIUM 10.1 06/26/2024    PROT 8.0 05/14/2024    ALBUMIN 3.5 06/26/2024    BILITOT 0.3 05/14/2024    ALKPHOS 69 05/14/2024    AST 13 05/14/2024    ALT 7 (L) 05/14/2024    ANIONGAP 10 06/26/2024    ESTGFRAFRICA 39.0 (A) 07/12/2022    EGFRNONAA 33.8 (A) 07/12/2022    TSH 1.963 06/26/2024     Vit D, 25-Hydroxy   Date Value Ref Range Status   06/26/2024 47 30 - 96 ng/mL Final     Comment:     Vitamin D deficiency.........<10 ng/mL                              Vitamin D insufficiency......10-29 ng/mL       Vitamin D sufficiency........> or equal to 30 ng/mL  Vitamin D toxicity............>100 ng/mL       Assessment and Plan     1. Type 2 diabetes mellitus with diabetic nephropathy, with long-term current use of insulin  TOUJEO SOLOSTAR U-300 INSULIN 300 unit/mL (1.5 mL) InPn pen    Renal Function Panel    Hemoglobin A1C    Microalbumin/Creatinine Ratio, Urine    pen needle, diabetic (NOVOFINE 32) 32 gauge x 1/4" Ndle    Ambulatory referral/consult to Pharmacy Assistance      2. Thyroid nodule        3. Stage 3b chronic kidney disease        4. Hypercalcemia            Type 2 diabetes mellitus with diabetic nephropathy  -- Labs prior to follow up.  -- A1c goal <7.5%.  -- Medications discussed:  MFM   GLP1-DPP4 - avoid given drug induced pancreatitis (Januvia)  SUNG   SGLT2   Reviewed potential adverse effects of SGLT-2 inhibitors, including genital mycotic infections, slightly increased risk of UTI, hypersensitivity, hypotension, and hyperkalemia. Advised to maintain water intake of 8-10 cups per day. Advised we need to check chemistry panel at baseline and 2 weeks " after starting. Discussed FDA warning reports of ketoacidosis associated with SGLT-2 inhibitors. Advised to seek immediate medical attention and stop the medication if symptoms such as difficulty breathing, nausea, vomiting, abdominal pain, confusion, and unusual fatigue/sleepiness. Discussed possible precipitating factors including major illness/reduced food and fluid intake (advised to stop under these circumstances), and reduced insulin dose. Discussed possible effects of increased fracture risk/decreased bone density. Discussed reports of increased risk of leg and foot amputations with canagliflozin and need to seek urgent care if developed new pain or tenderness, sores or ulcers, or infections in legs/feet.   Insulin   -- Reviewed logs/CGM:  Glucose controlled.  Concern for hypoglycemia unawareness.  Instructed to send glucose logs in 14 days.    Reach out to me sooner for any glucose <70 or consistently >180.  -- Medication Changes:   Metformin 500mg twice daily   Jardiance 10mg daily   Actos 30mg daily   Toujeo 10 units daily     -- Reviewed goals of therapy are to get the best control we can without hypoglycemia.  -- Reviewed patient's current insulin regimen. Clarified proper insulin dose and timing in relation to meals, etc. Insulin injection sites and proper rotation instructed.    -- Advised frequent self blood glucose monitoring.  Patient encouraged to document glucose results and bring them to every clinic visit.  -- Hypoglycemia precautions discussed. Instructed on precautions before driving.    -- Call for Bg repeatedly < 90 or > 180.   -- Close adherence to lifestyle changes recommended.   -- Periodic follow ups for eye evaluations, foot care and dental care suggested.    Thyroid nodule  -- I have reviewed management options including observation, FNA or surgery.  -- FNA procedure explained in depth.  -- Discussed indications for repeat FNA as well as surgical indications (abnormal FNA, compressive  symptoms or interval change).  -- Discussed possible results of FNA- Benign, Malignant, FLUS, or insufficient cells.  Discussed results auto released to patients, but advised the ordering provider will also contact to discuss.   -- All of the patients questions were answered.  -- FNA:   1/23/2023  R mid: Unsatisfactory  L inferior: Benign  -- Repeat thyroid US 6/2025.    Stage 3b chronic kidney disease  -- Optimize glucose control.     Hypercalcemia  -- Intermittent hypercalcemia.   -- Discussed indications for surgery if primary hyperparathyroidism proven.  -- Avoid dehydration, excessive calcium supplementation and meds (HCTZ) that can worsen hypercalcemia.  -- Prefers to monitor for now - labs.       Follow up in about 4 months (around 11/3/2024).    Visit today included increased complexity associated with the care of the problems addressed and managing the longitudinal care of the patient due to the serious and/or complex managed problems.

## 2024-06-26 ENCOUNTER — HOSPITAL ENCOUNTER (OUTPATIENT)
Dept: RADIOLOGY | Facility: HOSPITAL | Age: 79
Discharge: HOME | End: 2024-06-26
Attending: INTERNAL MEDICINE
Payer: MEDICARE

## 2024-06-26 DIAGNOSIS — U07.1 COVID: ICD-10-CM

## 2024-06-26 PROCEDURE — 71046 X-RAY EXAM CHEST 2 VIEWS: CPT | Mod: 26,,, | Performed by: RADIOLOGY

## 2024-06-26 PROCEDURE — 71046 X-RAY EXAM CHEST 2 VIEWS: CPT | Mod: TC

## 2024-07-03 ENCOUNTER — OFFICE VISIT (OUTPATIENT)
Dept: ENDOCRINOLOGY | Facility: CLINIC | Age: 79
End: 2024-07-03
Payer: MEDICARE

## 2024-07-03 VITALS
BODY MASS INDEX: 28.9 KG/M2 | HEART RATE: 93 BPM | SYSTOLIC BLOOD PRESSURE: 127 MMHG | OXYGEN SATURATION: 97 % | WEIGHT: 179.81 LBS | DIASTOLIC BLOOD PRESSURE: 68 MMHG | HEIGHT: 66 IN

## 2024-07-03 DIAGNOSIS — Z79.4 TYPE 2 DIABETES MELLITUS WITH DIABETIC NEPHROPATHY, WITH LONG-TERM CURRENT USE OF INSULIN: Primary | Chronic | ICD-10-CM

## 2024-07-03 DIAGNOSIS — E83.52 HYPERCALCEMIA: ICD-10-CM

## 2024-07-03 DIAGNOSIS — N18.32 STAGE 3B CHRONIC KIDNEY DISEASE: ICD-10-CM

## 2024-07-03 DIAGNOSIS — E04.1 THYROID NODULE: ICD-10-CM

## 2024-07-03 DIAGNOSIS — Z79.4 TYPE 2 DIABETES MELLITUS WITH DIABETIC NEPHROPATHY, WITH LONG-TERM CURRENT USE OF INSULIN: ICD-10-CM

## 2024-07-03 DIAGNOSIS — E11.21 TYPE 2 DIABETES MELLITUS WITH DIABETIC NEPHROPATHY, WITH LONG-TERM CURRENT USE OF INSULIN: Primary | Chronic | ICD-10-CM

## 2024-07-03 DIAGNOSIS — E11.21 TYPE 2 DIABETES MELLITUS WITH DIABETIC NEPHROPATHY, WITH LONG-TERM CURRENT USE OF INSULIN: ICD-10-CM

## 2024-07-03 PROCEDURE — 99999 PR PBB SHADOW E&M-EST. PATIENT-LVL V: CPT | Mod: PBBFAC,,, | Performed by: NURSE PRACTITIONER

## 2024-07-03 RX ORDER — INSULIN GLARGINE 300 U/ML
10 INJECTION, SOLUTION SUBCUTANEOUS DAILY
Start: 2024-07-03 | End: 2025-07-03

## 2024-07-03 RX ORDER — PIOGLITAZONEHYDROCHLORIDE 30 MG/1
30 TABLET ORAL
Qty: 90 TABLET | Refills: 1 | Status: SHIPPED | OUTPATIENT
Start: 2024-07-03

## 2024-07-03 RX ORDER — BLOOD SUGAR DIAGNOSTIC
STRIP MISCELLANEOUS
Qty: 90 EACH | Refills: 1 | Status: SHIPPED | OUTPATIENT
Start: 2024-07-03

## 2024-07-03 NOTE — TELEPHONE ENCOUNTER
Care Due:                  Date            Visit Type   Department     Provider  --------------------------------------------------------------------------------                                EP -                              PRIMARY      MET INTERNAL  Last Visit: 06-      CARE ([facility])   MEDICINE       Dora Freedman  Next Visit: None Scheduled  None         None Found                                                            Last  Test          Frequency    Reason                     Performed    Due Date  --------------------------------------------------------------------------------    Lipid Panel.  12 months..  atorvastatin.............  06- 06-    Uric Acid...  12 months..  allopurinoL..............  Not Found    Overdue    [employer] Embedded Care Due Messages. Reference number: [id number].   7/03/2024 3:11:10 AM CDT

## 2024-07-03 NOTE — TELEPHONE ENCOUNTER
Refill Routing Note   Medication(s) are not appropriate for processing by [facility] for the following reason(s):        Required labs outdated    [facility] action(s):  Defer  Approve     Requires labs : Yes             Appointments  past 12m or future 3m with PCP    Date Provider   Last Visit   6/21/2024 Dora Freedman MD   Next Visit   Visit date not found Dora Freedman MD   ED visits in past 90 days: 0        Note composed:2:14 PM 07/03/2024

## 2024-07-03 NOTE — ASSESSMENT & PLAN NOTE
-- Labs prior to follow up.  -- A1c goal <7.5%.  -- Medications discussed:  MFM   GLP1-DPP4 - avoid given drug induced pancreatitis (Januvia)  SUNG   SGLT2   Reviewed potential adverse effects of SGLT-2 inhibitors, including genital mycotic infections, slightly increased risk of UTI, hypersensitivity, hypotension, and hyperkalemia. Advised to maintain water intake of 8-10 cups per day. Advised we need to check chemistry panel at baseline and 2 weeks after starting. Discussed FDA warning reports of ketoacidosis associated with SGLT-2 inhibitors. Advised to seek immediate medical attention and stop the medication if symptoms such as difficulty breathing, nausea, vomiting, abdominal pain, confusion, and unusual fatigue/sleepiness. Discussed possible precipitating factors including major illness/reduced food and fluid intake (advised to stop under these circumstances), and reduced insulin dose. Discussed possible effects of increased fracture risk/decreased bone density. Discussed reports of increased risk of leg and foot amputations with canagliflozin and need to seek urgent care if developed new pain or tenderness, sores or ulcers, or infections in legs/feet.   Insulin   -- Reviewed logs/CGM:  Glucose controlled.  Concern for hypoglycemia unawareness.  Instructed to send glucose logs in 14 days.    Reach out to me sooner for any glucose <70 or consistently >180.  -- Medication Changes:   Metformin 500mg twice daily   Jardiance 10mg daily   Actos 30mg daily   Toujeo 10 units daily     -- Reviewed goals of therapy are to get the best control we can without hypoglycemia.  -- Reviewed patient's current insulin regimen. Clarified proper insulin dose and timing in relation to meals, etc. Insulin injection sites and proper rotation instructed.    -- Advised frequent self blood glucose monitoring.  Patient encouraged to document glucose results and bring them to every clinic visit.  -- Hypoglycemia precautions discussed.  Instructed on precautions before driving.    -- Call for Bg repeatedly < 90 or > 180.   -- Close adherence to lifestyle changes recommended.   -- Periodic follow ups for eye evaluations, foot care and dental care suggested.

## 2024-07-05 RX ORDER — ATORVASTATIN CALCIUM 40 MG/1
40 TABLET, FILM COATED ORAL
Qty: 90 TABLET | Refills: 3 | Status: SHIPPED | OUTPATIENT
Start: 2024-07-05

## 2024-07-08 ENCOUNTER — TELEPHONE (OUTPATIENT)
Dept: ENDOSCOPY | Facility: HOSPITAL | Age: 79
End: 2024-07-08
Payer: MEDICARE

## 2024-07-10 ENCOUNTER — HOSPITAL ENCOUNTER (OUTPATIENT)
Dept: RADIOLOGY | Facility: HOSPITAL | Age: 79
Discharge: HOME | End: 2024-07-10
Attending: INTERNAL MEDICINE
Payer: MEDICARE

## 2024-07-10 DIAGNOSIS — N83.8 MASS OF RIGHT OVARY: ICD-10-CM

## 2024-07-10 PROCEDURE — 76830 TRANSVAGINAL US NON-OB: CPT | Mod: TC,HCNC

## 2024-07-10 PROCEDURE — 76856 US EXAM PELVIC COMPLETE: CPT | Mod: 26,HCNC,, | Performed by: RADIOLOGY

## 2024-07-10 PROCEDURE — 76830 TRANSVAGINAL US NON-OB: CPT | Mod: 26,HCNC,, | Performed by: RADIOLOGY

## 2024-07-12 ENCOUNTER — TELEPHONE (OUTPATIENT)
Dept: ENDOSCOPY | Facility: HOSPITAL | Age: 79
End: 2024-07-12
Payer: MEDICARE

## 2024-07-12 NOTE — TELEPHONE ENCOUNTER
----- Message from Sylvie Woodruff sent at 7/12/2024  9:39 AM CDT -----  Regarding: FW: Instructions  Contact: [phone]    ----- Message -----  From: Shawna Wheatley LPN  Sent: 7/11/2024  11:56 AM CDT  To: [facility] Endoscopy Schedulers  Subject: FW: Instructions                                   ----- Message -----  From: Kiko Murrell  Sent: 7/11/2024   9:29 AM CDT  To: [facility] Staff  Subject: Instructions                                     Pt  Ran   calling to speak with someone in provider office regarding instructions for colonoscopy on 7/15.  States he would like instructions printed out , he will pick it up today. Please call pt back at  [phone]

## 2024-07-15 ENCOUNTER — HOSPITAL ENCOUNTER (OUTPATIENT)
Facility: HOSPITAL | Age: 79
Discharge: HOME | End: 2024-07-15
Attending: COLON & RECTAL SURGERY | Admitting: COLON & RECTAL SURGERY
Payer: MEDICARE

## 2024-07-15 VITALS
BODY MASS INDEX: 28.45 KG/M2 | HEIGHT: 66 IN | SYSTOLIC BLOOD PRESSURE: 149 MMHG | RESPIRATION RATE: 14 BRPM | WEIGHT: 177 LBS | OXYGEN SATURATION: 100 % | HEART RATE: 75 BPM | TEMPERATURE: 98 F | DIASTOLIC BLOOD PRESSURE: 67 MMHG

## 2024-07-15 DIAGNOSIS — Z12.11 COLON CANCER SCREENING: Primary | ICD-10-CM

## 2024-07-15 LAB — POCT GLUCOSE: 153 MG/DL (ref 70–110)

## 2024-07-15 PROCEDURE — G0105 COLORECTAL SCRN; HI RISK IND: HCPCS | Mod: 74,HCNC | Performed by: COLON & RECTAL SURGERY

## 2024-07-15 PROCEDURE — G0105 COLORECTAL SCRN; HI RISK IND: HCPCS | Mod: 53,HCNC,, | Performed by: COLON & RECTAL SURGERY

## 2024-07-15 PROCEDURE — 82962 GLUCOSE BLOOD TEST: CPT | Mod: HCNC | Performed by: COLON & RECTAL SURGERY

## 2024-07-15 PROCEDURE — 37000008 HC ANESTHESIA 1ST 15 MINUTES: Mod: HCNC | Performed by: COLON & RECTAL SURGERY

## 2024-07-15 PROCEDURE — 37000009 HC ANESTHESIA EA ADD 15 MINS: Mod: HCNC | Performed by: COLON & RECTAL SURGERY

## 2024-07-15 PROCEDURE — E9220 PRA ENDO ANESTHESIA: HCPCS | Mod: HCNC,,, | Performed by: NURSE ANESTHETIST, CERTIFIED REGISTERED

## 2024-07-15 PROCEDURE — 63600175 PHARM REV CODE 636 W HCPCS: Mod: HCNC | Performed by: NURSE ANESTHETIST, CERTIFIED REGISTERED

## 2024-07-15 PROCEDURE — 25000003 PHARM REV CODE 250: Mod: HCNC | Performed by: NURSE ANESTHETIST, CERTIFIED REGISTERED

## 2024-07-15 RX ORDER — SODIUM CHLORIDE 9 MG/ML
INJECTION, SOLUTION INTRAVENOUS CONTINUOUS
Status: DISCONTINUED | OUTPATIENT
Start: 2024-07-15 | End: 2024-07-15 | Stop reason: HOSPADM

## 2024-07-15 RX ORDER — PROPOFOL 10 MG/ML
VIAL (ML) INTRAVENOUS CONTINUOUS PRN
Status: DISCONTINUED | OUTPATIENT
Start: 2024-07-15 | End: 2024-07-15

## 2024-07-15 RX ORDER — PROPOFOL 10 MG/ML
VIAL (ML) INTRAVENOUS
Status: DISCONTINUED | OUTPATIENT
Start: 2024-07-15 | End: 2024-07-15

## 2024-07-15 RX ADMIN — SODIUM CHLORIDE: 0.9 INJECTION, SOLUTION INTRAVENOUS at 07:07

## 2024-07-15 RX ADMIN — PROPOFOL 80 MCG/KG/MIN: 10 INJECTION, EMULSION INTRAVENOUS at 08:07

## 2024-07-15 RX ADMIN — PROPOFOL 60 MG: 10 INJECTION, EMULSION INTRAVENOUS at 08:07

## 2024-07-15 NOTE — ANESTHESIA POSTPROCEDURE EVALUATION
Anesthesia Post Evaluation    Patient: Lina Davis    Procedure(s) Performed: Procedure(s) (LRB):  COLONOSCOPY (N/A)    Final Anesthesia Type: general      Patient location during evaluation: PACU  Patient participation: Yes- Able to Participate  Level of consciousness: awake and alert  Post-procedure vital signs: reviewed and stable  Pain management: adequate  Airway patency: patent    PONV status at discharge: No PONV  Anesthetic complications: no      Cardiovascular status: blood pressure returned to baseline  Respiratory status: spontaneous ventilation and room air  Hydration status: euvolemic  Follow-up not needed.              Vitals Value Taken Time   /67 07/15/24 0905   Temp 36.6 °C (97.9 °F) 07/15/24 0835   Pulse 75 07/15/24 0905   Resp 14 07/15/24 0905   SpO2 100 % 07/15/24 0905         Event Time   Out of Recovery 09:21:50         Pain/Adriana Score: Adriana Score: 10 (7/15/2024  9:05 AM)

## 2024-07-15 NOTE — TRANSFER OF CARE
"Anesthesia Transfer of Care Note    Patient: Lina Davis    Procedure(s) Performed: Procedure(s) (LRB):  COLONOSCOPY (N/A)    Patient location: PACU    Anesthesia Type: general    Transport from OR: Transported from OR on room air with adequate spontaneous ventilation    Post pain: adequate analgesia    Post assessment: no apparent anesthetic complications    Post vital signs: stable    Level of consciousness: awake and alert    Nausea/Vomiting: no nausea/vomiting    Complications: none    Transfer of care protocol was followed    Last vitals: Visit Vitals  BP (!) 185/83 (BP Location: Right arm, Patient Position: Lying)   Pulse 93   Temp 36.7 °C (98.1 °F) (Temporal)   Resp 16   Ht 5' 6" (1.676 m)   Wt 80.3 kg (177 lb)   SpO2 99%   Breastfeeding No   BMI 28.57 kg/m²     "

## 2024-07-15 NOTE — PLAN OF CARE
Discharge instructions with Provation reviewed with patient and spouse, verbalize understanding. PIV removed, cannula intact dressing applied with instructions to remove coband when she arrives home. Steady gait, escorted to bathroom then escorted with spouse at bedside. NAD noted, not c/o pain or discomfort

## 2024-07-15 NOTE — PROVATION PATIENT INSTRUCTIONS
Discharge Summary/Instructions after an Endoscopic Procedure  Patient Name: Lina Davis  Patient MRN: [id number]  Patient YOB: 1945  Monday, July 15, 2024  Dread Forman MD  Dear patient,  As a result of recent federal legislation (The Federal Cures Act), you may   receive lab or pathology results from your procedure in your [facility]   account before your physician is able to contact you. Your physician or   their representative will relay the results to you with their   recommendations at their soonest availability.  Thank you,  RESTRICTIONS:  During your procedure today, you received medications for sedation.  These   medications may affect your judgment, balance and coordination.  Therefore,   for 24 hours, you have the following restrictions:   - DO NOT drive a car, operate machinery, make legal/financial decisions,   sign important papers or drink alcohol.    ACTIVITY:  Today: no heavy lifting, straining or running due to procedural   sedation/anesthesia.  The following day: return to full activity including work.  DIET:  Eat and drink normally unless instructed otherwise.     TREATMENT FOR COMMON SIDE EFFECTS:  - Mild abdominal pain, nausea, belching, bloating or excessive gas:  rest,   eat lightly and use a heating pad.  - Sore Throat: treat with throat lozenges and/or gargle with warm salt   water.  - Because air was used during the procedure, expelling large amounts of air   from your rectum or belching is normal.  - If a bowel prep was taken, you may not have a bowel movement for 1-3 days.    This is normal.  SYMPTOMS TO WATCH FOR AND REPORT TO YOUR PHYSICIAN:  1. Abdominal pain or bloating, other than gas cramps.  2. Chest pain.  3. Back pain.  4. Signs of infection such as: chills or fever occurring within 24 hours   after the procedure.  5. Rectal bleeding, which would show as bright red, maroon, or black stools.   (A tablespoon of blood from the rectum is not serious, especially if    hemorrhoids are present.)  6. Vomiting.  7. Weakness or dizziness.  GO DIRECTLY TO THE NEAREST EMERGENCY ROOM IF YOU HAVE ANY OF THE FOLLOWING:      Difficulty breathing              Chills and/or fever over 101 F   Persistent vomiting and/or vomiting blood   Severe abdominal pain   Severe chest pain   Black, tarry stools   Bleeding- more than one tablespoon   Any other symptom or condition that you feel may need urgent attention  Your doctor recommends these additional instructions:  If any biopsies were taken, your doctors clinic will contact you in 1 to 2   weeks with any results.  - Discharge patient to home (ambulatory).   - Patient has a contact number available for emergencies.  The signs and   symptoms of potential delayed complications were discussed with the   patient.  Return to normal activities tomorrow.  Written discharge   instructions were provided to the patient.   - Resume previous diet.   - Continue present medications.   - Return to primary care physician as previously scheduled.   - Repeat colonoscopy (date not yet determined) because the examination was   incomplete.  For questions, problems or results please call your physician - Dread Forman MD at Work:  [phone].  [facility], EMERGENCY ROOM PHONE NUMBER: [phone]  IF A COMPLICATION OR EMERGENCY SITUATION ARISES AND YOU ARE UNABLE TO REACH   YOUR PHYSICIAN - GO DIRECTLY TO THE EMERGENCY ROOM.  Dread Forman MD  7/15/2024 8:36:34 AM  This report has been verified and signed electronically.  Dear patient,  As a result of recent federal legislation (The Federal Cures Act), you may   receive lab or pathology results from your procedure in your [facility]   account before your physician is able to contact you. Your physician or   their representative will relay the results to you with their   recommendations at their soonest availability.  Thank you,  PROVATION

## 2024-07-15 NOTE — PLAN OF CARE
Dr. Forman at bedside speaking with patient regarding difficulty accessing cecum. Spouse at bedside, both verbalize understanding

## 2024-07-15 NOTE — H&P
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    Vitamin D deficiency disease      Sedation Problems: NO  Family History   Problem Relation Name Age of Onset    Heart disease Mother      Diabetes Mother      Heart disease Father      Cancer Father          liver    Diabetes Sister Shawna     Cataracts Sister Shawna     Kidney disease Brother david     Heart disease Brother david     Diabetes Sister elijah     COPD Brother jordi     COPD Brother sancho     Gout Brother kylie     Benign prostatic hyperplasia Brother kylie     Heart disease Brother kylie     Kidney disease Brother kylie     Heart attack Brother kylie         heart attack    Kidney failure Brother kylie     Lupus Sister thanh     Heart attack Brother santiago     Drug abuse Brother santiago     No Known Problems Daughter      No Known Problems Son      Amblyopia Neg Hx      Blindness Neg Hx      Breast cancer Neg Hx      Colon cancer Neg Hx      Ovarian cancer Neg Hx       Fam Hx of Sedation Problems: NO  Social History     Socioeconomic History    Marital status:    Tobacco Use    Smoking status: Never    Smokeless tobacco: Never   Substance and Sexual Activity    Alcohol use: No    Drug use: No    Sexual activity: Yes     Partners: Male     Birth control/protection: Surgical       Review of Systems - Negative except   Respiratory ROS: no dyspnea  Cardiovascular ROS: no exertional chest pain  Gastrointestinal ROS: NO abdominal discomfort,  NO rectal bleeding  Musculoskeletal ROS: no muscular pain  Neurological ROS: no recent stroke    Physical Exam:  BP (!) 185/83 (BP Location:  "Right arm, Patient Position: Lying)   Pulse 93   Temp 98.1 °F (36.7 °C) (Temporal)   Resp 16   Ht 5' 6" (1.676 m)   Wt 80.3 kg (177 lb)   SpO2 99%   Breastfeeding No   BMI 28.57 kg/m²   General: no distress  Head: normocephalic  Mallampati Score   Neck: supple, symmetrical, trachea midline  Lungs:  normal respiratory effort  Heart: regular rate and rhythm  Abdomen: soft, non-tender non-distented; bowel sounds normal; no masses,  no organomegaly  Extremities: no cyanosis or edema, or clubbing    ASA:  II    PLAN  COLONOSCOPY.    SedationPlan :MAC    The details of the procedure, the possible need for biopsy or polypectomy and the potential risks including bleeding, perforation, missed polyps were discussed in detail.      "

## 2024-07-16 ENCOUNTER — TELEPHONE (OUTPATIENT)
Dept: ENDOSCOPY | Facility: HOSPITAL | Age: 79
End: 2024-07-16
Payer: MEDICARE

## 2024-07-16 VITALS — HEIGHT: 67 IN | WEIGHT: 177 LBS | BODY MASS INDEX: 27.78 KG/M2

## 2024-07-16 DIAGNOSIS — Z86.010 HISTORY OF COLON POLYPS: Primary | ICD-10-CM

## 2024-07-16 DIAGNOSIS — Z12.11 SPECIAL SCREENING FOR MALIGNANT NEOPLASMS, COLON: Primary | ICD-10-CM

## 2024-07-16 NOTE — TELEPHONE ENCOUNTER
"----- Message from Sylvie Woodruff sent at 7/16/2024  9:41 AM CDT -----  Regarding: FW: Colonoscopy, possible device-assisted    ----- Message -----  From: Adrian Barry MD  Sent: 7/15/2024  10:56 AM CDT  To: [facility] Patients  Subject: Colonoscopy, possible device-assisted            Procedure: Colonoscopy, possible device-assisted    Diagnosis: Surveillance colonoscopy - Hx of colon polyps    Procedure Timing: Non-urgent, next available    #If within 4 weeks selected, please diamond as high priority#    #If greater than 12 weeks, please select "5-12 weeks" and delay sending until 3 months prior to requested date#     Location: Any Site    Additional Scheduling Information: Recent incomplete colonoscopy; 60-min general GI slot for difficult colon; start with slim pediatric colonoscope and have the single-balloon enteroscope available.     Prep Specifications:Standard prep    Is the patient taking a GLP-1 Agonist:no    Have you attached a patient to this message: yes  "

## 2024-07-16 NOTE — TELEPHONE ENCOUNTER
Physical Therapy Progress Note     07/16/24 0916   PT Last Visit   PT Visit Date 07/16/24   Note Type   Note Type Treatment   Pain Assessment   Pain Assessment Tool FLACC   Pain Rating: FLACC (Rest) - Face 0   Pain Rating: FLACC (Rest) - Legs 0   Pain Rating: FLACC (Rest) - Activity 0   Pain Rating: FLACC (Rest) - Cry 0   Pain Rating: FLACC (Rest) - Consolability 0   Score: FLACC (Rest) 0   Pain Rating: FLACC (Activity) - Face 2   Pain Rating: FLACC (Activity) - Legs 1   Pain Rating: FLACC (Activity) - Activity 1   Pain Rating: FLACC (Activity) - Cry 1   Pain Rating: FLACC (Activity) - Consolability 1   Score: FLACC (Activity) 6   Restrictions/Precautions   Other Precautions Cognitive;Chair Alarm;Bed Alarm;Pain;Fall Risk;Multiple lines;O2   Subjective   Subjective pt encountered supine in bed, asleep, and although easily roused, attempted to keep eyes closed for most of session & was more lethargic than last seen by this PTA.  Pt appeared to be fidgeting or scratching in groin area at times.  Babcock catheter appeared to be on tension which RN was made aware post session.  pt difficult to redirect with questions & maintain participation in session.  At conclusion of session, pt refused to have O2 nc replaced onto face and declined breakfast.  RN informed of this as well.   Bed Mobility   Supine to Sit 2  Maximal assistance   Additional items Assist x 2   Transfers   Sit to Stand 2  Maximal assistance   Additional items Assist x 2   Stand to Sit 2  Maximal assistance   Additional items Assist x 2   Stand pivot 2  Maximal assistance   Additional items Assist x 2   Balance   Static Sitting Poor   Dynamic Sitting Poor -   Static Standing Poor -   Dynamic Standing Poor -   Activity Tolerance   Activity Tolerance Patient limited by fatigue;Patient limited by pain   Nurse Made Aware JASPER Prieto   Assessment   Prognosis Fair   Problem List Decreased strength;Decreased range of motion;Impaired balance;Decreased  Pt advised    endurance;Decreased mobility;Decreased coordination;Decreased cognition;Impaired judgement;Decreased safety awareness;Pain;Impaired vision;Impaired hearing   Assessment pt continues to require Ax2 in recent sessions with decreased effective participation in all tasks with increased lethargy, impaired balance & decreased functional endurance contributing to need for increased assist for seated balance while EOB & in standing while pivoting to recliner.  Pt unable to follow commands to effecitvely assist with UEs for HHA or to sequence these tasks today, but did maintain balance & LE strength long enough to safely pivot to drop arm recliner without LE buckling.  Pt remains appropriate for ongoing PT interventions to address deficits, but progress may remain limited by fluctuating medical status that has significantly impacted his moibly in recent days.  Anticipate he posesses strength to return to standing with RW & taking steps as in prevous sessions, but given his age & comorbidities, that could change rapidly as already noted to this point.   Goals   Patient Goals to rest   STG Expiration Date 07/17/24   PT Treatment Day 4   Plan   Treatment/Interventions Functional transfer training;LE strengthening/ROM;Endurance training;Therapeutic exercise;Patient/family training;Equipment eval/education;Bed mobility;Gait training;Cognitive reorientation;Elevations   Progress Slow progress, decreased activity tolerance   PT Frequency 3-5x/wk   Discharge Recommendation   Rehab Resource Intensity Level, PT I (Maximum Resource Intensity)   Equipment Recommended Walker   Walker Package Recommended Wheeled walker   AM-PAC Basic Mobility Inpatient   Turning in Flat Bed Without Bedrails 2   Lying on Back to Sitting on Edge of Flat Bed Without Bedrails 1   Moving Bed to Chair 1   Standing Up From Chair Using Arms 1   Walk in Room 1   Climb 3-5 Stairs With Railing 1   Basic Mobility Inpatient Raw Score 7   Turning Head Towards Sound 4    Follow Simple Instructions 2   Low Function Basic Mobility Raw Score  13   Low Function Basic Mobility Standardized Score  20.14   [facility] Highest Level Of Mobility   -[facility] Goal 2: Bed activities/Dependent transfer   -[facility] Achieved 4: Move to chair/commode       Luis Linton PTA    An [facility] Basic Mobility Raw Score less than 17 suggests pt would benefit from post acute rehab.  Please also refer to the recommendation of the Physical Therapist for safe discharge planning.

## 2024-07-16 NOTE — TELEPHONE ENCOUNTER
Spoke to Lina to schedule procedure(s) Colonoscopy       Physician to perform procedure(s) Dr. LISA Barry  Date of Procedure (s) 10/7/24  Arrival Time 10:00 AM  Time of Procedure(s) 11:00 AM   Location of Procedure(s) [facility] [address] Floor  Type of Rx Prep sent to patient: PEG  Instructions provided to patient via [facility]    Patient was informed on the following information and verbalized understanding. Screening questionnaire reviewed with patient and complete. If procedure requires anesthesia, a responsible adult needs to be present to accompany the patient home, patient cannot drive after receiving anesthesia. Appointment details are tentative, especially check-in time. Patient will receive a prep-op call 7 days prior to confirm check-in time for procedure. If applicable the patient should contact their pharmacy to verify Rx for procedure prep is ready for pick-up. Patient was advised to call the scheduling department at [phone] if pharmacy states no Rx is available. Patient was advised to call the endoscopy scheduling department if any questions or concerns arise.      [facility]oscopy Scheduling Department

## 2024-07-16 NOTE — TELEPHONE ENCOUNTER
Contacted the patient to schedule an endoscopy procedure(s) 7/16/24. The patient did not answer the call and left a voice message requesting a call back.

## 2024-07-17 ENCOUNTER — OFFICE VISIT (OUTPATIENT)
Dept: UROLOGY | Facility: CLINIC | Age: 79
End: 2024-07-17
Payer: MEDICARE

## 2024-07-17 VITALS
WEIGHT: 181.44 LBS | SYSTOLIC BLOOD PRESSURE: 158 MMHG | DIASTOLIC BLOOD PRESSURE: 77 MMHG | HEART RATE: 83 BPM | BODY MASS INDEX: 28.48 KG/M2 | HEIGHT: 67 IN

## 2024-07-17 DIAGNOSIS — R35.1 NOCTURIA MORE THAN TWICE PER NIGHT: ICD-10-CM

## 2024-07-17 DIAGNOSIS — N32.81 OVERACTIVE BLADDER: Primary | ICD-10-CM

## 2024-07-17 DIAGNOSIS — R35.0 URINARY FREQUENCY: ICD-10-CM

## 2024-07-17 PROCEDURE — 1101F PT FALLS ASSESS-DOCD LE1/YR: CPT | Mod: HCNC,CPTII,S$GLB,

## 2024-07-17 PROCEDURE — 3077F SYST BP >= 140 MM HG: CPT | Mod: HCNC,CPTII,S$GLB,

## 2024-07-17 PROCEDURE — 99214 OFFICE O/P EST MOD 30 MIN: CPT | Mod: HCNC,S$GLB,,

## 2024-07-17 PROCEDURE — 3288F FALL RISK ASSESSMENT DOCD: CPT | Mod: HCNC,CPTII,S$GLB,

## 2024-07-17 PROCEDURE — 1159F MED LIST DOCD IN RCRD: CPT | Mod: HCNC,CPTII,S$GLB,

## 2024-07-17 PROCEDURE — 99999 PR PBB SHADOW E&M-EST. PATIENT-LVL V: CPT | Mod: PBBFAC,HCNC,,

## 2024-07-17 PROCEDURE — 3078F DIAST BP <80 MM HG: CPT | Mod: HCNC,CPTII,S$GLB,

## 2024-07-17 NOTE — PATIENT INSTRUCTIONS
Jardiance (empagliflozin) can cause increased urination as a common side effect. This is because Jardiance helps your kidneys remove sugar from your blood through urine, which lowers your blood sugar. However, increased urination can also be a sign of a urinary tract infection (UTI). Other UTI symptoms include: burning while urinating, cloudy urine, painful urination, pelvic pain, and blood in the urine.      Metformin can also cause urinary tract infections, which can lead to cloudy or bloody urine, or pain when urinating. Other side effects that may affect urination include bladder pain, a frequent urge to urinate, and difficult or burning urination. If you experience any of these side effects, you should contact your doctor.    Limit fluids 2 hours before bedtime.  Elevate legs 2 hours before bedtime.  No caffeine, cokes, teas after 3 pm in the afternoon.   Get water intake goal reached earlier in the day.       Korin Salcedo #[phone], call if medication is not affordable.

## 2024-07-17 NOTE — PROGRESS NOTES
CHIEF COMPLAINT:  Urinary frequency      HISTORY OF PRESENTING ILLINESS:  Lina Davis is a 79 y.o. female new to [facility]. Presents today as a referral from Dr. Freedman for urinary frequency.     She does not experience daytime frequency.  She experiences frequent nocturia. She wakes up every 1.5-2 hours to urinate at night. She does drink water throughout the night and does not stop fluid intake prior to bed. Her symptoms seem to increase when lying down. She has urgency without incontinence. On jardiance, Actos, and Insulin. She is interested in a medication for her symptoms.     Past UAs and urine cultures reviewed.   CT abd pelvis with contrast reviewed 3/8/2024.     PMHx listed below.     REVIEW OF SYSTEMS:  Review of Systems   Constitutional:  Negative for chills and fever.   HENT:  Negative for congestion and sore throat.    Respiratory:  Negative for cough and shortness of breath.    Cardiovascular:  Negative for chest pain and palpitations.   Gastrointestinal:  Negative for nausea and vomiting.   Genitourinary:  Positive for urgency. Negative for dysuria, flank pain, frequency and hematuria.        +frequent nocturia   Neurological:  Negative for dizziness and headaches.         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    Vitamin D deficiency disease        Past Surgical History:   Procedure Laterality Date    ANTERIOR CERVICAL DISCECTOMY W/ FUSION N/A 3/19/2020    Procedure: DISCECTOMY, SPINE, CERVICAL, ANTERIOR APPROACH, WITH FUSION, C3-C4, C4-C5, C5-C6;  Surgeon: Joseph Walton MD;  Location: [facility] OR [address] [place] [address];  Service: Neurosurgery;  Laterality: N/A;    CARPAL TUNNEL RELEASE      bilateral    COLONOSCOPY N/A 3/7/2018    Procedure: COLONOSCOPY;  Surgeon: Luís Soni MD;  [facility]   Service: Endoscopy;  Laterality: N/A;  ok to schedule per Elizabeth    COLONOSCOPY N/A 7/15/2024    Procedure: COLONOSCOPY;  Surgeon: Dread Forman MD;  Location: [address]);  Service: Endoscopy;  Laterality: N/A;  Referred by:Dr. SHIRLEY Kay   Prep: Miralax  Route instructions sent: portal and mail  Other concerns: DM oral and insulin; his. of polyps  7/8-pre call complete-GT    COLONOSCOPY W/ BIOPSIES AND POLYPECTOMY      EPIDURAL STEROID INJECTION N/A 6/3/2022    Procedure: LESI L5-S1;  Surgeon: Leland Pena DO;  Location: [facility] OR;  Service: Pain Management;  Laterality: N/A;    EPIDURAL STEROID INJECTION INTO LUMBAR SPINE Left 10/26/2023    Procedure: LT L5-S1 TFESI;  Surgeon: Leland Pena DO;  Location: [facility] PAIN MANAGEMENT;  Service: Pain Management;  Laterality: Left;  15 mins    HEMORRHOID SURGERY      HYSTERECTOMY      AUB    INJECTION OF JOINT Bilateral 4/12/2022    Procedure: SACROILIAC JOINT Steroid Injection-Bilateral;  Surgeon: Leland Pena DO;  Location: [facility] OR;  Service: Pain Management;  Laterality: Bilateral;    ROTATOR CUFF REPAIR      bilateral    TRANSFORAMINAL EPIDURAL INJECTION OF STEROID Bilateral 12/2/2022    Procedure: TFESI (B/L) L4-5;  Surgeon: Leland Pena DO;  Location: [facility] OR;  Service: Pain Management;  Laterality: Bilateral;    TRIGGER FINGER RELEASE Left 2/2/2024    Procedure: RELEASE, TRIGGER FINGER - left index, long, ring, and small fingers;  Surgeon: Justus Isabel MD;  Location: [facility] OR;  Service: Orthopedics;  Laterality: Left;       Family History   Problem Relation Name Age of Onset    Heart disease Mother      Diabetes Mother      Heart disease Father      Cancer Father          liver    Diabetes Sister Shawna     Cataracts Sister Shawna     Kidney disease Brother david     Heart disease Brother david     Diabetes Sister elijah     COPD Brother jordi     COPD Brother sancho     Gout Brother kylie     Benign  prostatic hyperplasia Brother kylie     Heart disease Brother kylie     Kidney disease Brother kylie     Heart attack Brother kylie         heart attack    Kidney failure Brother kylie     Lupus Sister thanh     Heart attack Brother santiago     Drug abuse Brother santiago     No Known Problems Daughter      No Known Problems Son      Amblyopia Neg Hx      Blindness Neg Hx      Breast cancer Neg Hx      Colon cancer Neg Hx      Ovarian cancer Neg Hx         Social History     Socioeconomic History    Marital status:    Tobacco Use    Smoking status: Never    Smokeless tobacco: Never   Substance and Sexual Activity    Alcohol use: No    Drug use: No    Sexual activity: Yes     Partners: Male     Birth control/protection: Surgical       Allergies:  Gabapentin, Januvia [sitagliptin], and Metformin    Medications:    Current Outpatient Medications:     acetaminophen (TYLENOL) 500 MG tablet, Take 2 tablets (1,000 mg total) by mouth 3 (three) times daily., Disp: 21 tablet, Rfl: 0    alcohol swabs (DROPSAFE ALCOHOL PREP PADS) PadM, USE TWICE DAILY AS DIRECTED, E 11.9, Disp: 200 each, Rfl: 3    allopurinoL (ZYLOPRIM) 300 MG tablet, Take 1 tablet (300 mg total) by mouth once daily., Disp: 90 tablet, Rfl: 3    amLODIPine (NORVASC) 10 MG tablet, TAKE 1 TABLET EVERY DAY, Disp: 90 tablet, Rfl: 3    aspirin (ECOTRIN) 81 MG EC tablet, Take 81 mg by mouth once daily., Disp: , Rfl:     atorvastatin (LIPITOR) 40 MG tablet, TAKE 1 TABLET EVERY DAY, Disp: 90 tablet, Rfl: 3    blood glucose control, normal Soln, True Metrix control solution E11.9 - pt tests twice daily, Disp: 1 each, Rfl: 1    blood-glucose meter kit, Check glucose daily E11.9 meter covered by insurance Don Met[employer], Disp: 1 each, Rfl: 0    DULoxetine (CYMBALTA) 30 MG capsule, TAKE 1 CAPSULE ONE TIME DAILY, Disp: 90 capsule, Rfl: 3    empagliflozin (JARDIANCE) 10 mg tablet, Take 1 tablet (10 mg total) by mouth once daily., Disp: 90 tablet, Rfl: 4    lancets  "(TRUEPLUS LANCETS) 28 gauge Misc, 1 lancet by Misc.(Non-Drug; Combo Route) route 2 (two) times daily., Disp: 200 each, Rfl: 2    LIDOcaine (LIDODERM) 5 %, Place 1 patch onto the skin once daily. Remove & Discard patch within 12 hours or as directed by MD, Disp: 30 patch, Rfl: 2    losartan (COZAAR) 100 MG tablet, TAKE 1 TABLET (100 MG TOTAL) BY MOUTH ONCE DAILY., Disp: 90 tablet, Rfl: 2    metFORMIN (GLUCOPHAGE-XR) 500 MG ER 24hr tablet, Take 1 tablet (500 mg total) by mouth 2 (two) times daily with meals., Disp: 180 tablet, Rfl: 3    omeprazole (PRILOSEC) 20 MG capsule, TAKE 1 CAPSULE TWICE DAILY (Patient taking differently: Take 20 mg by mouth once daily.), Disp: 180 capsule, Rfl: 3    oxyCODONE-acetaminophen (PERCOCET) 5-325 mg per tablet, Take 1 tablet by mouth every 12 (twelve) hours as needed for Pain., Disp: 60 tablet, Rfl: 0    pen needle, diabetic (NOVOFINE 32) 32 gauge x 1/4" Ndle, Use with insulin daily., Disp: 90 each, Rfl: 1    pioglitazone (ACTOS) 30 MG tablet, TAKE 1 TABLET EVERY DAY, Disp: 90 tablet, Rfl: 1    polyethylene glycol (COLYTE) 240-22.72-6.72 -5.84 gram SolR, Take 4,000 mLs by mouth once. Take as directed by provider office for 1 dose, Disp: 4000 mL, Rfl: 0    pregabalin (LYRICA) 50 MG capsule, One at bedtime, Disp: 30 capsule, Rfl: 2    TOUJEO SOLOSTAR U-300 INSULIN 300 unit/mL (1.5 mL) InPn pen, Inject 10 Units into the skin once daily., Disp: , Rfl:     TRUE METRIX GLUCOSE TEST STRIP Strp, TEST BLOOD SUGAR TWICE DAILY, Disp: 200 strip, Rfl: 3    vibegron 75 mg Tab, Take 1 tablet (75 mg total) by mouth every evening., Disp: 90 tablet, Rfl: 3  No current facility-administered medications for this visit.    Facility-Administered Medications Ordered in Other Visits:     fentaNYL 50 mcg/mL injection 100 mcg, 100 mcg, Intravenous, PRN, Robert Alfaro PA-C, 100 mcg at 02/02/24 0640    midazolam (VERSED) 1 mg/mL injection 1 mg, 1 mg, Intravenous, PRN, Robert Alfaro PA-C, 1 mg at " 02/02/24 0640    PHYSICAL EXAMINATION:  Physical Exam  HENT:      Head: Normocephalic and atraumatic.      Right Ear: External ear normal.      Left Ear: External ear normal.      Nose: Nose normal.      Mouth/Throat:      Mouth: Mucous membranes are moist.   Pulmonary:      Effort: Pulmonary effort is normal. No respiratory distress.   Skin:     General: Skin is warm and dry.   Neurological:      General: No focal deficit present.      Mental Status: She is alert and oriented to person, place, and time.   Psychiatric:         Mood and Affect: Mood normal.         Behavior: Behavior normal.           LABS:  UA +2 leukocytes, trace protein, 1000 glucose, 50 blood (clean catch)    Lab Results   Component Value Date    CREATININE 1.4 06/26/2024    EGFRNORACEVR 38.7 (A) 06/26/2024               IMPRESSION:    Encounter Diagnoses   Name Primary?    Overactive bladder Yes    Urinary frequency     Nocturia more than twice per night          Assessment:       1. Overactive bladder    2. Urinary frequency    3. Nocturia more than twice per night        Plan:   - Discussed nocturia precautions and lifestyle modifications. She is interested in an OAB medication for symptoms. Highly recommended nocturia precautions in addition to OAB medication. Will trial 75 mg nightly vibegron for symptoms. SE discussed. Informed pt that medication can take 6-12 weeks to reach maximum efficacy. If it is not affordable, will trial vesicare or detrol la. Vibegron chosen due to hx of HTN.   - Discussed pelvic floor PT if interested in the future.  - Will obtain renal US in 8 weeks.    RTC 8 weeks to discuss medication efficacy    I spent 45 minutes with the patient of which more than half was spent in direct consultation with the patient in regards to our treatment and plan.  We addressed the office findings and recent labs; any possible need to go the ER today.   Education and recommendations of today's plan of care including home remedies and  Yes needed follow up with PCP.   We discussed the chief complaints; reviewed the LUTS and the possible contributory factors.

## 2024-07-23 RX ORDER — SOLIFENACIN SUCCINATE 10 MG/1
10 TABLET, FILM COATED ORAL DAILY
Qty: 90 TABLET | Refills: 3 | Status: SHIPPED | OUTPATIENT
Start: 2024-07-23 | End: 2025-07-23

## 2024-08-12 ENCOUNTER — TELEPHONE (OUTPATIENT)
Dept: INTERNAL MEDICINE | Facility: CLINIC | Age: 79
End: 2024-08-12

## 2024-08-12 RX ORDER — OXYCODONE AND ACETAMINOPHEN 5; 325 MG/1; MG/1
1 TABLET ORAL EVERY 12 HOURS PRN
Qty: 60 TABLET | Refills: 0 | Status: SHIPPED | OUTPATIENT
Start: 2024-08-12 | End: 2024-10-11 | Stop reason: SDUPTHER

## 2024-08-17 RX ORDER — LOSARTAN POTASSIUM 100 MG/1
100 TABLET ORAL
Qty: 90 TABLET | Refills: 3 | Status: SHIPPED | OUTPATIENT
Start: 2024-08-17

## 2024-08-17 NOTE — TELEPHONE ENCOUNTER
No care due was identified.  [facility] Embedded Care Due Messages. Reference number: [id number].   8/17/2024 2:08:42 AM CDT

## 2024-08-17 NOTE — TELEPHONE ENCOUNTER
Refill Routing Note   Medication(s) are not appropriate for processing by [facility] for the following reason(s):      Required vitals abnormal    [facility] action(s):  Defer Care Due:  None identified            Appointments  past 12m or future 3m with PCP    Date Provider   Last Visit   6/21/2024 Dora Freedman MD   Next Visit   10/15/2024 Dora Freedman MD   ED visits in past 90 days: 0        Note composed:2:56 PM 08/17/2024

## 2024-08-22 ENCOUNTER — OFFICE VISIT (OUTPATIENT)
Dept: ORTHOPEDICS | Facility: CLINIC | Age: 79
End: 2024-08-22
Payer: MEDICARE

## 2024-08-22 ENCOUNTER — HOSPITAL ENCOUNTER (OUTPATIENT)
Dept: RADIOLOGY | Facility: HOSPITAL | Age: 79
Discharge: HOME | End: 2024-08-22
Attending: ORTHOPAEDIC SURGERY
Payer: MEDICARE

## 2024-08-22 VITALS — HEIGHT: 67 IN | BODY MASS INDEX: 28.48 KG/M2 | WEIGHT: 181.44 LBS

## 2024-08-22 DIAGNOSIS — M25.532 LEFT WRIST PAIN: Primary | ICD-10-CM

## 2024-08-22 DIAGNOSIS — M77.12 LATERAL EPICONDYLITIS OF LEFT ELBOW: Primary | ICD-10-CM

## 2024-08-22 DIAGNOSIS — M25.532 LEFT WRIST PAIN: ICD-10-CM

## 2024-08-22 DIAGNOSIS — G56.02 CARPAL TUNNEL SYNDROME OF LEFT WRIST: ICD-10-CM

## 2024-08-22 PROCEDURE — 99214 OFFICE O/P EST MOD 30 MIN: CPT | Mod: HCNC,S$GLB,, | Performed by: ORTHOPAEDIC SURGERY

## 2024-08-22 PROCEDURE — 1125F AMNT PAIN NOTED PAIN PRSNT: CPT | Mod: HCNC,CPTII,S$GLB, | Performed by: ORTHOPAEDIC SURGERY

## 2024-08-22 PROCEDURE — 1101F PT FALLS ASSESS-DOCD LE1/YR: CPT | Mod: HCNC,CPTII,S$GLB, | Performed by: ORTHOPAEDIC SURGERY

## 2024-08-22 PROCEDURE — 3288F FALL RISK ASSESSMENT DOCD: CPT | Mod: HCNC,CPTII,S$GLB, | Performed by: ORTHOPAEDIC SURGERY

## 2024-08-22 PROCEDURE — 73110 X-RAY EXAM OF WRIST: CPT | Mod: 26,HCNC,LT, | Performed by: RADIOLOGY

## 2024-08-22 PROCEDURE — 1159F MED LIST DOCD IN RCRD: CPT | Mod: HCNC,CPTII,S$GLB, | Performed by: ORTHOPAEDIC SURGERY

## 2024-08-22 PROCEDURE — 73110 X-RAY EXAM OF WRIST: CPT | Mod: TC,HCNC,LT

## 2024-08-22 PROCEDURE — 99999 PR PBB SHADOW E&M-EST. PATIENT-LVL III: CPT | Mod: PBBFAC,HCNC,, | Performed by: ORTHOPAEDIC SURGERY

## 2024-08-22 NOTE — PROGRESS NOTES
[facility]  History & Physical  Orthopedics    SUBJECTIVE:      Chief Complaint: Bilateral Carpal Tunnel Syndrome    Referring Provider: Dora Freedman MD     History of Present Illness:  Patient is a 79 y.o. right hand dominant female who presents today with complaints of numbness, tingling, and pain of the left hand since spring 2024. Patient denies recent trauma or an inciting event. The patient reports the pain is predominantly located in her left hand and radiates up her forearm to the lateral epicondyle. She reports her pain, numbness and tingling are intermittent and worse at night. She reports pain is alleviated by nothing. Pain is disruptive to sleep at night. Patient reports previous treatment includes activity modifications, rest, tylenol, and nocturnal bracing.  Patient reports changes to  strength, weakness, numbness, and tingling. She previously had several trigger finger releases with Dr. Isabel in February 2024 for which she reports she is doing very well. She also had an ACDF performed by Dr. Walton in 2020 for which she reports she is doing well. She also previous had an open carpal tunnel release at [facility] several years ago. She presents today for initial evaluation with no further complaints.     The patient is a/an retired.    Onset of symptoms/DOI was Spring 2024.    Symptoms are aggravated by activity, movement, at night, and during the day.    Symptoms are alleviated by rest.    Symptoms consist of pain and numbness/tingling.    The patient rates their pain as a 8/10.    Attempted treatment(s) and/or interventions include activity modifications, rest, tylenol, nocturnal bracing.     The patient denies any fevers, chills, N/V, D/C and presents for evaluation.       Past Medical History:   Diagnosis Date    Anemia     Arthritis     Cataract     Gout, arthritis     HTN (hypertension), benign     Polyneuropathy     Type 2 diabetes mellitus with diabetic nephropathy  4/12/2016    Vitamin D deficiency disease      Past Surgical History:   Procedure Laterality Date    ANTERIOR CERVICAL DISCECTOMY W/ FUSION N/A 3/19/2020    Procedure: DISCECTOMY, SPINE, CERVICAL, ANTERIOR APPROACH, WITH FUSION, C3-C4, C4-C5, C5-C6;  Surgeon: Joseph Walton MD;  Location: [facility] OR 2ND FLR;  Service: Neurosurgery;  Laterality: N/A;    CARPAL TUNNEL RELEASE      bilateral    COLONOSCOPY N/A 3/7/2018    Procedure: COLONOSCOPY;  Surgeon: Luís Soni MD;  Location: [facility] ENDO (2ND FLR);  Service: Endoscopy;  Laterality: N/A;  ok to schedule per Elizabeth    COLONOSCOPY N/A 7/15/2024    Procedure: COLONOSCOPY;  Surgeon: Dread Forman MD;  Location: [facility] ENDO (4TH FLR);  Service: Endoscopy;  Laterality: N/A;  Referred by:Dr. SHIRLEY Kay   Prep: Miralax  Route instructions sent: portal and mail  Other concerns: DM oral and insulin; his. of polyps  7/8-pre call complete-GT    COLONOSCOPY W/ BIOPSIES AND POLYPECTOMY      EPIDURAL STEROID INJECTION N/A 6/3/2022    Procedure: LESI L5-S1;  Surgeon: Leland Pena DO;  Location: [facility] OR;  Service: Pain Management;  Laterality: N/A;    EPIDURAL STEROID INJECTION INTO LUMBAR SPINE Left 10/26/2023    Procedure: LT L5-S1 TFESI;  Surgeon: Leland Pena DO;  Location: [facility] PAIN MANAGEMENT;  Service: Pain Management;  Laterality: Left;  15 mins    HEMORRHOID SURGERY      HYSTERECTOMY      AUB    INJECTION OF JOINT Bilateral 4/12/2022    Procedure: SACROILIAC JOINT Steroid Injection-Bilateral;  Surgeon: Leland Pena DO;  Location: [facility] OR;  Service: Pain Management;  Laterality: Bilateral;    ROTATOR CUFF REPAIR      bilateral    TRANSFORAMINAL EPIDURAL INJECTION OF STEROID Bilateral 12/2/2022    Procedure: TFESI (B/L) L4-5;  Surgeon: Leland Pena DO;  Location: [facility] OR;  Service: Pain Management;  Laterality: Bilateral;    TRIGGER FINGER RELEASE Left 2/2/2024    Procedure: RELEASE, TRIGGER FINGER - left index, long, ring,  and small fingers;  Surgeon: Justus Isabel MD;  Location: [facility];  Service: Orthopedics;  Laterality: Left;     Review of patient's allergies indicates:   Allergen Reactions    Gabapentin Other (See Comments)     ineffective    Januvia [sitagliptin] Other (See Comments)     Pancreatitis      Metformin Other (See Comments)     Nausea and vomiting with immediate release - tolerates extended release     Social History     Social History Narrative    Not on file     Family History   Problem Relation Name Age of Onset    Heart disease Mother      Diabetes Mother      Heart disease Father      Cancer Father          liver    Diabetes Sister Shawna     Cataracts Sister Shawna     Kidney disease Brother david     Heart disease Brother david     Diabetes Sister elijah     COPD Brother jordi     COPD Brother sancho     Gout Brother kylie     Benign prostatic hyperplasia Brother kylie     Heart disease Brother kylie     Kidney disease Brother kylie     Heart attack Brother kylie         heart attack    Kidney failure Brother kylie     Lupus Sister thanh     Heart attack Brother santiago     Drug abuse Brother santiago     No Known Problems Daughter      No Known Problems Son      Amblyopia Neg Hx      Blindness Neg Hx      Breast cancer Neg Hx      Colon cancer Neg Hx      Ovarian cancer Neg Hx           Current Outpatient Medications:     acetaminophen (TYLENOL) 500 MG tablet, Take 2 tablets (1,000 mg total) by mouth 3 (three) times daily., Disp: 21 tablet, Rfl: 0    alcohol swabs (DROPSAFE ALCOHOL PREP PADS) PadM, USE TWICE DAILY AS DIRECTED, E 11.9, Disp: 200 each, Rfl: 3    allopurinoL (ZYLOPRIM) 300 MG tablet, Take 1 tablet (300 mg total) by mouth once daily., Disp: 90 tablet, Rfl: 3    amLODIPine (NORVASC) 10 MG tablet, TAKE 1 TABLET EVERY DAY, Disp: 90 tablet, Rfl: 3    aspirin (ECOTRIN) 81 MG EC tablet, Take 81 mg by mouth once daily., Disp: , Rfl:     atorvastatin (LIPITOR) 40 MG tablet, TAKE 1 TABLET EVERY DAY,  "Disp: 90 tablet, Rfl: 3    blood glucose control, normal Soln, True Metrix control solution E11.9 - pt tests twice daily, Disp: 1 each, Rfl: 1    blood-glucose meter kit, Check glucose daily E11.9 meter covered by insurance Don Met[employer], Disp: 1 each, Rfl: 0    DULoxetine (CYMBALTA) 30 MG capsule, TAKE 1 CAPSULE ONE TIME DAILY, Disp: 90 capsule, Rfl: 3    empagliflozin (JARDIANCE) 10 mg tablet, Take 1 tablet (10 mg total) by mouth once daily., Disp: 90 tablet, Rfl: 4    lancets (TRUEPLUS LANCETS) 28 gauge Misc, 1 lancet by Misc.(Non-Drug; Combo Route) route 2 (two) times daily., Disp: 200 each, Rfl: 2    LIDOcaine (LIDODERM) 5 %, Place 1 patch onto the skin once daily. Remove & Discard patch within 12 hours or as directed by MD, Disp: 30 patch, Rfl: 2    losartan (COZAAR) 100 MG tablet, TAKE 1 TABLET ONE TIME DAILY, Disp: 90 tablet, Rfl: 3    metFORMIN (GLUCOPHAGE-XR) 500 MG ER 24hr tablet, Take 1 tablet (500 mg total) by mouth 2 (two) times daily with meals., Disp: 180 tablet, Rfl: 3    omeprazole (PRILOSEC) 20 MG capsule, TAKE 1 CAPSULE TWICE DAILY (Patient taking differently: Take 20 mg by mouth once daily.), Disp: 180 capsule, Rfl: 3    oxyCODONE-acetaminophen (PERCOCET) 5-325 mg per tablet, Take 1 tablet by mouth every 12 (twelve) hours as needed for Pain., Disp: 60 tablet, Rfl: 0    pen needle, diabetic (NOVOFINE 32) 32 gauge x 1/4" Ndle, Use with insulin daily., Disp: 90 each, Rfl: 1    pioglitazone (ACTOS) 30 MG tablet, TAKE 1 TABLET EVERY DAY, Disp: 90 tablet, Rfl: 1    pregabalin (LYRICA) 50 MG capsule, One at bedtime, Disp: 30 capsule, Rfl: 2    solifenacin (VESICARE) 10 MG tablet, Take 1 tablet (10 mg total) by mouth once daily., Disp: 90 tablet, Rfl: 3    TOUJEO SOLOSTAR U-300 INSULIN 300 unit/mL (1.5 mL) InPn pen, Inject 10 Units into the skin once daily., Disp: , Rfl:     TRUE METRIX GLUCOSE TEST STRIP Strp, TEST BLOOD SUGAR TWICE DAILY, Disp: 200 strip, Rfl: 3  No current facility-administered " "medications for this visit.    Facility-Administered Medications Ordered in Other Visits:     fentaNYL 50 mcg/mL injection 100 mcg, 100 mcg, Intravenous, PRN, Robert Alfaro PA-C, 100 mcg at 02/02/24 0640    midazolam (VERSED) 1 mg/mL injection 1 mg, 1 mg, Intravenous, PRN, Robert Alfaro PA-C, 1 mg at 02/02/24 0640    Review of Systems:  Constitutional: no fever or chills  Eyes: no visual changes  ENT: no nasal congestion or sore throat  Respiratory: no cough or shortness of breath  Cardiovascular: no chest pain  Gastrointestinal: no nausea or vomiting, tolerating diet  Musculoskeletal: pain, soreness, and numbness/tingling    OBJECTIVE:      Vital Signs (Most Recent):  Vitals:    08/22/24 1034   Weight: 82.3 kg (181 lb 7 oz)   Height: 5' 7" (1.702 m)     Body mass index is 28.42 kg/m².      Physical Exam:  Constitutional: The patient appears well-developed and well-nourished. No distress.   Skin: No lesions appreciated  Head: Normocephalic and atraumatic.   Nose: Nose normal.   Ears: No deformities seen  Eyes: Conjunctivae and EOM are normal.   Neck: No tracheal deviation present.   Cardiovascular: Normal rate and intact distal pulses.    Pulmonary/Chest: Effort normal. No respiratory distress.   Abdominal: There is no guarding.   Neurological: The patient is alert.   Psychiatric: The patient has a normal mood and affect.     Bilateral Hand/Wrist Examination:    Observation/Inspection:  Swelling  none    Deformity  none  Discoloration  none     Scars   Previous open carpal tunnel release scar, well-healed, on the left hand     Atrophy  none    HAND/WRIST EXAMINATION:  TTP to lateral epicondyle  Pain with active resistance to extension of the left hand    Neurovascular Exam:  Digits WWP, brisk CR < 3s throughout  NVI motor/LTS to M/R/U nerves, radial pulse 2+  Tinel's Test - Carpal Tunnel  Positive Bilaterally L > R  Tinel's Test - Cubital Tunnel  Negative  Phalen's Test    Positive Bilaterally L > " R  Median Nerve Compression Test Positive Bilaterally L > R    ROM hand full, painless except pain at the lateral epicondyle with resisted extension    ROM wrist full, painless    ROM elbow full, painless    Abdomen not guarded  Respirations nonlabored  Perfusion intact    Diagnostic Results:     Imaging - I independently viewed the patient's imaging as well as the radiology report.  Xrays of the patient's left hand demonstrate no evidence of any acute fractures or dislocations or significant degenerative changes.    EMG -   Impression:  There is electrophysiologic evidence of a left sensorimotor median mononeuropathy across the wrist (I.e. Carpal tunnel syndrome).  There is no motor axonal loss.  There is no active denervation.  This is graded as Moderate in severity on the left.  When compared to the study from 2/2022, there has been improvement in distal latency, but slight worsening in sensory amplitude.  Note that the slowing at the wrist is relatively mild.  After successful release, mild slowing can persist indefinitely, so some of these changes could also be residual changes from the prior CTS.             ASSESSMENT/PLAN:      79 y.o. yo female with Left Lateral Epicondylitis, Left CTS    Plan: The patient and I had a thorough discussion today.  We discussed the working diagnosis as well as several other potential alternative diagnoses.  Treatment options were discussed, both conservative and surgical.  Conservative treatment options would include things such as activity modifications, workplace modifications, a period of rest, oral vs topical OTC and prescription anti-inflammatory medications, occupational therapy, splinting/bracing, immobilization, corticosteroid injections, and others.  Surgical options were discussed as well.     At this time, the patient would like to proceed with a trial of lateral epicondylitis strapping and occupational therapy. Occupational therapy treatment and evaluation ordered  today. Recommended continued nocturnal bracing and nerve glides for her CTS symptoms. Follow up in 2 months for re-evluation of symptoms.     Should the patient's symptoms worsen, persist, or fail to improve they should return for reevaluation and I would be happy to see them back anytime.    Please do not hesitate to reach out to us via email, phone, or MyChart with any questions, concerns, or feedback.

## 2024-09-09 DIAGNOSIS — E11.21 TYPE 2 DIABETES MELLITUS WITH DIABETIC NEPHROPATHY, UNSPECIFIED WHETHER LONG TERM INSULIN USE: ICD-10-CM

## 2024-09-09 RX ORDER — CALCIUM CITRATE/VITAMIN D3 200MG-6.25
TABLET ORAL
Qty: 200 STRIP | Refills: 3 | Status: SHIPPED | OUTPATIENT
Start: 2024-09-09

## 2024-09-09 NOTE — TELEPHONE ENCOUNTER
Provider Staff:  Action required for this patient    Requires labs      Please see care gap opportunities below in Care Due Message.    Thanks!  [facility]     Appointments      Date Provider   Last Visit   6/21/2024 Dora Freedman MD   Next Visit   10/15/2024 Dora Freedman MD     Refill Decision Note   Lina Davis  is requesting a refill authorization.  Brief Assessment and Rationale for Refill:  Approve     Medication Therapy Plan:  FOVS      Comments:     Note composed:4:37 PM 09/09/2024

## 2024-09-09 NOTE — TELEPHONE ENCOUNTER
Care Due:                  Date            Visit Type   Department     Provider  --------------------------------------------------------------------------------                                EP -                              PRIMARY      [facility] INTERNAL  Last Visit: 06-      CARE ([facility])   MEDICINE       Dora Freedman                               -                              PRIMARY      [facility] INTERNAL  Next Visit: 10-      CARE ([facility])   MEDICINE       Dora Freedman                                                            Last  Test          Frequency    Reason                     Performed    Due Date  --------------------------------------------------------------------------------    Lipid Panel.  12 months..  atorvastatin.............  06- 06-    Uric Acid...  12 months..  allopurinoL..............  Not Found    Overdue    [employer] Embedded Care Due Messages. Reference number: [id number].   9/09/2024 1:01:01 AM ANYT

## 2024-09-17 ENCOUNTER — TELEPHONE (OUTPATIENT)
Dept: ENDOCRINOLOGY | Facility: CLINIC | Age: 79
End: 2024-09-17
Payer: MEDICARE

## 2024-09-17 DIAGNOSIS — E11.21 TYPE 2 DIABETES MELLITUS WITH DIABETIC NEPHROPATHY, WITH LONG-TERM CURRENT USE OF INSULIN: Chronic | ICD-10-CM

## 2024-09-17 DIAGNOSIS — Z79.4 TYPE 2 DIABETES MELLITUS WITH DIABETIC NEPHROPATHY, WITH LONG-TERM CURRENT USE OF INSULIN: Chronic | ICD-10-CM

## 2024-09-17 RX ORDER — INSULIN GLARGINE 300 U/ML
10 INJECTION, SOLUTION SUBCUTANEOUS DAILY
Qty: 3 PEN | Refills: 1 | Status: SHIPPED | OUTPATIENT
Start: 2024-09-17 | End: 2025-09-17

## 2024-09-17 NOTE — TELEPHONE ENCOUNTER
----- Message from Hammad Arriaza sent at 9/17/2024 10:35 AM CDT -----  Regarding: Gé[employer] Restrepo 300u Patient Assistance Program  [facility] has received medication from [facility] patient assistance program for your patient Lina Davis ([id number]).  At your earliest convenience please send to [facility] escript for.  Toujeo SoloStar 300u (qty 3 pens)  Thanks

## 2024-09-23 ENCOUNTER — TELEPHONE (OUTPATIENT)
Dept: ORTHOPEDICS | Facility: CLINIC | Age: 79
End: 2024-09-23
Payer: MEDICARE

## 2024-09-23 DIAGNOSIS — M65.322 TRIGGER FINGER, LEFT INDEX FINGER: Primary | ICD-10-CM

## 2024-09-25 ENCOUNTER — CLINICAL SUPPORT (OUTPATIENT)
Dept: REHABILITATION | Facility: HOSPITAL | Age: 79
End: 2024-09-25
Attending: ORTHOPAEDIC SURGERY
Payer: MEDICARE

## 2024-09-25 DIAGNOSIS — M65.322 TRIGGER FINGER, LEFT INDEX FINGER: ICD-10-CM

## 2024-09-25 DIAGNOSIS — M25.60 RANGE OF MOTION DEFICIT: Primary | ICD-10-CM

## 2024-09-25 PROCEDURE — 97530 THERAPEUTIC ACTIVITIES: CPT

## 2024-09-25 PROCEDURE — 97166 OT EVAL MOD COMPLEX 45 MIN: CPT

## 2024-09-25 NOTE — PLAN OF CARE
"[facility]  Occupational Therapy Initial Evaluation     Name: Lina Davis  Clinic Number: [id number]    Therapy Diagnosis: No diagnosis found.  Physician: Justus Isabel MD    Physician Orders: Eval and Treat  Medical Diagnosis: M65.322 (ICD-10-CM) - Trigger finger, left index finger   Surgical Procedure and Date: Trigger finger release, 02/02/2024  Evaluation Date: 9/25/2024  Insurance Authorization Period Expiration: 09/23/2024 - 09/23/2025  Plan of Care Certification Period: 09/25/2024 - 11/25/2024  Date of Return to MD: 10/22/2024  Visit # / Visits authorized: 1 / 1  FOTO: 1/ 3    Precautions:  Standard    Time In: 2:00 PM  Time Out: 3: 00 PM  Total Billable Time: 60 minutes    Subjective     Date of Onset: January/February of this year    History of Current Condition/Mechanism of Injury: Lina reports: She had a primary care visit who referred her to Dr. Isabel.     Falls: no    Involved Side: left  Dominant Side: Right    Mechanism of Injury: overuse  Imaging:    XRAY from 08/22/2024 "FINDINGS:  Decreased mineralization throughout, could be due to disuse.  The alignment is normal.  There is no acute fracture seen, no osseous lesion seen, no advanced degenerative change.  Vascular calcifications noted.  The soft tissues appear normal.  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   Functional Limitations/Social History:    Previous functional status includes: Independent with all ADLs.     Current Functional Status   Home/Living " environment: lives with their spouse    - DME: Tennis elbow brace      Limitation of Functional Status as follows:   ADLs/IADLs:     - Feeding: ind    - Bathing: ind    - Dressing: ind  - Grooming: ind    - Home Management: ind    - Driving: ind     Leisure: time with family    Patient's Goals for Therapy: Improved function    Past Medical History/Physical Systems Review:   Lina Davis  has a past medical history of Anemia, Arthritis, Cataract, Gout, arthritis, HTN (hypertension), benign, Polyneuropathy, Type 2 diabetes mellitus with diabetic nephropathy, and Vitamin D deficiency disease.    Lina Davis  has a past surgical history that includes Rotator cuff repair; Colonoscopy w/ biopsies and polypectomy; Carpal tunnel release; Hemorrhoid surgery; Hysterectomy; Colonoscopy (N/A, 3/7/2018); Anterior cervical discectomy w/ fusion (N/A, 3/19/2020); Injection of joint (Bilateral, 4/12/2022); Epidural steroid injection (N/A, 6/3/2022); Transforaminal epidural injection of steroid (Bilateral, 12/2/2022); Epidural steroid injection into lumbar spine (Left, 10/26/2023); Trigger finger release (Left, 2/2/2024); and Colonoscopy (N/A, 7/15/2024).    Lina has a current medication list which includes the following prescription(s): acetaminophen, alcohol swabs, allopurinol, amlodipine, aspirin, atorvastatin, blood glucose control, normal, blood-glucose meter, duloxetine, empagliflozin, lancets, lidocaine, losartan, metformin, omeprazole, oxycodone-acetaminophen, pen needle, diabetic, pioglitazone, pregabalin, solifenacin, toujeo solostar u-300 insulin, and true metrix glucose test strip, and the following Facility-Administered Medications: fentanyl and midazolam.    Review of patient's allergies indicates:   Allergen Reactions    Gabapentin Other (See Comments)     ineffective    Januvia [sitagliptin] Other (See Comments)     Pancreatitis      Metformin Other (See Comments)     Nausea and vomiting with immediate release -  tolerates extended release        Objective     Observation/Appearance:  Skin intact and clean    Edema. Measured in centimeters.   9/25/2024 9/25/2024    Right  Left    Proximal Wrist Crease 16.8 16.5   Figure of 8 43 40.4   MCPs 20.4 19.4       Hand ROM. Measured in degrees.   9/25/2024 9/25/2024    Right  Left         Index: MP  Some age-related changes and extension lags.  -23/80              PIP      0/105              DIP  0/46              WHITE  208        Long:  MP  0/82              PIP  0/103              DIP  0/46              WHITE  231        Ring:   MP  0/85              PIP  -5/95              DIP  0/63              WHITE  238        Small:  MP  0/85               PIP  -14/93               DIP  0/62              WHITE  226        Thumb: MP  NT                IP         Rad ADD/ABD         Pal ADD/ABD            Opposition  P1 os SF     Elbow and Wrist ROM. Measured in degrees.   9/25/2024 9/25/2024    Left Right   Supination/Pronation WFL WFL   Wrist Ext/Flex 65/55 60/65   Wrist RD/UD WFL WFL       Sensation: The patient reports burning sensation into the tips of her fingers. Formal testing using semmes-lionel to be performed if sensation deficits persist.   N/A Distribution 9/25/2024 9/25/2024    Right  Left    [place] Lionel NT NT   Normal 1.65-2.83     Diminished Light Touch 3.22-3.61     Diminished Protective 3.84-4.31     Loss of Protective 4.56-6.65     Untestable >6.65     2 Point Discrimination     Static     Dynamic       Special Tests:   Left   9/25/2024   Intrinsic Tightness Test -   Froment's Sign +   Maudsley's Test = (+)  MP Flexion Test = (+)  Supinator resistance for RTS = (+)       Strength (Dyanmometer) and Pinch Strength (Pinch Gauge)  Measured in pounds and psi. Average of three trials.   Deferred Deferred    Right  Left    Rung II     Vazquez Pinch     3pt Pinch     2pt Pinch         Manual Muscle Testing  WE = 5/5    Intake Outcome Measure for FOTO hand Survey    Therapist reviewed  FOTO scores for Lina Davis on 9/25/2024.   FOTO report - see Media section or FOTO account episode details.    Intake Score: -%     Comments: Pt requires assistance for tablet use. FOTO to be administered upon initial sessions.     Treatment     Total Treatment time separate from Evaluation time: 10 minutes    Lina received the treatments listed below:      therapeutic activities to improve functional performance for 10  minutes, including:  - radial nerve glides  - ulnar nerve glides  - digit abduction/adduction  - Mp extension lifts  - TGE's  - Reverse blocking      Patient Education and Home Exercises      Education provided:   -role of OT, goals for OT, scheduling/cancellations, insurance limitations with patient.  -Additional Education provided: nerve health, signs and symptoms    Written Home Exercises Provided: Yes.  Exercises were reviewed and Lina was able to demonstrate them prior to the end of the session.    Lina demonstrated good  understanding of the education provided.     Pt was advised to perform these exercises free of pain, and to stop performing them if pain occurs.    See EMR under Patient Instructions for exercises provided 9/25/2024.    Assessment     Lina Davis is a 79 y.o. female referred to outpatient occupational therapy and presents with a medical diagnosis of M65.322 (ICD-10-CM) - Trigger finger, left index finger .  The patient tested positive for radial tunnel compression for 2/2 tests and ulnar nerve compression for 2/2 tests. Decrease range of motion  in extension of the digits of the left hand but with good wrist extension strength. The patient experiences pain in the forearm in the supinator region just distal to the elbow.   Following medical record review it is determined that pt will benefit from occupational therapy services in order to maximize pain free and/or functional use of left forearm and hand. The following goals were discussed with the patient and patient is in  agreement with them as to be addressed in the treatment plan. The patient's rehab potential is Good.     Anticipated barriers to occupational therapy: potential nerve related deficits    Plan of care discussed with patient: Yes  Patient's spiritual, cultural and educational needs considered and patient is agreeable to the plan of care and goals as stated below:     Medical Necessity is demonstrated by the following  Occupational Profile/History  Co-morbidities and personal factors that may impact the plan of care [] LOW: Brief chart review  [x] MODERATE: Expanded chart review   [] HIGH: Extensive chart review    Moderate / High Support Documentation:      Examination  Performance deficits relating to physical, cognitive or psychosocial skills that result in activity limitations and/or participation restrictions  [] LOW: addressing 1-3 Performance deficits  [x] MODERATE: 3-5 Performance deficits  [] HIGH: 5+ Performance deficits (please support below)    Moderate / High Support Documentation:    Physical:  Joint Mobility   Strength  Pinch Strength  Fine Motor Coordination  Muscle Tone    Cognitive:  No Deficits    Psychosocial:    Habits  Routines  Rituals     Treatment Options [] LOW: Limited options  [x] MODERATE: Several options  [] HIGH: Multiple options      Decision Making/ Complexity Score: moderate       The following goals were discussed with the patient and patient is in agreement with them as to be addressed in the treatment plan.     Goals:   Short Term Goals (4 weeks)   1)  Patient to be IND with HEP and modalities for pain management  2)   Increase left digits 2, 4-5 WHITE by 3-5 degrees or more to increase functional hand use for ADLs/work/leisure activities  3)  Assess  and pinch and set goals accordingly   4) Decrease complaints of pain to  3 out of 10 at worst to increase functional hand use for ADL/work/leisure activities. progressing not met 9/25/2024  5)   Patient to score at -% or more on  FOTO to demonstrate improved perception of functional hand use. progressing not met 9/25/2024   6)   Pt will return to near to prior level of function for ADLs and household management reporting I or Mod I with ADLs (dressing, feeding, grooming, toileting). progressing not met 9/25/2024    Long Term Goals (8 weeks)   1)  Increase left digits 2 & 5 WHITE by 8-10 degrees or more to increase functional hand use for ADLs/work/leisure activities  2)Decrease complaints of pain to  1 out of 10 at worst to increase functional hand use for ADL/work/leisure activities. progressing not met 9/25/2024  3)   Patient to score at -% or more on FOTO to demonstrate improved perception of functional hand use. progressing not met 9/25/2024   4)   Pt will return to near to prior level of function for ADLs and household management reporting I or Mod I with ADLs (dressing, feeding, grooming, toileting). progressing not met 9/25/2024       Plan   Certification Period/Plan of care expiration: 9/25/2024 to 11/25/2024.    Outpatient Occupational Therapy 1-2 times weekly for 8 weeks to include the following interventions: Paraffin, Fluidotherapy, Manual therapy/joint mobilizations, Modalities for pain management, US 3 mhz, Therapeutic exercises/activities., Strengthening, Orthotic Fabrication/Fit/Training, Edema Control, and Joint Protection.    Castro Alcantara, OT

## 2024-09-25 NOTE — PATIENT INSTRUCTIONS
"[facility], OCCUPATIONAL THERAPY  HOME EXERCISE PROGRAM     Complete the following exercises for 10 repetitions each, 2-3x/day:      RADIAL NERVE: Mobilization I    With right elbow resting at side, palm down. Use opposite hand to pull hand down, fingers relaxed.    RADIAL NERVE: Mobilization II    With right arm in front of body, palm down. Use opposite hand to pull hand down, fingers relaxed.    Ulnar Nerve Glides    Complete 1 sequence (Positions 1-6) 5 times, 3-5 times per day.   Avoid numbness and tingling or excessive pain, if this occurs wait 30 min before resuming                        Hook                             Table Top                        Straight Fist                         Full Fist      Perform hook fist by bending only at the top two joints. Reaching for the top of your palm. Keep the big knuckles straight. Return to straight hand.  Perform table top position by bending at the large knuckles while keeping fingers straight like a table top  Continue to straight fist by bending at the large and middle knuckles, keeping finger tips straight/flat (as if reaching towards elbow with finger tips)        Return to straight hand.  4.   Bend fingers into a full fist.          AROM: Abduction / Adduction  - With hand flat on table, spread all fingers apart,   then bring them together as close as possible.        AROM: Composte Extension ("Finger Lifts")  - Lift your finger off of the table one at a time  - Hold 3 seconds. Relax your finger.        Reverse PIP Extension  - Use your uninvolved hand to block large knuckles from moving, acting as a "roof"  - Bend and straighten at the PIP joint only (middle knuckle) while keeping the big knuckles bent  *Focus on straightening the fingers against the "roof" formed by your other hand    Therapist: BARBIE Cassidy/L     Copyright © [facility]. All rights reserved.      "

## 2024-09-25 NOTE — PROGRESS NOTES
[facility]  Occupational Therapy Initial Evaluation     Name: Lina Davis  Clinic Number: [id number]    Therapy Diagnosis: No diagnosis found.  Physician: Justus Isabel MD    Physician Orders: {OTW THERAPY ORDERS:54216}  Medical Diagnosis: M65.322 (ICD-10-CM) - Trigger finger, left index finger   Surgical Procedure and Date: ***, ***  Evaluation Date: 9/25/2024  Insurance Authorization Period Expiration: ***  Plan of Care Certification Period: ***  Date of Return to MD: ***  Visit # / Visits authorized: 1 / 1  FOTO: 1/ 3    Precautions:  {IP WOUND PRECAUTIONS OHS:56098}    Time In: ***  Time Out: ***  Total Billable Time: *** minutes    Subjective     Date of Onset: ***    History of Current Condition/Mechanism of Injury: Lina reports: ***    Falls: ***    Involved Side: ***  Dominant Side: { hand dominance:[id number]}    Mechanism of Injury: ***  Surgical Procedure: ***  Imaging: {Mri/ctscan/bone scan:58350} ***    Prior Therapy: ***    Pain:  Functional Pain Scale Rating 0-10:   {NUMBERS; 0-10:5044}/10 on average  {NUMBERS; 0-10:5044}/10 at best  {NUMBERS; 0-10:5044}/10 at worst  Location: ***  Description: {Pain Description:50316}  Aggravating Factors: {Causes; Pain:18112}  Easing Factors: {Pain (activities that relieve):93344}    Occupation:  ***  Working presently: {Work history:61209}  Duties: ***    Functional Limitations/Social History:    Previous functional status includes: Independent with all ADLs. ***    Current Functional Status   Home/Living environment: {LIVES WITH:02609}    - *** story home, *** steps to enter    - DME: ***      Limitation of Functional Status as follows:   ADLs/IADLs:     - Feeding: ***    - Bathing: ***    - Dressing: ***  - Grooming: ***    - Home Management: ***    - Driving: ***     Leisure: {AMB OT HAND LEISURE:60781}    Patient's Goals for Therapy: ***    Past Medical History/Physical Systems Review:   Lina Davis  has a past medical history of  Anemia, Arthritis, Cataract, Gout, arthritis, HTN (hypertension), benign, Polyneuropathy, Type 2 diabetes mellitus with diabetic nephropathy, and Vitamin D deficiency disease.    Lina Davis  has a past surgical history that includes Rotator cuff repair; Colonoscopy w/ biopsies and polypectomy; Carpal tunnel release; Hemorrhoid surgery; Hysterectomy; Colonoscopy (N/A, 3/7/2018); Anterior cervical discectomy w/ fusion (N/A, 3/19/2020); Injection of joint (Bilateral, 4/12/2022); Epidural steroid injection (N/A, 6/3/2022); Transforaminal epidural injection of steroid (Bilateral, 12/2/2022); Epidural steroid injection into lumbar spine (Left, 10/26/2023); Trigger finger release (Left, 2/2/2024); and Colonoscopy (N/A, 7/15/2024).    Lina has a current medication list which includes the following prescription(s): acetaminophen, alcohol swabs, allopurinol, amlodipine, aspirin, atorvastatin, blood glucose control, normal, blood-glucose meter, duloxetine, empagliflozin, lancets, lidocaine, losartan, metformin, omeprazole, oxycodone-acetaminophen, pen needle, diabetic, pioglitazone, pregabalin, solifenacin, toujeo solostar u-300 insulin, and true metrix glucose test strip, and the following Facility-Administered Medications: fentanyl and midazolam.    Review of patient's allergies indicates:   Allergen Reactions    Gabapentin Other (See Comments)     ineffective    Januvia [sitagliptin] Other (See Comments)     Pancreatitis      Metformin Other (See Comments)     Nausea and vomiting with immediate release - tolerates extended release        Objective     Observation/Appearance:  {AMB OT HAND OBSERVATION:98920} and ***    Edema. Measured in centimeters.   9/25/2024 9/25/2024    Right  Left    2in. Above elbow *** ***   2in. Below elbow *** ***   Proximal Wrist Crease *** ***   Figure of 8 *** ***   MCPs *** ***     Edema. Measured in centimeters.   9/25/2024 9/25/2024    Right  Left    Index:       P1 *** ***    PIP *** ***    P2 *** ***    DIP *** ***   P3 *** ***   Long:       P1 *** ***    PIP *** ***   P2 *** ***    DIP *** ***   P3 *** ***   Ring:       P1            *** ***    PIP            *** ***   P2             *** ***    DIP *** ***   P3 *** ***   Small:        P1           *** ***     PIP        *** ***   P2        *** ***    DIP *** ***   P3 *** ***   Thumb:     Prox. Phalanx *** ***   IP *** ***   Distal Phalanx *** ***     Hand ROM. Measured in degrees.   9/25/2024 9/25/2024    Right  Left         Index: MP  ***/*** ***/***              PIP     ***/*** ***/***              DIP ***/*** ***/***              WHITE *** ***        Long:  MP ***/*** ***/***              PIP ***/*** ***/***              DIP ***/*** ***/***              WHITE          Ring:   MP ***/*** ***/***              PIP ***/*** ***/***              DIP ***/*** ***/***              WHITE *** ***        Small:  MP ***/*** ***/***               PIP ***/*** ***/***               DIP ***/*** ***/***              WHITE *** ***        Thumb: MP ***/*** ***/***                IP ***/*** ***/***       Rad ADD/ABD *** ***       Pal ADD/ABD *** ***          Opposition *** ***     Elbow and Wrist ROM. Measured in degrees.   9/25/2024 9/25/2024    Left Right   Supination/Pronation *** ***   Wrist Ext/Flex *** ***   Wrist RD/UD *** ***       Sensation  {AMB OT SENSATION:33209} Distribution 9/25/2024 9/25/2024    Right  Left    [place] Delfin     Normal 1.65-2.83     Diminished Light Touch 3.22-3.61     Diminished Protective 3.84-4.31     Loss of Protective 4.56-6.65     Untestable >6.65     2 Point Discrimination     Static     Dynamic       Special Tests:   {Right/Left:71258}   9/25/2024   Thumb CMC Grind Test    Finkelstein's Test    Phalen's Test    Tinel's Test    Jamal's Test    Extrinsic Tightness Test    Intrinsic Tightness Test    ORL Test    Froment's Sign    Kiersten's Sign     Egawa Sign     Clamp Sign     Scaphoid Thrust Test    Mitrascheid's Test    Metacarpal  "Stress Test    Piano Key Test    ECU Synergy Test    Ulnar Compression Test    TFCC Load Test    Ulnocarpal Stress Test    Midcarpal Shift Test    Pisiform Boost Test         Strength (Dyanmometer) and Pinch Strength (Pinch Gauge)  Measured in pounds and psi. Average of three trials.   9/25/2024 9/25/2024    Right  Left    Rung II     Vazquez Pinch     3pt Pinch     2pt Pinch         Manual Muscle Testing      Intake Outcome Measure for FOTO *** Survey    Therapist reviewed FOTO scores for Lina Davis on 9/25/2024.   FOTO report - see Media section or FOTO account episode details.    Intake Score: ***%         Treatment     Total Treatment time separate from Evaluation time:***    Lina received the treatments listed below:      therapeutic activities to improve functional performance for ***  minutes, including:  ***      Patient Education and Home Exercises      Education provided:   -role of OT, goals for OT, scheduling/cancellations, insurance limitations with patient.  -Additional Education provided: ***    Written Home Exercises Provided: {Home Exercise Program:13197}.  Exercises were reviewed and Lina was able to demonstrate them prior to the end of the session.    Lina demonstrated {Desc; good/fair/poor:93794} understanding of the education provided.     Pt was advised to perform these exercises free of pain, and to stop performing them if pain occurs.    See EMR under {Blank single:69088::"Media","Patient Instructions"} for exercises provided {Blank single:22059::"9/25/2024","prior visit"}.    Assessment     Lina Davis is a 79 y.o. female referred to outpatient occupational therapy and presents with a medical diagnosis of ***.    Following medical record review it is determined that pt will benefit from occupational therapy services in order to maximize pain free and/or functional use of {LEFT/RIGHT:08497} ***. The following goals were discussed with the patient and patient is in agreement with them as " to be addressed in the treatment plan. The patient's rehab potential is {REHAB PROGNOSIS OHS:36315}.     Anticipated barriers to occupational therapy: ***    Plan of care discussed with patient: {YES:07891}  Patient's spiritual, cultural and educational needs considered and patient is agreeable to the plan of care and goals as stated below:     Medical Necessity is demonstrated by the following  Occupational Profile/History  Co-morbidities and personal factors that may impact the plan of care [] LOW: Brief chart review  [] MODERATE: Expanded chart review   [] HIGH: Extensive chart review    Moderate / High Support Documentation: ***     Examination  Performance deficits relating to physical, cognitive or psychosocial skills that result in activity limitations and/or participation restrictions  [] LOW: addressing 1-3 Performance deficits  [] MODERATE: 3-5 Performance deficits  [] HIGH: 5+ Performance deficits (please support below)    Moderate / High Support Documentation:    Physical:  {Physical:68784}    Cognitive:  {Cognitive:22891}    Psychosocial:    {Psychosocial:95070}     Treatment Options [] LOW: Limited options  [] MODERATE: Several options  [] HIGH: Multiple options      Decision Making/ Complexity Score: {Desc; low/moderate/high:488391}       The following goals were discussed with the patient and patient is in agreement with them as to be addressed in the treatment plan.     Goals:   Short Term Goals (4 weeks)   1)  Patient to be IND with HEP and modalities for pain management  2)  Increase ROM 3-10 degrees to increase functional hand use for ADLs/work/leisure activities  3)   Decrease edema .1-.5mm to increase joint mobility /flexibility for functional hand use.   4)  Assess  and pinch and set goals accordingly   5) Decrease complaints of pain to  {NUMBERS 1-10:74160} out of 10 at worst to increase functional hand use for ADL/work/leisure activities. progressing not met 9/25/2024  6)   Patient to  "score at ***% or more on FOTO to demonstrate improved perception of functional *** use. progressing not met 9/25/2024   7)   Pt will return to near to prior level of function for ADLs and household management reporting I or Mod I with ADLs (dressing, feeding, grooming, toileting). progressing not met 9/25/2024    Long Term Goals (8 weeks)   1)  Increase  strength 2-8 lbs. For ***  2)  Increase pinch strength 1-4 psi's for ***  3)  Increase ROM 11-20 degrees to increase functional hand use for ADLs/work/leisure activities  4)Decrease complaints of pain to  {NUMBERS 1-10:79201} out of 10 at worst to increase functional hand use for ADL/work/leisure activities. progressing not met 9/25/2024  5)   Patient to score at ***% or more on FOTO to demonstrate improved perception of functional *** use. progressing not met 9/25/2024   6)   Pt will return to near to prior level of function for ADLs and household management reporting I or Mod I with ADLs (dressing, feeding, grooming, toileting). progressing not met 9/25/2024       Plan   Certification Period/Plan of care expiration: 9/25/2024 to ***.    Outpatient Occupational Therapy {NUMBERS 1-5:02225} times weekly for {0-10:52833::"0"} weeks to include the following interventions: {AMB OT HAND TX:58480}.    Castro Alcantara OT    "

## 2024-09-27 ENCOUNTER — TELEPHONE (OUTPATIENT)
Dept: ENDOSCOPY | Facility: HOSPITAL | Age: 79
End: 2024-09-27
Payer: MEDICARE

## 2024-09-27 NOTE — TELEPHONE ENCOUNTER
.Spoke to patient for pre-call to confirm scheduled Colonoscopy and patient verbalized understanding of the following:       Date of Procedure (s)  verified 10/7/24  Arrival Time 1000 verified.  Location of Procedure(s) [place] [address]nd Floor verified.  NPO status reinforced. Ok to continue clear liquids up until 4 hours prior to the Endoscopy procedure.     Pt confirmed receipt of prep instructions and Rx prep (if applicable).  Instructions provided to patient via [facility]  Pt confirmed ride home after procedure if procedure requires anesthesia.   Pre-call screening questionnaire reviewed and completed with patient.   Appointment details are tentative, including check-in time.  If the patient begins taking any blood thinning medications, injectable weight loss/diabetes medications (other than insulin), or Adipex (phentermine) patient was instructed to contact the endoscopy scheduling department as soon as possible.  Patient was advised to call the endoscopy scheduling department if any questions or concerns arise.       [facility] Scheduling Department

## 2024-09-29 PROBLEM — M25.60 RANGE OF MOTION DEFICIT: Status: ACTIVE | Noted: 2024-09-29

## 2024-10-01 ENCOUNTER — CLINICAL SUPPORT (OUTPATIENT)
Dept: REHABILITATION | Facility: HOSPITAL | Age: 79
End: 2024-10-01
Payer: MEDICARE

## 2024-10-01 DIAGNOSIS — M25.60 RANGE OF MOTION DEFICIT: Primary | ICD-10-CM

## 2024-10-01 PROCEDURE — 97112 NEUROMUSCULAR REEDUCATION: CPT

## 2024-10-01 PROCEDURE — 97530 THERAPEUTIC ACTIVITIES: CPT

## 2024-10-01 PROCEDURE — 97140 MANUAL THERAPY 1/> REGIONS: CPT

## 2024-10-01 NOTE — PROGRESS NOTES
[facility]  Occupational Therapy Treatment Note     Date: 10/1/2024  Name: Lina Davis  Clinic Number: [id number]    Therapy Diagnosis:   Encounter Diagnosis   Name Primary?    Range of motion deficit Yes     Physician: Justus Isabel MD      Physician Orders: Eval and Treat  Medical Diagnosis: M65.322 (ICD-10-CM) - Trigger finger, left index finger   Surgical Procedure and Date: Trigger finger release, 02/02/2024  Evaluation Date: 9/25/2024  Insurance Authorization Period Expiration: 09/23/2024 - 09/23/2025  Plan of Care Certification Period: 09/25/2024 - 11/25/2024  Date of Return to MD: 10/22/2024  Visit # / Visits authorized: 1 / 1  FOTO: 1/ 3    Lobby Code: [email or web address]      Precautions:  Standard     Time In: 2:02 PM  Time Out: 3: 00 PM  Total Billable Time: 58 minutes      Subjective     Patient reports: She has burning on the dorsal forearm that wakes her up.   She was compliant with home exercise program given last session.   Response to previous treatment:blaise well  Functional change: none at this time    Pain: 6/10  Location: left arms     Objective     Observation/Appearance:  Skin intact and clean     Edema. Measured in centimeters.    9/25/2024 9/25/2024     Right  Left    Proximal Wrist Crease 16.8 16.5   Figure of 8 43 40.4   MCPs 20.4 19.4         Hand ROM. Measured in degrees.    9/25/2024 9/25/2024     Right  Left            Index: MP  Some age-related changes and extension lags.  -23/80              PIP       0/105              DIP   0/46              WHITE   208           Long:  MP   0/82              PIP   0/103              DIP   0/46              WHITE   231           Ring:   MP   0/85              PIP   -5/95              DIP   0/63              WHITE   238           Small:  MP   0/85               PIP   -14/93               DIP   0/62              WHITE   226           Thumb: MP   NT                IP           Rad ADD/ABD           Pal ADD/ABD              Opposition   P1 os  SF      Elbow and Wrist ROM. Measured in degrees.    9/25/2024 9/25/2024     Left Right   Supination/Pronation WFL WFL   Wrist Ext/Flex 65/55 60/65   Wrist RD/UD WFL WFL         Sensation: The patient reports burning sensation into the tips of her fingers. Formal testing using semmes-lionel to be performed if sensation deficits persist.   N/A Distribution 9/25/2024 9/25/2024     Right  Left    [place] Lionel NT NT   Normal 1.65-2.83       Diminished Light Touch 3.22-3.61       Diminished Protective 3.84-4.31       Loss of Protective 4.56-6.65       Untestable >6.65       2 Point Discrimination       Static       Dynamic          Special Tests:   Left    9/25/2024   Intrinsic Tightness Test -   Froment's Sign +   Maudsley's Test = (+)  MP Flexion Test = (+)  Supinator resistance for RTS = (+)         Strength (Dyanmometer) and Pinch Strength (Pinch Gauge)  Measured in pounds and psi. Average of three trials.    Deferred Deferred     Right  Left    Rung II       Vazquez Pinch       3pt Pinch       2pt Pinch             Manual Muscle Testing  WE = 5/5     Intake Outcome Measure for FOTO hand Survey     Therapist reviewed FOTO scores for Lina Davis on 9/25/2024.   FOTO report - see Media section or FOTO account episode details.     Intake Score: -%      Comments: Pt requires assistance for tablet use. FOTO to be administered upon initial sessions    Treatment     Lina received the treatments listed below:        Hot pack to the left elbow for 10 minutes for improved pain relief prior to exercises, neuromuscular re-education and activities.     manual therapy techniques: Joint mobilizations, Manual Lymphatic Drainage and Soft tissue Mobilization were applied to the: left forearm for 15 minutes, including:  - STM to the  entire left forearm, extensor wad, carpal tunnel.     therapeutic exercises to develop endurance, ROM, and flexibility for 0 minutes including:  NT    neuromuscular re-education activities to  improve: Coordination and Proprioception for 25 minutes. The following activities were included:  - radial nerve glide x 5  - radial nerve glide at elbow x 5  - ulnar nerve glide x 5  - tendon glides x 10  - Digit abduction/adduciton x 10  - MP extension lifts x 10  - Reverse blocking x 10    therapeutic activities to improve functional performance for 8 minutes, including:  - Juxacisor x 2'  - towel walks x 2'  - Education on nerve related conditions, modality use, muscle inflammation and tendon health      Patient Education and Home Exercises     Education provided:   - tendon and nerve health realtionship  - Progress towards goals     Written Home Exercises Provided: Pt instructed to continue prior HEP.  Exercises were reviewed and Lina was able to demonstrate them prior to the end of the session.  Lina demonstrated good  understanding of the home exercise program provided. See electronic medical record under Patient Instructions for exercises provided during therapy sessions.       Assessment      Pt required modeling and multiple verbal and tactile cues for appropriate performance of HEP this date. Good performance following increased education. Continued burning along the dorsal forearms and into the fingers but with improvement following manual therapy techniques. Therapy to focus on pain relief, hand strengthening, tissue elasticity of the forearm and improved function during daily activity.     Lina is progressing well towards her goals and there are no updates to goals at this time. Pt prognosis is Good.     Patient will continue to benefit from skilled outpatient occupational therapy to address the deficits listed in the problem list on initial evaluation provide patient/family education and to maximize patient's level of independence in the home and community environment.     Patient's spiritual, cultural and educational needs considered and patient agreeable to plan of care and goals.    Anticipated  barriers to occupational therapy: potential nerve related deficits     Goals:  Short Term Goals (4 weeks)   1)  Patient to be IND with HEP and modalities for pain management  2)   Increase left digits 2, 4-5 WHITE by 3-5 degrees or more to increase functional hand use for ADLs/work/leisure activities  3)  Assess  and pinch and set goals accordingly   4) Decrease complaints of pain to  3 out of 10 at worst to increase functional hand use for ADL/work/leisure activities. progressing not met 9/25/2024  5)   Patient to score at -% or more on FOTO to demonstrate improved perception of functional hand use. progressing not met 9/25/2024   6)   Pt will return to near to prior level of function for ADLs and household management reporting I or Mod I with ADLs (dressing, feeding, grooming, toileting). progressing not met 9/25/2024     Long Term Goals (8 weeks)   1)  Increase left digits 2 & 5 WHITE by 8-10 degrees or more to increase functional hand use for ADLs/work/leisure activities  2)Decrease complaints of pain to  1 out of 10 at worst to increase functional hand use for ADL/work/leisure activities. progressing not met 9/25/2024  3)   Patient to score at -% or more on FOTO to demonstrate improved perception of functional hand use. progressing not met 9/25/2024   4)   Pt will return to near to prior level of function for ADLs and household management reporting I or Mod I with ADLs (dressing, feeding, grooming, toileting). progressing not met 9/25/2024     Plan     Updates/Grading for next session: Continue OT FLO Alcantara OT   10/1/2024

## 2024-10-02 ENCOUNTER — OFFICE VISIT (OUTPATIENT)
Dept: UROLOGY | Facility: CLINIC | Age: 79
End: 2024-10-02
Payer: MEDICARE

## 2024-10-02 VITALS
HEIGHT: 67 IN | BODY MASS INDEX: 27.31 KG/M2 | HEART RATE: 90 BPM | DIASTOLIC BLOOD PRESSURE: 60 MMHG | WEIGHT: 174 LBS | SYSTOLIC BLOOD PRESSURE: 146 MMHG

## 2024-10-02 DIAGNOSIS — N32.81 OVERACTIVE BLADDER: Primary | ICD-10-CM

## 2024-10-02 DIAGNOSIS — R35.1 NOCTURIA MORE THAN TWICE PER NIGHT: ICD-10-CM

## 2024-10-02 DIAGNOSIS — R31.29 MICROSCOPIC HEMATURIA: ICD-10-CM

## 2024-10-02 LAB
BACTERIA #/AREA URNS AUTO: NORMAL /HPF
MICROSCOPIC COMMENT: NORMAL
RBC #/AREA URNS AUTO: 1 /HPF (ref 0–4)
SQUAMOUS #/AREA URNS AUTO: 0 /HPF
WBC #/AREA URNS AUTO: 4 /HPF (ref 0–5)

## 2024-10-02 PROCEDURE — 3078F DIAST BP <80 MM HG: CPT | Mod: HCNC,CPTII,S$GLB,

## 2024-10-02 PROCEDURE — 88112 CYTOPATH CELL ENHANCE TECH: CPT | Mod: HCNC | Performed by: STUDENT IN AN ORGANIZED HEALTH CARE EDUCATION/TRAINING PROGRAM

## 2024-10-02 PROCEDURE — 87088 URINE BACTERIA CULTURE: CPT | Mod: HCNC

## 2024-10-02 PROCEDURE — 99214 OFFICE O/P EST MOD 30 MIN: CPT | Mod: HCNC,25,S$GLB,

## 2024-10-02 PROCEDURE — 81001 URINALYSIS AUTO W/SCOPE: CPT | Mod: HCNC

## 2024-10-02 PROCEDURE — 1101F PT FALLS ASSESS-DOCD LE1/YR: CPT | Mod: HCNC,CPTII,S$GLB,

## 2024-10-02 PROCEDURE — 3288F FALL RISK ASSESSMENT DOCD: CPT | Mod: HCNC,CPTII,S$GLB,

## 2024-10-02 PROCEDURE — 3077F SYST BP >= 140 MM HG: CPT | Mod: HCNC,CPTII,S$GLB,

## 2024-10-02 PROCEDURE — 87086 URINE CULTURE/COLONY COUNT: CPT | Mod: HCNC

## 2024-10-02 PROCEDURE — 87077 CULTURE AEROBIC IDENTIFY: CPT | Mod: HCNC

## 2024-10-02 PROCEDURE — 87186 SC STD MICRODIL/AGAR DIL: CPT | Mod: HCNC

## 2024-10-02 PROCEDURE — 1159F MED LIST DOCD IN RCRD: CPT | Mod: HCNC,CPTII,S$GLB,

## 2024-10-02 PROCEDURE — 1160F RVW MEDS BY RX/DR IN RCRD: CPT | Mod: HCNC,CPTII,S$GLB,

## 2024-10-02 PROCEDURE — 51701 INSERT BLADDER CATHETER: CPT | Mod: HCNC,S$GLB,,

## 2024-10-02 PROCEDURE — 99999 PR PBB SHADOW E&M-EST. PATIENT-LVL V: CPT | Mod: PBBFAC,HCNC,,

## 2024-10-02 RX ORDER — MIRABEGRON 25 MG/1
25 TABLET, FILM COATED, EXTENDED RELEASE ORAL DAILY
Qty: 90 TABLET | Refills: 3 | Status: SHIPPED | OUTPATIENT
Start: 2024-10-02 | End: 2025-10-02

## 2024-10-02 RX ORDER — LIDOCAINE HYDROCHLORIDE 20 MG/ML
JELLY TOPICAL ONCE
OUTPATIENT
Start: 2024-10-02 | End: 2024-10-02

## 2024-10-02 NOTE — PATIENT INSTRUCTIONS
I explained to the patient that the causes of hematuria, whether it be gross hematuria or microhematuria, are many, including but not limited to  infections, kidney stones,  malignancy. Fortunately, for patients with microhematuria, the likelihood of finding an underlying  malignancy as the cause of the hematuria is very low at 1-2%. In patients with gross hematuria, the chances of an underlying  malignancy are higher but still low at 15-20%.    Nevertheless, I explained to the patient that the evaluation in both cases consists of upper tract imaging followed by flexible cystoscopy. I described the rationale and procedure for both and answered all questions.    Hematuria work up discussed in detail.  Will proceed with hematuria work up:  Creatinine prior to CT urogram to ensure adequate kidney function.   Cystoscopy - Dr. Watson   CT abdomen pelvis w/o contrast - scheduled   Urine cytology - sent   Urine culture - sent  Micro urinalysis - sent

## 2024-10-02 NOTE — PROGRESS NOTES
CHIEF COMPLAINT:  OAB      HISTORY OF PRESENTING ILLINESS:  Lina Davis is a 79 y.o. female established to [facility]. Presented 7/17/2024 as a referral from Dr. Freedman for urinary frequency. Nocturia precautions were recommended. Patient was also prescribed vesicare 10 mg daily for OAB. Vesicare has not improved her urinary symptoms.      No reports of daytime frequency. Was experiencing frequent nocturia, every 1.5-2 hours to urinate at night. She reported drinking water throughout the night and does not stop fluid intake prior to bed. Her symptoms worsened when lying down. She has urgency without incontinence. On jardiance, Actos, and Insulin. She was interested in a medication for her symptoms     Past UAs and urine cultures reviewed.   Clean catch micro UA 5/14/2024 6 rbc/hpf, 4/25/2024 15 rbc/hpf   CT abd pelvis with contrast reviewed 3/8/2024. Prominent R extrarenal pelvis, stable from previous imaging. Normal kidneys. Abnormal urothelial enhancement with bladder wall thickening.      PMHx listed below.         REVIEW OF SYSTEMS:  Review of Systems   Constitutional:  Negative for chills and fever.   HENT:  Negative for congestion and sore throat.    Eyes:  Negative for blurred vision.   Respiratory:  Negative for cough and shortness of breath.    Cardiovascular:  Negative for chest pain and palpitations.   Gastrointestinal:  Negative for nausea and vomiting.   Genitourinary:  Positive for urgency (with hesitancy, small output). Negative for dysuria, flank pain, frequency and hematuria.        +nocturia   Neurological:  Negative for dizziness and headaches.         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    Vitamin D deficiency disease        Past Surgical History:   Procedure Laterality Date    ANTERIOR  CERVICAL DISCECTOMY W/ FUSION N/A 3/19/2020    Procedure: DISCECTOMY, SPINE, CERVICAL, ANTERIOR APPROACH, WITH FUSION, C3-C4, C4-C5, C5-C6;  Surgeon: Joseph Walton MD;  Location: [facility] 2ND FLR;  Service: Neurosurgery;  Laterality: N/A;    CARPAL TUNNEL RELEASE      bilateral    COLONOSCOPY N/A 3/7/2018    Procedure: COLONOSCOPY;  Surgeon: Luís Soni MD;  Location: [facility] ENDO (2ND FLR);  Service: Endoscopy;  Laterality: N/A;  ok to schedule per Elizabeth    COLONOSCOPY N/A 7/15/2024    Procedure: COLONOSCOPY;  Surgeon: Dread Forman MD;  Location: [facility] ENDO (4TH FLR);  Service: Endoscopy;  Laterality: N/A;  Referred by:Dr. SHIRLEY Kay   Prep: Miralax  Route instructions sent: portal and mail  Other concerns: DM oral and insulin; his. of polyps  7/8-pre call complete-GT    COLONOSCOPY W/ BIOPSIES AND POLYPECTOMY      EPIDURAL STEROID INJECTION N/A 6/3/2022    Procedure: LESI L5-S1;  Surgeon: Leland Pena DO;  Location: [facility] OR;  Service: Pain Management;  Laterality: N/A;    EPIDURAL STEROID INJECTION INTO LUMBAR SPINE Left 10/26/2023    Procedure: LT L5-S1 TFESI;  Surgeon: Leland Pena DO;  Location: [facility] PAIN MANAGEMENT;  Service: Pain Management;  Laterality: Left;  15 mins    HEMORRHOID SURGERY      HYSTERECTOMY      AUB    INJECTION OF JOINT Bilateral 4/12/2022    Procedure: SACROILIAC JOINT Steroid Injection-Bilateral;  Surgeon: Leland Pena DO;  Location: [facility] OR;  Service: Pain Management;  Laterality: Bilateral;    ROTATOR CUFF REPAIR      bilateral    TRANSFORAMINAL EPIDURAL INJECTION OF STEROID Bilateral 12/2/2022    Procedure: TFESI (B/L) L4-5;  Surgeon: Leland Pena DO;  Location: [facility] OR;  Service: Pain Management;  Laterality: Bilateral;    TRIGGER FINGER RELEASE Left 2/2/2024    Procedure: RELEASE, TRIGGER FINGER - left index, long, ring, and small fingers;  Surgeon: Justus Isabel MD;  Location: [facility] OR;  Service: Orthopedics;  Laterality: Left;        Family History   Problem Relation Name Age of Onset    Heart disease Mother      Diabetes Mother      Heart disease Father      Cancer Father          liver    Diabetes Sister Shawna     Cataracts Sister Shawna     Kidney disease Brother david     Heart disease Brother david     Diabetes Sister elijah     COPD Brother jordi     COPD Brother sancho     Gout Brother kylie     Benign prostatic hyperplasia Brother kylie     Heart disease Brother kylie     Kidney disease Brother kylie     Heart attack Brother kylie         heart attack    Kidney failure Brother kylie     Lupus Sister thanh     Heart attack Brother santiago     Drug abuse Brother santiago     No Known Problems Daughter      No Known Problems Son      Amblyopia Neg Hx      Blindness Neg Hx      Breast cancer Neg Hx      Colon cancer Neg Hx      Ovarian cancer Neg Hx         Social History     Socioeconomic History    Marital status:    Tobacco Use    Smoking status: Never    Smokeless tobacco: Never   Substance and Sexual Activity    Alcohol use: No    Drug use: No    Sexual activity: Yes     Partners: Male     Birth control/protection: Surgical       Allergies:  Gabapentin and Januvia [sitagliptin]    Medications:    Current Outpatient Medications:     acetaminophen (TYLENOL) 500 MG tablet, Take 2 tablets (1,000 mg total) by mouth 3 (three) times daily., Disp: 21 tablet, Rfl: 0    alcohol swabs (DROPSAFE ALCOHOL PREP PADS) PadM, USE TWICE DAILY AS DIRECTED, E 11.9, Disp: 200 each, Rfl: 3    allopurinoL (ZYLOPRIM) 300 MG tablet, Take 1 tablet (300 mg total) by mouth once daily., Disp: 90 tablet, Rfl: 3    amLODIPine (NORVASC) 10 MG tablet, TAKE 1 TABLET EVERY DAY, Disp: 90 tablet, Rfl: 3    aspirin (ECOTRIN) 81 MG EC tablet, Take 81 mg by mouth once daily., Disp: , Rfl:     atorvastatin (LIPITOR) 40 MG tablet, TAKE 1 TABLET EVERY DAY, Disp: 90 tablet, Rfl: 3    blood glucose control, normal Soln, True Metrix control solution E11.9 - pt tests  "twice daily, Disp: 1 each, Rfl: 1    blood-glucose meter kit, Check glucose daily E11.9 meter covered by insurance Don Metrix, Disp: 1 each, Rfl: 0    DULoxetine (CYMBALTA) 30 MG capsule, TAKE 1 CAPSULE ONE TIME DAILY, Disp: 90 capsule, Rfl: 3    empagliflozin (JARDIANCE) 10 mg tablet, Take 1 tablet (10 mg total) by mouth once daily., Disp: 90 tablet, Rfl: 4    lancets (TRUEPLUS LANCETS) 28 gauge Misc, 1 lancet by Misc.(Non-Drug; Combo Route) route 2 (two) times daily., Disp: 200 each, Rfl: 2    LIDOcaine (LIDODERM) 5 %, Place 1 patch onto the skin once daily. Remove & Discard patch within 12 hours or as directed by MD, Disp: 30 patch, Rfl: 2    losartan (COZAAR) 100 MG tablet, TAKE 1 TABLET ONE TIME DAILY, Disp: 90 tablet, Rfl: 3    metFORMIN (GLUCOPHAGE-XR) 500 MG ER 24hr tablet, Take 1 tablet (500 mg total) by mouth 2 (two) times daily with meals., Disp: 180 tablet, Rfl: 3    mirabegron (MYRBETRIQ) 25 mg Tb24 ER tablet, Take 1 tablet (25 mg total) by mouth once daily., Disp: 90 tablet, Rfl: 3    omeprazole (PRILOSEC) 20 MG capsule, TAKE 1 CAPSULE TWICE DAILY (Patient taking differently: Take 20 mg by mouth once daily.), Disp: 180 capsule, Rfl: 3    oxyCODONE-acetaminophen (PERCOCET) 5-325 mg per tablet, Take 1 tablet by mouth every 12 (twelve) hours as needed for Pain., Disp: 60 tablet, Rfl: 0    pen needle, diabetic (NOVOFINE 32) 32 gauge x 1/4" Ndle, Use with insulin daily., Disp: 90 each, Rfl: 1    pioglitazone (ACTOS) 30 MG tablet, TAKE 1 TABLET EVERY DAY, Disp: 90 tablet, Rfl: 1    pregabalin (LYRICA) 50 MG capsule, One at bedtime, Disp: 30 capsule, Rfl: 2    solifenacin (VESICARE) 10 MG tablet, Take 1 tablet (10 mg total) by mouth once daily., Disp: 90 tablet, Rfl: 3    TOUJEO SOLOSTAR U-300 INSULIN 300 unit/mL (1.5 mL) InPn pen, Inject 10 Units into the skin once daily., Disp: 3 Pen, Rfl: 1    TRUE METRIX GLUCOSE TEST STRIP Strp, TEST BLOOD SUGAR TWO TIMES DAILY, Disp: 200 strip, Rfl: 3  No current " facility-administered medications for this visit.    Facility-Administered Medications Ordered in Other Visits:     fentaNYL 50 mcg/mL injection 100 mcg, 100 mcg, Intravenous, PRN, Robert Alfaro PA-C, 100 mcg at 24 0640    midazolam (VERSED) 1 mg/mL injection 1 mg, 1 mg, Intravenous, PRN, Robert Alfaro PA-C, 1 mg at 24 0640    PHYSICAL EXAMINATION:  Physical Exam  Exam conducted with a chaperone present.   HENT:      Head: Normocephalic and atraumatic.      Right Ear: External ear normal.      Left Ear: External ear normal.      Nose: Nose normal.      Mouth/Throat:      Mouth: Mucous membranes are moist.   Pulmonary:      Effort: Pulmonary effort is normal. No respiratory distress.   Abdominal:      Hernia: There is no hernia in the left inguinal area or right inguinal area.   Genitourinary:     General: Normal vulva.      Exam position: Lithotomy position.      Pubic Area: No rash or pubic lice.       Labia:         Right: No rash, tenderness, lesion or injury.         Left: No rash, tenderness, lesion or injury.       Urethra: No prolapse, urethral pain, urethral swelling or urethral lesion.      Comments: Name, , allergies verified. Consent verbally obtained.  Betadine prep was applied to the urethral meatus. An in and out cath was performed after voiding. The PVR was 180 ml.     Lymphadenopathy:      Lower Body: No right inguinal adenopathy. No left inguinal adenopathy.   Skin:     General: Skin is warm and dry.   Neurological:      General: No focal deficit present.      Mental Status: She is alert and oriented to person, place, and time.   Psychiatric:         Mood and Affect: Mood normal.         Behavior: Behavior normal.           LABS:  Catheterized output 180 ml       Lab Results   Component Value Date    CREATININE 1.4 2024    EGFRNORACEVR 38.7 (A) 2024             IMPRESSION:    Encounter Diagnoses   Name Primary?    Overactive bladder Yes    Nocturia more than  twice per night     Microscopic hematuria          Assessment:       1. Overactive bladder    2. Nocturia more than twice per night    3. Microscopic hematuria        Plan:   - Microscopic hematuria: I explained to the patient that the causes of hematuria, whether it be gross hematuria or microhematuria, are many, including but not limited to  infections, kidney stones,  malignancy. Fortunately, for patients with microhematuria, the likelihood of finding an underlying  malignancy as the cause of the hematuria is very low at 1-2%. In patients with gross hematuria, the chances of an underlying  malignancy are higher but still low at 15-20%.    Nevertheless, I explained to the patient that the evaluation in both cases consists of upper tract imaging followed by flexible cystoscopy. I described the rationale and procedure for both and answered all questions.    Hematuria work up discussed in detail.  Will proceed with hematuria work up:  Creatinine prior to CT urogram to ensure adequate kidney function.   Cystoscopy - Dr. Watson   CT abdomen pelvis w/o contrast - scheduled   Urine cytology - sent   Urine culture - sent  Micro urinalysis - sent     - OAB: discontinue 10 mg vesicare. Begin 25 mg Myrbetriq, if not affordable, will change to 4 mg detrol LA. SE of myrbetriq discussed.     RTC 6 weeks for BP and PVR check     I spent 30 minutes with the patient of which more than half was spent in direct consultation with the patient in regards to our treatment and plan.

## 2024-10-03 ENCOUNTER — TELEPHONE (OUTPATIENT)
Dept: UROLOGY | Facility: CLINIC | Age: 79
End: 2024-10-03
Payer: MEDICARE

## 2024-10-04 ENCOUNTER — TELEPHONE (OUTPATIENT)
Dept: GASTROENTEROLOGY | Facility: CLINIC | Age: 79
End: 2024-10-04
Payer: MEDICARE

## 2024-10-04 ENCOUNTER — HOSPITAL ENCOUNTER (OUTPATIENT)
Dept: RADIOLOGY | Facility: HOSPITAL | Age: 79
Discharge: HOME | End: 2024-10-04
Payer: MEDICARE

## 2024-10-04 DIAGNOSIS — R31.29 MICROSCOPIC HEMATURIA: ICD-10-CM

## 2024-10-04 DIAGNOSIS — R35.1 NOCTURIA MORE THAN TWICE PER NIGHT: ICD-10-CM

## 2024-10-04 LAB
FINAL PATHOLOGIC DIAGNOSIS: NORMAL
Lab: NORMAL

## 2024-10-04 PROCEDURE — 74176 CT ABD & PELVIS W/O CONTRAST: CPT | Mod: 26,HCNC,, | Performed by: RADIOLOGY

## 2024-10-04 PROCEDURE — 74176 CT ABD & PELVIS W/O CONTRAST: CPT | Mod: TC,HCNC

## 2024-10-05 LAB — BACTERIA UR CULT: ABNORMAL

## 2024-10-05 NOTE — TELEPHONE ENCOUNTER
----- Message from Keira sent at 10/4/2024  5:54 PM CDT -----  Type:  Patient Returning Call    Who Called: Pt's spouse  Who Left Message for Patient:  Does the patient know what this is regarding?: arrival time and instructions for procedure  Would the patient rather a call back or a response via [facility]? Call  Best Call Back Number: [phone]  Additional Information:  Pt spouse states pt was to be contacted by office and provided instructions for procedure on Monday.  Spouse states she has not been contacted.

## 2024-10-07 ENCOUNTER — ANESTHESIA EVENT (OUTPATIENT)
Dept: ENDOSCOPY | Facility: HOSPITAL | Age: 79
End: 2024-10-07
Payer: MEDICARE

## 2024-10-07 ENCOUNTER — HOSPITAL ENCOUNTER (OUTPATIENT)
Facility: HOSPITAL | Age: 79
Discharge: HOME | End: 2024-10-07
Attending: INTERNAL MEDICINE | Admitting: INTERNAL MEDICINE
Payer: MEDICARE

## 2024-10-07 ENCOUNTER — ANESTHESIA (OUTPATIENT)
Dept: ENDOSCOPY | Facility: HOSPITAL | Age: 79
End: 2024-10-07
Payer: MEDICARE

## 2024-10-07 ENCOUNTER — PATIENT MESSAGE (OUTPATIENT)
Dept: UROLOGY | Facility: CLINIC | Age: 79
End: 2024-10-07
Payer: MEDICARE

## 2024-10-07 VITALS
TEMPERATURE: 98 F | HEART RATE: 72 BPM | DIASTOLIC BLOOD PRESSURE: 92 MMHG | SYSTOLIC BLOOD PRESSURE: 138 MMHG | BODY MASS INDEX: 27.31 KG/M2 | HEIGHT: 67 IN | WEIGHT: 174 LBS | RESPIRATION RATE: 20 BRPM | OXYGEN SATURATION: 99 %

## 2024-10-07 DIAGNOSIS — N30.00 ACUTE CYSTITIS WITHOUT HEMATURIA: Primary | ICD-10-CM

## 2024-10-07 DIAGNOSIS — Z86.0100 HISTORY OF COLON POLYPS: Primary | ICD-10-CM

## 2024-10-07 DIAGNOSIS — Z86.0101 HISTORY OF ADENOMATOUS POLYP OF COLON: ICD-10-CM

## 2024-10-07 LAB
POCT GLUCOSE: 179 MG/DL (ref 70–110)
POCT GLUCOSE: 181 MG/DL (ref 70–110)

## 2024-10-07 PROCEDURE — D9220A PRA ANESTHESIA: Mod: HCNC,PT,ANES, | Performed by: ANESTHESIOLOGY

## 2024-10-07 PROCEDURE — 45380 COLONOSCOPY AND BIOPSY: CPT | Mod: PT,22,HCNC, | Performed by: INTERNAL MEDICINE

## 2024-10-07 PROCEDURE — 37000009 HC ANESTHESIA EA ADD 15 MINS: Mod: HCNC | Performed by: INTERNAL MEDICINE

## 2024-10-07 PROCEDURE — 63600175 PHARM REV CODE 636 W HCPCS: Mod: HCNC | Performed by: NURSE ANESTHETIST, CERTIFIED REGISTERED

## 2024-10-07 PROCEDURE — 25000003 PHARM REV CODE 250: Mod: HCNC | Performed by: INTERNAL MEDICINE

## 2024-10-07 PROCEDURE — 88305 TISSUE EXAM BY PATHOLOGIST: CPT | Mod: 26,HCNC,, | Performed by: PATHOLOGY

## 2024-10-07 PROCEDURE — 88305 TISSUE EXAM BY PATHOLOGIST: CPT | Mod: HCNC | Performed by: PATHOLOGY

## 2024-10-07 PROCEDURE — D9220A PRA ANESTHESIA: Mod: HCNC,PT,CRNA, | Performed by: NURSE ANESTHETIST, CERTIFIED REGISTERED

## 2024-10-07 PROCEDURE — 37000008 HC ANESTHESIA 1ST 15 MINUTES: Mod: HCNC | Performed by: INTERNAL MEDICINE

## 2024-10-07 PROCEDURE — 45380 COLONOSCOPY AND BIOPSY: CPT | Mod: PT,HCNC | Performed by: INTERNAL MEDICINE

## 2024-10-07 PROCEDURE — 27201012 HC FORCEPS, HOT/COLD, DISP: Mod: HCNC | Performed by: INTERNAL MEDICINE

## 2024-10-07 RX ORDER — PROPOFOL 10 MG/ML
VIAL (ML) INTRAVENOUS
Status: DISCONTINUED | OUTPATIENT
Start: 2024-10-07 | End: 2024-10-07

## 2024-10-07 RX ORDER — GLUCAGON 1 MG
1 KIT INJECTION
Status: DISCONTINUED | OUTPATIENT
Start: 2024-10-07 | End: 2024-10-07 | Stop reason: HOSPADM

## 2024-10-07 RX ORDER — PHENYLEPHRINE HYDROCHLORIDE 10 MG/ML
INJECTION INTRAVENOUS
Status: DISCONTINUED | OUTPATIENT
Start: 2024-10-07 | End: 2024-10-07

## 2024-10-07 RX ORDER — SODIUM CHLORIDE 9 MG/ML
INJECTION, SOLUTION INTRAVENOUS CONTINUOUS
Status: DISCONTINUED | OUTPATIENT
Start: 2024-10-07 | End: 2024-10-07 | Stop reason: HOSPADM

## 2024-10-07 RX ORDER — PROPOFOL 10 MG/ML
INJECTION, EMULSION INTRAVENOUS CONTINUOUS PRN
Status: DISCONTINUED | OUTPATIENT
Start: 2024-10-07 | End: 2024-10-07

## 2024-10-07 RX ORDER — NITROFURANTOIN 25; 75 MG/1; MG/1
100 CAPSULE ORAL EVERY 12 HOURS
Qty: 10 CAPSULE | Refills: 0 | Status: SHIPPED | OUTPATIENT
Start: 2024-10-07 | End: 2024-10-07

## 2024-10-07 RX ORDER — SODIUM CHLORIDE 0.9 % (FLUSH) 0.9 %
3 SYRINGE (ML) INJECTION
Status: DISCONTINUED | OUTPATIENT
Start: 2024-10-07 | End: 2024-10-07 | Stop reason: HOSPADM

## 2024-10-07 RX ORDER — NITROFURANTOIN 25; 75 MG/1; MG/1
100 CAPSULE ORAL EVERY 12 HOURS
Qty: 10 CAPSULE | Refills: 0 | Status: SHIPPED | OUTPATIENT
Start: 2024-10-07 | End: 2024-10-12

## 2024-10-07 RX ORDER — LIDOCAINE HYDROCHLORIDE 20 MG/ML
INJECTION INTRAVENOUS
Status: DISCONTINUED | OUTPATIENT
Start: 2024-10-07 | End: 2024-10-07

## 2024-10-07 RX ADMIN — PHENYLEPHRINE HYDROCHLORIDE 100 MCG: 10 INJECTION INTRAVENOUS at 11:10

## 2024-10-07 RX ADMIN — PROPOFOL 80 MG: 10 INJECTION, EMULSION INTRAVENOUS at 11:10

## 2024-10-07 RX ADMIN — PROPOFOL 150 MCG/KG/MIN: 10 INJECTION, EMULSION INTRAVENOUS at 11:10

## 2024-10-07 RX ADMIN — PROPOFOL 30 MG: 10 INJECTION, EMULSION INTRAVENOUS at 11:10

## 2024-10-07 RX ADMIN — SODIUM CHLORIDE: 0.9 INJECTION, SOLUTION INTRAVENOUS at 10:10

## 2024-10-07 RX ADMIN — LIDOCAINE HYDROCHLORIDE 100 MG: 20 INJECTION INTRAVENOUS at 11:10

## 2024-10-07 RX ADMIN — PHENYLEPHRINE HYDROCHLORIDE 100 MCG: 10 INJECTION INTRAVENOUS at 12:10

## 2024-10-07 NOTE — TRANSFER OF CARE
Anesthesia Transfer of Care Note    Patient: Lina Davis    Procedure(s) Performed: Procedure(s) (LRB):  COLONOSCOPY (N/A)    Patient location: [facility]    Anesthesia Type: general    Transport from OR: Transported from OR on room air with adequate spontaneous ventilation    Post pain: adequate analgesia    Post assessment: no apparent anesthetic complications    Post vital signs: stable    Level of consciousness: responds to stimulation and awake    Nausea/Vomiting: no nausea/vomiting    Complications: none    Transfer of care protocol was followed      Last vitals: Visit Vitals  /66   Pulse 63   Temp 36   Resp 19   Ht    Wt    SpO2 98%   Breastfeeding    BMI

## 2024-10-07 NOTE — ANESTHESIA POSTPROCEDURE EVALUATION
Anesthesia Post Evaluation    Patient: Lina Davis    Procedure(s) Performed: Procedure(s) (LRB):  COLONOSCOPY (N/A)    Final Anesthesia Type: general      Patient location during evaluation: PACU  Patient participation: Yes- Able to Participate  Level of consciousness: awake  Post-procedure vital signs: reviewed and stable  Pain management: adequate  Airway patency: patent    PONV status at discharge: No PONV  Anesthetic complications: no      Cardiovascular status: blood pressure returned to baseline  Respiratory status: unassisted  Hydration status: euvolemic  Follow-up not needed.              Vitals Value Taken Time   /92 10/07/24 1304   Temp 36.7 °C (98.1 °F) 10/07/24 1300   Pulse 73 10/07/24 1304   Resp 28 10/07/24 1304   SpO2 98 % 10/07/24 1304   Vitals shown include unfiled device data.      No case tracking events are documented in the log.      Pain/Adriana Score: Adriana Score: 10 (10/7/2024  1:02 PM)          
no chest pain and no edema.

## 2024-10-07 NOTE — PROVATION PATIENT INSTRUCTIONS
Discharge Summary/Instructions after an Endoscopic Procedure  Patient Name: Lina Davis  Patient MRN: [id number]  Patient YOB: 1945 Monday, October 7, 2024  Adrian Barry MD  Dear patient,  As a result of recent federal legislation (The Federal Cures Act), you may   receive lab or pathology results from your procedure in your [facility]   account before your physician is able to contact you. Your physician or   their representative will relay the results to you with their   recommendations at their soonest availability.  Thank you,  RESTRICTIONS:  During your procedure today, you received medications for sedation.  These   medications may affect your judgment, balance and coordination.  Therefore,   for 24 hours, you have the following restrictions:   - DO NOT drive a car, operate machinery, make legal/financial decisions,   sign important papers or drink alcohol.    ACTIVITY:  Today: no heavy lifting, straining or running due to procedural   sedation/anesthesia.  The following day: return to full activity including work.  DIET:  Eat and drink normally unless instructed otherwise.     TREATMENT FOR COMMON SIDE EFFECTS:  - Mild abdominal pain, nausea, belching, bloating or excessive gas:  rest,   eat lightly and use a heating pad.  - Sore Throat: treat with throat lozenges and/or gargle with warm salt   water.  - Because air was used during the procedure, expelling large amounts of air   from your rectum or belching is normal.  - If a bowel prep was taken, you may not have a bowel movement for 1-3 days.    This is normal.  SYMPTOMS TO WATCH FOR AND REPORT TO YOUR PHYSICIAN:  1. Abdominal pain or bloating, other than gas cramps.  2. Chest pain.  3. Back pain.  4. Signs of infection such as: chills or fever occurring within 24 hours   after the procedure.  5. Rectal bleeding, which would show as bright red, maroon, or black stools.   (A tablespoon of blood from the rectum is not serious, especially if    hemorrhoids are present.)  6. Vomiting.  7. Weakness or dizziness.  GO DIRECTLY TO THE NEAREST EMERGENCY ROOM IF YOU HAVE ANY OF THE FOLLOWING:      Difficulty breathing              Chills and/or fever over 101 F   Persistent vomiting and/or vomiting blood   Severe abdominal pain   Severe chest pain   Black, tarry stools   Bleeding- more than one tablespoon   Any other symptom or condition that you feel may need urgent attention  Your doctor recommends these additional instructions:  If any biopsies were taken, your doctors clinic will contact you in 1 to 2   weeks with any results.  - Discharge patient to home.   - Patient has a contact number available for emergencies.  The signs and   symptoms of potential delayed complications were discussed with the   patient.  Return to normal activities tomorrow.  Written discharge   instructions were provided to the patient.   - Resume previous diet.   - Continue present medications.   - Await pathology results.   - No repeat colonoscopy due to age.  For questions, problems or results please call your physician - Adrian Barry MD at Work:  [phone].  [facility] [place], EMERGENCY ROOM PHONE NUMBER: [phone]  IF A COMPLICATION OR EMERGENCY SITUATION ARISES AND YOU ARE UNABLE TO REACH   YOUR PHYSICIAN - GO DIRECTLY TO THE EMERGENCY ROOM.  Adrian Barry MD  10/7/2024 12:44:55 PM  This report has been verified and signed electronically.  Dear patient,  As a result of recent federal legislation (The Federal Cures Act), you may   receive lab or pathology results from your procedure in your [facility]   account before your physician is able to contact you. Your physician or   their representative will relay the results to you with their   recommendations at their soonest availability.  Thank you,  PROVATION

## 2024-10-07 NOTE — TELEPHONE ENCOUNTER
Spoke with patient in regard to CTU and urine culture. 5 day course of Macrobid sent to pharmacy of choice.

## 2024-10-07 NOTE — H&P
Short Stay Endoscopy History and Physical      Procedure - Colonoscopy  ASA - per anesthesia  Mallampati - per anesthesia  History of Anesthesia problems - no  Family history Anesthesia problems - no   Plan of anesthesia - MAC    HPI:  This is a 79 y.o. female here for surveillance of colon polyps with recent incomplete colonoscopy due to difficult colon and restricted mobility of the sigmoid.       ROS:  Constitutional: No fevers, chills  CV: No chest pain  Pulm: No cough, No shortness of breath  GI: see HPI    Medical History:  has a past medical history of Anemia, Arthritis, Cataract, Gout, arthritis, HTN (hypertension), benign, Polyneuropathy, Type 2 diabetes mellitus with diabetic nephropathy (4/12/2016), and Vitamin D deficiency disease.    Surgical History:  has a past surgical history that includes Rotator cuff repair; Colonoscopy w/ biopsies and polypectomy; Carpal tunnel release; Hemorrhoid surgery; Hysterectomy; Colonoscopy (N/A, 3/7/2018); Anterior cervical discectomy w/ fusion (N/A, 3/19/2020); Injection of joint (Bilateral, 4/12/2022); Epidural steroid injection (N/A, 6/3/2022); Transforaminal epidural injection of steroid (Bilateral, 12/2/2022); Epidural steroid injection into lumbar spine (Left, 10/26/2023); Trigger finger release (Left, 2/2/2024); and Colonoscopy (N/A, 7/15/2024).    Family History: family history includes Benign prostatic hyperplasia in her brother; COPD in her brother and brother; Cancer in her father; Cataracts in her sister; Diabetes in her mother, sister, and sister; Drug abuse in her brother; Gout in her brother; Heart attack in her brother and brother; Heart disease in her brother, brother, father, and mother; Kidney disease in her brother and brother; Kidney failure in her brother; Lupus in her sister; No Known Problems in her daughter and son.    Social History:  reports that she has never smoked. She has never used smokeless tobacco. She reports that she does not drink  alcohol and does not use drugs.    Review of patient's allergies indicates:   Allergen Reactions    Gabapentin Other (See Comments)     ineffective    Januvia [sitagliptin] Other (See Comments)     Pancreatitis         Medications:   Medications Prior to Admission   Medication Sig Dispense Refill Last Dose/Taking    allopurinoL (ZYLOPRIM) 300 MG tablet Take 1 tablet (300 mg total) by mouth once daily. 90 tablet 3 10/6/2024    amLODIPine (NORVASC) 10 MG tablet TAKE 1 TABLET EVERY DAY 90 tablet 3 10/6/2024    aspirin (ECOTRIN) 81 MG EC tablet Take 81 mg by mouth once daily.   10/6/2024    atorvastatin (LIPITOR) 40 MG tablet TAKE 1 TABLET EVERY DAY 90 tablet 3 10/6/2024    DULoxetine (CYMBALTA) 30 MG capsule TAKE 1 CAPSULE ONE TIME DAILY 90 capsule 3 10/6/2024    empagliflozin (JARDIANCE) 10 mg tablet Take 1 tablet (10 mg total) by mouth once daily. 90 tablet 4 10/6/2024    metFORMIN (GLUCOPHAGE-XR) 500 MG ER 24hr tablet Take 1 tablet (500 mg total) by mouth 2 (two) times daily with meals. 180 tablet 3 10/6/2024    mirabegron (MYRBETRIQ) 25 mg Tb24 ER tablet Take 1 tablet (25 mg total) by mouth once daily. 90 tablet 3 10/6/2024    nitrofurantoin, macrocrystal-monohydrate, (MACROBID) 100 MG capsule Take 1 capsule (100 mg total) by mouth every 12 (twelve) hours. for 5 days 10 capsule 0 10/6/2024    omeprazole (PRILOSEC) 20 MG capsule TAKE 1 CAPSULE TWICE DAILY (Patient taking differently: Take 20 mg by mouth once daily.) 180 capsule 3 10/6/2024    pioglitazone (ACTOS) 30 MG tablet TAKE 1 TABLET EVERY DAY 90 tablet 1 10/6/2024    pregabalin (LYRICA) 50 MG capsule One at bedtime 30 capsule 2 10/6/2024    TOUJEO SOLOSTAR U-300 INSULIN 300 unit/mL (1.5 mL) InPn pen Inject 10 Units into the skin once daily. 3 Pen 1 10/6/2024    acetaminophen (TYLENOL) 500 MG tablet Take 2 tablets (1,000 mg total) by mouth 3 (three) times daily. 21 tablet 0     alcohol swabs (DROPSAFE ALCOHOL PREP PADS) PadM USE TWICE DAILY AS DIRECTED, E 11.9  "200 each 3     blood glucose control, normal Soln True Metrix control solution E11.9 - pt tests twice daily 1 each 1     blood-glucose meter kit Check glucose daily E11.9 meter covered by insurance Don Metrix 1 each 0     lancets (TRUEPLUS LANCETS) 28 gauge Misc 1 lancet by Misc.(Non-Drug; Combo Route) route 2 (two) times daily. 200 each 2     LIDOcaine (LIDODERM) 5 % Place 1 patch onto the skin once daily. Remove & Discard patch within 12 hours or as directed by MD 30 patch 2     losartan (COZAAR) 100 MG tablet TAKE 1 TABLET ONE TIME DAILY 90 tablet 3     oxyCODONE-acetaminophen (PERCOCET) 5-325 mg per tablet Take 1 tablet by mouth every 12 (twelve) hours as needed for Pain. 60 tablet 0     pen needle, diabetic (NOVOFINE 32) 32 gauge x 1/4" Ndle Use with insulin daily. 90 each 1     solifenacin (VESICARE) 10 MG tablet Take 1 tablet (10 mg total) by mouth once daily. 90 tablet 3     TRUE METRIX GLUCOSE TEST STRIP Strp TEST BLOOD SUGAR TWO TIMES DAILY 200 strip 3          Physical Exam:    Vital Signs:   Vitals:    10/07/24 1041   BP: (!) 174/79   Pulse:    Resp:    Temp:        General Appearance: Well appearing in no acute distress  Eyes:    No scleral icterus  Lungs: CTA bilaterally  Heart:  reg rate and rhythm   Abdomen: Soft, non tender, non distended with positive bowel sounds      Labs:  Lab Results   Component Value Date    WBC 9.38 04/25/2024    HGB 10.7 (L) 04/25/2024    HCT 32.7 (L) 04/25/2024    MCV 91 04/25/2024     04/25/2024        BMP  Lab Results   Component Value Date     06/26/2024    K 4.2 06/26/2024     (H) 06/26/2024    CO2 20 (L) 06/26/2024    BUN 31 (H) 06/26/2024    CREATININE 1.4 06/26/2024    CALCIUM 10.1 06/26/2024    ANIONGAP 10 06/26/2024    ESTGFRAFRICA 39.0 (A) 07/12/2022    EGFRNONAA 33.8 (A) 07/12/2022     Lab Results   Component Value Date    INR 1.0 03/13/2020    INR 1.0 02/05/2018          Assessment:  79 y.o. female with history of colon polyps and " difficult/incomplete colonoscopy.     Plan:  Proceed with colonoscopy today, device-assisted as needed.  I have explained the risks and benefits of endoscopy procedures to the patient including but not limited to bleeding, perforation, infection, and death.  All questions and answered.        Adrian Barry MD

## 2024-10-07 NOTE — ANESTHESIA PREPROCEDURE EVALUATION
10/07/2024  Lina Davis is a 79 y.o., female.      Pre-op Assessment    I have reviewed the Patient Summary Reports.       I have reviewed the Medications.     Review of Systems  Anesthesia Hx:   History of prior surgery of interest to airway management or planning:            Denies Personal Hx of Anesthesia complications.                    Cardiovascular:     Hypertension                                        Renal/:  Chronic Renal Disease, CKD                Hepatic/GI:     GERD             Musculoskeletal:  Arthritis               Neurological:        Peripheral Neuropathy                          Endocrine:  Diabetes               Physical Exam  General: Well nourished    Airway:  Mallampati: III / II  Mouth Opening: Normal  TM Distance: Normal  Tongue: Normal    Chest/Lungs:  Normal Respiratory Rate    Heart:  Rate: Normal        Anesthesia Plan  Type of Anesthesia, risks & benefits discussed:    Anesthesia Type: Gen Natural Airway  Intra-op Monitoring Plan: Standard ASA Monitors  Post Op Pain Control Plan: multimodal analgesia  Induction:  IV  Informed Consent: Informed consent signed with the Patient and all parties understand the risks and agree with anesthesia plan.  All questions answered.   ASA Score: 3  Day of Surgery Review of History & Physical: H&P Update referred to the surgeon/provider.  Anesthesia Plan Notes: NPO confirmed  NO history of anesthesia problems .    Ready For Surgery From Anesthesia Perspective.     .

## 2024-10-09 LAB
FINAL PATHOLOGIC DIAGNOSIS: NORMAL
GROSS: NORMAL
Lab: NORMAL

## 2024-10-11 ENCOUNTER — OFFICE VISIT (OUTPATIENT)
Dept: INTERNAL MEDICINE | Facility: CLINIC | Age: 79
End: 2024-10-11
Payer: MEDICARE

## 2024-10-11 ENCOUNTER — TELEPHONE (OUTPATIENT)
Dept: UROLOGY | Facility: CLINIC | Age: 79
End: 2024-10-11
Payer: MEDICARE

## 2024-10-11 VITALS
HEART RATE: 87 BPM | DIASTOLIC BLOOD PRESSURE: 60 MMHG | BODY MASS INDEX: 27.07 KG/M2 | WEIGHT: 172.5 LBS | HEIGHT: 67 IN | SYSTOLIC BLOOD PRESSURE: 110 MMHG | OXYGEN SATURATION: 95 %

## 2024-10-11 DIAGNOSIS — Z79.4 TYPE 2 DIABETES MELLITUS WITH DIABETIC NEPHROPATHY, WITH LONG-TERM CURRENT USE OF INSULIN: Chronic | ICD-10-CM

## 2024-10-11 DIAGNOSIS — E55.9 MILD VITAMIN D DEFICIENCY: ICD-10-CM

## 2024-10-11 DIAGNOSIS — I10 HTN (HYPERTENSION), BENIGN: Primary | ICD-10-CM

## 2024-10-11 DIAGNOSIS — E78.5 HYPERLIPIDEMIA, UNSPECIFIED HYPERLIPIDEMIA TYPE: ICD-10-CM

## 2024-10-11 DIAGNOSIS — Z23 NEED FOR VACCINATION: ICD-10-CM

## 2024-10-11 DIAGNOSIS — E11.21 TYPE 2 DIABETES MELLITUS WITH DIABETIC NEPHROPATHY, WITH LONG-TERM CURRENT USE OF INSULIN: Chronic | ICD-10-CM

## 2024-10-11 PROCEDURE — G0008 ADMIN INFLUENZA VIRUS VAC: HCPCS | Mod: HCNC,S$GLB,, | Performed by: INTERNAL MEDICINE

## 2024-10-11 PROCEDURE — 99999 PR PBB SHADOW E&M-EST. PATIENT-LVL IV: CPT | Mod: PBBFAC,HCNC,, | Performed by: INTERNAL MEDICINE

## 2024-10-11 PROCEDURE — 1101F PT FALLS ASSESS-DOCD LE1/YR: CPT | Mod: HCNC,CPTII,S$GLB, | Performed by: INTERNAL MEDICINE

## 2024-10-11 PROCEDURE — 90653 IIV ADJUVANT VACCINE IM: CPT | Mod: HCNC,S$GLB,, | Performed by: INTERNAL MEDICINE

## 2024-10-11 PROCEDURE — 99214 OFFICE O/P EST MOD 30 MIN: CPT | Mod: HCNC,S$GLB,, | Performed by: INTERNAL MEDICINE

## 2024-10-11 PROCEDURE — 1159F MED LIST DOCD IN RCRD: CPT | Mod: HCNC,CPTII,S$GLB, | Performed by: INTERNAL MEDICINE

## 2024-10-11 PROCEDURE — 3288F FALL RISK ASSESSMENT DOCD: CPT | Mod: HCNC,CPTII,S$GLB, | Performed by: INTERNAL MEDICINE

## 2024-10-11 PROCEDURE — 3074F SYST BP LT 130 MM HG: CPT | Mod: HCNC,CPTII,S$GLB, | Performed by: INTERNAL MEDICINE

## 2024-10-11 PROCEDURE — 3078F DIAST BP <80 MM HG: CPT | Mod: HCNC,CPTII,S$GLB, | Performed by: INTERNAL MEDICINE

## 2024-10-11 RX ORDER — OXYCODONE AND ACETAMINOPHEN 5; 325 MG/1; MG/1
1 TABLET ORAL EVERY 12 HOURS PRN
Qty: 60 TABLET | Refills: 0 | Status: SHIPPED | OUTPATIENT
Start: 2024-10-11

## 2024-10-11 RX ORDER — OMEPRAZOLE 20 MG/1
CAPSULE, DELAYED RELEASE ORAL
Qty: 180 CAPSULE | Refills: 3 | Status: SHIPPED | OUTPATIENT
Start: 2024-10-11

## 2024-10-11 NOTE — TELEPHONE ENCOUNTER
LMOR for pt re: confirming procedure appt for Monday.   LM with location & arrival time for 2:45  
19-Nov-2017 09:37

## 2024-10-13 ENCOUNTER — TELEPHONE (OUTPATIENT)
Dept: INTERNAL MEDICINE | Facility: CLINIC | Age: 79
End: 2024-10-13

## 2024-10-13 NOTE — PROGRESS NOTES
Subjective:       Patient ID: Lina Davis is a 79 y.o. female.    Chief Complaint: Follow-up    HPIPt is doing okay except for L hand pain.  No CP or SOB.  Review of Systems   Respiratory:  Negative for shortness of breath (PND or orthopnea).    Cardiovascular:  Negative for chest pain (arm pain or jaw pain).   Gastrointestinal:  Negative for abdominal pain, diarrhea, nausea and vomiting.   Genitourinary:  Negative for dysuria.   Neurological:  Negative for seizures, syncope and headaches.       Objective:      Physical Exam  Constitutional:       General: She is not in acute distress.     Appearance: She is well-developed.   HENT:      Head: Normocephalic.   Eyes:      Pupils: Pupils are equal, round, and reactive to light.   Neck:      Thyroid: No thyromegaly.      Vascular: No JVD.   Cardiovascular:      Rate and Rhythm: Normal rate and regular rhythm.      Heart sounds: Normal heart sounds. No murmur heard.     No friction rub. No gallop.   Pulmonary:      Effort: Pulmonary effort is normal.      Breath sounds: Normal breath sounds. No wheezing or rales.   Abdominal:      General: Bowel sounds are normal. There is no distension.      Palpations: Abdomen is soft. There is no mass.      Tenderness: There is no abdominal tenderness. There is no guarding or rebound.   Musculoskeletal:      Cervical back: Neck supple.   Lymphadenopathy:      Cervical: No cervical adenopathy.   Skin:     General: Skin is warm and dry.   Neurological:      Mental Status: She is alert and oriented to person, place, and time.      Deep Tendon Reflexes: Reflexes are normal and symmetric.   Psychiatric:         Behavior: Behavior normal.         Thought Content: Thought content normal.         Judgment: Judgment normal.         Assessment:       1. HTN (hypertension), benign    2. Type 2 diabetes mellitus with diabetic nephropathy, with long-term current use of insulin    3. Hyperlipidemia, unspecified hyperlipidemia type    4. Mild  vitamin D deficiency    5. Need for vaccination        Plan:   HTN (hypertension), benign  -     Hypertension Digital Medicine (HDMP) Enrollment Order  -     CBC Auto Differential; Future; Expected date: 10/11/2024  -     Comprehensive Metabolic Panel; Future; Expected date: 10/11/2024  -     TSH; Future; Expected date: 10/11/2024    Type 2 diabetes mellitus with diabetic nephropathy, with long-term current use of insulin  -     Diabetes Digital Medicine (DDMP) Enrollment Order  -     Hemoglobin A1C; Future; Expected date: 10/11/2024  -     Urinalysis Microscopic; Future; Expected date: 10/12/2024  -     Urinalysis; Future; Expected date: 10/11/2024  -     Microalbumin/Creatinine Ratio, Urine; Future; Expected date: 10/11/2024    Hyperlipidemia, unspecified hyperlipidemia type  -     Lipid Panel; Future; Expected date: 10/11/2024    Mild vitamin D deficiency  -     Vitamin D; Future; Expected date: 10/11/2024    Need for vaccination  -     Influenza - Trivalent (Adjuvanted)    Other orders  -     omeprazole (PRILOSEC) 20 MG capsule; TAKE 1 CAPSULE TWICE DAILY  Dispense: 180 capsule; Refill: 3  -     oxyCODONE-acetaminophen (PERCOCET) 5-325 mg per tablet; Take 1 tablet by mouth every 12 (twelve) hours as needed for Pain.  Dispense: 60 tablet; Refill: 0

## 2024-10-14 ENCOUNTER — LAB VISIT (OUTPATIENT)
Dept: LAB | Facility: HOSPITAL | Age: 79
End: 2024-10-14
Attending: INTERNAL MEDICINE
Payer: MEDICARE

## 2024-10-14 ENCOUNTER — PROCEDURE VISIT (OUTPATIENT)
Dept: UROLOGY | Facility: CLINIC | Age: 79
End: 2024-10-14
Payer: MEDICARE

## 2024-10-14 VITALS
RESPIRATION RATE: 18 BRPM | HEART RATE: 99 BPM | BODY MASS INDEX: 26.24 KG/M2 | DIASTOLIC BLOOD PRESSURE: 55 MMHG | WEIGHT: 167.56 LBS | TEMPERATURE: 98 F | SYSTOLIC BLOOD PRESSURE: 113 MMHG

## 2024-10-14 DIAGNOSIS — Z79.4 TYPE 2 DIABETES MELLITUS WITH DIABETIC NEPHROPATHY, WITH LONG-TERM CURRENT USE OF INSULIN: Chronic | ICD-10-CM

## 2024-10-14 DIAGNOSIS — R31.29 MICROSCOPIC HEMATURIA: ICD-10-CM

## 2024-10-14 DIAGNOSIS — E11.21 TYPE 2 DIABETES MELLITUS WITH DIABETIC NEPHROPATHY, WITH LONG-TERM CURRENT USE OF INSULIN: Chronic | ICD-10-CM

## 2024-10-14 DIAGNOSIS — E78.5 HYPERLIPIDEMIA, UNSPECIFIED HYPERLIPIDEMIA TYPE: ICD-10-CM

## 2024-10-14 DIAGNOSIS — I10 HTN (HYPERTENSION), BENIGN: ICD-10-CM

## 2024-10-14 LAB
ALBUMIN SERPL BCP-MCNC: 3.8 G/DL (ref 3.5–5.2)
ALP SERPL-CCNC: 63 U/L (ref 55–135)
ALT SERPL W/O P-5'-P-CCNC: 9 U/L (ref 10–44)
ANION GAP SERPL CALC-SCNC: 9 MMOL/L (ref 8–16)
AST SERPL-CCNC: 14 U/L (ref 10–40)
BASOPHILS # BLD AUTO: 0.03 K/UL (ref 0–0.2)
BASOPHILS NFR BLD: 0.4 % (ref 0–1.9)
BILIRUB SERPL-MCNC: 0.4 MG/DL (ref 0.1–1)
BUN SERPL-MCNC: 38 MG/DL (ref 8–23)
CALCIUM SERPL-MCNC: 10.5 MG/DL (ref 8.7–10.5)
CHLORIDE SERPL-SCNC: 113 MMOL/L (ref 95–110)
CHOLEST SERPL-MCNC: 128 MG/DL (ref 120–199)
CHOLEST/HDLC SERPL: 3 {RATIO} (ref 2–5)
CO2 SERPL-SCNC: 19 MMOL/L (ref 23–29)
CREAT SERPL-MCNC: 1.9 MG/DL (ref 0.5–1.4)
DIFFERENTIAL METHOD BLD: ABNORMAL
EOSINOPHIL # BLD AUTO: 0.1 K/UL (ref 0–0.5)
EOSINOPHIL NFR BLD: 1.1 % (ref 0–8)
ERYTHROCYTE [DISTWIDTH] IN BLOOD BY AUTOMATED COUNT: 14.6 % (ref 11.5–14.5)
EST. GFR  (NO RACE VARIABLE): 26.9 ML/MIN/1.73 M^2
ESTIMATED AVG GLUCOSE: 114 MG/DL (ref 68–131)
GLUCOSE SERPL-MCNC: 198 MG/DL (ref 70–110)
HBA1C MFR BLD: 5.6 % (ref 4–5.6)
HCT VFR BLD AUTO: 29.5 % (ref 37–48.5)
HDLC SERPL-MCNC: 43 MG/DL (ref 40–75)
HDLC SERPL: 33.6 % (ref 20–50)
HGB BLD-MCNC: 9.3 G/DL (ref 12–16)
IMM GRANULOCYTES # BLD AUTO: 0.05 K/UL (ref 0–0.04)
IMM GRANULOCYTES NFR BLD AUTO: 0.6 % (ref 0–0.5)
LDLC SERPL CALC-MCNC: 60.6 MG/DL (ref 63–159)
LYMPHOCYTES # BLD AUTO: 2.3 K/UL (ref 1–4.8)
LYMPHOCYTES NFR BLD: 28.3 % (ref 18–48)
MCH RBC QN AUTO: 29.9 PG (ref 27–31)
MCHC RBC AUTO-ENTMCNC: 31.5 G/DL (ref 32–36)
MCV RBC AUTO: 95 FL (ref 82–98)
MONOCYTES # BLD AUTO: 0.5 K/UL (ref 0.3–1)
MONOCYTES NFR BLD: 6.1 % (ref 4–15)
NEUTROPHILS # BLD AUTO: 5.1 K/UL (ref 1.8–7.7)
NEUTROPHILS NFR BLD: 63.5 % (ref 38–73)
NONHDLC SERPL-MCNC: 85 MG/DL
NRBC BLD-RTO: 0 /100 WBC
PLATELET # BLD AUTO: 263 K/UL (ref 150–450)
PMV BLD AUTO: 10.5 FL (ref 9.2–12.9)
POTASSIUM SERPL-SCNC: 4.4 MMOL/L (ref 3.5–5.1)
PROT SERPL-MCNC: 7.8 G/DL (ref 6–8.4)
RBC # BLD AUTO: 3.11 M/UL (ref 4–5.4)
SODIUM SERPL-SCNC: 141 MMOL/L (ref 136–145)
TRIGL SERPL-MCNC: 122 MG/DL (ref 30–150)
WBC # BLD AUTO: 8 K/UL (ref 3.9–12.7)

## 2024-10-14 PROCEDURE — 52000 CYSTOURETHROSCOPY: CPT | Mod: HCNC,S$GLB,, | Performed by: UROLOGY

## 2024-10-14 PROCEDURE — 83036 HEMOGLOBIN GLYCOSYLATED A1C: CPT | Mod: HCNC | Performed by: INTERNAL MEDICINE

## 2024-10-14 PROCEDURE — 80053 COMPREHEN METABOLIC PANEL: CPT | Mod: HCNC | Performed by: INTERNAL MEDICINE

## 2024-10-14 PROCEDURE — 85025 COMPLETE CBC W/AUTO DIFF WBC: CPT | Mod: HCNC | Performed by: INTERNAL MEDICINE

## 2024-10-14 PROCEDURE — 80061 LIPID PANEL: CPT | Mod: HCNC | Performed by: INTERNAL MEDICINE

## 2024-10-14 PROCEDURE — 84443 ASSAY THYROID STIM HORMONE: CPT | Mod: HCNC | Performed by: INTERNAL MEDICINE

## 2024-10-14 RX ORDER — LIDOCAINE HYDROCHLORIDE 20 MG/ML
JELLY TOPICAL ONCE
Status: COMPLETED | OUTPATIENT
Start: 2024-10-14 | End: 2024-10-14

## 2024-10-14 RX ADMIN — LIDOCAINE HYDROCHLORIDE 5 ML: 20 JELLY TOPICAL at 03:10

## 2024-10-14 NOTE — PATIENT INSTRUCTIONS
What to Expect After a Cystoscopy  For the next 24-48 hours, you may feel a mild burning when you urinate. This burning is normal and expected. Drink plenty of water to dilute the urine to help relieve the burning sensation. You may also see a small amount of blood in your urine after the procedure.    Unless you are already taking antibiotics, you may be given an antibiotic after the test to prevent infection.    Signs and Symptoms to Report  Call the [facility] at [phone] if you develop any of the following:  Fever of 101 degrees or higher  Chills or persistent bleeding  Inability to urinate

## 2024-10-14 NOTE — PROCEDURES
Cystoscopy    Date/Time: 10/14/2024 3:00 PM    Performed by: Rich Watson MD  Authorized by: Korin Salcedo NP      Office Cystoscopy Procedure Note    Date of Procedure: 10/14/2024    Indication:  Hematuria      Informed consent:  The risks, benefits, complications, treatment options, and expected outcomes were discussed with the patient. The patient concurred with the proposed plan and provided informed consent.     Anesthesia: Lidocaine jelly 2%     Antibiotic prophylaxis: None     Procedure:  The patient was placed in the lithotomy position, was prepped and draped in the usual manner using sterile technique, and 2% lidocaine jelly instilled into the urethra.  A procedural timeout was performed identifying the patient, all in attendance were in agreement, including the patient. A 17 F flexible cystoscope was then inserted into the urethra and the urethra and bladder carefully examined.  The following findings were noted:     Findings:   1. Normal anterior urethra without evidence of strictures or tumors   2. Bladder free of masses, tumors, lesions.  Ureteral orifices in orthotopic position bilaterally    3. Grade II cystocele present      Specimens: None   Complications: None; patient tolerated the procedure well          Disposition: Home after brief observation   Condition: Stable     Assessment:  79-year-old female with microscopic hematuria and a cystocele, overactive bladder    Plan:  Discussed the patient's cystocele and lack of concerning findings on cystoscopy.  Given her overactive bladder, we will have her follow up with [facility], and discussed possible referral for overactive bladder and or a cystocele repair.    Attending Attestation:      I personally performed the procedure.     Rich Watson  4:07 PM  10/14/2024

## 2024-10-15 LAB — TSH SERPL DL<=0.005 MIU/L-ACNC: 1.47 UIU/ML (ref 0.4–4)

## 2024-10-16 ENCOUNTER — CLINICAL SUPPORT (OUTPATIENT)
Dept: REHABILITATION | Facility: HOSPITAL | Age: 79
End: 2024-10-16
Payer: MEDICARE

## 2024-10-16 DIAGNOSIS — M25.60 RANGE OF MOTION DEFICIT: Primary | ICD-10-CM

## 2024-10-16 PROCEDURE — 97140 MANUAL THERAPY 1/> REGIONS: CPT

## 2024-10-16 PROCEDURE — 97110 THERAPEUTIC EXERCISES: CPT

## 2024-10-16 PROCEDURE — 97530 THERAPEUTIC ACTIVITIES: CPT

## 2024-10-16 NOTE — PROGRESS NOTES
[facility]  Occupational Therapy Treatment Note     Date: 10/16/2024  Name: Lina Davis  Clinic Number: [id number]    Therapy Diagnosis:   Encounter Diagnosis   Name Primary?    Range of motion deficit Yes     Physician: Justus Isabel MD      Physician Orders: Eval and Treat  Medical Diagnosis: M65.322 (ICD-10-CM) - Trigger finger, left index finger   Surgical Procedure and Date: Trigger finger release, 02/02/2024  Evaluation Date: 9/25/2024  Insurance Authorization Period Expiration: 09/23/2024 - 09/23/2025  Plan of Care Certification Period: 09/25/2024 - 11/25/2024  Date of Return to MD: 10/22/2024  Visit # / Visits authorized: 2 / 20  FOTO: 1/ 3    Lobby Code: [email or web address]      Precautions:  Standard     Time In: 2:00 PM  Time Out: 2:55 PM  Total Billable Time: 55 minutes      Subjective     Patient reports: She has burning on the dorsal forearm that wakes her up.   She was compliant with home exercise program given last session.   Response to previous treatment:blaise well  Functional change: none at this time    Pain: 6/10  Location: left arms     Objective     Observation/Appearance:  Skin intact and clean     Edema. Measured in centimeters.    9/25/2024 9/25/2024 10/16/2024     Right  Left  Left   Proximal Wrist Crease 16.8 16.5 15.6   Figure of 8 43 40.4 42.4   MCPs 20.4 19.4 19         Hand ROM. Measured in degrees.    9/25/2024 9/25/2024 10/16/2024     Right  Left  Left            Index: MP  Some age-related changes and extension lags.  -23/80 -8/90              PIP       0/105 0/95              DIP   0/46 0/38              WHITE   208 215            Long:  MP   0/82 0/87              PIP   0/103 0/105              DIP   0/46 0/45              WHITE   231 237            Ring:   MP   0/85 0/90              PIP   -5/95 -4/100              DIP   0/63 0/58              WHITE   238 244            Small:  MP   0/85 0/90               PIP   -14/93 -8/95               DIP   0/62 0/65               WHITE   226 242            Thumb: MP   NT NT                IP            Rad ADD/ABD            Pal ADD/ABD               Opposition   P1 os SF       Elbow and Wrist ROM. Measured in degrees.    9/25/2024 9/25/2024 10/16/2024     Left Right Left   Supination/Pronation WFL WFL WFL   Wrist Ext/Flex 65/55 60/65 70/65   Wrist RD/UD WFL WFL WFL      Cervical Rotation:  Left = 45  Right  = 45     Sensation: The patient reports burning sensation into the tips of her fingers. Formal testing using semmes-lionel to be performed if sensation deficits persist.   Median and Ulnar Distribution 9/25/2024 9/25/2024     Right  Left    [place] Lionel     Normal 1.65-2.83       Diminished Light Touch 3.22-3.61       Diminished Protective 3.84-4.31 Yes - 4.31 for Median/Ulnar/Radial  Yes  - Median/Ulnar   Loss of Protective 4.56-6.65    Yes - Radial   Untestable >6.65       2 Point Discrimination       Static       Dynamic          Special Tests:   Left    9/25/2024   Intrinsic Tightness Test -   Froment's Sign +   Maudsley's Test = (+)  MP Flexion Test = (+)  Supinator resistance for RTS = (+)     Neural Tension Testing:  Radial nerve:      Right  = (-)      Left  = (+)    Cervical Distraction Test:     Left = (+)     Right  = (-)    Cervical Rotation:  Left = 45  Right  = 45     Strength (Dyanmometer) and Pinch Strength (Pinch Gauge)  Measured in pounds and psi. Average of three trials.    10/16/2024 10/16/2024     Right  Left    Rung II  45; 45; 45  AVG = 45  39; 29; 33  AVG = 33.66   Key Pinch       3pt Pinch       2pt Pinch             Manual Muscle Testing  WE = 5/5     Intake Outcome Measure for FOTO hand Survey     Therapist reviewed FOTO scores for [place] FERNANDO Davis on 9/25/2024.   FOTO report - see Media section or FOTO account episode details.     Intake Score: - %      Comments: Pt requires assistance for tablet use. FOTO to be administered upon initial sessions    Treatment     [place] received the treatments listed  below:        Hot pack to the left elbow for 10 minutes for improved pain relief prior to exercises, neuromuscular re-education and activities.  - NT    manual therapy techniques: Joint mobilizations, Manual Lymphatic Drainage and Soft tissue Mobilization were applied to the: left forearm for 15 minutes, including:  - STM to the  entire left forearm, extensor wad, carpal tunnel.     therapeutic exercises to develop endurance, ROM, and flexibility for 0 minutes including:  NT    neuromuscular re-education activities to improve: Coordination and Proprioception for 20 minutes. The following activities were included:  - passive radial nerve glide x 5  - radial nerve glide at elbow x 5 - NT  - ulnar nerve glide x 5 - NT  - tendon glides x 10  - Digit abduction/adduciton x 10 - NT  - MP extension lifts x 10- NT  - Reverse blocking x 10- NT  WE Eccentric with 2#: 2 x 10  Supinator eccentric with light mallet hammer: 2 x 10  Robberies x 10 -  for increased proximal stability  Special testing for cervical radiculopathy    therapeutic activities to improve functional performance for 20 minutes, including:  - Juxacisor x 2'  - towel walks x 2'  - objective measurements re-assessed this date.   - education on interpretation of cervical radiculopathy results   - education on PT referral and communication with referring MD/PA for PT referral    Patient Education and Home Exercises     Education provided:   - tendon and nerve health realtionship  - Progress towards goals     Written Home Exercises Provided: Pt instructed to continue prior HEP.  Exercises were reviewed and Lina was able to demonstrate them prior to the end of the session.  [place] demonstrated good  understanding of the home exercise program provided. See electronic medical record under Patient Instructions for exercises provided during therapy sessions.       Assessment      Pt required modeling and multiple verbal and tactile cues for appropriate performance of HEP  this date. Good performance following increased education. Continued burning along the dorsal forearms and into the fingers but with improvement at end of session.Increased range of motion measurements this date. Special testing for cervical radiculopathy performed this date with the patient testing positive for 3 out of 4 signs, including decreased cervical rotation less than 60 degrees, a positive neural tension test for the radial nerve and a positive cervical distraction test. Fair  strength this date. The therapist recognizes some forearm pain does come from extensor tendon inflammation or overuse, which may impact symptom interpretation during testing.The patient further describes lateral epicondylitis symptoms but has consistently been impacted by a burning sensation in the dorsal forearm into the fingers. Therapy to focus on pain relief, hand strengthening, tissue elasticity of the forearm and improved function during daily activity.     [place] is progressing well towards her goals and there are no updates to goals at this time. Pt prognosis is Good.     Patient will continue to benefit from skilled outpatient occupational therapy to address the deficits listed in the problem list on initial evaluation provide patient/family education and to maximize patient's level of independence in the home and community environment.     Patient's spiritual, cultural and educational needs considered and patient agreeable to plan of care and goals.    Anticipated barriers to occupational therapy: potential nerve related deficits     Goals:  Short Term Goals (4 weeks)   1)  Patient to be IND with HEP and modalities for pain management  2)   Increase left digits 2, 4-5 WHITE by 3-5 degrees or more to increase functional hand use for ADLs/work/leisure activities  3)  Assess  and pinch and set goals accordingly   4) Decrease complaints of pain to  3 out of 10 at worst to increase functional hand use for ADL/work/leisure  activities. progressing not met 9/25/2024  5)   Patient to score at -% or more on FOTO to demonstrate improved perception of functional hand use. progressing not met 9/25/2024   6)   Pt will return to near to prior level of function for ADLs and household management reporting I or Mod I with ADLs (dressing, feeding, grooming, toileting). progressing not met 9/25/2024     Long Term Goals (8 weeks)   1)  Increase left digits 2 & 5 WHITE by 8-10 degrees or more to increase functional hand use for ADLs/work/leisure activities  2)Decrease complaints of pain to  1 out of 10 at worst to increase functional hand use for ADL/work/leisure activities. progressing not met 9/25/2024  3)   Patient to score at -% or more on FOTO to demonstrate improved perception of functional hand use. progressing not met 9/25/2024   4)   Pt will return to near to prior level of function for ADLs and household management reporting I or Mod I with ADLs (dressing, feeding, grooming, toileting). progressing not met 9/25/2024     Plan     Updates/Grading for next session: Continue OT FLO [place] OT   10/16/2024

## 2024-10-22 ENCOUNTER — OFFICE VISIT (OUTPATIENT)
Dept: ORTHOPEDICS | Facility: CLINIC | Age: 79
End: 2024-10-22
Payer: MEDICARE

## 2024-10-22 ENCOUNTER — HOSPITAL ENCOUNTER (OUTPATIENT)
Dept: RADIOLOGY | Facility: HOSPITAL | Age: 79
Discharge: HOME | End: 2024-10-22
Payer: MEDICARE

## 2024-10-22 DIAGNOSIS — M79.642 LEFT HAND PAIN: Primary | ICD-10-CM

## 2024-10-22 DIAGNOSIS — M79.642 LEFT HAND PAIN: ICD-10-CM

## 2024-10-22 DIAGNOSIS — M25.522 LEFT ELBOW PAIN: ICD-10-CM

## 2024-10-22 PROCEDURE — 73080 X-RAY EXAM OF ELBOW: CPT | Mod: 26,HCNC,LT, | Performed by: RADIOLOGY

## 2024-10-22 PROCEDURE — 1125F AMNT PAIN NOTED PAIN PRSNT: CPT | Mod: HCNC,CPTII,S$GLB,

## 2024-10-22 PROCEDURE — 1159F MED LIST DOCD IN RCRD: CPT | Mod: HCNC,CPTII,S$GLB,

## 2024-10-22 PROCEDURE — 73130 X-RAY EXAM OF HAND: CPT | Mod: 26,HCNC,LT, | Performed by: RADIOLOGY

## 2024-10-22 PROCEDURE — 99999 PR PBB SHADOW E&M-EST. PATIENT-LVL IV: CPT | Mod: PBBFAC,HCNC,,

## 2024-10-22 PROCEDURE — 20605 DRAIN/INJ JOINT/BURSA W/O US: CPT | Mod: HCNC,LT,S$GLB,

## 2024-10-22 PROCEDURE — 1160F RVW MEDS BY RX/DR IN RCRD: CPT | Mod: HCNC,CPTII,S$GLB,

## 2024-10-22 PROCEDURE — 73130 X-RAY EXAM OF HAND: CPT | Mod: TC,HCNC,LT

## 2024-10-22 PROCEDURE — 73080 X-RAY EXAM OF ELBOW: CPT | Mod: TC,HCNC,LT

## 2024-10-22 PROCEDURE — 99214 OFFICE O/P EST MOD 30 MIN: CPT | Mod: 25,HCNC,S$GLB,

## 2024-10-22 RX ORDER — TRIAMCINOLONE ACETONIDE 40 MG/ML
20 INJECTION, SUSPENSION INTRA-ARTICULAR; INTRAMUSCULAR
Status: DISCONTINUED | OUTPATIENT
Start: 2024-10-22 | End: 2024-10-22 | Stop reason: HOSPADM

## 2024-10-22 RX ADMIN — TRIAMCINOLONE ACETONIDE 20 MG: 40 INJECTION, SUSPENSION INTRA-ARTICULAR; INTRAMUSCULAR at 10:10

## 2024-10-22 NOTE — PROGRESS NOTES
[facility]  History & Physical  Orthopedics    SUBJECTIVE:      Chief Complaint: Bilateral Carpal Tunnel Syndrome    Referring Provider: No ref. provider found     History of Present Illness:  Patient is a 79 y.o. right hand dominant female who presents today with complaints of numbness, tingling, and pain of the left hand since spring 2024. Patient denies recent trauma or an inciting event. The patient reports the pain is predominantly located in her left hand and radiates up her forearm to the lateral epicondyle. She reports her pain, numbness and tingling are intermittent and worse at night. She reports pain is alleviated by nothing. Pain is disruptive to sleep at night. Patient reports previous treatment includes activity modifications, rest, tylenol, and nocturnal bracing.  Patient reports changes to  strength, weakness, numbness, and tingling. She previously had several trigger finger releases with Dr. Isabel in February 2024 for which she reports she is doing very well. She also had an ACDF performed by Dr. Walton in 2020 for which she reports she is doing well. She also previous had an open carpal tunnel release at [facility] several years ago. She presents today for initial evaluation with no further complaints.     Interval history October 22, 2024:  The patient returns for re-evaluation.  She notes she has had increased pain, and states her pain is a 7/10 today.  She categorizes her pain as burning and throbbing in nature.  She reports her pain is predominantly located on the ulnar side of the left hand and radiates proximally to the lateral epicondyle and medial epicondyle.  She has tried tennis elbow strap with minimal relief.  She also has attended occupational therapy twice a week with moderate relief.  She presents today for re-evaluation and to discuss further intervention with no new complaints.    The patient is a/an retired.    Onset of symptoms/DOI was Spring 2024.    Symptoms  are aggravated by activity, movement, at night, and during the day.    Symptoms are alleviated by rest.    Symptoms consist of pain and numbness/tingling.    The patient rates their pain as a 7/10    Attempted treatment(s) and/or interventions include activity modifications, rest, tylenol, nocturnal bracing, and occupational therapy.     The patient denies any fevers, chills, N/V, D/C and presents for evaluation.       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    Vitamin D deficiency disease      Past Surgical History:   Procedure Laterality Date    ANTERIOR CERVICAL DISCECTOMY W/ FUSION N/A 3/19/2020    Procedure: DISCECTOMY, SPINE, CERVICAL, ANTERIOR APPROACH, WITH FUSION, C3-C4, C4-C5, C5-C6;  Surgeon: Joseph Walton MD;  [facility];  Service: Neurosurgery;  Laterality: N/A;    CARPAL TUNNEL RELEASE      bilateral    COLONOSCOPY N/A 3/7/2018    Procedure: COLONOSCOPY;  Surgeon: Luís Soni MD;  Location: [facility] (2ND FLR);  Service: Endoscopy;  Laterality: N/A;  ok to schedule per Elizabeth    COLONOSCOPY N/A 7/15/2024    Procedure: COLONOSCOPY;  Surgeon: Dread Forman MD;  Location: [facility] (4TH FLR);  Service: Endoscopy;  Laterality: N/A;  Referred by:Dr. SHIRLEY Kay   Prep: Miralax  Route instructions sent: portal and mail  Other concerns: DM oral and insulin; his. of polyps  7/8-pre call complete-GT    COLONOSCOPY N/A 10/7/2024    Procedure: COLONOSCOPY;  Surgeon: Adrian Barry MD;  Location: [facility] (2ND FLR);  Service: Endoscopy;  Laterality: N/A;  peg prep/ prp inst mailed and sent to pt via portal  Regarding: FW: Colonoscopy, possible device-assisted  From: Adrian Barry MD  Sent: 7/15/2024  10:56 AM CDT  To: [facility]  Subject: Colonoscopy, possible device-assisted           Procedu    COLONOSCOPY W/ BIOPSIES AND POLYPECTOMY      EPIDURAL STEROID INJECTION N/A 6/3/2022    Procedure: LESI L5-S1;  Surgeon: Leland Pena DO;  Location: [facility] OR;  Service: Pain Management;  Laterality: N/A;    EPIDURAL STEROID INJECTION INTO LUMBAR SPINE Left 10/26/2023    Procedure: LT L5-S1 TFESI;  Surgeon: Leland Pena DO;  Location: [facility] PAIN MANAGEMENT;  Service: Pain Management;  Laterality: Left;  15 mins    HEMORRHOID SURGERY      HYSTERECTOMY      AUB    INJECTION OF JOINT Bilateral 4/12/2022    Procedure: SACROILIAC JOINT Steroid Injection-Bilateral;  Surgeon: Leland Pena DO;  Location: [facility] OR;  Service: Pain Management;  Laterality: Bilateral;    ROTATOR CUFF REPAIR      bilateral    TRANSFORAMINAL EPIDURAL INJECTION OF STEROID Bilateral 12/2/2022    Procedure: TFESI (B/L) L4-5;  Surgeon: Leland Pena DO;  Location: [facility] OR;  Service: Pain Management;  Laterality: Bilateral;    TRIGGER FINGER RELEASE Left 2/2/2024    Procedure: RELEASE, TRIGGER FINGER - left index, long, ring, and small fingers;  Surgeon: Justus Isaebl MD;  Location: [facility] OR;  Service: Orthopedics;  Laterality: Left;     Review of patient's allergies indicates:   Allergen Reactions    Gabapentin Other (See Comments)     ineffective    Januvia [sitagliptin] Other (See Comments)     Pancreatitis       Social History     Social History Narrative    Not on file     Family History   Problem Relation Name Age of Onset    Heart disease Mother      Diabetes Mother      Heart disease Father      Cancer Father          liver    Diabetes Sister Shawna     Cataracts Sister Shawna     Kidney disease Brother david     Heart disease Brother david     Diabetes Sister elijah     COPD Brother jordi     COPD Brother sancho     Gout Brother kylie     Benign prostatic hyperplasia Brother kylie     Heart disease Brother kylie     Kidney disease Brother kylie     Heart attack Brother kylie         heart  attack    Kidney failure Brother kylie     Lupus Sister thanh     Heart attack Brother santiago     Drug abuse Brother santiago     No Known Problems Daughter      No Known Problems Son      Amblyopia Neg Hx      Blindness Neg Hx      Breast cancer Neg Hx      Colon cancer Neg Hx      Ovarian cancer Neg Hx           Current Outpatient Medications:     acetaminophen (TYLENOL) 500 MG tablet, Take 2 tablets (1,000 mg total) by mouth 3 (three) times daily., Disp: 21 tablet, Rfl: 0    alcohol swabs (DROPSAFE ALCOHOL PREP PADS) PadM, USE TWICE DAILY AS DIRECTED, E 11.9, Disp: 200 each, Rfl: 3    allopurinoL (ZYLOPRIM) 300 MG tablet, Take 1 tablet (300 mg total) by mouth once daily., Disp: 90 tablet, Rfl: 3    amLODIPine (NORVASC) 10 MG tablet, TAKE 1 TABLET EVERY DAY, Disp: 90 tablet, Rfl: 3    aspirin (ECOTRIN) 81 MG EC tablet, Take 81 mg by mouth once daily., Disp: , Rfl:     atorvastatin (LIPITOR) 40 MG tablet, TAKE 1 TABLET EVERY DAY, Disp: 90 tablet, Rfl: 3    blood glucose control, normal Soln, True Metrix control solution E11.9 - pt tests twice daily, Disp: 1 each, Rfl: 1    blood-glucose meter kit, Check glucose daily E11.9 meter covered by insurance Don Metrix, Disp: 1 each, Rfl: 0    DULoxetine (CYMBALTA) 30 MG capsule, TAKE 1 CAPSULE ONE TIME DAILY, Disp: 90 capsule, Rfl: 3    empagliflozin (JARDIANCE) 10 mg tablet, Take 1 tablet (10 mg total) by mouth once daily., Disp: 90 tablet, Rfl: 4    lancets (TRUEPLUS LANCETS) 28 gauge Misc, 1 lancet by Misc.(Non-Drug; Combo Route) route 2 (two) times daily., Disp: 200 each, Rfl: 2    LIDOcaine (LIDODERM) 5 %, Place 1 patch onto the skin once daily. Remove & Discard patch within 12 hours or as directed by MD, Disp: 30 patch, Rfl: 2    losartan (COZAAR) 100 MG tablet, TAKE 1 TABLET ONE TIME DAILY, Disp: 90 tablet, Rfl: 3    metFORMIN (GLUCOPHAGE-XR) 500 MG ER 24hr tablet, Take 1 tablet (500 mg total) by mouth 2 (two) times daily with meals., Disp: 180 tablet, Rfl: 3     "mirabegron (MYRBETRIQ) 25 mg Tb24 ER tablet, Take 1 tablet (25 mg total) by mouth once daily., Disp: 90 tablet, Rfl: 3    omeprazole (PRILOSEC) 20 MG capsule, TAKE 1 CAPSULE TWICE DAILY, Disp: 180 capsule, Rfl: 3    oxyCODONE-acetaminophen (PERCOCET) 5-325 mg per tablet, Take 1 tablet by mouth every 12 (twelve) hours as needed for Pain., Disp: 60 tablet, Rfl: 0    pen needle, diabetic (NOVOFINE 32) 32 gauge x 1/4" Ndle, Use with insulin daily., Disp: 90 each, Rfl: 1    pioglitazone (ACTOS) 30 MG tablet, TAKE 1 TABLET EVERY DAY, Disp: 90 tablet, Rfl: 1    pregabalin (LYRICA) 50 MG capsule, One at bedtime, Disp: 30 capsule, Rfl: 2    solifenacin (VESICARE) 10 MG tablet, Take 1 tablet (10 mg total) by mouth once daily., Disp: 90 tablet, Rfl: 3    TOUJEO SOLOSTAR U-300 INSULIN 300 unit/mL (1.5 mL) InPn pen, Inject 10 Units into the skin once daily., Disp: 3 Pen, Rfl: 1    TRUE METRIX GLUCOSE TEST STRIP Strp, TEST BLOOD SUGAR TWO TIMES DAILY, Disp: 200 strip, Rfl: 3  No current facility-administered medications for this visit.    Facility-Administered Medications Ordered in Other Visits:     fentaNYL 50 mcg/mL injection 100 mcg, 100 mcg, Intravenous, PRN, Robert Alfaro PA-C, 100 mcg at 02/02/24 0640    midazolam (VERSED) 1 mg/mL injection 1 mg, 1 mg, Intravenous, PRN, Robert Alfaro PA-C, 1 mg at 02/02/24 0640    Review of Systems:  Constitutional: no fever or chills  Eyes: no visual changes  ENT: no nasal congestion or sore throat  Respiratory: no cough or shortness of breath  Cardiovascular: no chest pain  Gastrointestinal: no nausea or vomiting, tolerating diet  Musculoskeletal: pain, soreness, and numbness/tingling    OBJECTIVE:      Vital Signs (Most Recent):  There were no vitals filed for this visit.    There is no height or weight on file to calculate BMI.      Physical Exam:  Constitutional: The patient appears well-developed and well-nourished. No distress.   Skin: No lesions appreciated  Head: " Normocephalic and atraumatic.   Nose: Nose normal.   Ears: No deformities seen  Eyes: Conjunctivae and EOM are normal.   Neck: No tracheal deviation present.   Cardiovascular: Normal rate and intact distal pulses.    Pulmonary/Chest: Effort normal. No respiratory distress.   Abdominal: There is no guarding.   Neurological: The patient is alert.   Psychiatric: The patient has a normal mood and affect.     Bilateral Hand/Wrist Examination:    Observation/Inspection:  Swelling  none    Deformity  none  Discoloration  none     Scars   Previous open carpal tunnel release scar, well-healed, on the left hand     Atrophy  none    HAND/WRIST EXAMINATION:  TTP to lateral epicondyle  Pain with active resistance to extension of the left hand    Neurovascular Exam:  Digits WWP, brisk CR < 3s throughout  NVI motor/LTS to M/R/U nerves, radial pulse 2+  Tinel's Test - Carpal Tunnel  negative bilaterally  Tinel's Test - Cubital Tunnel  negative bilaterally  Phalen's Test    negative bilaterally  Median Nerve Compression Test negative bilaterally    ROM hand full, painless except pain at the lateral epicondyle with resisted extension    ROM wrist full, painless    ROM elbow full, except with pain with supination and pronation; she is mildly tender to palpate the lateral epicondyle; she is moderately tender to palpate the medial elbow    Abdomen not guarded  Respirations nonlabored  Perfusion intact    Diagnostic Results:     Imaging - I independently viewed the patient's imaging as well as the radiology report.  Xrays of the patient's left hand demonstrate no evidence of any acute fractures or dislocations or significant degenerative changes. X-rays of the patient's left elbow reveal no fracture or dislocation. No fat pad elevation.      FINDINGS:  Bony structures are intact mild degenerative changes seen particularly at the 1st carpal-metacarpal joint space.    FINDINGS:  No fracture or dislocation.  No fat pad elevation.  Cartilage  spaces are maintained.  Few small osteophytes.  Calcification of the insertion point of the triceps tendon, consistent with enthesopathy.  Calcifications projecting over the ulnar aspect of the elbow, likely representing calcification of the ulnar collateral ligament.  The remainder of the soft tissues are unremarkable.    EMG -   Impression:  There is electrophysiologic evidence of a left sensorimotor median mononeuropathy across the wrist (I.e. Carpal tunnel syndrome).  There is no motor axonal loss.  There is no active denervation.  This is graded as Moderate in severity on the left.  When compared to the study from 2/2022, there has been improvement in distal latency, but slight worsening in sensory amplitude.  Note that the slowing at the wrist is relatively mild.  After successful release, mild slowing can persist indefinitely, so some of these changes could also be residual changes from the prior CTS.             ASSESSMENT/PLAN:      79 y.o. yo female with Left Lateral Epicondylitis, Left CTS    Plan: The patient and I had a thorough discussion today.  We discussed the working diagnosis as well as several other potential alternative diagnoses.  Treatment options were discussed, both conservative and surgical.  Conservative treatment options would include things such as activity modifications, workplace modifications, a period of rest, oral vs topical OTC and prescription anti-inflammatory medications, occupational therapy, splinting/bracing, immobilization, corticosteroid injections, and others.  Surgical options were discussed as well.     At this time, the patient would like to proceed with continuation of conservative management.  She is interested in a left elbow injection today.  She is a diabetic; therefore, I recommended she continue to monitor her glucose closely.  A medial epicondyle corticosteroid injection was performed in office today patient tolerated well with no immediate paresthesias or pain.   The patient will continue conservative management, but she does struggle with finances for attending occupational therapy.  Therefore, I recommended she continue with home exercises, topical Voltaren gel, topical lidocaine 4% gel, and the tennis elbow strap.  She will return to clinic in 6 weeks for a follow up appointment with Dr. Isabel or sooner for any issues.    Should the patient's symptoms worsen, persist, or fail to improve they should return for reevaluation and I would be happy to see them back anytime.    Please do not hesitate to reach out to us via email, phone, or MyChart with any questions, concerns, or feedback.         Cassandra Wiley PA-C

## 2024-10-22 NOTE — PROCEDURES
Small Joint Aspiration/Injection - Medial Epicondyle Injection    Date/Time: 10/22/2024 10:00 AM    Performed by: Cassandra Wiley PA-C  Authorized by: Cassandra Wiley PA-C    Consent Done?:  Yes (Verbal)  Indications:  Pain  Site marked: the procedure site was marked    Timeout: prior to procedure the correct patient, procedure, and site was verified    Local anesthesia used?: Yes    Anesthesia:  Local infiltration  Local anesthetic:  Lidocaine 1% without epinephrine  Location: left elbow.  Ultrasonic guidance for needle placement?: No    Needle size:  25 G  Approach:  Medial  Medications:  20 mg triamcinolone acetonide 40 mg/mL  Aspirate:  Clear

## 2024-10-23 ENCOUNTER — DOCUMENTATION ONLY (OUTPATIENT)
Dept: REHABILITATION | Facility: HOSPITAL | Age: 79
End: 2024-10-23
Payer: MEDICARE

## 2024-10-23 NOTE — PROGRESS NOTES
Patient no-showed today's appointment; appointment was for outpatient occupational therapy services. No charges for this date.      - DEANDRE Cassidy

## 2024-10-24 ENCOUNTER — OFFICE VISIT (OUTPATIENT)
Dept: OPTOMETRY | Facility: CLINIC | Age: 79
End: 2024-10-24
Payer: MEDICARE

## 2024-10-24 DIAGNOSIS — H52.203 MYOPIA OF BOTH EYES WITH ASTIGMATISM AND PRESBYOPIA: ICD-10-CM

## 2024-10-24 DIAGNOSIS — H52.13 MYOPIA OF BOTH EYES WITH ASTIGMATISM AND PRESBYOPIA: ICD-10-CM

## 2024-10-24 DIAGNOSIS — E11.9 DIABETES MELLITUS TYPE 2 WITHOUT RETINOPATHY: Primary | ICD-10-CM

## 2024-10-24 DIAGNOSIS — H52.4 MYOPIA OF BOTH EYES WITH ASTIGMATISM AND PRESBYOPIA: ICD-10-CM

## 2024-10-24 DIAGNOSIS — E11.21 TYPE 2 DIABETES MELLITUS WITH DIABETIC NEPHROPATHY, WITHOUT LONG-TERM CURRENT USE OF INSULIN: Chronic | ICD-10-CM

## 2024-10-24 DIAGNOSIS — H25.813 COMBINED FORM OF SENILE CATARACT OF BOTH EYES: ICD-10-CM

## 2024-10-24 PROCEDURE — 92014 COMPRE OPH EXAM EST PT 1/>: CPT | Mod: HCNC,S$GLB,, | Performed by: OPTOMETRIST

## 2024-10-24 PROCEDURE — 1159F MED LIST DOCD IN RCRD: CPT | Mod: HCNC,CPTII,S$GLB, | Performed by: OPTOMETRIST

## 2024-10-24 PROCEDURE — 92250 FUNDUS PHOTOGRAPHY W/I&R: CPT | Mod: HCNC,S$GLB,, | Performed by: OPTOMETRIST

## 2024-10-24 PROCEDURE — 1160F RVW MEDS BY RX/DR IN RCRD: CPT | Mod: HCNC,CPTII,S$GLB, | Performed by: OPTOMETRIST

## 2024-10-24 PROCEDURE — 99999 PR PBB SHADOW E&M-EST. PATIENT-LVL III: CPT | Mod: PBBFAC,HCNC,, | Performed by: OPTOMETRIST

## 2024-10-24 NOTE — PROGRESS NOTES
HPI    OSIRIS: 02/22/2021  Last DFE: 02/22/2021  Chief complaint (CC): 80 yo F here for annual eye exam. Problems with   night time driving due to glare. No problems with near vision.   Glasses? OTC readers as needed  Contacts? None   H/o eye surgery, injections or laser: None  H/o eye injury: None  Known eye conditions? None  Family h/o eye conditions? None  Eye gtts? None      (-) Flashes (-)  Floaters (-) Mucous   (-)  Tearing (-) Itching (-) Burning   (-) Headaches (-) Eye Pain/discomfort (-) Irritation   (-)  Redness (-) Double vision (-) Blurry vision    CL Exam: No    Diabetic? Yes 20 + years  A1c? Lab Results       Component                Value               Date                       HGBA1C                   5.6                 10/14/2024             Last edited by Yosef Otoole OD on 10/24/2024  1:11 PM.            Assessment /Plan     For exam results, see Encounter Report.        Diabetes mellitus type 2 without retinopathy  Type 2 diabetes mellitus with diabetic nephropathy, without long-term current use of insulin  - Pt educated on the importance of maintaining adequate glycemic and blood pressure control via diet, compliance with medications, exercise and timely follow up with PCP.  No diabetic retinopathy, No CSME.   - Based on Optos Review- RTC 3 months for DFE    Combined form of senile cataract of both eyes   - Visually significant.     Myopia of both eyes with astigmatism and presbyopia   - New Spectacle Rx given, discussed different options for glasses.

## 2024-10-30 ENCOUNTER — LAB VISIT (OUTPATIENT)
Dept: LAB | Facility: HOSPITAL | Age: 79
End: 2024-10-30
Payer: MEDICARE

## 2024-10-30 DIAGNOSIS — Z79.4 TYPE 2 DIABETES MELLITUS WITH DIABETIC NEPHROPATHY, WITH LONG-TERM CURRENT USE OF INSULIN: Chronic | ICD-10-CM

## 2024-10-30 DIAGNOSIS — E11.21 TYPE 2 DIABETES MELLITUS WITH DIABETIC NEPHROPATHY, WITH LONG-TERM CURRENT USE OF INSULIN: Chronic | ICD-10-CM

## 2024-10-30 LAB
ALBUMIN SERPL BCP-MCNC: 3.6 G/DL (ref 3.5–5.2)
ANION GAP SERPL CALC-SCNC: 9 MMOL/L (ref 8–16)
BUN SERPL-MCNC: 30 MG/DL (ref 8–23)
CALCIUM SERPL-MCNC: 9.9 MG/DL (ref 8.7–10.5)
CHLORIDE SERPL-SCNC: 110 MMOL/L (ref 95–110)
CO2 SERPL-SCNC: 23 MMOL/L (ref 23–29)
CREAT SERPL-MCNC: 1.5 MG/DL (ref 0.5–1.4)
EST. GFR  (NO RACE VARIABLE): 35.7 ML/MIN/1.73 M^2
ESTIMATED AVG GLUCOSE: 117 MG/DL (ref 68–131)
GLUCOSE SERPL-MCNC: 162 MG/DL (ref 70–110)
HBA1C MFR BLD: 5.7 % (ref 4–5.6)
PHOSPHATE SERPL-MCNC: 2.9 MG/DL (ref 2.7–4.5)
POTASSIUM SERPL-SCNC: 4.6 MMOL/L (ref 3.5–5.1)
SODIUM SERPL-SCNC: 142 MMOL/L (ref 136–145)

## 2024-10-30 PROCEDURE — 36415 COLL VENOUS BLD VENIPUNCTURE: CPT | Mod: HCNC | Performed by: NURSE PRACTITIONER

## 2024-10-30 PROCEDURE — 80069 RENAL FUNCTION PANEL: CPT | Mod: HCNC | Performed by: NURSE PRACTITIONER

## 2024-10-30 PROCEDURE — 83036 HEMOGLOBIN GLYCOSYLATED A1C: CPT | Mod: HCNC | Performed by: NURSE PRACTITIONER

## 2024-10-31 ENCOUNTER — TELEPHONE (OUTPATIENT)
Dept: OPTOMETRY | Facility: CLINIC | Age: 79
End: 2024-10-31
Payer: MEDICARE

## 2024-10-31 DIAGNOSIS — E11.21 TYPE 2 DIABETES MELLITUS WITH DIABETIC NEPHROPATHY, WITH LONG-TERM CURRENT USE OF INSULIN: ICD-10-CM

## 2024-10-31 DIAGNOSIS — Z79.4 TYPE 2 DIABETES MELLITUS WITH DIABETIC NEPHROPATHY, WITH LONG-TERM CURRENT USE OF INSULIN: ICD-10-CM

## 2024-10-31 RX ORDER — METFORMIN HYDROCHLORIDE 500 MG/1
500 TABLET, EXTENDED RELEASE ORAL 2 TIMES DAILY WITH MEALS
Qty: 180 TABLET | Refills: 3 | Status: SHIPPED | OUTPATIENT
Start: 2024-10-31

## 2024-11-01 NOTE — PROGRESS NOTES
Subjective:      Patient ID: Lina Davis is a 79 y.o. female.    Chief Complaint:  Diabetes    History of Present Illness  Lina Davis is here for follow up of T2DM and Thyroid Nodules.  Previously seen by me 7/2024.     With regards to Diabetes:    Diagnosed: ~2002/2003  DE: 7/2021  FH of DM: sister, brother   Denies any of her children have DM     Known complications:  DKA Denies  RN Denies  Eye Exam: 10/2024  PN : Yes   Podiatry: 12/2020  Nephropathy : Yes   CAD : Denies  Episode of pancreatitis in 2018 - attributed to Januvia    Diet/Exercise:   Eats 2-3 meals a day.   Snacks : occasionally - fig newtons.   Drinks : water, 1 or 2 8oz soft drinks a week, lemonade but will cut it with water.   Exercise - tries to stay active.  Recent illness, injury, steroids: Denies     Current regimen: PAP - SGLT2i and insulin   Metformin 500mg twice daily   Jardiance 10mg daily -- off for 3 days due to waiting on refill   Actos 30mg daily   Toujeo 10 units daily     Reports compliance.    Other medications tried:  Januvia- Discontinued in 2018 due to pancreatitis  Novolog    Glucose Monitor:   1 times a day testing  Log reviewed: log reviewed               Hypoglycemia:  Denies  Knows how to correct with 15 grams of carbs- juice, coke, or a peppermint.     Diabetes Management Status    Hemoglobin A1C   Date Value Ref Range Status   10/30/2024 5.7 (H) 4.0 - 5.6 % Final     Comment:     ADA Screening Guidelines:  5.7-6.4%  Consistent with prediabetes  >or=6.5%  Consistent with diabetes    High levels of fetal hemoglobin interfere with the HbA1C  assay. Heterozygous hemoglobin variants (HbS, HgC, etc)do  not significantly interfere with this assay.   However, presence of multiple variants may affect accuracy.     10/14/2024 5.6 4.0 - 5.6 % Final     Comment:     ADA Screening Guidelines:  5.7-6.4%  Consistent with prediabetes  >or=6.5%  Consistent with diabetes    High levels of fetal hemoglobin interfere with the  HbA1C  assay. Heterozygous hemoglobin variants (HbS, HgC, etc)do  not significantly interfere with this assay.   However, presence of multiple variants may affect accuracy.     06/26/2024 6.1 (H) 4.0 - 5.6 % Final     Comment:     ADA Screening Guidelines:  5.7-6.4%  Consistent with prediabetes  >or=6.5%  Consistent with diabetes    High levels of fetal hemoglobin interfere with the HbA1C  assay. Heterozygous hemoglobin variants (HbS, HgC, etc)do  not significantly interfere with this assay.   However, presence of multiple variants may affect accuracy.         Statin: Taking  ACE/ARB: Taking  Screening or Prevention Patient's value Goal Complete/Controlled?   HgA1C Testing and Control   Lab Results   Component Value Date    HGBA1C 5.7 (H) 10/30/2024      Annually/Less than 8% Yes   Lipid profile : 10/14/2024 Annually Yes   LDL control Lab Results   Component Value Date    LDLCALC 60.6 (L) 10/14/2024    Annually/Less than 100 mg/dl  Yes   Nephropathy screening Lab Results   Component Value Date    LABMICR 33.0 05/02/2023     Lab Results   Component Value Date    PROTEINUA Negative 10/14/2024    Annually Yes   Blood pressure BP Readings from Last 1 Encounters:   11/06/24 (!) 116/58    Less than 140/90 No   Dilated retinal exam : 10/24/2024 Annually Yes   Foot exam   Most Recent Foot Exam Date: Not Found Annually No     With regards to Thyroid Nodule:    Thyroid US:   6/2023  COMPARISON:  Neck ultrasound dated 08/05/2022.  Our records indicate a benign FNA of the left lobe nodule in 1/2023 and a non-diagnostic FNA of the right lobe nodule in 1/2023.  When compared to the prior study the above nodules are stable in size and features  Impression:  1.) Thyroid gland is normal in size with heterogeneous echotexture and normal vascularity  2.) 2.4 x 2.1 x 2.0 cm solid, isoechoic nodule is seen in the right mid lobe  3.) 0.8 x 0.4 x 0.6 cm spongiform nodule is seen in the right inferior pole  4.) 2.4 x 1.8 x 2.4 cm solid,  "isoechoic nodule is seen in the left inferior pole  5.) 1.1 x 0.5 x 1.1 cm spongiform nodule is seen in the left inferior pole  6.) 1.0 x 0.6 x 1.0 cm solid, isoechoic nodule is seen in the isthmus  RECOMMENDATIONS:  Recommend repeat thyroid ultrasound in 1-2 years.    FNA:   1/23/2023  R mid: Unsatisfactory  L inferior: Benign    Signs or Symptoms:   Difficulty breathing: Denies  Difficulty swallowing: Denies  Voice Changes: Denies  FH of thyroid cancer: Denies  Personal history of radiation treatment or exposure: Denies    Lab Results   Component Value Date    TSH 1.468 10/14/2024    F4ADHKY 6.0 11/26/2008    FREET4 1.10 08/08/2017       With regards to the Hypercalcemia:    Lab Results   Component Value Date    PTH 52.5 06/26/2024    CALCIUM 9.9 10/30/2024    PHOS 2.9 10/30/2024     Lab Results   Component Value Date    ALBUMIN 3.6 10/30/2024                       Daily intake of calcium is: Denies  Daily intake of vitamin D:  OTC Vit D3 3000iu daily     BMD:    12/2018  Normal BMD.  Compared to prior study in 9/2014, BMD has declined 5.0% at the spine and 4.3% at the hip.  RECOMMENDATIONS of [facility]:  1.  Calcium 3507-9311 mg daily and vitamin D 800-1000 units daily, adequate exercise.  2.  No need for repeat study in near future unless clinical change    Denies constipation, depression, muscle aches or pains.  Reports polyuria   Denies fractures, height loss or kidney stones.  Reports history of renal disease.  Denies family history of hyperparathyroidism or calcium problems.  Denies HCTZ., lithium, or chlorthiadone use.      Review of Systems  + Nocturia       Visit Vitals  BP (!) 116/58 (BP Location: Left arm, Patient Position: Sitting)   Pulse 66   Ht 5' 7" (1.702 m)   Wt 80.1 kg (176 lb 9.4 oz)   SpO2 96%   BMI 27.66 kg/m²       Body mass index is 27.66 kg/m².    Lab Review:   Lab Results   Component Value Date    HGBA1C 5.7 (H) 10/30/2024    HGBA1C 5.6 10/14/2024 "    HGBA1C 6.1 (H) 06/26/2024       Lab Results   Component Value Date    CHOL 128 10/14/2024    HDL 43 10/14/2024    LDLCALC 60.6 (L) 10/14/2024    TRIG 122 10/14/2024    CHOLHDL 33.6 10/14/2024     Lab Results   Component Value Date     10/30/2024    K 4.6 10/30/2024     10/30/2024    CO2 23 10/30/2024     (H) 10/30/2024    BUN 30 (H) 10/30/2024    CREATININE 1.5 (H) 10/30/2024    CALCIUM 9.9 10/30/2024    PROT 7.8 10/14/2024    ALBUMIN 3.6 10/30/2024    BILITOT 0.4 10/14/2024    ALKPHOS 63 10/14/2024    AST 14 10/14/2024    ALT 9 (L) 10/14/2024    ANIONGAP 9 10/30/2024    ESTGFRAFRICA 39.0 (A) 07/12/2022    EGFRNONAA 33.8 (A) 07/12/2022    TSH 1.468 10/14/2024     Vit D, 25-Hydroxy   Date Value Ref Range Status   06/26/2024 47 30 - 96 ng/mL Final     Comment:     Vitamin D deficiency.........<10 ng/mL                              Vitamin D insufficiency......10-29 ng/mL       Vitamin D sufficiency........> or equal to 30 ng/mL  Vitamin D toxicity............>100 ng/mL       Assessment and Plan     1. Type 2 diabetes mellitus with diabetic nephropathy, with long-term current use of insulin  Hemoglobin A1C    Comprehensive Metabolic Panel      2. Thyroid nodule        3. Hypercalcemia            Type 2 diabetes mellitus with diabetic nephropathy  -- Labs prior to follow up.  -- A1c goal <7.5%.  -- Medications discussed:  MFM   GLP1-DPP4 - avoid given drug induced pancreatitis (Januvia)  SUNG   SGLT2   Reviewed potential adverse effects of SGLT-2 inhibitors, including genital mycotic infections, slightly increased risk of UTI, hypersensitivity, hypotension, and hyperkalemia. Advised to maintain water intake of 8-10 cups per day. Advised we need to check chemistry panel at baseline and 2 weeks after starting. Discussed FDA warning reports of ketoacidosis associated with SGLT-2 inhibitors. Advised to seek immediate medical attention and stop the medication if symptoms such as difficulty breathing,  nausea, vomiting, abdominal pain, confusion, and unusual fatigue/sleepiness. Discussed possible precipitating factors including major illness/reduced food and fluid intake (advised to stop under these circumstances), and reduced insulin dose. Discussed possible effects of increased fracture risk/decreased bone density. Discussed reports of increased risk of leg and foot amputations with canagliflozin and need to seek urgent care if developed new pain or tenderness, sores or ulcers, or infections in legs/feet.   Insulin   -- Reviewed logs/CGM:  Glucose controlled.  Instructed to send glucose logs in 7 days.    Reach out to me sooner for any glucose <70 or consistently >180.  -- Medication Changes:   Metformin 500mg twice daily   Jardiance 10mg daily   Actos 30mg daily   TRIAL OFF: Toujeo 10 units daily     Off SGLT2i while waiting on refill - will monitor if this helps with the nocturia.     -- Reviewed goals of therapy are to get the best control we can without hypoglycemia.  -- Reviewed patient's current insulin regimen. Clarified proper insulin dose and timing in relation to meals, etc. Insulin injection sites and proper rotation instructed.    -- Advised frequent self blood glucose monitoring.  Patient encouraged to document glucose results and bring them to every clinic visit.  -- Hypoglycemia precautions discussed. Instructed on precautions before driving.    -- Call for Bg repeatedly < 90 or > 180.   -- Close adherence to lifestyle changes recommended.   -- Periodic follow ups for eye evaluations, foot care and dental care suggested.    Thyroid nodule  -- I have reviewed management options including observation, FNA or surgery.  -- FNA procedure explained in depth.  -- Discussed indications for repeat FNA as well as surgical indications (abnormal FNA, compressive symptoms or interval change).  -- Discussed possible results of FNA- Benign, Malignant, FLUS, or insufficient cells.  Discussed results auto released  to patients, but advised the ordering provider will also contact to discuss.   -- All of the patients questions were answered.  -- FNA:   1/23/2023  R mid: Unsatisfactory  L inferior: Benign  -- Repeat thyroid US 6/2025.    Hypercalcemia  -- Intermittent hypercalcemia.   -- Discussed indications for surgery if primary hyperparathyroidism proven.  -- Avoid dehydration, excessive calcium supplementation and meds (HCTZ) that can worsen hypercalcemia.  -- Most recent calcium unremarkable.  -- Prefers to monitor for now - labs.         Follow up in about 4 months (around 3/6/2025).    Visit today included increased complexity associated with the care of the problems addressed and managing the longitudinal care of the patient due to the serious and/or complex managed problems.

## 2024-11-06 ENCOUNTER — OFFICE VISIT (OUTPATIENT)
Dept: ENDOCRINOLOGY | Facility: CLINIC | Age: 79
End: 2024-11-06
Payer: MEDICARE

## 2024-11-06 VITALS
OXYGEN SATURATION: 96 % | HEIGHT: 67 IN | SYSTOLIC BLOOD PRESSURE: 116 MMHG | BODY MASS INDEX: 27.71 KG/M2 | WEIGHT: 176.56 LBS | DIASTOLIC BLOOD PRESSURE: 58 MMHG | HEART RATE: 66 BPM

## 2024-11-06 DIAGNOSIS — E04.1 THYROID NODULE: ICD-10-CM

## 2024-11-06 DIAGNOSIS — Z79.4 TYPE 2 DIABETES MELLITUS WITH DIABETIC NEPHROPATHY, WITH LONG-TERM CURRENT USE OF INSULIN: Primary | Chronic | ICD-10-CM

## 2024-11-06 DIAGNOSIS — E11.21 TYPE 2 DIABETES MELLITUS WITH DIABETIC NEPHROPATHY, WITH LONG-TERM CURRENT USE OF INSULIN: Primary | Chronic | ICD-10-CM

## 2024-11-06 DIAGNOSIS — E83.52 HYPERCALCEMIA: ICD-10-CM

## 2024-11-06 PROCEDURE — 99999 PR PBB SHADOW E&M-EST. PATIENT-LVL V: CPT | Mod: PBBFAC,HCNC,, | Performed by: NURSE PRACTITIONER

## 2024-11-06 NOTE — ASSESSMENT & PLAN NOTE
-- Intermittent hypercalcemia.   -- Discussed indications for surgery if primary hyperparathyroidism proven.  -- Avoid dehydration, excessive calcium supplementation and meds (HCTZ) that can worsen hypercalcemia.  -- Most recent calcium unremarkable.  -- Prefers to monitor for now - labs.

## 2024-11-06 NOTE — ASSESSMENT & PLAN NOTE
-- Labs prior to follow up.  -- A1c goal <7.5%.  -- Medications discussed:  MFM   GLP1-DPP4 - avoid given drug induced pancreatitis (Januvia)  SUNG   SGLT2   Reviewed potential adverse effects of SGLT-2 inhibitors, including genital mycotic infections, slightly increased risk of UTI, hypersensitivity, hypotension, and hyperkalemia. Advised to maintain water intake of 8-10 cups per day. Advised we need to check chemistry panel at baseline and 2 weeks after starting. Discussed FDA warning reports of ketoacidosis associated with SGLT-2 inhibitors. Advised to seek immediate medical attention and stop the medication if symptoms such as difficulty breathing, nausea, vomiting, abdominal pain, confusion, and unusual fatigue/sleepiness. Discussed possible precipitating factors including major illness/reduced food and fluid intake (advised to stop under these circumstances), and reduced insulin dose. Discussed possible effects of increased fracture risk/decreased bone density. Discussed reports of increased risk of leg and foot amputations with canagliflozin and need to seek urgent care if developed new pain or tenderness, sores or ulcers, or infections in legs/feet.   Insulin   -- Reviewed logs/CGM:  Glucose controlled.  Instructed to send glucose logs in 7 days.    Reach out to me sooner for any glucose <70 or consistently >180.  -- Medication Changes:   Metformin 500mg twice daily   Jardiance 10mg daily   Actos 30mg daily   TRIAL OFF: Toujeo 10 units daily     Off SGLT2i while waiting on refill - will monitor if this helps with the nocturia.     -- Reviewed goals of therapy are to get the best control we can without hypoglycemia.  -- Reviewed patient's current insulin regimen. Clarified proper insulin dose and timing in relation to meals, etc. Insulin injection sites and proper rotation instructed.    -- Advised frequent self blood glucose monitoring.  Patient encouraged to document glucose results and bring them to every  clinic visit.  -- Hypoglycemia precautions discussed. Instructed on precautions before driving.    -- Call for Bg repeatedly < 90 or > 180.   -- Close adherence to lifestyle changes recommended.   -- Periodic follow ups for eye evaluations, foot care and dental care suggested.

## 2024-11-11 ENCOUNTER — TELEPHONE (OUTPATIENT)
Dept: ENDOCRINOLOGY | Facility: CLINIC | Age: 79
End: 2024-11-11
Payer: MEDICARE

## 2024-11-11 NOTE — TELEPHONE ENCOUNTER
Spoke with the patient.  Has been off SGLT2i for > 1 week - this has NOT helped with nocturia. She is still waiting to hear from [facility]. I will message them.  OFF Toujeo - glucose < 140. No indication to restart.  Reach out if glucose > 150.  All questions answered. Patient verbalized understanding.

## 2024-11-13 NOTE — TELEPHONE ENCOUNTER
Is concerned for a possible \"earbud\" stuck in pt's L ear x 2 days. GCS 15, ambulatory    No care due was identified.  [facility] Embedded Care Due Messages. Reference number: [id number].   3/25/2024 1:10:17 PM CDT

## 2024-11-17 DIAGNOSIS — E11.21 TYPE 2 DIABETES MELLITUS WITH DIABETIC NEPHROPATHY, UNSPECIFIED WHETHER LONG TERM INSULIN USE: ICD-10-CM

## 2024-11-17 NOTE — TELEPHONE ENCOUNTER
Provider Staff:  Action required for this patient    Requires labs      Please see care gap opportunities below in Care Due Message.    Thanks!  [facility]     Appointments      Date Provider   Last Visit   10/11/2024 Dora Freedman MD   Next Visit   1/21/2025 Dora Freedman MD     Refill Decision Note   Lina Davis  is requesting a refill authorization.  Brief Assessment and Rationale for Refill:  Approve     Medication Therapy Plan:         Comments:     Note composed:3:48 PM 11/17/2024

## 2024-11-17 NOTE — TELEPHONE ENCOUNTER
Care Due:                  Date            Visit Type   Department     Provider  --------------------------------------------------------------------------------                                EP -                              PRIMARY      MET INTERNAL  Last Visit: 10-      CARE ([facility])   MEDICINE       Dora Freedman                              EP -                              PRIMARY      [facility] INTERNAL  Next Visit: 01-      CARE ([facility])   [facility]       Dora Freedman                                                            Last  Test          Frequency    Reason                     Performed    Due Date  --------------------------------------------------------------------------------    Uric Acid...  12 months..  allopurinoL..............  Not Found    Overdue    [employer] Embedded Care Due Messages. Reference number: [id number].   11/17/2024 6:18:40 AM CST

## 2024-11-18 ENCOUNTER — TELEPHONE (OUTPATIENT)
Dept: ENDOCRINOLOGY | Facility: CLINIC | Age: 79
End: 2024-11-18
Payer: MEDICARE

## 2024-11-18 ENCOUNTER — TELEPHONE (OUTPATIENT)
Dept: OPTOMETRY | Facility: CLINIC | Age: 79
End: 2024-11-18
Payer: MEDICARE

## 2024-11-18 ENCOUNTER — TELEPHONE (OUTPATIENT)
Dept: INTERNAL MEDICINE | Facility: CLINIC | Age: 79
End: 2024-11-18
Payer: MEDICARE

## 2024-11-18 NOTE — TELEPHONE ENCOUNTER
----- Message from Rodo sent at 11/18/2024  9:44 AM CST -----  Contact: [phone]  [email or web address]     Patient is calling for Medical Advice regarding: Pt said she needs a call back about a referral     How long has patient had these symptoms:    Pharmacy name and phone#:    Patient wants a call back or thru [facility]: call back     Comments:    Please advise patient replies from provider may take up to 48 hours.

## 2024-11-20 DIAGNOSIS — N18.31 STAGE 3A CHRONIC KIDNEY DISEASE: ICD-10-CM

## 2024-11-20 DIAGNOSIS — E11.21 TYPE 2 DIABETES MELLITUS WITH DIABETIC NEPHROPATHY, WITH LONG-TERM CURRENT USE OF INSULIN: ICD-10-CM

## 2024-11-20 DIAGNOSIS — Z79.4 TYPE 2 DIABETES MELLITUS WITH DIABETIC NEPHROPATHY, WITH LONG-TERM CURRENT USE OF INSULIN: ICD-10-CM

## 2024-11-26 ENCOUNTER — TELEPHONE (OUTPATIENT)
Dept: PHARMACY | Facility: CLINIC | Age: 79
End: 2024-11-26
Payer: MEDICARE

## 2024-11-26 NOTE — TELEPHONE ENCOUNTER
The prescription portion of Ms. Ryan 's Jardiance application was received from the providers office. The completed patient assistance application has been submitted to  [employer] for consideration of eligibility.

## 2024-12-10 ENCOUNTER — TELEPHONE (OUTPATIENT)
Dept: UROLOGY | Facility: CLINIC | Age: 79
End: 2024-12-10
Payer: MEDICARE

## 2024-12-12 ENCOUNTER — OFFICE VISIT (OUTPATIENT)
Dept: UROLOGY | Facility: CLINIC | Age: 79
End: 2024-12-12
Payer: MEDICARE

## 2024-12-12 VITALS
DIASTOLIC BLOOD PRESSURE: 64 MMHG | HEART RATE: 84 BPM | HEIGHT: 67 IN | SYSTOLIC BLOOD PRESSURE: 141 MMHG | BODY MASS INDEX: 27.55 KG/M2 | WEIGHT: 175.5 LBS

## 2024-12-12 DIAGNOSIS — N32.81 OVERACTIVE BLADDER: ICD-10-CM

## 2024-12-12 DIAGNOSIS — R35.1 NOCTURIA MORE THAN TWICE PER NIGHT: ICD-10-CM

## 2024-12-12 LAB — POC RESIDUAL URINE VOLUME: 20 ML (ref 0–100)

## 2024-12-12 PROCEDURE — 3077F SYST BP >= 140 MM HG: CPT | Mod: HCNC,CPTII,S$GLB,

## 2024-12-12 PROCEDURE — 1159F MED LIST DOCD IN RCRD: CPT | Mod: HCNC,CPTII,S$GLB,

## 2024-12-12 PROCEDURE — 99999 PR PBB SHADOW E&M-EST. PATIENT-LVL V: CPT | Mod: PBBFAC,HCNC,,

## 2024-12-12 PROCEDURE — 51798 US URINE CAPACITY MEASURE: CPT | Mod: HCNC,S$GLB,,

## 2024-12-12 PROCEDURE — 3288F FALL RISK ASSESSMENT DOCD: CPT | Mod: HCNC,CPTII,S$GLB,

## 2024-12-12 PROCEDURE — 1101F PT FALLS ASSESS-DOCD LE1/YR: CPT | Mod: HCNC,CPTII,S$GLB,

## 2024-12-12 PROCEDURE — 3078F DIAST BP <80 MM HG: CPT | Mod: HCNC,CPTII,S$GLB,

## 2024-12-12 PROCEDURE — 99214 OFFICE O/P EST MOD 30 MIN: CPT | Mod: HCNC,S$GLB,,

## 2024-12-12 PROCEDURE — 1160F RVW MEDS BY RX/DR IN RCRD: CPT | Mod: HCNC,CPTII,S$GLB,

## 2024-12-12 PROCEDURE — 1126F AMNT PAIN NOTED NONE PRSNT: CPT | Mod: HCNC,CPTII,S$GLB,

## 2024-12-12 RX ORDER — TOLTERODINE 4 MG/1
4 CAPSULE, EXTENDED RELEASE ORAL DAILY
Qty: 30 CAPSULE | Refills: 11 | Status: CANCELLED | OUTPATIENT
Start: 2024-12-12 | End: 2025-12-12

## 2024-12-12 NOTE — PROGRESS NOTES
CHIEF COMPLAINT:  OAB follow up       HISTORY OF PRESENTING ILLINESS:  Lina Davis is a 79 y.o. female is an established pt with the Urology dept. He was last seen by Korin Salcedo on 10/2/2024 for OAB. She has tried and failed Vesicare. She reports waking up at night 4-5 x's at night. She denies urinary leakage. She consumes mostly water and admits to taking sips throughout the night. She avoids bladder irritants. She denies straining to urinate. She denies feeling a buldge when she coughes or sneezes.    She had a hematuria workup in October which was negative. Dr simmons found a grade II Cystocele with her cystoscopy.             REVIEW OF SYSTEMS:  Review of Systems   Constitutional:  Negative for chills and fever.   Gastrointestinal:  Negative for nausea and vomiting.   Genitourinary:  Negative for dysuria and frequency.        Nocturia 4-5         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    Vitamin D deficiency disease        Past Surgical History:   Procedure Laterality Date    ANTERIOR CERVICAL DISCECTOMY W/ FUSION N/A 3/19/2020    Procedure: DISCECTOMY, SPINE, CERVICAL, ANTERIOR APPROACH, WITH FUSION, C3-C4, C4-C5, C5-C6;  Surgeon: Joseph Walton MD;  Location: [facility] OR [address] [place] [address];  Service: Neurosurgery;  Laterality: N/A;    CARPAL TUNNEL RELEASE      bilateral    COLONOSCOPY N/A 3/7/2018    Procedure: COLONOSCOPY;  Surgeon: Luís Soni MD;  Location: [facility] ENDO (2ND FLR);  Service: Endoscopy;  Laterality: N/A;  ok to schedule per Elizabeth    COLONOSCOPY N/A 7/15/2024    Procedure: COLONOSCOPY;  Surgeon: Dread Forman MD;  Location: [facility] ENDO (4TH FLR);  Service: Endoscopy;  Laterality: N/A;  Referred by:Dr. SHIRLEY Kay   Prep: Miralax  Route instructions sent: portal and mail  Other concerns: DM oral and insulin;  his. of polyps  7/8-pre call complete-GT    COLONOSCOPY N/A 10/7/2024    Procedure: COLONOSCOPY;  Surgeon: Adrian Barry MD;  Location: [address]);  Service: Endoscopy;  Laterality: N/A;  peg prep/ prp inst mailed and sent to pt via portal  Regarding: FW: Colonoscopy, possible device-assisted  From: Adrian Barry MD  Sent: 7/15/2024  10:56 AM CDT  To: [facility]  Subject: Colonoscopy, possible device-assisted          Procedu    COLONOSCOPY W/ BIOPSIES AND POLYPECTOMY      EPIDURAL STEROID INJECTION N/A 6/3/2022    Procedure: LESI L5-S1;  Surgeon: Leland Pena DO;  Location: [facility] OR;  Service: Pain Management;  Laterality: N/A;    EPIDURAL STEROID INJECTION INTO LUMBAR SPINE Left 10/26/2023    Procedure: LT L5-S1 TFESI;  Surgeon: Leland Pena DO;  Location: [facility] PAIN MANAGEMENT;  Service: Pain Management;  Laterality: Left;  15 mins    HEMORRHOID SURGERY      HYSTERECTOMY      AUB    INJECTION OF JOINT Bilateral 4/12/2022    Procedure: SACROILIAC JOINT Steroid Injection-Bilateral;  Surgeon: Leland Pena DO;  Location: [facility] OR;  Service: Pain Management;  Laterality: Bilateral;    ROTATOR CUFF REPAIR      bilateral    TRANSFORAMINAL EPIDURAL INJECTION OF STEROID Bilateral 12/2/2022    Procedure: TFESI (B/L) L4-5;  Surgeon: Leland Pena DO;  Location: [facility] OR;  Service: Pain Management;  Laterality: Bilateral;    TRIGGER FINGER RELEASE Left 2/2/2024    Procedure: RELEASE, TRIGGER FINGER - left index, long, ring, and small fingers;  Surgeon: Justus Isabel MD;  Location: [facility] OR;  Service: Orthopedics;  Laterality: Left;       Family History   Problem Relation Name Age of Onset    Heart disease Mother      Diabetes Mother      Heart disease Father      Cancer Father          liver    Diabetes Sister Shawna     Cataracts Sister Shawna     Kidney disease Brother david     Heart disease Brother david     Diabetes Sister elijah     COPD  Brother jordi     COPD Brother sancho     Gout Brother kylie     Benign prostatic hyperplasia Brother kylie     Heart disease Brother kylie     Kidney disease Brother kylie     Heart attack Brother kylie         heart attack    Kidney failure Brother kylie     Lupus Sister thanh     Heart attack Brother santiago     Drug abuse Brother santiago     No Known Problems Daughter      No Known Problems Son      Amblyopia Neg Hx      Blindness Neg Hx      Breast cancer Neg Hx      Colon cancer Neg Hx      Ovarian cancer Neg Hx         Social History     Socioeconomic History    Marital status:    Tobacco Use    Smoking status: Never    Smokeless tobacco: Never   Substance and Sexual Activity    Alcohol use: No    Drug use: No    Sexual activity: Yes     Partners: Male     Birth control/protection: Surgical       Allergies:  Gabapentin and Januvia [sitagliptin]    Medications:    Current Outpatient Medications:     acetaminophen (TYLENOL) 500 MG tablet, Take 2 tablets (1,000 mg total) by mouth 3 (three) times daily., Disp: 21 tablet, Rfl: 0    alcohol swabs (DROPSAFE ALCOHOL PREP PADS) PadM, USE TWICE DAILY AS DIRECTED, E 11.9, Disp: 200 each, Rfl: 3    allopurinoL (ZYLOPRIM) 300 MG tablet, Take 1 tablet (300 mg total) by mouth once daily., Disp: 90 tablet, Rfl: 3    amLODIPine (NORVASC) 10 MG tablet, TAKE 1 TABLET EVERY DAY, Disp: 90 tablet, Rfl: 3    aspirin (ECOTRIN) 81 MG EC tablet, Take 81 mg by mouth once daily., Disp: , Rfl:     atorvastatin (LIPITOR) 40 MG tablet, TAKE 1 TABLET EVERY DAY, Disp: 90 tablet, Rfl: 3    blood glucose control, normal Soln, True Metrix control solution E11.9 - pt tests twice daily, Disp: 1 each, Rfl: 1    blood sugar diagnostic (TRUE METRIX GLUCOSE TEST STRIP) Strp, Use to test blood glucose two (2) times a day, to be used with insurance-preferred brand of glucometer/supplies., Disp: 200 strip, Rfl: 3    blood-glucose meter kit, Check glucose daily E11.9 meter covered by  "insurance Don Metrix, Disp: 1 each, Rfl: 0    DULoxetine (CYMBALTA) 30 MG capsule, TAKE 1 CAPSULE ONE TIME DAILY, Disp: 90 capsule, Rfl: 3    empagliflozin (JARDIANCE) 10 mg tablet, Take 1 tablet (10 mg total) by mouth once daily., Disp: 90 tablet, Rfl: 4    lancets (TRUEPLUS LANCETS) 28 gauge Misc, 1 lancet by Misc.(Non-Drug; Combo Route) route 2 (two) times daily., Disp: 200 each, Rfl: 2    LIDOcaine (LIDODERM) 5 %, Place 1 patch onto the skin once daily. Remove & Discard patch within 12 hours or as directed by MD, Disp: 30 patch, Rfl: 2    losartan (COZAAR) 100 MG tablet, TAKE 1 TABLET ONE TIME DAILY, Disp: 90 tablet, Rfl: 3    metFORMIN (GLUCOPHAGE-XR) 500 MG ER 24hr tablet, Take 1 tablet (500 mg total) by mouth 2 (two) times daily with meals., Disp: 180 tablet, Rfl: 3    mirabegron (MYRBETRIQ) 25 mg Tb24 ER tablet, Take 1 tablet (25 mg total) by mouth once daily., Disp: 90 tablet, Rfl: 3    omeprazole (PRILOSEC) 20 MG capsule, TAKE 1 CAPSULE TWICE DAILY, Disp: 180 capsule, Rfl: 3    oxyCODONE-acetaminophen (PERCOCET) 5-325 mg per tablet, Take 1 tablet by mouth every 12 (twelve) hours as needed for Pain., Disp: 60 tablet, Rfl: 0    pen needle, diabetic (NOVOFINE 32) 32 gauge x 1/4" Ndle, Use with insulin daily., Disp: 90 each, Rfl: 1    pioglitazone (ACTOS) 30 MG tablet, TAKE 1 TABLET EVERY DAY, Disp: 90 tablet, Rfl: 1    pregabalin (LYRICA) 50 MG capsule, One at bedtime, Disp: 30 capsule, Rfl: 2    solifenacin (VESICARE) 10 MG tablet, Take 1 tablet (10 mg total) by mouth once daily., Disp: 90 tablet, Rfl: 3    TOUJEO SOLOSTAR U-300 INSULIN 300 unit/mL (1.5 mL) InPn pen, Inject 10 Units into the skin once daily., Disp: 3 Pen, Rfl: 1  No current facility-administered medications for this visit.    Facility-Administered Medications Ordered in Other Visits:     fentaNYL 50 mcg/mL injection 100 mcg, 100 mcg, Intravenous, PRN, Robert Alfaro PA-C, 100 mcg at 02/02/24 0640    midazolam (VERSED) 1 mg/mL injection 1 " "mg, 1 mg, Intravenous, PRN, Robert Alfaro, RAFIQ, 1 mg at 02/02/24 0640    PHYSICAL EXAMINATION:  Physical Exam  Exam conducted with a chaperone present.   Constitutional:       Appearance: Normal appearance.   HENT:      Head: Normocephalic and atraumatic.   Eyes:      Pupils: Pupils are equal, round, and reactive to light.   Pulmonary:      Effort: Pulmonary effort is normal. No respiratory distress.   Genitourinary:     General: Normal vulva.      Vagina: No vaginal discharge.      Comments: NOTE:  the exam was carried out with a nurse chaperone present  Normal external female genitalia  Urethral meatus is normal  Unable to visualize cervix    Grade II Cystocele present   Skin:     General: Skin is warm and dry.      Capillary Refill: Capillary refill takes less than 2 seconds.   Neurological:      Mental Status: She is alert.   Psychiatric:         Mood and Affect: Mood normal.         Behavior: Behavior normal.         Thought Content: Thought content normal.       NOTE:  the exam was carried out with a nurse chaperone present  Normal external female genitalia  Urethral meatus is normal  Unable to visualize cervix          LABS:    She was unable to urinate. Bladder scan showed 20 ml.       No results found for: "PSA", "PSADIAG", "PSATOTAL", "PHIND"    Lab Results   Component Value Date    CREATININE 1.5 (H) 10/30/2024    EGFRNORACEVR 35.7 (A) 10/30/2024               IMPRESSION:    Encounter Diagnoses   Name Primary?    Overactive bladder     Nocturia more than twice per night          Assessment:       1. Overactive bladder    2. Nocturia more than twice per night        Plan:       - Discussed behavioral modifications with pt. Instructed pt to reduce fluids at least 4 hours prior to bedtime. Consume fluids throughout the day in order to avoid urinating frequently at night  -Referral place to Dr Royal for cytocele    I spent a total of 30 minutes on the day of the visit. This includes face to face time " and non-face to face time preparing to see the patient (eg, review of tests), obtaining and/or reviewing separately obtained history, documenting clinical information in the electronic or other health record, independently interpreting results and communicating results to the patient/family/caregiver, or care coordinator  Reviewed the possible contributory factors.  Reviewed management; including possible medical and surgical options; including home remedies.  Recommendations for PCP follow up visit; any need to go to the ER.    Recommended lifestyle modifications with a proper, healthy diet, good hydration but during the day. Reducing bladder irritants.

## 2025-01-05 ENCOUNTER — HOSPITAL ENCOUNTER (EMERGENCY)
Facility: HOSPITAL | Age: 80
Discharge: HOME | End: 2025-01-06
Attending: STUDENT IN AN ORGANIZED HEALTH CARE EDUCATION/TRAINING PROGRAM
Payer: MEDICARE

## 2025-01-05 VITALS
HEART RATE: 87 BPM | SYSTOLIC BLOOD PRESSURE: 140 MMHG | WEIGHT: 174 LBS | HEIGHT: 66 IN | RESPIRATION RATE: 18 BRPM | TEMPERATURE: 100 F | BODY MASS INDEX: 27.97 KG/M2 | OXYGEN SATURATION: 98 % | DIASTOLIC BLOOD PRESSURE: 80 MMHG

## 2025-01-05 DIAGNOSIS — U07.1 COVID-19: Primary | ICD-10-CM

## 2025-01-05 DIAGNOSIS — R05.9 COUGH: ICD-10-CM

## 2025-01-05 LAB
INFLUENZA A, MOLECULAR: NEGATIVE
INFLUENZA B, MOLECULAR: NEGATIVE
SARS-COV-2 RDRP RESP QL NAA+PROBE: POSITIVE
SPECIMEN SOURCE: NORMAL

## 2025-01-05 PROCEDURE — 99284 EMERGENCY DEPT VISIT MOD MDM: CPT | Mod: 25

## 2025-01-05 PROCEDURE — 87502 INFLUENZA DNA AMP PROBE: CPT

## 2025-01-05 PROCEDURE — 25000003 PHARM REV CODE 250

## 2025-01-05 PROCEDURE — 87635 SARS-COV-2 COVID-19 AMP PRB: CPT

## 2025-01-05 PROCEDURE — 25000003 PHARM REV CODE 250: Performed by: STUDENT IN AN ORGANIZED HEALTH CARE EDUCATION/TRAINING PROGRAM

## 2025-01-05 RX ORDER — ACETAMINOPHEN 500 MG
1000 TABLET ORAL
Status: COMPLETED | OUTPATIENT
Start: 2025-01-05 | End: 2025-01-05

## 2025-01-05 RX ORDER — IBUPROFEN 600 MG/1
600 TABLET ORAL
Status: COMPLETED | OUTPATIENT
Start: 2025-01-05 | End: 2025-01-05

## 2025-01-05 RX ADMIN — ACETAMINOPHEN 1000 MG: 500 TABLET ORAL at 09:01

## 2025-01-05 RX ADMIN — IBUPROFEN 600 MG: 600 TABLET, FILM COATED ORAL at 10:01

## 2025-01-06 RX ORDER — IBUPROFEN 600 MG/1
600 TABLET ORAL EVERY 6 HOURS PRN
Qty: 20 TABLET | Refills: 0 | Status: SHIPPED | OUTPATIENT
Start: 2025-01-06 | End: 2025-01-06

## 2025-01-06 RX ORDER — BENZONATATE 100 MG/1
100 CAPSULE ORAL 3 TIMES DAILY PRN
Qty: 15 CAPSULE | Refills: 0 | Status: SHIPPED | OUTPATIENT
Start: 2025-01-06 | End: 2025-01-06

## 2025-01-06 RX ORDER — FLUTICASONE PROPIONATE 50 MCG
1 SPRAY, SUSPENSION (ML) NASAL 2 TIMES DAILY PRN
Qty: 15 G | Refills: 0 | Status: SHIPPED | OUTPATIENT
Start: 2025-01-06 | End: 2025-01-06

## 2025-01-06 RX ORDER — BENZONATATE 100 MG/1
100 CAPSULE ORAL 3 TIMES DAILY PRN
Qty: 15 CAPSULE | Refills: 0 | Status: SHIPPED | OUTPATIENT
Start: 2025-01-06 | End: 2025-01-11

## 2025-01-06 RX ORDER — IBUPROFEN 600 MG/1
600 TABLET ORAL EVERY 6 HOURS PRN
Qty: 20 TABLET | Refills: 0 | Status: SHIPPED | OUTPATIENT
Start: 2025-01-06

## 2025-01-06 RX ORDER — FLUTICASONE PROPIONATE 50 MCG
1 SPRAY, SUSPENSION (ML) NASAL 2 TIMES DAILY PRN
Qty: 15 G | Refills: 0 | Status: SHIPPED | OUTPATIENT
Start: 2025-01-06 | End: 2025-01-16

## 2025-01-06 NOTE — ED PROVIDER NOTES
Encounter Date: 1/5/2025       History     Chief Complaint   Patient presents with    Nasal Congestion     Nasal congestion that started Friday +coughing, sneezing, body aches     HPI  The patient is a 79-year-old female with history of type 2 diabetes, neuropathy, hypertension, gout, anemia, arthritis who presents for cough, congestion, sore throat, myalgia, subjective fever, sneezing, ear fullness.  Onset of symptoms 4 days ago.  Patient states that her grandson who stays with her sometimes was sick last week.  She denies chest pain, shortness of breath, abdominal pain, nausea, vomiting, diarrhea, urinary symptoms.  She reports that she has been taking Mucinex and Tylenol at home with some improvement.  Cough is productive, white sputum.    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    Vitamin D deficiency disease      Past Surgical History:   Procedure Laterality Date    ANTERIOR CERVICAL DISCECTOMY W/ FUSION N/A 3/19/2020    Procedure: DISCECTOMY, SPINE, CERVICAL, ANTERIOR APPROACH, WITH FUSION, C3-C4, C4-C5, C5-C6;  Surgeon: Joseph Walton MD;  Location: [facility] OR [address] [place] [address];  Service: Neurosurgery;  Laterality: N/A;    CARPAL TUNNEL RELEASE      bilateral    COLONOSCOPY N/A 3/7/2018    Procedure: COLONOSCOPY;  Surgeon: Luís Soni MD;  Location: [facility] ENDO (2ND FLR);  Service: Endoscopy;  Laterality: N/A;  ok to schedule per Elizabeth    COLONOSCOPY N/A 7/15/2024    Procedure: COLONOSCOPY;  Surgeon: Dread Forman MD;  Location: [facility] ENDO (4TH FLR);  Service: Endoscopy;  Laterality: N/A;  Referred by:Dr. SHIRLEY Kay   Prep: Miralax  Route instructions sent: portal  and mail  Other concerns: DM oral and insulin; his. of polyps  7/8-pre call complete-GT    COLONOSCOPY N/A 10/7/2024    Procedure: COLONOSCOPY;  Surgeon: Adrian Barry MD;  Location: [facility] ([address] [place] [address]);  Service: Endoscopy;  Laterality: N/A;  peg prep/ prp inst mailed and sent to pt via portal  Regarding: FW: Colonoscopy, possible device-assisted  From: Adrian Barry MD  Sent: 7/15/2024  10:56 AM CDT  To: [facility]  Subject: Colonoscopy, possible device-assisted          Procedu    COLONOSCOPY W/ BIOPSIES AND POLYPECTOMY      EPIDURAL STEROID INJECTION N/A 6/3/2022    Procedure: LESI L5-S1;  Surgeon: Leland Pena DO;  Location: [facility] OR;  Service: Pain Management;  Laterality: N/A;    EPIDURAL STEROID INJECTION INTO LUMBAR SPINE Left 10/26/2023    Procedure: LT L5-S1 TFESI;  Surgeon: Leland Pena DO;  [facility];  Service: Pain Management;  Laterality: Left;  15 mins    HEMORRHOID SURGERY      HYSTERECTOMY      AUB    INJECTION OF JOINT Bilateral 4/12/2022    Procedure: SACROILIAC JOINT Steroid Injection-Bilateral;  Surgeon: Leland Pena DO;  Location: [facility] OR;  Service: Pain Management;  Laterality: Bilateral;    ROTATOR CUFF REPAIR      bilateral    TRANSFORAMINAL EPIDURAL INJECTION OF STEROID Bilateral 12/2/2022    Procedure: TFESI (B/L) L4-5;  Surgeon: Leland Pena DO;  Location: [facility] OR;  Service: Pain Management;  Laterality: Bilateral;    TRIGGER FINGER RELEASE Left 2/2/2024    Procedure: RELEASE, TRIGGER FINGER - left index, long, ring, and small fingers;  Surgeon: Justus Isabel MD;  Location: [facility] OR;  Service: Orthopedics;  Laterality: Left;     Family History   Problem Relation Name Age of Onset    Heart disease Mother      Diabetes Mother      Heart disease Father      Cancer Father          liver    Diabetes Sister Shawna     Cataracts Sister Shawna     Kidney disease Brother david     Heart disease  Brother david     Diabetes Sister elijah     COPD Brother jordi     COPD Brother sancho     Gout Brother kylie     Benign prostatic hyperplasia Brother kylie     Heart disease Brother kylie     Kidney disease Brother kylie     Heart attack Brother kylie         heart attack    Kidney failure Brother kylie     Lupus Sister thanh     Heart attack Brother santiago     Drug abuse Brother santiago     No Known Problems Daughter      No Known Problems Son      Amblyopia Neg Hx      Blindness Neg Hx      Breast cancer Neg Hx      Colon cancer Neg Hx      Ovarian cancer Neg Hx       Social History     Tobacco Use    Smoking status: Never    Smokeless tobacco: Never   Substance Use Topics    Alcohol use: No    Drug use: No     Review of Systems  A full review of systems was obtained, see HPI for pertinent positives.    Physical Exam     Initial Vitals [01/05/25 1951]   BP Pulse Resp Temp SpO2   (!) 144/79 98 17 100.1 °F (37.8 °C) 98 %      MAP       --         Physical Exam  Constitutional: No acute distress, well-appearing, nontoxic  HENT: Normocephalic, atraumatic, moist mucous membranes, posterior oropharynx benign, TMs normal bilaterally  Neck: Supple, normal range of motion  Respiratory: Non-labored, lungs clear  Cardiovascular: Well perfused, normal rate, regular rhythm  Gastrointestinal: Soft, non-tender, non-distended  Integumentary: Warm and dry  Musculoskeletal: No deformity  Neurological: Awake and alert  Psychiatric: Cooperative     ED Course   Procedures  Labs Reviewed   SARS-COV-2 RNA AMPLIFICATION, QUAL - Abnormal       Result Value    SARS-CoV-2 RNA, Amplification, Qual Positive (*)    INFLUENZA A & B BY MOLECULAR    Influenza A, Molecular Negative      Influenza B, Molecular Negative      Flu A & B Source NP            Imaging Results              X-Ray Chest PA And Lateral (Final result)  Result time 01/06/25 00:21:31      Final result by Nir Lowry MD (01/06/25 00:21:31)                    "Impression:      No acute cardiopulmonary finding.      Electronically signed by: Nir Lowry MD  Date:    01/06/2025  Time:    00:21               Narrative:    EXAMINATION:  XR CHEST PA AND LATERAL    CLINICAL HISTORY:  Provided history is "  Cough, unspecified".    TECHNIQUE:  Frontal and lateral views of the chest were performed.    COMPARISON:  06/26/2024.    FINDINGS:  Cardiac silhouette is not enlarged. No focal consolidation.  No sizable pleural effusion.  No pneumothorax.  Operative changes of ACDF.                                       Medications   acetaminophen tablet 1,000 mg (1,000 mg Oral Given 1/5/25 2150)   ibuprofen tablet 600 mg (600 mg Oral Given 1/5/25 7466)     Medical Decision Making  Patient is a 79-year-old female with history of type 2 diabetes, hypertension, gout, anemia, arthritis who presents for cough, congestion, sore throat, myalgia, subjective fever, sneezing an ear fullness.  She is very well-appearing on exam.  Temp borderline febrile but she is hemodynamically stable.  Lungs are clear and she has no shortness of breath or dyspnea on exertion.  No tachycardia.  Symptoms seem consistent with viral URI.  Given cough for 4 days, will obtain chest x-ray to rule out pneumonia.  Viral swabs ordered.  Anticipate discharge home with close primary care follow up.    Chest x-ray without any evidence of pneumonia.  Viral swabs positive for COVID-19.  Patient overall very well-appearing.  She has no shortness of breath, hypoxia or tachycardia.  She feels comfortable with discharge home.  She has medication interactions to Paxlovid so unable to prescribe but will prescribe symptomatic treatment for home.  She is given very strict return precautions prior to discharge and advised to follow up with her PCP.    Amount and/or Complexity of Data Reviewed  Labs:  Decision-making details documented in ED Course.  Radiology: ordered.    Risk  Prescription drug management.               ED Course as " of 01/06/25 0108   Sun Jan 05, 2025   2328 SARS-CoV-2 RNA, Amplification, Qual(!): Positive [NN]   Mon Jan 06, 2025   0030 Chest x-ray clear [NN]   0045 On amlodipine which is a contraindication to Paxlovid [NN]      ED Course User Index  [NN] Tati Burciaga MD                           Clinical Impression:  Final diagnoses:  [R05.9] Cough  [U07.1] COVID-19 (Primary)          ED Disposition Condition    Discharge Stable          ED Prescriptions       Medication Sig Dispense Start Date End Date Auth. Provider    benzonatate (TESSALON) 100 MG capsule  (Status: Discontinued) Take 1 capsule (100 mg total) by mouth 3 (three) times daily as needed for Cough. 15 capsule 1/6/2025 1/6/2025 Tati Burciaga MD    ibuprofen (ADVIL,MOTRIN) 600 MG tablet  (Status: Discontinued) Take 1 tablet (600 mg total) by mouth every 6 (six) hours as needed for Pain. 20 tablet 1/6/2025 1/6/2025 Tati Burciaga MD    fluticasone propionate (FLONASE) 50 mcg/actuation nasal spray  (Status: Discontinued) 1 spray (50 mcg total) by Each Nostril route 2 (two) times daily as needed for Rhinitis. 15 g 1/6/2025 1/6/2025 Tati Burciaga MD    benzonatate (TESSALON) 100 MG capsule Take 1 capsule (100 mg total) by mouth 3 (three) times daily as needed for Cough. 15 capsule 1/6/2025 1/11/2025 Tati Burciaga MD    fluticasone propionate (FLONASE) 50 mcg/actuation nasal spray 1 spray (50 mcg total) by Each Nostril route 2 (two) times daily as needed for Rhinitis. 15 g 1/6/2025 1/16/2025 Tati Burciaga MD    ibuprofen (ADVIL,MOTRIN) 600 MG tablet Take 1 tablet (600 mg total) by mouth every 6 (six) hours as needed for Pain. 20 tablet 1/6/2025 -- Tati Burciaga MD          Follow-up Information       Follow up With Specialties Details Why Contact Info    Dora Freedman MD Internal Medicine Schedule an appointment as soon as possible for a visit   [address]  [facility] [address]  [phone]      [facility] -  Emergency Dept Emergency Medicine  As needed, If symptoms worsen [address]  [facility] [address]  [phone]             Tati Burciaga MD  01/06/25 0108

## 2025-01-06 NOTE — ED TRIAGE NOTES
Pt arrives with pain on the right ear and neck with frequent cough and congestion. Pt also has noticed decrease in hearing.  Hx vit D deficency, HTN, Gout, anemia, arthritis, cataract, type 2 DM.

## 2025-01-06 NOTE — DISCHARGE INSTRUCTIONS
You were seen today for cough and congestion.  Your testing was positive for COVID-19 today.  Please make sure that you are resting and hydrating well at home.  You should continue to take Tylenol or ibuprofen at home as needed for any body aches or fevers.  You have been prescribed a cough medication as well as Flonase to help with your congestion.  You should make sure that you are quarantine itching from 5 days from the onset of your symptoms.  You should make sure that you are masking and washing your hands well.  Please follow-up with your primary care doctor within the next week.  Please return to the emergency department for any worsening symptoms, persistent fever, difficulty breathing, shortness of breath or chest pain.

## 2025-02-21 DIAGNOSIS — Z00.00 ENCOUNTER FOR MEDICARE ANNUAL WELLNESS EXAM: ICD-10-CM

## 2025-03-06 ENCOUNTER — LAB VISIT (OUTPATIENT)
Dept: LAB | Facility: HOSPITAL | Age: 80
End: 2025-03-06
Payer: MEDICARE

## 2025-03-06 DIAGNOSIS — E11.21 TYPE 2 DIABETES MELLITUS WITH DIABETIC NEPHROPATHY, WITH LONG-TERM CURRENT USE OF INSULIN: Chronic | ICD-10-CM

## 2025-03-06 DIAGNOSIS — Z79.4 TYPE 2 DIABETES MELLITUS WITH DIABETIC NEPHROPATHY, WITH LONG-TERM CURRENT USE OF INSULIN: Chronic | ICD-10-CM

## 2025-03-06 LAB
ALBUMIN SERPL BCP-MCNC: 4 G/DL (ref 3.5–5.2)
ALP SERPL-CCNC: 77 U/L (ref 40–150)
ALT SERPL W/O P-5'-P-CCNC: 8 U/L (ref 10–44)
ANION GAP SERPL CALC-SCNC: 9 MMOL/L (ref 8–16)
AST SERPL-CCNC: 31 U/L (ref 10–40)
BILIRUB SERPL-MCNC: 0.4 MG/DL (ref 0.1–1)
BUN SERPL-MCNC: 29 MG/DL (ref 8–23)
CALCIUM SERPL-MCNC: 10.2 MG/DL (ref 8.7–10.5)
CHLORIDE SERPL-SCNC: 108 MMOL/L (ref 95–110)
CO2 SERPL-SCNC: 24 MMOL/L (ref 23–29)
CREAT SERPL-MCNC: 1.2 MG/DL (ref 0.5–1.4)
EST. GFR  (NO RACE VARIABLE): 46.6 ML/MIN/1.73 M^2
ESTIMATED AVG GLUCOSE: 117 MG/DL (ref 68–131)
GLUCOSE SERPL-MCNC: 153 MG/DL (ref 70–110)
HBA1C MFR BLD: 5.7 % (ref 4–5.6)
POTASSIUM SERPL-SCNC: 4.8 MMOL/L (ref 3.5–5.1)
PROT SERPL-MCNC: 8.5 G/DL (ref 6–8.4)
SODIUM SERPL-SCNC: 141 MMOL/L (ref 136–145)

## 2025-03-06 PROCEDURE — 80053 COMPREHEN METABOLIC PANEL: CPT | Performed by: NURSE PRACTITIONER

## 2025-03-06 PROCEDURE — 83036 HEMOGLOBIN GLYCOSYLATED A1C: CPT | Performed by: NURSE PRACTITIONER

## 2025-03-07 ENCOUNTER — RESULTS FOLLOW-UP (OUTPATIENT)
Dept: ENDOCRINOLOGY | Facility: CLINIC | Age: 80
End: 2025-03-07

## 2025-03-10 NOTE — PROGRESS NOTES
Subjective:      Patient ID: Lina Davis is a 79 y.o. female.    Chief Complaint:  Type 2 diabetes mellitus with diabetic nephropathy, with lo    History of Present Illness  Lina Davis is here for follow up of T2DM and Thyroid Nodules.  Previously seen by me 2024.     With regards to Diabetes:    Diagnosed: ~  DE: 2021  FH of DM: sister, brother   Denies any of her children have DM     Known complications:  DKA Denies  RN Denies  Eye Exam: 10/2024  PN : Yes   Podiatry: 2020  Nephropathy : Yes   CAD : Denies  Episode of pancreatitis in 2018 - attributed to Januvia    Diet/Exercise:   Eats 2 meals a day.   Snacks : occasionally - fig newtons.   Drinks : water, 1 or 2 8oz soft drinks a week, lemonade but will cut it with water.   Exercise - tries to stay active.  Recent illness, injury, steroids: COVID in January, steroids     Current regimen: PAP - SGLT2i and insulin   Metformin 500mg twice daily   Jardiance 10mg daily   Actos 30mg daily   Toujeo 10 units daily - restarted Toujeo (misunderstood)    Reports compliance.  Has not received Jardiance from PAP?    Other medications tried:  Januvia- Discontinued in 2018 due to pancreatitis  Novolog    Glucose Monitor:   1 times a day testing  Log reviewed: oral recall  Fastin-146    Hypoglycemia:  Denies  Knows how to correct with 15 grams of carbs- juice, coke, or a peppermint.     Diabetes Management Status    Hemoglobin A1C   Date Value Ref Range Status   2025 5.7 (H) 4.0 - 5.6 % Final     Comment:     ADA Screening Guidelines:  5.7-6.4%  Consistent with prediabetes  >or=6.5%  Consistent with diabetes    High levels of fetal hemoglobin interfere with the HbA1C  assay. Heterozygous hemoglobin variants (HbS, HgC, etc)do  not significantly interfere with this assay.   However, presence of multiple variants may affect accuracy.     10/30/2024 5.7 (H) 4.0 - 5.6 % Final     Comment:     ADA Screening Guidelines:  5.7-6.4%  Consistent with  prediabetes  >or=6.5%  Consistent with diabetes    High levels of fetal hemoglobin interfere with the HbA1C  assay. Heterozygous hemoglobin variants (HbS, HgC, etc)do  not significantly interfere with this assay.   However, presence of multiple variants may affect accuracy.     10/14/2024 5.6 4.0 - 5.6 % Final     Comment:     ADA Screening Guidelines:  5.7-6.4%  Consistent with prediabetes  >or=6.5%  Consistent with diabetes    High levels of fetal hemoglobin interfere with the HbA1C  assay. Heterozygous hemoglobin variants (HbS, HgC, etc)do  not significantly interfere with this assay.   However, presence of multiple variants may affect accuracy.         Statin: Taking  ACE/ARB: Taking  Screening or Prevention Patient's value Goal Complete/Controlled?   HgA1C Testing and Control   Lab Results   Component Value Date    HGBA1C 5.7 (H) 03/06/2025      Annually/Less than 8% Yes   Lipid profile : 10/14/2024 Annually Yes   LDL control Lab Results   Component Value Date    LDLCALC 60.6 (L) 10/14/2024    Annually/Less than 100 mg/dl  Yes   Nephropathy screening Lab Results   Component Value Date    LABMICR 33.0 05/02/2023     Lab Results   Component Value Date    PROTEINUA Negative 10/14/2024    Annually Yes   Blood pressure BP Readings from Last 1 Encounters:   03/12/25 (!) 150/76    Less than 140/90 No   Dilated retinal exam : 10/24/2024 Annually Yes   Foot exam   Most Recent Foot Exam Date: Not Found Annually No     With regards to Thyroid Nodule:    Thyroid US:   6/2023  COMPARISON:  Neck ultrasound dated 08/05/2022.  Our records indicate a benign FNA of the left lobe nodule in 1/2023 and a non-diagnostic FNA of the right lobe nodule in 1/2023.  When compared to the prior study the above nodules are stable in size and features  Impression:  1.) Thyroid gland is normal in size with heterogeneous echotexture and normal vascularity  2.) 2.4 x 2.1 x 2.0 cm solid, isoechoic nodule is seen in the right mid lobe  3.) 0.8 x  "0.4 x 0.6 cm spongiform nodule is seen in the right inferior pole  4.) 2.4 x 1.8 x 2.4 cm solid, isoechoic nodule is seen in the left inferior pole  5.) 1.1 x 0.5 x 1.1 cm spongiform nodule is seen in the left inferior pole  6.) 1.0 x 0.6 x 1.0 cm solid, isoechoic nodule is seen in the isthmus  RECOMMENDATIONS:  Recommend repeat thyroid ultrasound in 1-2 years.    FNA:   1/23/2023  R mid: Unsatisfactory  L inferior: Benign    Signs or Symptoms:   Difficulty breathing: Denies  Difficulty swallowing: Denies  Voice Changes: Denies  FH of thyroid cancer: Denies  Personal history of radiation treatment or exposure: Denies    Lab Results   Component Value Date    TSH 1.468 10/14/2024    P2SJVQB 6.0 11/26/2008    FREET4 1.10 08/08/2017       With regards to the Hypercalcemia:    Lab Results   Component Value Date    PTH 52.5 06/26/2024    CALCIUM 10.2 03/06/2025    PHOS 2.9 10/30/2024     Lab Results   Component Value Date    ALBUMIN 4.0 03/06/2025                       Daily intake of calcium is: Denies  Daily intake of vitamin D:  OTC Vit D3 3000iu daily     BMD:    12/2018  Normal BMD.  Compared to prior study in 9/2014, BMD has declined 5.0% at the spine and 4.3% at the hip.  RECOMMENDATIONS of [facility]:  1.  Calcium 9008-2257 mg daily and vitamin D 800-1000 units daily, adequate exercise.  2.  No need for repeat study in near future unless clinical change    Denies constipation, depression, muscle aches or pains.  Reports polyuria   Denies fractures, height loss or kidney stones.  Reports history of renal disease.  Denies family history of hyperparathyroidism or calcium problems.  Denies HCTZ., lithium, or chlorthiadone use.      Review of Systems  + Nocturia       Visit Vitals  BP (!) 150/76 (BP Location: Right arm, Patient Position: Sitting)   Pulse 90   Ht 5' 7" (1.702 m)   Wt 79.8 kg (175 lb 13.1 oz)   SpO2 (!) 94%   BMI 27.54 kg/m²         Body mass index is 27.54 " kg/m².    Lab Review:   Lab Results   Component Value Date    HGBA1C 5.7 (H) 03/06/2025    HGBA1C 5.7 (H) 10/30/2024    HGBA1C 5.6 10/14/2024       Lab Results   Component Value Date    CHOL 128 10/14/2024    HDL 43 10/14/2024    LDLCALC 60.6 (L) 10/14/2024    TRIG 122 10/14/2024    CHOLHDL 33.6 10/14/2024     Lab Results   Component Value Date     03/06/2025    K 4.8 03/06/2025     03/06/2025    CO2 24 03/06/2025     (H) 03/06/2025    BUN 29 (H) 03/06/2025    CREATININE 1.2 03/06/2025    CALCIUM 10.2 03/06/2025    PROT 8.5 (H) 03/06/2025    ALBUMIN 4.0 03/06/2025    BILITOT 0.4 03/06/2025    ALKPHOS 77 03/06/2025    AST 31 03/06/2025    ALT 8 (L) 03/06/2025    ANIONGAP 9 03/06/2025    ESTGFRAFRICA 39.0 (A) 07/12/2022    EGFRNONAA 33.8 (A) 07/12/2022    TSH 1.468 10/14/2024     Vit D, 25-Hydroxy   Date Value Ref Range Status   06/26/2024 47 30 - 96 ng/mL Final     Comment:     Vitamin D deficiency.........<10 ng/mL                              Vitamin D insufficiency......10-29 ng/mL       Vitamin D sufficiency........> or equal to 30 ng/mL  Vitamin D toxicity............>100 ng/mL       Assessment and Plan     1. Type 2 diabetes mellitus with diabetic nephropathy, with long-term current use of insulin  Hemoglobin A1C    Basic Metabolic Panel    Vitamin D      2. Thyroid nodule  US Soft Tissue Head Neck      3. Other long term (current) drug therapy  Vitamin D      4. Hypercalcemia        5. Stage 3b chronic kidney disease            Type 2 diabetes mellitus with diabetic nephropathy  -- Labs prior to follow up.  -- A1c goal <7.5%.  -- Medications discussed:  MFM   GLP1-DPP4 - avoid given drug induced pancreatitis (Januvia)  SUNG   SGLT2   Reviewed potential adverse effects of SGLT-2 inhibitors, including genital mycotic infections, slightly increased risk of UTI, hypersensitivity, hypotension, and hyperkalemia. Advised to maintain water intake of 8-10 cups per day. Advised we need to check  chemistry panel at baseline and 2 weeks after starting. Discussed FDA warning reports of ketoacidosis associated with SGLT-2 inhibitors. Advised to seek immediate medical attention and stop the medication if symptoms such as difficulty breathing, nausea, vomiting, abdominal pain, confusion, and unusual fatigue/sleepiness. Discussed possible precipitating factors including major illness/reduced food and fluid intake (advised to stop under these circumstances), and reduced insulin dose. Discussed possible effects of increased fracture risk/decreased bone density. Discussed reports of increased risk of leg and foot amputations with canagliflozin and need to seek urgent care if developed new pain or tenderness, sores or ulcers, or infections in legs/feet.   Insulin   -- Reviewed logs/CGM:  Glucose controlled.  Instructed to send glucose logs in 14 days.    Reach out to me sooner for any glucose <70 or consistently >180.  -- Medication Changes:   Metformin 500mg twice daily   Jardiance 10mg daily   Actos 30mg daily   TRIAL OFF: Toujeo 10 units daily     -- Reviewed goals of therapy are to get the best control we can without hypoglycemia.  -- Reviewed patient's current insulin regimen. Clarified proper insulin dose and timing in relation to meals, etc. Insulin injection sites and proper rotation instructed.    -- Advised frequent self blood glucose monitoring.  Patient encouraged to document glucose results and bring them to every clinic visit.  -- Hypoglycemia precautions discussed. Instructed on precautions before driving.    -- Call for Bg repeatedly < 90 or > 180.   -- Close adherence to lifestyle changes recommended.   -- Periodic follow ups for eye evaluations, foot care and dental care suggested.    Thyroid nodule  -- I have reviewed management options including observation, FNA or surgery.  -- FNA procedure explained in depth.  -- Discussed indications for repeat FNA as well as surgical indications (abnormal FNA,  compressive symptoms or interval change).  -- Discussed possible results of FNA- Benign, Malignant, FLUS, or insufficient cells.  Discussed results auto released to patients, but advised the ordering provider will also contact to discuss.   -- All of the patients questions were answered.  -- FNA:   1/23/2023  R mid: Unsatisfactory  L inferior: Benign  -- Repeat thyroid US prior to follow up.    Hypercalcemia  -- Intermittent hypercalcemia.   -- Discussed indications for surgery if primary hyperparathyroidism proven.  -- Avoid dehydration, excessive calcium supplementation and meds (HCTZ) that can worsen hypercalcemia.  -- Most recent calcium unremarkable.  -- Prefers to monitor for now - labs.     Stage 3b chronic kidney disease  -- Optimize glucose control.           Follow up in about 4 months (around 7/12/2025).    Visit today included increased complexity associated with the care of the problems addressed and managing the longitudinal care of the patient due to the serious and/or complex managed problems.

## 2025-03-12 ENCOUNTER — OFFICE VISIT (OUTPATIENT)
Dept: ENDOCRINOLOGY | Facility: CLINIC | Age: 80
End: 2025-03-12
Payer: MEDICARE

## 2025-03-12 VITALS
HEIGHT: 67 IN | OXYGEN SATURATION: 94 % | DIASTOLIC BLOOD PRESSURE: 76 MMHG | HEART RATE: 90 BPM | WEIGHT: 175.81 LBS | BODY MASS INDEX: 27.6 KG/M2 | SYSTOLIC BLOOD PRESSURE: 150 MMHG

## 2025-03-12 DIAGNOSIS — E83.52 HYPERCALCEMIA: ICD-10-CM

## 2025-03-12 DIAGNOSIS — E11.21 TYPE 2 DIABETES MELLITUS WITH DIABETIC NEPHROPATHY, WITH LONG-TERM CURRENT USE OF INSULIN: Primary | Chronic | ICD-10-CM

## 2025-03-12 DIAGNOSIS — Z79.4 TYPE 2 DIABETES MELLITUS WITH DIABETIC NEPHROPATHY, WITH LONG-TERM CURRENT USE OF INSULIN: Primary | Chronic | ICD-10-CM

## 2025-03-12 DIAGNOSIS — E04.1 THYROID NODULE: ICD-10-CM

## 2025-03-12 DIAGNOSIS — Z79.899 OTHER LONG TERM (CURRENT) DRUG THERAPY: ICD-10-CM

## 2025-03-12 DIAGNOSIS — N18.32 STAGE 3B CHRONIC KIDNEY DISEASE: ICD-10-CM

## 2025-03-12 PROCEDURE — 3288F FALL RISK ASSESSMENT DOCD: CPT | Mod: CPTII,S$GLB,, | Performed by: NURSE PRACTITIONER

## 2025-03-12 PROCEDURE — 1125F AMNT PAIN NOTED PAIN PRSNT: CPT | Mod: CPTII,S$GLB,, | Performed by: NURSE PRACTITIONER

## 2025-03-12 PROCEDURE — G2211 COMPLEX E/M VISIT ADD ON: HCPCS | Mod: S$GLB,,, | Performed by: NURSE PRACTITIONER

## 2025-03-12 PROCEDURE — 1101F PT FALLS ASSESS-DOCD LE1/YR: CPT | Mod: CPTII,S$GLB,, | Performed by: NURSE PRACTITIONER

## 2025-03-12 PROCEDURE — 1159F MED LIST DOCD IN RCRD: CPT | Mod: CPTII,S$GLB,, | Performed by: NURSE PRACTITIONER

## 2025-03-12 PROCEDURE — 99214 OFFICE O/P EST MOD 30 MIN: CPT | Mod: S$GLB,,, | Performed by: NURSE PRACTITIONER

## 2025-03-12 PROCEDURE — 99999 PR PBB SHADOW E&M-EST. PATIENT-LVL V: CPT | Mod: PBBFAC,,, | Performed by: NURSE PRACTITIONER

## 2025-03-12 PROCEDURE — 3077F SYST BP >= 140 MM HG: CPT | Mod: CPTII,S$GLB,, | Performed by: NURSE PRACTITIONER

## 2025-03-12 PROCEDURE — 1160F RVW MEDS BY RX/DR IN RCRD: CPT | Mod: CPTII,S$GLB,, | Performed by: NURSE PRACTITIONER

## 2025-03-12 PROCEDURE — 3078F DIAST BP <80 MM HG: CPT | Mod: CPTII,S$GLB,, | Performed by: NURSE PRACTITIONER

## 2025-03-12 NOTE — ASSESSMENT & PLAN NOTE
-- I have reviewed management options including observation, FNA or surgery.  -- FNA procedure explained in depth.  -- Discussed indications for repeat FNA as well as surgical indications (abnormal FNA, compressive symptoms or interval change).  -- Discussed possible results of FNA- Benign, Malignant, FLUS, or insufficient cells.  Discussed results auto released to patients, but advised the ordering provider will also contact to discuss.   -- All of the patients questions were answered.  -- FNA:   1/23/2023  R mid: Unsatisfactory  L inferior: Benign  -- Repeat thyroid US prior to follow up.

## 2025-03-12 NOTE — PATIENT INSTRUCTIONS
Metformin 500mg twice daily   Jardiance 10mg daily   Actos 30mg daily   STOP: Toujeo 10 units daily

## 2025-03-12 NOTE — ASSESSMENT & PLAN NOTE
-- Labs prior to follow up.  -- A1c goal <7.5%.  -- Medications discussed:  MFM   GLP1-DPP4 - avoid given drug induced pancreatitis (Januvia)  SUNG   SGLT2   Reviewed potential adverse effects of SGLT-2 inhibitors, including genital mycotic infections, slightly increased risk of UTI, hypersensitivity, hypotension, and hyperkalemia. Advised to maintain water intake of 8-10 cups per day. Advised we need to check chemistry panel at baseline and 2 weeks after starting. Discussed FDA warning reports of ketoacidosis associated with SGLT-2 inhibitors. Advised to seek immediate medical attention and stop the medication if symptoms such as difficulty breathing, nausea, vomiting, abdominal pain, confusion, and unusual fatigue/sleepiness. Discussed possible precipitating factors including major illness/reduced food and fluid intake (advised to stop under these circumstances), and reduced insulin dose. Discussed possible effects of increased fracture risk/decreased bone density. Discussed reports of increased risk of leg and foot amputations with canagliflozin and need to seek urgent care if developed new pain or tenderness, sores or ulcers, or infections in legs/feet.   Insulin   -- Reviewed logs/CGM:  Glucose controlled.  Instructed to send glucose logs in 14 days.    Reach out to me sooner for any glucose <70 or consistently >180.  -- Medication Changes:   Metformin 500mg twice daily   Jardiance 10mg daily   Actos 30mg daily   TRIAL OFF: Toujeo 10 units daily     -- Reviewed goals of therapy are to get the best control we can without hypoglycemia.  -- Reviewed patient's current insulin regimen. Clarified proper insulin dose and timing in relation to meals, etc. Insulin injection sites and proper rotation instructed.    -- Advised frequent self blood glucose monitoring.  Patient encouraged to document glucose results and bring them to every clinic visit.  -- Hypoglycemia precautions discussed. Instructed on precautions before  driving.    -- Call for Bg repeatedly < 90 or > 180.   -- Close adherence to lifestyle changes recommended.   -- Periodic follow ups for eye evaluations, foot care and dental care suggested.

## 2025-03-14 ENCOUNTER — OFFICE VISIT (OUTPATIENT)
Dept: UROLOGY | Facility: CLINIC | Age: 80
End: 2025-03-14
Payer: MEDICARE

## 2025-03-14 VITALS
DIASTOLIC BLOOD PRESSURE: 67 MMHG | HEART RATE: 89 BPM | SYSTOLIC BLOOD PRESSURE: 138 MMHG | BODY MASS INDEX: 26.53 KG/M2 | WEIGHT: 169.06 LBS | HEIGHT: 67 IN

## 2025-03-14 DIAGNOSIS — N32.81 OVERACTIVE BLADDER: ICD-10-CM

## 2025-03-14 DIAGNOSIS — R33.9 INCOMPLETE BLADDER EMPTYING: Primary | ICD-10-CM

## 2025-03-14 DIAGNOSIS — R35.1 NOCTURIA MORE THAN TWICE PER NIGHT: ICD-10-CM

## 2025-03-14 PROCEDURE — 99999 PR PBB SHADOW E&M-EST. PATIENT-LVL IV: CPT | Mod: PBBFAC,,, | Performed by: UROLOGY

## 2025-03-14 RX ORDER — DOXYCYCLINE HYCLATE 100 MG
100 TABLET ORAL ONCE
OUTPATIENT
Start: 2025-03-14 | End: 2025-03-14

## 2025-03-14 RX ORDER — LIDOCAINE HYDROCHLORIDE 20 MG/ML
JELLY TOPICAL ONCE
OUTPATIENT
Start: 2025-03-14 | End: 2025-03-14

## 2025-03-14 NOTE — PROGRESS NOTES
History of Present Illness    CHIEF COMPLAINT:  Ms. Davis presents today for evaluation of overactive bladder and pelvic organ prolapse. The patient has not been previously seen by a specialist board-certified in [employer] in our system previously and is meeting with me today for specialty care.     URINARY SYMPTOMS:  She reports frequent urination with urgency, noting need to urinate within 15 minutes of previous void. She experiences nocturia 4 times per night. Previous medication trial for overactive bladder was unsuccessful. She denies stress incontinence symptoms with coughing or sneezing.  It turns out that what really bothers her is the nocturia.     MEDICAL HISTORY:  She has well-controlled diabetes with A1C of 5.7 and was recently discontinued from insulin therapy. She also has arthritis requiring pain medication.    SURGICAL HISTORY:  She underwent hysterectomy for abnormal uterine bleeding with persistent menorrhagia.    OBSTETRIC HISTORY:   with 2 live births, hysterectomy for heavy bleeding, not sexually active , but has decreased libido since diabetes diagnosis.  She has regular bowel movements with daily stool softeners, prescribed by Dr. Rose to counteract constipating effects of arthritis pain medication.     ROS:  General: -fever, -chills, -fatigue, -weight gain, -weight loss  Eyes: -vision changes, -redness, -discharge  ENT: -ear pain, -nasal congestion, -sore throat  Cardiovascular: -chest pain, -palpitations, -lower extremity edema  Respiratory: -cough, -shortness of breath  Gastrointestinal: -abdominal pain, -nausea, -vomiting, -diarrhea, -constipation, -blood in stool  Genitourinary: -dysuria, -hematuria, -frequency, +excessive urination, +nocturia  Musculoskeletal: +joint pain, -muscle pain  Skin: -rash, -lesion  Neurological: -headache, -dizziness, -numbness, -tingling  Psychiatric: -anxiety, -depression, -sleep difficulty  Female Genitourinary: +changed libido       Past  Medical History:   Diagnosis Date    Anemia     Arthritis     Cataract     Gout, arthritis     HTN (hypertension), benign     Polyneuropathy     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    Vitamin D deficiency disease        Past Surgical History:   Procedure Laterality Date    ANTERIOR CERVICAL DISCECTOMY W/ FUSION N/A 3/19/2020    Procedure: DISCECTOMY, SPINE, CERVICAL, ANTERIOR APPROACH, WITH FUSION, C3-C4, C4-C5, C5-C6;  Surgeon: Joseph Walton MD;  Location: [facility] 2ND FLR;  Service: Neurosurgery;  Laterality: N/A;    CARPAL TUNNEL RELEASE      bilateral    COLONOSCOPY N/A 3/7/2018    Procedure: COLONOSCOPY;  Surgeon: Luís Soni MD;  Location: [facility] ENDO (2ND FLR);  Service: Endoscopy;  Laterality: N/A;  ok to schedule per Eilzabeth    COLONOSCOPY N/A 7/15/2024    Procedure: COLONOSCOPY;  Surgeon: Dread Forman MD;  Location: [facility] ENDO (4TH FLR);  Service: Endoscopy;  Laterality: N/A;  Referred by:Dr. SHIRLEY Kay   Prep: Miralax  Route instructions sent: portal and mail  Other concerns: DM oral and insulin; his. of polyps  7/8-pre call complete-GT    COLONOSCOPY N/A 10/7/2024    Procedure: COLONOSCOPY;  Surgeon: Adrian Barry MD;  Location: [facility] (2ND FLR);  Service: Endoscopy;  Laterality: N/A;  peg prep/ prp inst mailed and sent to pt via portal  Regarding: FW: Colonoscopy, possible device-assisted  From: Adrian Barry MD  Sent: 7/15/2024  10:56 AM CDT  To: [facility]  Subject: Colonoscopy, possible device-assisted          Procedu    COLONOSCOPY W/ BIOPSIES AND POLYPECTOMY      EPIDURAL STEROID INJECTION N/A 6/3/2022    Procedure: LESI L5-S1;  Surgeon: Leland Pena DO;  Location: [facility] OR;  Service: Pain Management;  Laterality: N/A;    EPIDURAL STEROID INJECTION INTO LUMBAR SPINE Left 10/26/2023    Procedure: LT L5-S1 TFESI;  Surgeon: Leland Pena DO;  Location: [facility] PAIN MANAGEMENT;  Service: Pain Management;   Laterality: Left;  15 mins    HEMORRHOID SURGERY      HYSTERECTOMY      AUB    INJECTION OF JOINT Bilateral 4/12/2022    Procedure: SACROILIAC JOINT Steroid Injection-Bilateral;  Surgeon: Leland Pena DO;  Location: [facility] OR;  Service: Pain Management;  Laterality: Bilateral;    ROTATOR CUFF REPAIR      bilateral    TRANSFORAMINAL EPIDURAL INJECTION OF STEROID Bilateral 12/2/2022    Procedure: TFESI (B/L) L4-5;  Surgeon: Leland Pena DO;  Location: [facility] OR;  Service: Pain Management;  Laterality: Bilateral;    TRIGGER FINGER RELEASE Left 2/2/2024    Procedure: RELEASE, TRIGGER FINGER - left index, long, ring, and small fingers;  Surgeon: Justus Isabel MD;  Location: [facility] OR;  Service: Orthopedics;  Laterality: Left;       Family History   Problem Relation Name Age of Onset    Heart disease Mother      Diabetes Mother      Heart disease Father      Cancer Father          liver    Diabetes Sister Shawna     Cataracts Sister Shawna     Kidney disease Brother david     Heart disease Brother david     Diabetes Sister elijah     COPD Brother jordi     COPD Brother sancho     Gout Brother kylie     Benign prostatic hyperplasia Brother kylie     Heart disease Brother kylie     Kidney disease Brother kylie     Heart attack Brother kylie         heart attack    Kidney failure Brother kylie     Lupus Sister thanh     Heart attack Brother santiago     Drug abuse Brother santiago      Social History[1]    Allergies:  Gabapentin and Januvia [sitagliptin]    Medications:  Encounter Medications[2]      PHYSICAL EXAMINATION:    The patient generally appears in good health, is appropriately interactive, and is in no apparent distress.    Skin: No lesions.    Mental: Cooperative with normal affect.    Neuro: Grossly intact.    HEENT: Normal. No evidence of lymphadenopathy.    Chest:  normal inspiratory effort.    Abdomen: Soft, non-tender. No masses or organomegaly. Bladder is not palpable. No evidence of flank  discomfort. No evidence of inguinal hernia.    Extremities: No clubbing, cyanosis, or edema    NOTE:  the exam was carried out with a nurse chaperone present  Normal external female genitalia  Grade II urogenital atrophy  Urethral meatus is normal  Urethra and bladder are nontender to bimanual exam  Well supported posteriorly   High stage II cystocele  Uterus and cervix are surgically absent  No adnexal masses  PVR by catheterization was 330 ml    LABS:    Lab Results   Component Value Date    BUN 29 (H) 03/06/2025    CREATININE 1.2 03/06/2025       UA 1.0102, pH 6, tr blood, otherwise, negative.       Assessment & Plan    IMPRESSION:  - Considered non-surgical options for management, as patient prefers to avoid surgery.    OVERACTIVE BLADDER WITH INCOMPLETE BLADDER EMPTYING:  - FUDS ordered. Diabetes could contribute to incomplete bladder emptying  - she has had a cystoscopy performed by Dr. Watson which showed a cystocele  - discussed need to make a diagnosis to determine the next best step    - Ordered urinalysis (patient provided sample during visit).    I spent a total of 45 minutes on the day of the visit.  This includes face to face time and non-face to face time preparing to see the patient (eg, review of tests), obtaining and/or reviewing separately obtained history, documenting clinical information in the electronic or other health record, independently interpreting results and communicating results to the patient/family/caregiver, or care coordinator.    Visit complexity today is associated with medical care services that are part of the ongoing care related to the single serious and/or complex condition of Overactive bladder (OAB) and Chronic urinary retention (CUR). A longitudinal relationship exists or is being developed between the patient and this practitioner for the care of this condition.     Copy to:  Dora Freedman MD      This note was generated with the assistance of ambient listening  technology. Verbal consent was obtained by the patient and accompanying visitor(s) for the recording of patient appointment to facilitate this note. I attest to having reviewed and edited the generated note for accuracy, though some syntax or spelling errors may persist. Please contact the author of this note for any clarification.             [1]   Social History  Socioeconomic History    Marital status:    Tobacco Use    Smoking status: Never    Smokeless tobacco: Never   Substance and Sexual Activity    Alcohol use: No    Drug use: No    Sexual activity: Yes     Partners: Male     Birth control/protection: Surgical   [2]   Outpatient Encounter Medications as of 3/14/2025   Medication Sig Dispense Refill    acetaminophen (TYLENOL) 500 MG tablet Take 2 tablets (1,000 mg total) by mouth 3 (three) times daily. 21 tablet 0    alcohol swabs (DROPSAFE ALCOHOL PREP PADS) PadM USE TWICE DAILY AS DIRECTED, E 11.9 200 each 3    allopurinoL (ZYLOPRIM) 300 MG tablet Take 1 tablet (300 mg total) by mouth once daily. 90 tablet 3    amLODIPine (NORVASC) 10 MG tablet TAKE 1 TABLET EVERY DAY 90 tablet 3    aspirin (ECOTRIN) 81 MG EC tablet Take 81 mg by mouth once daily.      atorvastatin (LIPITOR) 40 MG tablet TAKE 1 TABLET EVERY DAY 90 tablet 3    blood glucose control, normal Soln True Metrix control solution E11.9 - pt tests twice daily 1 each 1    blood sugar diagnostic (TRUE METRIX GLUCOSE TEST STRIP) Strp Use to test blood glucose two (2) times a day, to be used with insurance-preferred brand of glucometer/supplies. 200 strip 3    blood-glucose meter kit Check glucose daily E11.9 meter covered by insurance Don Metrix 1 each 0    empagliflozin (JARDIANCE) 10 mg tablet Take 1 tablet (10 mg total) by mouth once daily. 90 tablet 4    ibuprofen (ADVIL,MOTRIN) 600 MG tablet Take 1 tablet (600 mg total) by mouth every 6 (six) hours as needed for Pain. 20 tablet 0    lancets (TRUEPLUS LANCETS) 28 gauge Misc 1 lancet by  "Misc.(Non-Drug; Combo Route) route 2 (two) times daily. 200 each 2    losartan (COZAAR) 100 MG tablet TAKE 1 TABLET ONE TIME DAILY 90 tablet 3    metFORMIN (GLUCOPHAGE-XR) 500 MG ER 24hr tablet Take 1 tablet (500 mg total) by mouth 2 (two) times daily with meals. 180 tablet 3    omeprazole (PRILOSEC) 20 MG capsule TAKE 1 CAPSULE TWICE DAILY 180 capsule 3    oxyCODONE-acetaminophen (PERCOCET) 5-325 mg per tablet Take 1 tablet by mouth every 12 (twelve) hours as needed for Pain. 60 tablet 0    pen needle, diabetic (NOVOFINE 32) 32 gauge x 1/4" Ndle Use with insulin daily. 90 each 1    pioglitazone (ACTOS) 30 MG tablet TAKE 1 TABLET EVERY DAY 90 tablet 1    pregabalin (LYRICA) 50 MG capsule One at bedtime 30 capsule 2    TOUJEO SOLOSTAR U-300 INSULIN 300 unit/mL (1.5 mL) InPn pen Inject 10 Units into the skin once daily. 3 Pen 1    DULoxetine (CYMBALTA) 30 MG capsule TAKE 1 CAPSULE ONE TIME DAILY (Patient not taking: Reported on 3/14/2025) 90 capsule 3    LIDOcaine (LIDODERM) 5 % Place 1 patch onto the skin once daily. Remove & Discard patch within 12 hours or as directed by MD (Patient not taking: Reported on 3/14/2025) 30 patch 2    [DISCONTINUED] mirabegron (MYRBETRIQ) 25 mg Tb24 ER tablet Take 1 tablet (25 mg total) by mouth once daily. (Patient not taking: Reported on 3/14/2025) 90 tablet 3    [DISCONTINUED] solifenacin (VESICARE) 10 MG tablet Take 1 tablet (10 mg total) by mouth once daily. (Patient not taking: Reported on 3/14/2025) 90 tablet 3     Facility-Administered Encounter Medications as of 3/14/2025   Medication Dose Route Frequency Provider Last Rate Last Admin    fentaNYL 50 mcg/mL injection 100 mcg  100 mcg Intravenous PRN Robert Alfaro PA-C   100 mcg at 02/02/24 0640    midazolam (VERSED) 1 mg/mL injection 1 mg  1 mg Intravenous PRN Robert Alfaro PA-C   1 mg at 02/02/24 0640     "

## 2025-03-21 ENCOUNTER — OFFICE VISIT (OUTPATIENT)
Dept: INTERNAL MEDICINE | Facility: CLINIC | Age: 80
End: 2025-03-21
Payer: MEDICARE

## 2025-03-21 VITALS
SYSTOLIC BLOOD PRESSURE: 138 MMHG | WEIGHT: 177 LBS | HEIGHT: 67 IN | BODY MASS INDEX: 27.78 KG/M2 | HEART RATE: 96 BPM | DIASTOLIC BLOOD PRESSURE: 60 MMHG

## 2025-03-21 DIAGNOSIS — R20.2 PARESTHESIA OF THUMB OF LEFT HAND: ICD-10-CM

## 2025-03-21 DIAGNOSIS — M54.2 NECK PAIN: ICD-10-CM

## 2025-03-21 DIAGNOSIS — I65.23 BILATERAL CAROTID ARTERY STENOSIS: ICD-10-CM

## 2025-03-21 DIAGNOSIS — D64.9 ANEMIA, UNSPECIFIED TYPE: ICD-10-CM

## 2025-03-21 DIAGNOSIS — H26.9 CATARACT, UNSPECIFIED CATARACT TYPE, UNSPECIFIED LATERALITY: Primary | ICD-10-CM

## 2025-03-21 DIAGNOSIS — Z79.4 TYPE 2 DIABETES MELLITUS WITH DIABETIC NEPHROPATHY, WITH LONG-TERM CURRENT USE OF INSULIN: ICD-10-CM

## 2025-03-21 DIAGNOSIS — R51.9 NONINTRACTABLE HEADACHE, UNSPECIFIED CHRONICITY PATTERN, UNSPECIFIED HEADACHE TYPE: ICD-10-CM

## 2025-03-21 DIAGNOSIS — Z23 NEED FOR PNEUMOCOCCAL VACCINATION: ICD-10-CM

## 2025-03-21 DIAGNOSIS — E11.21 TYPE 2 DIABETES MELLITUS WITH DIABETIC NEPHROPATHY, WITH LONG-TERM CURRENT USE OF INSULIN: ICD-10-CM

## 2025-03-21 DIAGNOSIS — Z12.31 SCREENING MAMMOGRAM, ENCOUNTER FOR: ICD-10-CM

## 2025-03-21 PROCEDURE — 3078F DIAST BP <80 MM HG: CPT | Mod: CPTII,S$GLB,, | Performed by: INTERNAL MEDICINE

## 2025-03-21 PROCEDURE — 1125F AMNT PAIN NOTED PAIN PRSNT: CPT | Mod: CPTII,S$GLB,, | Performed by: INTERNAL MEDICINE

## 2025-03-21 PROCEDURE — 99215 OFFICE O/P EST HI 40 MIN: CPT | Mod: S$GLB,,, | Performed by: INTERNAL MEDICINE

## 2025-03-21 PROCEDURE — G0009 ADMIN PNEUMOCOCCAL VACCINE: HCPCS | Mod: S$GLB,,, | Performed by: INTERNAL MEDICINE

## 2025-03-21 PROCEDURE — 3288F FALL RISK ASSESSMENT DOCD: CPT | Mod: CPTII,S$GLB,, | Performed by: INTERNAL MEDICINE

## 2025-03-21 PROCEDURE — 1159F MED LIST DOCD IN RCRD: CPT | Mod: CPTII,S$GLB,, | Performed by: INTERNAL MEDICINE

## 2025-03-21 PROCEDURE — 1101F PT FALLS ASSESS-DOCD LE1/YR: CPT | Mod: CPTII,S$GLB,, | Performed by: INTERNAL MEDICINE

## 2025-03-21 PROCEDURE — 90677 PCV20 VACCINE IM: CPT | Mod: S$GLB,,, | Performed by: INTERNAL MEDICINE

## 2025-03-21 PROCEDURE — 3075F SYST BP GE 130 - 139MM HG: CPT | Mod: CPTII,S$GLB,, | Performed by: INTERNAL MEDICINE

## 2025-03-21 PROCEDURE — 99999 PR PBB SHADOW E&M-EST. PATIENT-LVL V: CPT | Mod: PBBFAC,,, | Performed by: INTERNAL MEDICINE

## 2025-03-21 RX ORDER — AMLODIPINE BESYLATE 10 MG/1
10 TABLET ORAL DAILY
Qty: 90 TABLET | Refills: 3 | Status: SHIPPED | OUTPATIENT
Start: 2025-03-21

## 2025-03-21 RX ORDER — ATORVASTATIN CALCIUM 40 MG/1
40 TABLET, FILM COATED ORAL DAILY
Qty: 90 TABLET | Refills: 3 | Status: SHIPPED | OUTPATIENT
Start: 2025-03-21

## 2025-03-21 RX ORDER — OMEPRAZOLE 20 MG/1
CAPSULE, DELAYED RELEASE ORAL
Qty: 180 CAPSULE | Refills: 3 | Status: SHIPPED | OUTPATIENT
Start: 2025-03-21

## 2025-03-21 RX ORDER — ALLOPURINOL 300 MG/1
300 TABLET ORAL DAILY
Qty: 90 TABLET | Refills: 3 | Status: SHIPPED | OUTPATIENT
Start: 2025-03-21

## 2025-03-21 RX ORDER — OXYCODONE AND ACETAMINOPHEN 5; 325 MG/1; MG/1
1 TABLET ORAL EVERY 12 HOURS PRN
Qty: 60 TABLET | Refills: 0 | Status: SHIPPED | OUTPATIENT
Start: 2025-03-21

## 2025-03-21 RX ORDER — PREGABALIN 50 MG/1
CAPSULE ORAL
Qty: 30 CAPSULE | Refills: 2 | Status: SHIPPED | OUTPATIENT
Start: 2025-03-21

## 2025-03-21 RX ORDER — PIOGLITAZONEHYDROCHLORIDE 30 MG/1
30 TABLET ORAL DAILY
Qty: 90 TABLET | Refills: 3 | Status: SHIPPED | OUTPATIENT
Start: 2025-03-21

## 2025-03-21 RX ORDER — LOSARTAN POTASSIUM 100 MG/1
100 TABLET ORAL DAILY
Qty: 90 TABLET | Refills: 3 | Status: SHIPPED | OUTPATIENT
Start: 2025-03-21

## 2025-03-24 ENCOUNTER — TELEPHONE (OUTPATIENT)
Dept: ENDOCRINOLOGY | Facility: CLINIC | Age: 80
End: 2025-03-24
Payer: MEDICARE

## 2025-03-24 NOTE — TELEPHONE ENCOUNTER
Spoke with patient to review glucose.    Current Regimen:  Metformin 500mg twice daily   Jardiance 10mg daily   Actos 30mg daily   TRIAL OFF: Toujeo 10 units daily     3/14 -- fasting 141  3/15 -- L 146  3/16 -- D 134  3/17 -- fasting 147  3/18 -- L ?  3/19 -- 136  3/20 -- D 149  3/21 -- fasting 149  3/22 -- L 154  3/23 -- L 176  3/24 -- fasting 151    Hypoglycemia: NONE  Hyperglycemia: see above     Recommended Medication Changes:  NONE      Reach out to me sooner for any glucose <70 or consistently >150.    Medication list updated.  Findings and recommendations reviewed with the patient.  All questions answered.

## 2025-03-25 ENCOUNTER — LAB VISIT (OUTPATIENT)
Dept: LAB | Facility: HOSPITAL | Age: 80
End: 2025-03-25
Payer: MEDICARE

## 2025-03-25 DIAGNOSIS — D64.9 ANEMIA, UNSPECIFIED TYPE: ICD-10-CM

## 2025-03-25 DIAGNOSIS — R51.9 NONINTRACTABLE HEADACHE, UNSPECIFIED CHRONICITY PATTERN, UNSPECIFIED HEADACHE TYPE: ICD-10-CM

## 2025-03-25 LAB
ABSOLUTE EOSINOPHIL (OHS): 0.05 K/UL
ABSOLUTE MONOCYTE (OHS): 0.53 K/UL (ref 0.3–1)
ABSOLUTE NEUTROPHIL COUNT (OHS): 4.71 K/UL (ref 1.8–7.7)
BASOPHILS # BLD AUTO: 0.03 K/UL
BASOPHILS NFR BLD AUTO: 0.4 %
ERYTHROCYTE [DISTWIDTH] IN BLOOD BY AUTOMATED COUNT: 14.3 % (ref 11.5–14.5)
ERYTHROCYTE [SEDIMENTATION RATE] IN BLOOD BY PHOTOMETRIC METHOD: 70 MM/HR
HCT VFR BLD AUTO: 30.4 % (ref 37–48.5)
HGB BLD-MCNC: 9.9 GM/DL (ref 12–16)
IMM GRANULOCYTES # BLD AUTO: 0.04 K/UL (ref 0–0.04)
IMM GRANULOCYTES NFR BLD AUTO: 0.6 % (ref 0–0.5)
LYMPHOCYTES # BLD AUTO: 1.77 K/UL (ref 1–4.8)
MCH RBC QN AUTO: 29 PG (ref 27–50)
MCHC RBC AUTO-ENTMCNC: 32.6 G/DL (ref 32–36)
MCV RBC AUTO: 89 FL (ref 82–98)
NUCLEATED RBC (/100WBC) (OHS): 0 /100 WBC
PLATELET # BLD AUTO: 251 K/UL (ref 150–450)
PMV BLD AUTO: 10.5 FL (ref 9.2–12.9)
RBC # BLD AUTO: 3.41 M/UL (ref 4–5.4)
RELATIVE EOSINOPHIL (OHS): 0.7 %
RELATIVE LYMPHOCYTE (OHS): 24.8 % (ref 18–48)
RELATIVE MONOCYTE (OHS): 7.4 % (ref 4–15)
RELATIVE NEUTROPHIL (OHS): 66.1 % (ref 38–73)
WBC # BLD AUTO: 7.13 K/UL (ref 3.9–12.7)

## 2025-03-25 PROCEDURE — 84466 ASSAY OF TRANSFERRIN: CPT

## 2025-03-25 PROCEDURE — 84238 ASSAY NONENDOCRINE RECEPTOR: CPT

## 2025-03-25 PROCEDURE — 86140 C-REACTIVE PROTEIN: CPT

## 2025-03-25 PROCEDURE — 85025 COMPLETE CBC W/AUTO DIFF WBC: CPT

## 2025-03-25 PROCEDURE — 85652 RBC SED RATE AUTOMATED: CPT

## 2025-03-25 PROCEDURE — 36415 COLL VENOUS BLD VENIPUNCTURE: CPT

## 2025-03-25 PROCEDURE — 82728 ASSAY OF FERRITIN: CPT

## 2025-03-26 LAB
CRP SERPL-MCNC: 12 MG/L
FERRITIN SERPL-MCNC: 380 NG/ML (ref 20–300)
IRON SATN MFR SERPL: 24 % (ref 20–50)
IRON SERPL-MCNC: 65 UG/DL (ref 30–160)
TIBC SERPL-MCNC: 272 UG/DL (ref 250–450)
TRANSFERRIN SERPL-MCNC: 184 MG/DL (ref 200–375)

## 2025-03-27 LAB — STFR SERPL-MCNC: 4.8 MG/L (ref 1.8–4.6)

## 2025-03-28 ENCOUNTER — HOSPITAL ENCOUNTER (OUTPATIENT)
Dept: VASCULAR SURGERY | Facility: CLINIC | Age: 80
Discharge: HOME | End: 2025-03-28
Attending: INTERNAL MEDICINE
Payer: MEDICARE

## 2025-03-28 DIAGNOSIS — I65.23 BILATERAL CAROTID ARTERY STENOSIS: ICD-10-CM

## 2025-03-28 PROCEDURE — 93880 EXTRACRANIAL BILAT STUDY: CPT | Mod: S$GLB,,, | Performed by: SURGERY

## 2025-03-30 ENCOUNTER — RESULTS FOLLOW-UP (OUTPATIENT)
Dept: INTERNAL MEDICINE | Facility: CLINIC | Age: 80
End: 2025-03-30

## 2025-03-30 ENCOUNTER — TELEPHONE (OUTPATIENT)
Dept: INTERNAL MEDICINE | Facility: CLINIC | Age: 80
End: 2025-03-30
Payer: MEDICARE

## 2025-03-30 DIAGNOSIS — I67.2 CEREBROVASCULAR ARTERIOSCLEROSIS: Primary | ICD-10-CM

## 2025-03-30 NOTE — PROGRESS NOTES
Your carotids show some blockage on both sides.  I recommend that we refer you back to see Vascular surgery.  We will call you to schedule.

## 2025-04-03 ENCOUNTER — HOSPITAL ENCOUNTER (OUTPATIENT)
Dept: RADIOLOGY | Facility: HOSPITAL | Age: 80
Discharge: HOME | End: 2025-04-03
Attending: UROLOGY
Payer: MEDICARE

## 2025-04-03 DIAGNOSIS — R35.1 NOCTURIA MORE THAN TWICE PER NIGHT: ICD-10-CM

## 2025-04-03 DIAGNOSIS — R33.9 INCOMPLETE BLADDER EMPTYING: ICD-10-CM

## 2025-04-03 DIAGNOSIS — N32.81 OVERACTIVE BLADDER: ICD-10-CM

## 2025-04-07 ENCOUNTER — TELEPHONE (OUTPATIENT)
Dept: UROLOGY | Facility: CLINIC | Age: 80
End: 2025-04-07
Payer: MEDICARE

## 2025-04-07 ENCOUNTER — RESULTS FOLLOW-UP (OUTPATIENT)
Dept: UROLOGY | Facility: CLINIC | Age: 80
End: 2025-04-07

## 2025-04-07 RX ORDER — LEVOFLOXACIN 500 MG/1
500 TABLET, FILM COATED ORAL DAILY
Qty: 10 TABLET | Refills: 0 | Status: SHIPPED | OUTPATIENT
Start: 2025-04-07 | End: 2025-04-17

## 2025-04-07 NOTE — TELEPHONE ENCOUNTER
Patient had pseudomonas in the urine.  Sensitive to levaquin.  This was sent to her [facility]'s pharmacy

## 2025-04-09 ENCOUNTER — TELEPHONE (OUTPATIENT)
Dept: UROLOGY | Facility: CLINIC | Age: 80
End: 2025-04-09
Payer: MEDICARE

## 2025-04-09 NOTE — TELEPHONE ENCOUNTER
----- Message from Mehreen Royal MD sent at 4/9/2025  7:21 AM CDT -----  Levaquin was sent on 4/7/2025.  Please make sure the patient knows that the antibiotic ws sent for her.  Thanks.  ----- Message -----  From: Lab, Background User  Sent: 4/4/2025   4:48 PM CDT  To: Mehreen Royal MD

## 2025-04-17 ENCOUNTER — HOSPITAL ENCOUNTER (OUTPATIENT)
Dept: RADIOLOGY | Facility: HOSPITAL | Age: 80
Discharge: HOME | End: 2025-04-17
Attending: INTERNAL MEDICINE
Payer: MEDICARE

## 2025-04-17 DIAGNOSIS — M54.2 NECK PAIN: ICD-10-CM

## 2025-04-17 DIAGNOSIS — R51.9 NONINTRACTABLE HEADACHE, UNSPECIFIED CHRONICITY PATTERN, UNSPECIFIED HEADACHE TYPE: ICD-10-CM

## 2025-04-17 PROCEDURE — 70450 CT HEAD/BRAIN W/O DYE: CPT | Mod: TC

## 2025-04-17 PROCEDURE — 72141 MRI NECK SPINE W/O DYE: CPT | Mod: 26,,, | Performed by: RADIOLOGY

## 2025-04-17 PROCEDURE — 72141 MRI NECK SPINE W/O DYE: CPT | Mod: TC

## 2025-04-17 PROCEDURE — 70450 CT HEAD/BRAIN W/O DYE: CPT | Mod: 26,,, | Performed by: RADIOLOGY

## 2025-04-23 ENCOUNTER — TELEPHONE (OUTPATIENT)
Dept: VASCULAR SURGERY | Facility: CLINIC | Age: 80
End: 2025-04-23
Payer: MEDICARE

## 2025-04-23 ENCOUNTER — TELEPHONE (OUTPATIENT)
Facility: CLINIC | Age: 80
End: 2025-04-23
Payer: MEDICARE

## 2025-04-23 NOTE — TELEPHONE ENCOUNTER
Pt's , Ran, answered pt's telephone. Once I identified myself, the pt's  starts to confirm that his wife knows of the procedure tomorrow at [facility] on [facility] in [place] and will be present at 1415.

## 2025-04-23 NOTE — TELEPHONE ENCOUNTER
Spoke with the pt's  Ran and informed him due to clinic emergent closing the appt will be cancelled.Pt will be contacted to reschedule appt. Ran verbalized understanding of information received.

## 2025-04-24 ENCOUNTER — TELEPHONE (OUTPATIENT)
Dept: ADMINISTRATIVE | Facility: CLINIC | Age: 80
End: 2025-04-24
Payer: MEDICARE

## 2025-04-24 ENCOUNTER — HOSPITAL ENCOUNTER (OUTPATIENT)
Dept: RADIOLOGY | Facility: HOSPITAL | Age: 80
Discharge: HOME | End: 2025-04-24
Attending: UROLOGY
Payer: MEDICARE

## 2025-04-24 ENCOUNTER — PROCEDURE VISIT (OUTPATIENT)
Facility: CLINIC | Age: 80
End: 2025-04-24
Payer: MEDICARE

## 2025-04-24 VITALS
WEIGHT: 178.81 LBS | SYSTOLIC BLOOD PRESSURE: 150 MMHG | HEART RATE: 79 BPM | RESPIRATION RATE: 16 BRPM | BODY MASS INDEX: 28 KG/M2 | TEMPERATURE: 97 F | DIASTOLIC BLOOD PRESSURE: 76 MMHG

## 2025-04-24 DIAGNOSIS — N32.81 OVERACTIVE BLADDER: ICD-10-CM

## 2025-04-24 DIAGNOSIS — R35.1 NOCTURIA MORE THAN TWICE PER NIGHT: ICD-10-CM

## 2025-04-24 DIAGNOSIS — R33.9 INCOMPLETE BLADDER EMPTYING: ICD-10-CM

## 2025-04-24 RX ORDER — DOXYCYCLINE HYCLATE 100 MG
100 TABLET ORAL ONCE
Status: COMPLETED | OUTPATIENT
Start: 2025-04-24 | End: 2025-04-24

## 2025-04-24 RX ADMIN — Medication 100 MG: at 04:04

## 2025-04-24 NOTE — TELEPHONE ENCOUNTER
Called pt; no answer; could not confirm or leave a message due to line kept ringing; I was calling to confirm pt's 5/1/25 home visit for eawv appt and to make sure the appt date still worked for pt.

## 2025-04-24 NOTE — PATIENT INSTRUCTIONS
FLUORO URODYNAMIC STUDY (FUDS)   DISCHARGE INSTRUCTIONS    You have had a procedure that will require time to properly heal. Follow the instructions you have been given on how to care for yourself once you are home. Below is additional information to help in your recovery.    ACTIVITY  There are no restrictions in activity. Start doing again the things you did before the procedure.  You may experience a slight burning sensation and notice a small amount of blood in your urine after your procedure. This will clear up within a day. Call the clinic if it continues beyond 48 hours.  If you are sent home with a catheter in place, only take showers until the catheter is removed.    DIET  Continue your normal diet. You may eat the same foods you ate before your procedure.  Drink plenty of fluids during the first 24 to 48 hours following your procedure.    MEDICATIONS  Resume all other previous medications from your prescribing physician.  Continue any pre-procedure antibiotics until they are all gone.    SIGNS AND SYMPTOMS TO REPORT TO THE DOCTOR  Chills or fever greater than 101° F within 24 hours of procedure.  Changes in urination, such as increased bleeding, foul smell, cloudy urine, or painful urination.  Call your doctor with any questions or concerns.    For any emergency situation, call 911 immediately or go to your nearest emergency room.    [facility]  [phone]

## 2025-04-24 NOTE — Clinical Note
Please reach out to the patient to schedule an appointment to be taught CIC.  I gave her the IUGA handout for CIC and a 14 Fr catheter sample bag from [facility]

## 2025-04-24 NOTE — PROCEDURES
Procedures  Fluoro Urodynamic Report    Indication:  incomplete bladder emptying    Patient was taken to the Urodynamic Suite with a comfortably full bladder and asked to perform a free uroflow.  Next, the patient was prepped and the urinary residual was drained with a 14 Fr catheter.  A 7 Fr dual lumen catheter was placed to measure intravesical pressures.  A 10 Fr balloon manometer was placed into the rectum for abdominal pressure measurements.  Patch EMG electrodes were placed on the perineum.  The patient was connected to the Stylenda Urodynamic machine, using a multichannel technique.  The bladder was filled with Cysto ConRay at room temperature at a rate of 30 ml/min.  Patient is filled to urgency.  Filling is performed with the patient in the seated position.  Abdominal leak point pressures are checked at 1st desire, then serially at 50cc increments first with Valsalva then with coughing.  The patient was then asked to sit and void for a pressure flow study.    The following are the results of the study:  1.  Uroflow       Q max:  13 ml/sec       Voided volume:  201 ml       Pattern of the curve:  biphasic    2.  PVR:  200 ml    3.  CMG       Sensation:         First Desire:  46 ml         Normal Desire:  84 ml         Strong Desire:  145 ml         Urgency:  196 ml       Capacity:  600 ml        Abnormal Contractions:  none       Compliance:  normal    4.  Abdominal Leak Point Pressure:  there was no stress incontinence    5.  EMG:  decreased during stress maneuvers, strain pattern with attempt to void    6.  Voiding phase       Patient could not void       Amount in the bladder at the end of the test:  600 ml    7.  Fluoroscopy--I have personally interpreted the images and the results are as follows:  bladder was smooth, there was no DESD    8.  Analysis:  increased sensation had a large bladder capacity and an atonic bladder.  The patient has been a diabetic for 20 years so this is likely related to the  diabetes mellitus    9.  Recommendations:       A.  Discussed CIC and SNM       b.  She is willing to be taught how to perform CIC       c.  The IUGA hand out and sample catheters were provided so she could familiarize herself but she will be given an appointment to be taught CIC.     CYSTOSCOPY REPORT    Pre Procedure Diagnosis:  incomplete bladder emptying    Post Procedure Diagnosis:  same    Anesthesia: 10 cc 2% lidocaine jelly applied per urethra.    14 FR Flexible Olympus cystoscope used.    FINDINGS:  Dome, anterior, posterior, lateral walls and bladder base free of urothelial abnormalities. Right and left ureteral orifices in the normal postion and configuration, both effluxed clear urine.  Bladder neck and urethra were normal.    Specimen:  none    The patient was taken to the cystoscopy suite and placed in dorsal lithotomy position.  The genitalia was prepped and draped  in the usual sterile fashion.  Time out was performed.  Two percent lidocaine jelly was inserted in the urethra.  After sufficent time had passed to allow good local anesthesia, the cystoscope was inserted in the urethra and passed into the bladder visualizing the urethra along its entire course.  The dome, anterior, posterior and lateral walls were examined systematically.  The ureteral orifices were in their usual position and configuration.  The cystoscope was turned upon itself 180 degrees to visualize the bladder neck.  The cystoscope was then brought to the level of the bladder neck, the water was turned on and the urethra was visualized.  The cystoscope was removed and the patient was instructed to urinate prior to leaving the office.     Post procedure medication:  doxycycline 100 mg x 1     ASSESSMENT/PLAN:  80 year old woman status post flexible cystoscopy.  1. Push fluids for 24 hours.  2. May see blood in the urine, this should gradually improve over the next 2-3 days.  3. The patient was instructed to return to the office or  go to the emergency should fever, chills, cloudy urine, or inability to urinate develop.  4. Follow up as above.

## 2025-04-25 ENCOUNTER — TELEPHONE (OUTPATIENT)
Dept: UROLOGY | Facility: CLINIC | Age: 80
End: 2025-04-25
Payer: MEDICARE

## 2025-04-25 NOTE — TELEPHONE ENCOUNTER
----- Message from Mehreen Royal MD sent at 4/24/2025  6:41 PM CDT -----  Please reach out to the patient to schedule an appointment to be taught CIC.  I gave her the IUGA handout for CIC and a 14 Fr catheter sample bag from [facility].

## 2025-04-28 ENCOUNTER — TELEPHONE (OUTPATIENT)
Dept: VASCULAR SURGERY | Facility: CLINIC | Age: 80
End: 2025-04-28
Payer: MEDICARE

## 2025-04-28 NOTE — TELEPHONE ENCOUNTER
Attempted to contact the pt to inform her of appt rescheduled but no answer.Left a voice message informing them of appt rescheduled to 5/21/25 at 1pm.Also left a voice message to contact the clinic at [phone] if appt is not acceptable.

## 2025-04-29 ENCOUNTER — TELEPHONE (OUTPATIENT)
Dept: PHARMACY | Facility: CLINIC | Age: 80
End: 2025-04-29
Payer: MEDICARE

## 2025-04-30 ENCOUNTER — CLINICAL SUPPORT (OUTPATIENT)
Dept: UROLOGY | Facility: CLINIC | Age: 80
End: 2025-04-30
Payer: MEDICARE

## 2025-04-30 VITALS
TEMPERATURE: 98 F | BODY MASS INDEX: 27.97 KG/M2 | HEIGHT: 66 IN | WEIGHT: 174 LBS | SYSTOLIC BLOOD PRESSURE: 161 MMHG | DIASTOLIC BLOOD PRESSURE: 70 MMHG | HEART RATE: 91 BPM

## 2025-04-30 DIAGNOSIS — N32.81 OVERACTIVE BLADDER: Primary | ICD-10-CM

## 2025-04-30 PROCEDURE — 99999 PR PBB SHADOW E&M-EST. PATIENT-LVL II: CPT | Mod: PBBFAC,,,

## 2025-04-30 NOTE — PROGRESS NOTES
Patient here for instruction on intermittent catheterization.           Had patient watch video on self catheterization then verbally explained procedure gave patient time to ask question. Had patient demonstrate  technique , patient state she will be able to do this.  Health Maintenance Due   Topic Date Due    Aspirin/Antiplatelet Therapy  Never done    Urine Microalbumin  07/14/2021 07/26/2021[facility] REVIEWED W/ NO RESULTS YIELDED/CHART SCRUBBED/JM

## 2025-05-01 ENCOUNTER — OFFICE VISIT (OUTPATIENT)
Dept: HOME HEALTH SERVICES | Facility: CLINIC | Age: 80
End: 2025-05-01
Payer: MEDICARE

## 2025-05-01 VITALS
SYSTOLIC BLOOD PRESSURE: 118 MMHG | HEIGHT: 66 IN | BODY MASS INDEX: 27.97 KG/M2 | HEART RATE: 88 BPM | WEIGHT: 174 LBS | DIASTOLIC BLOOD PRESSURE: 61 MMHG

## 2025-05-01 DIAGNOSIS — I70.0 ATHEROSCLEROSIS OF AORTA: ICD-10-CM

## 2025-05-01 DIAGNOSIS — E78.2 HYPERLIPIDEMIA, MIXED: ICD-10-CM

## 2025-05-01 DIAGNOSIS — I10 HTN (HYPERTENSION), BENIGN: ICD-10-CM

## 2025-05-01 DIAGNOSIS — E11.21 TYPE 2 DIABETES MELLITUS WITH DIABETIC NEPHROPATHY, WITH LONG-TERM CURRENT USE OF INSULIN: Chronic | ICD-10-CM

## 2025-05-01 DIAGNOSIS — Z00.00 ENCOUNTER FOR MEDICARE ANNUAL WELLNESS EXAM: Primary | ICD-10-CM

## 2025-05-01 DIAGNOSIS — Z79.4 TYPE 2 DIABETES MELLITUS WITH DIABETIC NEPHROPATHY, WITH LONG-TERM CURRENT USE OF INSULIN: Chronic | ICD-10-CM

## 2025-05-01 DIAGNOSIS — N18.31 STAGE 3A CHRONIC KIDNEY DISEASE: ICD-10-CM

## 2025-05-01 DIAGNOSIS — R20.2 PARESTHESIA OF THUMB OF LEFT HAND: ICD-10-CM

## 2025-05-01 DIAGNOSIS — K21.9 GASTROESOPHAGEAL REFLUX DISEASE, UNSPECIFIED WHETHER ESOPHAGITIS PRESENT: ICD-10-CM

## 2025-05-01 PROCEDURE — 1101F PT FALLS ASSESS-DOCD LE1/YR: CPT | Mod: CPTII,S$GLB,, | Performed by: NURSE PRACTITIONER

## 2025-05-01 PROCEDURE — 3288F FALL RISK ASSESSMENT DOCD: CPT | Mod: CPTII,S$GLB,, | Performed by: NURSE PRACTITIONER

## 2025-05-01 PROCEDURE — 1159F MED LIST DOCD IN RCRD: CPT | Mod: CPTII,S$GLB,, | Performed by: NURSE PRACTITIONER

## 2025-05-01 PROCEDURE — G0439 PPPS, SUBSEQ VISIT: HCPCS | Mod: S$GLB,,, | Performed by: NURSE PRACTITIONER

## 2025-05-01 PROCEDURE — 1158F ADVNC CARE PLAN TLK DOCD: CPT | Mod: CPTII,S$GLB,, | Performed by: NURSE PRACTITIONER

## 2025-05-01 PROCEDURE — 3078F DIAST BP <80 MM HG: CPT | Mod: CPTII,S$GLB,, | Performed by: NURSE PRACTITIONER

## 2025-05-01 PROCEDURE — 1125F AMNT PAIN NOTED PAIN PRSNT: CPT | Mod: CPTII,S$GLB,, | Performed by: NURSE PRACTITIONER

## 2025-05-01 PROCEDURE — 1160F RVW MEDS BY RX/DR IN RCRD: CPT | Mod: CPTII,S$GLB,, | Performed by: NURSE PRACTITIONER

## 2025-05-01 PROCEDURE — 3074F SYST BP LT 130 MM HG: CPT | Mod: CPTII,S$GLB,, | Performed by: NURSE PRACTITIONER

## 2025-05-02 ENCOUNTER — HOSPITAL ENCOUNTER (OUTPATIENT)
Dept: RADIOLOGY | Facility: HOSPITAL | Age: 80
Discharge: HOME | End: 2025-05-02
Attending: INTERNAL MEDICINE
Payer: MEDICARE

## 2025-05-02 DIAGNOSIS — Z12.31 SCREENING MAMMOGRAM, ENCOUNTER FOR: ICD-10-CM

## 2025-05-02 PROCEDURE — 77067 SCR MAMMO BI INCL CAD: CPT | Mod: 26,,, | Performed by: RADIOLOGY

## 2025-05-02 PROCEDURE — 77067 SCR MAMMO BI INCL CAD: CPT | Mod: TC

## 2025-05-02 PROCEDURE — 77063 BREAST TOMOSYNTHESIS BI: CPT | Mod: 26,,, | Performed by: RADIOLOGY

## 2025-05-09 ENCOUNTER — TELEPHONE (OUTPATIENT)
Dept: VASCULAR SURGERY | Facility: CLINIC | Age: 80
End: 2025-05-09
Payer: MEDICARE

## 2025-05-09 ENCOUNTER — PATIENT OUTREACH (OUTPATIENT)
Dept: ADMINISTRATIVE | Facility: HOSPITAL | Age: 80
End: 2025-05-09
Payer: MEDICARE

## 2025-05-09 NOTE — TELEPHONE ENCOUNTER
Spoke with the pt and informed her of appt on 5/21/25 will be rescheduled due to provider on [facility] and will not be returning to [facility]. Pt is requesting to be seen before PCP appt on 5/30/25.Appt scheduled and confirmed with the pt and appt letter placed in the mail. Pt verbalized understanding of information received.

## 2025-05-18 PROBLEM — R20.2 PARESTHESIA OF THUMB OF LEFT HAND: Status: ACTIVE | Noted: 2025-05-18

## 2025-05-18 PROBLEM — N18.31 STAGE 3A CHRONIC KIDNEY DISEASE: Status: ACTIVE | Noted: 2020-09-22

## 2025-05-18 PROBLEM — E66.811 OBESITY (BMI 30.0-34.9): Status: RESOLVED | Noted: 2020-09-22 | Resolved: 2025-05-18

## 2025-05-19 NOTE — PATIENT INSTRUCTIONS
Counseling and Referral of Other Preventative  (Italic type indicates deductible and co-insurance are waived)    Patient Name: Lina Davis  Today's Date: 5/18/2025    Health Maintenance       Date Due Completion Date    Diabetes Urine Screening 05/02/2024 5/2/2023    Hemoglobin A1c 09/06/2025 3/6/2025    Lipid Panel 10/14/2025 10/14/2024    Diabetic Eye Exam 10/24/2025 10/24/2024    TETANUS VACCINE 01/15/2026 1/15/2016    Aspirin/Antiplatelet Therapy 05/01/2026 5/1/2025        No orders of the defined types were placed in this encounter.    The following information is provided to all patients.  This information is to help you find resources for any of the problems found today that may be affecting your health:                  Living healthy guide: www.[facility].[place].gov      Understanding Diabetes: www.diabetes.org      Eating healthy: www.cdc.gov/healthyweight      [facility] home safety checklist: www.cdc.gov/steadi/patient.html      Agency on Aging: www.goea.[place].gov      Alcoholics anonymous (AA): www.aa.org      Physical Activity: www.rashida.nih.gov/vw9cbrr      Tobacco use: www.quitwithusla.org

## 2025-05-19 NOTE — PROGRESS NOTES
"Lina Davis presented for a follow-up Medicare AWV today. The following components were reviewed and updated:    Medical history  Family History  Social history  Allergies and Current Medications  Health Risk Assessment  Health Maintenance  Care Team    **See Completed Assessments for Annual Wellness visit with in the encounter summary    The following assessments were completed:  Depression Screening  Cognitive function Screening    Timed Get Up Test  Whisper Test      Opioid documentation:      Patient does have a current opioid prescription.      Patient accepted further discussion regarding opioid medication use.      Patient is currently taking oxycodone narcotic for left hand pain.        Pain level today is 2/10.       In addition to narcotic pain medications, patient is also using acetaminophen for pain control.       Patient is followed by a specialist currently for their pain and will not be referred today.       Patient's opioid risk potential based on ORT-OUD tool:       Bean each box that applies   No   Yes     Family history of substance abuse   Alcohol [x] []   Illegal drugs [x] []   Rx drugs [x] []     Personal history of substance abuse   Alcohol [x] []   Illegal drugs [x] []   Rx drugs [x] []     Age between 16-45 years   [x]   []     Patient with ADD, OCD, Bipolar disorder, schizoprenia   [x]   []     Patient with depression   [x]   []                         Scoring total                                                                 Non-opioid treatment options have been discussed today and added to the patient's after visit summary.          Vitals:    05/01/25 1058   BP: 118/61   Patient Position: Sitting   Pulse: 88   Weight: 78.9 kg (174 lb)   Height: 5' 6" (1.676 m)     Body mass index is 28.08 kg/m².       Physical Exam  Constitutional:       Appearance: Normal appearance.   HENT:      Head: Normocephalic and atraumatic.      Nose: Nose normal.      Mouth/Throat:      Mouth: Mucous " membranes are moist.   Eyes:      Extraocular Movements: Extraocular movements intact.   Cardiovascular:      Rate and Rhythm: Normal rate and regular rhythm.      Heart sounds: Normal heart sounds.   Pulmonary:      Effort: Pulmonary effort is normal. No respiratory distress.      Breath sounds: Normal breath sounds.   Abdominal:      General: Bowel sounds are normal. There is no distension.      Palpations: Abdomen is soft.   Musculoskeletal:         General: No swelling. Normal range of motion.      Cervical back: Normal range of motion.   Skin:     General: Skin is warm and dry.   Neurological:      General: No focal deficit present.      Mental Status: She is alert and oriented to person, place, and time.      Gait: Gait normal.   Psychiatric:         Mood and Affect: Mood normal.         Behavior: Behavior normal.           Diagnoses and health risks identified today and associated recommendations/orders:  1. Encounter for Medicare annual wellness exam  Assessments completed. Preventive measures and health maintenance reviewed with patient.  - Referral to Enhanced Annual Wellness Visit (eAWV) W+1    2. Type 2 diabetes mellitus with diabetic nephropathy, with long-term current use of insulin  Stable, patient on Jardiance, Metformin, Actos, and Toujeo insulin. Followed by PCP and Endocrinology.    3. Stage 3a chronic kidney disease  Stable, followed by PCP.    4. Atherosclerosis of aorta  Stable, patient on Aspirin. Followed by PCP.    5. Paresthesia of thumb of left hand  Stable, followed by PCP.    6. HTN (hypertension), benign  Stable, patient on Amlodipine and Losartan. Followed by PCP.    7. Hyperlipidemia, mixed  Stable, patient on Lipitor. Followed by PCP.    8. Gastroesophageal reflux disease, unspecified whether esophagitis present  Stable, patient on Prilosec. Followed by PCP.      Provided [place] with a 5-10 year written screening schedule and personal prevention plan. Recommendations were developed  using the USPSTF age appropriate recommendations. Education, counseling, and referrals were provided as needed.  After Visit Summary printed and given to patient which includes a list of additional screenings\tests needed.    Follow up in about 1 year (around 5/1/2026) for your next annual wellness visit.      Tiana Capellan, NP      I offered to discuss advanced care planning, including how to pick a person who would make decisions for you if you were unable to make them for yourself, called a health care power of , and what kind of decisions you might make such as use of life sustaining treatments such as ventilators and tube feeding when faced with a life limiting illness recorded on a living will that they will need to know. (How you want to be cared for as you near the end of your natural life)     X Patient is interested in learning more about how to make advanced directives.  I provided them paperwork and offered to discuss this with them.

## 2025-05-21 ENCOUNTER — TELEPHONE (OUTPATIENT)
Dept: PAIN MEDICINE | Facility: CLINIC | Age: 80
End: 2025-05-21
Payer: MEDICARE

## 2025-05-21 ENCOUNTER — OFFICE VISIT (OUTPATIENT)
Dept: PAIN MEDICINE | Facility: CLINIC | Age: 80
End: 2025-05-21
Payer: MEDICARE

## 2025-05-21 ENCOUNTER — PATIENT MESSAGE (OUTPATIENT)
Dept: PAIN MEDICINE | Facility: CLINIC | Age: 80
End: 2025-05-21

## 2025-05-21 VITALS
HEIGHT: 66 IN | WEIGHT: 174 LBS | DIASTOLIC BLOOD PRESSURE: 58 MMHG | HEART RATE: 73 BPM | SYSTOLIC BLOOD PRESSURE: 129 MMHG | BODY MASS INDEX: 27.97 KG/M2

## 2025-05-21 DIAGNOSIS — N18.30 STAGE 3 CHRONIC KIDNEY DISEASE, UNSPECIFIED WHETHER STAGE 3A OR 3B CKD: ICD-10-CM

## 2025-05-21 DIAGNOSIS — M47.816 LUMBAR SPONDYLOSIS: Primary | ICD-10-CM

## 2025-05-21 DIAGNOSIS — M54.16 LUMBAR RADICULOPATHY: ICD-10-CM

## 2025-05-21 PROCEDURE — 3078F DIAST BP <80 MM HG: CPT | Mod: CPTII,S$GLB,, | Performed by: STUDENT IN AN ORGANIZED HEALTH CARE EDUCATION/TRAINING PROGRAM

## 2025-05-21 PROCEDURE — 1101F PT FALLS ASSESS-DOCD LE1/YR: CPT | Mod: CPTII,S$GLB,, | Performed by: STUDENT IN AN ORGANIZED HEALTH CARE EDUCATION/TRAINING PROGRAM

## 2025-05-21 PROCEDURE — 1125F AMNT PAIN NOTED PAIN PRSNT: CPT | Mod: CPTII,S$GLB,, | Performed by: STUDENT IN AN ORGANIZED HEALTH CARE EDUCATION/TRAINING PROGRAM

## 2025-05-21 PROCEDURE — 3074F SYST BP LT 130 MM HG: CPT | Mod: CPTII,S$GLB,, | Performed by: STUDENT IN AN ORGANIZED HEALTH CARE EDUCATION/TRAINING PROGRAM

## 2025-05-21 PROCEDURE — 99214 OFFICE O/P EST MOD 30 MIN: CPT | Mod: S$GLB,,, | Performed by: STUDENT IN AN ORGANIZED HEALTH CARE EDUCATION/TRAINING PROGRAM

## 2025-05-21 PROCEDURE — 99999 PR PBB SHADOW E&M-EST. PATIENT-LVL III: CPT | Mod: PBBFAC,,, | Performed by: STUDENT IN AN ORGANIZED HEALTH CARE EDUCATION/TRAINING PROGRAM

## 2025-05-21 PROCEDURE — 3288F FALL RISK ASSESSMENT DOCD: CPT | Mod: CPTII,S$GLB,, | Performed by: STUDENT IN AN ORGANIZED HEALTH CARE EDUCATION/TRAINING PROGRAM

## 2025-05-21 PROCEDURE — 1159F MED LIST DOCD IN RCRD: CPT | Mod: CPTII,S$GLB,, | Performed by: STUDENT IN AN ORGANIZED HEALTH CARE EDUCATION/TRAINING PROGRAM

## 2025-05-21 NOTE — H&P (VIEW-ONLY)
Chronic Pain - f/u    Referring Physician: No ref. provider found    Date: 05/21/2025     Re: Lina Davis  MR#: [id number]  YOB: 1945  Age: 80 y.o.    Chief Complaint:  leg and back  Chief Complaint   Patient presents with    Low-back Pain     **This note is dictated using the M*Modal Fluency Direct word recognition program. There are word recognition mistakes that are occasionally missed on review.**    ASSESSMENT: 80 y.o. year old female with back and leg pain, consistent with     1. Lumbar spondylosis  Procedure Request Order for Pain Management      2. Lumbar radiculopathy        3. Stage 3 chronic kidney disease, unspecified whether stage 3a or 3b CKD          PLAN:     Lumbar spondylosis   -patient has notable facet arthritis of the lumbar spine and may be candidate for MBB/RFA.   6/3/25 - b/l L3,4,5 MBB#1 - no sed - no AC  6/17/25 - b/l L3,4,5 MBB#2 - no sed - no AC  7/1/25 - b/l L3,4,5 RFA - RN sed - no AC    Diabetic peripheral neuropathy  -b/l knees down to toes. Worse in the toes.  -A1c=5.7  -discussed qutenza.  Information provided.  She can contact me if she wants to schedule    Chronic low back pain - multifactorial  -etiology not totally clear.  Likely multifactorial with elements of SI joint, lumbar spondylosis, lumbar radiculopathy, spinal stenosis, degenerative disc disease all playing some part.  -failed tizanidine  -failed PT for lumbar spine  -failed SIJ and L5-S1 LESI.    Sacroiliac joint dysfunction / Sacroiliitis  -s/p bilateral SIJ on 4/12/22 - 10% relief at 2 weeks.  -description and physical exam still suggest that she has SI joint involvement.  Might be more ligamentous then joint itself    Left Lumbar radiculopathy - resolved  -s/p L5-S1 LESI without any improvement  12/2/22 - s/p b/l L4-5 TFESI - resolved @2years  -monitor kidney function  -Possible SCS candidate. She does not want surgery.  -STOPPED Lyrica 50mg BID due to ineffective. Dose adjustment to 50% due to CKD3.  Not helpful  -Taking tylenol    DM2  -on insulin  -discussed risks of steroid injection while diabetic.    CKD3  -GFR 46.6 on 3/6/25    - RTC August  - Counseled patient regarding the importance of weight loss and activity modification and physical therapy.    The above plan and management options were discussed at length with patient. Patient is in agreement with the above and verbalized understanding. It will be communicated with the referring physician via electronic record, fax, or mail.  Lab/study reports reviewed were important and necessary because subsequent medical and treatment recommendations required review of the above lab/study reports. Images viewed/reviewed above were important and necessary because subsequent medical and treatment recommendations required review of the reviewed image(s).     Electronically signed by:  Leland Pena DO  05/21/2025    =========================================================================================================     SUBJECTIVE:  Interval History 5/21/2025:   Lina Davis is a 80 y.o. female presents to the clinic for follow up.  Since last visit the pain has is unchanged. The patient states that she has been having more pain in the low back.  The pain is worse with getting/out of the car. Worse with standing. The pain statys in the low back and does not radiate into the legs too much.  She has burning from the knees down to her toes.  Worse in the feet.      The pain is located in the lower back  area and radiates to the upper back .  The pain is described as throbbing    At BEST  7/10   At WORST  10/10 on the WORST day.    On average pain is rated as 7/10.   Today the pain is rated as 4/10  Symptoms interfere with daily activity.   Exacerbating factors: Standing, Walking, and Getting out of bed/chair.    Mitigating factors medications.     Current pain medications: Percocet 5mg (2-3x/week), topicals, tylenol  Failed Pain Medications: gabapentin,  meloxicam, oxycodone, steroids, tylenol, tramadol, tizanidine, tylenol, Lyrica    Pain Procedures  4/12/22 - b/l SIJ 4/12/22 - 10% relief.   6/3/22 -  L5-S1 LESI on 6/3 - RN sedation - no improvement  12/2/22 - b/l L4-5 TFESI - RN sedation - doesn't remember  10/26/23 - Left L5-S1 TFESI - helpful for a while    Interval History 10/6/2023:   Lina Davis is a 80 y.o. female presents to the clinic for follow up.  Since last visit the pain has has worsened. The pain for the last few months has been in the left back/buttocks radiating down the left posterior leg into the foot.    The pain is located in the left hip area and radiates to the lower left leg and top of left foot.  The pain is described as sharp and shooting    At BEST  6/10   At WORST  8/10 on the WORST day.    On average pain is rated as 7/10.   Today the pain is rated as 6/10  Symptoms interfere with daily activity and sleeping.   Exacerbating factors: Sitting and sitting on left hip.    Mitigating factors nothing.     Interval History 11/1/2022:     Lina Davis is a 80 y.o. female presents to the clinic for follow up.  Since last visit the pain has is unchanged.  Pain is worse in the legs. Especailly down the sides of the legs and into the foot.    The pain is located in the lower back area and radiates to the buttocks down the legs.  The pain is described as aching, dull, and throbbing    At BEST  0/10   At WORST  8/10 on the WORST day.    On average pain is rated as 3/10.   Today the pain is rated as 6/10  Symptoms interfere with daily activity and sleeping.   Exacerbating factors: Standing, Bending, Walking, and Getting out of bed/chair.    Mitigating factors medications.     Interval History 8/30/2022:     Lina Davis is a 80 y.o. female presents to the clinic for follow up.  Since last visit the pain has has moderately improved. She is doing her home exercise program and that has been helping somewhat.  The pain has not gone away, but it is  improved. She has no back pain at night when she lays down, only some leg pain.    The pain is located in the low back/buttocks area and does not radiate.  The pain is described as aching, dull, and throbbing    At BEST  0/10   At WORST  8/10 on the WORST day.    On average pain is rated as 6/10.   Today the pain is rated as 0/10  Symptoms interfere with daily activity.   Worse with standing.  Better with walking, sitting.    Interval History 8/2/2022:     Lina Davis is a 80 y.o. female presents to the clinic for follow up.  Since last visit the pain has is unchanged.  The patient finished her 2nd therapy session and is very sore.     The pain is located in the buttocks/low back area and does not radiate.  The pain is described as aching, dull and throbbing    At BEST  6/10   At WORST  9/10 on the WORST day.    On average pain is rated as 7/10.   Today the pain is rated as 6/10  Symptoms interfere with daily activity and sleeping.   Exacerbating factors: Standing, Getting out of bed/chair and in and out the car.    Mitigating factors medications and sitting.     Interval History 6/28/2022:     Lina Davis is a 80 y.o. female presents to the clinic for follow up.  Since last visit the pain has has worsened slightly.  Did not get relief from the epidural injection.  Pain is still primarily left low back/buttocks with radiation in to the left leg.     The pain is located in the lower back area and radiates to the leg(knee).  The pain is described as aching    At BEST  7/10   At WORST  10/10 on the WORST day.    On average pain is rated as 8/10.   Today the pain is rated as 5/10  Symptoms interfere with daily activity and sleeping.   Exacerbating factors: Standing, Getting out of bed/chair and in and out the car.    Mitigating factors medications and sitting.     Current pain medications: Percocet 5mg (2-3x/week), topicals  Failed Pain Medications: gabapentin, meloxicam, oxycodone, steroids, tylenol, tramadol,  tizanidine, tylenol      Interval History 4/28/2022:     Lina Davis is a 80 y.o. female presents to the clinic for follow up.  Since last visit the pain has is unchanged. She did not get any signfiicant improvement from the SI joint injection. Her leg pain > back pain.  It goes down both lateral legs from the buttocks to the top of the feet on both sides.    The pain is located in the lower back area and radiates to the legs.  The pain is described as aching    At BEST  7/10   At WORST  10/10 on the WORST day.    On average pain is rated as 8/10.   Today the pain is rated as 7/10  Symptoms interfere with daily activity and sleeping.   Exacerbating factors: Sitting and night time.    Mitigating factors medications.     Current pain medications: Percocet 5mg (2-3x/week), topicals  Failed Pain Medications: gabapentin, meloxicam, oxycodone, steroids, tylenol, tramadol, tizanidine, tylenol    Initial hx:  Lina Davis is a 80 y.o. female presents to the clinic for the evaluation of lower back pain. The pain started 6 year ago following no inciting event and symptoms have been worsening.  The patient states that she has low back pain that radiates down both legs to the top of the bilateral feet.   Sometimes the pain with radiate in to the inside of the leg. The pain waker her up in the middle of the night.  The pain usually starts when she lays down. The patient cannot sleep on her back, so she has to rotate from side to side.  The patient has tried different topicals ointments, tylenol, gabapentin.  Leg pain > back pain.     Dr. Freedman tried a cortisone shot which did not help.  She has a small script of oxycodone which she takes rarely.  She takes 2 tylenol extra strength every night.  She tried gabapentin 600mg TID and it was not helpful. Meloxicam not helpful.    Pain Description:    The pain is located in the lower back area and radiates to the legs down to the feet.    At BEST  6/10   At WORST  10/10 on the  WORST day.    On average pain is rated as 7/10.   Today the pain is rated as 8/10  The pain is continuous.  The pain is described as aching and sharp    Symptoms interfere with daily activity and sleeping.   Exacerbating factors: Standing, Laying, Walking and Night Time.    Mitigating factors rubbing gel and nothing .   She reports 3 hours of sleep per night.    Physical Therapy/Home Exercise: No, not currently in physical therapy or home exercise program    Current Pain Medications:    - Opioids: Percocet 5mg (2-3x/week)  - Topicals: multiple    Failed Pain Medications:    - gabapentin, meloxicam, oxycodone, steroids, tylenol, tramadol    Pain Treatment Therapies:    Pain procedures: none  Physical Therapy: none  Chiropractor: none  Acupuncture: none  TENS unit: none  Spinal decompression: none  Joint replacement: none    Patient denies urinary incontinence, bowel incontinence and loss of sensations.  Patient denies any suicidal or homicidal ideations     report:  Reviewed and consistent with medication use as prescribed.    Imaging:   XR lumbar 2/14/22:  Minimal mild levoscoliosis thoracic lumbar junction, elevation right hemipelvis, prominent bridging osteophytes lower dorsal and lumbar spine particularly L2-L3 and right L4-L5.  DJD SI joints.  Primary anterior subluxation L4 on L5, vacuum disc deformity L5-S1.  Prompt facet joint arthropathy particularly L3 through L5 levels.    XR HIPs 2/14/22:  Prominent degenerative disc spondylosis facet joint arthropathy lumbosacral junction, elevation right hemipelvis, injection site calcification left lateral buttocks.  DJD SI joints.  Mild DJD hip joints and hypertrophic change cortex greater tuberosities.      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    Vitamin D deficiency disease      Past Surgical  History:   Procedure Laterality Date    ANTERIOR CERVICAL DISCECTOMY W/ FUSION N/A 3/19/2020    Procedure: DISCECTOMY, SPINE, CERVICAL, ANTERIOR APPROACH, WITH FUSION, C3-C4, C4-C5, C5-C6;  Surgeon: Joseph Walton MD;  Location: [facility] OR 2ND FLR;  Service: Neurosurgery;  Laterality: N/A;    CARPAL TUNNEL RELEASE      bilateral    COLONOSCOPY N/A 3/7/2018    Procedure: COLONOSCOPY;  Surgeon: Luís Soni MD;  Location: [facility] ENDO (2ND FLR);  Service: Endoscopy;  Laterality: N/A;  ok to schedule per Elizabeth    COLONOSCOPY N/A 7/15/2024    Procedure: COLONOSCOPY;  Surgeon: Dread Forman MD;  Location: [facility] ENDO (4TH FLR);  Service: Endoscopy;  Laterality: N/A;  Referred by:Dr. SHIRLEY Kay   Prep: Miralax  Route instructions sent: portal and mail  Other concerns: DM oral and insulin; his. of polyps  7/8-pre call complete-GT    COLONOSCOPY N/A 10/7/2024    Procedure: COLONOSCOPY;  Surgeon: Adrian Barry MD;  Location: [facility] (2ND FLR);  Service: Endoscopy;  Laterality: N/A;  peg prep/ prp inst mailed and sent to pt via portal  Regarding: FW: Colonoscopy, possible device-assisted  From: Adrian Barry MD  Sent: 7/15/2024  10:56 AM CDT  To: [facility]  Subject: Colonoscopy, possible device-assisted          Procedu    COLONOSCOPY W/ BIOPSIES AND POLYPECTOMY      EPIDURAL STEROID INJECTION N/A 6/3/2022    Procedure: LESI L5-S1;  Surgeon: Leland Pena DO;  Location: [facility] OR;  Service: Pain Management;  Laterality: N/A;    EPIDURAL STEROID INJECTION INTO LUMBAR SPINE Left 10/26/2023    Procedure: LT L5-S1 TFESI;  Surgeon: Leland Pena DO;  Location: [facility] PAIN MANAGEMENT;  Service: Pain Management;  Laterality: Left;  15 mins    HEMORRHOID SURGERY      HYSTERECTOMY      AUB    INJECTION OF JOINT Bilateral 4/12/2022    Procedure: SACROILIAC JOINT Steroid Injection-Bilateral;  Surgeon: Leland Pena DO;  Location: [facility] OR;  Service: Pain Management;   Laterality: Bilateral;    ROTATOR CUFF REPAIR      bilateral    TRANSFORAMINAL EPIDURAL INJECTION OF STEROID Bilateral 12/2/2022    Procedure: TFESI (B/L) L4-5;  Surgeon: Leland Pena DO;  Location: [facility] OR;  Service: Pain Management;  Laterality: Bilateral;    TRIGGER FINGER RELEASE Left 2/2/2024    Procedure: RELEASE, TRIGGER FINGER - left index, long, ring, and small fingers;  Surgeon: Justus Isabel MD;  Location: [facility] OR;  Service: Orthopedics;  Laterality: Left;     Social History     Socioeconomic History    Marital status:    Tobacco Use    Smoking status: Never    Smokeless tobacco: Never   Substance and Sexual Activity    Alcohol use: No    Drug use: No    Sexual activity: Not Currently     Partners: Male     Birth control/protection: Surgical     Social Drivers of Health     Financial Resource Strain: Low Risk  (5/1/2025)    Overall Financial Resource Strain (CARDIA)     Difficulty of Paying Living Expenses: Not very hard   Food Insecurity: No Food Insecurity (5/1/2025)    Hunger Vital Sign     Worried About Running Out of Food in the Last Year: Never true     Ran Out of Food in the Last Year: Never true   Transportation Needs: No Transportation Needs (5/1/2025)    PRAPARE - Transportation     Lack of Transportation (Medical): No     Lack of Transportation (Non-Medical): No   Physical Activity: Insufficiently Active (5/1/2025)    Exercise Vital Sign     Days of Exercise per Week: 3 days     Minutes of Exercise per Session: 10 min   Stress: No Stress Concern Present (5/1/2025)    [employer] - Occupational Stress Questionnaire     Feeling of Stress : Not at all   Housing Stability: Low Risk  (5/1/2025)    Housing Stability Vital Sign     Unable to Pay for Housing in the Last Year: No     Number of Times Moved in the Last Year: 0     Homeless in the Last Year: No     Family History   Problem Relation Name Age of Onset    Heart disease Mother      Diabetes  Mother      Heart disease Father      Cancer Father          liver    Diabetes Sister Shawna     Cataracts Sister Shawna     Kidney disease Brother david     Heart disease Brother david     Diabetes Sister elijah     COPD Brother jordi     COPD Brother sancho     Gout Brother kylie     Benign prostatic hyperplasia Brother kylie     Heart disease Brother kylie     Kidney disease Brother kylie     Heart attack Brother kylie         heart attack    Kidney failure Brother kylie     Lupus Sister thanh     Heart attack Brother santiago     Drug abuse Brother santiago     No Known Problems Daughter      No Known Problems Son      Amblyopia Neg Hx      Blindness Neg Hx      Breast cancer Neg Hx      Colon cancer Neg Hx      Ovarian cancer Neg Hx         Review of patient's allergies indicates:   Allergen Reactions    Gabapentin Other (See Comments)     ineffective    Januvia [sitagliptin] Other (See Comments)     Pancreatitis         Current Outpatient Medications   Medication Sig    acetaminophen (TYLENOL) 500 MG tablet Take 2 tablets (1,000 mg total) by mouth 3 (three) times daily.    alcohol swabs (DROPSAFE ALCOHOL PREP PADS) PadM USE TWICE DAILY AS DIRECTED, E 11.9    allopurinoL (ZYLOPRIM) 300 MG tablet Take 1 tablet (300 mg total) by mouth once daily.    amLODIPine (NORVASC) 10 MG tablet Take 1 tablet (10 mg total) by mouth once daily.    aspirin (ECOTRIN) 81 MG EC tablet Take 81 mg by mouth once daily.    atorvastatin (LIPITOR) 40 MG tablet Take 1 tablet (40 mg total) by mouth once daily.    blood glucose control, normal Soln True Metrix control solution E11.9 - pt tests twice daily    blood sugar diagnostic (TRUE METRIX GLUCOSE TEST STRIP) Strp Use to test blood glucose two (2) times a day, to be used with insurance-preferred brand of glucometer/supplies.    blood-glucose meter kit Check glucose daily E11.9 meter covered by insurance Don Metrix    empagliflozin (JARDIANCE) 10 mg tablet Take 1 tablet (10 mg total)  "by mouth once daily.    ibuprofen (ADVIL,MOTRIN) 600 MG tablet Take 1 tablet (600 mg total) by mouth every 6 (six) hours as needed for Pain.    lancets (TRUEPLUS LANCETS) 28 gauge Misc 1 lancet by Misc.(Non-Drug; Combo Route) route 2 (two) times daily.    LIDOcaine (LIDODERM) 5 % Place 1 patch onto the skin once daily. Remove & Discard patch within 12 hours or as directed by MD    losartan (COZAAR) 100 MG tablet Take 1 tablet (100 mg total) by mouth once daily.    metFORMIN (GLUCOPHAGE-XR) 500 MG ER 24hr tablet Take 1 tablet (500 mg total) by mouth 2 (two) times daily with meals.    omeprazole (PRILOSEC) 20 MG capsule TAKE 1 CAPSULE TWICE DAILY    oxyCODONE-acetaminophen (PERCOCET) 5-325 mg per tablet Take 1 tablet by mouth every 12 (twelve) hours as needed for Pain.    pen needle, diabetic (NOVOFINE 32) 32 gauge x 1/4" Ndle Use with insulin daily.    pioglitazone (ACTOS) 30 MG tablet Take 1 tablet (30 mg total) by mouth once daily.    pregabalin (LYRICA) 50 MG capsule One at bedtime    TOUJEO SOLOSTAR U-300 INSULIN 300 unit/mL (1.5 mL) InPn pen Inject 10 Units into the skin once daily.     No current facility-administered medications for this visit.     Facility-Administered Medications Ordered in Other Visits   Medication    fentaNYL 50 mcg/mL injection 100 mcg    midazolam (VERSED) 1 mg/mL injection 1 mg       REVIEW OF SYSTEMS:    GENERAL:  No weight loss, malaise or fevers.   HEENT:   No recent changes in vision or hearing   NECK:  Negative for lumps, no difficulty with swallowing.  RESPIRATORY:  Negative for cough, wheezing or shortness of breath, patient denies any recent URI.  CARDIOVASCULAR:  Negative for chest pain, leg swelling or palpitations.  GI:  Negative for abdominal discomfort, blood in stools or black stools or change in bowel habits.  MUSCULOSKELETAL:  See HPI.  SKIN:  Negative for lesions, rash, and itching.  PSYCH:  No mood disorder or recent psychosocial stressors.  Patients sleep is not " "disturbed secondary to pain.  HEMATOLOGY/LYMPHOLOGY:  Negative for prolonged bleeding, bruising easily or swollen nodes.  Patient is not currently taking any anti-coagulants  NEURO:   No history of headaches, syncope, paralysis, seizures or tremors.  All other reviewed and negative other than HPI.    OBJECTIVE:    BP (!) 129/58 (BP Location: Right arm, Patient Position: Sitting)   Pulse 73   Ht 5' 6" (1.676 m)   Wt 78.9 kg (174 lb)   BMI 28.08 kg/m²     PHYSICAL EXAMINATION:    PSYCH:  Mood and affect appropriate. Thought pattern is goal oriented. AOx3  GENERAL: Well appearing, in no acute distress, alert and oriented x3.  SKIN: Skin color, texture, turgor normal, no rashes or lesions.  HEAD/FACE:  Normocephalic, atraumatic. Cranial nerves grossly intact.  CV: RRR with palpation of the radial artery.  PULM: CTAB. No evidence of respiratory difficulty, symmetric chest rise.  GI:  Soft and non-tender.    BACK:   - No obvious deformity or signs of trauma, Normal lumbar lordotic curve  - Negative spinous process tenderness  - Positive paravertebral tenderness  - Positive pain to palpation over the facet joints of the lumbar spine.   - Negative QL / Iliac crest / Glut tenderness  - Slump test is Negative for radicular pain  - Slump test is Negative for back pain  - Supine Straight leg raising is Negative for radicular pain  - Supine Straight leg raising is Negative for back pain  - Lumbar ROM is diminished in Flexion with pain  - Lumbar ROM is diminished in Extension with pain  - Lumbar ROM is diminished in Lateral Flexion with pain  - Lumbar ROM is diminished in Rotation with pain  - Did not perform Sustained Hip Flexion test (for discogenic pain)  - Positive Altered Gait, Posture  - Axial facet loading test Positive on the B/l side(s)    SI Joint exam:  - Positive SI joint tenderness to palpation  - William's sign Did not perform  - Yeoman's Test: Did not perform for SI joint pain indicating anterior SI ligament " involvement. Did not perform for anterior thigh pain/paresthesia which indicates femoral nerve stretch.  - Gaenslen's Test:Negative  - Finger Yovani's Sign:Negative  - SI compression test:Negative  - SI distraction test:Negative  - Thigh Thrust: Negative  - SI Thrust: Did not perform    MUSKULOSKELETAL:    EXTREMITIES:   Hip Exam:  - Log Roll Negative  - FADIR Negative  - Stinchfield Negative  - Hip Scour Negative  - GTB Tenderness Negative    MUSCULOSKELETAL:  No atrophy or tone abnormalities are noted in the UE or LE.  No deformities, edema, or skin discoloration are noted on visible skin. Good capillary refill.    NEURO: Bilateral upper and lower extremity coordination and muscle stretch reflexes are physiologic and symmetric.    No loss of sensation is noted.    NEUROLOGICAL EXAM:  MENTAL STATUS: A x O x 3, good concentration, speech is fluent and goal directed  MEMORY: recent and remote are intact  CN: CN2-12 grossly intact  MOTOR: 5/5 in all muscle groups of b/l LE  DTRs: 0 symmetric LE patella and achilles  Sensation:    -no Loss of sensation in a left lower and right lower L-1, L-2, L-3, L-4 and L-5 bilaterally distribution.  Babinski: absent on the bilateral side(s)    GAIT: antalgic

## 2025-05-21 NOTE — TELEPHONE ENCOUNTER
----- Message from Leland Irving DO sent at 2025 11:59 AM CDT -----  Regarding: Order for NATHANAEL MARION    Patient Name: NATHANAEL MARINO([id number])  Sex: Female  : 1945      PCP: ROSEMARY SHIPMAN    Center: [facility] CENTRAL BILLING OFFICE     Types of orders made on 2025: Procedure Request    Order Date:2025  Ordering User:LELAND IRVING [[id number]  Encounter Provider:Leland Irving DO [[id number]]  Authorizing Provider: Leland Irving DO [[id number]]  Department:[facility] PAIN MANAGEMENT[[id number]]    Common Order Information  Procedure -> Medial Branch Block (Specify level and laterality)     Pre-op Diagnosis -> Lumbar spondylosis     Order Specific Information  Order: Procedure Request Order for Pain Management [Custom: [id number]]  Order #:          [id number]: 1 FUTURE    Priority: Routine  Class: Clinic Performed    Future Order Information      Expires on:2026            Expected by:2025                   Comment:6/3/25 - b/l L3,4,5 MBB#1 - no sed - no AC             25 - b/l L3,4,5 MBB#2 - no sed - no AC             25 - b/l L3,4,5 RFA - RN sed - no AC    Associated Diagnoses      M47.816 Lumbar spondylosis      Physician -> lokesh         Is patient on anti-coagulants? -> No         Facility Name: -> [facility]           Priority: Routine  Class: Clinic Performed    Future Order Information      Expires on:2026            Expected by:2025                   Comment:6/3/25 - b/l L3,4,5 MBB#1 - no sed - no AC             25 - b/l L3,4,5 MBB#2 - no sed - no AC             25 - b/l L3,4,5 RFA - RN sed - no AC    Associated Diagnoses      M47.816 Lumbar spondylosis      Procedure -> Medial Branch Block (Specify level and laterality)         Physician -> [email or web address]         Is patient on anti-coagulants? -> No         Pre-op Diagnosis -> Lumbar spondylosis         Facility Name: -> [facility]

## 2025-05-21 NOTE — PROGRESS NOTES
Chronic Pain - f/u    Referring Physician: No ref. provider found    Date: 05/21/2025     Re: Lina Davis  MR#: [id number]  YOB: 1945  Age: 80 y.o.    Chief Complaint:  leg and back  Chief Complaint   Patient presents with    Low-back Pain     **This note is dictated using the M*Modal Fluency Direct word recognition program. There are word recognition mistakes that are occasionally missed on review.**    ASSESSMENT: 80 y.o. year old female with back and leg pain, consistent with     1. Lumbar spondylosis  Procedure Request Order for Pain Management      2. Lumbar radiculopathy        3. Stage 3 chronic kidney disease, unspecified whether stage 3a or 3b CKD          PLAN:     Lumbar spondylosis   -patient has notable facet arthritis of the lumbar spine and may be candidate for MBB/RFA.   6/3/25 - b/l L3,4,5 MBB#1 - no sed - no AC  6/17/25 - b/l L3,4,5 MBB#2 - no sed - no AC  7/1/25 - b/l L3,4,5 RFA - RN sed - no AC    Diabetic peripheral neuropathy  -b/l knees down to toes. Worse in the toes.  -A1c=5.7  -discussed qutenza.  Information provided.  She can contact me if she wants to schedule    Chronic low back pain - multifactorial  -etiology not totally clear.  Likely multifactorial with elements of SI joint, lumbar spondylosis, lumbar radiculopathy, spinal stenosis, degenerative disc disease all playing some part.  -failed tizanidine  -failed PT for lumbar spine  -failed SIJ and L5-S1 LESI.    Sacroiliac joint dysfunction / Sacroiliitis  -s/p bilateral SIJ on 4/12/22 - 10% relief at 2 weeks.  -description and physical exam still suggest that she has SI joint involvement.  Might be more ligamentous then joint itself    Left Lumbar radiculopathy - resolved  -s/p L5-S1 LESI without any improvement  12/2/22 - s/p b/l L4-5 TFESI - resolved @2years  -monitor kidney function  -Possible SCS candidate. She does not want surgery.  -STOPPED Lyrica 50mg BID due to ineffective. Dose adjustment to 50% due to CKD3.  Not helpful  -Taking tylenol    DM2  -on insulin  -discussed risks of steroid injection while diabetic.    CKD3  -GFR 46.6 on 3/6/25    - RTC August  - Counseled patient regarding the importance of weight loss and activity modification and physical therapy.    The above plan and management options were discussed at length with patient. Patient is in agreement with the above and verbalized understanding. It will be communicated with the referring physician via electronic record, fax, or mail.  Lab/study reports reviewed were important and necessary because subsequent medical and treatment recommendations required review of the above lab/study reports. Images viewed/reviewed above were important and necessary because subsequent medical and treatment recommendations required review of the reviewed image(s).     Electronically signed by:  Leland Pena DO  05/21/2025    =========================================================================================================     SUBJECTIVE:  Interval History 5/21/2025:   Lina Davis is a 80 y.o. female presents to the clinic for follow up.  Since last visit the pain has is unchanged. The patient states that she has been having more pain in the low back.  The pain is worse with getting/out of the car. Worse with standing. The pain statys in the low back and does not radiate into the legs too much.  She has burning from the knees down to her toes.  Worse in the feet.      The pain is located in the lower back  area and radiates to the upper back .  The pain is described as throbbing    At BEST  7/10   At WORST  10/10 on the WORST day.    On average pain is rated as 7/10.   Today the pain is rated as 4/10  Symptoms interfere with daily activity.   Exacerbating factors: Standing, Walking, and Getting out of bed/chair.    Mitigating factors medications.     Current pain medications: Percocet 5mg (2-3x/week), topicals, tylenol  Failed Pain Medications: gabapentin,  meloxicam, oxycodone, steroids, tylenol, tramadol, tizanidine, tylenol, Lyrica    Pain Procedures  4/12/22 - b/l SIJ 4/12/22 - 10% relief.   6/3/22 -  L5-S1 LESI on 6/3 - RN sedation - no improvement  12/2/22 - b/l L4-5 TFESI - RN sedation - doesn't remember  10/26/23 - Left L5-S1 TFESI - helpful for a while    Interval History 10/6/2023:   Lina Davis is a 80 y.o. female presents to the clinic for follow up.  Since last visit the pain has has worsened. The pain for the last few months has been in the left back/buttocks radiating down the left posterior leg into the foot.    The pain is located in the left hip area and radiates to the lower left leg and top of left foot.  The pain is described as sharp and shooting    At BEST  6/10   At WORST  8/10 on the WORST day.    On average pain is rated as 7/10.   Today the pain is rated as 6/10  Symptoms interfere with daily activity and sleeping.   Exacerbating factors: Sitting and sitting on left hip.    Mitigating factors nothing.     Interval History 11/1/2022:     Lina Davis is a 80 y.o. female presents to the clinic for follow up.  Since last visit the pain has is unchanged.  Pain is worse in the legs. Especailly down the sides of the legs and into the foot.    The pain is located in the lower back area and radiates to the buttocks down the legs.  The pain is described as aching, dull, and throbbing    At BEST  0/10   At WORST  8/10 on the WORST day.    On average pain is rated as 3/10.   Today the pain is rated as 6/10  Symptoms interfere with daily activity and sleeping.   Exacerbating factors: Standing, Bending, Walking, and Getting out of bed/chair.    Mitigating factors medications.     Interval History 8/30/2022:     Lina Davis is a 80 y.o. female presents to the clinic for follow up.  Since last visit the pain has has moderately improved. She is doing her home exercise program and that has been helping somewhat.  The pain has not gone away, but it is  improved. She has no back pain at night when she lays down, only some leg pain.    The pain is located in the low back/buttocks area and does not radiate.  The pain is described as aching, dull, and throbbing    At BEST  0/10   At WORST  8/10 on the WORST day.    On average pain is rated as 6/10.   Today the pain is rated as 0/10  Symptoms interfere with daily activity.   Worse with standing.  Better with walking, sitting.    Interval History 8/2/2022:     Lina Davis is a 80 y.o. female presents to the clinic for follow up.  Since last visit the pain has is unchanged.  The patient finished her 2nd therapy session and is very sore.     The pain is located in the buttocks/low back area and does not radiate.  The pain is described as aching, dull and throbbing    At BEST  6/10   At WORST  9/10 on the WORST day.    On average pain is rated as 7/10.   Today the pain is rated as 6/10  Symptoms interfere with daily activity and sleeping.   Exacerbating factors: Standing, Getting out of bed/chair and in and out the car.    Mitigating factors medications and sitting.     Interval History 6/28/2022:     Lina Davis is a 80 y.o. female presents to the clinic for follow up.  Since last visit the pain has has worsened slightly.  Did not get relief from the epidural injection.  Pain is still primarily left low back/buttocks with radiation in to the left leg.     The pain is located in the lower back area and radiates to the leg(knee).  The pain is described as aching    At BEST  7/10   At WORST  10/10 on the WORST day.    On average pain is rated as 8/10.   Today the pain is rated as 5/10  Symptoms interfere with daily activity and sleeping.   Exacerbating factors: Standing, Getting out of bed/chair and in and out the car.    Mitigating factors medications and sitting.     Current pain medications: Percocet 5mg (2-3x/week), topicals  Failed Pain Medications: gabapentin, meloxicam, oxycodone, steroids, tylenol, tramadol,  tizanidine, tylenol      Interval History 4/28/2022:     Lina Davis is a 80 y.o. female presents to the clinic for follow up.  Since last visit the pain has is unchanged. She did not get any signfiicant improvement from the SI joint injection. Her leg pain > back pain.  It goes down both lateral legs from the buttocks to the top of the feet on both sides.    The pain is located in the lower back area and radiates to the legs.  The pain is described as aching    At BEST  7/10   At WORST  10/10 on the WORST day.    On average pain is rated as 8/10.   Today the pain is rated as 7/10  Symptoms interfere with daily activity and sleeping.   Exacerbating factors: Sitting and night time.    Mitigating factors medications.     Current pain medications: Percocet 5mg (2-3x/week), topicals  Failed Pain Medications: gabapentin, meloxicam, oxycodone, steroids, tylenol, tramadol, tizanidine, tylenol    Initial hx:  Lina Davis is a 80 y.o. female presents to the clinic for the evaluation of lower back pain. The pain started 6 year ago following no inciting event and symptoms have been worsening.  The patient states that she has low back pain that radiates down both legs to the top of the bilateral feet.   Sometimes the pain with radiate in to the inside of the leg. The pain waker her up in the middle of the night.  The pain usually starts when she lays down. The patient cannot sleep on her back, so she has to rotate from side to side.  The patient has tried different topicals ointments, tylenol, gabapentin.  Leg pain > back pain.     Dr. Freedman tried a cortisone shot which did not help.  She has a small script of oxycodone which she takes rarely.  She takes 2 tylenol extra strength every night.  She tried gabapentin 600mg TID and it was not helpful. Meloxicam not helpful.    Pain Description:    The pain is located in the lower back area and radiates to the legs down to the feet.    At BEST  6/10   At WORST  10/10 on the  WORST day.    On average pain is rated as 7/10.   Today the pain is rated as 8/10  The pain is continuous.  The pain is described as aching and sharp    Symptoms interfere with daily activity and sleeping.   Exacerbating factors: Standing, Laying, Walking and Night Time.    Mitigating factors rubbing gel and nothing .   She reports 3 hours of sleep per night.    Physical Therapy/Home Exercise: No, not currently in physical therapy or home exercise program    Current Pain Medications:    - Opioids: Percocet 5mg (2-3x/week)  - Topicals: multiple    Failed Pain Medications:    - gabapentin, meloxicam, oxycodone, steroids, tylenol, tramadol    Pain Treatment Therapies:    Pain procedures: none  Physical Therapy: none  Chiropractor: none  Acupuncture: none  TENS unit: none  Spinal decompression: none  Joint replacement: none    Patient denies urinary incontinence, bowel incontinence and loss of sensations.  Patient denies any suicidal or homicidal ideations     report:  Reviewed and consistent with medication use as prescribed.    Imaging:   XR lumbar 2/14/22:  Minimal mild levoscoliosis thoracic lumbar junction, elevation right hemipelvis, prominent bridging osteophytes lower dorsal and lumbar spine particularly L2-L3 and right L4-L5.  DJD SI joints.  Primary anterior subluxation L4 on L5, vacuum disc deformity L5-S1.  Prompt facet joint arthropathy particularly L3 through L5 levels.    XR HIPs 2/14/22:  Prominent degenerative disc spondylosis facet joint arthropathy lumbosacral junction, elevation right hemipelvis, injection site calcification left lateral buttocks.  DJD SI joints.  Mild DJD hip joints and hypertrophic change cortex greater tuberosities.      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    Vitamin D deficiency disease      Past Surgical  History:   Procedure Laterality Date    ANTERIOR CERVICAL DISCECTOMY W/ FUSION N/A 3/19/2020    Procedure: DISCECTOMY, SPINE, CERVICAL, ANTERIOR APPROACH, WITH FUSION, C3-C4, C4-C5, C5-C6;  Surgeon: Joseph Walton MD;  Location: [facility] OR 2ND FLR;  Service: Neurosurgery;  Laterality: N/A;    CARPAL TUNNEL RELEASE      bilateral    COLONOSCOPY N/A 3/7/2018    Procedure: COLONOSCOPY;  Surgeon: Luís Soni MD;  Location: [facility] ENDO (2ND FLR);  Service: Endoscopy;  Laterality: N/A;  ok to schedule per Elizabeth    COLONOSCOPY N/A 7/15/2024    Procedure: COLONOSCOPY;  Surgeon: Dread Forman MD;  Location: [facility] ENDO (4TH FLR);  Service: Endoscopy;  Laterality: N/A;  Referred by:Dr. SHIRLEY Kay   Prep: Miralax  Route instructions sent: portal and mail  Other concerns: DM oral and insulin; his. of polyps  7/8-pre call complete-GT    COLONOSCOPY N/A 10/7/2024    Procedure: COLONOSCOPY;  Surgeon: Adrian Barry MD;  Location: [facility] (2ND FLR);  Service: Endoscopy;  Laterality: N/A;  peg prep/ prp inst mailed and sent to pt via portal  Regarding: FW: Colonoscopy, possible device-assisted  From: Adrian Barry MD  Sent: 7/15/2024  10:56 AM CDT  To: [facility]  Subject: Colonoscopy, possible device-assisted          Procedu    COLONOSCOPY W/ BIOPSIES AND POLYPECTOMY      EPIDURAL STEROID INJECTION N/A 6/3/2022    Procedure: LESI L5-S1;  Surgeon: Leland Pena DO;  Location: [facility] OR;  Service: Pain Management;  Laterality: N/A;    EPIDURAL STEROID INJECTION INTO LUMBAR SPINE Left 10/26/2023    Procedure: LT L5-S1 TFESI;  Surgeon: Leland Pena DO;  Location: [facility] PAIN MANAGEMENT;  Service: Pain Management;  Laterality: Left;  15 mins    HEMORRHOID SURGERY      HYSTERECTOMY      AUB    INJECTION OF JOINT Bilateral 4/12/2022    Procedure: SACROILIAC JOINT Steroid Injection-Bilateral;  Surgeon: Leland Pena DO;  Location: [facility] OR;  Service: Pain Management;   Laterality: Bilateral;    ROTATOR CUFF REPAIR      bilateral    TRANSFORAMINAL EPIDURAL INJECTION OF STEROID Bilateral 12/2/2022    Procedure: TFESI (B/L) L4-5;  Surgeon: Leland Pena DO;  Location: [facility] OR;  Service: Pain Management;  Laterality: Bilateral;    TRIGGER FINGER RELEASE Left 2/2/2024    Procedure: RELEASE, TRIGGER FINGER - left index, long, ring, and small fingers;  Surgeon: Justus Isabel MD;  Location: [facility] OR;  Service: Orthopedics;  Laterality: Left;     Social History     Socioeconomic History    Marital status:    Tobacco Use    Smoking status: Never    Smokeless tobacco: Never   Substance and Sexual Activity    Alcohol use: No    Drug use: No    Sexual activity: Not Currently     Partners: Male     Birth control/protection: Surgical     Social Drivers of Health     Financial Resource Strain: Low Risk  (5/1/2025)    Overall Financial Resource Strain (CARDIA)     Difficulty of Paying Living Expenses: Not very hard   Food Insecurity: No Food Insecurity (5/1/2025)    Hunger Vital Sign     Worried About Running Out of Food in the Last Year: Never true     Ran Out of Food in the Last Year: Never true   Transportation Needs: No Transportation Needs (5/1/2025)    PRAPARE - Transportation     Lack of Transportation (Medical): No     Lack of Transportation (Non-Medical): No   Physical Activity: Insufficiently Active (5/1/2025)    Exercise Vital Sign     Days of Exercise per Week: 3 days     Minutes of Exercise per Session: 10 min   Stress: No Stress Concern Present (5/1/2025)    [place] [employer] [place] [employer] - Occupational Stress Questionnaire     Feeling of Stress : Not at all   Housing Stability: Low Risk  (5/1/2025)    Housing Stability Vital Sign     Unable to Pay for Housing in the Last Year: No     Number of Times Moved in the Last Year: 0     Homeless in the Last Year: No     Family History   Problem Relation Name Age of Onset    Heart disease Mother      Diabetes  Mother      Heart disease Father      Cancer Father          liver    Diabetes Sister Shawna     Cataracts Sister Shawna     Kidney disease Brother david     Heart disease Brother david     Diabetes Sister elijah     COPD Brother jordi     COPD Brother sancho     Gout Brother kylie     Benign prostatic hyperplasia Brother kylie     Heart disease Brother kylie     Kidney disease Brother kylie     Heart attack Brother kylie         heart attack    Kidney failure Brother kylie     Lupus Sister thanh     Heart attack Brother santiago     Drug abuse Brother santiago     No Known Problems Daughter      No Known Problems Son      Amblyopia Neg Hx      Blindness Neg Hx      Breast cancer Neg Hx      Colon cancer Neg Hx      Ovarian cancer Neg Hx         Review of patient's allergies indicates:   Allergen Reactions    Gabapentin Other (See Comments)     ineffective    Januvia [sitagliptin] Other (See Comments)     Pancreatitis         Current Outpatient Medications   Medication Sig    acetaminophen (TYLENOL) 500 MG tablet Take 2 tablets (1,000 mg total) by mouth 3 (three) times daily.    alcohol swabs (DROPSAFE ALCOHOL PREP PADS) PadM USE TWICE DAILY AS DIRECTED, E 11.9    allopurinoL (ZYLOPRIM) 300 MG tablet Take 1 tablet (300 mg total) by mouth once daily.    amLODIPine (NORVASC) 10 MG tablet Take 1 tablet (10 mg total) by mouth once daily.    aspirin (ECOTRIN) 81 MG EC tablet Take 81 mg by mouth once daily.    atorvastatin (LIPITOR) 40 MG tablet Take 1 tablet (40 mg total) by mouth once daily.    blood glucose control, normal Soln True Metrix control solution E11.9 - pt tests twice daily    blood sugar diagnostic (TRUE METRIX GLUCOSE TEST STRIP) Strp Use to test blood glucose two (2) times a day, to be used with insurance-preferred brand of glucometer/supplies.    blood-glucose meter kit Check glucose daily E11.9 meter covered by insurance Don Metrix    empagliflozin (JARDIANCE) 10 mg tablet Take 1 tablet (10 mg total)  "by mouth once daily.    ibuprofen (ADVIL,MOTRIN) 600 MG tablet Take 1 tablet (600 mg total) by mouth every 6 (six) hours as needed for Pain.    lancets (TRUEPLUS LANCETS) 28 gauge Misc 1 lancet by Misc.(Non-Drug; Combo Route) route 2 (two) times daily.    LIDOcaine (LIDODERM) 5 % Place 1 patch onto the skin once daily. Remove & Discard patch within 12 hours or as directed by MD    losartan (COZAAR) 100 MG tablet Take 1 tablet (100 mg total) by mouth once daily.    metFORMIN (GLUCOPHAGE-XR) 500 MG ER 24hr tablet Take 1 tablet (500 mg total) by mouth 2 (two) times daily with meals.    omeprazole (PRILOSEC) 20 MG capsule TAKE 1 CAPSULE TWICE DAILY    oxyCODONE-acetaminophen (PERCOCET) 5-325 mg per tablet Take 1 tablet by mouth every 12 (twelve) hours as needed for Pain.    pen needle, diabetic (NOVOFINE 32) 32 gauge x 1/4" Ndle Use with insulin daily.    pioglitazone (ACTOS) 30 MG tablet Take 1 tablet (30 mg total) by mouth once daily.    pregabalin (LYRICA) 50 MG capsule One at bedtime    TOUJEO SOLOSTAR U-300 INSULIN 300 unit/mL (1.5 mL) InPn pen Inject 10 Units into the skin once daily.     No current facility-administered medications for this visit.     Facility-Administered Medications Ordered in Other Visits   Medication    fentaNYL 50 mcg/mL injection 100 mcg    midazolam (VERSED) 1 mg/mL injection 1 mg       REVIEW OF SYSTEMS:    GENERAL:  No weight loss, malaise or fevers.   HEENT:   No recent changes in vision or hearing   NECK:  Negative for lumps, no difficulty with swallowing.  RESPIRATORY:  Negative for cough, wheezing or shortness of breath, patient denies any recent URI.  CARDIOVASCULAR:  Negative for chest pain, leg swelling or palpitations.  GI:  Negative for abdominal discomfort, blood in stools or black stools or change in bowel habits.  MUSCULOSKELETAL:  See HPI.  SKIN:  Negative for lesions, rash, and itching.  PSYCH:  No mood disorder or recent psychosocial stressors.  Patients sleep is not " "disturbed secondary to pain.  HEMATOLOGY/LYMPHOLOGY:  Negative for prolonged bleeding, bruising easily or swollen nodes.  Patient is not currently taking any anti-coagulants  NEURO:   No history of headaches, syncope, paralysis, seizures or tremors.  All other reviewed and negative other than HPI.    OBJECTIVE:    BP (!) 129/58 (BP Location: Right arm, Patient Position: Sitting)   Pulse 73   Ht 5' 6" (1.676 m)   Wt 78.9 kg (174 lb)   BMI 28.08 kg/m²     PHYSICAL EXAMINATION:    PSYCH:  Mood and affect appropriate. Thought pattern is goal oriented. AOx3  GENERAL: Well appearing, in no acute distress, alert and oriented x3.  SKIN: Skin color, texture, turgor normal, no rashes or lesions.  HEAD/FACE:  Normocephalic, atraumatic. Cranial nerves grossly intact.  CV: RRR with palpation of the radial artery.  PULM: CTAB. No evidence of respiratory difficulty, symmetric chest rise.  GI:  Soft and non-tender.    BACK:   - No obvious deformity or signs of trauma, Normal lumbar lordotic curve  - Negative spinous process tenderness  - Positive paravertebral tenderness  - Positive pain to palpation over the facet joints of the lumbar spine.   - Negative QL / Iliac crest / Glut tenderness  - Slump test is Negative for radicular pain  - Slump test is Negative for back pain  - Supine Straight leg raising is Negative for radicular pain  - Supine Straight leg raising is Negative for back pain  - Lumbar ROM is diminished in Flexion with pain  - Lumbar ROM is diminished in Extension with pain  - Lumbar ROM is diminished in Lateral Flexion with pain  - Lumbar ROM is diminished in Rotation with pain  - Did not perform Sustained Hip Flexion test (for discogenic pain)  - Positive Altered Gait, Posture  - Axial facet loading test Positive on the B/l side(s)    SI Joint exam:  - Positive SI joint tenderness to palpation  - William's sign Did not perform  - Yeoman's Test: Did not perform for SI joint pain indicating anterior SI ligament " involvement. Did not perform for anterior thigh pain/paresthesia which indicates femoral nerve stretch.  - Gaenslen's Test:Negative  - Finger Yovani's Sign:Negative  - SI compression test:Negative  - SI distraction test:Negative  - Thigh Thrust: Negative  - SI Thrust: Did not perform    MUSKULOSKELETAL:    EXTREMITIES:   Hip Exam:  - Log Roll Negative  - FADIR Negative  - Stinchfield Negative  - Hip Scour Negative  - GTB Tenderness Negative    MUSCULOSKELETAL:  No atrophy or tone abnormalities are noted in the UE or LE.  No deformities, edema, or skin discoloration are noted on visible skin. Good capillary refill.    NEURO: Bilateral upper and lower extremity coordination and muscle stretch reflexes are physiologic and symmetric.    No loss of sensation is noted.    NEUROLOGICAL EXAM:  MENTAL STATUS: A x O x 3, good concentration, speech is fluent and goal directed  MEMORY: recent and remote are intact  CN: CN2-12 grossly intact  MOTOR: 5/5 in all muscle groups of b/l LE  DTRs: 0 symmetric LE patella and achilles  Sensation:    -no Loss of sensation in a left lower and right lower L-1, L-2, L-3, L-4 and L-5 bilaterally distribution.  Babinski: absent on the bilateral side(s)    GAIT: antalgic

## 2025-05-21 NOTE — PATIENT INSTRUCTIONS
These are the dates of your procedures    6/3/25 - test block #1 (most likely in the afternoon)  6/17/25 -  test block #2 (most likely in the afternoon)  7/1/25 -Nerve ablation (burning).  Most likely in the morning    You will get a phone call the Friday before with the exact time that you should arrive.  Plan on being there for about 2 hours.

## 2025-05-27 ENCOUNTER — TELEPHONE (OUTPATIENT)
Dept: PAIN MEDICINE | Facility: CLINIC | Age: 80
End: 2025-05-27
Payer: MEDICARE

## 2025-05-28 ENCOUNTER — INITIAL CONSULT (OUTPATIENT)
Dept: VASCULAR SURGERY | Facility: CLINIC | Age: 80
End: 2025-05-28
Attending: SURGERY
Payer: MEDICARE

## 2025-05-28 VITALS
WEIGHT: 174.19 LBS | BODY MASS INDEX: 27.99 KG/M2 | HEART RATE: 88 BPM | DIASTOLIC BLOOD PRESSURE: 61 MMHG | SYSTOLIC BLOOD PRESSURE: 112 MMHG | HEIGHT: 66 IN | TEMPERATURE: 98 F

## 2025-05-28 DIAGNOSIS — I65.22 CAROTID STENOSIS, ASYMPTOMATIC, LEFT: Primary | ICD-10-CM

## 2025-05-28 PROCEDURE — 99213 OFFICE O/P EST LOW 20 MIN: CPT | Mod: S$GLB,,, | Performed by: SURGERY

## 2025-05-28 PROCEDURE — 99999 PR PBB SHADOW E&M-EST. PATIENT-LVL IV: CPT | Mod: PBBFAC,,, | Performed by: SURGERY

## 2025-05-30 ENCOUNTER — OFFICE VISIT (OUTPATIENT)
Dept: INTERNAL MEDICINE | Facility: CLINIC | Age: 80
End: 2025-05-30
Payer: MEDICARE

## 2025-05-30 ENCOUNTER — TELEPHONE (OUTPATIENT)
Dept: PAIN MEDICINE | Facility: HOSPITAL | Age: 80
End: 2025-05-30
Payer: MEDICARE

## 2025-05-30 VITALS
DIASTOLIC BLOOD PRESSURE: 60 MMHG | HEART RATE: 77 BPM | OXYGEN SATURATION: 99 % | SYSTOLIC BLOOD PRESSURE: 112 MMHG | WEIGHT: 174.94 LBS | BODY MASS INDEX: 28.11 KG/M2 | HEIGHT: 66 IN

## 2025-05-30 DIAGNOSIS — R20.2 PARESTHESIA OF THUMB OF LEFT HAND: ICD-10-CM

## 2025-05-30 DIAGNOSIS — G95.9 CERVICAL MYELOPATHY: Primary | ICD-10-CM

## 2025-05-30 DIAGNOSIS — K21.9 GASTROESOPHAGEAL REFLUX DISEASE, UNSPECIFIED WHETHER ESOPHAGITIS PRESENT: ICD-10-CM

## 2025-05-30 PROCEDURE — 1101F PT FALLS ASSESS-DOCD LE1/YR: CPT | Mod: CPTII,S$GLB,, | Performed by: INTERNAL MEDICINE

## 2025-05-30 PROCEDURE — 1159F MED LIST DOCD IN RCRD: CPT | Mod: CPTII,S$GLB,, | Performed by: INTERNAL MEDICINE

## 2025-05-30 PROCEDURE — 99999 PR PBB SHADOW E&M-EST. PATIENT-LVL V: CPT | Mod: PBBFAC,,, | Performed by: INTERNAL MEDICINE

## 2025-05-30 PROCEDURE — 1125F AMNT PAIN NOTED PAIN PRSNT: CPT | Mod: CPTII,S$GLB,, | Performed by: INTERNAL MEDICINE

## 2025-05-30 PROCEDURE — 3288F FALL RISK ASSESSMENT DOCD: CPT | Mod: CPTII,S$GLB,, | Performed by: INTERNAL MEDICINE

## 2025-05-30 PROCEDURE — 3074F SYST BP LT 130 MM HG: CPT | Mod: CPTII,S$GLB,, | Performed by: INTERNAL MEDICINE

## 2025-05-30 PROCEDURE — 3078F DIAST BP <80 MM HG: CPT | Mod: CPTII,S$GLB,, | Performed by: INTERNAL MEDICINE

## 2025-05-30 PROCEDURE — 99214 OFFICE O/P EST MOD 30 MIN: CPT | Mod: S$GLB,,, | Performed by: INTERNAL MEDICINE

## 2025-05-30 RX ORDER — OXYCODONE AND ACETAMINOPHEN 5; 325 MG/1; MG/1
1 TABLET ORAL EVERY 12 HOURS PRN
Qty: 60 TABLET | Refills: 0 | Status: SHIPPED | OUTPATIENT
Start: 2025-05-30

## 2025-05-31 NOTE — PROGRESS NOTES
Subjective:       Patient ID: Lina Davis is a 80 y.o. female.    Chief Complaint: Annual Exam    HPI SHe is having significant L hand numbness and burning plus R sided neck pain - MRI reviewed.  No CP or SOB. BP and glc doing well.  New epigastric pain and early satiety.   Review of Systems   Respiratory:  Negative for shortness of breath (PND or orthopnea).    Cardiovascular:  Negative for chest pain (arm pain or jaw pain).   Gastrointestinal:  Negative for abdominal pain, diarrhea, nausea and vomiting.   Genitourinary:  Negative for dysuria.   Neurological:  Negative for seizures, syncope and headaches.       Objective:      Physical Exam  Constitutional:       General: She is not in acute distress.     Appearance: She is well-developed.   HENT:      Head: Normocephalic.   Eyes:      Pupils: Pupils are equal, round, and reactive to light.   Neck:      Thyroid: No thyromegaly.      Vascular: No JVD.   Cardiovascular:      Rate and Rhythm: Normal rate and regular rhythm.      Heart sounds: Normal heart sounds. No murmur heard.     No friction rub. No gallop.   Pulmonary:      Effort: Pulmonary effort is normal.      Breath sounds: Normal breath sounds. No wheezing or rales.   Abdominal:      General: Bowel sounds are normal. There is no distension.      Palpations: Abdomen is soft. There is no mass.      Tenderness: There is no abdominal tenderness. There is no guarding or rebound.   Musculoskeletal:      Cervical back: Neck supple.   Lymphadenopathy:      Cervical: No cervical adenopathy.   Skin:     General: Skin is warm and dry.   Neurological:      Mental Status: She is alert and oriented to person, place, and time.      Deep Tendon Reflexes: Reflexes are normal and symmetric.   Psychiatric:         Behavior: Behavior normal.         Thought Content: Thought content normal.         Judgment: Judgment normal.         Assessment:       1. Cervical myelopathy    2. Paresthesia of thumb of left hand    3.  Gastroesophageal reflux disease, unspecified whether esophagitis present        Plan:   Cervical myelopathy  -     Ambulatory referral/consult to Neurosurgery; Future; Expected date: 06/06/2025    Paresthesia of thumb of left hand  -     oxyCODONE-acetaminophen (PERCOCET) 5-325 mg per tablet; Take 1 tablet by mouth every 12 (twelve) hours as needed for Pain.  Dispense: 60 tablet; Refill: 0    Gastroesophageal reflux disease, unspecified whether esophagitis present  -     Ambulatory referral/consult to Endo Procedure ; Future; Expected date: 05/31/2025

## 2025-06-02 ENCOUNTER — TELEPHONE (OUTPATIENT)
Dept: ENDOSCOPY | Facility: HOSPITAL | Age: 80
End: 2025-06-02

## 2025-06-02 ENCOUNTER — PATIENT MESSAGE (OUTPATIENT)
Dept: ENDOSCOPY | Facility: HOSPITAL | Age: 80
End: 2025-06-02

## 2025-06-02 ENCOUNTER — TELEPHONE (OUTPATIENT)
Dept: NEUROSURGERY | Facility: CLINIC | Age: 80
End: 2025-06-02
Payer: MEDICARE

## 2025-06-02 DIAGNOSIS — K21.9 GASTROESOPHAGEAL REFLUX DISEASE, UNSPECIFIED WHETHER ESOPHAGITIS PRESENT: Primary | ICD-10-CM

## 2025-06-03 ENCOUNTER — TELEPHONE (OUTPATIENT)
Dept: PAIN MEDICINE | Facility: CLINIC | Age: 80
End: 2025-06-03

## 2025-06-03 ENCOUNTER — HOSPITAL ENCOUNTER (OUTPATIENT)
Facility: HOSPITAL | Age: 80
Discharge: HOME | End: 2025-06-03
Attending: STUDENT IN AN ORGANIZED HEALTH CARE EDUCATION/TRAINING PROGRAM | Admitting: STUDENT IN AN ORGANIZED HEALTH CARE EDUCATION/TRAINING PROGRAM
Payer: MEDICARE

## 2025-06-03 VITALS
HEART RATE: 81 BPM | DIASTOLIC BLOOD PRESSURE: 71 MMHG | SYSTOLIC BLOOD PRESSURE: 162 MMHG | OXYGEN SATURATION: 99 % | TEMPERATURE: 98 F | RESPIRATION RATE: 16 BRPM

## 2025-06-03 DIAGNOSIS — M47.816 LUMBAR SPONDYLOSIS: Primary | ICD-10-CM

## 2025-06-03 LAB — POCT GLUCOSE: 203 MG/DL (ref 70–110)

## 2025-06-03 PROCEDURE — 82962 GLUCOSE BLOOD TEST: CPT | Performed by: STUDENT IN AN ORGANIZED HEALTH CARE EDUCATION/TRAINING PROGRAM

## 2025-06-03 PROCEDURE — 64493 INJ PARAVERT F JNT L/S 1 LEV: CPT | Mod: 50 | Performed by: STUDENT IN AN ORGANIZED HEALTH CARE EDUCATION/TRAINING PROGRAM

## 2025-06-03 PROCEDURE — 64494 INJ PARAVERT F JNT L/S 2 LEV: CPT | Mod: 50 | Performed by: STUDENT IN AN ORGANIZED HEALTH CARE EDUCATION/TRAINING PROGRAM

## 2025-06-03 PROCEDURE — 64494 INJ PARAVERT F JNT L/S 2 LEV: CPT | Mod: 50,,, | Performed by: STUDENT IN AN ORGANIZED HEALTH CARE EDUCATION/TRAINING PROGRAM

## 2025-06-03 PROCEDURE — 64493 INJ PARAVERT F JNT L/S 1 LEV: CPT | Mod: 50,,, | Performed by: STUDENT IN AN ORGANIZED HEALTH CARE EDUCATION/TRAINING PROGRAM

## 2025-06-03 PROCEDURE — 63600175 PHARM REV CODE 636 W HCPCS: Performed by: STUDENT IN AN ORGANIZED HEALTH CARE EDUCATION/TRAINING PROGRAM

## 2025-06-03 RX ORDER — BUPIVACAINE HYDROCHLORIDE 2.5 MG/ML
INJECTION, SOLUTION EPIDURAL; INFILTRATION; INTRACAUDAL; PERINEURAL
Status: DISCONTINUED | OUTPATIENT
Start: 2025-06-03 | End: 2025-06-03 | Stop reason: HOSPADM

## 2025-06-03 RX ORDER — LIDOCAINE HYDROCHLORIDE 20 MG/ML
INJECTION, SOLUTION EPIDURAL; INFILTRATION; INTRACAUDAL; PERINEURAL
Status: DISCONTINUED | OUTPATIENT
Start: 2025-06-03 | End: 2025-06-03 | Stop reason: HOSPADM

## 2025-06-03 NOTE — OP NOTE
"PROCEDURE: bilateral Lumbar L3, L4, and L5 Medial Branch Nerve Block    Patient Name: Lina Davis  MRN: [id number]    PROCEDURE DATE: 6/3/2025    BLOCK # 1    DIAGNOSIS: Lumbar spondylosis without Myelopathy  CPT CODE: 41729 (lumbar 1st level), 42082 (2nd level), 75485 (3rd and any additional levels)    POSTPROCEDURE DIAGNOSIS Same    PHYSICIAN: Leland Pena DO  NEEDLE TYPE: - 25G 3.5" Spinal Needle  MEDICATIONS INJECTED: 0.25% bupivicaine, 1ml at each level  LOCAL ANESTHETIC USED: Lidocaine 1%, 2ml at each level    Sedation Medications - None    Estimated Blood Loss - <2ml  Drains: None  Specimens Removed: None  Urine Output - Not Measured  Complications: None  Outcome: Good    PRE-PROCEDURE PAIN SCORE: 6/10    POST-PROCEDURE PAIN SCORE: 0/10    Informed Consent:  The patient's condition and proposed procedures, risks, and alternatives were discussed with the patient or responsible party.  The patient's / responsible party's questions were answered.   The patient / responsible party appeared to understand and chose to proceed.  Informed consent was obtained.    Procedure in Detail:  The patient was taken back to the OR fluoroscopy suite and placed in a prone position. The skin overlying the injection site was prepped and draped in an aseptic fashion. The target injection site (see above) was identified with fluoroscopy.     Procedural Pause:  A procedural pause verifying correct patient, medical record number, allergies, medications to be administered, current vital signs, and surgical site was performed immediately prior to beginning the procedure.    The skin and subcutaneous tissue overlying the target site of injection was anesthetized using 6 ml of 1% lidocaine MPF with a 25-gauge, 1½ -inch needle.   The above noted needle type was advanced to each target under fluoroscopic guidance parallel to the beam of the fluoroscope utilizing a bullseye approach.      Lumbar Medial Branch Blocks  The needle was " then advanced to the dorsal, superior, and medial aspect of the transverse process(es) adjacent the superior articular process at the levels and on the side(s) specified above.  An oblique view confirmed correct needle placement at the junction of the transverse process and base of the superior articular process.  A lateral image was obtained to confirm needle depth.  At each site the needle(s) rested on periosteum.    L5 Dorsal Ramus Nerve Block at Sacral Ala   The L5 dorsal ramus nerve block on the side(s) specified above was performed using a slightly oblique approach under fluoroscopic guidance, placing the needle within the groove between the sacral ala and the superior articular process of S1.  A 10-20° oblique view confirmed proper needle placement.  A lateral image was obtained to confirm needle depth.  The needle rested on periosteum.      After negative aspiration for heme or CSF, the above noted contrast was injected and persisted at each site under fluoroscopy, demonstrating absence of vascular uptake.   After negative aspiration for heme or CSF, the above noted solution was slowly injected at each site to avoid forcing the solution away from the target point(s).  The needle(s) were then removed.   The same procedural technique outlined above was repeated on the OPPOSITE side.    The heart rate, pulse oximetry, and blood pressure were continuously monitored throughout the procedure.  The procedure was well tolerated. She was carefully escorted to the recovery room in stable condition. Patient was monitored by RN for recovery period.  The patient will be contacted in the next few days to determine extent of relief.  Patient was given post procedure and discharge instructions to follow at home.  The patient was discharged in a stable condition.    Note Electronically Signed by:  Leland Spencer  06/03/2025

## 2025-06-03 NOTE — PLAN OF CARE
Pt in preop bay 24, VSS taken. Pt denies any open wounds on body or the use of any weight loss injections. Pt ready to roll.    Procedural consents verified with pt.

## 2025-06-03 NOTE — DISCHARGE INSTRUCTIONS
[facility] [place]  Messaging service # [phone]  On-call pager for emergency# [phone]    MEDIAL BRANCH BLOCK POST-PROCEDURE INSTRUCTIONS:    What you need to do:  Keep a record of your response to the injection you had today.  It is very important that you accurately record how you responded to today's injection.  Please try to separate any soreness from the needle from your usual pain.  We want to know how this affects your usual pain.  Also REMEMBER that today's injection is a DIAGNOSTIC block, the pain relief will only last for a few hours to a few days.  Insurance requires 2 of these blocks before progressing to the ablation.  The sole purpose of this injection is to give us information, and not to treat your pain for an extended period.    Think about the amount of pain that you would have doing the most painful activities (I.e. bending, twisting, walking, standing straight, cleaning, etc...).  Now do those same activities as soon as you get home from the hospital.  Think about how much better you feel doing those activities now that the injection is done.  Does it feel 50%, 80%, 100% better than it would have otherwise?  This is the number you need to send me.    Send me a message through [facility] or leave a message at the phone number above telling me what % of improvement you got from the injection.    If you have difficulty putting a percentage on your pain relief fill this out:  (Rate each question below from 0-10 with 0 being no pain and 10 being the worst imaginable pain)    How bad would your pain get during activities like walking/going up stairs BEFORE the injection? _______    For the first 8 hours AFTER the injection, when you did those same painful activities, what was you best pain score? ____________    Send me those two numbers if you aren't able to give a  percentage.  ---------------------------------------------------------------------------------------------------------------------------------------------------------------------------------------------  What to watch out for:    If you experience any of the following symptoms after your procedure, please notify the messaging service immediately (see above for contact information):   fever (increased oral temperature)   bleeding or swelling at the injection site,    drainage, rash or redness at the injection site    possible signs of infection    increased pain at the injection site   worsening of your usual pain   severe headache   new or worsening numbness    new arm and/or leg weakness, or    changes in bowel and/or bladder function: urinating or defecating on yourself and not knowing that you did it.    PLEASE FOLLOW ALL INSTRUCTIONS CAREFULLY     Do not take a bath, swim or use Jacuzzi for 24 hours after procedure. (A shower is fine).   Remove any Band-Aids when you get home.    Use cold/ice, as needed for comfort.  We recommend the use of cold therapy alternating on for 20 minutes, off for 20 minutes.    Do not apply direct heat (heating pad or heat packs) to the injection site for 24 hours.     Resume your usual medications, unless instructed otherwise by your Pain Physician.     If you are on warfarin (Coumadin) or other blood thinner, resume this medication as instructed by your prescribing Physician.    IF AT ANY POINT YOU ARE VERY CONCERNED ABOUT YOUR SYMPTOMS, PLEASE GO TO THE EMERGENCY ROOM.    If you develop worsening pain, weakness, numbness, lose bowel or bladder control (i.e., having an accident where you did not even know you had to go to the bathroom and suddenly noticed you soiled yourself), saddle anesthesia (a loss of sensation restricted to the area of the buttocks, anus and between the legs -- i.e., those parts of your body that would touch a saddle if you were sitting on one) you need to go  immediately to the emergency department for evaluation and treatment.    ----------------------------------------------------------------------------------------------------------------------------------------------------------------  If you received Sedation please read the following instructions:  POST SEDATION INSTRUCTIONS    Today you received intravenous medication (also known as sedation) that was used to help you relax and/or decrease discomfort during your procedure. This medication will be acting in your body for the next 24 hours, so you might feel a little tired or sleepy. This feeling will slowly wear off.   Common side effects associated with these medications include: drowsiness, dizziness, sleepiness, confusion, feeling excited, difficulty remembering things, lack of steadiness with walking or balance, loss of fine muscle control, slowed reflexes, difficulty focusing, and blurred vision.  Some over-the-counter and prescription medications (e.g., muscle relaxants, opioids, mood-altering medications, sedatives/hypnotics, antihistamines) can interact with the intravenous medication you received and cause an increased risk of the side effects listed above in addition to other potentially life threatening side effects. Use extreme caution if you are taking such medications, and consult with your Pain Physician or prescribing physician if you have any questions.  For the next 12-24 hours:    DO NOT--Drive a car, operate machinery or power tools   DO NOT--Drink any alcoholic beverages (not even beer), they may dangerously increase the risk of side effects.    DO NOT--Make any important legal or business decisions or sign important documents.  We advise you to have someone to assist you at home. Move slowly and carefully. Do not make sudden changes in position. Be aware of dizziness or light-headedness and move accordingly.   If you seek medical treatment within 24 hours, let the nurse or doctor caring for  you know that you have received the above medications. If you have any questions or concerns related to your sedation or treatment today please contact us.

## 2025-06-03 NOTE — PLAN OF CARE
Discharge instructions reviewed with pt. Pt v/u of all instructions. VSS. No concerns voiced; all questions answered. Pt received no sedation. RN escorted pt to lobby to go home with family.

## 2025-06-04 ENCOUNTER — TELEPHONE (OUTPATIENT)
Dept: OPHTHALMOLOGY | Facility: CLINIC | Age: 80
End: 2025-06-04
Payer: MEDICARE

## 2025-06-05 ENCOUNTER — CLINICAL SUPPORT (OUTPATIENT)
Dept: ENDOSCOPY | Facility: HOSPITAL | Age: 80
End: 2025-06-05
Attending: INTERNAL MEDICINE
Payer: MEDICARE

## 2025-06-05 DIAGNOSIS — K21.9 GASTROESOPHAGEAL REFLUX DISEASE, UNSPECIFIED WHETHER ESOPHAGITIS PRESENT: ICD-10-CM

## 2025-06-10 ENCOUNTER — TELEPHONE (OUTPATIENT)
Dept: PAIN MEDICINE | Facility: CLINIC | Age: 80
End: 2025-06-10
Payer: MEDICARE

## 2025-06-13 ENCOUNTER — TELEPHONE (OUTPATIENT)
Dept: PAIN MEDICINE | Facility: HOSPITAL | Age: 80
End: 2025-06-13
Payer: MEDICARE

## 2025-06-13 ENCOUNTER — TELEPHONE (OUTPATIENT)
Dept: ENDOSCOPY | Facility: HOSPITAL | Age: 80
End: 2025-06-13
Payer: MEDICARE

## 2025-06-13 ENCOUNTER — TELEPHONE (OUTPATIENT)
Dept: NEUROSURGERY | Facility: CLINIC | Age: 80
End: 2025-06-13
Payer: MEDICARE

## 2025-06-13 NOTE — TELEPHONE ENCOUNTER
LVM for pt  informing them that appt has been moved to 2:15 PM. Advised to call back for any questions.

## 2025-06-16 ENCOUNTER — TELEPHONE (OUTPATIENT)
Dept: PAIN MEDICINE | Facility: CLINIC | Age: 80
End: 2025-06-16
Payer: MEDICARE

## 2025-06-23 ENCOUNTER — TELEPHONE (OUTPATIENT)
Dept: OPHTHALMOLOGY | Facility: CLINIC | Age: 80
End: 2025-06-23
Payer: MEDICARE

## 2025-06-23 NOTE — TELEPHONE ENCOUNTER
Copied from CRM #[id number]. Topic: General Inquiry - Patient Advice  >> Jun 23, 2025 11:37 AM Nicole wrote:  CONSULT/ADVISORY    Name of Caller: Lina     Contact Preference: [phone]      Nature of Call: Pt would like a call back to further discuss her upcoming appt 6/25 to explain the importance to her Eval.   As well  as disucss possible other options to be seen sooner for her Cataracts Eval and procedure.   Would like a call back in order to further disucss

## 2025-06-26 ENCOUNTER — TELEPHONE (OUTPATIENT)
Dept: INTERNAL MEDICINE | Facility: CLINIC | Age: 80
End: 2025-06-26
Payer: MEDICARE

## 2025-06-26 NOTE — TELEPHONE ENCOUNTER
Copied from CRM #[id number]. Topic: General Inquiry - Patient Advice  >> Jun 25, 2025  3:50 PM Saloni wrote:  .1MEDICALADVICE     Patient is calling for Medical Advice regarding: Problems with another doctor     How long has patient had these symptoms:RANDALL    Pharmacy name and phone#:NA    Patient wants a call back or thru [facility], provide patient's call back phone number:call back at:[phone]    Comments: pt's  said that he is calling in regards to pt needs to get a return call from the doctor about problems she is having with getting a return call from there eye doctor . Please advise     Please advise patient replies from provider may take up to 48 hours.

## 2025-06-30 ENCOUNTER — LAB VISIT (OUTPATIENT)
Dept: LAB | Facility: HOSPITAL | Age: 80
End: 2025-06-30
Payer: MEDICARE

## 2025-06-30 DIAGNOSIS — E55.9 MILD VITAMIN D DEFICIENCY: ICD-10-CM

## 2025-06-30 DIAGNOSIS — E11.21 TYPE 2 DIABETES MELLITUS WITH DIABETIC NEPHROPATHY, WITH LONG-TERM CURRENT USE OF INSULIN: Chronic | ICD-10-CM

## 2025-06-30 DIAGNOSIS — Z79.899 OTHER LONG TERM (CURRENT) DRUG THERAPY: ICD-10-CM

## 2025-06-30 DIAGNOSIS — Z79.4 TYPE 2 DIABETES MELLITUS WITH DIABETIC NEPHROPATHY, WITH LONG-TERM CURRENT USE OF INSULIN: Chronic | ICD-10-CM

## 2025-06-30 LAB
25(OH)D3+25(OH)D2 SERPL-MCNC: 82 NG/ML (ref 30–96)
ANION GAP (OHS): 10 MMOL/L (ref 8–16)
BUN SERPL-MCNC: 33 MG/DL (ref 8–23)
CALCIUM SERPL-MCNC: 10 MG/DL (ref 8.7–10.5)
CHLORIDE SERPL-SCNC: 109 MMOL/L (ref 95–110)
CO2 SERPL-SCNC: 19 MMOL/L (ref 23–29)
CREAT SERPL-MCNC: 1.4 MG/DL (ref 0.5–1.4)
EAG (OHS): 137 MG/DL (ref 68–131)
GFR SERPLBLD CREATININE-BSD FMLA CKD-EPI: 39 ML/MIN/1.73/M2
GLUCOSE SERPL-MCNC: 195 MG/DL (ref 70–110)
HBA1C MFR BLD: 6.4 % (ref 4–5.6)
POTASSIUM SERPL-SCNC: 5.2 MMOL/L (ref 3.5–5.1)
SODIUM SERPL-SCNC: 138 MMOL/L (ref 136–145)

## 2025-06-30 PROCEDURE — 82310 ASSAY OF CALCIUM: CPT

## 2025-06-30 PROCEDURE — 83036 HEMOGLOBIN GLYCOSYLATED A1C: CPT

## 2025-06-30 PROCEDURE — 36415 COLL VENOUS BLD VENIPUNCTURE: CPT

## 2025-06-30 PROCEDURE — 82306 VITAMIN D 25 HYDROXY: CPT

## 2025-07-01 ENCOUNTER — TELEPHONE (OUTPATIENT)
Dept: ENDOSCOPY | Facility: HOSPITAL | Age: 80
End: 2025-07-01
Payer: MEDICARE

## 2025-07-01 ENCOUNTER — RESULTS FOLLOW-UP (OUTPATIENT)
Dept: ENDOCRINOLOGY | Facility: CLINIC | Age: 80
End: 2025-07-01

## 2025-07-01 NOTE — TELEPHONE ENCOUNTER
Contacted patient in regards to upcoming EGD on 8/1/25. All questions answered. Pt verbalized understanding.

## 2025-07-01 NOTE — TELEPHONE ENCOUNTER
Copied from CRM #[id number]. Topic: General Inquiry - Patient Advice  >> Jul 1, 2025  2:48 PM Adam wrote:  Hi,    Who called: The pt       Reason:Pt has a question about her medication prior to her upcoming procedure . Pls call her at [phone]      Provider's name: Dr. Redman       Additional Information:Thank you.

## 2025-07-02 ENCOUNTER — HOSPITAL ENCOUNTER (OUTPATIENT)
Dept: ENDOCRINOLOGY | Facility: CLINIC | Age: 80
Discharge: HOME | End: 2025-07-02
Attending: NURSE PRACTITIONER
Payer: MEDICARE

## 2025-07-02 DIAGNOSIS — E04.1 THYROID NODULE: ICD-10-CM

## 2025-07-02 PROCEDURE — 76536 US EXAM OF HEAD AND NECK: CPT | Mod: S$GLB,,, | Performed by: INTERNAL MEDICINE

## 2025-07-07 NOTE — PROGRESS NOTES
Subjective:      Patient ID: Lina Davis is a 80 y.o. female.    Chief Complaint:  Diabetes    History of Present Illness  Lina Davis is here for follow up of T2DM and Thyroid Nodules.  Previously seen by me 3/2025.     With regards to Diabetes:    Diagnosed: ~2002/2003  DE: 7/2021  FH of DM: sister, brother   Denies any of her children have DM     Known complications:  DKA Denies  RN Denies  Eye Exam: 10/2024  PN : Yes   Podiatry: 12/2020  Nephropathy : Yes   CAD : Denies  Episode of pancreatitis in 2018 - attributed to Januvia    Diet/Exercise:   Eats 2 meals a day.   Snacks : occasionally - fig newtons.   Drinks : water, 1 or 2 8oz soft drinks a week, lemonade but will cut it with water.   Exercise - tries to stay active.  Recent illness, injury, steroids: UTI     Current regimen: PAP - SGLT2i and insulin   Metformin 500mg twice daily   Jardiance 10mg daily   Actos 30mg daily   Toujeo 10 units daily (self-started 4/1/2025)    Reports compliance.    Other medications tried:  Januvia- Discontinued in 2018 due to pancreatitis  Novolog    Glucose Monitor:   1 times a day testing  Log reviewed: log reviewed, scanned in media tab    Hypoglycemia:  Denies  Knows how to correct with 15 grams of carbs- juice, coke, or a peppermint.     Diabetes Management Status    Hemoglobin A1C   Date Value Ref Range Status   03/06/2025 5.7 (H) 4.0 - 5.6 % Final     Comment:     ADA Screening Guidelines:  5.7-6.4%  Consistent with prediabetes  >or=6.5%  Consistent with diabetes    High levels of fetal hemoglobin interfere with the HbA1C  assay. Heterozygous hemoglobin variants (HbS, HgC, etc)do  not significantly interfere with this assay.   However, presence of multiple variants may affect accuracy.     10/30/2024 5.7 (H) 4.0 - 5.6 % Final     Comment:     ADA Screening Guidelines:  5.7-6.4%  Consistent with prediabetes  >or=6.5%  Consistent with diabetes    High levels of fetal hemoglobin interfere with the HbA1C  assay.  Heterozygous hemoglobin variants (HbS, HgC, etc)do  not significantly interfere with this assay.   However, presence of multiple variants may affect accuracy.     10/14/2024 5.6 4.0 - 5.6 % Final     Comment:     ADA Screening Guidelines:  5.7-6.4%  Consistent with prediabetes  >or=6.5%  Consistent with diabetes    High levels of fetal hemoglobin interfere with the HbA1C  assay. Heterozygous hemoglobin variants (HbS, HgC, etc)do  not significantly interfere with this assay.   However, presence of multiple variants may affect accuracy.       Hemoglobin A1c   Date Value Ref Range Status   06/30/2025 6.4 (H) 4.0 - 5.6 % Final     Comment:     ADA Screening Guidelines:  5.7-6.4%  Consistent with prediabetes  >=6.5%  Consistent with diabetes    High levels of fetal hemoglobin interfere with the HbA1C  assay. Heterozygous hemoglobin variants (HbS, HgC, etc)do  not significantly interfere with this assay.   However, presence of multiple variants may affect accuracy.       Statin: Taking  ACE/ARB: Taking  Screening or Prevention Patient's value Goal Complete/Controlled?   HgA1C Testing and Control   Lab Results   Component Value Date    HGBA1C 6.4 (H) 06/30/2025      Annually/Less than 8% Yes   Lipid profile : 10/14/2024 Annually Yes   LDL control Lab Results   Component Value Date    LDLCALC 60.6 (L) 10/14/2024    Annually/Less than 100 mg/dl  Yes   Nephropathy screening Lab Results   Component Value Date    LABMICR 97.0 07/08/2025     Lab Results   Component Value Date    PROTEINUA Negative 10/14/2024    Annually Yes   Blood pressure BP Readings from Last 1 Encounters:   07/09/25 (!) 144/60    Less than 140/90 No   Dilated retinal exam : 10/24/2024 Annually Yes   Foot exam   Most Recent Foot Exam Date: Not Found Annually No     With regards to Thyroid Nodule:    Thyroid US:   7/2025  Impression:  1. Thyroid gland is normal in size with heterogenous echotexture.  2. 3 nodules in the right thyroid described above.  The  "right middle lobe (1.9 cm) nodule meets criteria for fine-needle aspiration.  3. 3 nodules in the left thyroid described above. None meet criteria for fine-needle aspiration.  4. No abnormal lymph nodes are identified.  RECOMMENDATIONS:  Consider FNA of right middle lobe 1.9 cm thyroid nodule.  Follow-up ultrasound in 1 year is recommended.     FNA:   1/23/2023  R mid: Unsatisfactory  L inferior: Benign    Signs or Symptoms:   Difficulty breathing: Denies  Difficulty swallowing: Denies  Voice Changes: Denies  FH of thyroid cancer: Denies  Personal history of radiation treatment or exposure: Denies    Lab Results   Component Value Date    TSH 1.468 10/14/2024    F3JBROY 6.0 11/26/2008    FREET4 1.10 08/08/2017       With regards to the Hypercalcemia:    Lab Results   Component Value Date    PTH 52.5 06/26/2024    CALCIUM 10.0 06/30/2025    PHOS 2.9 10/30/2024     Lab Results   Component Value Date    ALBUMIN 4.0 03/06/2025                       Daily intake of calcium is: Denies  Daily intake of vitamin D:  OTC Vit D3 3000iu daily     BMD:    12/2018  Normal BMD.  Compared to prior study in 9/2014, BMD has declined 5.0% at the spine and 4.3% at the hip.  RECOMMENDATIONS of [facility]:  1.  Calcium 1382-5268 mg daily and vitamin D 800-1000 units daily, adequate exercise.  2.  No need for repeat study in near future unless clinical change    Denies constipation, depression, muscle aches or pains.  Reports polyuria   Denies fractures, height loss or kidney stones.  Reports history of renal disease.  Denies family history of hyperparathyroidism or calcium problems.  Denies HCTZ., lithium, or chlorthiadone use.      Review of Systems  + Nocturia       Visit Vitals  BP (!) 144/60 (BP Location: Right arm, Patient Position: Sitting)   Pulse 91   Resp 18   Ht 5' 6" (1.676 m)   Wt 77.3 kg (170 lb 4.9 oz)   SpO2 98%   BMI 27.49 kg/m²           Body mass index is 27.49 kg/m².    Lab " "Review:   Lab Results   Component Value Date    HGBA1C 6.4 (H) 06/30/2025    HGBA1C 5.7 (H) 03/06/2025    HGBA1C 5.7 (H) 10/30/2024       Lab Results   Component Value Date    CHOL 128 10/14/2024    HDL 43 10/14/2024    LDLCALC 60.6 (L) 10/14/2024    TRIG 122 10/14/2024    CHOLHDL 33.6 10/14/2024     Lab Results   Component Value Date     06/30/2025    K 5.2 (H) 06/30/2025     06/30/2025    CO2 19 (L) 06/30/2025     (H) 06/30/2025    BUN 33 (H) 06/30/2025    CREATININE 1.4 06/30/2025    CALCIUM 10.0 06/30/2025    PROT 8.5 (H) 03/06/2025    ALBUMIN 4.0 03/06/2025    BILITOT 0.4 03/06/2025    ALKPHOS 77 03/06/2025    AST 31 03/06/2025    ALT 8 (L) 03/06/2025    ANIONGAP 10 06/30/2025    ESTGFRAFRICA 39.0 (A) 07/12/2022    EGFRNONAA 33.8 (A) 07/12/2022    TSH 1.468 10/14/2024     Vitamin D   Date Value Ref Range Status   06/30/2025 82 30 - 96 ng/mL Final     Comment:     Vitamin D deficiency.........<10 ng/mL                              Vitamin D insufficiency......10-29 ng/mL       Vitamin D sufficiency........> or equal to 30 ng/mL  Vitamin D toxicity............>100 ng/mL       Vit D, 25-Hydroxy   Date Value Ref Range Status   06/26/2024 47 30 - 96 ng/mL Final     Comment:     Vitamin D deficiency.........<10 ng/mL                              Vitamin D insufficiency......10-29 ng/mL       Vitamin D sufficiency........> or equal to 30 ng/mL  Vitamin D toxicity............>100 ng/mL       Assessment and Plan     1. Type 2 diabetes mellitus with diabetic nephropathy, with long-term current use of insulin  Comprehensive Metabolic Panel    Hemoglobin A1C    Lipid Panel    TOUJEO SOLOSTAR U-300 INSULIN 300 unit/mL (1.5 mL) InPn pen    empagliflozin (JARDIANCE) 10 mg tablet    pen needle, diabetic (NOVOFINE 32) 32 gauge x 1/4" Ndle    Ambulatory referral/consult to Pharmacy Assistance      2. Thyroid nodule  TSH    US FNA Thyroid, 1st Lesion      3. Stage 3a chronic kidney disease  empagliflozin " (JARDIANCE) 10 mg tablet          Type 2 diabetes mellitus with diabetic nephropathy  -- Labs prior to follow up.  -- A1c goal <7.5%.  -- Medications discussed:  MFM   GLP1-DPP4 - avoid given drug induced pancreatitis (Januvia)  SUNG   SGLT2   Reviewed potential adverse effects of SGLT-2 inhibitors, including genital mycotic infections, slightly increased risk of UTI, hypersensitivity, hypotension, and hyperkalemia. Advised to maintain water intake of 8-10 cups per day. Advised we need to check chemistry panel at baseline and 2 weeks after starting. Discussed FDA warning reports of ketoacidosis associated with SGLT-2 inhibitors. Advised to seek immediate medical attention and stop the medication if symptoms such as difficulty breathing, nausea, vomiting, abdominal pain, confusion, and unusual fatigue/sleepiness. Discussed possible precipitating factors including major illness/reduced food and fluid intake (advised to stop under these circumstances), and reduced insulin dose. Discussed possible effects of increased fracture risk/decreased bone density. Discussed reports of increased risk of leg and foot amputations with canagliflozin and need to seek urgent care if developed new pain or tenderness, sores or ulcers, or infections in legs/feet.   Insulin   -- Reviewed logs/CGM:  Glucose controlled.  Reach out to me sooner for any glucose <70 or consistently >180.  -- Medication Changes:   Metformin 500mg twice daily   Jardiance 10mg daily   Actos 30mg daily   Toujeo 10 units daily     -- Reviewed goals of therapy are to get the best control we can without hypoglycemia.  -- Reviewed patient's current insulin regimen. Clarified proper insulin dose and timing in relation to meals, etc. Insulin injection sites and proper rotation instructed.    -- Advised frequent self blood glucose monitoring.  Patient encouraged to document glucose results and bring them to every clinic visit.  -- Hypoglycemia precautions discussed.  Instructed on precautions before driving.    -- Call for Bg repeatedly < 90 or > 180.   -- Close adherence to lifestyle changes recommended.   -- Periodic follow ups for eye evaluations, foot care and dental care suggested.    Thyroid nodule  -- I have reviewed management options including observation, FNA or surgery.  -- FNA procedure explained in depth.  -- Discussed indications for repeat FNA as well as surgical indications (abnormal FNA, compressive symptoms or interval change).  -- Discussed possible results of FNA- Benign, Malignant, FLUS, or insufficient cells.  Discussed results auto released to patients, but advised the ordering provider will also contact to discuss.   -- All of the patients questions were answered.  -- FNA:   1/23/2023  R mid: Unsatisfactory  L inferior: Benign  -- Reviewed thyroid US.  -- Schedule FNA of right middle lobe 1.9 cm thyroid nodule.      Follow up in about 4 months (around 11/9/2025).    Visit today included increased complexity associated with the care of the problems addressed and managing the longitudinal care of the patient due to the serious and/or complex managed problems.

## 2025-07-08 ENCOUNTER — LAB VISIT (OUTPATIENT)
Dept: LAB | Facility: HOSPITAL | Age: 80
End: 2025-07-08
Payer: MEDICARE

## 2025-07-08 DIAGNOSIS — E11.21 TYPE 2 DIABETES MELLITUS WITH DIABETIC NEPHROPATHY, WITH LONG-TERM CURRENT USE OF INSULIN: Chronic | ICD-10-CM

## 2025-07-08 DIAGNOSIS — Z79.4 TYPE 2 DIABETES MELLITUS WITH DIABETIC NEPHROPATHY, WITH LONG-TERM CURRENT USE OF INSULIN: Chronic | ICD-10-CM

## 2025-07-08 LAB
ALBUMIN/CREAT UR: 122.8 UG/MG
CREAT UR-MCNC: 79 MG/DL (ref 15–325)
MICROALBUMIN UR-MCNC: 97 UG/ML (ref ?–5000)

## 2025-07-08 PROCEDURE — 82570 ASSAY OF URINE CREATININE: CPT

## 2025-07-09 ENCOUNTER — OFFICE VISIT (OUTPATIENT)
Dept: ENDOCRINOLOGY | Facility: CLINIC | Age: 80
End: 2025-07-09
Payer: MEDICARE

## 2025-07-09 VITALS
HEART RATE: 91 BPM | DIASTOLIC BLOOD PRESSURE: 60 MMHG | WEIGHT: 170.31 LBS | OXYGEN SATURATION: 98 % | BODY MASS INDEX: 27.37 KG/M2 | HEIGHT: 66 IN | RESPIRATION RATE: 18 BRPM | SYSTOLIC BLOOD PRESSURE: 144 MMHG

## 2025-07-09 DIAGNOSIS — E04.1 THYROID NODULE: ICD-10-CM

## 2025-07-09 DIAGNOSIS — N18.31 STAGE 3A CHRONIC KIDNEY DISEASE: ICD-10-CM

## 2025-07-09 DIAGNOSIS — Z79.4 TYPE 2 DIABETES MELLITUS WITH DIABETIC NEPHROPATHY, WITH LONG-TERM CURRENT USE OF INSULIN: Primary | Chronic | ICD-10-CM

## 2025-07-09 DIAGNOSIS — E11.21 TYPE 2 DIABETES MELLITUS WITH DIABETIC NEPHROPATHY, WITH LONG-TERM CURRENT USE OF INSULIN: Primary | Chronic | ICD-10-CM

## 2025-07-09 PROCEDURE — G2211 COMPLEX E/M VISIT ADD ON: HCPCS | Mod: S$GLB,,, | Performed by: NURSE PRACTITIONER

## 2025-07-09 PROCEDURE — 3077F SYST BP >= 140 MM HG: CPT | Mod: CPTII,S$GLB,, | Performed by: NURSE PRACTITIONER

## 2025-07-09 PROCEDURE — 99999 PR PBB SHADOW E&M-EST. PATIENT-LVL V: CPT | Mod: PBBFAC,,, | Performed by: NURSE PRACTITIONER

## 2025-07-09 PROCEDURE — 1160F RVW MEDS BY RX/DR IN RCRD: CPT | Mod: CPTII,S$GLB,, | Performed by: NURSE PRACTITIONER

## 2025-07-09 PROCEDURE — 1159F MED LIST DOCD IN RCRD: CPT | Mod: CPTII,S$GLB,, | Performed by: NURSE PRACTITIONER

## 2025-07-09 PROCEDURE — 1101F PT FALLS ASSESS-DOCD LE1/YR: CPT | Mod: CPTII,S$GLB,, | Performed by: NURSE PRACTITIONER

## 2025-07-09 PROCEDURE — 1126F AMNT PAIN NOTED NONE PRSNT: CPT | Mod: CPTII,S$GLB,, | Performed by: NURSE PRACTITIONER

## 2025-07-09 PROCEDURE — 3078F DIAST BP <80 MM HG: CPT | Mod: CPTII,S$GLB,, | Performed by: NURSE PRACTITIONER

## 2025-07-09 PROCEDURE — 3288F FALL RISK ASSESSMENT DOCD: CPT | Mod: CPTII,S$GLB,, | Performed by: NURSE PRACTITIONER

## 2025-07-09 PROCEDURE — 99214 OFFICE O/P EST MOD 30 MIN: CPT | Mod: S$GLB,,, | Performed by: NURSE PRACTITIONER

## 2025-07-09 RX ORDER — INSULIN GLARGINE 300 U/ML
10 INJECTION, SOLUTION SUBCUTANEOUS DAILY
Qty: 3 PEN | Refills: 1 | Status: SHIPPED | OUTPATIENT
Start: 2025-07-09 | End: 2026-07-09

## 2025-07-09 RX ORDER — BLOOD SUGAR DIAGNOSTIC
STRIP MISCELLANEOUS
Qty: 90 EACH | Refills: 1 | Status: SHIPPED | OUTPATIENT
Start: 2025-07-09

## 2025-07-09 NOTE — ASSESSMENT & PLAN NOTE
-- Labs prior to follow up.  -- A1c goal <7.5%.  -- Medications discussed:  MFM   GLP1-DPP4 - avoid given drug induced pancreatitis (Januvia)  SUNG   SGLT2   Reviewed potential adverse effects of SGLT-2 inhibitors, including genital mycotic infections, slightly increased risk of UTI, hypersensitivity, hypotension, and hyperkalemia. Advised to maintain water intake of 8-10 cups per day. Advised we need to check chemistry panel at baseline and 2 weeks after starting. Discussed FDA warning reports of ketoacidosis associated with SGLT-2 inhibitors. Advised to seek immediate medical attention and stop the medication if symptoms such as difficulty breathing, nausea, vomiting, abdominal pain, confusion, and unusual fatigue/sleepiness. Discussed possible precipitating factors including major illness/reduced food and fluid intake (advised to stop under these circumstances), and reduced insulin dose. Discussed possible effects of increased fracture risk/decreased bone density. Discussed reports of increased risk of leg and foot amputations with canagliflozin and need to seek urgent care if developed new pain or tenderness, sores or ulcers, or infections in legs/feet.   Insulin   -- Reviewed logs/CGM:  Glucose controlled.  Reach out to me sooner for any glucose <70 or consistently >180.  -- Medication Changes:   Metformin 500mg twice daily   Jardiance 10mg daily   Actos 30mg daily   Toujeo 10 units daily     -- Reviewed goals of therapy are to get the best control we can without hypoglycemia.  -- Reviewed patient's current insulin regimen. Clarified proper insulin dose and timing in relation to meals, etc. Insulin injection sites and proper rotation instructed.    -- Advised frequent self blood glucose monitoring.  Patient encouraged to document glucose results and bring them to every clinic visit.  -- Hypoglycemia precautions discussed. Instructed on precautions before driving.    -- Call for Bg repeatedly < 90 or > 180.   --  Close adherence to lifestyle changes recommended.   -- Periodic follow ups for eye evaluations, foot care and dental care suggested.

## 2025-07-09 NOTE — PATIENT INSTRUCTIONS
Metformin 500mg twice daily   Jardiance 10mg daily   Actos 30mg daily   Toujeo 10 units daily    Call me if glucose running consistently < 150.  STOP Toujeo if glucose < 80 and then call me.

## 2025-07-09 NOTE — ASSESSMENT & PLAN NOTE
-- I have reviewed management options including observation, FNA or surgery.  -- FNA procedure explained in depth.  -- Discussed indications for repeat FNA as well as surgical indications (abnormal FNA, compressive symptoms or interval change).  -- Discussed possible results of FNA- Benign, Malignant, FLUS, or insufficient cells.  Discussed results auto released to patients, but advised the ordering provider will also contact to discuss.   -- All of the patients questions were answered.  -- FNA:   1/23/2023  R mid: Unsatisfactory  L inferior: Benign  -- Reviewed thyroid US.  -- Schedule FNA of right middle lobe 1.9 cm thyroid nodule.

## 2025-07-09 NOTE — PROGRESS NOTES
Nataly     Mrs. Davis's case has been assigned to Gilmajohny JuneJerome @[phone]. Provider may review progress notes by typing pharmacy patient assistance in Epic search box.      What happens next? Assigned advocate will review your patients chart and research available options.  Patient and Provider may be asked to provide specific documentation to facilitate the PAP process. Failure to provide the requested documentation will delay assistance.    Please note: epic chart must reflect a current order for the requested medication written by an [facility] provider to begin PAP process.   Please note: all requests are subject to program availability and patient eligibility verification.   Please note: each program has it's own unique eligibility criteria (e.g., income limits, insurance status medical condition, residency).Therefore eligibility is determined by the specific program being applied to not by the Pharmacy Patient Assistance Team.         Thank you,   [facility]  [address] [facility] [id number]  [place] [address]  Fax: [phone]  Email: [email or web address]

## 2025-07-24 ENCOUNTER — PROCEDURE VISIT (OUTPATIENT)
Dept: NEUROLOGY | Facility: CLINIC | Age: 80
End: 2025-07-24
Payer: MEDICARE

## 2025-07-24 DIAGNOSIS — R20.2 PARESTHESIA OF THUMB OF LEFT HAND: ICD-10-CM

## 2025-07-24 PROCEDURE — 95910 NRV CNDJ TEST 7-8 STUDIES: CPT | Mod: S$GLB,,, | Performed by: PSYCHIATRY & NEUROLOGY

## 2025-07-24 PROCEDURE — 99203 OFFICE O/P NEW LOW 30 MIN: CPT | Mod: S$GLB,,, | Performed by: PSYCHIATRY & NEUROLOGY

## 2025-07-24 PROCEDURE — 95886 MUSC TEST DONE W/N TEST COMP: CPT | Mod: S$GLB,,, | Performed by: PSYCHIATRY & NEUROLOGY

## 2025-07-24 NOTE — PROCEDURES
EMG W/ ULTRASOUND AND NERVE CONDUCTION TEST 1 Extremity    Date/Time: 7/24/2025 9:00 AM    Performed by: Tyler Mondragon MD  Authorized by: Dora Freedman MD                                                                 [place]    Subjective:       Patient ID: Lina Davis is a 80 y.o. female here for a EMG focused evaluation for neck and arm pain. Previous visits and diagnostic evaluation reviewed.  Patient with a history of previous neck surgery as well as previous left carpal tunnel release procedure who describes numbness involving the last 3 digits of her left hand.  She also describes neck pain which radiates down her left arm.  Symptoms have been progressively worsening and severe at times.   HPI  Review of patient's allergies indicates:   Allergen Reactions    Gabapentin Other (See Comments)     ineffective    Januvia [sitagliptin] Other (See Comments)     Pancreatitis        There were no vitals filed for this visit.   Chief Complaint: No chief complaint on file.    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    Vitamin D deficiency disease       Social History     Socioeconomic History    Marital status:    Tobacco Use    Smoking status: Never    Smokeless tobacco: Never   Substance and Sexual Activity    Alcohol use: No    Drug use: No    Sexual activity: Not Currently     Partners: Male     Birth control/protection: Surgical     Social Drivers of Health     Financial Resource Strain: Low Risk  (5/1/2025)    Overall Financial Resource Strain (CARDIA)     Difficulty of Paying Living Expenses: Not very hard   Food Insecurity: No Food Insecurity (5/1/2025)    Hunger Vital Sign     Worried About Running Out of Food in the Last Year: Never true     Ran Out of Food in the Last Year: Never true   Transportation Needs: No  Transportation Needs (5/1/2025)    PRAPARE - Transportation     Lack of Transportation (Medical): No     Lack of Transportation (Non-Medical): No   Physical Activity: Insufficiently Active (5/1/2025)    Exercise Vital Sign     Days of Exercise per Week: 3 days     Minutes of Exercise per Session: 10 min   Stress: No Stress Concern Present (5/1/2025)    [employer] - Occupational Stress Questionnaire     Feeling of Stress : Not at all   Housing Stability: Low Risk  (5/1/2025)    Housing Stability Vital Sign     Unable to Pay for Housing in the Last Year: No     Number of Times Moved in the Last Year: 0     Homeless in the Last Year: No            Objective:      Physical Exam    Constitutional: Patient appears well-nourished.   Head: Normocephalic and atraumatic.   Mouth/Throat: Oropharynx is clear and moist.   Pulmonary/Chest: Effort normal.   Abdominal: Soft.   Skin: Skin is warm and dry.      General:  Patient is alert and cooperative.  Affect:  Patient is appropriate to surroundings and environment.  Language:  Speech is fluent.  HEENT:  There are no outward signs of trauma to head or face.  Cranial Nerves:  Pupils are equal round and reactive to light. Extra-ocular movements are intact. Face, tongue, and palate are symmetrical.  Motor:  Patient exhibits normal strength testing in bilateral proximal and distal upper extremities.  Reflexes:  Symmetrical in bilateral upper extremities.  Gait:  Ambulation is independent without use of cane or walker without signs of ataxia or circumduction.  Cerebellar:  No evidence of dysmetria.  Sensory:  Intact to sensory modalities tested.  Musculoskeletal:  There is no severe tenderness to palpation and manipulation of the cervical spine region.     TSH   Date Value Ref Range Status   10/14/2024 1.468 0.400 - 4.000 uIU/mL Final   06/26/2024 1.963 0.400 - 4.000 uIU/mL Final     Vitamin B-12   Date Value Ref Range Status   04/25/2024 503 210 - 950  pg/mL Final   03/23/2010 680 210 - 950 pg/ml Final     Hemoglobin A1C   Date Value Ref Range Status   03/06/2025 5.7 (H) 4.0 - 5.6 % Final     Comment:     ADA Screening Guidelines:  5.7-6.4%  Consistent with prediabetes  >or=6.5%  Consistent with diabetes    High levels of fetal hemoglobin interfere with the HbA1C  assay. Heterozygous hemoglobin variants (HbS, HgC, etc)do  not significantly interfere with this assay.   However, presence of multiple variants may affect accuracy.     10/30/2024 5.7 (H) 4.0 - 5.6 % Final     Comment:     ADA Screening Guidelines:  5.7-6.4%  Consistent with prediabetes  >or=6.5%  Consistent with diabetes    High levels of fetal hemoglobin interfere with the HbA1C  assay. Heterozygous hemoglobin variants (HbS, HgC, etc)do  not significantly interfere with this assay.   However, presence of multiple variants may affect accuracy.       Hemoglobin A1c   Date Value Ref Range Status   06/30/2025 6.4 (H) 4.0 - 5.6 % Final     Comment:     ADA Screening Guidelines:  5.7-6.4%  Consistent with prediabetes  >=6.5%  Consistent with diabetes    High levels of fetal hemoglobin interfere with the HbA1C  assay. Heterozygous hemoglobin variants (HbS, HgC, etc)do  not significantly interfere with this assay.   However, presence of multiple variants may affect accuracy.        Results for orders placed during the hospital encounter of 04/17/25    MRI Cervical Spine Without Contrast    Narrative  EXAMINATION:  MRI CERVICAL SPINE WITHOUT CONTRAST    CLINICAL HISTORY:  Neck pain, acute, prior cervical surgery; Cervicalgia    TECHNIQUE:  Multiplanar, multisequence MR images of the cervical spine were performed without the administration of contrast.    COMPARISON:  10/28/2019 MRI cervical spine, ultrasound 06/07/2023 thyroid gland.    CT cervical spine 08/19/2020.    FINDINGS:  Postoperative changes of anterior instrumented fusion C3 through C6.  There is a subtle anterolisthesis of C6 relative to C7.  No  fracture, no osseous lesions, no malignant bone marrow replacement process or infection.    The bilateral atlantooccipital joint and C1-C2 lateral masses appear normal.    Subtle cord signal abnormality noted at C6, left to the cord and bilateral side of the cord at C6-7 concerning for an area of myelomalacia, similar to the prior exam.    C2-C3: No disc abnormality, no apparent canal stenosis or significant foraminal narrowing.    C3-C4: Posterior disc osteophyte protrusion abutting the cord resulting in moderate canal stenosis.  There is severe left and mild right foraminal narrowing.    C4-C5: Posterior disc osteophyte complex abutting the anterior cord, less conspicuous compared to the prior exam creating mild canal stenosis, the foramina are patent.    C5-C6: Posterior disc osteophyte complex less conspicuous compared to the prior exam, bilateral uncovertebral spur.  There is mild canal stenosis.  There is moderate left and severe right foraminal narrowing.    C6-C7: There is uncovering of the disc due to the subtle anterolisthesis, ligamentum flavum hypertrophy, severe canal stenosis, accentuated from the prior study.  Moderate right, mild left foraminal narrowing.    C7-T1: Bilateral facet joint osseous hypertrophy.  No apparent canal stenosis.  No significant foraminal narrowing.    Heterogeneous thyroid gland with nodularity similar to MRI 10/28/2019.  Small bilobed mass left parotid gland low T1, high T2 signal 1.7 x 0.9 cm, appears unchanged from imaging dated back 08/19/2020, favoring a benign process.    Impression  Postoperative changes of C3-C6 anterior instrumented fusion.  Improvement in the canal width at C3-4, C4-5 and C5-6.    Subtle anterolisthesis and posterior disc osteophyte complex and ligamentum flavum hypertrophy C6-C7 result in severe canal stenosis which is new from the prior examination.    Small areas of myelomalacia within the cord at the C6-C7 level, not significantly  changed.    Multinodular thyroid.  To correlate with ultrasound 06/07/2023.    Please see details of each levels above.    This report was flagged in Epic as abnormal.      Electronically signed by: Saloni Bhagat MD  Date:    04/17/2025  Time:    14:06      Assessment:       We reviewed and discussed at length results of EMG of the left upper extremity performed today revealing a chronic left C7 radiculopathy. These findings are available via media section of chart review.   1. Paresthesia of thumb of left hand              Plan:       We discussed treatment options at length. Recommend patient keep appointment with referring provider.         I spent a total of 35 minutes on the day of the visit. This includes face to face time and non-face to face time preparing to see the patient (eg, review of tests), obtaining and/or reviewing separately obtained history, documenting clinical information in the electronic or other health record, independently interpreting results and communicating results to the patient/family/caregiver, or care coordinator.    Tyler Mondragon MD, FAAN  07/24/2025   10:09 AM     A dictation device was used to produce this document. Use of such devices sometimes results in grammatical errors or replacement of words that sound similarly.

## 2025-08-01 ENCOUNTER — ANESTHESIA EVENT (OUTPATIENT)
Dept: ENDOSCOPY | Facility: HOSPITAL | Age: 80
End: 2025-08-01
Payer: MEDICARE

## 2025-08-01 ENCOUNTER — HOSPITAL ENCOUNTER (OUTPATIENT)
Facility: HOSPITAL | Age: 80
Discharge: HOME | End: 2025-08-01
Attending: INTERNAL MEDICINE | Admitting: INTERNAL MEDICINE
Payer: MEDICARE

## 2025-08-01 ENCOUNTER — ANESTHESIA (OUTPATIENT)
Dept: ENDOSCOPY | Facility: HOSPITAL | Age: 80
End: 2025-08-01
Payer: MEDICARE

## 2025-08-01 VITALS
HEIGHT: 66 IN | TEMPERATURE: 98 F | DIASTOLIC BLOOD PRESSURE: 67 MMHG | SYSTOLIC BLOOD PRESSURE: 148 MMHG | BODY MASS INDEX: 27.97 KG/M2 | OXYGEN SATURATION: 98 % | HEART RATE: 79 BPM | RESPIRATION RATE: 16 BRPM | WEIGHT: 174 LBS

## 2025-08-01 DIAGNOSIS — K21.9 GERD (GASTROESOPHAGEAL REFLUX DISEASE): ICD-10-CM

## 2025-08-01 DIAGNOSIS — K21.9 GASTROESOPHAGEAL REFLUX DISEASE, UNSPECIFIED WHETHER ESOPHAGITIS PRESENT: Primary | ICD-10-CM

## 2025-08-01 LAB — POCT GLUCOSE: 158 MG/DL (ref 70–110)

## 2025-08-01 PROCEDURE — 43239 EGD BIOPSY SINGLE/MULTIPLE: CPT | Mod: GC,,, | Performed by: INTERNAL MEDICINE

## 2025-08-01 PROCEDURE — 82962 GLUCOSE BLOOD TEST: CPT | Performed by: INTERNAL MEDICINE

## 2025-08-01 PROCEDURE — 27201012 HC FORCEPS, HOT/COLD, DISP: Performed by: INTERNAL MEDICINE

## 2025-08-01 PROCEDURE — 88305 TISSUE EXAM BY PATHOLOGIST: CPT | Mod: 26,,, | Performed by: PATHOLOGY

## 2025-08-01 PROCEDURE — 43239 EGD BIOPSY SINGLE/MULTIPLE: CPT | Performed by: INTERNAL MEDICINE

## 2025-08-01 PROCEDURE — 37000008 HC ANESTHESIA 1ST 15 MINUTES: Performed by: INTERNAL MEDICINE

## 2025-08-01 PROCEDURE — 63600175 PHARM REV CODE 636 W HCPCS

## 2025-08-01 PROCEDURE — 88305 TISSUE EXAM BY PATHOLOGIST: CPT | Mod: TC | Performed by: INTERNAL MEDICINE

## 2025-08-01 PROCEDURE — 25000003 PHARM REV CODE 250

## 2025-08-01 PROCEDURE — 37000009 HC ANESTHESIA EA ADD 15 MINS: Performed by: INTERNAL MEDICINE

## 2025-08-01 RX ORDER — LIDOCAINE HYDROCHLORIDE 20 MG/ML
INJECTION INTRAVENOUS
Status: DISCONTINUED | OUTPATIENT
Start: 2025-08-01 | End: 2025-08-01

## 2025-08-01 RX ORDER — PROPOFOL 10 MG/ML
VIAL (ML) INTRAVENOUS
Status: DISCONTINUED | OUTPATIENT
Start: 2025-08-01 | End: 2025-08-01

## 2025-08-01 RX ORDER — SODIUM CHLORIDE 9 MG/ML
INJECTION, SOLUTION INTRAVENOUS CONTINUOUS
Status: DISCONTINUED | OUTPATIENT
Start: 2025-08-01 | End: 2025-08-01 | Stop reason: HOSPADM

## 2025-08-01 RX ADMIN — SODIUM CHLORIDE: 9 INJECTION, SOLUTION INTRAVENOUS at 07:08

## 2025-08-01 RX ADMIN — PROPOFOL 150 MCG/KG/MIN: 10 INJECTION, EMULSION INTRAVENOUS at 07:08

## 2025-08-01 RX ADMIN — PROPOFOL 50 MG: 10 INJECTION, EMULSION INTRAVENOUS at 07:08

## 2025-08-01 RX ADMIN — PROPOFOL 30 MG: 10 INJECTION, EMULSION INTRAVENOUS at 07:08

## 2025-08-01 RX ADMIN — LIDOCAINE HYDROCHLORIDE 60 MG: 20 INJECTION INTRAVENOUS at 07:08

## 2025-08-01 NOTE — ANESTHESIA PREPROCEDURE EVALUATION
08/01/2025  Lina Davis is a 80 y.o., female.    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    Vitamin D deficiency disease      Past Surgical History:   Procedure Laterality Date    ANTERIOR CERVICAL DISCECTOMY W/ FUSION N/A 3/19/2020    Procedure: DISCECTOMY, SPINE, CERVICAL, ANTERIOR APPROACH, WITH FUSION, C3-C4, C4-C5, C5-C6;  Surgeon: Joseph Walton MD;  Location: [facility] 2ND FLR;  Service: Neurosurgery;  Laterality: N/A;    CARPAL TUNNEL RELEASE      bilateral    COLONOSCOPY N/A 3/7/2018    Procedure: COLONOSCOPY;  Surgeon: Luís Soni MD;  Location: [facility] (2ND FLR);  Service: Endoscopy;  Laterality: N/A;  ok to schedule per Elizabeth    COLONOSCOPY N/A 7/15/2024    Procedure: COLONOSCOPY;  Surgeon: Dread Forman MD;  Location: [facility] (4TH FLR);  Service: Endoscopy;  Laterality: N/A;  Referred by:Dr. SHIRLEY Kay   Prep: Miralax  Route instructions sent: portal and mail  Other concerns: DM oral and insulin; his. of polyps  7/8-pre call complete-GT    COLONOSCOPY N/A 10/7/2024    Procedure: COLONOSCOPY;  Surgeon: Adrian Barry MD;  Location: [facility] (2ND FLR);  Service: Endoscopy;  Laterality: N/A;  peg prep/ prp inst mailed and sent to pt via portal  Regarding: FW: Colonoscopy, possible device-assisted  From: Adrian Barry MD  Sent: 7/15/2024  10:56 AM CDT  To: [facility]  Subject: Colonoscopy, possible device-assisted          Procedu    COLONOSCOPY W/ BIOPSIES AND POLYPECTOMY      EPIDURAL STEROID INJECTION N/A 6/3/2022    Procedure: LESI L5-S1;  Surgeon: Leland Pena DO;  Location: [facility];  Service: Pain Management;  Laterality: N/A;    EPIDURAL STEROID INJECTION INTO  LUMBAR SPINE Left 10/26/2023    Procedure: LT L5-S1 TFESI;  Surgeon: Leland Pena DO;  [facility]: [facility];  Service: Pain Management;  Laterality: Left;  15 mins    HEMORRHOID SURGERY      HYSTERECTOMY      AUB    INJECTION OF ANESTHETIC AGENT AROUND MEDIAL BRANCH NERVES INNERVATING LUMBAR FACET JOINT Bilateral 6/3/2025    Procedure: b/l L3,4,5 MBB#1;  Surgeon: Leland Pena DO;  [facility]: [facility];  Service: Pain Management;  Laterality: Bilateral;  no AC    INJECTION OF JOINT Bilateral 4/12/2022    Procedure: SACROILIAC JOINT Steroid Injection-Bilateral;  Surgeon: Leland Pena DO;  Location: [facility] OR;  Service: Pain Management;  Laterality: Bilateral;    ROTATOR CUFF REPAIR      bilateral    TRANSFORAMINAL EPIDURAL INJECTION OF STEROID Bilateral 12/2/2022    Procedure: TFESI (B/L) L4-5;  Surgeon: Leland Pena DO;  Location: [facility] OR;  Service: Pain Management;  Laterality: Bilateral;    TRIGGER FINGER RELEASE Left 2/2/2024    Procedure: RELEASE, TRIGGER FINGER - left index, long, ring, and small fingers;  Surgeon: Justus Isabel MD;  Location: [facility] OR;  Service: Orthopedics;  Laterality: Left;     Results for orders placed or performed during the hospital encounter of 03/08/24   Repeat EKG 12-lead    Collection Time: 03/08/24 10:34 PM   Result Value Ref Range    QRS Duration 100 ms    OHS QTC Calculation 406 ms    Narrative    Test Reason : R94.31,    Vent. Rate : 081 BPM     Atrial Rate : 081 BPM     P-R Int : 190 ms          QRS Dur : 100 ms      QT Int : 350 ms       P-R-T Axes : 064 -35 091 degrees     QTc Int : 406 ms    Normal sinus rhythm  Left axis deviation  LVH with QRS widening and repolarization abnormality ( R in aVL )  Abnormal ECG  When compared with ECG of 08-MAR-2024 21:11,  Sinus rhythm has replaced Atrial flutter  Nonspecific T wave abnormality, worse in Inferior leads  T wave inversion no longer evident in Lateral leads  Lead V2  is probably placed incorrectly  QT has shortened  Confirmed by Bean Mcnally MD ([id number]) on 3/10/2024 11:15:36 AM    Referred By: [email or web address]   SELF           Confirmed By:Bean Mcnally MD       Pre-op Assessment    I have reviewed the Patient Summary Reports.    I have reviewed the NPO Status.   I have reviewed the Medications.     Review of Systems  Anesthesia Hx:  No problems with previous Anesthesia                Social:  Non-Smoker       Hematology/Oncology:  Hematology Normal   Oncology Normal                                   EENT/Dental:  EENT/Dental Normal           Cardiovascular:     Hypertension, well controlled                                          Renal/:  Chronic Renal Disease, CKD                Hepatic/GI:     GERD, well controlled                Musculoskeletal:  Musculoskeletal Normal                Neurological:    Neuromuscular Disease,                                   Endocrine:  Diabetes, well controlled           Dermatological:  Skin Normal    Psych:  Psychiatric Normal                    Physical Exam  General: Well nourished, Cooperative, Alert and Oriented    Airway:  Mallampati: II   Mouth Opening: Normal  TM Distance: Normal  Tongue: Normal  Neck ROM: Normal ROM    Dental:  Dentures        Anesthesia Plan  Type of Anesthesia, risks & benefits discussed:    Anesthesia Type: Gen Natural Airway  Intra-op Monitoring Plan: Standard ASA Monitors  Induction:  IV  ASA Score: 3  Day of Surgery Review of History & Physical: H&P Update referred to the surgeon/provider.I have interviewed and examined the patient. I have reviewed the patient's H&P dated:     Ready For Surgery From Anesthesia Perspective.     .

## 2025-08-01 NOTE — ANESTHESIA POSTPROCEDURE EVALUATION
Anesthesia Post Evaluation    Patient: Lina Davis    Procedure(s) Performed: Procedure(s) (LRB):  EGD (ESOPHAGOGASTRODUODENOSCOPY) (N/A)    Final Anesthesia Type: general      Patient location during evaluation: GI PACU  Patient participation: Yes- Able to Participate  Level of consciousness: awake and alert  Post-procedure vital signs: reviewed and stable  Pain management: adequate  Airway patency: patent    PONV status at discharge: No PONV  Anesthetic complications: no      Cardiovascular status: blood pressure returned to baseline  Respiratory status: spontaneous ventilation  Hydration status: euvolemic  Follow-up not needed.              Vitals Value Taken Time   /67 08/01/25 08:34   Temp 36.6 °C (97.8 °F) 08/01/25 08:08   Pulse 79 08/01/25 08:34   Resp 16 08/01/25 08:34   SpO2 98 % 08/01/25 08:34         Event Time   Out of Recovery 08:39:00         Pain/Adriana Score: Adriana Score: 10 (8/1/2025  8:23 AM)

## 2025-08-01 NOTE — TRANSFER OF CARE
"Anesthesia Transfer of Care Note    Patient: Lina Davis    Procedure(s) Performed: Procedure(s) (LRB):  EGD (ESOPHAGOGASTRODUODENOSCOPY) (N/A)    Patient location: GI    Anesthesia Type: general    Transport from OR: Transported from OR on room air with adequate spontaneous ventilation    Post pain: adequate analgesia    Post assessment: no apparent anesthetic complications and tolerated procedure well    Post vital signs: stable    Level of consciousness: awake    Nausea/Vomiting: no nausea/vomiting    Complications: none    Transfer of care protocol was followed      Last vitals: Visit Vitals  BP (!) 110/58 (BP Location: Left arm, Patient Position: Lying)   Pulse 80   Temp 36.6 °C (97.8 °F)   Resp 16   Ht 5' 6" (1.676 m)   Wt 78.9 kg (174 lb)   SpO2 98%   Breastfeeding No   BMI 28.08 kg/m²     "

## 2025-08-04 ENCOUNTER — PATIENT MESSAGE (OUTPATIENT)
Dept: GASTROENTEROLOGY | Facility: CLINIC | Age: 80
End: 2025-08-04
Payer: MEDICARE

## 2025-08-04 LAB
ESTROGEN SERPL-MCNC: NORMAL PG/ML
INSULIN SERPL-ACNC: NORMAL U[IU]/ML
LAB AP CLINICAL INFORMATION: NORMAL
LAB AP GROSS DESCRIPTION: NORMAL
LAB AP PERFORMING LOCATION(S): NORMAL
LAB AP REPORT FOOTNOTES: NORMAL

## 2025-08-14 ENCOUNTER — HOSPITAL ENCOUNTER (OUTPATIENT)
Dept: ENDOCRINOLOGY | Facility: CLINIC | Age: 80
Discharge: HOME | End: 2025-08-14
Attending: NURSE PRACTITIONER
Payer: MEDICARE

## 2025-08-14 DIAGNOSIS — E04.1 THYROID NODULE: ICD-10-CM

## 2025-08-14 PROCEDURE — 10005 FNA BX W/US GDN 1ST LES: CPT | Mod: S$GLB,,, | Performed by: INTERNAL MEDICINE

## 2025-08-14 PROCEDURE — 88173 CYTOPATH EVAL FNA REPORT: CPT | Mod: TC

## 2025-08-15 LAB
ESTROGEN SERPL-MCNC: NORMAL PG/ML
INSULIN SERPL-ACNC: NORMAL U[IU]/ML
LAB AP CLINICAL INFORMATION: NORMAL
LAB AP GROSS DESCRIPTION: NORMAL
LAB AP NON-GYN INTERPRETATION SPECIMEN 1: NORMAL
LAB AP PERFORMING LOCATION(S): NORMAL

## 2025-08-18 ENCOUNTER — PATIENT MESSAGE (OUTPATIENT)
Dept: ENDOCRINOLOGY | Facility: CLINIC | Age: 80
End: 2025-08-18
Payer: MEDICARE

## 2025-09-04 ENCOUNTER — TELEPHONE (OUTPATIENT)
Dept: ENDOCRINOLOGY | Facility: CLINIC | Age: 80
End: 2025-09-04
Payer: MEDICARE

## 2025-09-04 ENCOUNTER — OFFICE VISIT (OUTPATIENT)
Dept: OPHTHALMOLOGY | Facility: CLINIC | Age: 80
End: 2025-09-04
Payer: MEDICARE

## 2025-09-04 DIAGNOSIS — H25.12 NUCLEAR SCLEROTIC CATARACT OF LEFT EYE: Primary | ICD-10-CM

## 2025-09-04 DIAGNOSIS — H25.11 NUCLEAR SCLEROTIC CATARACT OF RIGHT EYE: ICD-10-CM

## 2025-09-04 PROCEDURE — 99999 PR PBB SHADOW E&M-EST. PATIENT-LVL II: CPT | Mod: PBBFAC,,, | Performed by: OPHTHALMOLOGY

## 2025-09-05 ENCOUNTER — TELEPHONE (OUTPATIENT)
Dept: OPHTHALMOLOGY | Facility: CLINIC | Age: 80
End: 2025-09-05
Payer: MEDICARE

## 2025-09-05 DIAGNOSIS — H25.12 NUCLEAR SCLEROTIC CATARACT OF LEFT EYE: Primary | ICD-10-CM

## (undated) DEVICE — SOL 9P NACL IRR PIC IL

## (undated) DEVICE — SEE MEDLINE ITEM 152529

## (undated) DEVICE — Device

## (undated) DEVICE — ADHESIVE MASTISOL VIAL 48/BX

## (undated) DEVICE — SOL IRR SOD CHL .9% POUR

## (undated) DEVICE — GLOVE BIOGEL SKINSENSE PI 7.0

## (undated) DEVICE — UNDERGLOVES BIOGEL PI SIZE 7.5

## (undated) DEVICE — GOWN SMART IMP BREATHABLE XXLG

## (undated) DEVICE — SOL IRR NACL .9% 3000ML

## (undated) DEVICE — DRESSING XEROFORM NONADH 1X8IN

## (undated) DEVICE — MARKER SKIN STND TIP BLUE BARR

## (undated) DEVICE — SEE MEDLINE ITEM 156905

## (undated) DEVICE — GLOVE BIOGEL SKINSENSE PI 7.5

## (undated) DEVICE — DRAPE STERI-DRAPE 1000 17X11IN

## (undated) DEVICE — PIN DISTRACTION DISP 14MM
Type: IMPLANTABLE DEVICE | Site: SPINE CERVICAL | Status: NON-FUNCTIONAL
Removed: 2020-03-19

## (undated) DEVICE — TOWEL OR DISP STRL BLUE 4/PK

## (undated) DEVICE — SYR 3CC LUER LOC

## (undated) DEVICE — SUPPORT SLING SHOT II MEDIUM

## (undated) DEVICE — SEE MEDLINE ITEM 157150

## (undated) DEVICE — BUR BONE CUT MICRO TPS 3X3.8MM

## (undated) DEVICE — NDL ECLIPSE SAFETY 18GX1-1/2IN

## (undated) DEVICE — CHLORAPREP 10.5 ML APPLICATOR

## (undated) DEVICE — KIT NERVE BLOCK PREP BAPTIST

## (undated) DEVICE — TUBE SET INFLOW/OUTFLOW

## (undated) DEVICE — UNDERGLOVES BIOGEL PI SZ 7 LF

## (undated) DEVICE — CANNULA TWIST IN 7MM X 7CM

## (undated) DEVICE — DRESSING XEROFORM FOIL PK 1X8

## (undated) DEVICE — PAD ABD 8X10 STERILE

## (undated) DEVICE — SUT MCRYL PLUS 4-0 PS2 27IN

## (undated) DEVICE — SOCKINETTE DOUBLE PLY 4X48IN

## (undated) DEVICE — SEE MEDLINE ITEM 146420

## (undated) DEVICE — NDL 22GA X1 1/2 REG BEVEL

## (undated) DEVICE — RESTRAINT LIMB HOLDER ADJ FOAM

## (undated) DEVICE — DRAPE U SPLIT SHEET 54X76IN

## (undated) DEVICE — PAD SHOULDER CARE POLAR

## (undated) DEVICE — SUT MONOCRYL 4-0 PS-1 UND

## (undated) DEVICE — COVER PROXIMA MAYO STAND

## (undated) DEVICE — CLOSURE SKIN STERI STRIP 1/2X4

## (undated) DEVICE — DRESSING TRANS 4X4 TEGADERM

## (undated) DEVICE — DRAPE OPMI STERILE

## (undated) DEVICE — SYR DISP LL 5CC

## (undated) DEVICE — PAD SHOULDER POLAR CARE XL

## (undated) DEVICE — CATH SUCTION 10FR

## (undated) DEVICE — UNDERGLOVES BIOGEL PI SIZE 8

## (undated) DEVICE — SUT VICRYL PLUS 2-0

## (undated) DEVICE — PAD ELECTRODE STER 1.5X3

## (undated) DEVICE — BLADE SHAVER 4.5 6/BX

## (undated) DEVICE — SYR 10CC LUER LOCK

## (undated) DEVICE — PROBE ARTHSCP EDGE  90 SUCTION

## (undated) DEVICE — GAUZE SPONGE PEANUT STRL

## (undated) DEVICE — APPLICATOR CHLORAPREP ORN 26ML

## (undated) DEVICE — SEE MEDLINE ITEM 152621

## (undated) DEVICE — POSITIONER IV ARMBOARD FOAM

## (undated) DEVICE — SEE MEDLINE ITEM 157117

## (undated) DEVICE — DRESSING TELFA N ADH 3X8

## (undated) DEVICE — GRASPER SUTURE 60 DEG 15CM PNK

## (undated) DEVICE — BANDAGE ADHESIVE

## (undated) DEVICE — BANDAGE MATRIX HK LOOP 2IN 5YD

## (undated) DEVICE — PACK SET UP CONVERTORS

## (undated) DEVICE — DRAPE C ARM 42 X 120 10/BX

## (undated) DEVICE — DRAPE THYROID WITH ARMBOARD

## (undated) DEVICE — CARTRIDGE OIL

## (undated) DEVICE — SHAVER ULTRAFFR 4.2MM

## (undated) DEVICE — CORD BIPOLAR 12 FOOT

## (undated) DEVICE — COVER LIGHT HANDLE 80/CA

## (undated) DEVICE — 14MM DRILL BIT

## (undated) DEVICE — GLOVE ORTHO PF SZ 8.5

## (undated) DEVICE — DRESSING SURGICAL 1/2X1/2

## (undated) DEVICE — ELECTRODE REM PLYHSV RETURN 9

## (undated) DEVICE — DURAPREP SURG SCRUB 26ML

## (undated) DEVICE — SEE MEDLINE ITEM 152530

## (undated) DEVICE — DRAPE STERI U-SHAPED 47X51IN

## (undated) DEVICE — GAUZE SPONGE 4X4 12PLY

## (undated) DEVICE — SUT CTD VICRYL 3-0 CR/SH

## (undated) DEVICE — GLOVE BIOGEL PI MICRO SZ 7.5

## (undated) DEVICE — GLOVE SURGEON SYN PF SZ 9

## (undated) DEVICE — CANNULA PASSPORT 8 MM X 4CM.

## (undated) DEVICE — COVER CAMERA OPERATING ROOM

## (undated) DEVICE — SUT 4-0 ETHILON 18 PS-2

## (undated) DEVICE — NDL 18GA X1 1/2 REG BEVEL

## (undated) DEVICE — NDL SPINAL 18GX3.5 SPINOCAN

## (undated) DEVICE — TUBE FRAZIER 5MM 2FT SOFT TIP

## (undated) DEVICE — SLING ARM LARGE FOAM STRAP

## (undated) DEVICE — KIT SHOULDER POSITIONER SPIDER

## (undated) DEVICE — CANNULA TRIPLEDAM 8.25MM 7CM

## (undated) DEVICE — TOURNIQUET SB QC DP 18X4IN

## (undated) DEVICE — CANNULA PASSPORT 8 MM X 3CM

## (undated) DEVICE — DRAPE STERI INSTRUMENT 1018

## (undated) DEVICE — DIFFUSER